# Patient Record
Sex: FEMALE | Race: WHITE | HISPANIC OR LATINO | Employment: OTHER | URBAN - METROPOLITAN AREA
[De-identification: names, ages, dates, MRNs, and addresses within clinical notes are randomized per-mention and may not be internally consistent; named-entity substitution may affect disease eponyms.]

---

## 2017-01-10 ENCOUNTER — HOSPITAL ENCOUNTER (OUTPATIENT)
Dept: RADIOLOGY | Facility: HOSPITAL | Age: 72
Discharge: HOME | End: 2017-01-10
Payer: COMMERCIAL

## 2017-01-10 DIAGNOSIS — Z12.31 ENCOUNTER FOR SCREENING MAMMOGRAM FOR MALIGNANT NEOPLASM OF BREAST: ICD-10-CM

## 2017-01-10 DIAGNOSIS — J32.9 CHRONIC SINUSITIS: ICD-10-CM

## 2017-01-10 PROCEDURE — G0202 SCR MAMMO BI INCL CAD: HCPCS

## 2017-01-11 ENCOUNTER — GENERIC CONVERSION - ENCOUNTER (OUTPATIENT)
Dept: OTHER | Facility: OTHER | Age: 72
End: 2017-01-11

## 2017-05-05 ENCOUNTER — ALLSCRIPTS OFFICE VISIT (OUTPATIENT)
Dept: OTHER | Facility: OTHER | Age: 72
End: 2017-05-05

## 2017-05-05 ENCOUNTER — APPOINTMENT (OUTPATIENT)
Dept: LAB | Facility: CLINIC | Age: 72
End: 2017-05-05
Payer: COMMERCIAL

## 2017-05-05 DIAGNOSIS — M65.311 TRIGGER THUMB OF RIGHT HAND: ICD-10-CM

## 2017-05-05 LAB
EST. AVERAGE GLUCOSE BLD GHB EST-MCNC: 123 MG/DL
HBA1C MFR BLD: 5.9 % (ref 4.2–6.3)

## 2017-05-05 PROCEDURE — 36415 COLL VENOUS BLD VENIPUNCTURE: CPT

## 2017-05-05 PROCEDURE — 83036 HEMOGLOBIN GLYCOSYLATED A1C: CPT

## 2017-06-08 ENCOUNTER — HOSPITAL ENCOUNTER (OUTPATIENT)
Dept: RADIOLOGY | Facility: CLINIC | Age: 72
Discharge: HOME | End: 2017-06-08
Payer: COMMERCIAL

## 2017-06-08 ENCOUNTER — ALLSCRIPTS OFFICE VISIT (OUTPATIENT)
Dept: OTHER | Facility: OTHER | Age: 72
End: 2017-06-08

## 2017-06-08 DIAGNOSIS — M79.643 PAIN OF HAND: ICD-10-CM

## 2017-06-08 PROCEDURE — 73130 X-RAY EXAM OF HAND: CPT

## 2017-11-22 ENCOUNTER — HOSPITAL ENCOUNTER (EMERGENCY)
Facility: HOSPITAL | Age: 72
Discharge: HOME | End: 2017-11-22
Attending: EMERGENCY MEDICINE
Payer: COMMERCIAL

## 2017-11-22 ENCOUNTER — APPOINTMENT (EMERGENCY)
Dept: RADIOLOGY | Facility: HOSPITAL | Age: 72
End: 2017-11-22
Payer: COMMERCIAL

## 2017-11-22 VITALS
RESPIRATION RATE: 16 BRPM | WEIGHT: 119 LBS | OXYGEN SATURATION: 98 % | TEMPERATURE: 97.9 F | SYSTOLIC BLOOD PRESSURE: 171 MMHG | HEART RATE: 95 BPM | DIASTOLIC BLOOD PRESSURE: 107 MMHG

## 2017-11-22 DIAGNOSIS — S66.911A STRAIN OF RIGHT WRIST, INITIAL ENCOUNTER: Primary | ICD-10-CM

## 2017-11-22 PROCEDURE — 73110 X-RAY EXAM OF WRIST: CPT

## 2017-11-22 PROCEDURE — 73130 X-RAY EXAM OF HAND: CPT

## 2017-11-22 PROCEDURE — 99283 EMERGENCY DEPT VISIT LOW MDM: CPT

## 2017-11-22 NOTE — ED PROVIDER NOTES
History  Chief Complaint   Patient presents with    Wrist Injury     pt tripped over curb falling onto right wrist  denies hitting head, no loc, no neck pain, denies thinners  Patient is a 71-year-old female presenting to the ER today with injury to the right wrist and right hand  Patient states that she was going to her mailbox yesterday evening and tripped on her left foot falling forward and catching herself on her right hand and wrist   Denied hitting head or losing consciousness  Patient was able to get herself up  Patient took aspirin for pain  Did not have a ride to the hospital until this morning  On exam patient has pain to palpation over the radial ulnar aspects of the distal right upper extremity  Pain over all MCPs  No obvious deformity however  Assessment plan:  71-year-old female with mechanical fall will check x-ray right wrist and right hand  History provided by:  Patient   used: No        None       Past Medical History:   Diagnosis Date    Asthma     Fibromyalgia     Irritable bowel syndrome        History reviewed  No pertinent surgical history  History reviewed  No pertinent family history  I have reviewed and agree with the history as documented  Social History   Substance Use Topics    Smoking status: Current Every Day Smoker    Smokeless tobacco: Never Used    Alcohol use No        Review of Systems   Constitutional: Negative  Negative for appetite change, chills, diaphoresis, fatigue and fever  HENT: Negative  Eyes: Negative  Respiratory: Negative  Cardiovascular: Negative  Gastrointestinal: Negative for abdominal pain, blood in stool, constipation, diarrhea, nausea and vomiting  Endocrine: Negative  Genitourinary: Negative for decreased urine volume, difficulty urinating, dyspareunia, dysuria, flank pain, frequency, hematuria, pelvic pain, urgency, vaginal bleeding, vaginal discharge and vaginal pain  Musculoskeletal: Positive for arthralgias, joint swelling and myalgias  Skin: Negative  Allergic/Immunologic: Negative  Neurological: Negative  Psychiatric/Behavioral: Negative  All other systems reviewed and are negative  Physical Exam  ED Triage Vitals   Temperature Pulse Respirations Blood Pressure SpO2   11/22/17 0709 11/22/17 0709 11/22/17 0709 11/22/17 0710 11/22/17 0709   97 9 °F (36 6 °C) 95 16 (!) 171/107 98 %      Temp Source Heart Rate Source Patient Position - Orthostatic VS BP Location FiO2 (%)   11/22/17 0709 -- -- -- --   Oral          Pain Score       11/22/17 0706       8           Orthostatic Vital Signs  Vitals:    11/22/17 0709 11/22/17 0710   BP:  (!) 171/107   Pulse: 95        Physical Exam   Constitutional: She is oriented to person, place, and time  She appears well-developed and well-nourished  HENT:   Head: Normocephalic and atraumatic  Right Ear: External ear normal    Left Ear: External ear normal    Nose: Nose normal    Mouth/Throat: Oropharynx is clear and moist    Eyes: Conjunctivae and EOM are normal  Pupils are equal, round, and reactive to light  Neck: Normal range of motion  Neck supple  No JVD present  No tracheal deviation present  No thyromegaly present  Cardiovascular: Normal rate, regular rhythm, normal heart sounds and intact distal pulses  Exam reveals no gallop and no friction rub  No murmur heard  Pulmonary/Chest: Effort normal and breath sounds normal  No stridor  No respiratory distress  She has no wheezes  She has no rales  She exhibits no tenderness  Abdominal: Soft  Bowel sounds are normal  She exhibits no distension and no mass  There is no tenderness  There is no rebound and no guarding  No hernia  Musculoskeletal: Normal range of motion  She exhibits tenderness  She exhibits no edema or deformity  Right wrist: She exhibits tenderness and bony tenderness  She exhibits normal range of motion and no swelling          Right hand: She exhibits tenderness and bony tenderness  She exhibits normal range of motion, normal two-point discrimination, normal capillary refill, no deformity, no laceration and no swelling  Normal sensation noted  Decreased sensation is not present in the ulnar distribution, is not present in the medial distribution and is not present in the radial distribution  Normal strength noted  She exhibits no finger abduction, no thumb/finger opposition and no wrist extension trouble  Lymphadenopathy:     She has no cervical adenopathy  Neurological: She is alert and oriented to person, place, and time  She has normal reflexes  She displays normal reflexes  No cranial nerve deficit  She exhibits normal muscle tone  Coordination normal    Skin: Skin is warm  No rash noted  No erythema  No pallor  Psychiatric: She has a normal mood and affect  Her behavior is normal  Judgment and thought content normal    Nursing note and vitals reviewed        ED Medications  Medications - No data to display    Diagnostic Studies  Results Reviewed     None                 XR hand 3+ views RIGHT   ED Interpretation by Nivia Morse DO (11/22 0759)      XR wrist 3+ views RIGHT    (Results Pending)         Procedures  Procedures      Phone Consults  ED Phone Contact    ED Course  ED Course                                MDM  Number of Diagnoses or Management Options  Strain of right wrist, initial encounter: new and requires workup     Amount and/or Complexity of Data Reviewed  Clinical lab tests: ordered and reviewed  Tests in the radiology section of CPT®: ordered and reviewed  Tests in the medicine section of CPT®: reviewed and ordered  Decide to obtain previous medical records or to obtain history from someone other than the patient: yes  Independent visualization of images, tracings, or specimens: yes    Patient Progress  Patient progress: stable    CritCare Time    Disposition  Final diagnoses:   Strain of right wrist, initial encounter     Time reflects when diagnosis was documented in both MDM as applicable and the Disposition within this note     Time User Action Codes Description Comment    11/22/2017  8:02 AM Aleyda Code Add [E24 321K] Strain of right wrist, initial encounter       ED Disposition     ED Disposition Condition Comment    Discharge  Michel Rivera discharge to home/self care  Condition at discharge: Good        Follow-up Information     Follow up With Specialties Details Why Contact Info    Nabor Washington MD Internal Medicine Schedule an appointment as soon as possible for a visit  [address] [place] [address]  [phone]          Patient's Medications    No medications on file     No discharge procedures on file  ED Provider  Attending physically available and evaluated Michel Rivera I managed the patient along with the ED Attending      Electronically Signed by         Xi Kovacs DO  Resident  11/22/17 6399

## 2017-11-22 NOTE — ED ATTENDING ATTESTATION
Yudy Uriarte MD, saw and evaluated the patient  I have discussed the patient with the resident/non-physician practitioner and agree with the resident's/non-physician practitioner's findings, Plan of Care, and MDM as documented in the resident's/non-physician practitioner's note, except where noted  All available labs and Radiology studies were reviewed  At this point I agree with the current assessment done in the Emergency Department  I have conducted an independent evaluation of this patient a history and physical is as follows:     The patient presents with complaints of hand injury after a fall on an outstretched hands last evening no other injuries just complains of pain over the dorsal aspect of the hand  Mild swelling and tenderness over the dorsal aspect of her 2nd 3rd meta carpal no skin violation normal pulses normal sensation normal motor function wrist is nontender  X-ray negative for fracture    Critical Care Time  CritCare Time

## 2017-11-22 NOTE — DISCHARGE INSTRUCTIONS
Wrist Injury   WHAT YOU NEED TO KNOW:   A wrist injury happens when the tissues of your wrist joint are damaged  Your wrist joint is made up of tendons, ligaments, nerves, and bones  Two common types of injuries that can happen to your wrist are sprains and strains  A sprain can happen when the ligaments are stretched or torn  Ligaments are bands of elastic tissue that connect and hold the bones together  A strain happens when a tendon or muscle is overused, stretched, or torn  Tendons attach your hand and arm muscles to the bones of the wrist    DISCHARGE INSTRUCTIONS:   Medicines:   · NSAIDs:  These medicines decrease swelling, pain, and fever  NSAIDs are available without a doctor's order  Ask which medicine is right for you  Ask how much to take and when to take it  Take as directed  NSAIDs can cause stomach bleeding and kidney problems if not taken correctly  · Pain medicine: You may be given a prescription medicine to decrease pain  Do not wait until the pain is severe before you take this medicine  · Take your medicine as directed  Contact your healthcare provider if you think your medicine is not helping or if you have side effects  Tell him of her if you are allergic to any medicine  Keep a list of the medicines, vitamins, and herbs you take  Include the amounts, and when and why you take them  Bring the list or the pill bottles to follow-up visits  Carry your medicine list with you in case of an emergency  Follow up with your healthcare provider as directed:  Write down your questions so you remember to ask them during your visits  Manage your symptoms:   · Wrist supports:  A cast or splint may be put on your fingers, hand, and wrist to support your wrist and prevent further damage  Wear these as directed  Ask for instructions on how to bathe while you are wearing a splint or case  · Rest:  You may need to rest your wrist for at least 48 hours and avoid activities that cause pain   Ask what activities you should avoid and for how long  · Ice:  Ice helps decrease swelling and pain  Ice may also help prevent tissue damage  Use an ice pack or put crushed ice in a plastic bag  Cover it with a towel and place it on your injured wrist for 15 to 20 minutes every hour as directed  · Compression:  Your healthcare provider may suggest you wrap your wrist with an elastic bandage  This will help decrease swelling, support your wrist, and help it heal  Wear your wrist wrap as directed  Ask for instructions on how to wrap your wrist     · Elevation:  When you sit or lie down, keep your wrist at or above the level of your heart  This may help decrease pain and swelling  Physical therapy:  Your healthcare provider may recommend that you go to physical therapy  A physical therapist shows you how to do exercises that can help to strengthen your wrist and improve its range of movement  These exercises may also help decrease your pain  Prevent another wrist injury:   · Do strengthening exercises: Your healthcare provider or physical therapist may suggest that you do exercises to strengthen your hand and arm muscles  Ask when you may return to your regular physical activities or sports  If you start to exercise too soon it may cause you to injure your wrist again  · Protect your wrists:  Wrist guard splints or protective tape can help to support your wrist during exercise and sports  These devices may also keep your wrist from bending too far back  Ask for more information about the type of wrist support that you should use  Contact your healthcare provider if:   · You have a fever  · The bruising, swelling, or pain in your wrist gets worse  · You have questions or concerns about your condition or care  Return to the emergency department if:   · The skin on or near your wrist or hand feels cold, or it turns blue or white      · The skin on or near your wrist or hand is very tight, raised, and swollen  · You have new trouble moving and using your hands, fingers, or wrist     · Your wrist, hands, or fingers become swollen, red, numb, or they tingle  · Your wrist has any open wounds, including from surgery, that are red, swollen, warm, or have pus coming from them  © 2017 [employer] Information is for End User's use only and may not be sold, redistributed or otherwise used for commercial purposes  All illustrations and images included in CareNotes® are the copyrighted property of [employer]  or Dewayne Melendez  The above information is an  only  It is not intended as medical advice for individual conditions or treatments  Talk to your doctor, nurse or pharmacist before following any medical regimen to see if it is safe and effective for you

## 2017-11-29 ENCOUNTER — GENERIC CONVERSION - ENCOUNTER (OUTPATIENT)
Dept: OTHER | Facility: OTHER | Age: 72
End: 2017-11-29

## 2017-12-11 ENCOUNTER — APPOINTMENT (OUTPATIENT)
Dept: RADIOLOGY | Facility: CLINIC | Age: 72
End: 2017-12-11
Payer: COMMERCIAL

## 2017-12-11 ENCOUNTER — ALLSCRIPTS OFFICE VISIT (OUTPATIENT)
Dept: OTHER | Facility: OTHER | Age: 72
End: 2017-12-11

## 2017-12-11 DIAGNOSIS — S63.641A SPRAIN OF METACARPOPHALANGEAL JOINT OF RIGHT THUMB: ICD-10-CM

## 2017-12-11 DIAGNOSIS — M79.646 PAIN OF FINGER: ICD-10-CM

## 2017-12-11 DIAGNOSIS — Z12.31 ENCOUNTER FOR SCREENING MAMMOGRAM FOR MALIGNANT NEOPLASM OF BREAST: ICD-10-CM

## 2017-12-11 DIAGNOSIS — S63.641D SPRAIN OF METACARPOPHALANGEAL JOINT OF RIGHT THUMB, SUBSEQUENT ENCOUNTER: ICD-10-CM

## 2017-12-11 PROCEDURE — 73140 X-RAY EXAM OF FINGER(S): CPT

## 2017-12-12 NOTE — PROGRESS NOTES
Assessment    1  Rupture of ulnar collateral ligament of right thumb, initial encounter (842 12) (W32 506U)    Plan  Finger pain    · * XR FINGER RIGHT FIFTH DIGIT-PINKIE; Status:Active; Requested for:92Smu2882;   Rupture of ulnar collateral ligament of right thumb, initial encounter    · Follow Up After Tests Complete Evaluation and Treatment  Follow-up  Status: Hold For -Scheduling,Retrospective Authorization  Requested for: 48GKE8408   · Continue with our present treatment plan ; Status:Complete - RetrospectiveAuthorization;   Done: 86ARB5346   · US MSK COMPLETE; Status:Hold For - Scheduling; Requested for:55Fuw7386;   Sprain of right little finger, initial encounter    · Follow-up PRN Evaluation and Treatment  Follow-up  Status: Complete - RetrospectiveAuthorization  Done: 72TLB8014   · Apply an ice pack as needed for pain twice a day for 20 minutes ; Status:Complete -Retrospective Authorization;   Done: 97OGF2394    Discussion/Summary    Assessment: R small finger sprain, possible R thumb UCL tear  up after US is complete  tape R small finger for sprain, ice prn  note was dictated with dictation software  As such, and may contain typographical errors, improperly dictated words, background noise, or other errors  patient is a 71year old female who presents today with R thumb and small finger pain  Pt c/o pain at her best 8/10 and worst 10/10  Pt reports a fall on 11/22/17  pt takes asprin for pain   Pt is waking at night due to the pain  past medical history, surgical history, family history, social history, medications, allergies, and review of systems have been read and reviewed on the chart and have been updated  Review of Systems was recorded in the office today on a separate evaluation sheet and is listed below    of Systems:NegativeNegativeNegativeNegativeNegativeNegativeNegative except as aboveAs aboveNegative except as aboveNegativeNegative    / Psych: Awake and Oriented, No acute distress, age appropriateNormocephalic, atraumatic, mucous membranes moist, neck supple, trachea midline  No rebound or signs of guardingNo discernible arrhythmia, 2+ pulses with good capillary refillNo audible wheezing or audible stridor[The patient is neurovascularly intact in the median, ulnar, and radial nerve distribution  There is normal sensation and good capillary refill within the digits  2+ pulses  ][No lesions, rashes, streaking, purulence, abscesses, lymphangitis] ecchymosis on volar side of R small finger and MCP jt of R thumb negative grind test  UCL radial stress testing showed laxity  Studies: new xrays were ordered today in the office  They were reviewed and discussed with the patient  The xray was normal   attestation:  VITOR Sylvester, ATC, am acting as a scribe while in the presence of the attending physician  Chief Complaint  1  Finger Problem    Active Problems    1  Allergic rhinitis (477 9) (J30 9)   2  Asthma, very poorly controlled (493 90) (J45 909)   3  Breast cyst, right (610 0) (N60 01)   4  Chronic sinusitis (473 9) (J32 9)   5  Classic migraine with aura (346 00) (G43 109)   6  Deficient knowledge of health care maintenance   7  Encounter for screening colonoscopy (V76 51) (Z12 11)   8  Encounter for screening mammogram for high-risk patient (V76 11) (Z12 31)   9  Encounter for screening mammogram for malignant neoplasm of breast (V76 12) (Z12 31)   10  Exacerbation of persistent asthma (493 92) (J45 901)   11  GERD (gastroesophageal reflux disease) (530 81) (K21 9)   12  Hand pain (729 5) (M79 643)   13  Metacarpophalangeal joint pain of right hand (719 44) (M79 641)   14  Mild persistent asthma with acute exacerbation (493 92) (J45 31)   15  Neck muscle strain (847 0) (S16 1XXA)   16  Need for lipid screening (V77 91) (Z13 220)   17  Pain of right thumb (729 5) (M79 644)   18  Renal cyst (753 10) (N28 1)   19  S/P small bowel resection (V45 89) (Z90 49)   20   Screening for cardiovascular condition (V81 2) (Z13 6)   21  Seasonal allergies (477 9) (J30 2)   22  Sinusitis (473 9) (J32 9)   23  Sprain of right wrist, subsequent encounter (V58 89,842 00) (S63 501D)   24  Tobacco use (305 1) (Z72 0)   25  Trigger finger of right thumb (727 03) (M65 311)    Past Medical History   · History of Bronchitis, asthmatic (493 90) (J45 909)   · History of Chronic diarrhea (787 91) (K52 9)   · History of Fibromyalgia (729 1) (M79 7)   · History of Focal nodular hyperplasia of liver (573 8) (K76 89)   · History of diarrhea (V12 79) (E64 883)   · History of herpes zoster (V12 09) (Z86 19)   · History of vertigo (V12 49) (Z87 898)   · History of Irritable bowel syndrome (564 1) (K58 9)   · History of Osteoarthritis (V13 4)   · History of Palpitations Intermittently   · History of URTI (acute upper respiratory infection) (465 9) (J06 9)    Surgical History   · History of Biopsy Breast Open   · History of Small Bowel Resection    Family History   · Family history of Aspiration of vomit   · Family history of Asthma   · Family history of Breast CA    Social History     · Cigarette smoker (305 1) (F17 210)   · Current Smoker (305 1)   · Exercising Regularly   · Lives with adult children   · Marital History -    · Mental Disability: (319)   · Never Drank Alcohol   · Tobacco use (305 1) (Z72 0)   · Unemployed (V62 0)    Current Meds   1  Albuterol Sulfate (2 5 MG/3ML) 0 083% Inhalation Nebulization Solution; USE 1 UNIT DOSE EVERY 4-6 HOURS AS NEEDED FOR WHEEZING ; Therapy: 93GSA8028 to (Evaluate:87Bnx8432)  Requested for: 29EED3932; Last Rx:33Txg5694 Ordered   2  Flovent  MCG/ACT Inhalation Aerosol; INHALE 2 PUFFS TWICE DAILY  RINSE MOUTH AFTER USE; Therapy: 60Ode7397 to (Evaluate:28Jan2018)  Requested for: 80VJL9737; Last Rx:29Nov2017 Ordered   3   Fluticasone Propionate 50 MCG/ACT Nasal Suspension; USE 2 SPRAYS IN EACH NOSTRIL ONCE DAILY  Requested for: 60TJT8227; Last Rx:29Nov2017; Status: 1554 Surgeons  to Pharmacy - Awaiting Verification Ordered   4  Ketorolac Tromethamine 0 4 % Ophthalmic Solution; INSTILL 1 DROP INTO AFFECTED EYE(S) 4 TIMES DAILY; Therapy: 27AVH1837 to (Last LT:78CMW2583)  Requested for: 10PFT4432 Ordered   5  Loratadine 10 MG Oral Tablet; TAKE 1 TABLET DAILY  Requested for: 49QWO4335; Last Rx:40Aoe8710 Ordered   6  Metoclopramide HCl - 5 MG Oral Tablet; TAKE 0 5 TABLET Every 6 hours PRN; Therapy: 44EQR4859 to (Leo Schaeffer)  Requested for: 95LIZ7480; Last VV:49WRI3701 Ordered   7  Nebulizer Device; USE AS DIRECTED; Therapy: 11TTL5835 to (Last Rx:06Mar2015)  Requested for: 16GFS6285 Ordered   8  Omeprazole 20 MG Oral Capsule Delayed Release; TAKE 1 CAPSULE DAILY; Therapy: 28Nft1827 to (Evaluate:28Mct0673)  Requested for: 15PYO1577; Last SQ:12IVL7251 Ordered   9  Ventolin  (90 Base) MCG/ACT Inhalation Aerosol Solution; inhale 1-2 puffs every 4-6 hours only for wheezing and severe shortness of breath not for daily use; Therapy: 73Zie9887 to (Faisal Mora)  Requested for: 48FGU5973; Last Rx:29Nov2017 Ordered   10  ZyrTEC Allergy 10 MG Oral Capsule; take 1 capsule daily; Therapy: 34YTU5978 to (Leo Schaeffer)  Requested for: 63JFM0191; Last  FJ:77KHO6987 Ordered    Allergies  1  Codeine Derivatives   2  Morphine Derivatives   3  Motrin TABS   4  Tramadol  5  No Known Food Allergies   6  Pollen   7   Ragweed    Vitals  Signs   Heart Rate: 89  Systolic: 371  Diastolic: 458  Height: 5 ft 3 in  Weight: 131 lb 4 00 oz  BMI Calculated: 23 25  BSA Calculated: 1 62    Future Appointments    Date/Time Provider Specialty Site   12/21/2017 08:35 AM Huan Jordan DO Internal Medicine [facility] [address] [place] [address] PCP       Signatures   Electronically signed by : Keli Mercado DO; Dec 11 2017  3:31PM EST                       (Author)

## 2017-12-13 ENCOUNTER — HOSPITAL ENCOUNTER (OUTPATIENT)
Dept: RADIOLOGY | Facility: HOSPITAL | Age: 72
Discharge: HOME | End: 2017-12-13
Attending: ORTHOPAEDIC SURGERY | Admitting: RADIOLOGY
Payer: COMMERCIAL

## 2017-12-13 DIAGNOSIS — S63.641A SPRAIN OF METACARPOPHALANGEAL JOINT OF RIGHT THUMB: ICD-10-CM

## 2017-12-13 PROCEDURE — 76882 US LMTD JT/FCL EVL NVASC XTR: CPT

## 2017-12-15 ENCOUNTER — GENERIC CONVERSION - ENCOUNTER (OUTPATIENT)
Dept: OTHER | Facility: OTHER | Age: 72
End: 2017-12-15

## 2017-12-21 ENCOUNTER — ALLSCRIPTS OFFICE VISIT (OUTPATIENT)
Dept: OTHER | Facility: OTHER | Age: 72
End: 2017-12-21

## 2017-12-22 NOTE — PROGRESS NOTES
Assessment   1  Asthma, very poorly controlled (493 90) (J45 909)   2  Chronic sinusitis (473 9) (J32 9)   3  Tobacco use (305 1) (Z72 0)   4  Need for prophylactic vaccination and inoculation against influenza (V04 81) (Z23)   5  Visit for screening mammogram (V76 12) (Z12 31)    Plan   Allergic rhinitis    · Fluticasone Propionate 50 MCG/ACT Nasal Suspension (Flonase)   · Loratadine 10 MG Oral Tablet   · [facility]S Nasal Mist 0 9 % Nasal Aerosol Solution; USE AS DIRECTED ON PACKAGE   · ZyrTEC Allergy 10 MG Oral Capsule; take 1 capsule daily  Asthma, very poorly controlled    · Advair Diskus 250-50 MCG/DOSE Inhalation Aerosol Powder Breath Activated; INHALE 1 PUFF    TWICE DAILY  RINSE MOUTH AFTER USE  Encounter for screening mammogram for high-risk patient, Rupture of ulnar collateral ligament of    right thumb, subsequent encounter    · * MAMMO SCREENING BILATERAL W CAD; Status:Hold For - Scheduling; Requested for:66Sto4533;      Exacerbation of persistent asthma    · Albuterol Sulfate (2 5 MG/3ML) 0 083% Inhalation Nebulization Solution; USE 1 UNIT DOSE    EVERY 4-6 HOURS AS NEEDED FOR WHEEZING   Health Maintenance    · Ventolin  (90 Base) MCG/ACT Inhalation Aerosol Solution; inhale 1-2 puffs every    4-6 hours only for wheezing and severe shortness of breath not for daily use  PMH: Bronchitis, asthmatic    · Flovent  MCG/ACT Inhalation Aerosol  Trigger finger of right thumb    · Ketorolac Tromethamine 0 4 % Ophthalmic Solution    Discussion/Summary   Discussion Summary:    Discussed with patient the need for smoking cessation  Explained to patient and given her poorly-controlled asthma that smoking cessation is likely going to be the best remedy for this  Patient remains pre contemplative about this action at this time  Patient is resistant to stopping smoking as she states that she uses smoking as a relief mechanism for her anxiety  with patient the need for influenza vaccine   Patient states that she is allergic to eggs at this time  Patient states that she is resistant to getting the flu vaccine  The the attending spoke with the patient discuss that allergies to eggs is no longer considered an absolute contraindication to flu vaccination at the patient was offered flu vaccination  She remains hesitant and declines vaccine at this time  with patient the need for follow-up care through her orthopedic surgeon for the ruptured ulnar collateral ligament of her thumb on the right  Patient verbalized understanding and states that she has an appointment to follow up with him in February  with patient the need for use of saline nasal spray for chronic sinusitis  Patient states that she wakes up each morning with difficulty breathing through her nose is a chronic condition for her patient states that the fluticasone has not been helpful for her  At this time I explained to the patient that since the fluticasone has not been helpful for that switching to saline more frequently using this may be helpful    with patient the need for follow-up and moderate feet patient had a mammogram approximately 1 year ago in January of 2017  I will give her a script to go for a follow-up mammogram in 1 year as requested by the radiologist    issues with asthma control  At this point it appears to be more seasonal  We will change her medications to advair for the winter and will re-evaluate in the spring  should follow up in 3 months for re-evaluation, particularly for the asthma control  Counseling Documentation With Imm: The patient was counseled regarding diagnostic results,-- instructions for management,-- risk factor reductions,-- impressions,-- risks and benefits of treatment options,-- importance of compliance with treatment  Immunization Counseling The parent/guardian was counseled on the following vaccine components: Influenza vaccine      Medication SE Review and Pt Understands Tx: Possible side effects of new medications were reviewed with the patient/guardian today  The treatment plan was reviewed with the patient/guardian  The patient/guardian understands and agrees with the treatment plan      Chief Complaint   Chief Complaint Free Text Note Form: Asthma, UCL of r thumb rupture, screening mammo, smoking cessation      History of Present Illness   HPI: Pt is 72 yo female with multiple medical conditions here for a f/u of her chronic medical conditions  recently fell and injured her Rt thumb  She has seen orthopaedic surgery and was diagnosis with an UCL of the R thumb  She is wearing a thumb spica splint for this and is to follow up with him again in February  pt states that she is using her nebulizer twice daily  PT states that she has been running out of her inhalers  Pt continues to use her flovent twice daily and her ventolin twice daily  (upon further questioning by the attending, she states that this is only occurs in the winter ) Pt states that she continues to smoke 5-7 cigarettes  Pt states that she needs refills on all of her inhalers and nebulizers  She continues to have subjective wheezing, particularly when her sinuses are congestioned  for mammography  Pt had a mammo done in 1/2017 and needs follow up in one year  states that she cannot receive an influenza vaccine 2/2 egg allergy, this was discussed with her by the attending     smoking cessation, but the patient is pre-contemplative and states that she needs it for my nerves, its like my own personal drug  Hospital Based Practices Required Assessment:      Pain Assessment      the patient states they have pain  The pain is located in the all over body  The patient describes the pain as aching  (on a scale of 0 to 10, the patient rates the pain at 8 )       Prefered Language is  english  Primary Language is  english        Review of Systems   Complete-Female:      Constitutional: chills, but-- no fever,-- not feeling poorly-- and-- not feeling tired  Eyes: purulent discharge from the eyes-- and-- itching of the eyes, but-- no eye pain,-- no eyesight problems-- and-- eyes not red  ENT: no earache,-- no nosebleeds,-- no hearing loss-- and-- no nasal discharge  Cardiovascular: palpitations, but-- the heart rate was not slow,-- no chest pain-- and-- the heart rate was not fast       Respiratory: cough-- and-- wheezing, but-- no shortness of breath-- and-- no shortness of breath during exertion  Gastrointestinal: diarrhea, but-- no abdominal pain,-- no nausea,-- no vomiting-- and-- no constipation  Genitourinary: no dysuria-- and-- no incontinence  Musculoskeletal: arthralgias-- and-- myalgias, but-- no joint swelling-- and-- no joint stiffness  Integumentary: itching, but-- no rashes,-- no skin lesions-- and-- no skin wound  Neurological: headache  ROS Reviewed:    ROS reviewed  Active Problems   1  Allergic rhinitis (477 9) (J30 9)   2  Asthma, very poorly controlled (493 90) (J45 909)   3  Breast cyst, right (610 0) (N60 01)   4  Chronic sinusitis (473 9) (J32 9)   5  Classic migraine with aura (346 00) (G43 109)   6  Deficient knowledge of health care maintenance   7  Encounter for screening colonoscopy (V76 51) (Z12 11)   8  Encounter for screening mammogram for high-risk patient (V76 11) (Z12 31)   9  Encounter for screening mammogram for malignant neoplasm of breast (V76 12) (Z12 31)   10  Exacerbation of persistent asthma (493 92) (J45 901)   11  Finger pain (729 5) (M79 646)   12  GERD (gastroesophageal reflux disease) (530 81) (K21 9)   13  Hand pain (729 5) (M79 643)   14  Metacarpophalangeal joint pain of right hand (719 44) (M79 641)   15  Mild persistent asthma with acute exacerbation (493 92) (J45 31)   16  Neck muscle strain (847 0) (S16 1XXA)   17  Need for lipid screening (V77 91) (Z13 220)   18  Pain of right thumb (729 5) (M79 644)   19  Renal cyst (753 10) (N28 1)   20  Rupture of ulnar collateral ligament of right thumb, initial encounter (842 12) (S63 641A)   21  S/P small bowel resection (V45 89) (Z90 49)   22  Screening for cardiovascular condition (V81 2) (Z13 6)   23  Seasonal allergies (477 9) (J30 2)   24  Sinusitis (473 9) (J32 9)   25  Sprain of right little finger, initial encounter (842 10) (S63 616A)   26  Sprain of right wrist, subsequent encounter (V58 89,842 00) (S63 501D)   27  Tobacco use (305 1) (Z72 0)   28  Trigger finger of right thumb (727 03) (M65 311)    Past Medical History   1  History of Bronchitis, asthmatic (493 90) (J45 909)   2  History of Chronic diarrhea (787 91) (K52 9)   3  History of Fibromyalgia (729 1) (M79 7)   4  History of Focal nodular hyperplasia of liver (573 8) (K76 89)   5  History of diarrhea (V12 79) (Z87 898)   6  History of herpes zoster (V12 09) (Z86 19)   7  History of vertigo (V12 49) (Z87 898)   8  History of Irritable bowel syndrome (564 1) (K58 9)   9  History of Osteoarthritis (V13 4)   10  History of Palpitations Intermittently   11  History of URTI (acute upper respiratory infection) (465 9) (J06 9)  Active Problems And Past Medical History Reviewed: The active problems and past medical history were reviewed and updated today  Surgical History   1  History of Biopsy Breast Open   2  History of Small Bowel Resection  Surgical History Reviewed: The surgical history was reviewed and updated today  Family History   Mother    1  Family history of Aspiration of vomit  Father    2  Family history of Asthma  Sister    3  Family history of Breast CA  Family History    4  Family history of arthritis (V17 7) (Z82 61)   5  Family history of musculoskeletal disease (V17 89) (Z82 69)   6  Family history of osteoporosis (V17 81) (Z82 62)  Family History Reviewed: The family history was reviewed and updated today         Social History    · Cigarette smoker (305 1) (F17 210)   · Current Smoker (305 1)   · Exercising Regularly   · Lives with adult children   · Marital History -    · Mental Disability: [phone] 6780-0655)   · Never Drank Alcohol   · Tobacco use (305 1) (Z72 0)   · Unemployed (V62 0)  Social History Reviewed: The social history was reviewed and updated today  The social history was reviewed and is unchanged  Current Meds    1  Albuterol Sulfate (2 5 MG/3ML) 0 083% Inhalation Nebulization Solution; USE 1 UNIT DOSE EVERY     4-6 HOURS AS NEEDED FOR WHEEZING ; Therapy: 81UBP3654 to (Evaluate:04Sep2015)  Requested for: 08QWP3634; Last Rx:10Tvf7729     Ordered   2  Flovent  MCG/ACT Inhalation Aerosol; INHALE 2 PUFFS TWICE DAILY  RINSE MOUTH AFTER     USE; Therapy: 44Yeo2032 to (Evaluate:28Jan2018)  Requested for: 17YQH8861; Last Rx:29Nov2017     Ordered   3  Fluticasone Propionate 50 MCG/ACT Nasal Suspension; USE 2 SPRAYS IN EACH NOSTRIL ONCE     DAILY  Requested for: 09SAV1657; Last Rx:29Nov2017; Status: ACTIVE - Transmit to Pharmacy -     Awaiting Verification Ordered   4  Ketorolac Tromethamine 0 4 % Ophthalmic Solution; INSTILL 1 DROP INTO AFFECTED EYE(S) 4     TIMES DAILY; Therapy: 27FST6670 to (Last TZ:57HYK5516)  Requested for: 83KUJ3592 Ordered   5  Loratadine 10 MG Oral Tablet; TAKE 1 TABLET DAILY  Requested for: 07EUG2626; Last     Rx:24Pau0099 Ordered   6  Metoclopramide HCl - 5 MG Oral Tablet; TAKE 0 5 TABLET Every 6 hours PRN; Therapy: 11IXR6824 to ([facility])  Requested for: 59ZMV7724; Last WM:89WDR2546     Ordered   7  Nebulizer Device; USE AS DIRECTED; Therapy: 17CDZ7193 to (Last Rx:06Mar2015)  Requested for: 87EKM3733 Ordered   8  Omeprazole 20 MG Oral Capsule Delayed Release; TAKE 1 CAPSULE DAILY; Therapy: 44Ycy5419 to (Evaluate:73Wec0516)  Requested for: 99ANH5995; Last HI:00LYZ9853     Ordered   9   Ventolin  (90 Base) MCG/ACT Inhalation Aerosol Solution; inhale 1-2 puffs every 4-6 hours     only for wheezing and severe shortness of breath not for daily use;     Therapy: 82Cek0869 to ([facility])  Requested for: 75UWC3268; Last Rx:29Nov2017     Ordered   10  ZyrTEC Allergy 10 MG Oral Capsule; take 1 capsule daily; Therapy: 90FRQ0161 to (Della Shaw)  Requested for: 51XPI6416; Last CS:67DLJ4439      Ordered    Allergies   1  Codeine Derivatives   2  Morphine Derivatives   3  Motrin TABS   4  Tramadol  5  No Known Food Allergies   6  Pollen   7  Ragweed    Vitals   Vital Signs    Recorded: 21Dec2017 08:29AM   Temperature 98 2 F   Heart Rate 80   Systolic 865   Diastolic 90   Height 5 ft 3 in   Weight 128 lb 11 97 oz   BMI Calculated 22 81   BSA Calculated 1 6     Physical Exam        Constitutional Elderly female, in no acute distress, resting comfortably in the chair  Eyes      Conjunctiva and lids: No swelling, erythema or discharge  Pupils and irises: Equal, round and reactive to light  Ears, Nose, Mouth, and Throat      External inspection of ears and nose: Normal        Nasal mucosa, septum, and turbinates: Normal without edema or erythema  Oropharynx: Normal with no erythema, edema, exudate or lesions  -- Moist mucous membranes come no exudates or post nasal drainage my exam       Pulmonary      Respiratory effort: No increased work of breathing or signs of respiratory distress  -- No accessory muscle recruitment, no costal retraction, no tugging  -- Diffusely diminished lung sounds, no signifiicant wheezing on exam, bilaterally anterior posterior  Cardiovascular      Auscultation of heart: Normal rate and rhythm, normal S1 and S2, without murmurs  Examination of extremities for edema and/or varicosities: Normal        Abdomen      Abdomen: Non-tender, no masses  -- No tenderness to palpation in all 4 quadrants, normal bowel sounds in all 4 quadrants  Musculoskeletal      Digits and nails: Normal without clubbing or cyanosis         Inspection/palpation of joints, bones, and muscles: Normal        Skin Skin and subcutaneous tissue: Normal without rashes or lesions  Attending Note   Attending Note: Attending Note: I interviewed and examined the patient,-- I discussed the case with the Resident and reviewed the Resident's note-- and-- I supervised the Resident  Level of Participation: I was present in clinic and examined the patient  Patient's History: Patient has chronic nasal allergic symptoms despite using Flovent  She reports she saw an ENT specialist two years ago and no other treatment options were available  Key Parts of the Exam: Heart reg Lungs: clear  Diagnosis and Plan: Agree with A&P  In addition, would recommend POC spirometry when she returns in the spring  The patient is also willing to have a Medicare annual visit  Information on advanced directives given  Future Appointments      Date/Time Provider Specialty Site   02/21/2018 08:00 AM TRACI Oreilly   Orthopedic [address] [place] [address]     Signatures    Electronically signed by : Ghulam Arciniega DO; Dec 21 2017  9:35AM EST                       (Author)     Electronically signed by : Ghulam Arciniega DO; Dec 21 2017  9:54AM EST                       (Author)     Electronically signed by : TRACI العراقي ; Dec 21 2017 10:58AM EST                       (Co-participant)

## 2018-01-13 VITALS
DIASTOLIC BLOOD PRESSURE: 106 MMHG | WEIGHT: 126 LBS | HEIGHT: 63 IN | HEART RATE: 96 BPM | SYSTOLIC BLOOD PRESSURE: 156 MMHG | BODY MASS INDEX: 22.32 KG/M2

## 2018-01-15 VITALS
HEIGHT: 63 IN | SYSTOLIC BLOOD PRESSURE: 170 MMHG | HEART RATE: 88 BPM | BODY MASS INDEX: 23.44 KG/M2 | WEIGHT: 132.28 LBS | DIASTOLIC BLOOD PRESSURE: 100 MMHG | TEMPERATURE: 98.1 F

## 2018-01-18 NOTE — RESULT NOTES
Message   Please call patient and let her know that the report of her mammogram was negative and that she will need her regular screening mammogram in 1 year  Verified Results  * MAMMO SCREENING BILATERAL W CAD 17TXG3744 08:49AM Anay Morley Order Number: [id number]    - Patient Instructions: To schedule this appointment, please contact Central Scheduling at [phone]  Do not wear any perfume, powder, lotion or deodorant on breast or underarm area  Please bring your doctors order, referral (if needed) and insurance information with you on the day of the test  Failure to bring this information may result in this test being rescheduled  Arrive 15 minutes prior to your appointment time to register  On the day of your test, please bring any prior mammogram or breast studies with you that were not performed at a [facility]  Failure to bring prior exams may result in your test needing to be rescheduled  Test Name Result Flag Reference   MAMMO SCREENING BILATERAL W CAD (Report)     Patient History:   Patient is postmenopausal    Family history of breast cancer in 2 sisters at age 48 or over  Excisional biopsy of the right breast    Patient is an every day smoker  Patient's BMI is 24 2  Reason for exam: screening (asymptomatic)  Mammo Screening Bilateral W CAD: January 10, 2017 - Check In #:    [de-identified]   Bilateral CC and MLO view(s) were taken  Technologist: RT Marcia(YURIDIA)(M)   Prior study comparison: August 11, 2015, bilateral digital    screening mammogram performed at [address] [place] [address]     May 6, 2009, bilateral digital screening mammogram performed at    [address] [place] [address]  December 19, 2003, bilateral    screening mammogram performed at [address] [place] [address]      There are scattered fibroglandular densities  The parenchymal pattern appears stable  No dominant soft tissue    mass or suspicious calcifications are noted   Stable nodular    densities are seen in both breasts  The skin and nipple contours    are within normal limits  No mammographic evidence of malignancy  No    significant changes when compared with prior studies  ASSESSMENT: BiRad:2 - Benign     Recommendation:   Routine screening mammogram in 1 year  A reminder letter will be   scheduled  Analyzed by CAD     8-10% of cancers will be missed on mammography  Management of a    palpable abnormality must be based on clinical grounds  Patients   will be notified of their results via letter from our facility  Accredited by [employer] of Radiology and FDA       Transcription Location: [facility] 98: [id number]     Risk Value(s):   Tyrer-Cuzick 10 Year: 7 798%, Tyrer-Cuzick Lifetime: 12 208%,    Myriad Table: 1 5%, OMAR 5 Year: 9 6%, NCI Lifetime: 25 5%   Signed by:   Cliff Macdonald MD   1/10/17

## 2018-01-22 VITALS
BODY MASS INDEX: 23.25 KG/M2 | DIASTOLIC BLOOD PRESSURE: 100 MMHG | SYSTOLIC BLOOD PRESSURE: 158 MMHG | WEIGHT: 131.25 LBS | HEART RATE: 89 BPM | HEIGHT: 63 IN

## 2018-01-22 VITALS
SYSTOLIC BLOOD PRESSURE: 158 MMHG | WEIGHT: 129.19 LBS | HEART RATE: 70 BPM | HEIGHT: 63 IN | TEMPERATURE: 98.3 F | BODY MASS INDEX: 22.89 KG/M2 | DIASTOLIC BLOOD PRESSURE: 90 MMHG

## 2018-01-23 VITALS
DIASTOLIC BLOOD PRESSURE: 90 MMHG | SYSTOLIC BLOOD PRESSURE: 148 MMHG | HEART RATE: 80 BPM | WEIGHT: 128.75 LBS | TEMPERATURE: 98.2 F | BODY MASS INDEX: 22.81 KG/M2 | HEIGHT: 63 IN

## 2018-01-23 NOTE — CONSULTS
Chief Complaint    1  Finger Problem    Active Problems    1  Allergic rhinitis (477 9) (J30 9)   2  Asthma, very poorly controlled (493 90) (J45 909)   3  Breast cyst, right (610 0) (N60 01)   4  Chronic sinusitis (473 9) (J32 9)   5  Classic migraine with aura (346 00) (G43 109)   6  Deficient knowledge of health care maintenance   7  Encounter for screening colonoscopy (V76 51) (Z12 11)   8  Encounter for screening mammogram for high-risk patient (V76 11) (Z12 31)   9  Encounter for screening mammogram for malignant neoplasm of breast (V76 12)   (Z12 31)   10  Exacerbation of persistent asthma (493 92) (J45 901)   11  GERD (gastroesophageal reflux disease) (530 81) (K21 9)   12  Hand pain (729 5) (M79 643)   13  Metacarpophalangeal joint pain of right hand (719 44) (M79 641)   14  Mild persistent asthma with acute exacerbation (493 92) (J45 31)   15  Neck muscle strain (847 0) (S16 1XXA)   16  Need for lipid screening (V77 91) (Z13 220)   17  Pain of right thumb (729 5) (M79 644)   18  Renal cyst (753 10) (N28 1)   19  S/P small bowel resection (V45 89) (Z90 49)   20  Screening for cardiovascular condition (V81 2) (Z13 6)   21  Seasonal allergies (477 9) (J30 2)   22  Sinusitis (473 9) (J32 9)   23  Sprain of right wrist, subsequent encounter (V58 89,842 00) (S63 501D)   24  Tobacco use (305 1) (Z72 0)   25   Trigger finger of right thumb (727 03) (M65 311)    Past Medical History    · History of Bronchitis, asthmatic (493 90) (J45 909)   · History of Chronic diarrhea (787 91) (K52 9)   · History of Fibromyalgia (729 1) (M79 7)   · History of Focal nodular hyperplasia of liver (573 8) (K76 89)   · History of diarrhea (V12 79) (P80 838)   · History of herpes zoster (V12 09) (Z86 19)   · History of vertigo (V12 49) (L10 867)   · History of Irritable bowel syndrome (564 1) (K58 9)   · History of Osteoarthritis (V13 4)   · History of Palpitations Intermittently   · History of URTI (acute upper respiratory infection) (465 9) (J06 9)    Surgical History    · History of Biopsy Breast Open   · History of Small Bowel Resection    Family History    · Family history of Aspiration of vomit    · Family history of Asthma    · Family history of Breast CA    Social History    · Cigarette smoker (305 1) (F17 210)   · Current Smoker (305 1)   · Exercising Regularly   · Lives with adult children   · Marital History -    · Mental Disability: (319)   · Never Drank Alcohol   · Tobacco use (305 1) (Z72 0)   · Unemployed (V62 0)    Current Meds   1  Albuterol Sulfate (2 5 MG/3ML) 0 083% Inhalation Nebulization Solution; USE 1 UNIT   DOSE EVERY 4-6 HOURS AS NEEDED FOR WHEEZING ; Therapy: 32NML0267 to (Evaluate:04Sep2015)  Requested for: 39XOL7741; Last   Rx:51Ooj7366 Ordered   2  Flovent  MCG/ACT Inhalation Aerosol; INHALE 2 PUFFS TWICE DAILY  RINSE   MOUTH AFTER USE; Therapy: 09Dui5164 to (Evaluate:28Jan2018)  Requested for: 43KYQ3852; Last   Rx:29Nov2017 Ordered   3  Fluticasone Propionate 50 MCG/ACT Nasal Suspension; USE 2 SPRAYS IN EACH   NOSTRIL ONCE DAILY  Requested for: 21UXP8270; Last Rx:29Nov2017; Status: ACTIVE   - Transmit to Pharmacy - Awaiting Verification Ordered   4  Ketorolac Tromethamine 0 4 % Ophthalmic Solution; INSTILL 1 DROP INTO AFFECTED   EYE(S) 4 TIMES DAILY; Therapy: 60XEU9632 to (Last KJ:09VGP5673)  Requested for: 78CRH0076 Ordered   5  Loratadine 10 MG Oral Tablet; TAKE 1 TABLET DAILY  Requested for: 77OBD6531; Last   Rx:19Aua3118 Ordered   6  Metoclopramide HCl - 5 MG Oral Tablet; TAKE 0 5 TABLET Every 6 hours PRN; Therapy: 11TSF3516 to (Katarzyna Gamez)  Requested for: 11MXH0651; Last   XD:98OOW4889 Ordered   7  Nebulizer Device; USE AS DIRECTED; Therapy: 04BXH1550 to (Last Rx:06Mar2015)  Requested for: 82PLF1378 Ordered   8  Omeprazole 20 MG Oral Capsule Delayed Release; TAKE 1 CAPSULE DAILY;    Therapy: 58Axd2120 to (Evaluate:02Sep2017)  Requested for: 77NXE5800; Last   UN:02AVV3013 Ordered 9  Ventolin  (90 Base) MCG/ACT Inhalation Aerosol Solution; inhale 1-2 puffs every   4-6 hours only for wheezing and severe shortness of breath not for daily use; Therapy: 20Ius9643 to (Roberto Carlos Travis)  Requested for: 64EGP6431; Last   Rx:29Nov2017 Ordered   10  ZyrTEC Allergy 10 MG Oral Capsule; take 1 capsule daily; Therapy: 48SDH1780 to (Lyudmila Jones)  Requested for: 53PCZ4671; Last    CN:09DGA9985 Ordered    Allergies    1  Codeine Derivatives   2  Morphine Derivatives   3  Motrin TABS   4  Tramadol    5  No Known Food Allergies   6  Pollen   7  Ragweed    Vitals  Signs    Heart Rate: 49DGQUHTFQ: 490WEMXVXKHB: 472IFEVPV: 5 ft 3 inWeight: 131 lb 4 00 ozBMI Calculated: 23 25BSA Calculated: 1 62    Assessment    1  Rupture of ulnar collateral ligament of right thumb, initial encounter (842 12) (U37 184O)    Plan     Finger pain    · * XR FINGER RIGHT FIFTH DIGIT-PINKIE; Status:Active; Requested for:36Xhw0631;      Rupture of ulnar collateral ligament of right thumb, initial encounter    · Follow Up After Tests Complete Evaluation and Treatment  Follow-up  Status: Hold For -  Scheduling,Retrospective Authorization  Requested for: 58QBD4428   · Continue with our present treatment plan ; Status:Complete - Retrospective  Authorization;   Done: 26LSN1826   · US MSK COMPLETE; Status:Hold For - Scheduling; Requested for:39Bvv5470;      Sprain of right little finger, initial encounter    · Follow-up PRN Evaluation and Treatment  Follow-up  Status: Complete - Retrospective  Authorization  Done: 48JAS6347   · Apply an ice pack as needed for pain twice a day for 20 minutes ; Status:Complete -  Retrospective Authorization;   Done: 50PJS3987    Discussion/Summary    Assessment: R small finger sprain, possible R thumb UCL tear    Plan:   follow up after US is complete  buddy tape R small finger for sprain, ice prn    This note was dictated with dictation software   As such, and may contain typographical errors, improperly dictated words, background noise, or other errors  HPI:  The patient is a 71year old female who presents today with R thumb and small finger pain  Pt c/o pain at her best 8/10 and worst 10/10  Pt reports a fall on 11/22/17  pt takes asprin for pain   Pt is waking at night due to the pain  The past medical history, surgical history, family history, social history, medications, allergies, and review of systems have been read and reviewed on the chart and have been updated  Review of Systems was recorded in the office today on a separate evaluation sheet and is listed below  Review of Systems:  Constitutional: Negative  HEENT: Negative  Cardiovascular: Negative  Pulmonary: Negative  : Negative  GI: Negative  Skin: Negative except as above  Musculoskeletal: As above  Neurologic: Negative except as above  Endocrine: Negative  Psychiatric: Negative    Examination:    General / Psych: Awake and Oriented, No acute distress, age appropriate  HEENT: Normocephalic, atraumatic, mucous membranes moist, neck supple, trachea midline  Abdomen: No rebound or signs of guarding  Cardio: No discernible arrhythmia, 2+ pulses with good capillary refill  Pulmonary: No audible wheezing or audible stridor  Neurologic: [The patient is neurovascularly intact in the median, ulnar, and radial nerve distribution  There is normal sensation and good capillary refill within the digits  2+ pulses ]  Skin: [No lesions, rashes, streaking, purulence, abscesses, lymphangitis] ecchymosis on volar side of R small finger and MCP jt of R thumb  Musculoskeletal: negative grind test  UCL radial stress testing showed laxity  Diagnostic Studies: new xrays were ordered today in the office  They were reviewed and discussed with the patient  The xray was normal      Scribe attestation:    Sav MULLINS, VITOR, ATC, am acting as a scribe while in the presence of the attending physician           Signatures   Electronically signed by : Puja Apodaca DO; Dec 11 2017  3:31PM EST                       (Author)

## 2018-01-23 NOTE — CONSULTS
Chief Complaint    1  Finger Problem    Active Problems    1  Allergic rhinitis (477 9) (J30 9)   2  Asthma, very poorly controlled (493 90) (J45 909)   3  Breast cyst, right (610 0) (N60 01)   4  Chronic sinusitis (473 9) (J32 9)   5  Classic migraine with aura (346 00) (G43 109)   6  Deficient knowledge of health care maintenance   7  Encounter for screening colonoscopy (V76 51) (Z12 11)   8  Encounter for screening mammogram for high-risk patient (V76 11) (Z12 31)   9  Encounter for screening mammogram for malignant neoplasm of breast (V76 12)   (Z12 31)   10  Exacerbation of persistent asthma (493 92) (J45 901)   11  GERD (gastroesophageal reflux disease) (530 81) (K21 9)   12  Hand pain (729 5) (M79 643)   13  Metacarpophalangeal joint pain of right hand (719 44) (M79 641)   14  Mild persistent asthma with acute exacerbation (493 92) (J45 31)   15  Neck muscle strain (847 0) (S16 1XXA)   16  Need for lipid screening (V77 91) (Z13 220)   17  Pain of right thumb (729 5) (M79 644)   18  Renal cyst (753 10) (N28 1)   19  S/P small bowel resection (V45 89) (Z90 49)   20  Screening for cardiovascular condition (V81 2) (Z13 6)   21  Seasonal allergies (477 9) (J30 2)   22  Sinusitis (473 9) (J32 9)   23  Sprain of right wrist, subsequent encounter (V58 89,842 00) (S63 501D)   24  Tobacco use (305 1) (Z72 0)   25  Trigger finger of right thumb (727 03) (M65 311)    Past Medical History    1  History of Bronchitis, asthmatic (493 90) (J45 909)   2  History of Chronic diarrhea (787 91) (K52 9)   3  History of Fibromyalgia (729 1) (M79 7)   4  History of Focal nodular hyperplasia of liver (573 8) (K76 89)   5  History of diarrhea (V12 79) (Z87 898)   6  History of herpes zoster (V12 09) (Z86 19)   7  History of vertigo (V12 49) (Z87 898)   8  History of Irritable bowel syndrome (564 1) (K58 9)   9  History of Osteoarthritis (V13 4)   10  History of Palpitations Intermittently   11   History of URTI (acute upper respiratory infection) (465 9) (J06 9)    Surgical History    1  History of Biopsy Breast Open   2  History of Small Bowel Resection    Family History    1  Family history of Aspiration of vomit    2  Family history of Asthma    3  Family history of Breast CA    Social History    · Cigarette smoker (305 1) (F17 210)   · Current Smoker (305 1)   · Exercising Regularly   · Lives with adult children   · Marital History -    · Mental Disability: ((20) 1896-7386)   · Never Drank Alcohol   · Tobacco use (305 1) (Z72 0)   · Unemployed (V62 0)    Current Meds   1  Albuterol Sulfate (2 5 MG/3ML) 0 083% Inhalation Nebulization Solution; USE 1 UNIT   DOSE EVERY 4-6 HOURS AS NEEDED FOR WHEEZING ; Therapy: 84LXC3420 to (Evaluate:92Xcv7616)  Requested for: 68JXF9718; Last   Rx:96Sqo8528 Ordered   2  Flovent  MCG/ACT Inhalation Aerosol; INHALE 2 PUFFS TWICE DAILY  RINSE   MOUTH AFTER USE; Therapy: 17Juo6538 to (Evaluate:28Jan2018)  Requested for: 10CUH3476; Last   Rx:29Nov2017 Ordered   3  Fluticasone Propionate 50 MCG/ACT Nasal Suspension; USE 2 SPRAYS IN EACH   NOSTRIL ONCE DAILY  Requested for: 19XSX0104; Last Rx:29Nov2017; Status: ACTIVE   - Transmit to Pharmacy - Awaiting Verification Ordered   4  Ketorolac Tromethamine 0 4 % Ophthalmic Solution; INSTILL 1 DROP INTO AFFECTED   EYE(S) 4 TIMES DAILY; Therapy: 41GMC2780 to (Last VN:41VIK9204)  Requested for: 92EBS7492 Ordered   5  Loratadine 10 MG Oral Tablet; TAKE 1 TABLET DAILY  Requested for: 92XRF7464; Last   Rx:65Tlj2182 Ordered   6  Metoclopramide HCl - 5 MG Oral Tablet; TAKE 0 5 TABLET Every 6 hours PRN; Therapy: 76UAS4985 to (Francesca Blancas)  Requested for: 49IQB0733; Last   FV:93HHZ4454 Ordered   7  Nebulizer Device; USE AS DIRECTED; Therapy: 30QFT2645 to (Last Rx:06Mar2015)  Requested for: 88ZEW2178 Ordered   8  Omeprazole 20 MG Oral Capsule Delayed Release; TAKE 1 CAPSULE DAILY;    Therapy: 34SBU3833 to (Evaluate:19Kgn3824)  Requested for: 08XIJ7360; Last   EV:39BKL9140 Ordered   9  Ventolin  (90 Base) MCG/ACT Inhalation Aerosol Solution; inhale 1-2 puffs every   4-6 hours only for wheezing and severe shortness of breath not for daily use; Therapy: 81Ldc7612 to (Mohit Mercado)  Requested for: 74AOW3949; Last   Rx:49Lge5106 Ordered   10  ZyrTEC Allergy 10 MG Oral Capsule; take 1 capsule daily; Therapy: 21QCT7720 to (Elvin Narayanan)  Requested for: 31WGZ8311; Last    VM:67EMD7944 Ordered    Allergies    1  Codeine Derivatives   2  Morphine Derivatives   3  Motrin TABS   4  Tramadol    5  No Known Food Allergies   6  Pollen   7  Ragweed    Vitals  Signs   Recorded: 11Dec2017 03:03PM   Heart Rate: 89  Systolic: 881  Diastolic: 815  Height: 5 ft 3 in  Weight: 131 lb 4 00 oz  BMI Calculated: 23 25  BSA Calculated: 1 62    Assessment    1  Rupture of ulnar collateral ligament of right thumb, initial encounter (842 12) (R28 714V)    Plan  Finger pain    1  * XR FINGER RIGHT FIFTH DIGIT-PINKIE; Status:Active; Requested for:13Qgp8495;   Rupture of ulnar collateral ligament of right thumb, initial encounter    2  Follow Up After Tests Complete Evaluation and Treatment  Follow-up  Status: Hold For -   Scheduling,Retrospective Authorization  Requested for: 02BSP2351   3  Continue with our present treatment plan ; Status:Complete - Retrospective   Authorization;   Done: 57KOT6106   4  US MSK COMPLETE; Status:Hold For - Scheduling; Requested for:56Ucf2464;   Sprain of right little finger, initial encounter    5  Follow-up PRN Evaluation and Treatment  Follow-up  Status: Complete - Retrospective   Authorization  Done: 83ZPY2859   6  Apply an ice pack as needed for pain twice a day for 20 minutes ; Status:Complete -   Retrospective Authorization;   Done: 27NWF6870    Discussion/Summary  Discussion Summary:   Assessment: R small finger sprain, possible R thumb UCL tear    Plan:   follow up after US is complete     buddy tape R small finger for sprain, ice prn    This note was dictated with dictation software  As such, and may contain typographical errors, improperly dictated words, background noise, or other errors  HPI:  The patient is a 71year old female who presents today with R thumb and small finger pain  Pt c/o pain at her best 8/10 and worst 10/10  Pt reports a fall on 11/22/17  pt takes asprin for pain   Pt is waking at night due to the pain  The past medical history, surgical history, family history, social history, medications, allergies, and review of systems have been read and reviewed on the chart and have been updated  Review of Systems was recorded in the office today on a separate evaluation sheet and is listed below  Review of Systems:  Constitutional: Negative  HEENT: Negative  Cardiovascular: Negative  Pulmonary: Negative  : Negative  GI: Negative  Skin: Negative except as above  Musculoskeletal: As above  Neurologic: Negative except as above  Endocrine: Negative  Psychiatric: Negative    Examination:    General / Psych: Awake and Oriented, No acute distress, age appropriate  HEENT: Normocephalic, atraumatic, mucous membranes moist, neck supple, trachea midline  Abdomen: No rebound or signs of guarding  Cardio: No discernible arrhythmia, 2+ pulses with good capillary refill  Pulmonary: No audible wheezing or audible stridor  Neurologic: [The patient is neurovascularly intact in the median, ulnar, and radial nerve distribution  There is normal sensation and good capillary refill within the digits  2+ pulses ]  Skin: [No lesions, rashes, streaking, purulence, abscesses, lymphangitis] ecchymosis on volar side of R small finger and MCP jt of R thumb  Musculoskeletal: negative grind test  UCL radial stress testing showed laxity  Diagnostic Studies: new xrays were ordered today in the office  They were reviewed and discussed with the patient   The xray was normal      Scribe attestation:    ILuiz, LAT, ATC, am acting as a scribe while in the presence of the attending physician           Future Appointments    Signatures   Electronically signed by : Sebas Montesinos DO; Dec 11 2017  3:31PM EST                       (Author)

## 2018-01-24 VITALS
HEIGHT: 63 IN | SYSTOLIC BLOOD PRESSURE: 136 MMHG | WEIGHT: 131 LBS | HEART RATE: 97 BPM | DIASTOLIC BLOOD PRESSURE: 86 MMHG | BODY MASS INDEX: 23.21 KG/M2

## 2018-02-20 RX ORDER — FLUTICASONE PROPIONATE 110 UG/1
2 AEROSOL, METERED RESPIRATORY (INHALATION) 2 TIMES DAILY
COMMUNITY
Start: 2013-12-12 | End: 2018-10-05 | Stop reason: ALTCHOICE

## 2018-02-20 RX ORDER — LORATADINE 10 MG/1
1 TABLET ORAL DAILY
COMMUNITY
End: 2018-03-27 | Stop reason: SDUPTHER

## 2018-02-20 RX ORDER — METOCLOPRAMIDE 5 MG/1
TABLET ORAL EVERY 6 HOURS
COMMUNITY
Start: 2015-03-06 | End: 2018-03-27 | Stop reason: SDUPTHER

## 2018-02-20 RX ORDER — OMEPRAZOLE 20 MG/1
1 CAPSULE, DELAYED RELEASE ORAL DAILY
Status: ON HOLD | COMMUNITY
Start: 2015-04-27 | End: 2018-05-15 | Stop reason: ALTCHOICE

## 2018-02-20 RX ORDER — SOFT LENS DISINFECTANT
SOLUTION, NON-ORAL MISCELLANEOUS
COMMUNITY
Start: 2015-03-06 | End: 2018-06-28 | Stop reason: ALTCHOICE

## 2018-02-20 RX ORDER — KETOROLAC TROMETHAMINE 4 MG/ML
1 SOLUTION/ DROPS OPHTHALMIC 4 TIMES DAILY
Status: ON HOLD | COMMUNITY
Start: 2017-05-05 | End: 2018-05-15 | Stop reason: ALTCHOICE

## 2018-02-20 RX ORDER — ALBUTEROL SULFATE 2.5 MG/3ML
1 SOLUTION RESPIRATORY (INHALATION)
COMMUNITY
Start: 2015-03-06 | End: 2018-05-16 | Stop reason: SDUPTHER

## 2018-02-20 RX ORDER — FLUTICASONE PROPIONATE 50 MCG
SPRAY, SUSPENSION (ML) NASAL
Refills: 0 | COMMUNITY
Start: 2017-12-26 | End: 2018-02-21 | Stop reason: SDUPTHER

## 2018-02-21 ENCOUNTER — OFFICE VISIT (OUTPATIENT)
Dept: OBGYN CLINIC | Facility: HOSPITAL | Age: 73
End: 2018-02-21
Payer: COMMERCIAL

## 2018-02-21 VITALS
HEART RATE: 78 BPM | WEIGHT: 132.4 LBS | DIASTOLIC BLOOD PRESSURE: 101 MMHG | BODY MASS INDEX: 23.46 KG/M2 | SYSTOLIC BLOOD PRESSURE: 188 MMHG | HEIGHT: 63 IN

## 2018-02-21 DIAGNOSIS — M18.11 PRIMARY OSTEOARTHRITIS OF FIRST CARPOMETACARPAL JOINT OF RIGHT HAND: ICD-10-CM

## 2018-02-21 DIAGNOSIS — S63.642A RUPTURE OF ULNAR COLLATERAL LIGAMENT OF LEFT THUMB, INITIAL ENCOUNTER: Primary | ICD-10-CM

## 2018-02-21 DIAGNOSIS — M65.351 TRIGGER LITTLE FINGER OF RIGHT HAND: ICD-10-CM

## 2018-02-21 PROCEDURE — 99214 OFFICE O/P EST MOD 30 MIN: CPT | Performed by: ORTHOPAEDIC SURGERY

## 2018-02-21 RX ORDER — METHYLPREDNISOLONE 4 MG/1
TABLET ORAL
Qty: 1 EACH | Refills: 0 | Status: ON HOLD | OUTPATIENT
Start: 2018-02-21 | End: 2018-05-15 | Stop reason: ALTCHOICE

## 2018-02-21 NOTE — PROGRESS NOTES
ASSESSMENT/PLAN:    Diagnoses and all orders for this visit:    Rupture of ulnar collateral ligament of left thumb, initial encounter    Primary osteoarthritis of first carpometacarpal joint of right hand  -     Thumb Cude comf/Cool  -     Methylprednisolone 4 MG TBPK; Use as directed on package    Other orders  -     fluticasone-salmeterol (ADVAIR DISKUS) 250-50 mcg/dose inhaler; Inhale 1 puff 2 (two) times a day  -     VENTOLIN  (90 Base) MCG/ACT inhaler; inhale 1 to 2 puffs by mouth every 4 to 6 hours ONLY FOR WHEEZING     (REFER TO PRESCRIPTION NOTES)  -     albuterol (2 5 mg/3 mL) 0 083 % nebulizer solution; Inhale 1 each  -     Saline 0 9 % AERS; into each nostril  -     fluticasone (FLOVENT HFA) 110 MCG/ACT inhaler; Inhale 2 puffs 2 (two) times a day  -     Discontinue: fluticasone (FLONASE) 50 mcg/act nasal spray;   -     ketorolac (ACULAR) 0 4 % SOLN; Apply 1 drop to eye 4 (four) times a day  -     loratadine (CLARITIN) 10 mg tablet; Take 1 tablet by mouth daily  -     metoclopramide (REGLAN) 5 mg tablet; Take by mouth every 6 (six) hours  -     Respiratory Therapy Supplies (NEBULIZER) FILIBERTO; by Does not apply route  -     omeprazole (PriLOSEC) 20 mg delayed release capsule; Take 1 capsule by mouth daily  -     Cetirizine HCl (ZYRTEC ALLERGY) 10 MG CAPS; Take 1 capsule by mouth daily        Assessment:   Thumb UCL Tear  Left  Thumb CMC jt arthritis Left  Trigger finger Left small finger    Plan:   Resume activities as tolerated and bracing    Follow Up:  4  week(s)    To Do Next Visit:       General Discussions:  CMC Arthritis: The anatomy and physiology of carpometacarpal joint arthritis was discussed with the patient today in the office  Deterioration of the articular cartilage eventually leads to hypermobility at the thumb [facility] joint, resulting in joint subluxation, osteophyte formation, cystic changes within the trapezium and base of the first metacarpal, as well as subchondral sclerosis  Eventually, pain, limited mobility, and compensatory hyperextension at the metacarpophalangeal joint may develop  While normal activity and usage of the thumb joint may provide a painful experience to the patient, this typically does not result in damage to the thumb or hand  Treatment options include resting thumb spica splints to decreased joint edema, pain, and inflammation  Therapy exercises to strengthen the thenar musculature may relieve pain, but do not alter the overall continued development of osteoarthritis  Oral medications, topical medications, corticosteroid injections may decrease pain and increase overall function  Eventually, approximately 5% of patients may require surgical intervention  Trigger FInger: The anatomy and physiology of trigger finger was discussed with the patient today in the office  Edema and increased contact pressure within the flexor tendons at the A1 pulley can cause pain, crepitation, and limitation of function  Treatment options include resting MP blocking splints to decrease edema, oral anti-inflammatory medications, home or formal therapy exercises, up to 2 steroid injections within the tendon sheath, or surgical release  While majority of patients do respond to conservative treatment, up to 20% may require surgical release  Operative Discussions:         _____________________________________________________  CHIEF COMPLAINT:  Chief Complaint   Patient presents with    Right Thumb - Pain         SUBJECTIVE:  Lyndsay Anne is a 71y o  year old female who presents with Pain  Moderate  Intermittant  Sharp and Aching and shocking sensation to the left thumb  This started  3 month(s) ago as Due to a personal injury  Pt states that the pain is waking her up at night    Radiation: Yes to the  hand  Previous Treatments: bracing without relief  Associated symptoms: pain and numbness in R small finger that sends a sharp shooting pain down into her hand    PAST MEDICAL HISTORY:  Past Medical History:   Diagnosis Date    Asthma     Fibromyalgia     Irritable bowel syndrome        PAST SURGICAL HISTORY:  No past surgical history on file  FAMILY HISTORY:  No family history on file  SOCIAL HISTORY:  Social History   Substance Use Topics    Smoking status: Current Every Day Smoker    Smokeless tobacco: Never Used    Alcohol use No       MEDICATIONS:    Current Outpatient Prescriptions:     albuterol (2 5 mg/3 mL) 0 083 % nebulizer solution, Inhale 1 each, Disp: , Rfl:     Cetirizine HCl (ZYRTEC ALLERGY) 10 MG CAPS, Take 1 capsule by mouth daily, Disp: , Rfl:     fluticasone (FLOVENT HFA) 110 MCG/ACT inhaler, Inhale 2 puffs 2 (two) times a day, Disp: , Rfl:     fluticasone-salmeterol (ADVAIR DISKUS) 250-50 mcg/dose inhaler, Inhale 1 puff 2 (two) times a day, Disp: , Rfl:     ketorolac (ACULAR) 0 4 % SOLN, Apply 1 drop to eye 4 (four) times a day, Disp: , Rfl:     metoclopramide (REGLAN) 5 mg tablet, Take by mouth every 6 (six) hours, Disp: , Rfl:     omeprazole (PriLOSEC) 20 mg delayed release capsule, Take 1 capsule by mouth daily, Disp: , Rfl:     Respiratory Therapy Supplies (NEBULIZER) FILIBERTO, by Does not apply route, Disp: , Rfl:     Saline 0 9 % AERS, into each nostril, Disp: , Rfl:     fluticasone (FLONASE) 50 mcg/act nasal spray, , Disp: , Rfl: 0    loratadine (CLARITIN) 10 mg tablet, Take 1 tablet by mouth daily, Disp: , Rfl:     VENTOLIN  (90 Base) MCG/ACT inhaler, inhale 1 to 2 puffs by mouth every 4 to 6 hours ONLY FOR WHEEZING     (REFER TO PRESCRIPTION NOTES)  , Disp: , Rfl: 0    ALLERGIES:  Allergies   Allergen Reactions    Ambrosia Artemisiifolia (Ragweed) Skin Test     Codeine     Epinephrine      Pt reports "it makes my heart race, they told me I cant take it "    Ibuprofen     Morphine     Pollen Extract     Tramadol        REVIEW OF SYSTEMS:  A comprehensive review of systems was negative except for: Constitutional: positive for fevers  Musculoskeletal: positive for joint pain, joint swelling, muscle pain    LABS:  HgA1c:   Lab Results   Component Value Date    HGBA1C 5 9 05/05/2017     BMP:   Lab Results   Component Value Date    GLUCOSE 92 05/19/2015    CALCIUM 9 3 05/19/2015     05/19/2015    K 4 1 05/19/2015    CO2 28 05/19/2015     05/19/2015    BUN 12 05/19/2015    CREATININE 0 80 05/19/2015         _____________________________________________________  PHYSICAL EXAMINATION:  General: well developed and well nourished, alert, oriented times 3 and appears comfortable  Psychiatric: Normal  HEENT: Trachea Midline, No torticollis  Cardiovascular: No discernable arrhythmia  Pulmonary: No wheezing or stridor  Skin: No Erythema, No Lacerations, No Fluctuation, No Ulcerations, nodule of A1 pulley of the small finger  Nodules at interphalange joint with crepitation to left thumb  Neurovascular: Sensation Intact to the Median, Ulnar, Radial Nerve, Motor Intact to the Median, Ulnar, Radial Nerve and Pulses Intact    MUSCULOSKELETAL EXAMINATION:  RIGHT SIDE:  Finger:  lacking 10 degrees extension at small finger  lateral band subluxation of small finger pip jt as well as a nodule at the A1 pulley of the small finger  instability of ucl of thumb gaps to about 45 degrees  Nodules at interphalange joint with crepetion with shoulder sign to [facility] jt of thumb     _____________________________________________________  STUDIES REVIEWED:  I have personally reviewed pertinent films in PACS and my interpretation is a complete tear of the right thumb MCP jt UCL from its metacarpal head side attachment        PROCEDURES PERFORMED:  Procedures  No Procedures performed today

## 2018-02-23 ENCOUNTER — HOSPITAL ENCOUNTER (EMERGENCY)
Facility: HOSPITAL | Age: 73
Discharge: HOME | End: 2018-02-23
Attending: EMERGENCY MEDICINE
Payer: COMMERCIAL

## 2018-02-23 VITALS
OXYGEN SATURATION: 97 % | TEMPERATURE: 98.8 F | BODY MASS INDEX: 23.03 KG/M2 | WEIGHT: 130 LBS | DIASTOLIC BLOOD PRESSURE: 107 MMHG | RESPIRATION RATE: 19 BRPM | HEART RATE: 104 BPM | SYSTOLIC BLOOD PRESSURE: 193 MMHG

## 2018-02-23 DIAGNOSIS — J01.90 ACUTE SINUSITIS: Primary | ICD-10-CM

## 2018-02-23 PROCEDURE — 99283 EMERGENCY DEPT VISIT LOW MDM: CPT

## 2018-02-23 RX ORDER — ACETAMINOPHEN 325 MG/1
650 TABLET ORAL ONCE
Status: DISCONTINUED | OUTPATIENT
Start: 2018-02-23 | End: 2018-02-23 | Stop reason: HOSPADM

## 2018-02-23 RX ORDER — LORATADINE 10 MG/1
10 TABLET ORAL ONCE
Status: COMPLETED | OUTPATIENT
Start: 2018-02-23 | End: 2018-02-23

## 2018-02-23 RX ORDER — AMOXICILLIN AND CLAVULANATE POTASSIUM 875; 125 MG/1; MG/1
1 TABLET, FILM COATED ORAL ONCE
Status: COMPLETED | OUTPATIENT
Start: 2018-02-23 | End: 2018-02-23

## 2018-02-23 RX ORDER — LORATADINE 10 MG/1
10 TABLET ORAL DAILY
Qty: 20 TABLET | Refills: 0 | Status: SHIPPED | OUTPATIENT
Start: 2018-02-23 | End: 2018-03-27 | Stop reason: SDUPTHER

## 2018-02-23 RX ORDER — ONDANSETRON 4 MG/1
4 TABLET, ORALLY DISINTEGRATING ORAL ONCE
Status: COMPLETED | OUTPATIENT
Start: 2018-02-23 | End: 2018-02-23

## 2018-02-23 RX ORDER — AMOXICILLIN AND CLAVULANATE POTASSIUM 875; 125 MG/1; MG/1
1 TABLET, FILM COATED ORAL EVERY 12 HOURS SCHEDULED
Qty: 14 TABLET | Refills: 0 | Status: SHIPPED | OUTPATIENT
Start: 2018-02-23 | End: 2018-02-23

## 2018-02-23 RX ORDER — AMOXICILLIN AND CLAVULANATE POTASSIUM 875; 125 MG/1; MG/1
1 TABLET, FILM COATED ORAL EVERY 12 HOURS SCHEDULED
Qty: 20 TABLET | Refills: 0 | Status: SHIPPED | OUTPATIENT
Start: 2018-02-23 | End: 2018-03-05

## 2018-02-23 RX ORDER — ONDANSETRON 4 MG/1
4 TABLET, ORALLY DISINTEGRATING ORAL EVERY 8 HOURS PRN
Qty: 12 TABLET | Refills: 0 | Status: SHIPPED | OUTPATIENT
Start: 2018-02-23 | End: 2018-05-16 | Stop reason: SDUPTHER

## 2018-02-23 RX ADMIN — ONDANSETRON 4 MG: 4 TABLET, ORALLY DISINTEGRATING ORAL at 04:23

## 2018-02-23 RX ADMIN — AMOXICILLIN AND CLAVULANATE POTASSIUM 1 TABLET: 875; 125 TABLET, FILM COATED ORAL at 04:40

## 2018-02-23 RX ADMIN — LORATADINE 10 MG: 10 TABLET ORAL at 04:39

## 2018-02-23 NOTE — ED NOTES
Pt  Vomiting at this time, and reports "her headache is worse "  Dr Shaquille Mondragon made aware        Angel Das, RN  02/23/18 0114

## 2018-02-23 NOTE — ED NOTES
Pt  Took home dose of Excedrin at this time with permission from Dr Sherly Alfaro, RN  02/23/18 [id number]

## 2018-02-23 NOTE — ED PROVIDER NOTES
History  Chief Complaint   Patient presents with    Facial Pain     pt states her sinuses have been "killing her"  pt states that she has had sinus pressure since yesterday and today vomited mucus  Pt is eating and drinking     HPI  71-year-old female presents to the emergency department for evaluation of facial pain  Patient states that she has had URI symptoms on and off for some time and that yesterday she began having severe facial pain with sinus pressure  Patient recalls having had similar symptoms in the past due to sinus infections and states that she has similar symptoms a month every time she has upper respiratory tract infections  She has been taking Flonase without relief of symptoms  On review of systems, patient complains of associated lightheadedness, and cough, which leads her to feel nausea  She denies any fevers, chills, chest pain, abdominal pain, change in urinary/bowel function, or complaints other than stated above  Prior to Admission Medications   Prescriptions Last Dose Informant Patient Reported? Taking? Cetirizine HCl (ZYRTEC ALLERGY) 10 MG CAPS  Self Yes Yes   Sig: Take 1 capsule by mouth daily   Methylprednisolone 4 MG TBPK   No Yes   Sig: Use as directed on package   Respiratory Therapy Supplies (NEBULIZER) FILIBERTO  Self Yes Yes   Sig: by Does not apply route   Saline 0 9 % AERS  Self Yes Yes   Sig: into each nostril   VENTOLIN  (90 Base) MCG/ACT inhaler  Self Yes Yes   Sig: inhale 1 to 2 puffs by mouth every 4 to 6 hours ONLY FOR WHEEZING     (REFER TO PRESCRIPTION NOTES)     albuterol (2 5 mg/3 mL) 0 083 % nebulizer solution  Self Yes Yes   Sig: Inhale 1 each   fluticasone (FLOVENT HFA) 110 MCG/ACT inhaler  Self Yes Yes   Sig: Inhale 2 puffs 2 (two) times a day   fluticasone-salmeterol (ADVAIR DISKUS) 250-50 mcg/dose inhaler  Self Yes Yes   Sig: Inhale 1 puff 2 (two) times a day   ketorolac (ACULAR) 0 4 % SOLN  Self Yes Yes   Sig: Apply 1 drop to eye 4 (four) times a day loratadine (CLARITIN) 10 mg tablet  Self Yes Yes   Sig: Take 1 tablet by mouth daily   metoclopramide (REGLAN) 5 mg tablet  Self Yes Yes   Sig: Take by mouth every 6 (six) hours   omeprazole (PriLOSEC) 20 mg delayed release capsule  Self Yes Yes   Sig: Take 1 capsule by mouth daily      Facility-Administered Medications: None       Past Medical History:   Diagnosis Date    Asthma     Fibromyalgia     Irritable bowel syndrome        History reviewed  No pertinent surgical history  Family History   Problem Relation Age of Onset    Heart attack Mother     Asthma Father      I have reviewed and agree with the history as documented  Social History   Substance Use Topics    Smoking status: Current Every Day Smoker    Smokeless tobacco: Never Used    Alcohol use No        Review of Systems   Constitutional: Negative for chills, fatigue and fever  HENT: Positive for rhinorrhea, sinus pain and sinus pressure  Negative for trouble swallowing  Eyes: Negative for visual disturbance  Respiratory: Positive for cough  Negative for shortness of breath  Cardiovascular: Negative for chest pain  Gastrointestinal: Negative for abdominal pain, nausea and vomiting  Genitourinary: Negative for dysuria and hematuria  Musculoskeletal: Negative for back pain  Skin: Negative for rash  Allergic/Immunologic: Negative for immunocompromised state  Neurological: Positive for light-headedness  Negative for headaches  Hematological: Does not bruise/bleed easily  Psychiatric/Behavioral: The patient is not nervous/anxious  All other systems reviewed and are negative        Physical Exam  ED Triage Vitals [02/23/18 0142]   Temperature Pulse Respirations Blood Pressure SpO2   98 8 °F (37 1 °C) (!) 112 20 (!) 204/122 96 %      Temp Source Heart Rate Source Patient Position - Orthostatic VS BP Location FiO2 (%)   Oral Monitor Sitting Left arm --      Pain Score       8           Orthostatic Vital Signs  Vitals: 02/23/18 0142 02/23/18 0441   BP: (!) 204/122 (!) 193/107   Pulse: (!) 112 104   Patient Position - Orthostatic VS: Sitting Lying       Physical Exam   Constitutional: She is oriented to person, place, and time  She appears well-nourished  No distress  HENT:   Head: Normocephalic and atraumatic  Eyes: EOM are normal    Neck: Normal range of motion  Neck supple  Cardiovascular: Normal rate and regular rhythm  Pulmonary/Chest: Effort normal and breath sounds normal  No respiratory distress  Abdominal: Soft  She exhibits no distension  There is no tenderness  Musculoskeletal: Normal range of motion  Neurological: She is alert and oriented to person, place, and time  Skin: Skin is warm and dry  She is not diaphoretic  Psychiatric: She has a normal mood and affect  Her behavior is normal    Nursing note and vitals reviewed  ED Medications  Medications   loratadine (CLARITIN) tablet 10 mg (10 mg Oral Given 2/23/18 0439)   amoxicillin-clavulanate (AUGMENTIN) 875-125 mg per tablet 1 tablet (1 tablet Oral Given 2/23/18 8490)   ondansetron (ZOFRAN-ODT) dispersible tablet 4 mg (4 mg Oral Given 2/23/18 0423)       Diagnostic Studies  Results Reviewed     None                 No orders to display         Procedures  Procedures      Phone Consults  ED Phone Contact    ED Course  ED Course                                MDM  Number of Diagnoses or Management Options  Acute sinusitis:   Diagnosis management comments: 80-year-old female with uri sx, sinus pain  Will treat sx for sinusitis and discharge home with outpatient follow up       CritCare Time    Disposition  Final diagnoses:   Acute sinusitis     Time reflects when diagnosis was documented in both MDM as applicable and the Disposition within this note     Time User Action Codes Description Comment    2/23/2018  4:40 AM Kellen Lemus Add [J01 90] Acute sinusitis       ED Disposition     ED Disposition Condition Comment    Discharge  [facility] [address] discharge to home/self care  Condition at discharge: Good        Follow-up Information     Follow up With Specialties Details Why [address], DO Internal Medicine  As needed, If symptoms worsen [facility]  [phone]          Discharge Medication List as of 2/23/2018  4:42 AM      START taking these medications    Details   amoxicillin-clavulanate (AUGMENTIN) 875-125 mg per tablet Take 1 tablet by mouth every 12 (twelve) hours for 10 days, Starting Fri 2/23/2018, Until Mon 3/5/2018, Print      !! loratadine (CLARITIN) 10 mg tablet Take 1 tablet (10 mg total) by mouth daily, Starting Fri 2/23/2018, Print       !! - Potential duplicate medications found  Please discuss with provider        CONTINUE these medications which have NOT CHANGED    Details   albuterol (2 5 mg/3 mL) 0 083 % nebulizer solution Inhale 1 each, Starting Fri 3/6/2015, Historical Med      Cetirizine HCl (ZYRTEC ALLERGY) 10 MG CAPS Take 1 capsule by mouth daily, Starting Fri 5/5/2017, Historical Med      fluticasone (FLOVENT HFA) 110 MCG/ACT inhaler Inhale 2 puffs 2 (two) times a day, Starting Th 12/12/2013, Historical Med      fluticasone-salmeterol (ADVAIR DISKUS) 250-50 mcg/dose inhaler Inhale 1 puff 2 (two) times a day, Starting Thu 12/21/2017, Historical Med      ketorolac (ACULAR) 0 4 % SOLN Apply 1 drop to eye 4 (four) times a day, Starting Fri 5/5/2017, Historical Med      !! loratadine (CLARITIN) 10 mg tablet Take 1 tablet by mouth daily, Historical Med      Methylprednisolone 4 MG TBPK Use as directed on package, Normal      metoclopramide (REGLAN) 5 mg tablet Take by mouth every 6 (six) hours, Starting Fri 3/6/2015, Historical Med      omeprazole (PriLOSEC) 20 mg delayed release capsule Take 1 capsule by mouth daily, Starting Mon 4/27/2015, Historical Med      Respiratory Therapy Supplies (NEBULIZER) FILIBERTO by Does not apply route, Starting Fri 3/6/2015, Historical Med Saline 0 9 % AERS into each nostril, Starting Thu 12/21/2017, Historical Med      VENTOLIN  (90 Base) MCG/ACT inhaler inhale 1 to 2 puffs by mouth every 4 to 6 hours ONLY FOR WHEEZING     (REFER TO PRESCRIPTION NOTES)  , Historical Med       !! - Potential duplicate medications found  Please discuss with provider  No discharge procedures on file  ED Provider  Attending physically available and evaluated Niels Ya I managed the patient along with the ED Attending      Electronically Signed by         Radha Richmond MD  02/25/18 7501

## 2018-02-23 NOTE — ED NOTES
Pt  Discharged from department by Dr Cristiane Greene  RN not present during discharge        Robert Alcala, EUN  02/23/18 8632

## 2018-02-23 NOTE — ED NOTES
Pt states that she was vomiting mucus but now just has dry heaves     Kena Welsh, EUN  02/23/18 6215

## 2018-02-23 NOTE — ED ATTENDING ATTESTATION
I, [address], DO, saw and evaluated the patient  I have discussed the patient with the resident/non-physician practitioner and agree with the resident's/non-physician practitioner's findings, Plan of Care, and MDM as documented in the resident's/non-physician practitioner's note, except where noted  All available labs and Radiology studies were reviewed  At this point I agree with the current assessment done in the Emergency Department  I have conducted an independent evaluation of this patient a history and physical is as follows:    56-year-old female presents for evaluation of facial pain  Patient states she has had URI symptoms on off and yesterday began having severe facial pain sinus pressure  Patient has had similar symptoms in the past with sinus infections  No chest pain or shortness of breath  On exam-no acute distress, no respiratory distress, tenderness to percussion in the maxillary sinus area bilaterally    Plan-, continue Flonase, start antihistamine like Claritin, antibiotic    Critical Care Time  CritCare Time    Procedures

## 2018-02-23 NOTE — DISCHARGE INSTRUCTIONS
Rhinosinusitis   WHAT YOU NEED TO KNOW:   Rhinosinusitis (RS) is inflammation of your nose and sinuses  It commonly begins as a virus, often as a common cold  Viruses usually last 7 to 10 days and do not need treatment  When the virus does not get better on its own, you may have bacterial RS  This means that bacteria have begun to grow inside your sinuses  Acute RS lasts less than 4 weeks  Chronic RS lasts 12 weeks or more  Recurrent RS is when you have 4 or more episodes of RS in one year  DISCHARGE INSTRUCTIONS:   Return to the emergency department if:   · Your eye and eyelid are red, swollen, and painful  · You cannot open your eye  · You have double vision or you cannot see  · Your eyeball bulges out or you cannot move your eye  · You are more sleepy than normal or you notice changes in your ability to think, move, or talk  · You have a stiff neck, a fever, or a bad headache  · You have swelling of your forehead or scalp  Contact your healthcare provider if:   · Your symptoms are worse or do not improve after 3 to 5 days of treatment  · You have questions or concerns about your condition or care  Medicines: You may need any of the following:  · Acetaminophen  decreases pain and fever  It is available without a doctor's order  Ask how much to take and how often to take it  Follow directions  Acetaminophen can cause liver damage if not taken correctly  · NSAIDs , such as ibuprofen, help decrease swelling, pain, and fever  This medicine is available with or without a doctor's order  NSAIDs can cause stomach bleeding or kidney problems in certain people  If you take blood thinner medicine, always ask your healthcare provider if NSAIDs are safe for you  Always read the medicine label and follow directions  · Nasal steroid sprays  decrease inflammation in your nose and sinuses  · Decongestants  reduce swelling and drain mucus in the nose and sinuses   They may help you breathe easier  · Antihistamines  dry mucus in the nose and relieve sneezing  · Antibiotics  treat a bacterial infection and may be needed if your symptoms do not improve or they get worse  · Take your medicine as directed  Contact your healthcare provider if you think your medicine is not helping or if you have side effects  Tell him or her if you are allergic to any medicine  Keep a list of the medicines, vitamins, and herbs you take  Include the amounts, and when and why you take them  Bring the list or the pill bottles to follow-up visits  Carry your medicine list with you in case of an emergency  Self-care:   · Rinse your sinuses  Use a sinus rinse device to rinse your nasal passages with a saline (salt water) solution  This will help thin the mucus in your nose and rinse away pollen and dirt  It will also help reduce swelling so you can breathe normally  Ask your healthcare provider how often to do this  · Breathe in steam   Heat a bowl of water until you see steam  Lean over the bowl and make a tent over your head with a large towel  Breathe deeply for about 20 minutes  Be careful not to get too close to the steam or burn yourself  Do this 3 times a day  You can also breathe deeply when you take a hot shower  · Sleep with your head elevated  Place an extra pillow under your head before you go to sleep to help your sinuses drain  · Drink liquids as directed  Ask your healthcare provider how much liquid to drink each day and which liquids are best for you  Liquids will thin the mucus in your nose and help it drain  Avoid drinks that contain alcohol or caffeine  · Do not smoke, and avoid secondhand smoke  Nicotine and other chemicals in cigarettes and cigars can make your symptoms worse  Ask your healthcare provider for information if you currently smoke and need help to quit  E-cigarettes or smokeless tobacco still contain nicotine   Talk to your healthcare provider before you use these products  Follow up with your healthcare provider as directed: Follow up if your symptoms are worse or not better after 3 to 5 days of treatment  Write down your questions so you remember to ask them during your visits  © 2017 [employer] Information is for End User's use only and may not be sold, redistributed or otherwise used for commercial purposes  All illustrations and images included in CareNotes® are the copyrighted property of A D A M [employer] Inc  or Dewayne Melendez  The above information is an  only  It is not intended as medical advice for individual conditions or treatments  Talk to your doctor, nurse or pharmacist before following any medical regimen to see if it is safe and effective for you

## 2018-03-27 ENCOUNTER — OFFICE VISIT (OUTPATIENT)
Dept: INTERNAL MEDICINE CLINIC | Facility: CLINIC | Age: 73
End: 2018-03-27
Payer: COMMERCIAL

## 2018-03-27 VITALS
HEIGHT: 63 IN | WEIGHT: 129.19 LBS | DIASTOLIC BLOOD PRESSURE: 90 MMHG | HEART RATE: 80 BPM | SYSTOLIC BLOOD PRESSURE: 150 MMHG | TEMPERATURE: 98.2 F | BODY MASS INDEX: 22.89 KG/M2

## 2018-03-27 DIAGNOSIS — J01.01 ACUTE RECURRENT MAXILLARY SINUSITIS: ICD-10-CM

## 2018-03-27 PROCEDURE — 99213 OFFICE O/P EST LOW 20 MIN: CPT | Performed by: INTERNAL MEDICINE

## 2018-03-27 RX ORDER — LORATADINE 10 MG/1
10 TABLET ORAL DAILY
Qty: 20 TABLET | Refills: 0 | Status: ON HOLD | OUTPATIENT
Start: 2018-03-27 | End: 2018-05-15 | Stop reason: ALTCHOICE

## 2018-03-27 RX ORDER — METOCLOPRAMIDE 5 MG/1
5 TABLET ORAL EVERY 6 HOURS
Qty: 10 TABLET | Refills: 3 | Status: SHIPPED | OUTPATIENT
Start: 2018-03-27 | End: 2018-03-30

## 2018-03-27 RX ORDER — LORATADINE 10 MG/1
10 TABLET ORAL DAILY
Qty: 90 TABLET | Refills: 3 | Status: ON HOLD | OUTPATIENT
Start: 2018-03-27 | End: 2018-05-15 | Stop reason: ALTCHOICE

## 2018-03-27 NOTE — PROGRESS NOTES
CLINIC VISIT NOTE    ASSESSMENT/PLAN:  1) chronic sinusitis, with acute flair   Pt has had symptoms off and on for years   Has been evaluated by ENT and was deemed to high of a surgical risk 2/2 adverse reaction to anesthesia in the past   Pt states most recent symptoms began to flair one month ago   Developed nausea and vomiting   Presented to ED for evaluation on 2/23    Severe facial pain, drainage with no relief from flonase   Started on augmentin with no relief   Will add saline flushes and recommend kandy pot washes   Recommend conservative treatment and discussed with patient the indications to return to center with fever/chills    2) nausea and vomiting, likely 2/2 to #2  Pt is on long term Reglan for nausea suppression, will continue this   Recommended hydration    Health Maintenance:  defered    Schedule a follow-up appointment as needed for symptoms otherwise in 6 months for follow up preventive care  CHIEF COMPLAINT: Sinus congestion    HISTORY OF PRESENT ILLNESS:    Pt is a 72 yo female with pmhx of chronic sinus congestion  Pt presented to the emergency department 1 month ago for evaluation of an acute flair of sinus congestion  She was prescribed Augmentin at that time and had minimal improvement of symptoms  She has been evaluated by ent and has been determined not to be an operative candidate 2/2 adverse reaction to anesthesia  Her symptoms today are principally facial congestion and tenderness over the maxillary sinuses  Review of Systems   Constitutional: Negative for activity change, appetite change, chills, diaphoresis, fatigue and fever  HENT: Positive for congestion  Negative for dental problem  Respiratory: Negative for cough, chest tightness, shortness of breath, wheezing and stridor  Cardiovascular: Negative for chest pain, palpitations and leg swelling  Musculoskeletal: Positive for arthralgias and back pain  Negative for gait problem, joint swelling and myalgias  Neurological: Positive for headaches  Negative for dizziness, syncope, facial asymmetry, weakness, light-headedness and numbness  Hematological: Negative for adenopathy  Does not bruise/bleed easily  OBJECTIVE:  Vitals:    03/27/18 0856   BP: 150/90   BP Location: Left arm   Patient Position: Sitting   Cuff Size: Standard   Pulse: 80   Temp: 98 2 °F (36 8 °C)   TempSrc: Oral   Weight: 58 6 kg (129 lb 3 oz)   Height: 5' 3" (1 6 m)     Physical Exam   Constitutional: She is oriented to person, place, and time  She appears well-developed and well-nourished  No distress  Elderly female resting comfortably in the chair, grossly poor dentician   HENT:   Head: Normocephalic and atraumatic  Mouth/Throat: Oropharynx is clear and moist  No oropharyngeal exudate  Mild injection of nasal carries, no gross drainage  Mild erythema in external auditory canal  Tenderness to palpation across maxilliary sinuses bilaterally   Eyes: Conjunctivae and EOM are normal  Pupils are equal, round, and reactive to light  No scleral icterus  Neck: Normal range of motion  Neck supple  No JVD present  Cardiovascular: Normal rate, regular rhythm and normal heart sounds  Exam reveals no gallop and no friction rub  No murmur heard  Pulmonary/Chest: Effort normal and breath sounds normal  No respiratory distress  She has no wheezes  She has no rales  Abdominal: Soft  Bowel sounds are normal  She exhibits no distension and no mass  There is no tenderness  There is no rebound and no guarding  Musculoskeletal: She exhibits no edema or deformity  Lymphadenopathy:     She has no cervical adenopathy  Neurological: She is alert and oriented to person, place, and time  No cranial nerve deficit  She exhibits normal muscle tone  Coordination normal    Skin: Skin is warm and dry  No rash noted  She is not diaphoretic  No erythema  No pallor  Nursing note and vitals reviewed          Current Outpatient Prescriptions:    albuterol (2 5 mg/3 mL) 0 083 % nebulizer solution, Inhale 1 each, Disp: , Rfl:     Cetirizine HCl (ZYRTEC ALLERGY) 10 MG CAPS, Take 1 capsule by mouth daily, Disp: , Rfl:     fluticasone (FLONASE) 50 mcg/act nasal spray, , Disp: , Rfl: 0    fluticasone (FLOVENT HFA) 110 MCG/ACT inhaler, Inhale 2 puffs 2 (two) times a day, Disp: , Rfl:     fluticasone-salmeterol (ADVAIR DISKUS) 250-50 mcg/dose inhaler, Inhale 1 puff 2 (two) times a day, Disp: , Rfl:     omeprazole (PriLOSEC) 20 mg delayed release capsule, Take 1 capsule by mouth daily, Disp: , Rfl:     Respiratory Therapy Supplies (NEBULIZER) FILIBERTO, by Does not apply route, Disp: , Rfl:     Saline 0 9 % AERS, into each nostril, Disp: , Rfl:     VENTOLIN  (90 Base) MCG/ACT inhaler, inhale 1 to 2 puffs by mouth every 4 to 6 hours ONLY FOR WHEEZING     (REFER TO PRESCRIPTION NOTES)  , Disp: , Rfl: 0    ketorolac (ACULAR) 0 4 % SOLN, Apply 1 drop to eye 4 (four) times a day, Disp: , Rfl:     loratadine (CLARITIN) 10 mg tablet, Take 1 tablet by mouth daily, Disp: , Rfl:     loratadine (CLARITIN) 10 mg tablet, Take 1 tablet (10 mg total) by mouth daily, Disp: 20 tablet, Rfl: 0    Methylprednisolone 4 MG TBPK, Use as directed on package, Disp: 1 each, Rfl: 0    metoclopramide (REGLAN) 5 mg tablet, Take by mouth every 6 (six) hours, Disp: , Rfl:     ondansetron (ZOFRAN-ODT) 4 mg disintegrating tablet, Take 1 tablet (4 mg total) by mouth every 8 (eight) hours as needed for nausea or vomiting for up to 3 days, Disp: 12 tablet, Rfl: 0    Past Medical History:   Diagnosis Date    Asthma     Asthmatic bronchitis     Last Assessed: 10/7/2014     Chronic diarrhea     Last Assessed: 8/20/2015     Fibromyalgia     Focal nodular hyperplasia of liver     Last Assessed: 6/11/2015    Herpes zoster     Last Assessed: 3/18/2016    IBS (irritable bowel syndrome)     Intermittent palpitations     Irritable bowel syndrome     Osteoarthritis     Vertigo      Past Surgical History:   Procedure Laterality Date    BREAST BIOPSY      SMALL INTESTINE SURGERY       Social History     Social History    Marital status:      Spouse name: N/A    Number of children: N/A    Years of education: N/A     Occupational History    Unemployed       Social History Main Topics    Smoking status: Current Every Day Smoker     Types: Cigarettes    Smokeless tobacco: Never Used      Comment: Tobacco Use     Alcohol use No    Drug use: No    Sexual activity: No     Other Topics Concern    Not on file     Social History Narrative    Mental Disability     Exercising Regularly     Lives with adult children      Family History   Problem Relation Age of Onset    Heart attack Mother     Other Mother      Aspiration of Vomit     Asthma Father     Breast cancer Sister     Arthritis Family     Other Family      Musculoskeletal Disease     Osteoporosis Family        ==  Anthony Menjivar 15 Internal Medicine PGY-1    [address] [facility] , [address] [address] [facility] [address] [facility]  Office: [phone]  Fax: [phone]

## 2018-04-04 ENCOUNTER — OFFICE VISIT (OUTPATIENT)
Dept: OBGYN CLINIC | Facility: HOSPITAL | Age: 73
End: 2018-04-04
Payer: COMMERCIAL

## 2018-04-04 VITALS
HEART RATE: 93 BPM | SYSTOLIC BLOOD PRESSURE: 144 MMHG | HEIGHT: 63 IN | BODY MASS INDEX: 23.5 KG/M2 | DIASTOLIC BLOOD PRESSURE: 97 MMHG | WEIGHT: 132.6 LBS

## 2018-04-04 DIAGNOSIS — M18.11 PRIMARY OSTEOARTHRITIS OF FIRST CARPOMETACARPAL JOINT OF RIGHT HAND: ICD-10-CM

## 2018-04-04 DIAGNOSIS — S63.642D RUPTURE OF ULNAR COLLATERAL LIGAMENT OF LEFT THUMB, SUBSEQUENT ENCOUNTER: Primary | ICD-10-CM

## 2018-04-04 PROCEDURE — 99214 OFFICE O/P EST MOD 30 MIN: CPT | Performed by: ORTHOPAEDIC SURGERY

## 2018-04-04 NOTE — H&P
ASSESSMENT/PLAN:    Diagnoses and all orders for this visit:    Rupture of ulnar collateral ligament of left thumb, subsequent encounter    Primary osteoarthritis of first carpometacarpal joint of right hand     Right hand small finger trigger finger    Assessment:   Symptomatic right small trigger, right ulnar collateral ligament tear of the thumb metacarpophalangeal joint, and right thumb CMC joint arthritis    Plan:   Patient today for surgical intervention for ulnar collateral ligament reconstruction of the right thumb metacarpophalangeal joint as well as trigger finger release of the right small finger  We will observe the thumb CMC arthritis and address this in the future if required  She is aware that her thumb arthritis pain will not resolve after surgical intervention to fix the ligament problems  Expected recovery time approximately 3 months for the ulnar collateral ligament injury  She may continue her Comfort Cool brace for her thumb  Follow Up: After Surgery    To Do Next Visit:    and Sutures out    General Discussions:          Operative Discussions:     Standard Consent: The risks and benefits of the procedure were explained to the patient, which include, but are not limited to: Bleeding, infection, recurrence, pain, scar, damage to tendons, damage to nerves, and damage to blood vessels, failure to give desired results and complications related to anesthesia  These risks, along with alternative conservative treatment options, and postoperative protocols were voiced back and understood by the patient  All questions were answered to the patient's satisfaction  The patient agrees to comply with a standard postoperative protocol, and is willing to proceed  Education was provided via written and auditory forms  There were no barriers to learning  Written handouts regarding wound care, incision and scar care, and general preoperative information was provided to the patient    Prior to surgery, the patient may be requested to stop all anti-inflammatory medications  Prophylactic aspirin, Plavix, and Coumadin may be allowed to be continued  Medications including vitamin E , ginkgo, and fish oil are requested to be stopped approximately one week prior to surgery  Hypertensive medications and beta blockers, if taken, should be continued  _____________________________________________________  CHIEF COMPLAINT:  Chief Complaint   Patient presents with    Left Thumb - Follow-up         SUBJECTIVE:  Brooks Cheney is a 71y o  year old female who presents for follow up regarding her right hand  Medrol Dosepak did not provide stability or pain relief  She is utilizing a Comfort Cool brace which is helping minimally  Today, she complains of mild-to-moderate pain over the thumb CMC joint, she complains of pain over the flexor tendon the right small finger as well as instability and pain to the metacarpophalangeal joint of the right thumb  No history of numbness, tingling, fevers, or chills      PAST MEDICAL HISTORY:  Past Medical History:   Diagnosis Date    Asthma     Asthmatic bronchitis     Last Assessed: 10/7/2014     Chronic diarrhea     Last Assessed: 8/20/2015     Fibromyalgia     Focal nodular hyperplasia of liver     Last Assessed: 6/11/2015    Herpes zoster     Last Assessed: 3/18/2016    IBS (irritable bowel syndrome)     Intermittent palpitations     Irritable bowel syndrome     Osteoarthritis     Vertigo        PAST SURGICAL HISTORY:  Past Surgical History:   Procedure Laterality Date    BREAST BIOPSY      SMALL INTESTINE SURGERY         FAMILY HISTORY:  Family History   Problem Relation Age of Onset    Heart attack Mother     Other Mother      Aspiration of Vomit     Asthma Father     Breast cancer Sister     Arthritis Family     Other Family      Musculoskeletal Disease     Osteoporosis Family        SOCIAL HISTORY:  Social History   Substance Use Topics    Smoking status: Current Every Day Smoker     Types: Cigarettes    Smokeless tobacco: Never Used      Comment: Tobacco Use     Alcohol use No       MEDICATIONS:    Current Outpatient Prescriptions:     albuterol (2 5 mg/3 mL) 0 083 % nebulizer solution, Inhale 1 each, Disp: , Rfl:     Cetirizine HCl (ZYRTEC ALLERGY) 10 MG CAPS, Take 1 capsule by mouth daily, Disp: , Rfl:     fluticasone (FLONASE) 50 mcg/act nasal spray, , Disp: , Rfl: 0    fluticasone (FLOVENT HFA) 110 MCG/ACT inhaler, Inhale 2 puffs 2 (two) times a day, Disp: , Rfl:     fluticasone-salmeterol (ADVAIR DISKUS) 250-50 mcg/dose inhaler, Inhale 1 puff 2 (two) times a day, Disp: , Rfl:     ketorolac (ACULAR) 0 4 % SOLN, Apply 1 drop to eye 4 (four) times a day, Disp: , Rfl:     loratadine (CLARITIN) 10 mg tablet, Take 1 tablet (10 mg total) by mouth daily, Disp: 20 tablet, Rfl: 0    loratadine (CLARITIN) 10 mg tablet, Take 1 tablet (10 mg total) by mouth daily, Disp: 90 tablet, Rfl: 3    Methylprednisolone 4 MG TBPK, Use as directed on package, Disp: 1 each, Rfl: 0    omeprazole (PriLOSEC) 20 mg delayed release capsule, Take 1 capsule by mouth daily, Disp: , Rfl:     Respiratory Therapy Supplies (NEBULIZER) FILIBERTO, by Does not apply route, Disp: , Rfl:     Saline 0 9 % AERS, into each nostril, Disp: , Rfl:     VENTOLIN  (90 Base) MCG/ACT inhaler, inhale 1 to 2 puffs by mouth every 4 to 6 hours ONLY FOR WHEEZING     (REFER TO PRESCRIPTION NOTES)  , Disp: , Rfl: 0    ondansetron (ZOFRAN-ODT) 4 mg disintegrating tablet, Take 1 tablet (4 mg total) by mouth every 8 (eight) hours as needed for nausea or vomiting for up to 3 days, Disp: 12 tablet, Rfl: 0    ALLERGIES:  Allergies   Allergen Reactions    Ambrosia Artemisiifolia (Ragweed) Skin Test     Codeine     Epinephrine      Pt reports "it makes my heart race, they told me I cant take it "    Ibuprofen     Morphine     Other     Pollen Extract     Tramadol        REVIEW OF SYSTEMS:  Pertinent items are noted in HPI  A comprehensive review of systems was negative  LABS:  HgA1c:   Lab Results   Component Value Date    HGBA1C 5 9 05/05/2017     BMP:   Lab Results   Component Value Date    GLUCOSE 92 05/19/2015    CALCIUM 9 3 05/19/2015     05/19/2015    K 4 1 05/19/2015    CO2 28 05/19/2015     05/19/2015    BUN 12 05/19/2015    CREATININE 0 80 05/19/2015           _____________________________________________________  PHYSICAL EXAMINATION:  General: well developed and well nourished, alert, oriented times 3 and appears comfortable  Psychiatric: Normal  HEENT: Trachea Midline, No torticollis  Cardiovascular: No discernable arrhythmia  Pulmonary: No wheezing or stridor  Skin: No masses, erthema, lacerations, fluctation, ulcerations  Neurovascular: Sensation Intact to the Median, Ulnar, Radial Nerve, Motor Intact to the Median, Ulnar, Radial Nerve and Pulses Intact    MUSCULOSKELETAL EXAMINATION:  Right hand today has tenderness to palpation right thumb carpometacarpal joint without if shoulder sign, crepitation, and grind test   Patient has significant laxity ulnar collateral ligament of the right thumb metacarpophalangeal joint both in neutral and 30° flexion compared to the contralateral side indicative of a tear  Patient with no laxity index finger radial collateral ligament metacarpophalangeal joint but significant tenderness to the adductor muscle at this point  Patient has crepitation with a nodule over the small finger of the right hand with no active triggering but pain with motion      _____________________________________________________  STUDIES REVIEWED:  No Studies to review      PROCEDURES PERFORMED:  Procedures  No Procedures performed today

## 2018-04-04 NOTE — PROGRESS NOTES
ASSESSMENT/PLAN:    Diagnoses and all orders for this visit:    Rupture of ulnar collateral ligament of left thumb, subsequent encounter    Primary osteoarthritis of first carpometacarpal joint of right hand     Right hand small finger trigger finger    Assessment:   Symptomatic right small trigger, right ulnar collateral ligament tear of the thumb metacarpophalangeal joint, and right thumb CMC joint arthritis    Plan:   Patient today for surgical intervention for ulnar collateral ligament reconstruction of the right thumb metacarpophalangeal joint as well as trigger finger release of the right small finger  We will observe the thumb CMC arthritis and address this in the future if required  She is aware that her thumb arthritis pain will not resolve after surgical intervention to fix the ligament problems  Expected recovery time approximately 3 months for the ulnar collateral ligament injury  She may continue her Comfort Cool brace for her thumb  Follow Up: After Surgery    To Do Next Visit:    and Sutures out    General Discussions:          Operative Discussions:     Standard Consent: The risks and benefits of the procedure were explained to the patient, which include, but are not limited to: Bleeding, infection, recurrence, pain, scar, damage to tendons, damage to nerves, and damage to blood vessels, failure to give desired results and complications related to anesthesia  These risks, along with alternative conservative treatment options, and postoperative protocols were voiced back and understood by the patient  All questions were answered to the patient's satisfaction  The patient agrees to comply with a standard postoperative protocol, and is willing to proceed  Education was provided via written and auditory forms  There were no barriers to learning  Written handouts regarding wound care, incision and scar care, and general preoperative information was provided to the patient    Prior to surgery, the patient may be requested to stop all anti-inflammatory medications  Prophylactic aspirin, Plavix, and Coumadin may be allowed to be continued  Medications including vitamin E , ginkgo, and fish oil are requested to be stopped approximately one week prior to surgery  Hypertensive medications and beta blockers, if taken, should be continued  _____________________________________________________  CHIEF COMPLAINT:  Chief Complaint   Patient presents with    Left Thumb - Follow-up         SUBJECTIVE:  Brigette Saez is a 71y o  year old female who presents for follow up regarding her right hand  Medrol Dosepak did not provide stability or pain relief  She is utilizing a Comfort Cool brace which is helping minimally  Today, she complains of mild-to-moderate pain over the thumb CMC joint, she complains of pain over the flexor tendon the right small finger as well as instability and pain to the metacarpophalangeal joint of the right thumb  No history of numbness, tingling, fevers, or chills      PAST MEDICAL HISTORY:  Past Medical History:   Diagnosis Date    Asthma     Asthmatic bronchitis     Last Assessed: 10/7/2014     Chronic diarrhea     Last Assessed: 8/20/2015     Fibromyalgia     Focal nodular hyperplasia of liver     Last Assessed: 6/11/2015    Herpes zoster     Last Assessed: 3/18/2016    IBS (irritable bowel syndrome)     Intermittent palpitations     Irritable bowel syndrome     Osteoarthritis     Vertigo        PAST SURGICAL HISTORY:  Past Surgical History:   Procedure Laterality Date    BREAST BIOPSY      SMALL INTESTINE SURGERY         FAMILY HISTORY:  Family History   Problem Relation Age of Onset    Heart attack Mother     Other Mother      Aspiration of Vomit     Asthma Father     Breast cancer Sister     Arthritis Family     Other Family      Musculoskeletal Disease     Osteoporosis Family        SOCIAL HISTORY:  Social History   Substance Use Topics    Smoking status: Current Every Day Smoker     Types: Cigarettes    Smokeless tobacco: Never Used      Comment: Tobacco Use     Alcohol use No       MEDICATIONS:    Current Outpatient Prescriptions:     albuterol (2 5 mg/3 mL) 0 083 % nebulizer solution, Inhale 1 each, Disp: , Rfl:     Cetirizine HCl (ZYRTEC ALLERGY) 10 MG CAPS, Take 1 capsule by mouth daily, Disp: , Rfl:     fluticasone (FLONASE) 50 mcg/act nasal spray, , Disp: , Rfl: 0    fluticasone (FLOVENT HFA) 110 MCG/ACT inhaler, Inhale 2 puffs 2 (two) times a day, Disp: , Rfl:     fluticasone-salmeterol (ADVAIR DISKUS) 250-50 mcg/dose inhaler, Inhale 1 puff 2 (two) times a day, Disp: , Rfl:     ketorolac (ACULAR) 0 4 % SOLN, Apply 1 drop to eye 4 (four) times a day, Disp: , Rfl:     loratadine (CLARITIN) 10 mg tablet, Take 1 tablet (10 mg total) by mouth daily, Disp: 20 tablet, Rfl: 0    loratadine (CLARITIN) 10 mg tablet, Take 1 tablet (10 mg total) by mouth daily, Disp: 90 tablet, Rfl: 3    Methylprednisolone 4 MG TBPK, Use as directed on package, Disp: 1 each, Rfl: 0    omeprazole (PriLOSEC) 20 mg delayed release capsule, Take 1 capsule by mouth daily, Disp: , Rfl:     Respiratory Therapy Supplies (NEBULIZER) FILIBERTO, by Does not apply route, Disp: , Rfl:     Saline 0 9 % AERS, into each nostril, Disp: , Rfl:     VENTOLIN  (90 Base) MCG/ACT inhaler, inhale 1 to 2 puffs by mouth every 4 to 6 hours ONLY FOR WHEEZING     (REFER TO PRESCRIPTION NOTES)  , Disp: , Rfl: 0    ondansetron (ZOFRAN-ODT) 4 mg disintegrating tablet, Take 1 tablet (4 mg total) by mouth every 8 (eight) hours as needed for nausea or vomiting for up to 3 days, Disp: 12 tablet, Rfl: 0    ALLERGIES:  Allergies   Allergen Reactions    Ambrosia Artemisiifolia (Ragweed) Skin Test     Codeine     Epinephrine      Pt reports "it makes my heart race, they told me I cant take it "    Ibuprofen     Morphine     Other     Pollen Extract     Tramadol        REVIEW OF SYSTEMS:  Pertinent items are noted in HPI  A comprehensive review of systems was negative  LABS:  HgA1c:   Lab Results   Component Value Date    HGBA1C 5 9 05/05/2017     BMP:   Lab Results   Component Value Date    GLUCOSE 92 05/19/2015    CALCIUM 9 3 05/19/2015     05/19/2015    K 4 1 05/19/2015    CO2 28 05/19/2015     05/19/2015    BUN 12 05/19/2015    CREATININE 0 80 05/19/2015           _____________________________________________________  PHYSICAL EXAMINATION:  General: well developed and well nourished, alert, oriented times 3 and appears comfortable  Psychiatric: Normal  HEENT: Trachea Midline, No torticollis  Cardiovascular: No discernable arrhythmia  Pulmonary: No wheezing or stridor  Skin: No masses, erthema, lacerations, fluctation, ulcerations  Neurovascular: Sensation Intact to the Median, Ulnar, Radial Nerve, Motor Intact to the Median, Ulnar, Radial Nerve and Pulses Intact    MUSCULOSKELETAL EXAMINATION:  Right hand today has tenderness to palpation right thumb carpometacarpal joint without if shoulder sign, crepitation, and grind test   Patient has significant laxity ulnar collateral ligament of the right thumb metacarpophalangeal joint both in neutral and 30° flexion compared to the contralateral side indicative of a tear  Patient with no laxity index finger radial collateral ligament metacarpophalangeal joint but significant tenderness to the adductor muscle at this point  Patient has crepitation with a nodule over the small finger of the right hand with no active triggering but pain with motion      _____________________________________________________  STUDIES REVIEWED:  No Studies to review      PROCEDURES PERFORMED:  Procedures  No Procedures performed today

## 2018-04-16 ENCOUNTER — OFFICE VISIT (OUTPATIENT)
Dept: INTERNAL MEDICINE CLINIC | Facility: CLINIC | Age: 73
End: 2018-04-16

## 2018-04-16 VITALS
WEIGHT: 129.63 LBS | BODY MASS INDEX: 22.97 KG/M2 | SYSTOLIC BLOOD PRESSURE: 142 MMHG | HEART RATE: 108 BPM | HEIGHT: 63 IN | DIASTOLIC BLOOD PRESSURE: 102 MMHG | TEMPERATURE: 98.4 F

## 2018-04-16 DIAGNOSIS — Z01.818 PRE-OPERATIVE EXAMINATION: ICD-10-CM

## 2018-04-16 DIAGNOSIS — M18.11 PRIMARY OSTEOARTHRITIS OF FIRST CARPOMETACARPAL JOINT OF RIGHT HAND: Primary | ICD-10-CM

## 2018-04-16 PROCEDURE — 99213 OFFICE O/P EST LOW 20 MIN: CPT | Performed by: INTERNAL MEDICINE

## 2018-04-16 NOTE — PATIENT INSTRUCTIONS

## 2018-04-16 NOTE — PROGRESS NOTES
ASSESSMENT/PLAN:  Diagnoses and all orders for this visit:    Pre-operative examination for Primary osteoarthritis of first carpometacarpal joint of right hand: Patient is able to complete >4 METs of activity without difficulty  Her risk for operative cardiovascular event is 0 4%  In addition, although patient has diagnosis of asthma her disease is well controlled, although patient continues to smoke  Overall, she is low risk for this low risk procedure, and may proceed to surgery without further testing  She was advised to cease smoking prior to and after surgery to aid with recovery  She should take all inhalers as directed prior to surgery  These were discussed with patient and she is agreeable  Nicotine dependence:  I discussed ceasing smoking prior to the operation to assist with recovery, however patient remains pre-contemplative  CHIEF COMPLAINT: Pre-operative evaluation    HISTORY OF PRESENT ILLNESS:  Colin Mckeon is a 71 y o  who is in clinic today for pre-operative evaluation prior to RIGHT trigger finger surgery tentatively 5/15/2018  She reports she is able to walk >2 flights of stairs without feeling short of breath and is able to complete chores around her home without shortness of breath  She has a history of asthma for which she uses her albuterol nebulizers less than monthly for shortness of breath, with patient reporting no use over the past 4 weeks  Denies history of MI, stroke, DM2  Review of Systems   Constitutional: Negative for chills and fever  HENT: Positive for congestion and rhinorrhea  Negative for sinus pain  Respiratory: Negative for cough, shortness of breath and wheezing  Cardiovascular: Negative for chest pain, palpitations and leg swelling  Gastrointestinal: Negative for abdominal pain, nausea and vomiting  Endocrine: Negative for polydipsia and polyuria  Musculoskeletal: Negative for arthralgias and back pain     Skin: Negative for color change and pallor  Neurological: Negative for dizziness, weakness and headaches  Hematological: Negative for adenopathy  Does not bruise/bleed easily  OBJECTIVE:  Vitals:    04/16/18 0927   BP: (!) 142/102   Pulse: (!) 108   Temp: 98 4 °F (36 9 °C)   Weight: 58 8 kg (129 lb 10 1 oz)   Height: 5' 3" (1 6 m)     Physical Exam   Constitutional: She is oriented to person, place, and time  She appears well-developed and well-nourished  No distress  HENT:   Head: Normocephalic and atraumatic  Mouth/Throat: Oropharynx is clear and moist  No oropharyngeal exudate  Eyes: Right eye exhibits no discharge  Left eye exhibits no discharge  No scleral icterus  Neck: Normal range of motion  Neck supple  Cardiovascular: Normal rate, regular rhythm, normal heart sounds and intact distal pulses  Exam reveals no gallop and no friction rub  No murmur heard  Pulmonary/Chest: Effort normal and breath sounds normal  No respiratory distress  She has no wheezes  She has no rales  Abdominal: Soft  Bowel sounds are normal  There is no tenderness  Musculoskeletal: Normal range of motion  She exhibits no edema or tenderness  Lymphadenopathy:     She has no cervical adenopathy  Neurological: She is alert and oriented to person, place, and time  No cranial nerve deficit  Skin: Skin is warm and dry  No rash noted  She is not diaphoretic  No erythema  Psychiatric: She has a normal mood and affect  Her behavior is normal    Vitals reviewed          Current Outpatient Prescriptions:     albuterol (2 5 mg/3 mL) 0 083 % nebulizer solution, Inhale 1 each, Disp: , Rfl:     fluticasone (FLONASE) 50 mcg/act nasal spray, , Disp: , Rfl: 0    fluticasone (FLOVENT HFA) 110 MCG/ACT inhaler, Inhale 2 puffs 2 (two) times a day, Disp: , Rfl:     fluticasone-salmeterol (ADVAIR DISKUS) 250-50 mcg/dose inhaler, Inhale 1 puff 2 (two) times a day, Disp: , Rfl:     loratadine (CLARITIN) 10 mg tablet, Take 1 tablet (10 mg total) by mouth daily, Disp: 20 tablet, Rfl: 0    omeprazole (PriLOSEC) 20 mg delayed release capsule, Take 1 capsule by mouth daily, Disp: , Rfl:     Respiratory Therapy Supplies (NEBULIZER) FILIBERTO, by Does not apply route, Disp: , Rfl:     VENTOLIN  (90 Base) MCG/ACT inhaler, inhale 1 to 2 puffs by mouth every 4 to 6 hours ONLY FOR WHEEZING     (REFER TO PRESCRIPTION NOTES)  , Disp: , Rfl: 0    Cetirizine HCl (ZYRTEC ALLERGY) 10 MG CAPS, Take 1 capsule by mouth daily, Disp: , Rfl:     ketorolac (ACULAR) 0 4 % SOLN, Apply 1 drop to eye 4 (four) times a day, Disp: , Rfl:     loratadine (CLARITIN) 10 mg tablet, Take 1 tablet (10 mg total) by mouth daily, Disp: 90 tablet, Rfl: 3    Methylprednisolone 4 MG TBPK, Use as directed on package, Disp: 1 each, Rfl: 0    ondansetron (ZOFRAN-ODT) 4 mg disintegrating tablet, Take 1 tablet (4 mg total) by mouth every 8 (eight) hours as needed for nausea or vomiting for up to 3 days, Disp: 12 tablet, Rfl: 0    Saline 0 9 % AERS, into each nostril, Disp: , Rfl:     Past Medical History:   Diagnosis Date    Asthma     Asthmatic bronchitis     Last Assessed: 10/7/2014     Chronic diarrhea     Last Assessed: 8/20/2015     Fibromyalgia     Focal nodular hyperplasia of liver     Last Assessed: 6/11/2015    Herpes zoster     Last Assessed: 3/18/2016    IBS (irritable bowel syndrome)     Intermittent palpitations     Irritable bowel syndrome     Osteoarthritis     Vertigo      Past Surgical History:   Procedure Laterality Date    BREAST BIOPSY      SMALL INTESTINE SURGERY       Social History     Social History    Marital status:       Spouse name: N/A    Number of children: N/A    Years of education: N/A     Occupational History    Unemployed       Social History Main Topics    Smoking status: Current Every Day Smoker     Types: Cigarettes    Smokeless tobacco: Never Used      Comment: Tobacco Use     Alcohol use No    Drug use: No    Sexual activity: No Other Topics Concern    Not on file     Social History Narrative    Mental Disability     Exercising Regularly     Lives with adult children      Family History   Problem Relation Age of Onset    Heart attack Mother     Other Mother      Aspiration of Vomit     Asthma Father     Breast cancer Sister     Arthritis Family     Other Family      Musculoskeletal Disease     Osteoporosis Family        ==  DO Mercedes Kasper 74 Internal Medicine PGY-3    [facility]  [address]   [facility] , [address] [address] [facility] [address] [facility]  Office: [phone]  Fax: [phone]

## 2018-05-11 ENCOUNTER — OFFICE VISIT (OUTPATIENT)
Dept: LAB | Facility: HOSPITAL | Age: 73
End: 2018-05-11
Attending: ORTHOPAEDIC SURGERY
Payer: COMMERCIAL

## 2018-05-11 DIAGNOSIS — M18.11 PRIMARY OSTEOARTHRITIS OF FIRST CARPOMETACARPAL JOINT OF RIGHT HAND: ICD-10-CM

## 2018-05-11 LAB
ALBUMIN SERPL BCP-MCNC: 3 G/DL (ref 3.5–5)
ALP SERPL-CCNC: 80 U/L (ref 46–116)
ALT SERPL W P-5'-P-CCNC: 20 U/L (ref 12–78)
ANION GAP SERPL CALCULATED.3IONS-SCNC: 8 MMOL/L (ref 4–13)
AST SERPL W P-5'-P-CCNC: 16 U/L (ref 5–45)
ATRIAL RATE: 87 BPM
BASOPHILS # BLD AUTO: 0.06 THOUSANDS/ΜL (ref 0–0.1)
BASOPHILS NFR BLD AUTO: 1 % (ref 0–1)
BILIRUB SERPL-MCNC: 0.2 MG/DL (ref 0.2–1)
BUN SERPL-MCNC: 13 MG/DL (ref 5–25)
CALCIUM SERPL-MCNC: 8.8 MG/DL (ref 8.3–10.1)
CHLORIDE SERPL-SCNC: 111 MMOL/L (ref 100–108)
CO2 SERPL-SCNC: 25 MMOL/L (ref 21–32)
CREAT SERPL-MCNC: 0.95 MG/DL (ref 0.6–1.3)
EOSINOPHIL # BLD AUTO: 0.49 THOUSAND/ΜL (ref 0–0.61)
EOSINOPHIL NFR BLD AUTO: 5 % (ref 0–6)
ERYTHROCYTE [DISTWIDTH] IN BLOOD BY AUTOMATED COUNT: 15.4 % (ref 11.6–15.1)
GFR SERPL CREATININE-BSD FRML MDRD: 60 ML/MIN/1.73SQ M
GLUCOSE SERPL-MCNC: 95 MG/DL (ref 65–140)
HCT VFR BLD AUTO: 42.4 % (ref 34.8–46.1)
HGB BLD-MCNC: 13.3 G/DL (ref 11.5–15.4)
LYMPHOCYTES # BLD AUTO: 3 THOUSANDS/ΜL (ref 0.6–4.47)
LYMPHOCYTES NFR BLD AUTO: 30 % (ref 14–44)
MCH RBC QN AUTO: 29.2 PG (ref 26.8–34.3)
MCHC RBC AUTO-ENTMCNC: 31.4 G/DL (ref 31.4–37.4)
MCV RBC AUTO: 93 FL (ref 82–98)
MONOCYTES # BLD AUTO: 0.56 THOUSAND/ΜL (ref 0.17–1.22)
MONOCYTES NFR BLD AUTO: 6 % (ref 4–12)
NEUTROPHILS # BLD AUTO: 5.9 THOUSANDS/ΜL (ref 1.85–7.62)
NEUTS SEG NFR BLD AUTO: 58 % (ref 43–75)
NRBC BLD AUTO-RTO: 0 /100 WBCS
P AXIS: 62 DEGREES
PLATELET # BLD AUTO: 280 THOUSANDS/UL (ref 149–390)
PMV BLD AUTO: 11.9 FL (ref 8.9–12.7)
POTASSIUM SERPL-SCNC: 4.1 MMOL/L (ref 3.5–5.3)
PR INTERVAL: 128 MS
PROT SERPL-MCNC: 6.7 G/DL (ref 6.4–8.2)
QRS AXIS: -3 DEGREES
QRSD INTERVAL: 82 MS
QT INTERVAL: 366 MS
QTC INTERVAL: 440 MS
RBC # BLD AUTO: 4.55 MILLION/UL (ref 3.81–5.12)
SODIUM SERPL-SCNC: 144 MMOL/L (ref 136–145)
T WAVE AXIS: 48 DEGREES
VENTRICULAR RATE: 87 BPM
WBC # BLD AUTO: 10.03 THOUSAND/UL (ref 4.31–10.16)

## 2018-05-11 PROCEDURE — 85025 COMPLETE CBC W/AUTO DIFF WBC: CPT

## 2018-05-11 PROCEDURE — 93010 ELECTROCARDIOGRAM REPORT: CPT | Performed by: INTERNAL MEDICINE

## 2018-05-11 PROCEDURE — 93005 ELECTROCARDIOGRAM TRACING: CPT

## 2018-05-11 PROCEDURE — 80053 COMPREHEN METABOLIC PANEL: CPT

## 2018-05-11 PROCEDURE — 36415 COLL VENOUS BLD VENIPUNCTURE: CPT

## 2018-05-15 ENCOUNTER — HOSPITAL ENCOUNTER (OUTPATIENT)
Facility: HOSPITAL | Age: 73
Setting detail: OUTPATIENT SURGERY
Discharge: HOME | End: 2018-05-15
Attending: ORTHOPAEDIC SURGERY | Admitting: ORTHOPAEDIC SURGERY
Payer: COMMERCIAL

## 2018-05-15 VITALS
RESPIRATION RATE: 18 BRPM | HEIGHT: 63 IN | BODY MASS INDEX: 22.86 KG/M2 | SYSTOLIC BLOOD PRESSURE: 173 MMHG | DIASTOLIC BLOOD PRESSURE: 98 MMHG | WEIGHT: 129 LBS | HEART RATE: 89 BPM | TEMPERATURE: 98 F | OXYGEN SATURATION: 98 %

## 2018-05-15 RX ORDER — ASPIRIN 81 MG/1
81 TABLET ORAL DAILY
COMMUNITY
End: 2020-01-23

## 2018-05-16 ENCOUNTER — OFFICE VISIT (OUTPATIENT)
Dept: INTERNAL MEDICINE CLINIC | Facility: CLINIC | Age: 73
End: 2018-05-16
Payer: COMMERCIAL

## 2018-05-16 VITALS
BODY MASS INDEX: 23.12 KG/M2 | SYSTOLIC BLOOD PRESSURE: 174 MMHG | DIASTOLIC BLOOD PRESSURE: 100 MMHG | HEART RATE: 92 BPM | WEIGHT: 130.51 LBS | TEMPERATURE: 98.2 F

## 2018-05-16 DIAGNOSIS — R11.0 NAUSEA: Chronic | ICD-10-CM

## 2018-05-16 DIAGNOSIS — F17.210 CIGARETTE NICOTINE DEPENDENCE WITHOUT COMPLICATION: ICD-10-CM

## 2018-05-16 DIAGNOSIS — M18.11 PRIMARY OSTEOARTHRITIS OF FIRST CARPOMETACARPAL JOINT OF RIGHT HAND: ICD-10-CM

## 2018-05-16 DIAGNOSIS — S63.642D RUPTURE OF ULNAR COLLATERAL LIGAMENT OF LEFT THUMB, SUBSEQUENT ENCOUNTER: ICD-10-CM

## 2018-05-16 DIAGNOSIS — J45.20 MILD INTERMITTENT ASTHMA, UNSPECIFIED WHETHER COMPLICATED: Primary | ICD-10-CM

## 2018-05-16 DIAGNOSIS — J30.2 SEASONAL ALLERGIC RHINITIS, UNSPECIFIED TRIGGER: ICD-10-CM

## 2018-05-16 DIAGNOSIS — Z12.31 SCREENING MAMMOGRAM, ENCOUNTER FOR: ICD-10-CM

## 2018-05-16 DIAGNOSIS — J32.8 OTHER CHRONIC SINUSITIS: Chronic | ICD-10-CM

## 2018-05-16 PROCEDURE — 99213 OFFICE O/P EST LOW 20 MIN: CPT | Performed by: INTERNAL MEDICINE

## 2018-05-16 RX ORDER — ALBUTEROL SULFATE 2.5 MG/3ML
2.5 SOLUTION RESPIRATORY (INHALATION) EVERY 4 HOURS PRN
Qty: 25 VIAL | Refills: 3 | Status: SHIPPED | OUTPATIENT
Start: 2018-05-16 | End: 2018-10-05 | Stop reason: SDUPTHER

## 2018-05-16 RX ORDER — MONTELUKAST SODIUM 10 MG/1
10 TABLET ORAL
Qty: 30 TABLET | Refills: 3 | Status: SHIPPED | OUTPATIENT
Start: 2018-05-16 | End: 2020-01-23

## 2018-05-16 RX ORDER — ONDANSETRON 4 MG/1
4 TABLET, ORALLY DISINTEGRATING ORAL EVERY 8 HOURS PRN
Qty: 15 TABLET | Refills: 0 | Status: SHIPPED | OUTPATIENT
Start: 2018-05-16 | End: 2019-01-17 | Stop reason: SDUPTHER

## 2018-05-16 RX ORDER — ALBUTEROL SULFATE 2.5 MG/3ML
2.5 SOLUTION RESPIRATORY (INHALATION) EVERY 4 HOURS PRN
Qty: 3 ML | Refills: 3 | Status: SHIPPED | OUTPATIENT
Start: 2018-05-16 | End: 2018-05-16 | Stop reason: SDUPTHER

## 2018-05-16 RX ORDER — NICOTINE 21 MG/24HR
1 PATCH, TRANSDERMAL 24 HOURS TRANSDERMAL EVERY 24 HOURS
Qty: 28 PATCH | Refills: 3 | Status: SHIPPED | OUTPATIENT
Start: 2018-05-16 | End: 2020-01-23

## 2018-05-16 NOTE — PROGRESS NOTES
CLINIC VISIT NOTE  Ike Blanca 68 y o  female   MRN: [id number]    ASSESSMENT/PLAN:  Diagnoses and all orders for this visit:    1  Mild intermittent asthma, unspecified whether complicated  · albuterol (2 5 mg/3 mL) 0 083 % nebulizer solution; Take 3 mL (2 5 mg total) by nebulization every 4 (four) hours as needed for wheezing or shortness of breath  · No acute exacerbation, symptoms are well controlled  · Patient reports that she uses her inhalers and nebulizers only as needed and does not use them every week    2  Other chronic sinusitis  · Advised patient to continue taking her medications as instructed  · Will add Singulair 10 mg daily  · Advised patient to continue taking Allegra for the next 2 weeks, and if there is no relief of symptoms, to try over-the-counter Zyrtec  · Follow-up in 1 month for re-evaluation    3  Primary osteoarthritis of first carpometacarpal joint of right hand and rupture of ulnar collateral ligament of left thumb, subsequent encounter  · Advised patient to reschedule her surgery  · Follow-up with Orthopedic surgery    4  Screening mammogram, encounter for  · Will will provide order slip for screening mammogram  · Last mammogram in January 2017 was BI-[facility]ADS 2    5  Cigarette nicotine dependence without complication  · Patient is willing to attempt smoking cessation  · Will provide orders for nicotine patch  · Also advised patient that smoking cessation will help with her sinusitis and allergic symptoms    6  Nausea  · Will reorder Zofran as needed    7   Seasonal allergic rhinitis, unspecified trigger  · Advise patient to continue Allegra for 2 weeks and if there is no change in symptoms, to switch to over-the-counter Zyrtec      Health Maintenance:  Due for mammogram-Will provide order slip  Patient states that she has had a colonoscopy within the last 5 years although no records are available-patient states that she will contact the doctor who performed colonoscopy to have records faxed over    Schedule a follow-up appointment in 1 month for follow-up of chronic complaints possible GI referral for evaluation chronic nausea  Chief Complaint:  Nausea, sinus congestion, cough  History of Present Illness:  67-year-old female presents to office for complaints of nausea, sinus congestion, mucus production, and cough  Patient was unable to have her orthopedic surgery performed yesterday due to worsening complaints as noted above  Patient presents to office today to address these issues  It is feeling okay otherwise offers no other acute complaints apart from some itchiness in her back which she was concern for possible shingles  Patient was scheduled to have surgery of ruptured ulnar collateral ligament of the left thumb as well as surgery for primary osteoarthritis of the 1st carpometacarpal joint of the right hand with Orthopedic surgery yesterday  However, due to symptoms of nausea, sinus congestion, and cough with mucus production, patient refused surgery  Patient has suffered from chronic sinusitis has had acute flares in the past   Her current presentation is same  She is currently on multiple medications including Allegra, Flonase  She also uses a Neti pot at home  After switching to Allegra, she has had some relief of her symptoms  Review of Systems   HENT: Positive for congestion, postnasal drip and rhinorrhea  Respiratory: Positive for cough  Gastrointestinal: Positive for nausea  Musculoskeletal: Positive for arthralgias  All other systems reviewed and are negative  Objective:  Vitals:    05/16/18 1402   BP: (!) 174/100   Pulse: 92   Temp: 98 2 °F (36 8 °C)   Weight: 59 2 kg (130 lb 8 2 oz)     Physical Exam   Constitutional: She is oriented to person, place, and time  She appears well-developed  No distress  HENT:   Head: Normocephalic and atraumatic  Nose: Nose normal    Mouth/Throat: No oropharyngeal exudate     Mildly edematous turbinates bilaterally, positive cerumen, TMs intact with no edema or fluid noted   Eyes: Conjunctivae and EOM are normal  No scleral icterus  Neck: Neck supple  No JVD present  No tracheal deviation present  Cardiovascular: Normal rate, regular rhythm, normal heart sounds and intact distal pulses  Pulmonary/Chest: Effort normal and breath sounds normal  No stridor  No respiratory distress  She has no wheezes  She has no rales  She exhibits no tenderness  Abdominal: Soft  Bowel sounds are normal  She exhibits no distension  There is no tenderness  There is no rebound and no guarding  Musculoskeletal: She exhibits no edema  Distal right upper extremity in Comfort Cool brace   Neurological: She is alert and oriented to person, place, and time  No cranial nerve deficit  Skin: Skin is warm and dry  She is not diaphoretic  No erythema  No pallor  No shingles rash noted in area of complaint of itchiness   Psychiatric: She has a normal mood and affect  Her behavior is normal  Judgment and thought content normal    Nursing note and vitals reviewed          Current Outpatient Prescriptions:     albuterol (2 5 mg/3 mL) 0 083 % nebulizer solution, Take 3 mL (2 5 mg total) by nebulization every 4 (four) hours as needed for wheezing or shortness of breath, Disp: 3 mL, Rfl: 3    aspirin (ECOTRIN LOW STRENGTH) 81 mg EC tablet, Take 81 mg by mouth daily, Disp: , Rfl:     fexofenadine (ALLEGRA) 30 MG tablet, Take 30 mg by mouth 2 (two) times a day, Disp: , Rfl:     fluticasone (FLONASE) 50 mcg/act nasal spray, , Disp: , Rfl: 0    fluticasone (FLOVENT HFA) 110 MCG/ACT inhaler, Inhale 2 puffs 2 (two) times a day, Disp: , Rfl:     fluticasone-salmeterol (ADVAIR DISKUS) 250-50 mcg/dose inhaler, Inhale 1 puff 2 (two) times a day, Disp: , Rfl:     Respiratory Therapy Supplies (NEBULIZER) FILIBERTO, by Does not apply route, Disp: , Rfl:     Saline 0 9 % AERS, into each nostril, Disp: , Rfl:     VENTOLIN  (90 Base) MCG/ACT inhaler, inhale 1 to 2 puffs by mouth every 4 to 6 hours ONLY FOR WHEEZING     (REFER TO PRESCRIPTION NOTES)  , Disp: , Rfl: 0    ondansetron (ZOFRAN-ODT) 4 mg disintegrating tablet, Take 1 tablet (4 mg total) by mouth every 8 (eight) hours as needed for nausea or vomiting for up to 3 days, Disp: 12 tablet, Rfl: 0    Past Medical History:   Diagnosis Date    Asthma     Asthmatic bronchitis     Last Assessed: 10/7/2014     Chronic diarrhea     Last Assessed: 8/20/2015     Fibromyalgia     Focal nodular hyperplasia of liver     Last Assessed: 6/11/2015    Herpes zoster     Last Assessed: 3/18/2016    IBS (irritable bowel syndrome)     Intermittent palpitations     Irritable bowel syndrome     Osteoarthritis     Vertigo      Past Surgical History:   Procedure Laterality Date    BREAST BIOPSY      SMALL INTESTINE SURGERY       Social History     Social History    Marital status:      Spouse name: N/A    Number of children: N/A    Years of education: N/A     Occupational History    Unemployed       Social History Main Topics    Smoking status: Current Every Day Smoker     Types: Cigarettes    Smokeless tobacco: Never Used      Comment: Tobacco Use     Alcohol use No    Drug use: No    Sexual activity: No     Other Topics Concern    Not on file     Social History Narrative    Mental Disability     Exercising Regularly     Lives with adult children      Family History   Problem Relation Age of Onset    Heart attack Mother     Other Mother      Aspiration of Vomit     Asthma Father     Breast cancer Sister     Arthritis Family     Other Family      Musculoskeletal Disease     Osteoporosis Family        ==  DO Mercedes Johnson 73 Internal Medicine PGY-1    [address] , [address] [address] [facility] [address] [facility]  Office: [phone]  Fax: [phone]

## 2018-05-16 NOTE — PATIENT INSTRUCTIONS
Sinusitis   AMBULATORY CARE:   Sinusitis  is inflammation or infection of your sinuses  It is most often caused by a virus  Acute sinusitis may last up to 12 weeks  Chronic sinusitis lasts longer than 12 weeks  Recurrent sinusitis means you have 4 or more times in 1 year  Common symptoms include the following:   · Fever    · Pain, pressure, redness, or swelling around the forehead, cheeks, or eyes    · Thick yellow or green discharge from your nose    · Tenderness when you touch your face over your sinuses    · Dry cough that happens mostly at night or when you lie down    · Headache and face pain that is worse when you lean forward    · Tooth pain, or pain when you chew  Seek care immediately if:   · Your eye and eyelid are red, swollen, and painful  · You cannot open your eye  · You have vision changes, such as double vision  · Your eyeball bulges out or you cannot move your eye  · You are more sleepy than normal, or you notice changes in your ability to think, move, or talk  · You have a stiff neck, a fever, or a bad headache  · You have swelling of your forehead or scalp  Contact your healthcare provider if:   · Your symptoms do not improve after 3 days  · Your symptoms do not go away after 10 days  · You have nausea and are vomiting  · Your nose is bleeding  · You have questions or concerns about your condition or care  Treatment for sinusitis:  Your symptoms may go away on their own  Your healthcare provider may recommend watchful waiting for up to 10 days before starting antibiotics  You may  need any of the following:  · Acetaminophen  decreases pain and fever  It is available without a doctor's order  Ask how much to take and how often to take it  Follow directions  Read the labels of all other medicines you are using to see if they also contain acetaminophen, or ask your doctor or pharmacist  Acetaminophen can cause liver damage if not taken correctly   Do not use more than 4 grams (4,000 milligrams) total of acetaminophen in one day  · NSAIDs , such as ibuprofen, help decrease swelling, pain, and fever  This medicine is available with or without a doctor's order  NSAIDs can cause stomach bleeding or kidney problems in certain people  If you take blood thinner medicine, always ask your healthcare provider if NSAIDs are safe for you  Always read the medicine label and follow directions  · Nasal steroid sprays  may help decrease inflammation in your nose and sinuses  · Decongestants  help reduce swelling and drain mucus in the nose and sinuses  They may help you breathe easier  · Antihistamines  help dry mucus in the nose and relieve sneezing  · Antibiotics  help treat or prevent a bacterial infection  · Take your medicine as directed  Contact your healthcare provider if you think your medicine is not helping or if you have side effects  Tell him or her if you are allergic to any medicine  Keep a list of the medicines, vitamins, and herbs you take  Include the amounts, and when and why you take them  Bring the list or the pill bottles to follow-up visits  Carry your medicine list with you in case of an emergency  Self-care:   · Rinse your sinuses  Use a sinus rinse device to rinse your nasal passages with a saline (salt water) solution or distilled water  Do not use tap water  This will help thin the mucus in your nose and rinse away pollen and dirt  It will also help reduce swelling so you can breathe normally  Ask your healthcare provider how often to do this  · Breathe in steam   Heat a bowl of water until you see steam  Lean over the bowl and make a tent over your head with a large towel  Breathe deeply for about 20 minutes  Be careful not to get too close to the steam or burn yourself  Do this 3 times a day  You can also breathe deeply when you take a hot shower  · Sleep with your head elevated    Place an extra pillow under your head before you go to sleep to help your sinuses drain  · Drink liquids as directed  Ask your healthcare provider how much liquid to drink each day and which liquids are best for you  Liquids will thin the mucus in your nose and help it drain  Avoid drinks that contain alcohol or caffeine  · Do not smoke, and avoid secondhand smoke  Nicotine and other chemicals in cigarettes and cigars can make your symptoms worse  Ask your healthcare provider for information if you currently smoke and need help to quit  E-cigarettes or smokeless tobacco still contain nicotine  Talk to your healthcare provider before you use these products  Prevent the spread of germs that cause sinusitis:  Wash your hands often with soap and water  Wash your hands after you use the bathroom, change a child's diaper, or sneeze  Wash your hands before you prepare or eat food  Follow up with your healthcare provider as directed: You may be referred to an ear, nose, and throat specialist  Write down your questions so you remember to ask them during your visits  © 2017 [facility] [facility] Information is for End User's use only and may not be sold, redistributed or otherwise used for commercial purposes  All illustrations and images included in CareNotes® are the copyrighted property of [employer]  or Dewayne Melendez  The above information is an  only  It is not intended as medical advice for individual conditions or treatments  Talk to your doctor, nurse or pharmacist before following any medical regimen to see if it is safe and effective for you

## 2018-05-23 ENCOUNTER — OFFICE VISIT (OUTPATIENT)
Dept: OBGYN CLINIC | Facility: HOSPITAL | Age: 73
End: 2018-05-23
Payer: COMMERCIAL

## 2018-05-23 VITALS
HEIGHT: 63 IN | HEART RATE: 94 BPM | WEIGHT: 131.6 LBS | SYSTOLIC BLOOD PRESSURE: 196 MMHG | BODY MASS INDEX: 23.32 KG/M2 | DIASTOLIC BLOOD PRESSURE: 128 MMHG

## 2018-05-23 DIAGNOSIS — S63.642D RUPTURE OF ULNAR COLLATERAL LIGAMENT OF LEFT THUMB, SUBSEQUENT ENCOUNTER: Primary | ICD-10-CM

## 2018-05-23 DIAGNOSIS — M65.321 TRIGGER FINGER, RIGHT INDEX FINGER: ICD-10-CM

## 2018-05-23 DIAGNOSIS — M18.11 PRIMARY OSTEOARTHRITIS OF FIRST CARPOMETACARPAL JOINT OF RIGHT HAND: ICD-10-CM

## 2018-05-23 DIAGNOSIS — M65.351 TRIGGER LITTLE FINGER OF RIGHT HAND: ICD-10-CM

## 2018-05-23 DIAGNOSIS — M65.321 TRIGGER INDEX FINGER OF RIGHT HAND: ICD-10-CM

## 2018-05-23 PROCEDURE — 99214 OFFICE O/P EST MOD 30 MIN: CPT | Performed by: ORTHOPAEDIC SURGERY

## 2018-05-23 NOTE — PROGRESS NOTES
ASSESSMENT/PLAN:    Diagnoses and all orders for this visit:    Rupture of ulnar collateral ligament of left thumb, subsequent encounter  -     Case request operating room: RECONSTRUCTION UCL THUMB, [address]; Standing  -     Case request operating room: REC[facility] THUMB, [address]    Primary osteoarthritis of first carpometacarpal joint of right hand    Trigger little finger of right hand  -     Case request operating room: REC[facility]STRUCTION [facility] THUMB, [address]; Standing  -     Case request operating room: RECONSTRUCTION UCL THUMB, RELEASE TRIGGER FINGER INDEX AND SMALL    Trigger finger, right index finger  -     Case request operating room: REC[facility]STRUCTION [facility] THUMB, [address]; Standing  -     Case request operating room: RECONSTRUCTION UCL THUMB, RELEASE TRIGGER FINGER INDEX AND SMALL    Trigger index finger of right hand    Other orders  -     Diet NPO; Sips with meds; Standing  -     Height and weight upon arrival; Standing  -     Void on call to OR; Standing  -     Insert peripheral IV; Standing  -     ceFAZolin (ANCEF) 2 G in sodium chloride 0 9% 50 ml IVPB (CMPD ORDER); Infuse 50 mL (2,000 mg total) into a venous catheter once         Assessment:   Symptomatic right small and index trigger, 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, and right thumb CMC joint arthritis    Plan:   Patient today for surgical intervention for ulnar collateral ligament reconstruction of the right thumb metacarpophalangeal joint as well as trigger finger release of the right small finger  We will observe the thumb CMC arthritis and address this in the future if required  She is aware that her thumb arthritis pain will not resolve after surgical intervention to fix the ligament problems    Expected recovery time approximately 3 months for the ulnar collateral ligament injury  She may continue her Comfort Cool brace for her thumb  Follow Up: After Surgery    To Do Next Visit:  Sutures out    General Discussions:  [facility] Arthritis: The anatomy and physiology of carpometacarpal joint arthritis was discussed with the patient today in the office  Deterioration of the articular cartilage eventually leads to hypermobility at the thumb [facility] joint, resulting in joint subluxation, osteophyte formation, cystic changes within the trapezium and base of the first metacarpal, as well as subchondral sclerosis  Eventually, pain, limited mobility, and compensatory hyperextension at the metacarpophalangeal joint may develop  While normal activity and usage of the thumb joint may provide a painful experience to the patient, this typically does not result in damage to the thumb or hand  Treatment options include resting thumb spica splints to decreased joint edema, pain, and inflammation  Therapy exercises to strengthen the thenar musculature may relieve pain, but do not alter the overall continued development of osteoarthritis  Oral medications, topical medications, corticosteroid injections may decrease pain and increase overall function  Eventually, approximately 5% of patients may require surgical intervention  Operative Discussions:  Trigger Finger Release: The anatomy and physiology of trigger finger was discussed with the patient today in the office  Edema and increased contact pressure within the flexor tendons at the A1 pulley can cause pain, crepitation, and limitation of function  Treatment options include resting MP blocking splints to decrease edema, oral anti-inflammatory medications, home or formal therapy exercises, up to 2 steroid injections or surgical release  While majority of patients do respond to conservative treatment, up to 20% may require surgical release  The patient has elected release of the trigger finger    The patient has elected to undergo a release of the A1 pulley (trigger finger)  A small incision will be made over the palmar aspect of the hand, the tendon sheath holding the flexor tendons will be released  In the postoperative period, light activities are allowed immediately, driving is allowed when narcotic medication has stopped, and the incision may get wet after 2 days  Heavy activities (lifting more than approximately 10 pounds) will be allowed after the follow up appointment in 1-2 weeks  While the pain and discomfort within the wrist typically improves rapidly, some residual discomfort may be present for up to 6 weeks  The nodule that is typically palpable in the palmar aspect of the hand will not be removed, as this would necessitate removal of a portion of the flexor tendon, however the catching, clicking, and locking should resolve  Approximate success rate is 98%  The risks and benefits of the procedure were explained to the patient, which include, but are not limited to: Bleeding, infection, recurrence, pain, scar, damage to tendons, damage to nerves, and damage to blood vessels, need for future surgery and complications related to anesthesia  If bony work is done, risks also include malunion and nonunion  These risks, along with alternative conservative treatment options, and postoperative protocols were voiced back and understood by the patient  All questions were answered to the patient's satisfaction  The patient agrees to comply with a standard postoperative protocol, and is willing to proceed  Education was provided via written and auditory forms  There were no barriers to learning  Written handouts regarding wound care, incision and scar care, and general preoperative information, as well as risks and benefits were provided to the patient    Standard Consent: The risks and benefits of the procedure were explained to the patient, which include, but are not limited to: Bleeding, infection, recurrence, pain, scar, damage to tendons, damage to nerves, and damage to blood vessels, failure to give desired results and complications related to anesthesia  These risks, along with alternative conservative treatment options, and postoperative protocols were voiced back and understood by the patient  All questions were answered to the patient's satisfaction  The patient agrees to comply with a standard postoperative protocol, and is willing to proceed  Education was provided via written and auditory forms  There were no barriers to learning  Written handouts regarding wound care, incision and scar care, and general preoperative information was provided to the patient  Prior to surgery, the patient may be requested to stop all anti-inflammatory medications  Prophylactic aspirin, Plavix, and Coumadin may be allowed to be continued  Medications including vitamin E , ginkgo, and fish oil are requested to be stopped approximately one week prior to surgery  Hypertensive medications and beta blockers, if taken, should be continued  _____________________________________________________  CHIEF COMPLAINT:  Chief Complaint   Patient presents with    Right Thumb - Follow-up         SUBJECTIVE:  Kayla Bain is a 68y o  year old female who presents for follow up regarding Trigger Finger  right  index finger, small finger and Thumb UCL Tear  right  Since last visit, Kayla Bain has tried NSAIDs, Tylenol, activity modification and bracing without relief  Today there is Pain  Moderate  Constant  Sharp and Aching to the right thumb  Pt is having trouble sleeping at night due to her pain  Radiation: Yes to the  wrist and hand  Associated symptoms: Catching and Locking of R index and small finger  Pt states that these are the most painful in the morning and are swollen upon waking up  Pt states that they click and lock on her very frequently       PAST MEDICAL HISTORY:  Past Medical History:   Diagnosis Date    Asthma     Asthmatic bronchitis     Last Assessed: 10/7/2014     Chronic diarrhea     Last Assessed: 8/20/2015     Fibromyalgia     Focal nodular hyperplasia of liver     Last Assessed: 6/11/2015    Herpes zoster     Last Assessed: 3/18/2016    IBS (irritable bowel syndrome)     Intermittent palpitations     Irritable bowel syndrome     Osteoarthritis     Vertigo        PAST SURGICAL HISTORY:  Past Surgical History:   Procedure Laterality Date    BREAST BIOPSY      SMALL INTESTINE SURGERY         FAMILY HISTORY:  Family History   Problem Relation Age of Onset    Heart attack Mother     Other Mother      Aspiration of Vomit     Asthma Father     Breast cancer Sister     Arthritis Family     Other Family      Musculoskeletal Disease     Osteoporosis Family        SOCIAL HISTORY:  Social History   Substance Use Topics    Smoking status: Current Every Day Smoker     Types: Cigarettes    Smokeless tobacco: Never Used      Comment: Tobacco Use     Alcohol use No       MEDICATIONS:    Current Outpatient Prescriptions:     albuterol (2 5 mg/3 mL) 0 083 % nebulizer solution, Take 3 mL (2 5 mg total) by nebulization every 4 (four) hours as needed for wheezing or shortness of breath, Disp: 25 vial, Rfl: 3    aspirin (ECOTRIN LOW STRENGTH) 81 mg EC tablet, Take 81 mg by mouth daily, Disp: , Rfl:     fexofenadine (ALLEGRA) 30 MG tablet, Take 30 mg by mouth 2 (two) times a day, Disp: , Rfl:     fluticasone (FLONASE) 50 mcg/act nasal spray, , Disp: , Rfl: 0    fluticasone (FLOVENT HFA) 110 MCG/ACT inhaler, Inhale 2 puffs 2 (two) times a day, Disp: , Rfl:     fluticasone-salmeterol (ADVAIR DISKUS) 250-50 mcg/dose inhaler, Inhale 1 puff 2 (two) times a day, Disp: , Rfl:     montelukast (SINGULAIR) 10 mg tablet, Take 1 tablet (10 mg total) by mouth daily at bedtime, Disp: 30 tablet, Rfl: 3    nicotine (NICODERM CQ) 14 mg/24hr TD 24 hr patch, Place 1 patch on the skin every 24 hours, Disp: 28 patch, Rfl: 3    Respiratory Therapy Supplies (NEBULIZER) FILIBERTO, by Does not apply route, Disp: , Rfl:     Saline 0 9 % AERS, into each nostril, Disp: , Rfl:     VENTOLIN  (90 Base) MCG/ACT inhaler, inhale 1 to 2 puffs by mouth every 4 to 6 hours ONLY FOR WHEEZING     (REFER TO PRESCRIPTION NOTES)  , Disp: , Rfl: 0    ondansetron (ZOFRAN-ODT) 4 mg disintegrating tablet, Take 1 tablet (4 mg total) by mouth every 8 (eight) hours as needed for nausea or vomiting for up to 5 days, Disp: 15 tablet, Rfl: 0    ALLERGIES:  Allergies   Allergen Reactions    Ambrosia Artemisiifolia (Ragweed) Skin Test     Codeine     Epinephrine      Pt reports "it makes my heart race, they told me I cant take it "    Ibuprofen     Morphine     Other     Pollen Extract     Tramadol        REVIEW OF SYSTEMS:  Pertinent items are noted in HPI      LABS:  HgA1c:   Lab Results   Component Value Date    HGBA1C 5 9 05/05/2017     BMP:   Lab Results   Component Value Date    GLUCOSE 95 05/11/2018    CALCIUM 8 8 05/11/2018     05/11/2018    K 4 1 05/11/2018    CO2 25 05/11/2018     (H) 05/11/2018    BUN 13 05/11/2018    CREATININE 0 95 05/11/2018           _____________________________________________________  PHYSICAL EXAMINATION:  General: well developed and well nourished, alert, oriented times 3 and appears comfortable  Psychiatric: Normal  HEENT: Trachea Midline, No torticollis  Cardiovascular: No discernable arrhythmia  Pulmonary: No wheezing or stridor  Skin: No masses, erthema, lacerations, fluctation, ulcerations  Neurovascular: Sensation Intact to the Median, Ulnar, Radial Nerve, Motor Intact to the Median, Ulnar, Radial Nerve and Pulses Intact    MUSCULOSKELETAL EXAMINATION:  RIGHT SIDE:  Right hand today has tenderness to palpation right thumb carpometacarpal joint without if shoulder sign, crepitation, and grind test   Patient has significant laxity ulnar collateral ligament of the right thumb metacarpophalangeal joint both in neutral and 30° flexion to about 60 degrees when compared to the contralateral side indicative of a tear  Patient with no laxity index finger radial collateral ligament metacarpophalangeal joint but significant tenderness to the adductor muscle at this point  Patient has crepitation with a nodule over the small and index finger of the right hand with no active triggering but pain with motion      _____________________________________________________  STUDIES REVIEWED:  No Studies to review      PROCEDURES PERFORMED:  Procedures  No Procedures performed today   Scribe Attestation    I,:   Merna Vargas am acting as a scribe while in the presence of the attending physician :        I,:   Diaz Ashton MD personally performed the services described in this documentation    as scribed in my presence :

## 2018-05-23 NOTE — H&P
ASSESSMENT/PLAN:    Diagnoses and all orders for this visit:    Rupture of ulnar collateral ligament of left thumb, subsequent encounter  -     Case request operating room: RECONSTRUCTION UCL THUMB, [address]; Standing  -     Case request operating room: RECONSTRUCTION UCL THUMB, [address]    Primary osteoarthritis of first carpometacarpal joint of right hand    Trigger little finger of right hand  -     Case request operating room: RECONSTRUCTION UCL THUMB, [address]; Standing  -     Case request operating room: RECONSTRUCTION UCL THUMB, RELEASE TRIGGER FINGER INDEX AND SMALL    Trigger finger, right index finger  -     Case request operating room: RECONSTRUCTION UCL THUMB, [address]; Standing  -     Case request operating room: RECONSTRUCTION UCL THUMB, RELEASE TRIGGER FINGER INDEX AND SMALL    Trigger index finger of right hand    Other orders  -     Diet NPO; Sips with meds; Standing  -     Height and weight upon arrival; Standing  -     Void on call to OR; Standing  -     Insert peripheral IV; Standing  -     ceFAZolin (ANCEF) 2 G in sodium chloride 0 9% 50 ml IVPB (CMPD ORDER); Infuse 50 mL (2,000 mg total) into a venous catheter once         Assessment:   Symptomatic right small and index trigger, 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, and right thumb CMC joint arthritis    Plan:   Patient today for surgical intervention for ulnar collateral ligament reconstruction of the right thumb metacarpophalangeal joint as well as trigger finger release of the right small finger  We will observe the thumb CMC arthritis and address this in the future if required  She is aware that her thumb arthritis pain will not resolve after surgical intervention to fix the ligament problems    Expected recovery time approximately 3 months for the ulnar collateral ligament injury  She may continue her Comfort Cool brace for her thumb  Follow Up: After Surgery    To Do Next Visit:  Sutures out    General Discussions:  [facility] Arthritis: The anatomy and physiology of carpometacarpal joint arthritis was discussed with the patient today in the office  Deterioration of the articular cartilage eventually leads to hypermobility at the thumb [facility] joint, resulting in joint subluxation, osteophyte formation, cystic changes within the trapezium and base of the first metacarpal, as well as subchondral sclerosis  Eventually, pain, limited mobility, and compensatory hyperextension at the metacarpophalangeal joint may develop  While normal activity and usage of the thumb joint may provide a painful experience to the patient, this typically does not result in damage to the thumb or hand  Treatment options include resting thumb spica splints to decreased joint edema, pain, and inflammation  Therapy exercises to strengthen the thenar musculature may relieve pain, but do not alter the overall continued development of osteoarthritis  Oral medications, topical medications, corticosteroid injections may decrease pain and increase overall function  Eventually, approximately 5% of patients may require surgical intervention  Operative Discussions:  Trigger Finger Release: The anatomy and physiology of trigger finger was discussed with the patient today in the office  Edema and increased contact pressure within the flexor tendons at the A1 pulley can cause pain, crepitation, and limitation of function  Treatment options include resting MP blocking splints to decrease edema, oral anti-inflammatory medications, home or formal therapy exercises, up to 2 steroid injections or surgical release  While majority of patients do respond to conservative treatment, up to 20% may require surgical release  The patient has elected release of the trigger finger    The patient has elected to undergo a release of the A1 pulley (trigger finger)  A small incision will be made over the palmar aspect of the hand, the tendon sheath holding the flexor tendons will be released  In the postoperative period, light activities are allowed immediately, driving is allowed when narcotic medication has stopped, and the incision may get wet after 2 days  Heavy activities (lifting more than approximately 10 pounds) will be allowed after the follow up appointment in 1-2 weeks  While the pain and discomfort within the wrist typically improves rapidly, some residual discomfort may be present for up to 6 weeks  The nodule that is typically palpable in the palmar aspect of the hand will not be removed, as this would necessitate removal of a portion of the flexor tendon, however the catching, clicking, and locking should resolve  Approximate success rate is 98%  The risks and benefits of the procedure were explained to the patient, which include, but are not limited to: Bleeding, infection, recurrence, pain, scar, damage to tendons, damage to nerves, and damage to blood vessels, need for future surgery and complications related to anesthesia  If bony work is done, risks also include malunion and nonunion  These risks, along with alternative conservative treatment options, and postoperative protocols were voiced back and understood by the patient  All questions were answered to the patient's satisfaction  The patient agrees to comply with a standard postoperative protocol, and is willing to proceed  Education was provided via written and auditory forms  There were no barriers to learning  Written handouts regarding wound care, incision and scar care, and general preoperative information, as well as risks and benefits were provided to the patient    Standard Consent: The risks and benefits of the procedure were explained to the patient, which include, but are not limited to: Bleeding, infection, recurrence, pain, scar, damage to tendons, damage to nerves, and damage to blood vessels, failure to give desired results and complications related to anesthesia  These risks, along with alternative conservative treatment options, and postoperative protocols were voiced back and understood by the patient  All questions were answered to the patient's satisfaction  The patient agrees to comply with a standard postoperative protocol, and is willing to proceed  Education was provided via written and auditory forms  There were no barriers to learning  Written handouts regarding wound care, incision and scar care, and general preoperative information was provided to the patient  Prior to surgery, the patient may be requested to stop all anti-inflammatory medications  Prophylactic aspirin, Plavix, and Coumadin may be allowed to be continued  Medications including vitamin E , ginkgo, and fish oil are requested to be stopped approximately one week prior to surgery  Hypertensive medications and beta blockers, if taken, should be continued  _____________________________________________________  CHIEF COMPLAINT:  Chief Complaint   Patient presents with    Right Thumb - Follow-up         SUBJECTIVE:  Francisco Campuzano is a 68y o  year old female who presents for follow up regarding Trigger Finger  right  index finger, small finger and Thumb UCL Tear  right  Since last visit, Francisco Campuzano has tried NSAIDs, Tylenol, activity modification and bracing without relief  Today there is Pain  Moderate  Constant  Sharp and Aching to the right thumb  Pt is having trouble sleeping at night due to her pain  Radiation: Yes to the  wrist and hand  Associated symptoms: Catching and Locking of R index and small finger  Pt states that these are the most painful in the morning and are swollen upon waking up  Pt states that they click and lock on her very frequently       PAST MEDICAL HISTORY:  Past Medical History:   Diagnosis Date    Asthma     Asthmatic bronchitis     Last Assessed: 10/7/2014     Chronic diarrhea     Last Assessed: 8/20/2015     Fibromyalgia     Focal nodular hyperplasia of liver     Last Assessed: 6/11/2015    Herpes zoster     Last Assessed: 3/18/2016    IBS (irritable bowel syndrome)     Intermittent palpitations     Irritable bowel syndrome     Osteoarthritis     Vertigo        PAST SURGICAL HISTORY:  Past Surgical History:   Procedure Laterality Date    BREAST BIOPSY      SMALL INTESTINE SURGERY         FAMILY HISTORY:  Family History   Problem Relation Age of Onset    Heart attack Mother     Other Mother      Aspiration of Vomit     Asthma Father     Breast cancer Sister     Arthritis Family     Other Family      Musculoskeletal Disease     Osteoporosis Family        SOCIAL HISTORY:  Social History   Substance Use Topics    Smoking status: Current Every Day Smoker     Types: Cigarettes    Smokeless tobacco: Never Used      Comment: Tobacco Use     Alcohol use No       MEDICATIONS:    Current Outpatient Prescriptions:     albuterol (2 5 mg/3 mL) 0 083 % nebulizer solution, Take 3 mL (2 5 mg total) by nebulization every 4 (four) hours as needed for wheezing or shortness of breath, Disp: 25 vial, Rfl: 3    aspirin (ECOTRIN LOW STRENGTH) 81 mg EC tablet, Take 81 mg by mouth daily, Disp: , Rfl:     fexofenadine (ALLEGRA) 30 MG tablet, Take 30 mg by mouth 2 (two) times a day, Disp: , Rfl:     fluticasone (FLONASE) 50 mcg/act nasal spray, , Disp: , Rfl: 0    fluticasone (FLOVENT HFA) 110 MCG/ACT inhaler, Inhale 2 puffs 2 (two) times a day, Disp: , Rfl:     fluticasone-salmeterol (ADVAIR DISKUS) 250-50 mcg/dose inhaler, Inhale 1 puff 2 (two) times a day, Disp: , Rfl:     montelukast (SINGULAIR) 10 mg tablet, Take 1 tablet (10 mg total) by mouth daily at bedtime, Disp: 30 tablet, Rfl: 3    nicotine (NICODERM CQ) 14 mg/24hr TD 24 hr patch, Place 1 patch on the skin every 24 hours, Disp: 28 patch, Rfl: 3    Respiratory Therapy Supplies (NEBULIZER) FILIBERTO, by Does not apply route, Disp: , Rfl:     Saline 0 9 % AERS, into each nostril, Disp: , Rfl:     VENTOLIN  (90 Base) MCG/ACT inhaler, inhale 1 to 2 puffs by mouth every 4 to 6 hours ONLY FOR WHEEZING     (REFER TO PRESCRIPTION NOTES)  , Disp: , Rfl: 0    ondansetron (ZOFRAN-ODT) 4 mg disintegrating tablet, Take 1 tablet (4 mg total) by mouth every 8 (eight) hours as needed for nausea or vomiting for up to 5 days, Disp: 15 tablet, Rfl: 0    ALLERGIES:  Allergies   Allergen Reactions    Ambrosia Artemisiifolia (Ragweed) Skin Test     Codeine     Epinephrine      Pt reports "it makes my heart race, they told me I cant take it "    Ibuprofen     Morphine     Other     Pollen Extract     Tramadol        REVIEW OF SYSTEMS:  Pertinent items are noted in HPI      LABS:  HgA1c:   Lab Results   Component Value Date    HGBA1C 5 9 05/05/2017     BMP:   Lab Results   Component Value Date    GLUCOSE 95 05/11/2018    CALCIUM 8 8 05/11/2018     05/11/2018    K 4 1 05/11/2018    CO2 25 05/11/2018     (H) 05/11/2018    BUN 13 05/11/2018    CREATININE 0 95 05/11/2018           _____________________________________________________  PHYSICAL EXAMINATION:  General: well developed and well nourished, alert, oriented times 3 and appears comfortable  Psychiatric: Normal  HEENT: Trachea Midline, No torticollis  Cardiovascular: No discernable arrhythmia  Pulmonary: No wheezing or stridor  Skin: No masses, erthema, lacerations, fluctation, ulcerations  Neurovascular: Sensation Intact to the Median, Ulnar, Radial Nerve, Motor Intact to the Median, Ulnar, Radial Nerve and Pulses Intact    MUSCULOSKELETAL EXAMINATION:  RIGHT SIDE:  Right hand today has tenderness to palpation right thumb carpometacarpal joint without if shoulder sign, crepitation, and grind test   Patient has significant laxity ulnar collateral ligament of the right thumb metacarpophalangeal joint both in neutral and 30° flexion to about 60 degrees when compared to the contralateral side indicative of a tear  Patient with no laxity index finger radial collateral ligament metacarpophalangeal joint but significant tenderness to the adductor muscle at this point  Patient has crepitation with a nodule over the small and index finger of the right hand with no active triggering but pain with motion      _____________________________________________________  STUDIES REVIEWED:  No Studies to review      PROCEDURES PERFORMED:  Procedures  No Procedures performed today   Scribe Attestation    I,:   Becky Batista am acting as a scribe while in the presence of the attending physician :        I,:   Sterling Burns MD personally performed the services described in this documentation    as scribed in my presence :

## 2018-05-28 DIAGNOSIS — J30.1 SEASONAL ALLERGIC RHINITIS DUE TO POLLEN: Primary | ICD-10-CM

## 2018-05-29 RX ORDER — FLUTICASONE PROPIONATE 50 MCG
SPRAY, SUSPENSION (ML) NASAL
Qty: 16 G | Refills: 5 | Status: SHIPPED | OUTPATIENT
Start: 2018-05-29 | End: 2018-07-09 | Stop reason: SDUPTHER

## 2018-06-08 ENCOUNTER — APPOINTMENT (OUTPATIENT)
Dept: LAB | Facility: CLINIC | Age: 73
End: 2018-06-08
Payer: COMMERCIAL

## 2018-06-08 ENCOUNTER — OFFICE VISIT (OUTPATIENT)
Dept: INTERNAL MEDICINE CLINIC | Facility: CLINIC | Age: 73
End: 2018-06-08
Payer: COMMERCIAL

## 2018-06-08 VITALS
BODY MASS INDEX: 22.89 KG/M2 | WEIGHT: 129.19 LBS | SYSTOLIC BLOOD PRESSURE: 180 MMHG | DIASTOLIC BLOOD PRESSURE: 104 MMHG | TEMPERATURE: 98.3 F | HEART RATE: 84 BPM | HEIGHT: 63 IN

## 2018-06-08 DIAGNOSIS — I10 ESSENTIAL HYPERTENSION: ICD-10-CM

## 2018-06-08 DIAGNOSIS — Z23 NEED FOR TDAP VACCINATION: Primary | ICD-10-CM

## 2018-06-08 DIAGNOSIS — F41.9 ANXIETY: ICD-10-CM

## 2018-06-08 PROBLEM — F41.0 PANIC ATTACK: Status: ACTIVE | Noted: 2018-06-08

## 2018-06-08 LAB
ANION GAP SERPL CALCULATED.3IONS-SCNC: 7 MMOL/L (ref 4–13)
BUN SERPL-MCNC: 14 MG/DL (ref 5–25)
CALCIUM SERPL-MCNC: 9.3 MG/DL (ref 8.3–10.1)
CHLORIDE SERPL-SCNC: 110 MMOL/L (ref 100–108)
CO2 SERPL-SCNC: 25 MMOL/L (ref 21–32)
CREAT SERPL-MCNC: 1 MG/DL (ref 0.6–1.3)
GFR SERPL CREATININE-BSD FRML MDRD: 56 ML/MIN/1.73SQ M
GLUCOSE P FAST SERPL-MCNC: 85 MG/DL (ref 65–99)
POTASSIUM SERPL-SCNC: 4 MMOL/L (ref 3.5–5.3)
SODIUM SERPL-SCNC: 142 MMOL/L (ref 136–145)

## 2018-06-08 PROCEDURE — 3725F SCREEN DEPRESSION PERFORMED: CPT | Performed by: INTERNAL MEDICINE

## 2018-06-08 PROCEDURE — 36415 COLL VENOUS BLD VENIPUNCTURE: CPT

## 2018-06-08 PROCEDURE — 80048 BASIC METABOLIC PNL TOTAL CA: CPT

## 2018-06-08 PROCEDURE — 90715 TDAP VACCINE 7 YRS/> IM: CPT | Performed by: INTERNAL MEDICINE

## 2018-06-08 PROCEDURE — 90471 IMMUNIZATION ADMIN: CPT | Performed by: INTERNAL MEDICINE

## 2018-06-08 PROCEDURE — 99213 OFFICE O/P EST LOW 20 MIN: CPT | Performed by: INTERNAL MEDICINE

## 2018-06-08 RX ORDER — HYDROCHLOROTHIAZIDE 12.5 MG/1
12.5 TABLET ORAL DAILY
Qty: 30 TABLET | Refills: 0 | Status: SHIPPED | OUTPATIENT
Start: 2018-06-08 | End: 2018-07-09 | Stop reason: SDUPTHER

## 2018-06-08 RX ORDER — ADHESIVE BANDAGE 3/4"
BANDAGE TOPICAL 2 TIMES DAILY
Qty: 1 EACH | Refills: 0 | Status: SHIPPED | OUTPATIENT
Start: 2018-06-08 | End: 2018-07-09 | Stop reason: SDUPTHER

## 2018-06-08 RX ORDER — HYDROXYZINE HYDROCHLORIDE 10 MG/1
10 TABLET, FILM COATED ORAL
Qty: 5 TABLET | Refills: 0 | Status: ON HOLD | OUTPATIENT
Start: 2018-06-08 | End: 2018-07-03

## 2018-06-08 NOTE — ASSESSMENT & PLAN NOTE
Assessment/Plan  panic attacks  Possible organic contributing causes are: coming up surgery on July 3/2018  Hydroxyzine 10 mg to be taken the night before surgery

## 2018-06-08 NOTE — ASSESSMENT & PLAN NOTE
persistently elevated BP    Plan:   Will start her on HCTZ 12 5 mg daily   Have BMP in one week  f/u on BP in one week  BP cuff Rx provided

## 2018-06-08 NOTE — PROGRESS NOTES
INTERNAL MEDICINE FOLLOW-UP OFFICE VISIT  [facility]  [address] [facility], [address]    NAME: Sander Nino  AGE: 68 y o  SEX: female    DATE OF ENCOUNTER: 6/8/2018    Assessment and Plan     Panic attack  Assessment/Plan  panic attacks  Possible organic contributing causes are: coming up surgery on July 3/2018  Hydroxyzine 10 mg to be taken the night before surgery  Essential hypertension  persistently elevated BP    Plan: Will start her on HCTZ 12 5 mg daily   Have BMP in one week  f/u on BP in one week  BP cuff Rx provided      Orders Placed This Encounter   Procedures    TDAP VACCINE GREATER THAN OR EQUAL TO 6YO IM    Basic metabolic panel           Chief Complaint     Chief Complaint   Patient presents with    Follow-up       History of Present Illness     Subjective  Sander Nino is a 68 y o  female who presents for evaluation of panic attack  She has the following anxiety symptoms: insomnia, palpitations and panic attacks  Symptom are exacerbated because she is schedule for orthopedic right hand surgery    She denies current suicidal and homicidal ideation  Family history significant for depression  Risk factors: none  Previous treatment includes life style modification  The following portions of the patient's history were reviewed and updated as appropriate: allergies, current medications, past family history, past medical history, past social history, past surgical history and problem list     Review of Systems     Review of Systems   Constitutional: Negative for activity change, appetite change, chills, diaphoresis, fever and unexpected weight change  HENT: Negative for congestion, sinus pressure, trouble swallowing and voice change  Eyes: Negative for discharge, itching and visual disturbance  Respiratory: Negative for apnea, choking, chest tightness, shortness of breath and wheezing      Cardiovascular: Negative for chest pain, palpitations and leg swelling  Gastrointestinal: Negative for abdominal distention, abdominal pain, diarrhea, nausea and vomiting  Endocrine: Negative for cold intolerance, heat intolerance and polyuria  Genitourinary: Negative for difficulty urinating and dysuria  Musculoskeletal: Negative for back pain  Skin: Negative for color change and rash  Allergic/Immunologic: Negative for environmental allergies and food allergies  Neurological: Negative for tremors, seizures, weakness, light-headedness, numbness and headaches  Psychiatric/Behavioral: Negative for agitation, behavioral problems, confusion, hallucinations and suicidal ideas  The patient is not nervous/anxious  Active Problem List     Patient Active Problem List   Diagnosis    Rupture of ulnar collateral ligament of left thumb    Primary osteoarthritis of first carpometacarpal joint of right hand    Pre-operative examination    Mild intermittent asthma    Other chronic sinusitis    Screening mammogram, encounter for    Cigarette nicotine dependence without complication    Nausea    Seasonal allergies    Trigger little finger of right hand    Trigger finger, right index finger    Panic attack    Essential hypertension       Objective     BP (!) 180/104 (BP Location: Right arm, Patient Position: Sitting, Cuff Size: Standard)   Pulse 84   Temp 98 3 °F (36 8 °C) (Oral)   Ht 5' 3" (1 6 m)   Wt 58 6 kg (129 lb 3 oz)   BMI 22 88 kg/m²     Physical Exam   Constitutional: She is oriented to person, place, and time  She appears well-developed and well-nourished  No distress  HENT:   Head: Normocephalic and atraumatic  Mouth/Throat: Oropharynx is clear and moist  No oropharyngeal exudate  Eyes: Conjunctivae and EOM are normal  Pupils are equal, round, and reactive to light  Right eye exhibits no discharge  Left eye exhibits no discharge  No scleral icterus  Neck: Normal range of motion  Neck supple  No JVD present  No tracheal deviation present  No thyromegaly present  Cardiovascular: Normal rate, regular rhythm, normal heart sounds and intact distal pulses  Exam reveals no gallop and no friction rub  No murmur heard  Pulmonary/Chest: Effort normal and breath sounds normal  No stridor  No respiratory distress  She has no wheezes  She has no rales  She exhibits no tenderness  Abdominal: Soft  Bowel sounds are normal  She exhibits no distension  There is no tenderness  There is no rebound and no guarding  Musculoskeletal: Normal range of motion  She exhibits no edema or tenderness  Neurological: She is alert and oriented to person, place, and time  She has normal reflexes  She displays normal reflexes  No cranial nerve deficit  She exhibits normal muscle tone  Coordination normal    Skin: Skin is warm and dry  No rash noted  She is not diaphoretic  No erythema  No pallor  Psychiatric: She has a normal mood and affect  Her behavior is normal  Thought content normal    Nursing note and vitals reviewed          Current Medications     Current Outpatient Prescriptions:     aspirin (ECOTRIN LOW STRENGTH) 81 mg EC tablet, Take 81 mg by mouth daily, Disp: , Rfl:     fexofenadine (ALLEGRA) 30 MG tablet, Take 30 mg by mouth 2 (two) times a day, Disp: , Rfl:     fluticasone (FLONASE) 50 mcg/act nasal spray, instill 2 sprays into each nostril once daily, Disp: 16 g, Rfl: 5    fluticasone (FLOVENT HFA) 110 MCG/ACT inhaler, Inhale 2 puffs 2 (two) times a day, Disp: , Rfl:     fluticasone-salmeterol (ADVAIR DISKUS) 250-50 mcg/dose inhaler, Inhale 1 puff 2 (two) times a day, Disp: , Rfl:     montelukast (SINGULAIR) 10 mg tablet, Take 1 tablet (10 mg total) by mouth daily at bedtime, Disp: 30 tablet, Rfl: 3    nicotine (NICODERM CQ) 14 mg/24hr TD 24 hr patch, Place 1 patch on the skin every 24 hours, Disp: 28 patch, Rfl: 3    Respiratory Therapy Supplies (NEBULIZER) FILIBERTO, by Does not apply route, Disp: , Rfl:     Saline 0 9 % AERS, into each nostril, Disp: , Rfl:     VENTOLIN  (90 Base) MCG/ACT inhaler, inhale 1 to 2 puffs by mouth every 4 to 6 hours ONLY FOR WHEEZING     (REFER TO PRESCRIPTION NOTES)  , Disp: , Rfl: 0    albuterol (2 5 mg/3 mL) 0 083 % nebulizer solution, Take 3 mL (2 5 mg total) by nebulization every 4 (four) hours as needed for wheezing or shortness of breath, Disp: 25 vial, Rfl: 3    Blood Pressure Monitoring (BLOOD PRESSURE CUFF) MISC, by Does not apply route 2 (two) times a day, Disp: 1 each, Rfl: 0    hydrochlorothiazide (HYDRODIURIL) 12 5 mg tablet, Take 1 tablet (12 5 mg total) by mouth daily, Disp: 30 tablet, Rfl: 0    hydrOXYzine HCL (ATARAX) 10 mg tablet, Take 1 tablet (10 mg total) by mouth daily at bedtime for 5 days, Disp: 5 tablet, Rfl: 0    ondansetron (ZOFRAN-ODT) 4 mg disintegrating tablet, Take 1 tablet (4 mg total) by mouth every 8 (eight) hours as needed for nausea or vomiting for up to 5 days, Disp: 15 tablet, Rfl: 0    Health Maintenance     Health Maintenance   Topic Date Due    Hepatitis C Screening  1945    Depression Screening PHQ-9  1945    CRC Screening: Colonoscopy  1945    DTaP,Tdap,and Td Vaccines (1 - Tdap) 05/05/1967    Fall Risk  05/05/2011    Urinary Incontinence Screening  05/05/2011    GLAUCOMA SCREENING 67+ YR  05/05/2013    INFLUENZA VACCINE  09/01/2018    MAMMOGRAM  01/10/2019    PNEUMOCOCCAL POLYSACCHARIDE VACCINE AGE 65 AND OVER  Completed     Immunization History   Administered Date(s) Administered    Pneumococcal Polysaccharide PPV23 01/01/2003, 10/01/2008    Tdap 06/08/2018       Miriam Watts DO  6/8/2018 9:38 AM

## 2018-06-28 ENCOUNTER — ANESTHESIA EVENT (OUTPATIENT)
Dept: PERIOP | Facility: HOSPITAL | Age: 73
End: 2018-06-28

## 2018-06-28 NOTE — ANESTHESIA PREPROCEDURE EVALUATION
Review of Systems/Medical History  Patient summary reviewed  Chart reviewed  No history of anesthetic complications     Cardiovascular  Hypertension ,   Comment: Hx irregular heartbeat  EKG- 5/2018--SR 87, Old septal MI, T wave inversion laterally (new),  Pulmonary  Smoker (1 pk every 3 days) cigarette smoker  , Asthma (Mild intermittent) , well controlled/ stable Asthma type of rescue: PRN nebulizer,   Comment: Seasonal allergies     GI/Hepatic    GERD well controlled, Liver disease (Focal nodular hyperplasia) ,   Comment: IBS     Negative  ROS        Endo/Other  Negative endo/other ROS      GYN  Negative gynecology ROS          Hematology  Negative hematology ROS      Musculoskeletal  Osteoarthritis,        Neurology    Headaches (Migraines), Fibromyalgia  Comment: Hx vertigo  Fibromyalgia Psychology   Anxiety,     Comment: Panic attacks         Physical Exam    Airway    Mallampati score: II  TM Distance: >3 FB       Dental   upper dentures and lower dentures,     Cardiovascular  Rhythm: regular,     Pulmonary  Breath sounds clear to auscultation,     Other Findings        Anesthesia Plan  ASA Score- 3     Anesthesia Type- general with ASA Monitors  Additional Monitors:   Airway Plan: LMA  Comment: Pt had a GI EGD procedure done 8-9 years ago, not done with anesthesia, with concerns about "lung scarring" after procedure (spoke with daughter over the phone)  Pt aware she may receive regional nn block if [facility] requests it, with LMA vs  ETT    7/3/2018--New T wave inversions laterally in EKG 5/11/2018 have not been followed up on  Needs f/u before proceeding with GA  TRACI Brantley MD        Plan Factors-    Induction- intravenous  Postoperative Plan- Plan for postoperative opioid use  Informed Consent- Anesthetic plan and risks discussed with patient  I personally reviewed this patient with the CRNA  Discussed and agreed on the Anesthesia Plan with the CRNA  Margarita Avendaño

## 2018-07-03 ENCOUNTER — ANESTHESIA (OUTPATIENT)
Dept: PERIOP | Facility: HOSPITAL | Age: 73
End: 2018-07-03

## 2018-07-08 PROBLEM — R94.31 ABNORMAL EKG: Status: ACTIVE | Noted: 2018-07-08

## 2018-07-08 PROBLEM — M65.311 TRIGGER FINGER OF RIGHT THUMB: Status: ACTIVE | Noted: 2017-05-05

## 2018-07-09 ENCOUNTER — OFFICE VISIT (OUTPATIENT)
Dept: INTERNAL MEDICINE CLINIC | Facility: CLINIC | Age: 73
End: 2018-07-09
Payer: COMMERCIAL

## 2018-07-09 VITALS
WEIGHT: 131.84 LBS | HEIGHT: 63 IN | TEMPERATURE: 98.5 F | DIASTOLIC BLOOD PRESSURE: 110 MMHG | BODY MASS INDEX: 23.36 KG/M2 | HEART RATE: 88 BPM | SYSTOLIC BLOOD PRESSURE: 160 MMHG

## 2018-07-09 DIAGNOSIS — R94.31 ABNORMAL EKG: Primary | ICD-10-CM

## 2018-07-09 DIAGNOSIS — J30.2 SEASONAL ALLERGIC RHINITIS, UNSPECIFIED TRIGGER: ICD-10-CM

## 2018-07-09 DIAGNOSIS — I10 ESSENTIAL HYPERTENSION: ICD-10-CM

## 2018-07-09 PROBLEM — R73.03 PREDIABETES: Status: ACTIVE | Noted: 2018-07-09

## 2018-07-09 PROBLEM — R11.0 NAUSEA: Chronic | Status: RESOLVED | Noted: 2018-05-16 | Resolved: 2018-07-09

## 2018-07-09 PROCEDURE — 99213 OFFICE O/P EST LOW 20 MIN: CPT | Performed by: INTERNAL MEDICINE

## 2018-07-09 RX ORDER — HYDROCHLOROTHIAZIDE 25 MG/1
25 TABLET ORAL DAILY
Qty: 30 TABLET | Refills: 0 | Status: SHIPPED | OUTPATIENT
Start: 2018-07-09 | End: 2018-07-23 | Stop reason: SDUPTHER

## 2018-07-09 RX ORDER — FLUTICASONE PROPIONATE 50 MCG
2 SPRAY, SUSPENSION (ML) NASAL DAILY
Qty: 16 G | Refills: 4 | Status: SHIPPED | OUTPATIENT
Start: 2018-07-09 | End: 2019-01-17 | Stop reason: SDUPTHER

## 2018-07-09 RX ORDER — ADHESIVE BANDAGE 3/4"
BANDAGE TOPICAL 2 TIMES DAILY
Qty: 1 EACH | Refills: 0 | Status: SHIPPED | OUTPATIENT
Start: 2018-07-09 | End: 2020-06-03 | Stop reason: SDUPTHER

## 2018-07-09 NOTE — PROGRESS NOTES
Assessment/Plan:     Diagnoses and all orders for this visit:    Abnormal EKG  ·   EKG showed T-wave inversions in V4 and V5, possible atrial enlargement  · Pt has good constitutional status and RCRI of 0, thus 0 4% of cardiac event during surgery  · Do not believe EKG alone should delay surgery, however, given recent changes and uncontrolled BP, will order echo  · Would like to obtain better BP control prior to proceeding with surgery  · Follow up in 2 weeks  Seasonal allergies  · Renewed flonase    HTN  · Recommend checking BP at home with cuff  · Increase HCT to 25 mg daily  · Recommend follow up in 2 weeks for BP check - obtain echo at earliest convenience  Subjective:      Patient ID: Alisia Villa is a 68 y o  female  Pt denies history of heart attack and stroke, but does report history of "irregular heartbeat" but doesn't know what kind  She said a couple years ago, wore a heart monitor for a week where they caught an abnormal heart rhythm, but doesn't know what kind  No known hx of a-fib or vtach  Currently smokes 3-4 cigs a day  She can walk up two flights of steps and two city blocks without stopping  Denies chest pain, palpitations, shortness of breath, abd pain, edema  She does however get anxious a lot and does get nervous in the doctor's office  She has been very anxious since hearing the news of her abnormal EKG results and is upset that someone did not tell her about them beforehand  Pt states that she only takes HCTZ for BP, however has missed doses in the past and she only started taking it last Friday as she was out for a while  She did not take her BP medication today  The following portions of the patient's history were reviewed and updated as appropriate: allergies, current medications, past family history, past medical history, past social history, past surgical history and problem list     Review of Systems   Constitutional: Negative for chills and fever  HENT: Positive for congestion  Eyes: Positive for itching  Respiratory: Negative for cough, shortness of breath and wheezing  Cardiovascular: Negative for chest pain, palpitations and leg swelling  Objective:    BP (!) 160/110 (BP Location: Left arm, Patient Position: Sitting, Cuff Size: Adult)   Pulse 88   Temp 98 5 °F (36 9 °C) (Oral)   Ht 5' 2 5" (1 588 m)   Wt 59 8 kg (131 lb 13 4 oz)   BMI 23 73 kg/m²      Physical Exam   Constitutional: She is oriented to person, place, and time  She appears well-developed and well-nourished  No distress  Repeat blood pressure:  /110  /110   HENT:   Head: Normocephalic and atraumatic  Cardiovascular: Normal rate, regular rhythm, normal heart sounds and intact distal pulses  No murmur heard  Pulses:       Radial pulses are 2+ on the right side, and 2+ on the left side  Dorsalis pedis pulses are 1+ on the right side, and 1+ on the left side  Pulmonary/Chest: Effort normal and breath sounds normal  No respiratory distress  She has no wheezes  Abdominal: Soft  There is no tenderness  Musculoskeletal: She exhibits no edema  Neurological: She is alert and oriented to person, place, and time  Skin: Skin is warm and dry  She is not diaphoretic  Psychiatric: Her speech is normal  Her mood appears anxious  Vitals reviewed

## 2018-07-09 NOTE — PATIENT INSTRUCTIONS
· You are on a medication for blood pressure called HYDROCHLOROTHIAZIDE  This medication works of by increasing the amount of water and electrolytes your kidneys excrete  We will increase your dose of hydrochlorothiazide to 25 mg daily      · Check your blood pressure at home 2 times a day and writing down results  · Follow up in our clinic 2 weeks for BP check

## 2018-07-12 ENCOUNTER — TELEPHONE (OUTPATIENT)
Dept: INTERNAL MEDICINE CLINIC | Facility: CLINIC | Age: 73
End: 2018-07-12

## 2018-07-12 DIAGNOSIS — R94.31 ABNORMAL EKG: Primary | ICD-10-CM

## 2018-07-12 DIAGNOSIS — Z01.818 PRE-OPERATIVE EXAMINATION: ICD-10-CM

## 2018-07-12 NOTE — TELEPHONE ENCOUNTER
Please call patient and notify her that anesthesia would like her to be evaluated by cardiology prior to her operation  I spoke with the anesthesiologist, and despite our assessment that she should be okay for the operation, anesthesia was still requesting a note from cardiology stating she was okay to undergo surgery  I have placed a cardiology referral and she can follow up with them at her convenience  Please let me know if any other questions  Thank you

## 2018-07-13 ENCOUNTER — TELEPHONE (OUTPATIENT)
Dept: INTERNAL MEDICINE CLINIC | Facility: CLINIC | Age: 73
End: 2018-07-13

## 2018-07-13 ENCOUNTER — OFFICE VISIT (OUTPATIENT)
Dept: OBGYN CLINIC | Facility: HOSPITAL | Age: 73
End: 2018-07-13
Payer: COMMERCIAL

## 2018-07-13 VITALS
BODY MASS INDEX: 23.64 KG/M2 | HEART RATE: 83 BPM | HEIGHT: 63 IN | DIASTOLIC BLOOD PRESSURE: 113 MMHG | SYSTOLIC BLOOD PRESSURE: 194 MMHG | WEIGHT: 133.4 LBS

## 2018-07-13 DIAGNOSIS — S63.642D RUPTURE OF ULNAR COLLATERAL LIGAMENT OF LEFT THUMB, SUBSEQUENT ENCOUNTER: Primary | ICD-10-CM

## 2018-07-13 PROCEDURE — 99213 OFFICE O/P EST LOW 20 MIN: CPT | Performed by: ORTHOPAEDIC SURGERY

## 2018-07-13 NOTE — PROGRESS NOTES
ASSESSMENT/PLAN:     assessment:  78-year-old female with right UCL injury to her thumb, right index trigger finger, right small finger trigger finger     plan:  -   Blood pressure control with primary care doctor  - follow-up p r n  To reschedule surgical intervention for the above-mentioned pathologies    _____________________________________________________  CHIEF COMPLAINT:  Chief Complaint   Patient presents with    Left Thumb - Follow-up         SUBJECTIVE:  Abdirashid Ashley is a 68y o  year old female who presents  With known  Right thumb  UCL injury and right index and small finger trigger fingers  Her surgery to address these problems recently canceled due to EKG changes  She also has found that she has high blood pressure since she went to her primary care physician after the surgery cancellation    She plans to do serial visits to her primary care physician to get her blood pressure under control    PAST MEDICAL HISTORY:  Past Medical History:   Diagnosis Date    Asthma     Asthmatic bronchitis     Last Assessed: 10/7/2014     Chronic diarrhea     Last Assessed: 8/20/2015     Fibromyalgia     Focal nodular hyperplasia of liver     Last Assessed: 6/11/2015    Herpes zoster     Last Assessed: 3/18/2016    IBS (irritable bowel syndrome)     Intermittent palpitations     Irritable bowel syndrome     Osteoarthritis     Vertigo        PAST SURGICAL HISTORY:  Past Surgical History:   Procedure Laterality Date    BREAST BIOPSY      SMALL INTESTINE SURGERY         FAMILY HISTORY:  Family History   Problem Relation Age of Onset    Heart attack Mother     Other Mother         Aspiration of Vomit     Asthma Father     Breast cancer Sister     Arthritis Family     Other Family         Musculoskeletal Disease     Osteoporosis Family        SOCIAL HISTORY:  Social History   Substance Use Topics    Smoking status: Current Every Day Smoker     Types: Cigarettes    Smokeless tobacco: Never Used Comment: Tobacco Use down to 3 cigs daily    Alcohol use No       MEDICATIONS:    Current Outpatient Prescriptions:     albuterol (2 5 mg/3 mL) 0 083 % nebulizer solution, Take 3 mL (2 5 mg total) by nebulization every 4 (four) hours as needed for wheezing or shortness of breath, Disp: 25 vial, Rfl: 3    aspirin (ECOTRIN LOW STRENGTH) 81 mg EC tablet, Take 81 mg by mouth daily, Disp: , Rfl:     Blood Pressure Monitoring (BLOOD PRESSURE CUFF) MISC, by Does not apply route 2 (two) times a day, Disp: 1 each, Rfl: 0    fexofenadine (ALLEGRA) 30 MG tablet, Take 180 mg by mouth daily  , Disp: , Rfl:     fluticasone (FLONASE) 50 mcg/act nasal spray, 2 sprays into each nostril daily, Disp: 16 g, Rfl: 4    fluticasone (FLOVENT HFA) 110 MCG/ACT inhaler, Inhale 2 puffs 2 (two) times a day, Disp: , Rfl:     fluticasone-salmeterol (ADVAIR DISKUS) 250-50 mcg/dose inhaler, Inhale 1 puff 2 (two) times a day as needed  , Disp: , Rfl:     hydrochlorothiazide (HYDRODIURIL) 25 mg tablet, Take 1 tablet (25 mg total) by mouth daily, Disp: 30 tablet, Rfl: 0    montelukast (SINGULAIR) 10 mg tablet, Take 1 tablet (10 mg total) by mouth daily at bedtime, Disp: 30 tablet, Rfl: 3    nicotine (NICODERM CQ) 14 mg/24hr TD 24 hr patch, Place 1 patch on the skin every 24 hours, Disp: 28 patch, Rfl: 3    VENTOLIN  (90 Base) MCG/ACT inhaler, inhale 1 to 2 puffs by mouth every 4 to 6 hours ONLY FOR WHEEZING     (REFER TO PRESCRIPTION NOTES)  , Disp: , Rfl: 0    ondansetron (ZOFRAN-ODT) 4 mg disintegrating tablet, Take 1 tablet (4 mg total) by mouth every 8 (eight) hours as needed for nausea or vomiting for up to 5 days, Disp: 15 tablet, Rfl: 0    ALLERGIES:  Allergies   Allergen Reactions    Ambrosia Artemisiifolia (Ragweed) Skin Test Facial Swelling    Codeine Other (See Comments)     Heart races    Epinephrine      Pt reports "it makes my heart race, they told me I cant take it "    Ibuprofen Other (See Comments)     Heart racing    Morphine Other (See Comments)     Heart racing    Pollen Extract Sneezing    Tramadol Other (See Comments)     Heart racing       REVIEW OF SYSTEMS:  Pertinent items are noted in HPI  A comprehensive review of systems was negative  LABS:  HgA1c:   Lab Results   Component Value Date    HGBA1C 5 9 05/05/2017     BMP:   Lab Results   Component Value Date    GLUCOSE 95 05/11/2018    CALCIUM 9 3 06/08/2018     06/08/2018    K 4 0 06/08/2018    CO2 25 06/08/2018     (H) 06/08/2018    BUN 14 06/08/2018    CREATININE 1 00 06/08/2018       _____________________________________________________  PHYSICAL EXAMINATION:  General: well developed and well nourished, alert, oriented times 3 and appears comfortable  Psychiatric: Normal  HEENT: Trachea Midline, No torticollis  Cardiovascular: No discernable arrhythmia  Pulmonary: No wheezing or stridor  Skin: No masses, erthema, lacerations, fluctation, ulcerations  Neurovascular: Sensation Intact to the Median, Ulnar, Radial Nerve, Motor Intact to the Median, Ulnar, Radial Nerve and Pulses Intact    MUSCULOSKELETAL EXAMINATION:   right thumb:   Stress of ulnar collateral ligament of the thumb gaps at 0 and 30  Right index finger pain over volar metacarpal region and palpable nodule  Right small finger:  Pain over volar metacarpal region and palpable nodule    _____________________________________________________  STUDIES REVIEWED:  No Studies to review      PROCEDURES PERFORMED:  Procedures  No Procedures performed today     I interviewed, took the history and examined the patient  I discuss the case with the resident and reviewed the resident's note  I supervised the resident and I agree with the resident management plan as it was presented to me  I was present in the clinic and examined the patient

## 2018-07-13 NOTE — TELEPHONE ENCOUNTER
Please disregard last message regarding patient's preop  I spoke more with anesthesia, and they said that as long as there's a note from us stating she's okay to have surgery done, they are okay with it  So she does not necessarily need a cardiology evaluation  However, we are still trying to get her BP under better control, so she should keep her follow-up with us prior to getting surgery    However, it looks like the anesthesiologist is no longer requiring her to see a cardiologist

## 2018-07-21 NOTE — PROGRESS NOTES
INTERNAL MEDICINE FOLLOW-UP OFFICE VISIT  [facility]  [address] [place] [address]    NAME: Heather Maldonado  AGE: 68 y o  SEX: female  DATE OF ENCOUNTER: 7/21/2018    Assessment and Plan     1  Abnormal EKG  [facility]CO pending   Will follow up results   Patient has follow up with cardiology in august 2  Essential hypertension  Home blood pressure ranges-was utnable to take blood pressure at home as her insurance does not cover   In office blood pressure repeat measurement-106/82   Patient to obtain echo at earliest convenience for abnormal EKG findings  - hydrochlorothiazide (HYDRODIURIL) 25 mg tablet; Take 1 tablet (25 mg total) by mouth daily  Dispense: 90 tablet; Refill: 3    3  Palpitations  Patient is to monitor symptom frequency and duration at home  At next visit, can discuss holter/event recorder  Appreciate Cardiology recommendations      No orders of the defined types were placed in this encounter       - Counseling Documentation: patient was counseled regarding: diagnostic results, instructions for management and risk factor reductions    Chief Complaint     Chief Complaint   Patient presents with    Follow-up     blood pressure check       History of Present Illness     Patient is a 26-year-old female with history of asthma, hypertension, seasonal allergies, current tobacco use who presents as 2 week follow-up for blood pressure check  Blood pressure elevated at last visit  Home hydrochlorothiazide increased from 12 5 mg 25 mg  Patient instructed to take home blood pressure measurements  For abnormal EKG findings, she has echo ordered and scheduled  Patient notes history of palpitations in the past   She reports that these occurred for whole life especially around her pregnancy  She had echo in the past as well as Holter monitor which afforded no diagnosis                 The following portions of the patient's history were reviewed and updated as appropriate: allergies, current medications, past family history, past medical history, past social history, past surgical history and problem list     Review of Systems     Review of Systems   Constitutional: Negative for chills and fever  HENT: Positive for congestion and sinus pressure  Negative for sore throat and trouble swallowing  Eyes: Negative for photophobia and visual disturbance  Respiratory: Negative for shortness of breath and wheezing  Cardiovascular: Positive for palpitations  Negative for chest pain  Gastrointestinal: Negative for abdominal distention and abdominal pain  Musculoskeletal: Negative for arthralgias and back pain  Neurological: Negative for dizziness, light-headedness and headaches  Active Problem List     Patient Active Problem List   Diagnosis    Rupture of ulnar collateral ligament of left thumb    Primary osteoarthritis of first carpometacarpal joint of right hand    Pre-operative examination    Mild intermittent asthma    Other chronic sinusitis    Screening mammogram, encounter for    Cigarette nicotine dependence without complication    Seasonal allergies    Trigger finger, right index finger    Panic attack    Essential hypertension    Asthma, very poorly controlled    Breast cyst, right    Classic migraine with aura    Deficient knowledge    GERD (gastroesophageal reflux disease)    Renal cyst    Trigger finger of right thumb    Abnormal EKG    Prediabetes       Objective     There were no vitals taken for this visit  Physical Exam   Constitutional: She is oriented to person, place, and time  She appears well-developed and well-nourished  No distress  HENT:   Head: Normocephalic and atraumatic  Mouth/Throat: No oropharyngeal exudate  Eyes: Conjunctivae and EOM are normal  Pupils are equal, round, and reactive to light  No scleral icterus  Cardiovascular: Normal rate, regular rhythm and normal heart sounds      No murmur heard   Pulmonary/Chest: Effort normal and breath sounds normal  No respiratory distress  She has no wheezes  She has no rales  Musculoskeletal: She exhibits no edema, tenderness or deformity  Neurological: She is alert and oriented to person, place, and time  Skin: Skin is warm and dry  She is not diaphoretic  Psychiatric: She has a normal mood and affect   Her behavior is normal  Judgment and thought content normal        Pertinent Laboratory/Diagnostic Studies:  Labs reviewed     Current Medications     Current Outpatient Prescriptions:     albuterol (2 5 mg/3 mL) 0 083 % nebulizer solution, Take 3 mL (2 5 mg total) by nebulization every 4 (four) hours as needed for wheezing or shortness of breath, Disp: 25 vial, Rfl: 3    aspirin (ECOTRIN LOW STRENGTH) 81 mg EC tablet, Take 81 mg by mouth daily, Disp: , Rfl:     Blood Pressure Monitoring (BLOOD PRESSURE CUFF) MISC, by Does not apply route 2 (two) times a day, Disp: 1 each, Rfl: 0    fexofenadine (ALLEGRA) 30 MG tablet, Take 180 mg by mouth daily  , Disp: , Rfl:     fluticasone (FLONASE) 50 mcg/act nasal spray, 2 sprays into each nostril daily, Disp: 16 g, Rfl: 4    fluticasone (FLOVENT HFA) 110 MCG/ACT inhaler, Inhale 2 puffs 2 (two) times a day, Disp: , Rfl:     fluticasone-salmeterol (ADVAIR DISKUS) 250-50 mcg/dose inhaler, Inhale 1 puff 2 (two) times a day as needed  , Disp: , Rfl:     hydrochlorothiazide (HYDRODIURIL) 25 mg tablet, Take 1 tablet (25 mg total) by mouth daily, Disp: 30 tablet, Rfl: 0    montelukast (SINGULAIR) 10 mg tablet, Take 1 tablet (10 mg total) by mouth daily at bedtime, Disp: 30 tablet, Rfl: 3    nicotine (NICODERM CQ) 14 mg/24hr TD 24 hr patch, Place 1 patch on the skin every 24 hours, Disp: 28 patch, Rfl: 3    ondansetron (ZOFRAN-ODT) 4 mg disintegrating tablet, Take 1 tablet (4 mg total) by mouth every 8 (eight) hours as needed for nausea or vomiting for up to 5 days, Disp: 15 tablet, Rfl: 0    VENTOLIN  (90 Base) MCG/ACT inhaler, inhale 1 to 2 puffs by mouth every 4 to 6 hours ONLY FOR WHEEZING     (REFER TO PRESCRIPTION NOTES)  , Disp: , Rfl: 0    Health Maintenance     Health Maintenance   Topic Date Due    Hepatitis C Screening  1945    CRC Screening: Colonoscopy  1945    Fall Risk  05/05/2011    Urinary Incontinence Screening  05/05/2011    GLAUCOMA SCREENING 65 + YR  05/05/2013    INFLUENZA VACCINE  09/01/2018    MAMMOGRAM  01/10/2019    Depression Screening PHQ-9  06/08/2019    DTaP,Tdap,and Td Vaccines (2 - Td) 06/08/2028    PNEUMOCOCCAL POLYSACCHARIDE VACCINE AGE 72 AND OVER  Completed     Immunization History   Administered Date(s) Administered    Pneumococcal Polysaccharide PPV23 01/01/2003, 10/01/2008    Tdap 06/08/2018       Shun Arthur  Internal Medicine PGY-2    [address] [facility], [place] , [address] [address] [facility] [address] [facility]  Office: [phone]  Fax: [phone]

## 2018-07-23 ENCOUNTER — OFFICE VISIT (OUTPATIENT)
Dept: INTERNAL MEDICINE CLINIC | Facility: CLINIC | Age: 73
End: 2018-07-23
Payer: COMMERCIAL

## 2018-07-23 VITALS
BODY MASS INDEX: 23.67 KG/M2 | TEMPERATURE: 98.1 F | WEIGHT: 133.6 LBS | HEIGHT: 63 IN | SYSTOLIC BLOOD PRESSURE: 138 MMHG | DIASTOLIC BLOOD PRESSURE: 88 MMHG | HEART RATE: 88 BPM

## 2018-07-23 DIAGNOSIS — R94.31 ABNORMAL EKG: Primary | ICD-10-CM

## 2018-07-23 DIAGNOSIS — I10 ESSENTIAL HYPERTENSION: ICD-10-CM

## 2018-07-23 DIAGNOSIS — R00.2 PALPITATIONS: ICD-10-CM

## 2018-07-23 PROCEDURE — 99213 OFFICE O/P EST LOW 20 MIN: CPT | Performed by: INTERNAL MEDICINE

## 2018-07-23 RX ORDER — HYDROCHLOROTHIAZIDE 25 MG/1
25 TABLET ORAL DAILY
Qty: 90 TABLET | Refills: 3 | Status: SHIPPED | OUTPATIENT
Start: 2018-07-23 | End: 2018-08-31 | Stop reason: SDUPTHER

## 2018-08-15 ENCOUNTER — HOSPITAL ENCOUNTER (OUTPATIENT)
Dept: NON INVASIVE DIAGNOSTICS | Facility: HOSPITAL | Age: 73
Discharge: HOME | End: 2018-08-15
Payer: COMMERCIAL

## 2018-08-15 DIAGNOSIS — I10 ESSENTIAL HYPERTENSION: ICD-10-CM

## 2018-08-15 DIAGNOSIS — R94.31 ABNORMAL EKG: ICD-10-CM

## 2018-08-15 PROCEDURE — 93306 TTE W/DOPPLER COMPLETE: CPT

## 2018-08-15 PROCEDURE — 93306 TTE W/DOPPLER COMPLETE: CPT | Performed by: INTERNAL MEDICINE

## 2018-08-26 NOTE — PROGRESS NOTES
INTERNAL MEDICINE FOLLOW-UP OFFICE VISIT  [facility]  [address] [facility]    NAME: Johanna Dakin  AGE: 68 y o  SEX: female    DATE OF ENCOUNTER: 8/27/2018    Assessment and Plan     1  Abnormal EKG  Lateral T-wave inversions, possible left atrial enlargement   Workup for palpitations as below  Echocardiogram reviewed  Patient to follow up with Cardiology    2  Intermittent palpitations  Echo read reviewed with patient  Likely benefit from event recorder  Appreciate Cardiology recommendations patient has visit 8/31/2018    3  Chronic left hip pain  Concerning for osteoarthritis, sacral iliac joint pain, sciatica  Provide Tylenol, x-ray hips, sacroiliac joints  PT eval and treat-patient has worked in the pool in the past with good results  - acetaminophen (TYLENOL) 500 mg tablet; Take 1 tablet (500 mg total) by mouth every 6 (six) hours as needed for mild pain  Dispense: 60 tablet; Refill: 0  - XR hips bilateral 2 vw w pelvis if performed; Future  - Ambulatory referral to Physical Therapy; Future  - XR sacroiliac joints 3+ views; Future    Orders Placed This Encounter   Procedures    XR hips bilateral 2 vw w pelvis if performed    XR sacroiliac joints 3+ views    Ambulatory referral to Physical Therapy       - Counseling Documentation: patient was counseled regarding: diagnostic results, risk factor reductions, impressions and importance of compliance with treatment    Chief Complaint     Chief Complaint   Patient presents with    Follow-up       History of Present Illness     Patient presents today for follow-up of echo results  Patient originally evaluated for preoperative clearance  Found to have abnormal EKG with T-wave inversions in V4 and V5 with possible atrial enlargement  Patient notes history of "irregular heartbeat" where she wore a monitor years ago that caught an abnormal heart rhythm    The no documented history of atrial fibrillation or ventricular tachycardia  Patient seen on last visit and echo ordered  Echo report consistent with EF 65%, no RWMA, wall thickness mildly increased, mild concentric hypertrophy, grade 1 diastolic dysfunction    Under review of patient's "irregular heartbeat"  Patient notes has been occurring her whole life since childhood  Occurs every 2-3 days  Can last from 1 hr to the entire day and resolved spontaneously  Patient has sensation of tachycardia, palpitations  These can occur at rest or with exercise  Not related to stress  No associated chest pain, dizziness, lightheadedness  Patient notes that she wore heart monitor in the past for 1 week without evidence of irregular heartbeat  Patient has follow-up with Cardiology 8/31/2018  Discuss holter monitor  Hip Pain    Incident onset: In november, mechanical fall  The injury mechanism was a fall  The pain is present in the left hip (Radiates down to left calf)  The quality of the pain is described as aching and shooting  The pain is at a severity of 8/10  The pain is moderate  The pain has been intermittent since onset  The symptoms are aggravated by weight bearing  She has tried nothing for the symptoms  The following portions of the patient's history were reviewed and updated as appropriate: allergies, current medications, past family history, past medical history, past social history, past surgical history and problem list     Review of Systems     Review of Systems   Constitutional: Negative for chills, fever and unexpected weight change  HENT: Positive for congestion and rhinorrhea  Negative for sore throat  Eyes: Negative for photophobia and visual disturbance  Respiratory: Negative for shortness of breath and wheezing  Cardiovascular: Negative for chest pain, palpitations and leg swelling  Gastrointestinal: Negative for abdominal distention and abdominal pain  Genitourinary: Negative for difficulty urinating and dysuria  Musculoskeletal: Positive for arthralgias, joint swelling and myalgias  Skin: Negative for rash and wound  Neurological: Negative for dizziness and headaches  Active Problem List     Patient Active Problem List   Diagnosis    Rupture of ulnar collateral ligament of left thumb    Primary osteoarthritis of first carpometacarpal joint of right hand    Pre-operative examination    Mild intermittent asthma    Other chronic sinusitis    Screening mammogram, encounter for    Cigarette nicotine dependence without complication    Seasonal allergies    Trigger finger, right index finger    Panic attack    Essential hypertension    Asthma, very poorly controlled    Breast cyst, right    Classic migraine with aura    Deficient knowledge    GERD (gastroesophageal reflux disease)    Renal cyst    Trigger finger of right thumb    Abnormal EKG    Prediabetes    Intermittent palpitations    Chronic left hip pain       Objective     /70   Pulse 88   Temp 97 9 °F (36 6 °C)   Ht 5' 3" (1 6 m)   Wt 60 4 kg (133 lb 2 5 oz)   BMI 23 59 kg/m²     Physical Exam   Constitutional: She appears well-developed and well-nourished  No distress  HENT:   Head: Normocephalic and atraumatic  Right Ear: External ear normal    Left Ear: External ear normal    Mouth/Throat: Oropharynx is clear and moist  No oropharyngeal exudate  Eyes: Conjunctivae and EOM are normal  Pupils are equal, round, and reactive to light  No scleral icterus  Neck: Normal range of motion  Neck supple  Cardiovascular: Normal rate, regular rhythm and normal heart sounds  No murmur heard  Pulmonary/Chest: Effort normal and breath sounds normal  No respiratory distress  She has no wheezes  She has no rales  Abdominal: Soft  She exhibits no distension  There is no tenderness  There is no rebound  Musculoskeletal: She exhibits tenderness (Left hip and left sacroiliac joint)  She exhibits no edema or deformity  Lymphadenopathy:     She has no cervical adenopathy  Skin: Skin is warm and dry  She is not diaphoretic  Psychiatric: She has a normal mood and affect  Her behavior is normal  Judgment and thought content normal        Pertinent Laboratory/Diagnostic Studies:  No results found      Images and diagnostics reviewed     Current Medications     Current Outpatient Prescriptions:     albuterol (2 5 mg/3 mL) 0 083 % nebulizer solution, Take 3 mL (2 5 mg total) by nebulization every 4 (four) hours as needed for wheezing or shortness of breath, Disp: 25 vial, Rfl: 3    aspirin (ECOTRIN LOW STRENGTH) 81 mg EC tablet, Take 81 mg by mouth daily, Disp: , Rfl:     Blood Pressure Monitoring (BLOOD PRESSURE CUFF) MISC, by Does not apply route 2 (two) times a day, Disp: 1 each, Rfl: 0    fexofenadine (ALLEGRA) 30 MG tablet, Take 180 mg by mouth daily  , Disp: , Rfl:     fluticasone (FLONASE) 50 mcg/act nasal spray, 2 sprays into each nostril daily, Disp: 16 g, Rfl: 4    hydrochlorothiazide (HYDRODIURIL) 25 mg tablet, Take 1 tablet (25 mg total) by mouth daily, Disp: 90 tablet, Rfl: 3    acetaminophen (TYLENOL) 500 mg tablet, Take 1 tablet (500 mg total) by mouth every 6 (six) hours as needed for mild pain, Disp: 60 tablet, Rfl: 0    fluticasone (FLOVENT HFA) 110 MCG/ACT inhaler, Inhale 2 puffs 2 (two) times a day, Disp: , Rfl:     fluticasone-salmeterol (ADVAIR DISKUS) 250-50 mcg/dose inhaler, Inhale 1 puff 2 (two) times a day as needed  , Disp: , Rfl:     montelukast (SINGULAIR) 10 mg tablet, Take 1 tablet (10 mg total) by mouth daily at bedtime, Disp: 30 tablet, Rfl: 3    nicotine (NICODERM CQ) 14 mg/24hr TD 24 hr patch, Place 1 patch on the skin every 24 hours, Disp: 28 patch, Rfl: 3    ondansetron (ZOFRAN-ODT) 4 mg disintegrating tablet, Take 1 tablet (4 mg total) by mouth every 8 (eight) hours as needed for nausea or vomiting for up to 5 days, Disp: 15 tablet, Rfl: 0    VENTOLIN  (90 Base) MCG/ACT inhaler, inhale 1 to 2 puffs by mouth every 4 to 6 hours ONLY FOR WHEEZING     (REFER TO PRESCRIPTION NOTES)  , Disp: , Rfl: 0    Health Maintenance     Health Maintenance   Topic Date Due    CRC Screening: Colonoscopy  1945    Fall Risk  05/05/2011    Urinary Incontinence Screening  05/05/2011    Pneumococcal PPSV23/PCV13 65+ Years / High and Highest Risk (1 of 2 - PCV13) 05/05/2011    INFLUENZA VACCINE  09/01/2018    MAMMOGRAM  01/10/2019    Depression Screening PHQ-9  06/08/2019    DTaP,Tdap,and Td Vaccines (2 - Td) 06/08/2028     Immunization History   Administered Date(s) Administered    Pneumococcal Polysaccharide PPV23 01/01/2003, 10/01/2008    Tdap 06/08/2018       Nasir Gutierrez  Internal Medicine PGY-2  [address] [facility] , [address] [address] [facility] [address] [facility]  Office: [phone]  Fax: [phone]

## 2018-08-27 ENCOUNTER — TELEPHONE (OUTPATIENT)
Dept: INTERNAL MEDICINE CLINIC | Facility: CLINIC | Age: 73
End: 2018-08-27

## 2018-08-27 ENCOUNTER — OFFICE VISIT (OUTPATIENT)
Dept: INTERNAL MEDICINE CLINIC | Facility: CLINIC | Age: 73
End: 2018-08-27
Payer: COMMERCIAL

## 2018-08-27 VITALS
TEMPERATURE: 97.9 F | HEIGHT: 63 IN | BODY MASS INDEX: 23.59 KG/M2 | SYSTOLIC BLOOD PRESSURE: 104 MMHG | WEIGHT: 133.16 LBS | DIASTOLIC BLOOD PRESSURE: 70 MMHG | HEART RATE: 88 BPM

## 2018-08-27 DIAGNOSIS — R94.31 ABNORMAL EKG: Primary | ICD-10-CM

## 2018-08-27 DIAGNOSIS — M25.552 CHRONIC LEFT HIP PAIN: ICD-10-CM

## 2018-08-27 DIAGNOSIS — R00.2 INTERMITTENT PALPITATIONS: ICD-10-CM

## 2018-08-27 DIAGNOSIS — G89.29 CHRONIC LEFT HIP PAIN: ICD-10-CM

## 2018-08-27 PROCEDURE — 99214 OFFICE O/P EST MOD 30 MIN: CPT | Performed by: INTERNAL MEDICINE

## 2018-08-27 RX ORDER — ACETAMINOPHEN 500 MG
500 TABLET ORAL EVERY 6 HOURS PRN
Qty: 60 TABLET | Refills: 0 | Status: SHIPPED | OUTPATIENT
Start: 2018-08-27

## 2018-08-27 NOTE — PATIENT INSTRUCTIONS
Hip Pain   WHAT YOU NEED TO KNOW:   What causes hip pain? Hip pain can be caused by a number of conditions, such as bursitis, arthritis, or muscle or tendon strain  You may have swelling in the fluid-filled sacs that protect your muscles and tendons  Hip pain can also be caused by a lower back problem  Hip pain may be caused by trauma, playing sports, or running  Your pain may start in your hip and go to your thigh, buttock, or groin  How is hip pain treated? You may need x-rays to make sure there are no broken bones  You may be given NSAIDs to help decrease pain and swelling  How can I manage hip pain? · Rest  your injured hip so that it can heal  You may need to avoid putting any weight on your hip for at least 48 hours  Return to normal activities as directed  · Ice  the injury for 20 minutes every 4 hours, or as directed  Use an ice pack, or put crushed ice in a plastic bag  Cover it with a towel to protect your skin  Ice helps prevent tissue damage and decreases swelling and pain  · Elevate  your injured hip above the level of your heart as often as you can  This will help decrease swelling and pain  If possible, prop your hip and leg on pillows or blankets to keep the area elevated comfortably  · Maintain a healthy weight  Extra body weight can cause pressure and pain in your hip, knee, and ankle joints  Ask your healthcare provider how much you should weigh  Ask him to help you create a weight loss plan if you are overweight  · Use assistive devices as directed  You may need to use a cane or crutches  Assistive devices help decrease pain and pressure on your hip when you walk  Ask your healthcare provider for more information about assistive devices and how to use them correctly  When should I seek immediate care? · Your pain gets worse  · You have numbness in your leg or toes  · You cannot put any weight on or move your hip  When should I contact my healthcare provider?    · You have a fever  · Your pain does not decrease, even after treatment  · You have questions or concerns about your condition or care  CARE AGREEMENT:   You have the right to help plan your care  Learn about your health condition and how it may be treated  Discuss treatment options with your caregivers to decide what care you want to receive  You always have the right to refuse treatment  The above information is an  only  It is not intended as medical advice for individual conditions or treatments  Talk to your doctor, nurse or pharmacist before following any medical regimen to see if it is safe and effective for you  © 2017 [address] [facility] Information is for End User's use only and may not be sold, redistributed or otherwise used for commercial purposes  All illustrations and images included in CareNotes® are the copyrighted property of A D A M , Inc  or Dewayne Melendez

## 2018-08-31 ENCOUNTER — OFFICE VISIT (OUTPATIENT)
Dept: MULTI SPECIALTY CLINIC | Facility: CLINIC | Age: 73
End: 2018-08-31
Payer: COMMERCIAL

## 2018-08-31 VITALS
TEMPERATURE: 98.5 F | HEART RATE: 100 BPM | BODY MASS INDEX: 23.98 KG/M2 | WEIGHT: 135.36 LBS | HEIGHT: 63 IN | SYSTOLIC BLOOD PRESSURE: 124 MMHG | DIASTOLIC BLOOD PRESSURE: 86 MMHG

## 2018-08-31 DIAGNOSIS — R94.31 ABNORMAL EKG: ICD-10-CM

## 2018-08-31 DIAGNOSIS — Z01.818 PRE-OPERATIVE EXAMINATION: ICD-10-CM

## 2018-08-31 DIAGNOSIS — I10 ESSENTIAL HYPERTENSION: ICD-10-CM

## 2018-08-31 PROCEDURE — 93000 ELECTROCARDIOGRAM COMPLETE: CPT | Performed by: STUDENT IN AN ORGANIZED HEALTH CARE EDUCATION/TRAINING PROGRAM

## 2018-08-31 PROCEDURE — 99202 OFFICE O/P NEW SF 15 MIN: CPT | Performed by: STUDENT IN AN ORGANIZED HEALTH CARE EDUCATION/TRAINING PROGRAM

## 2018-08-31 RX ORDER — HYDROCHLOROTHIAZIDE 25 MG/1
12.5 TABLET ORAL DAILY
Qty: 90 TABLET | Refills: 0 | Status: SHIPPED | OUTPATIENT
Start: 2018-08-31 | End: 2019-01-17 | Stop reason: DRUGHIGH

## 2018-08-31 NOTE — PROGRESS NOTES
Cardiology Follow Up    Chapis Calderon  1945  [id number]  [facility] [phone]    1  Abnormal EKG  Ambulatory referral to Cardiology    POCT ECG   2  Pre-operative examination  Ambulatory referral to Cardiology    POCT ECG   3  Essential hypertension  hydrochlorothiazide (HYDRODIURIL) 25 mg tablet    metoprolol tartrate (LOPRESSOR) 25 mg tablet    Basic metabolic panel    Magnesium    POCT ECG     Discussion/Summary:    1  EKG done today shows sinus tachycardia  T wave inversions seen in prior EKG are just non specific changes  Patient denies any chest pain, Good functional capacity  Echo showed normal LV systolic function with no significant valvular abnormalities  2  HTN - BP controlled today but patient having symptoms with increased dose of HCTZ  Will decrease HCTZ to 12 5 mg daily and start metoprolol tartrate 25 mg bid  Will f u in 1 week for BP check  3  Palpitations - could be just sinus tachycardia given patients anxiety  If she still continues to have palpitations next week after taking new BP medications and if her BP is controlled, will consider 48 Holter at that time  4  Pre operative cardiac risk assessment - She has no history of CAD or heart failure  Very good functional capacity  Echo showed normal LV function  Will f/u in 1 week as new medications were started in this visit and then give recommendations for surgery  Patient states she had some issues with her lung and anesthesia when she had prior abdominal surgery  Asked her to discuss with her medical team regarding that prior to her surgery for her hand  HPI - 69 y/o F w/ Hx HTN, Tobacco abuse was sent for pre op evaluation for trigger finger  She was found to have abnormal EKG with mild T wave inversions in leads V4, V5  Patient denies any history of CAD or heart failure   Patient was recently started on HCTZ 12 5 mg daily for HTN and was recently increased to 25 mg daily  Patient states since her HCTZ was increased, she has been c/o dizziness and cramps  She has c/o palpitations since several years  She states every 2 days she gets palpitations at rest for 10 - 15 minutes, which resolves on its own  She states she had heart monitors in the past and was told she had "irregular heart beat", but was never given any medications or blood thinners  She states she was never told she had atrial fibrillation  She had a hand injury and trigger finger and needs surgery for it  She denies any chest pain, sob, syncope episodes  She walks daily without any complaints  She can walk more than 2 flights of stairs without any sob  She can walk more than 5 blocks without any complaints  She smokes daily  Denies alcohol or drug abuse  Denies any family history of heart problems      Patient Active Problem List   Diagnosis    Rupture of ulnar collateral ligament of left thumb    Primary osteoarthritis of first carpometacarpal joint of right hand    Pre-operative examination    Mild intermittent asthma    Other chronic sinusitis    Screening mammogram, encounter for    Cigarette nicotine dependence without complication    Seasonal allergies    Trigger finger, right index finger    Panic attack    Essential hypertension    Asthma, very poorly controlled    Breast cyst, right    Classic migraine with aura    Deficient knowledge    GERD (gastroesophageal reflux disease)    Renal cyst    Trigger finger of right thumb    Abnormal EKG    Prediabetes    Intermittent palpitations    Chronic left hip pain     Past Medical History:   Diagnosis Date    Asthma     Asthmatic bronchitis     Last Assessed: 10/7/2014     Chronic diarrhea     Last Assessed: 8/20/2015     Fibromyalgia     Focal nodular hyperplasia of liver     Last Assessed: 6/11/2015    Herpes zoster     Last Assessed: 3/18/2016    IBS (irritable bowel syndrome)     Intermittent palpitations     Irritable bowel syndrome     Osteoarthritis     Vertigo      Social History     Social History    Marital status:       Spouse name: N/A    Number of children: N/A    Years of education: N/A     Occupational History    Unemployed       Social History Main Topics    Smoking status: Current Every Day Smoker     Packs/day: 0 25     Types: Cigarettes    Smokeless tobacco: Never Used      Comment: Tobacco Use down to 3 cigs daily    Alcohol use No    Drug use: No    Sexual activity: No     Other Topics Concern    Not on file     Social History Narrative    Mental Disability     Exercising Regularly     Lives with adult children       Family History   Problem Relation Age of Onset    Heart attack Mother     Other Mother         Aspiration of Vomit     Asthma Father     Breast cancer Sister     Arthritis Family     Other Family         Musculoskeletal Disease     Osteoporosis Family      Past Surgical History:   Procedure Laterality Date    BREAST BIOPSY      SMALL INTESTINE SURGERY         Current Outpatient Prescriptions:     albuterol (2 5 mg/3 mL) 0 083 % nebulizer solution, Take 3 mL (2 5 mg total) by nebulization every 4 (four) hours as needed for wheezing or shortness of breath, Disp: 25 vial, Rfl: 3    aspirin (ECOTRIN LOW STRENGTH) 81 mg EC tablet, Take 81 mg by mouth daily, Disp: , Rfl:     fexofenadine (ALLEGRA) 30 MG tablet, Take 180 mg by mouth daily  , Disp: , Rfl:     fluticasone (FLONASE) 50 mcg/act nasal spray, 2 sprays into each nostril daily, Disp: 16 g, Rfl: 4    fluticasone (FLOVENT HFA) 110 MCG/ACT inhaler, Inhale 2 puffs 2 (two) times a day, Disp: , Rfl:     fluticasone-salmeterol (ADVAIR DISKUS) 250-50 mcg/dose inhaler, Inhale 1 puff 2 (two) times a day as needed  , Disp: , Rfl:     hydrochlorothiazide (HYDRODIURIL) 25 mg tablet, Take 0 5 tablets (12 5 mg total) by mouth daily, Disp: 90 tablet, Rfl: 0    VENTOLIN  (90 Base) MCG/ACT inhaler, inhale 1 to 2 puffs by mouth every 4 to 6 hours ONLY FOR WHEEZING     (REFER TO PRESCRIPTION NOTES)  , Disp: , Rfl: 0    acetaminophen (TYLENOL) 500 mg tablet, Take 1 tablet (500 mg total) by mouth every 6 (six) hours as needed for mild pain (Patient not taking: Reported on 8/31/2018 ), Disp: 60 tablet, Rfl: 0    Blood Pressure Monitoring (BLOOD PRESSURE CUFF) MISC, by Does not apply route 2 (two) times a day (Patient not taking: Reported on 8/31/2018 ), Disp: 1 each, Rfl: 0    metoprolol tartrate (LOPRESSOR) 25 mg tablet, Take 1 tablet (25 mg total) by mouth every 12 (twelve) hours, Disp: 60 tablet, Rfl: 6    montelukast (SINGULAIR) 10 mg tablet, Take 1 tablet (10 mg total) by mouth daily at bedtime (Patient not taking: Reported on 8/31/2018 ), Disp: 30 tablet, Rfl: 3    nicotine (NICODERM CQ) 14 mg/24hr TD 24 hr patch, Place 1 patch on the skin every 24 hours (Patient not taking: Reported on 8/31/2018 ), Disp: 28 patch, Rfl: 3    ondansetron (ZOFRAN-ODT) 4 mg disintegrating tablet, Take 1 tablet (4 mg total) by mouth every 8 (eight) hours as needed for nausea or vomiting for up to 5 days, Disp: 15 tablet, Rfl: 0  Allergies   Allergen Reactions    Ambrosia Artemisiifolia (Ragweed) Skin Test Facial Swelling    Codeine Other (See Comments)     Heart races    Epinephrine      Pt reports "it makes my heart race, they told me I cant take it "    Ibuprofen Other (See Comments)     Heart racing    Morphine Other (See Comments)     Heart racing    Pollen Extract Sneezing    Tramadol Other (See Comments)     Heart racing       Imaging: No results found  Review of Systems:  Review of Systems   Constitutional: Negative for fatigue  Respiratory: Negative for chest tightness and shortness of breath  Cardiovascular: Positive for palpitations  Negative for chest pain  Neurological: Positive for dizziness  Negative for syncope  Physical Exam:  Physical Exam   Constitutional: She is oriented to person, place, and time   She appears well-developed  No distress  HENT:   Head: Normocephalic and atraumatic  Eyes: Pupils are equal, round, and reactive to light  Neck: No JVD present  No thyromegaly present  Cardiovascular: Regular rhythm  Tachycardia present  No murmur heard  Pulmonary/Chest: She has no wheezes  She has no rales  Abdominal: Soft  There is no tenderness  Musculoskeletal: She exhibits no edema or tenderness  Neurological: She is alert and oriented to person, place, and time  Skin: She is not diaphoretic         /86 (BP Location: Right arm, Patient Position: Sitting, Cuff Size: Adult)   Pulse 100   Temp 98 5 °F (36 9 °C) (Oral)   Ht 5' 2 5" (1 588 m)   Wt 61 4 kg (135 lb 5 8 oz)   BMI 24 36 kg/m²

## 2018-09-14 NOTE — PROGRESS NOTES
LEFT REMINDER FOR PATIENT TO COMPLETE TESTS THAT WERE ORDERED BY PROVIDER:    XR HIPS BILATERAL 2 VW W PELVIS   XR SACROILIAC JOINTS 3+ VIEWS

## 2018-09-19 ENCOUNTER — OFFICE VISIT (OUTPATIENT)
Dept: OBGYN CLINIC | Facility: HOSPITAL | Age: 73
End: 2018-09-19
Payer: COMMERCIAL

## 2018-09-19 ENCOUNTER — OFFICE VISIT (OUTPATIENT)
Dept: OCCUPATIONAL THERAPY | Facility: HOSPITAL | Age: 73
End: 2018-09-19
Payer: COMMERCIAL

## 2018-09-19 VITALS
HEART RATE: 80 BPM | HEIGHT: 63 IN | DIASTOLIC BLOOD PRESSURE: 88 MMHG | WEIGHT: 134 LBS | SYSTOLIC BLOOD PRESSURE: 143 MMHG | BODY MASS INDEX: 23.74 KG/M2

## 2018-09-19 DIAGNOSIS — M65.321 TRIGGER FINGER, RIGHT INDEX FINGER: ICD-10-CM

## 2018-09-19 DIAGNOSIS — M65.351 TRIGGER FINGER, RIGHT LITTLE FINGER: ICD-10-CM

## 2018-09-19 DIAGNOSIS — S63.641D RUPTURE OF ULNAR COLLATERAL LIGAMENT OF RIGHT THUMB, SUBSEQUENT ENCOUNTER: Primary | ICD-10-CM

## 2018-09-19 DIAGNOSIS — R52 BODY ACHES: ICD-10-CM

## 2018-09-19 DIAGNOSIS — S63.641A RUPTURE OF ULNAR COLLATERAL LIGAMENT OF RIGHT THUMB, INITIAL ENCOUNTER: Primary | ICD-10-CM

## 2018-09-19 PROBLEM — M65.839 INTERSECTION SYNDROME: Status: ACTIVE | Noted: 2018-09-19

## 2018-09-19 PROCEDURE — G8991 OTHER PT/OT GOAL STATUS: HCPCS | Performed by: OCCUPATIONAL THERAPIST

## 2018-09-19 PROCEDURE — G8990 OTHER PT/OT CURRENT STATUS: HCPCS | Performed by: OCCUPATIONAL THERAPIST

## 2018-09-19 PROCEDURE — G8992 OTHER PT/OT  D/C STATUS: HCPCS | Performed by: OCCUPATIONAL THERAPIST

## 2018-09-19 PROCEDURE — L3913 HFO W/O JOINTS CF: HCPCS | Performed by: OCCUPATIONAL THERAPIST

## 2018-09-19 PROCEDURE — 99213 OFFICE O/P EST LOW 20 MIN: CPT | Performed by: ORTHOPAEDIC SURGERY

## 2018-09-19 NOTE — PROGRESS NOTES
ASSESSMENT/PLAN:    Assessment:   Right thumb UCL rupture  Right index and small trigger fingers  Right EPL subluxation   Left intersection syndrome    Plan:   Reiterated to the patient that we do need clearance from her cardiologist to proceed with surgery and she should make sure to follow up with them to get this  In the meantime, she would like to just try a new splint (skier's) and see if this helps  HEP for intersection syndrome  As for her left intersection syndrome, treatment options of steroid injections, OTC medications/creams, massage, heat/ice discussed - pt would like to proceed with just home treatment options, declines injection today    Follow Up:  6  week(s)  _____________________________________________________  CHIEF COMPLAINT:  Chief Complaint   Patient presents with    Right Thumb - Pain, Numbness         SUBJECTIVE:  Dariela Duron is a 68y o  year old female who presents for follow up regarding right thumb UCL tear  Has been wearing her brace but states she occasionally has to take this off 2/2 sensation of numbness  She still has great difficulty using this hand  Also still complaints of right index and small finger pain, but no locking  We are still waiting official clearance for surgery (appears patient did not follow up as instructed)  In addition, now with new left forearm pain, dorsal  No injury  Also, has started to notice something "moving" in her right thumb      PAST MEDICAL HISTORY:  Past Medical History:   Diagnosis Date    Asthma     Asthmatic bronchitis     Last Assessed: 10/7/2014     Chronic diarrhea     Last Assessed: 8/20/2015     Fibromyalgia     Focal nodular hyperplasia of liver     Last Assessed: 6/11/2015    Herpes zoster     Last Assessed: 3/18/2016    IBS (irritable bowel syndrome)     Intermittent palpitations     Irritable bowel syndrome     Osteoarthritis     Vertigo        PAST SURGICAL HISTORY:  Past Surgical History:   Procedure Laterality Date    BREAST BIOPSY      SMALL INTESTINE SURGERY         FAMILY HISTORY:  Family History   Problem Relation Age of Onset    Heart attack Mother     Other Mother         Aspiration of Vomit     Asthma Father     Breast cancer Sister     Arthritis Family     Other Family         Musculoskeletal Disease     Osteoporosis Family        SOCIAL HISTORY:  Social History   Substance Use Topics    Smoking status: Current Every Day Smoker     Packs/day: 0 25     Types: Cigarettes    Smokeless tobacco: Never Used      Comment: Tobacco Use down to 3 cigs daily    Alcohol use No       MEDICATIONS:    Current Outpatient Prescriptions:     albuterol (2 5 mg/3 mL) 0 083 % nebulizer solution, Take 3 mL (2 5 mg total) by nebulization every 4 (four) hours as needed for wheezing or shortness of breath, Disp: 25 vial, Rfl: 3    aspirin (ECOTRIN LOW STRENGTH) 81 mg EC tablet, Take 81 mg by mouth daily, Disp: , Rfl:     Blood Pressure Monitoring (BLOOD PRESSURE CUFF) MISC, by Does not apply route 2 (two) times a day, Disp: 1 each, Rfl: 0    fexofenadine (ALLEGRA) 30 MG tablet, Take 180 mg by mouth daily  , Disp: , Rfl:     fluticasone (FLONASE) 50 mcg/act nasal spray, 2 sprays into each nostril daily, Disp: 16 g, Rfl: 4    fluticasone (FLOVENT HFA) 110 MCG/ACT inhaler, Inhale 2 puffs 2 (two) times a day, Disp: , Rfl:     fluticasone-salmeterol (ADVAIR DISKUS) 250-50 mcg/dose inhaler, Inhale 1 puff 2 (two) times a day as needed  , Disp: , Rfl:     hydrochlorothiazide (HYDRODIURIL) 25 mg tablet, Take 0 5 tablets (12 5 mg total) by mouth daily, Disp: 90 tablet, Rfl: 0    montelukast (SINGULAIR) 10 mg tablet, Take 1 tablet (10 mg total) by mouth daily at bedtime, Disp: 30 tablet, Rfl: 3    VENTOLIN  (90 Base) MCG/ACT inhaler, inhale 1 to 2 puffs by mouth every 4 to 6 hours ONLY FOR WHEEZING     (REFER TO PRESCRIPTION NOTES)  , Disp: , Rfl: 0    acetaminophen (TYLENOL) 500 mg tablet, Take 1 tablet (500 mg total) by mouth every 6 (six) hours as needed for mild pain (Patient not taking: Reported on 9/19/2018 ), Disp: 60 tablet, Rfl: 0    metoprolol tartrate (LOPRESSOR) 25 mg tablet, Take 1 tablet (25 mg total) by mouth every 12 (twelve) hours (Patient not taking: Reported on 9/19/2018 ), Disp: 60 tablet, Rfl: 6    nicotine (NICODERM CQ) 14 mg/24hr TD 24 hr patch, Place 1 patch on the skin every 24 hours (Patient not taking: Reported on 8/31/2018 ), Disp: 28 patch, Rfl: 3    ondansetron (ZOFRAN-ODT) 4 mg disintegrating tablet, Take 1 tablet (4 mg total) by mouth every 8 (eight) hours as needed for nausea or vomiting for up to 5 days, Disp: 15 tablet, Rfl: 0    ALLERGIES:  Allergies   Allergen Reactions    Ambrosia Artemisiifolia (Ragweed) Skin Test Facial Swelling    Codeine Other (See Comments)     Heart races    Epinephrine      Pt reports "it makes my heart race, they told me I cant take it "    Ibuprofen Other (See Comments)     Heart racing    Morphine Other (See Comments)     Heart racing    Pollen Extract Sneezing    Tramadol Other (See Comments)     Heart racing       REVIEW OF SYSTEMS:  Pertinent items are noted in HPI  A comprehensive review of systems was negative      LABS:  HgA1c:   Lab Results   Component Value Date    HGBA1C 5 9 05/05/2017     BMP:   Lab Results   Component Value Date    GLUCOSE 92 05/19/2015    CALCIUM 9 3 06/08/2018     06/08/2018    K 4 0 06/08/2018    CO2 25 06/08/2018     (H) 06/08/2018    BUN 14 06/08/2018    CREATININE 1 00 06/08/2018           _____________________________________________________  PHYSICAL EXAMINATION:  General: well developed and well nourished, alert, oriented times 3 and appears comfortable  Psychiatric: Normal  HEENT: Trachea Midline, No torticollis  Cardiovascular: No discernable arrhythmia  Pulmonary: No wheezing or stridor  Skin: No masses, erthema, lacerations, fluctation, ulcerations  Neurovascular: Sensation Intact to the Median, Ulnar, Radial Nerve, Motor Intact to the Median, Ulnar, Radial Nerve and Pulses Intact    MUSCULOSKELETAL EXAMINATION:  LEFT SIDE:  Patient with ttp c/w intersection syndrome  Mild edema  This is not worsened with active thumb extension or finger extension  RIGHT SIDE:  Patient with laxity of the thumb UCL  She has ttp along this area  She is also noted to have a subluxing EPL  In addition, patient with ttp along A1 pulleys along the index and small fingers   No clicking     _____________________________________________________  STUDIES REVIEWED:  No Studies to review      PROCEDURES PERFORMED:  Procedures  No Procedures performed today   Scribe Attestation    I,:   Ly Dee PA-C am acting as a scribe while in the presence of the attending physician :        I,:   Erik Lindsey MD personally performed the services described in this documentation    as scribed in my presence :

## 2018-09-19 NOTE — PROGRESS NOTES
Splint     Diagnosis:   1  Rupture of ulnar collateral ligament of right thumb, subsequent encounter       Indication: Joint Protection    Location: Right  thumb  Supplies: Custom Fit Orthotic  Splint type: Hand-Based SPICA  Wearing Schedule: Night only and Day Time as Tolerated  Describe Position: Thumb 30 degrees palmar/radial abduction    Precautions: Joint conservative protection    Patient or Caregiver expresses understanding of wearing Schedule and Precautions? Yes  Patient or Caregiver able to don/doff orthotic independently? Yes    Written orders provided to patient?  No  Orders Obtained: Verbal  Orders Obtained from: Stefania Jara    Return for evaluation and treatment No

## 2018-10-05 ENCOUNTER — OFFICE VISIT (OUTPATIENT)
Dept: INTERNAL MEDICINE CLINIC | Facility: CLINIC | Age: 73
End: 2018-10-05
Payer: COMMERCIAL

## 2018-10-05 VITALS
HEIGHT: 63 IN | HEART RATE: 80 BPM | BODY MASS INDEX: 23.91 KG/M2 | DIASTOLIC BLOOD PRESSURE: 87 MMHG | WEIGHT: 134.92 LBS | SYSTOLIC BLOOD PRESSURE: 141 MMHG | TEMPERATURE: 98.2 F

## 2018-10-05 DIAGNOSIS — J06.9 VIRAL UPPER RESPIRATORY TRACT INFECTION: Primary | ICD-10-CM

## 2018-10-05 DIAGNOSIS — J45.20 MILD INTERMITTENT ASTHMA, UNSPECIFIED WHETHER COMPLICATED: ICD-10-CM

## 2018-10-05 DIAGNOSIS — J45.21 MILD INTERMITTENT ASTHMA WITH ACUTE EXACERBATION: Chronic | ICD-10-CM

## 2018-10-05 PROCEDURE — 4040F PNEUMOC VAC/ADMIN/RCVD: CPT | Performed by: PHYSICIAN ASSISTANT

## 2018-10-05 PROCEDURE — 99213 OFFICE O/P EST LOW 20 MIN: CPT | Performed by: PHYSICIAN ASSISTANT

## 2018-10-05 PROCEDURE — 1160F RVW MEDS BY RX/DR IN RCRD: CPT | Performed by: PHYSICIAN ASSISTANT

## 2018-10-05 RX ORDER — ALBUTEROL SULFATE 2.5 MG/3ML
2.5 SOLUTION RESPIRATORY (INHALATION) EVERY 4 HOURS PRN
Qty: 75 VIAL | Refills: 0 | Status: SHIPPED | OUTPATIENT
Start: 2018-10-05 | End: 2019-01-17 | Stop reason: SDUPTHER

## 2018-10-05 NOTE — PATIENT INSTRUCTIONS
Continue inhaler and the nebulizer if not helping  Cold med was sent to your pharmacy    We advised to that you can continue to use your Flonase and may use the saline nasal spray during the day however wait at least 1 hour after using the Flonase before using the saline spray  Please continue to get plenty of rest and clear fluids  As we discussed if your symptoms get any worse or you are having difficulty breathing then please call here or go to the emergency room

## 2018-10-05 NOTE — PROGRESS NOTES
Assessment/Plan:    No problem-specific Assessment & Plan notes found for this encounter  Diagnoses and all orders for this visit:    Viral upper respiratory tract infection    Mild intermittent asthma with acute exacerbation  -     Dextromethorphan-Guaifenesin (CORICIDIN HBP CONGESTION/COUGH)  MG CAPS; Take 1 capsule by mouth 3 (three) times a day for 5 days    Mild intermittent asthma, unspecified whether complicated  -     albuterol (2 5 mg/3 mL) 0 083 % nebulizer solution; Take 1 vial (2 5 mg total) by nebulization every 4 (four) hours as needed for wheezing or shortness of breath          Subjective:      Patient ID: Jannie Elise is a 68 y o  female  Pt here with worsening asthma symptoms  States past 2 weeks with congestion and then went to her chest 2 days ago    Patient reports that she called for a same-day sick visit when she felt her chest was titer and heard wheezing last night  Patient confirms that she is using her nebulizer  Patient is using it 3-4 times daily  Patient also has a Ventolin inhaler and is using that on occasion  Patient does not have any daily inhalers as normally she does not have daily symptoms of her asthma  Patient feels that her worst symptom is the nasal congestion and postnasal drip  Patient purchased an over-the-counter saline nasal spray but did did not start using it as wanted to know how to use it correctly 1st     Taking the 1/2 HCTZ as per cardiologist  Juan Cordero taking the metoprolol        The following portions of the patient's history were reviewed and updated as appropriate: allergies, current medications, past family history, past medical history, past social history, past surgical history and problem list     Review of Systems   Constitutional: Negative for chills and fever  HENT: Positive for congestion and postnasal drip  Negative for facial swelling, sore throat and trouble swallowing  Eyes: Negative for itching     Respiratory: Positive for cough, shortness of breath and wheezing  Negative for chest tightness  Cardiovascular: Negative for chest pain and palpitations  Gastrointestinal: Negative for constipation, diarrhea and nausea  Genitourinary: Negative for dysuria and urgency  Musculoskeletal: Negative  Skin: Negative for rash  Neurological: Positive for light-headedness  Negative for dizziness and syncope  Objective:      /87   Pulse 80   Temp 98 2 °F (36 8 °C) (Oral)   Ht 5' 2 5" (1 588 m)   Wt 61 2 kg (134 lb 14 7 oz)   BMI 24 28 kg/m²          Physical Exam   Constitutional: She is oriented to person, place, and time  She appears well-developed and well-nourished  She does not have a sickly appearance  She does not appear ill  No distress  HENT:   Bilateral nares positive for some swelling  Clear rhinorrhea  Moderate postnasal drip  Eyes: Conjunctivae are normal  Right eye exhibits no discharge  Left eye exhibits no discharge  Neck: Neck supple  Cardiovascular: Normal rate, regular rhythm and normal heart sounds  Pulmonary/Chest: Breath sounds normal  She has no wheezes  She has no rhonchi  She has no rales  Throughout the visit patient is speaking in complete full sentences  She is not short of breath  No audible wheezing on auscultation  Abdominal: There is no tenderness  Lymphadenopathy:     She has no cervical adenopathy  Neurological: She is alert and oriented to person, place, and time  Skin: No rash noted  Psychiatric: She has a normal mood and affect   Her behavior is normal

## 2018-10-25 RX ORDER — ONDANSETRON 4 MG/1
TABLET, ORALLY DISINTEGRATING ORAL AS NEEDED
Refills: 0 | COMMUNITY
Start: 2018-08-31 | End: 2020-02-11 | Stop reason: SDUPTHER

## 2019-01-11 ENCOUNTER — OFFICE VISIT (OUTPATIENT)
Dept: OBGYN CLINIC | Facility: HOSPITAL | Age: 74
End: 2019-01-11
Payer: COMMERCIAL

## 2019-01-11 VITALS
DIASTOLIC BLOOD PRESSURE: 80 MMHG | WEIGHT: 136 LBS | SYSTOLIC BLOOD PRESSURE: 128 MMHG | HEIGHT: 63 IN | HEART RATE: 84 BPM | BODY MASS INDEX: 24.1 KG/M2

## 2019-01-11 DIAGNOSIS — S63.641A RUPTURE OF ULNAR COLLATERAL LIGAMENT OF RIGHT THUMB, INITIAL ENCOUNTER: Primary | ICD-10-CM

## 2019-01-11 DIAGNOSIS — M65.321 TRIGGER FINGER, RIGHT INDEX FINGER: ICD-10-CM

## 2019-01-11 DIAGNOSIS — M65.351 TRIGGER FINGER, RIGHT LITTLE FINGER: ICD-10-CM

## 2019-01-11 PROCEDURE — 99213 OFFICE O/P EST LOW 20 MIN: CPT | Performed by: ORTHOPAEDIC SURGERY

## 2019-01-11 NOTE — PROGRESS NOTES
ASSESSMENT/PLAN:    Assessment:   Right thumb UCL rupture with increased sensitivity to SSBRN  Right index and small trigger fingers  Right EPL subluxation   Left intersection syndrome    Plan: We will switch the patient from her hard thumb spica splint made by OT to a comfort cool brace today  We will follow up with the patient in another 4 weeks to evaluate for possible surgical intervention of her trigger fingers and thumb ucl  She is going to finish up getting all of her pre op clearance in the next 4 weeks  Follow Up:  4  week(s)    To Do Next Visit:       General Discussions:     Trigger FInger: The anatomy and physiology of trigger finger was discussed with the patient today in the office  Edema and increased contact pressure within the flexor tendons at the A1 pulley can cause pain, crepitation, and limitation of function  Treatment options include resting MP blocking splints to decrease edema, oral anti-inflammatory medications, home or formal therapy exercises, up to 2 steroid injections within the tendon sheath, or surgical release  While majority of patients do respond to conservative treatment, up to 20% may require surgical release  Operative Discussions:         _____________________________________________________  CHIEF COMPLAINT:  Chief Complaint   Patient presents with    Right Thumb - Follow-up         SUBJECTIVE:  Sammi Garcia is a 68y o  year old female who presents for follow up regarding Right thumb UCL rupture, Right index and small trigger fingers, Right EPL subluxation and Left intersection syndrome   Since last visit, Sammi Garcia has tried thumb spica brace without relief  Today there is Pain  Severe  Constant  Aching to the right index finger, small finger and thumb  She states that she did follow up with cardiology and has another appointment due to her asthma on 1/17/19 to obtain clearance     Radiation: None  Associated symptoms: No Complaints    PAST MEDICAL HISTORY:  Past Medical History:   Diagnosis Date    Asthma     Asthmatic bronchitis     Last Assessed: 10/7/2014     Chronic diarrhea     Last Assessed: 8/20/2015     Fibromyalgia     Focal nodular hyperplasia of liver     Last Assessed: 6/11/2015    Herpes zoster     Last Assessed: 3/18/2016    IBS (irritable bowel syndrome)     Intermittent palpitations     Irritable bowel syndrome     Osteoarthritis     Vertigo        PAST SURGICAL HISTORY:  Past Surgical History:   Procedure Laterality Date    BREAST BIOPSY      SMALL INTESTINE SURGERY         FAMILY HISTORY:  Family History   Problem Relation Age of Onset    Heart attack Mother     Other Mother         Aspiration of Vomit     Asthma Father     Breast cancer Sister     Arthritis Family     Other Family         Musculoskeletal Disease     Osteoporosis Family        SOCIAL HISTORY:  Social History   Substance Use Topics    Smoking status: Current Every Day Smoker     Packs/day: 0 25     Types: Cigarettes    Smokeless tobacco: Never Used      Comment: Tobacco Use down to 3 cigs daily    Alcohol use No       MEDICATIONS:    Current Outpatient Prescriptions:     acetaminophen (TYLENOL) 500 mg tablet, Take 1 tablet (500 mg total) by mouth every 6 (six) hours as needed for mild pain, Disp: 60 tablet, Rfl: 0    albuterol (2 5 mg/3 mL) 0 083 % nebulizer solution, Take 1 vial (2 5 mg total) by nebulization every 4 (four) hours as needed for wheezing or shortness of breath, Disp: 75 vial, Rfl: 0    aspirin (ECOTRIN LOW STRENGTH) 81 mg EC tablet, Take 81 mg by mouth daily, Disp: , Rfl:     Blood Pressure Monitoring (BLOOD PRESSURE CUFF) MISC, by Does not apply route 2 (two) times a day, Disp: 1 each, Rfl: 0    fexofenadine (ALLEGRA) 30 MG tablet, Take 180 mg by mouth daily  , Disp: , Rfl:     fluticasone (FLONASE) 50 mcg/act nasal spray, 2 sprays into each nostril daily, Disp: 16 g, Rfl: 4    hydrochlorothiazide (HYDRODIURIL) 25 mg tablet, Take 0 5 tablets (12 5 mg total) by mouth daily, Disp: 90 tablet, Rfl: 0    metoprolol tartrate (LOPRESSOR) 25 mg tablet, Take 1 tablet (25 mg total) by mouth every 12 (twelve) hours, Disp: 60 tablet, Rfl: 6    montelukast (SINGULAIR) 10 mg tablet, Take 1 tablet (10 mg total) by mouth daily at bedtime, Disp: 30 tablet, Rfl: 3    nicotine (NICODERM CQ) 14 mg/24hr TD 24 hr patch, Place 1 patch on the skin every 24 hours, Disp: 28 patch, Rfl: 3    ondansetron (ZOFRAN-ODT) 4 mg disintegrating tablet, dissolve 1 tablet ON TONGUE every 8 hours if needed for nausea and vomiting FOR UP TO 5 DAYS, Disp: , Rfl: 0    VENTOLIN  (90 Base) MCG/ACT inhaler, inhale 1 to 2 puffs by mouth every 4 to 6 hours ONLY FOR WHEEZING     (REFER TO PRESCRIPTION NOTES)  , Disp: , Rfl: 0    ondansetron (ZOFRAN-ODT) 4 mg disintegrating tablet, Take 1 tablet (4 mg total) by mouth every 8 (eight) hours as needed for nausea or vomiting for up to 5 days, Disp: 15 tablet, Rfl: 0    ALLERGIES:  Allergies   Allergen Reactions    Ambrosia Artemisiifolia (Ragweed) Skin Test Facial Swelling    Codeine Other (See Comments)     Heart races    Epinephrine      Pt reports "it makes my heart race, they told me I cant take it "    Ibuprofen Other (See Comments)     Heart racing    Morphine Other (See Comments)     Heart racing    Pollen Extract Sneezing    Tramadol Other (See Comments)     Heart racing       REVIEW OF SYSTEMS:  Pertinent items are noted in HPI      LABS:  HgA1c:   Lab Results   Component Value Date    HGBA1C 5 9 05/05/2017     BMP:   Lab Results   Component Value Date    GLUCOSE 92 05/19/2015    CALCIUM 9 3 06/08/2018     05/19/2015    K 4 0 06/08/2018    CO2 25 06/08/2018     (H) 06/08/2018    BUN 14 06/08/2018    CREATININE 1 00 06/08/2018           _____________________________________________________  PHYSICAL EXAMINATION:  General: well developed and well nourished, alert, oriented times 3 and appears comfortable  Psychiatric: Normal  HEENT: Trachea Midline, No torticollis  Cardiovascular: No discernable arrhythmia  Pulmonary: No wheezing or stridor  Skin: No Erythema, No Lacerations, No Fluctuation, No Ulcerations  Neurovascular: Sensation Intact to the Median, Ulnar, Radial Nerve, Motor Intact to the Median, Ulnar, Radial Nerve and Pulses Intact    MUSCULOSKELETAL EXAMINATION:  RIGHT SIDE:  Finger:  Nodules  index finger, small finger, with crepitation, tenderness to A1 pulleys of these two fingers   SSBRN tinels reproduces pain to index finger    _____________________________________________________  STUDIES REVIEWED:  No Studies to review      PROCEDURES PERFORMED:  Procedures  No Procedures performed today   Scribe Attestation    I,:   Adalberto Kelley am acting as a scribe while in the presence of the attending physician :        I,:   Tien Ruth MD personally performed the services described in this documentation    as scribed in my presence :

## 2019-01-15 ENCOUNTER — TELEPHONE (OUTPATIENT)
Dept: OBGYN CLINIC | Facility: HOSPITAL | Age: 74
End: 2019-01-15

## 2019-01-17 ENCOUNTER — OFFICE VISIT (OUTPATIENT)
Dept: INTERNAL MEDICINE CLINIC | Facility: CLINIC | Age: 74
End: 2019-01-17

## 2019-01-17 ENCOUNTER — APPOINTMENT (OUTPATIENT)
Dept: LAB | Facility: CLINIC | Age: 74
End: 2019-01-17
Payer: COMMERCIAL

## 2019-01-17 VITALS
TEMPERATURE: 97.7 F | DIASTOLIC BLOOD PRESSURE: 90 MMHG | SYSTOLIC BLOOD PRESSURE: 134 MMHG | WEIGHT: 137.13 LBS | HEART RATE: 68 BPM | HEIGHT: 63 IN | BODY MASS INDEX: 24.3 KG/M2

## 2019-01-17 DIAGNOSIS — J30.2 SEASONAL ALLERGIC RHINITIS, UNSPECIFIED TRIGGER: ICD-10-CM

## 2019-01-17 DIAGNOSIS — S63.641D RUPTURE OF ULNAR COLLATERAL LIGAMENT OF RIGHT THUMB, SUBSEQUENT ENCOUNTER: ICD-10-CM

## 2019-01-17 DIAGNOSIS — R42 DIZZINESS: ICD-10-CM

## 2019-01-17 DIAGNOSIS — R00.2 INTERMITTENT PALPITATIONS: ICD-10-CM

## 2019-01-17 DIAGNOSIS — J45.20 MILD INTERMITTENT ASTHMA, UNSPECIFIED WHETHER COMPLICATED: ICD-10-CM

## 2019-01-17 DIAGNOSIS — R52 BODY ACHES: ICD-10-CM

## 2019-01-17 DIAGNOSIS — I10 ESSENTIAL HYPERTENSION: ICD-10-CM

## 2019-01-17 DIAGNOSIS — Z01.818 PRE-OPERATIVE EXAMINATION: Primary | ICD-10-CM

## 2019-01-17 DIAGNOSIS — R11.0 NAUSEA: Chronic | ICD-10-CM

## 2019-01-17 LAB
ANION GAP SERPL CALCULATED.3IONS-SCNC: 7 MMOL/L (ref 4–13)
BUN SERPL-MCNC: 20 MG/DL (ref 5–25)
CALCIUM SERPL-MCNC: 8.9 MG/DL (ref 8.3–10.1)
CHLORIDE SERPL-SCNC: 108 MMOL/L (ref 100–108)
CO2 SERPL-SCNC: 23 MMOL/L (ref 21–32)
CREAT SERPL-MCNC: 1.24 MG/DL (ref 0.6–1.3)
CRP SERPL QL: 4.8 MG/L
ERYTHROCYTE [SEDIMENTATION RATE] IN BLOOD: 26 MM/HOUR (ref 0–20)
EST. AVERAGE GLUCOSE BLD GHB EST-MCNC: 137 MG/DL
GFR SERPL CREATININE-BSD FRML MDRD: 43 ML/MIN/1.73SQ M
GLUCOSE P FAST SERPL-MCNC: 100 MG/DL (ref 65–99)
HBA1C MFR BLD: 6.4 % (ref 4.2–6.3)
MAGNESIUM SERPL-MCNC: 2.5 MG/DL (ref 1.6–2.6)
POTASSIUM SERPL-SCNC: 4.3 MMOL/L (ref 3.5–5.3)
SODIUM SERPL-SCNC: 138 MMOL/L (ref 136–145)
T4 SERPL-MCNC: 8.1 UG/DL (ref 4.7–13.3)
TSH SERPL DL<=0.05 MIU/L-ACNC: 1.51 UIU/ML (ref 0.36–3.74)

## 2019-01-17 PROCEDURE — 83735 ASSAY OF MAGNESIUM: CPT

## 2019-01-17 PROCEDURE — 86431 RHEUMATOID FACTOR QUANT: CPT

## 2019-01-17 PROCEDURE — 99214 OFFICE O/P EST MOD 30 MIN: CPT | Performed by: INTERNAL MEDICINE

## 2019-01-17 PROCEDURE — 86235 NUCLEAR ANTIGEN ANTIBODY: CPT

## 2019-01-17 PROCEDURE — 36415 COLL VENOUS BLD VENIPUNCTURE: CPT

## 2019-01-17 PROCEDURE — 86038 ANTINUCLEAR ANTIBODIES: CPT

## 2019-01-17 PROCEDURE — 84436 ASSAY OF TOTAL THYROXINE: CPT

## 2019-01-17 PROCEDURE — 86140 C-REACTIVE PROTEIN: CPT

## 2019-01-17 PROCEDURE — 86200 CCP ANTIBODY: CPT

## 2019-01-17 PROCEDURE — 85652 RBC SED RATE AUTOMATED: CPT

## 2019-01-17 PROCEDURE — 84443 ASSAY THYROID STIM HORMONE: CPT

## 2019-01-17 PROCEDURE — 83036 HEMOGLOBIN GLYCOSYLATED A1C: CPT

## 2019-01-17 PROCEDURE — 81374 HLA I TYPING 1 ANTIGEN LR: CPT

## 2019-01-17 PROCEDURE — 86430 RHEUMATOID FACTOR TEST QUAL: CPT

## 2019-01-17 PROCEDURE — 80048 BASIC METABOLIC PNL TOTAL CA: CPT

## 2019-01-17 PROCEDURE — 86617 LYME DISEASE ANTIBODY: CPT

## 2019-01-17 RX ORDER — FLUTICASONE PROPIONATE 50 MCG
2 SPRAY, SUSPENSION (ML) NASAL DAILY
Qty: 16 G | Refills: 4 | Status: SHIPPED | OUTPATIENT
Start: 2019-01-17 | End: 2019-07-27 | Stop reason: SDUPTHER

## 2019-01-17 RX ORDER — FLUTICASONE PROPIONATE 44 UG/1
2 AEROSOL, METERED RESPIRATORY (INHALATION) 2 TIMES DAILY
Qty: 1 INHALER | Refills: 1 | Status: SHIPPED | OUTPATIENT
Start: 2019-01-17 | End: 2020-01-23

## 2019-01-17 RX ORDER — ONDANSETRON 4 MG/1
4 TABLET, ORALLY DISINTEGRATING ORAL EVERY 8 HOURS PRN
Qty: 15 TABLET | Refills: 0 | Status: SHIPPED | OUTPATIENT
Start: 2019-01-17 | End: 2019-01-22 | Stop reason: ALTCHOICE

## 2019-01-17 RX ORDER — ALBUTEROL SULFATE 2.5 MG/3ML
2.5 SOLUTION RESPIRATORY (INHALATION) EVERY 4 HOURS PRN
Qty: 75 VIAL | Refills: 0 | Status: SHIPPED | OUTPATIENT
Start: 2019-01-17 | End: 2020-01-23 | Stop reason: SDUPTHER

## 2019-01-17 RX ORDER — HYDROCHLOROTHIAZIDE 25 MG/1
12.5 TABLET ORAL DAILY
Qty: 90 TABLET | Refills: 0 | Status: CANCELLED | OUTPATIENT
Start: 2019-01-17

## 2019-01-17 RX ORDER — BISOPROLOL FUMARATE AND HYDROCHLOROTHIAZIDE 2.5; 6.25 MG/1; MG/1
1 TABLET ORAL DAILY
Qty: 30 TABLET | Refills: 5 | Status: SHIPPED | OUTPATIENT
Start: 2019-01-17 | End: 2020-01-23

## 2019-01-17 NOTE — PROGRESS NOTES
INTERNAL MEDICINE FOLLOW-UP OFFICE VISIT  [facility]  [address] [place] [address]    NAME: Leola Dance  AGE: 68 y o  SEX: female    DATE OF ENCOUNTER: 1/17/2019    Assessment and Plan     1  Mild intermittent asthma, unspecified whether complicated  Start inhaled   Uncontrolled with increased use of albuterol as well as exacerbation within last year  Will add Flovent  Encourage patient to use singulair  Smoking cessation discussed   - albuterol (2 5 mg/3 mL) 0 083 % nebulizer solution; Take 1 vial (2 5 mg total) by nebulization every 4 (four) hours as needed for wheezing or shortness of breath  Dispense: 75 vial; Refill: 0    2  Seasonal allergic rhinitis, unspecified trigger  - fluticasone (FLONASE) 50 mcg/act nasal spray; 2 sprays into each nostril daily  Dispense: 16 g; Refill: 4    3  Essential hypertension  DC hydrochlorothiazide  Start bisoprolol 6 25 mg/hydrochlorothiazide 12 5 mg  Continue monitor blood pressure at home  Patient return for blood pressure check follow-up of symptoms    4  Nausea  - ondansetron (ZOFRAN-ODT) 4 mg disintegrating tablet; Take 1 tablet (4 mg total) by mouth every 8 (eight) hours as needed for nausea or vomiting for up to 5 days  Dispense: 15 tablet; Refill: 0    5  Pre-operative examination  No history of CAD, CHF, not on insulin therapy  Good functional capacity with greater than 4 Mets  Does have history of asthma  with 1 exacerbation last year  STOP BANG negative   Plan:  -will check chest x-ray    -will start Flovent for improved control of asthma  -Patient's cardiac status and pulmonary status should not preclude her from surgery  Patient should return to Cardiology for further follow-up of ongoing issues  6  Intermittent palpitations  Patient reports same symptoms and previous visit  Patient is self discontinue metoprolol due to dizziness  Will start bisoprolol/hydrochlorothiazide    7   Rupture of ulnar collateral ligament of right thumb, subsequent encounter  Along with orthopedist  Preoperative clearance as above    8  Dizziness   Orthostatics negative in office  Possibly secondary to new onset diabetes as patient is complaining of polydipsia and polyuria  Will check hemoglobin A1c      No orders of the defined types were placed in this encounter       - Counseling Documentation: patient was counseled regarding: diagnostic results, instructions for management, risks and benefits of treatment options and importance of compliance with treatment    Chief Complaint     Chief Complaint   Patient presents with    Follow-up     needs medications refilled; cardiologist said she needs bloodwork to determine if patient is losing too much fluid causing cramping and dizziness       History of Present Illness     Patient is a 64-year-old female with history of asthma, hypertension, seasonal allergies, palpitations, current tobacco use who presents For follow up  Patient recently followed with cardiologist in August for evaluation of palpitations  Was started on metoprolol at that time  Patient was unable to tolerate medications he started getting dizzy and stopped on her own  They also decreased hydrochlorothiazide from 25 mg to 12 5 mg daily  But due to patient not being able to cut medication she is continue to 25 mg dose  She is currently complaining of dizziness that has been occurring over the last few months  She reports that 2-3 times per week she feels lightheadedness and vision gets hazy  These can last for up to 10-15 minutes  During these times if she tries to walk she feels off balance  She denies any palpitations during these events  They resolved spontaneously  She also complains of increased thirst with polydipsia  All the symptoms seem to occur at the same time  Additionally presents for preoperative clearance for rupture of ligament in hand  Orthopedist is planning repair    Patient has good functional status can walk up 2 flights of stairs and walk multiple blocks without symptoms of chest pain or shortness of breath  Patient had echocardiogram in August 2018 which showed EF 65%, no regional wall motion abnormalities, grade 1 diastolic dysfunction  Last EKG with nonspecific T-wave changes    Health maintenance   Needs flu, pcv 13, mammogram, tdap          The following portions of the patient's history were reviewed and updated as appropriate: allergies, current medications, past family history, past medical history, past social history, past surgical history and problem list     Review of Systems     Review of Systems   Constitutional: Negative for chills and fever  HENT: Negative for sore throat and trouble swallowing  Eyes: Negative for photophobia and visual disturbance  Respiratory: Positive for wheezing  Negative for shortness of breath  Cardiovascular: Positive for palpitations  Negative for chest pain  Gastrointestinal: Negative for constipation, diarrhea, nausea and vomiting  Endocrine: Positive for polydipsia and polyuria  Genitourinary: Negative for difficulty urinating and dysuria  Musculoskeletal: Positive for arthralgias and myalgias  Skin: Negative for rash and wound  Neurological: Positive for dizziness and light-headedness  Negative for headaches         Active Problem List     Patient Active Problem List   Diagnosis    Rupture of ulnar collateral ligament of right thumb    Primary osteoarthritis of first carpometacarpal joint of right hand    Pre-operative examination    Mild intermittent asthma    Other chronic sinusitis    Screening mammogram, encounter for    Cigarette nicotine dependence without complication    Seasonal allergies    Trigger finger, right index finger    Panic attack    Essential hypertension    Asthma, very poorly controlled    Breast cyst, right    Classic migraine with aura    Deficient knowledge    GERD (gastroesophageal reflux disease)    Renal cyst    Trigger finger of right thumb    Abnormal EKG    Prediabetes    Intermittent palpitations    Chronic left hip pain    Trigger finger, right little finger    Intersection syndrome    Viral upper respiratory tract infection    Dizziness       Objective     /90   Pulse 68   Temp 97 7 °F (36 5 °C)   Ht 5' 2 5" (1 588 m)   Wt 62 2 kg (137 lb 2 oz)   BMI 24 68 kg/m²     Physical Exam   Constitutional: She is oriented to person, place, and time  She appears well-developed and well-nourished  No distress  HENT:   Head: Normocephalic and atraumatic  Mouth/Throat: No oropharyngeal exudate  Dry mucous membranes    Eyes: Pupils are equal, round, and reactive to light  Conjunctivae and EOM are normal  No scleral icterus  Neck: Normal range of motion  Neck supple  Cardiovascular: Normal rate, regular rhythm and normal heart sounds  No murmur heard  Pulmonary/Chest: Breath sounds normal  No respiratory distress  She has no wheezes  She has no rales  She exhibits no tenderness  Abdominal: Soft  Bowel sounds are normal  She exhibits no distension  There is no tenderness  There is no rebound and no guarding  Musculoskeletal: She exhibits no edema, tenderness or deformity  Lymphadenopathy:     She has no cervical adenopathy  Neurological: She is alert and oriented to person, place, and time  Skin: She is not diaphoretic  Psychiatric: She has a normal mood and affect  Her behavior is normal  Judgment and thought content normal        Pertinent Laboratory/Diagnostic Studies:  No results found      Images and diagnostics reviewed     Current Medications     Current Outpatient Prescriptions:     albuterol (2 5 mg/3 mL) 0 083 % nebulizer solution, Take 1 vial (2 5 mg total) by nebulization every 4 (four) hours as needed for wheezing or shortness of breath, Disp: 75 vial, Rfl: 0    aspirin (ECOTRIN LOW STRENGTH) 81 mg EC tablet, Take 81 mg by mouth daily, Disp: , Rfl:     fexofenadine (ALLEGRA) 30 MG tablet, Take 180 mg by mouth daily  , Disp: , Rfl:     fluticasone (FLONASE) 50 mcg/act nasal spray, 2 sprays into each nostril daily, Disp: 16 g, Rfl: 4    ondansetron (ZOFRAN-ODT) 4 mg disintegrating tablet, dissolve 1 tablet ON TONGUE every 8 hours if needed for nausea and vomiting FOR UP TO 5 DAYS, Disp: , Rfl: 0    VENTOLIN  (90 Base) MCG/ACT inhaler, inhale 1 to 2 puffs by mouth every 4 to 6 hours ONLY FOR WHEEZING     (REFER TO PRESCRIPTION NOTES)  , Disp: , Rfl: 0    acetaminophen (TYLENOL) 500 mg tablet, Take 1 tablet (500 mg total) by mouth every 6 (six) hours as needed for mild pain (Patient not taking: Reported on 1/17/2019 ), Disp: 60 tablet, Rfl: 0    Blood Pressure Monitoring (BLOOD PRESSURE CUFF) MISC, by Does not apply route 2 (two) times a day (Patient not taking: Reported on 1/17/2019 ), Disp: 1 each, Rfl: 0    montelukast (SINGULAIR) 10 mg tablet, Take 1 tablet (10 mg total) by mouth daily at bedtime (Patient not taking: Reported on 1/17/2019 ), Disp: 30 tablet, Rfl: 3    nicotine (NICODERM CQ) 14 mg/24hr TD 24 hr patch, Place 1 patch on the skin every 24 hours (Patient not taking: Reported on 1/17/2019 ), Disp: 28 patch, Rfl: 3    ondansetron (ZOFRAN-ODT) 4 mg disintegrating tablet, Take 1 tablet (4 mg total) by mouth every 8 (eight) hours as needed for nausea or vomiting for up to 5 days, Disp: 15 tablet, Rfl: 0    Health Maintenance     Health Maintenance   Topic Date Due    Hepatitis C Screening  1945    Medicare Annual Wellness Visit (AWV)  1945    Fall Risk  05/05/2011    Urinary Incontinence Screening  05/05/2011    Pneumococcal PPSV23/PCV13 65+ Years / High and Highest Risk (1 of 2 - PCV13) 05/05/2011    CRC Screening: Colonoscopy  03/25/2015    INFLUENZA VACCINE  07/01/2018    MAMMOGRAM  01/10/2019    Depression Screening PHQ  06/08/2019    DTaP,Tdap,and Td Vaccines (2 - Td) 06/08/2028     Immunization History Administered Date(s) Administered    Pneumococcal Polysaccharide PPV23 01/01/2003, 10/01/2008    Tdap 06/08/2018       Scarlet Belem  Internal Medicine PGY-2  [address] [facility] , [address] [address] [place] [address], [facility]  Office: [phone]  Fax: [phone]

## 2019-01-18 DIAGNOSIS — R76.8 RHEUMATOID FACTOR POSITIVE: Primary | ICD-10-CM

## 2019-01-18 LAB
B BURGDOR IGG PATRN SER IB-IMP: NEGATIVE
B BURGDOR IGM PATRN SER IB-IMP: NEGATIVE
B BURGDOR18KD IGG SER QL IB: ABNORMAL
B BURGDOR23KD IGG SER QL IB: PRESENT
B BURGDOR23KD IGM SER QL IB: ABNORMAL
B BURGDOR28KD IGG SER QL IB: ABNORMAL
B BURGDOR30KD IGG SER QL IB: ABNORMAL
B BURGDOR39KD IGG SER QL IB: PRESENT
B BURGDOR39KD IGM SER QL IB: ABNORMAL
B BURGDOR41KD IGG SER QL IB: ABNORMAL
B BURGDOR41KD IGM SER QL IB: ABNORMAL
B BURGDOR45KD IGG SER QL IB: ABNORMAL
B BURGDOR58KD IGG SER QL IB: ABNORMAL
B BURGDOR66KD IGG SER QL IB: ABNORMAL
B BURGDOR93KD IGG SER QL IB: PRESENT
CRYOGLOB RF SER-ACNC: ABNORMAL [IU]/ML
ENA SCL70 AB SER-ACNC: <0.2 AI (ref 0–0.9)
RHEUMATOID FACT SER QL LA: POSITIVE
RYE IGE QN: NEGATIVE

## 2019-01-19 LAB — CCP IGA+IGG SERPL IA-ACNC: >250 UNITS (ref 0–19)

## 2019-01-22 LAB — HLA-B27 QL NAA+PROBE: NEGATIVE

## 2019-02-08 ENCOUNTER — OFFICE VISIT (OUTPATIENT)
Dept: OBGYN CLINIC | Facility: HOSPITAL | Age: 74
End: 2019-02-08
Payer: COMMERCIAL

## 2019-02-08 ENCOUNTER — OFFICE VISIT (OUTPATIENT)
Dept: OCCUPATIONAL THERAPY | Facility: HOSPITAL | Age: 74
End: 2019-02-08
Attending: ORTHOPAEDIC SURGERY
Payer: COMMERCIAL

## 2019-02-08 VITALS
BODY MASS INDEX: 25.21 KG/M2 | HEART RATE: 87 BPM | HEIGHT: 62 IN | SYSTOLIC BLOOD PRESSURE: 158 MMHG | DIASTOLIC BLOOD PRESSURE: 99 MMHG | WEIGHT: 137 LBS

## 2019-02-08 DIAGNOSIS — M65.321 TRIGGER FINGER, RIGHT INDEX FINGER: ICD-10-CM

## 2019-02-08 DIAGNOSIS — M65.331 TRIGGER FINGER, RIGHT MIDDLE FINGER: ICD-10-CM

## 2019-02-08 DIAGNOSIS — M65.351 TRIGGER FINGER, RIGHT LITTLE FINGER: ICD-10-CM

## 2019-02-08 DIAGNOSIS — M65.321 TRIGGER FINGER, RIGHT INDEX FINGER: Primary | ICD-10-CM

## 2019-02-08 DIAGNOSIS — S63.641D RUPTURE OF ULNAR COLLATERAL LIGAMENT OF RIGHT THUMB, SUBSEQUENT ENCOUNTER: ICD-10-CM

## 2019-02-08 PROCEDURE — L3933 FO W/O JOINTS CF: HCPCS | Performed by: OCCUPATIONAL THERAPIST

## 2019-02-08 PROCEDURE — 99214 OFFICE O/P EST MOD 30 MIN: CPT | Performed by: ORTHOPAEDIC SURGERY

## 2019-02-08 NOTE — PROGRESS NOTES
ASSESSMENT/PLAN:    Assessment:   Right thumb UCL rupture  Right index, long and small trigger fingers      Plan:   Patient will see hand therapy today for trigger finger splints  Comfort cool thumb spica brace      Follow Up:  patient's will call once her other medial issue are taken care and she wants to proceed with surgery    To Do Next Visit:   recheck    General Discussions:     Trigger FInger: The anatomy and physiology of trigger finger was discussed with the patient today in the office  Edema and increased contact pressure within the flexor tendons at the A1 pulley can cause pain, crepitation, and limitation of function  Treatment options include resting MP blocking splints to decrease edema, oral anti-inflammatory medications, home or formal therapy exercises, up to 2 steroid injections within the tendon sheath, or surgical release  While majority of patients do respond to conservative treatment, up to 20% may require surgical release        _____________________________________________________  CHIEF COMPLAINT:  Chief Complaint   Patient presents with    Right Hand - Follow-up         SUBJECTIVE:  Giovani Sandoval is a 68y o  year old female who presents for follow up regarding right index, long, and small trigger fingers and right thumb UCL rupture  Patient was previously schedule for surgery however was cancelled due to other medial issues  Since last visit, Giovani Sandoval has tried comfort cool bracing with only partial relief  Today there is Pain  Moderate  Intermittant  Sharp, Catching and Locking to the right index finger, long finger and small finger          PAST MEDICAL HISTORY:  Past Medical History:   Diagnosis Date    Asthma     Asthmatic bronchitis     Last Assessed: 10/7/2014     Chronic diarrhea     Last Assessed: 8/20/2015     Fibromyalgia     Focal nodular hyperplasia of liver     Last Assessed: 6/11/2015    Herpes zoster     Last Assessed: 3/18/2016    IBS (irritable bowel syndrome)     Intermittent palpitations     Irritable bowel syndrome     Osteoarthritis     Vertigo        PAST SURGICAL HISTORY:  Past Surgical History:   Procedure Laterality Date    BREAST BIOPSY      SMALL INTESTINE SURGERY         FAMILY HISTORY:  Family History   Problem Relation Age of Onset    Heart attack Mother     Other Mother         Aspiration of Vomit     Asthma Father     Breast cancer Sister     Arthritis Family     Other Family         Musculoskeletal Disease     Osteoporosis Family        SOCIAL HISTORY:  Social History   Substance Use Topics    Smoking status: Current Every Day Smoker     Packs/day: 0 25     Types: Cigarettes    Smokeless tobacco: Never Used      Comment: Tobacco Use down to 3 cigs daily    Alcohol use No       MEDICATIONS:    Current Outpatient Prescriptions:     acetaminophen (TYLENOL) 500 mg tablet, Take 1 tablet (500 mg total) by mouth every 6 (six) hours as needed for mild pain, Disp: 60 tablet, Rfl: 0    albuterol (2 5 mg/3 mL) 0 083 % nebulizer solution, Take 1 vial (2 5 mg total) by nebulization every 4 (four) hours as needed for wheezing or shortness of breath, Disp: 75 vial, Rfl: 0    aspirin (ECOTRIN LOW STRENGTH) 81 mg EC tablet, Take 81 mg by mouth daily, Disp: , Rfl:     bisoprolol-hydrochlorothiazide (ZIAC) 2 5-6 25 MG per tablet, Take 1 tablet by mouth daily, Disp: 30 tablet, Rfl: 5    Blood Pressure Monitoring (BLOOD PRESSURE CUFF) MISC, by Does not apply route 2 (two) times a day, Disp: 1 each, Rfl: 0    fexofenadine (ALLEGRA) 30 MG tablet, Take 180 mg by mouth daily  , Disp: , Rfl:     fluticasone (FLONASE) 50 mcg/act nasal spray, 2 sprays into each nostril daily, Disp: 16 g, Rfl: 4    fluticasone (FLOVENT HFA) 44 mcg/act inhaler, Inhale 2 puffs 2 (two) times a day Rinse mouth after use , Disp: 1 Inhaler, Rfl: 1    montelukast (SINGULAIR) 10 mg tablet, Take 1 tablet (10 mg total) by mouth daily at bedtime, Disp: 30 tablet, Rfl: 3   nicotine (NICODERM CQ) 14 mg/24hr TD 24 hr patch, Place 1 patch on the skin every 24 hours, Disp: 28 patch, Rfl: 3    ondansetron (ZOFRAN-ODT) 4 mg disintegrating tablet, dissolve 1 tablet ON TONGUE every 8 hours if needed for nausea and vomiting FOR UP TO 5 DAYS, Disp: , Rfl: 0    VENTOLIN  (90 Base) MCG/ACT inhaler, inhale 1 to 2 puffs by mouth every 4 to 6 hours ONLY FOR WHEEZING     (REFER TO PRESCRIPTION NOTES)  , Disp: , Rfl: 0    ondansetron (ZOFRAN-ODT) 4 mg disintegrating tablet, Take 1 tablet (4 mg total) by mouth every 8 (eight) hours as needed for nausea or vomiting for up to 5 days, Disp: 15 tablet, Rfl: 0    ALLERGIES:  Allergies   Allergen Reactions    Ambrosia Artemisiifolia (Ragweed) Skin Test Facial Swelling    Codeine Other (See Comments)     Heart races    Epinephrine      Pt reports "it makes my heart race, they told me I cant take it "    Ibuprofen Other (See Comments)     Heart racing    Morphine Other (See Comments)     Heart racing    Pollen Extract Sneezing    Tramadol Other (See Comments)     Heart racing       REVIEW OF SYSTEMS:  Pertinent items are noted in HPI  A comprehensive review of systems was negative      LABS:  HgA1c:   Lab Results   Component Value Date    HGBA1C 6 4 (H) 01/17/2019     BMP:   Lab Results   Component Value Date    GLUCOSE 92 05/19/2015    CALCIUM 8 9 01/17/2019     05/19/2015    K 4 3 01/17/2019    CO2 23 01/17/2019     01/17/2019    BUN 20 01/17/2019    CREATININE 1 24 01/17/2019           _____________________________________________________  PHYSICAL EXAMINATION:  General: well developed and well nourished, alert, oriented times 3 and appears comfortable  Psychiatric: Normal  HEENT: Trachea Midline, No torticollis  Cardiovascular: No discernable arrhythmia  Pulmonary: No wheezing or stridor  Skin: No masses, erthema, lacerations, fluctation, ulcerations  Neurovascular: Sensation Intact to the Median, Ulnar, Radial Nerve, Motor Intact to the Median, Ulnar, Radial Nerve and Pulses Intact    MUSCULOSKELETAL EXAMINATION:  RIGHT SIDE:  Finger:  Nodules  index finger, long finger, small finger, with crepitation, tenderness to A1 pulleys of these three fingers  UCL gapping with 70 degrees with stress testing, positive shoulder sign, palpable nodule PIP of the index finger       _____________________________________________________  STUDIES REVIEWED:  No Studies to review      PROCEDURES PERFORMED:  Procedures  No Procedures performed today       Scribe Attestation    I,:   Allen Baron am acting as a scribe while in the presence of the attending physician :        I,:   Jessy Rosenthal MD personally performed the services described in this documentation    as scribed in my presence :          Hattie Chicas,:   Allen Baron am acting as a scribe while in the presence of the attending physician :        I,:   Jessy Rosenthal MD personally performed the services described in this documentation    as scribed in my presence :

## 2019-02-08 NOTE — PROGRESS NOTES
Splint     Diagnosis:   1  Trigger finger, right index finger  Ambulatory referral to PT/OT hand therapy   2  Trigger finger, right little finger  Ambulatory referral to PT/OT hand therapy   3  Trigger finger, right middle finger  Ambulatory referral to PT/OT hand therapy     Indication: Motion Blocking    Location: Right  index finger, long finger, ring finger and small finger  Supplies: Custom Fit Orthotic and Skin coverage   Splint type: MCP ext  Wearing Schedule: Night only  Describe Position: ext    Precautions: Universal (skin contact/breakdown)    Patient or Caregiver expresses understanding of wearing Schedule and Precautions? Yes  Patient or Caregiver able to don/doff orthotic independently? Yes    Written orders provided to patient?  Yes  Orders Obtained: Written  Orders Obtained from: Dr Clemencia Plaza      Return for evaluation and treatment No

## 2019-03-25 ENCOUNTER — TELEPHONE (OUTPATIENT)
Dept: INTERNAL MEDICINE CLINIC | Facility: CLINIC | Age: 74
End: 2019-03-25

## 2019-03-25 NOTE — TELEPHONE ENCOUNTER
NO SHOW #[id number] Patient presents with:  Weakness: APN assessment; pt with 10 lb weight loss since discharge from hospital 1/16/18    Pt and spouse concerned with continuing weight loss and inability to eat/drink since returning home from hospital. Pt denies N/V/D/C, fever

## 2019-07-10 ENCOUNTER — TELEPHONE (OUTPATIENT)
Dept: INTERNAL MEDICINE CLINIC | Facility: CLINIC | Age: 74
End: 2019-07-10

## 2019-07-10 NOTE — TELEPHONE ENCOUNTER
----- Message from Devin Duran sent at 7/8/2019 10:42 AM EDT -----  Regarding: 3 no shows in a row   3/25/19 - no show   6/3/19-no show  7/8/19-no show

## 2019-07-10 NOTE — TELEPHONE ENCOUNTER
I left this patient a voice mail message on both phone numbers to call me back to review past 3 missed appointments and to identify any barriers on why she missed her appointments and to see how we can assist      If there are no barriers mentioned then I will forward this patients information to Dr Vickie Clayton and Brian Guardado to review and to determine dismissal

## 2019-07-26 NOTE — TELEPHONE ENCOUNTER
I have called this patient multiple times and left voicemail messages on both of the phone numbers that are listed  I have not received a call back from her  I was waiting to see if she would call us back before sending you this message  Patient has 3 consecutive no show appointments

## 2019-07-27 DIAGNOSIS — J30.2 SEASONAL ALLERGIC RHINITIS, UNSPECIFIED TRIGGER: ICD-10-CM

## 2019-07-28 RX ORDER — FLUTICASONE PROPIONATE 50 MCG
SPRAY, SUSPENSION (ML) NASAL
Qty: 16 G | Refills: 4 | Status: SHIPPED | OUTPATIENT
Start: 2019-07-28 | End: 2020-01-23

## 2019-07-29 NOTE — TELEPHONE ENCOUNTER
Please send her a dismissal letter  It is fine to refill medications for the next 30 days, so that she has time to schedule care elsewhere

## 2020-01-20 ENCOUNTER — APPOINTMENT (EMERGENCY)
Dept: RADIOLOGY | Facility: HOSPITAL | Age: 75
End: 2020-01-20
Payer: COMMERCIAL

## 2020-01-20 ENCOUNTER — HOSPITAL ENCOUNTER (EMERGENCY)
Facility: HOSPITAL | Age: 75
Discharge: HOME | End: 2020-01-20
Attending: EMERGENCY MEDICINE | Admitting: EMERGENCY MEDICINE
Payer: COMMERCIAL

## 2020-01-20 VITALS
RESPIRATION RATE: 20 BRPM | WEIGHT: 137.57 LBS | HEART RATE: 96 BPM | SYSTOLIC BLOOD PRESSURE: 183 MMHG | BODY MASS INDEX: 25.16 KG/M2 | OXYGEN SATURATION: 99 % | DIASTOLIC BLOOD PRESSURE: 95 MMHG | TEMPERATURE: 98 F

## 2020-01-20 DIAGNOSIS — M51.26 HERNIATION OF LEFT SIDE OF L4-L5 INTERVERTEBRAL DISC: ICD-10-CM

## 2020-01-20 DIAGNOSIS — M79.605 ACUTE PAIN OF LEFT LOWER EXTREMITY: Primary | ICD-10-CM

## 2020-01-20 DIAGNOSIS — M70.60 TROCHANTERIC BURSITIS: ICD-10-CM

## 2020-01-20 DIAGNOSIS — M54.50 LOWER BACK PAIN: ICD-10-CM

## 2020-01-20 LAB
ANION GAP SERPL CALCULATED.3IONS-SCNC: 7 MMOL/L (ref 4–13)
BASOPHILS # BLD AUTO: 0.07 THOUSANDS/ΜL (ref 0–0.1)
BASOPHILS NFR BLD AUTO: 1 % (ref 0–1)
BUN SERPL-MCNC: 14 MG/DL (ref 5–25)
CALCIUM SERPL-MCNC: 8.9 MG/DL (ref 8.3–10.1)
CHLORIDE SERPL-SCNC: 113 MMOL/L (ref 100–108)
CO2 SERPL-SCNC: 24 MMOL/L (ref 21–32)
CREAT SERPL-MCNC: 1.04 MG/DL (ref 0.6–1.3)
EOSINOPHIL # BLD AUTO: 0.23 THOUSAND/ΜL (ref 0–0.61)
EOSINOPHIL NFR BLD AUTO: 2 % (ref 0–6)
ERYTHROCYTE [DISTWIDTH] IN BLOOD BY AUTOMATED COUNT: 14.5 % (ref 11.6–15.1)
GFR SERPL CREATININE-BSD FRML MDRD: 53 ML/MIN/1.73SQ M
GLUCOSE SERPL-MCNC: 92 MG/DL (ref 65–140)
HCT VFR BLD AUTO: 42.4 % (ref 34.8–46.1)
HGB BLD-MCNC: 13.4 G/DL (ref 11.5–15.4)
IMM GRANULOCYTES # BLD AUTO: 0.03 THOUSAND/UL (ref 0–0.2)
IMM GRANULOCYTES NFR BLD AUTO: 0 % (ref 0–2)
LYMPHOCYTES # BLD AUTO: 2.01 THOUSANDS/ΜL (ref 0.6–4.47)
LYMPHOCYTES NFR BLD AUTO: 18 % (ref 14–44)
MCH RBC QN AUTO: 29.2 PG (ref 26.8–34.3)
MCHC RBC AUTO-ENTMCNC: 31.6 G/DL (ref 31.4–37.4)
MCV RBC AUTO: 92 FL (ref 82–98)
MONOCYTES # BLD AUTO: 0.59 THOUSAND/ΜL (ref 0.17–1.22)
MONOCYTES NFR BLD AUTO: 5 % (ref 4–12)
NEUTROPHILS # BLD AUTO: 8.44 THOUSANDS/ΜL (ref 1.85–7.62)
NEUTS SEG NFR BLD AUTO: 74 % (ref 43–75)
NRBC BLD AUTO-RTO: 0 /100 WBCS
PLATELET # BLD AUTO: 252 THOUSANDS/UL (ref 149–390)
PMV BLD AUTO: 11.6 FL (ref 8.9–12.7)
POTASSIUM SERPL-SCNC: 4.4 MMOL/L (ref 3.5–5.3)
RBC # BLD AUTO: 4.59 MILLION/UL (ref 3.81–5.12)
SODIUM SERPL-SCNC: 144 MMOL/L (ref 136–145)
WBC # BLD AUTO: 11.37 THOUSAND/UL (ref 4.31–10.16)

## 2020-01-20 PROCEDURE — 36415 COLL VENOUS BLD VENIPUNCTURE: CPT | Performed by: EMERGENCY MEDICINE

## 2020-01-20 PROCEDURE — 80048 BASIC METABOLIC PNL TOTAL CA: CPT | Performed by: EMERGENCY MEDICINE

## 2020-01-20 PROCEDURE — 99284 EMERGENCY DEPT VISIT MOD MDM: CPT

## 2020-01-20 PROCEDURE — 85025 COMPLETE CBC W/AUTO DIFF WBC: CPT | Performed by: EMERGENCY MEDICINE

## 2020-01-20 PROCEDURE — 72131 CT LUMBAR SPINE W/O DYE: CPT

## 2020-01-20 PROCEDURE — 99284 EMERGENCY DEPT VISIT MOD MDM: CPT | Performed by: EMERGENCY MEDICINE

## 2020-01-20 PROCEDURE — 96374 THER/PROPH/DIAG INJ IV PUSH: CPT

## 2020-01-20 PROCEDURE — 73502 X-RAY EXAM HIP UNI 2-3 VIEWS: CPT

## 2020-01-20 RX ORDER — TRIAMCINOLONE ACETONIDE 40 MG/ML
40 INJECTION, SUSPENSION INTRA-ARTICULAR; INTRAMUSCULAR ONCE
Status: COMPLETED | OUTPATIENT
Start: 2020-01-20 | End: 2020-01-20

## 2020-01-20 RX ORDER — ACETAMINOPHEN 325 MG/1
975 TABLET ORAL ONCE
Status: COMPLETED | OUTPATIENT
Start: 2020-01-20 | End: 2020-01-20

## 2020-01-20 RX ORDER — BUPIVACAINE HYDROCHLORIDE 5 MG/ML
5 INJECTION, SOLUTION EPIDURAL; INTRACAUDAL ONCE
Status: COMPLETED | OUTPATIENT
Start: 2020-01-20 | End: 2020-01-20

## 2020-01-20 RX ADMIN — TRIAMCINOLONE ACETONIDE 40 MG: 40 INJECTION, SUSPENSION INTRA-ARTICULAR; INTRAMUSCULAR at 12:01

## 2020-01-20 RX ADMIN — ACETAMINOPHEN 975 MG: 325 TABLET ORAL at 11:57

## 2020-01-20 RX ADMIN — BUPIVACAINE HYDROCHLORIDE 5 ML: 5 INJECTION, SOLUTION EPIDURAL; INTRACAUDAL at 13:30

## 2020-01-20 RX ADMIN — TRIAMCINOLONE ACETONIDE 40 MG: 40 INJECTION, SUSPENSION INTRA-ARTICULAR; INTRAMUSCULAR at 13:30

## 2020-01-20 NOTE — ED ATTENDING ATTESTATION
1/20/2020  IDago MD, saw and evaluated the patient  I have discussed the patient with the resident/non-physician practitioner and agree with the resident's/non-physician practitioner's findings, Plan of Care, and MDM as documented in the resident's/non-physician practitioner's note, except where noted  All available labs and Radiology studies were reviewed  I was present for key portions of any procedure(s) performed by the resident/non-physician practitioner and I was immediately available to provide assistance  At this point I agree with the current assessment done in the Emergency Department  I have conducted an independent evaluation of this patient a history and physical is as follows:   The patient presents for evaluation of atraumatic left lateral hip pain the pain is worse if she touches the area she is able to walk she is able to range her hip without difficulty she has had no fever or chills  No radicular symptoms and no back pain and no abdominal pain  Exam well-appearing no acute distress abdomen soft nontender no masses noted no bruits noted pulses are normal back is nontender  Tenderness over the lateral aspect of her left hip there is no redness she has good range of motion of her hip joint  From a clinical standpoint this appears to be trochanteric bursitis  Will do x-ray will offer local bupivacaine/triamcinolone local injection  ED Course         Critical Care Time  Procedures

## 2020-01-20 NOTE — ED PROVIDER NOTES
ASSESSMENT AND PLAN    Yassine Nix is a 69 y o  female with a history of fibromyalgia who presents for evaluation of left hip pain as noted below  On arrival, the patient is hemodynamically stable and well-appearing without acute distress, with a nontoxic appearance  On exam, the patient has tenderness to palpation of the left paraspinal muscles, as well as the SI joint  She also has point tenderness over the greater trochanter on the left side, and extending down the ITB band on the left  She has no knee tenderness  She has no step-off, deformity, skin change, or signs of infection   The physical exam is otherwise unremarkable   -Labwork largely unremarkable  -Tylenol for pain  -steroid injection performed overlying the left greater trochanter for possible trochanteric bursitis  -plain films of the left hip unremarkable  -noncontrast CT scan of the L-spine significant for multiple level degenerative disc disease, as well as significant disc retropulsion at L4-L5, with compression of the L4-L5 spinal nerve  There is also critical lumbar spinal stenosis  -unclear etiology of the patient's symptoms  While she does have exam consistent with trochanteric bursitis, she also has symptomatology concerning for neuropathic pain due to a spinal nerve impingement  On re-evaluation, her pain has slightly improved  She is able to ambulate, though with a limp  I offered the patient admission given her difficulty walking, as well as her critical stenosis, however the patient declined, stating she would "rather have an appointment   -will discharge home  Strict return precautions provided  Provided with outpatient follow up with Neurosurgery for further evaluation  History  Chief Complaint   Patient presents with    Leg Pain     L leg pain since Friday  Denies jean-pierre HIDALGO this is a 18-year-old female who presents for evaluation of left leg pain  The patient states her symptoms started on Friday    She states the pain starts at the hip, is quite sharp, and worse with any movement  She states that it goes down her left knee  She states when she has flares of pain, is very sharp, it causes her significant came pain, making it difficult for her to walk  She denies any trauma  She denies any heavy lifting, but states that she walks a lot  She denies any back pain  She denies any difficulty urinating, urinary retention, incontinence of urine or stool  She denies any fevers, chills, chest pain, shortness of breath, nausea, vomiting, diarrhea, abdominal pain  She has no recent travel, surgeries, hospitalizations, or sick contacts  She has no history of IV drug use  She has no recent weight gain or weight loss  She has no recent use of a long-term steroids  The patient states that she has tried aspirin for pain, with her most recent dose being earlier today, however has not tried any other pain medication    Prior to Admission Medications   Prescriptions Last Dose Informant Patient Reported? Taking? Blood Pressure Monitoring (BLOOD PRESSURE CUFF) MISC  Self No No   Sig: by Does not apply route 2 (two) times a day   VENTOLIN  (90 Base) MCG/ACT inhaler  Self Yes No   Sig: inhale 1 to 2 puffs by mouth every 4 to 6 hours ONLY FOR WHEEZING     (REFER TO PRESCRIPTION NOTES)     acetaminophen (TYLENOL) 500 mg tablet  Self No No   Sig: Take 1 tablet (500 mg total) by mouth every 6 (six) hours as needed for mild pain   albuterol (2 5 mg/3 mL) 0 083 % nebulizer solution  Self No No   Sig: Take 1 vial (2 5 mg total) by nebulization every 4 (four) hours as needed for wheezing or shortness of breath   aspirin (ECOTRIN LOW STRENGTH) 81 mg EC tablet  Self Yes No   Sig: Take 81 mg by mouth daily   bisoprolol-hydrochlorothiazide (ZIAC) 2 5-6 25 MG per tablet  Self No No   Sig: Take 1 tablet by mouth daily   fexofenadine (ALLEGRA) 30 MG tablet  Self Yes No   Sig: Take 180 mg by mouth daily     fluticasone (FLONASE) 50 mcg/act nasal spray   No No   Sig: instill 2 sprays into each nostril once daily   fluticasone (FLOVENT HFA) 44 mcg/act inhaler  Self No No   Sig: Inhale 2 puffs 2 (two) times a day Rinse mouth after use    montelukast (SINGULAIR) 10 mg tablet  Self No No   Sig: Take 1 tablet (10 mg total) by mouth daily at bedtime   nicotine (NICODERM CQ) 14 mg/24hr TD 24 hr patch  Self No No   Sig: Place 1 patch on the skin every 24 hours   ondansetron (ZOFRAN-ODT) 4 mg disintegrating tablet  Self Yes No   Sig: dissolve 1 tablet ON TONGUE every 8 hours if needed for nausea and vomiting FOR UP TO 5 DAYS   ondansetron (ZOFRAN-ODT) 4 mg disintegrating tablet   No No   Sig: Take 1 tablet (4 mg total) by mouth every 8 (eight) hours as needed for nausea or vomiting for up to 5 days      Facility-Administered Medications: None       Past Medical History:   Diagnosis Date    Asthma     Asthmatic bronchitis     Last Assessed: 10/7/2014     Chronic diarrhea     Last Assessed: 8/20/2015     Fibromyalgia     Focal nodular hyperplasia of liver     Last Assessed: 6/11/2015    Herpes zoster     Last Assessed: 3/18/2016    IBS (irritable bowel syndrome)     Intermittent palpitations     Irritable bowel syndrome     Osteoarthritis     Vertigo        Past Surgical History:   Procedure Laterality Date    BREAST BIOPSY      SMALL INTESTINE SURGERY         Family History   Problem Relation Age of Onset    Heart attack Mother     Other Mother         Aspiration of Vomit     Asthma Father     Breast cancer Sister     Arthritis Family     Other Family         Musculoskeletal Disease     Osteoporosis Family      I have reviewed and agree with the history as documented  Social History     Tobacco Use    Smoking status: Current Every Day Smoker     Packs/day: 0 25     Types: Cigarettes    Smokeless tobacco: Never Used    Tobacco comment: Tobacco Use down to 3 cigs daily   Substance Use Topics    Alcohol use: No    Drug use:  No Review of Systems   Constitutional: Negative for chills and fever  HENT: Negative for congestion and rhinorrhea  Eyes: Negative for photophobia and visual disturbance  Respiratory: Negative for cough and shortness of breath  Cardiovascular: Negative for chest pain and palpitations  Gastrointestinal: Negative for abdominal pain, diarrhea, nausea and vomiting  Genitourinary: Negative for dysuria and frequency  Musculoskeletal: Negative for neck pain and neck stiffness  Skin: Negative for pallor and rash  Neurological: Negative for light-headedness and headaches  All other systems reviewed and are negative  Physical Exam  ED Triage Vitals [01/20/20 1054]   Temperature Pulse Respirations Blood Pressure SpO2   98 °F (36 7 °C) 96 20 (!) 183/95 99 %      Temp Source Heart Rate Source Patient Position - Orthostatic VS BP Location FiO2 (%)   Oral Monitor Lying Left arm --      Pain Score       Worst Possible Pain             Orthostatic Vital Signs  Vitals:    01/20/20 1054   BP: (!) 183/95   Pulse: 96   Patient Position - Orthostatic VS: Lying       Physical Exam   Constitutional: She is oriented to person, place, and time  Awake alert, well-appearing, no acute distress  Nontoxic in appearance  Mental status appropriate  HENT:   Head: Normocephalic and atraumatic  Mouth/Throat: Oropharynx is clear and moist  No oropharyngeal exudate  Eyes: Pupils are equal, round, and reactive to light  EOM are normal  Right eye exhibits no discharge  Left eye exhibits no discharge  No scleral icterus  Neck: Normal range of motion  Neck supple  No JVD present  No tracheal deviation present  Cardiovascular: Normal rate, regular rhythm and normal heart sounds  No murmur heard  Pulmonary/Chest: Effort normal  No stridor  No respiratory distress  She has no wheezes  She has no rales  Abdominal: Soft  She exhibits no distension and no mass  There is no tenderness     Musculoskeletal:   There is test palpation over the left SI joint  There is tenderness over the left greater trochanter  Pain is reproducible with flexion at the hip on the left side  Straight leg raise is negative  There are no overlying skin changes, warmth, crepitus, or deformity   Neurological: She is alert and oriented to person, place, and time  No cranial nerve deficit  She exhibits normal muscle tone  Skin: Skin is warm and dry  No rash noted  No pallor         ED Medications  Medications   acetaminophen (TYLENOL) tablet 975 mg (975 mg Oral Given 1/20/20 1157)   bupivacaine (PF) (MARCAINE) 0 5 % injection 5 mL (5 mL Injection Given by Other 1/20/20 1330)   triamcinolone acetonide (KENALOG-40) 40 mg/mL injection 40 mg (40 mg Intra-articular Given 1/20/20 1201)   triamcinolone acetonide (KENALOG-40) 40 mg/mL injection 40 mg (40 mg Intra-articular Given by Other 1/20/20 1330)       Diagnostic Studies  Results Reviewed     Procedure Component Value Units Date/Time    Basic metabolic panel [[id number]]  (Abnormal) Collected:  01/20/20 1200    Lab Status:  Final result Specimen:  Blood from Arm, Left Updated:  01/20/20 1225     Sodium 144 mmol/L      Potassium 4 4 mmol/L      Chloride 113 mmol/L      CO2 24 mmol/L      ANION GAP 7 mmol/L      BUN 14 mg/dL      Creatinine 1 04 mg/dL      Glucose 92 mg/dL      Calcium 8 9 mg/dL      eGFR 53 ml/min/1 73sq m     Narrative:       [place] guidelines for Chronic Kidney Disease (CKD):     Stage 1 with normal or high GFR (GFR > 90 mL/min/1 73 square meters)    Stage 2 Mild CKD (GFR = 60-89 mL/min/1 73 square meters)    Stage 3A Moderate CKD (GFR = 45-59 mL/min/1 73 square meters)    Stage 3B Moderate CKD (GFR = 30-44 mL/min/1 73 square meters)    Stage 4 Severe CKD (GFR = 15-29 mL/min/1 73 square meters)    Stage 5 End Stage CKD (GFR <15 mL/min/1 73 square meters)  Note: GFR calculation is accurate only with a steady state creatinine    CBC and differential [[id number]]  (Abnormal) Collected:  01/20/20 1200    Lab Status:  Final result Specimen:  Blood from Arm, Left Updated:  01/20/20 1209     WBC 11 37 Thousand/uL      RBC 4 59 Million/uL      Hemoglobin 13 4 g/dL      Hematocrit 42 4 %      MCV 92 fL      MCH 29 2 pg      MCHC 31 6 g/dL      RDW 14 5 %      MPV 11 6 fL      Platelets 243 Thousands/uL      nRBC 0 /100 WBCs      Neutrophils Relative 74 %      Immat GRANS % 0 %      Lymphocytes Relative 18 %      Monocytes Relative 5 %      Eosinophils Relative 2 %      Basophils Relative 1 %      Neutrophils Absolute 8 44 Thousands/µL      Immature Grans Absolute 0 03 Thousand/uL      Lymphocytes Absolute 2 01 Thousands/µL      Monocytes Absolute 0 59 Thousand/µL      Eosinophils Absolute 0 23 Thousand/µL      Basophils Absolute 0 07 Thousands/µL                  CT lumbar spine without contrast   Final Result by Liliana Jason MD (01/20 1036)      Severe central canal narrowing at L4-5 level with associated facet joint disease, ligamentous hypertrophy  There is a broad-based left paracentral, central, right paracentral and right foraminal disc herniation  There is noted right and moderate to    severe left foraminal narrowing and correlate with left L4 and L5 radiculopathy      No acute compression collapse vertebra      On the previous study of February 2010 patient had a cystic mass in the left adnexa, this is not imaged on the current study  Correlate clinically for need of further evaluation with ultrasound, evaluate whether that mass has been removed or resolved   The study was marked in EPIC for immediate notification  Workstation performed: [id number]         XR hip/pelv 2-3 vws left if performed   Final Result by Amelia Burns MD (01/20 6498)      No acute osseous abnormality              Workstation performed: [id number]               Procedures  Procedures      ED Course                               MDM      Disposition  Final diagnoses:   Acute pain of left lower extremity   Herniation of left side of L4-L5 intervertebral disc   Trochanteric bursitis   Lower back pain     Time reflects when diagnosis was documented in both MDM as applicable and the Disposition within this note     Time User Action Codes Description Comment    1/20/2020  3:58 PM [place] Add [M79 605] Acute pain of left lower extremity     1/20/2020  3:58 PM [place] Add [M51 26] Herniation of left side of L4-L5 intervertebral disc     1/20/2020  3:59 PM [place] Add [M70 60] Trochanteric bursitis     1/20/2020  4:00 PM [place] Add [M54 5] Lower back pain       ED Disposition     ED Disposition Condition Date/Time Comment    Discharge Stable Mon Jan 20, 2020  3:58 PM Magui Selby discharge to home/self care              Follow-up Information     Follow up With Specialties Details Why Contact Info Additional [address] [facility]   [address] [address]  [address] [place] [address], [address] [place] [address], [facility], [place], [place], [address]   [facility]       Rowan Armas MD Neurosurgery Call  To schedule an appointment [place] [address]  [phone]             Discharge Medication List as of 1/20/2020  4:02 PM      CONTINUE these medications which have NOT CHANGED    Details   acetaminophen (TYLENOL) 500 mg tablet Take 1 tablet (500 mg total) by mouth every 6 (six) hours as needed for mild pain, Starting Mon 8/27/2018, Normal      albuterol (2 5 mg/3 mL) 0 083 % nebulizer solution Take 1 vial (2 5 mg total) by nebulization every 4 (four) hours as needed for wheezing or shortness of breath, Starting Thu 1/17/2019, Normal      aspirin (ECOTRIN LOW STRENGTH) 81 mg EC tablet Take 81 mg by mouth daily, Historical Med      bisoprolol-hydrochlorothiazide (ZIAC) 2 5-6 25 MG per tablet Take 1 tablet by mouth daily, Starting Thu 1/17/2019, Normal      Blood Pressure Monitoring (BLOOD PRESSURE CUFF) MISC by Does not apply route 2 (two) times a day, Starting Mon 7/9/2018, Print      fexofenadine (ALLEGRA) 30 MG tablet Take 180 mg by mouth daily  , Historical Med      fluticasone (FLONASE) 50 mcg/act nasal spray instill 2 sprays into each nostril once daily, Normal      fluticasone (FLOVENT HFA) 44 mcg/act inhaler Inhale 2 puffs 2 (two) times a day Rinse mouth after use , Starting Thu 1/17/2019, Normal      montelukast (SINGULAIR) 10 mg tablet Take 1 tablet (10 mg total) by mouth daily at bedtime, Starting Wed 5/16/2018, Normal      nicotine (NICODERM CQ) 14 mg/24hr TD 24 hr patch Place 1 patch on the skin every 24 hours, Starting Wed 5/16/2018, Normal      ondansetron (ZOFRAN-ODT) 4 mg disintegrating tablet dissolve 1 tablet ON TONGUE every 8 hours if needed for nausea and vomiting FOR UP TO 5 DAYS, Historical Med      VENTOLIN  (90 Base) MCG/ACT inhaler inhale 1 to 2 puffs by mouth every 4 to 6 hours ONLY FOR WHEEZING     (REFER TO PRESCRIPTION NOTES)  , Historical Med           No discharge procedures on file  ED Provider  Attending physically available and evaluated Rishabh Elena  HARPREET managed the patient along with the ED Attending      Electronically Signed by         Kike Bennett MD  01/20/20 7395

## 2020-01-21 ENCOUNTER — TRANSCRIBE ORDERS (OUTPATIENT)
Dept: NEUROSURGERY | Facility: CLINIC | Age: 75
End: 2020-01-21

## 2020-01-21 DIAGNOSIS — M54.50 LOWER BACK PAIN: Primary | ICD-10-CM

## 2020-01-23 ENCOUNTER — OFFICE VISIT (OUTPATIENT)
Dept: FAMILY MEDICINE CLINIC | Facility: CLINIC | Age: 75
End: 2020-01-23

## 2020-01-23 VITALS
WEIGHT: 132.8 LBS | BODY MASS INDEX: 22.67 KG/M2 | TEMPERATURE: 97.5 F | SYSTOLIC BLOOD PRESSURE: 160 MMHG | HEART RATE: 80 BPM | RESPIRATION RATE: 20 BRPM | HEIGHT: 64 IN | DIASTOLIC BLOOD PRESSURE: 110 MMHG

## 2020-01-23 DIAGNOSIS — I10 ESSENTIAL HYPERTENSION: ICD-10-CM

## 2020-01-23 DIAGNOSIS — J45.20 MILD INTERMITTENT ASTHMA, UNSPECIFIED WHETHER COMPLICATED: ICD-10-CM

## 2020-01-23 DIAGNOSIS — R73.03 PREDIABETES: ICD-10-CM

## 2020-01-23 DIAGNOSIS — M05.80 POLYARTHRITIS WITH POSITIVE RHEUMATOID FACTOR (HCC): ICD-10-CM

## 2020-01-23 DIAGNOSIS — Z12.39 SCREENING FOR BREAST CANCER: ICD-10-CM

## 2020-01-23 DIAGNOSIS — Z00.00 MEDICARE ANNUAL WELLNESS VISIT, INITIAL: Primary | ICD-10-CM

## 2020-01-23 DIAGNOSIS — Z12.31 SCREENING MAMMOGRAM, ENCOUNTER FOR: ICD-10-CM

## 2020-01-23 PROBLEM — J06.9 VIRAL UPPER RESPIRATORY TRACT INFECTION: Status: RESOLVED | Noted: 2018-10-05 | Resolved: 2020-01-23

## 2020-01-23 PROBLEM — Z01.818 PRE-OPERATIVE EXAMINATION: Status: RESOLVED | Noted: 2018-04-16 | Resolved: 2020-01-23

## 2020-01-23 PROBLEM — R42 DIZZINESS: Status: RESOLVED | Noted: 2019-01-17 | Resolved: 2020-01-23

## 2020-01-23 PROCEDURE — 3008F BODY MASS INDEX DOCD: CPT | Performed by: FAMILY MEDICINE

## 2020-01-23 PROCEDURE — 1160F RVW MEDS BY RX/DR IN RCRD: CPT | Performed by: FAMILY MEDICINE

## 2020-01-23 PROCEDURE — 1101F PT FALLS ASSESS-DOCD LE1/YR: CPT | Performed by: FAMILY MEDICINE

## 2020-01-23 PROCEDURE — 3725F SCREEN DEPRESSION PERFORMED: CPT | Performed by: FAMILY MEDICINE

## 2020-01-23 PROCEDURE — 3288F FALL RISK ASSESSMENT DOCD: CPT | Performed by: FAMILY MEDICINE

## 2020-01-23 PROCEDURE — 1170F FXNL STATUS ASSESSED: CPT | Performed by: FAMILY MEDICINE

## 2020-01-23 PROCEDURE — G0439 PPPS, SUBSEQ VISIT: HCPCS | Performed by: FAMILY MEDICINE

## 2020-01-23 PROCEDURE — 1125F AMNT PAIN NOTED PAIN PRSNT: CPT | Performed by: FAMILY MEDICINE

## 2020-01-23 RX ORDER — OXYMETAZOLINE HYDROCHLORIDE 0.05 G/100ML
2 SPRAY NASAL 2 TIMES DAILY
COMMUNITY
End: 2020-03-03 | Stop reason: ALTCHOICE

## 2020-01-23 RX ORDER — BISOPROLOL FUMARATE AND HYDROCHLOROTHIAZIDE 2.5; 6.25 MG/1; MG/1
1 TABLET ORAL DAILY
Qty: 30 TABLET | Refills: 5 | Status: SHIPPED | OUTPATIENT
Start: 2020-01-23 | End: 2020-01-23

## 2020-01-23 RX ORDER — ALBUTEROL SULFATE 2.5 MG/3ML
2.5 SOLUTION RESPIRATORY (INHALATION) EVERY 4 HOURS PRN
Qty: 75 VIAL | Refills: 0 | Status: SHIPPED | OUTPATIENT
Start: 2020-01-23 | End: 2021-08-25 | Stop reason: SDUPTHER

## 2020-01-23 RX ORDER — LISINOPRIL AND HYDROCHLOROTHIAZIDE 12.5; 1 MG/1; MG/1
1 TABLET ORAL DAILY
Qty: 30 TABLET | Refills: 5 | Status: SHIPPED | OUTPATIENT
Start: 2020-01-23 | End: 2020-03-03

## 2020-01-23 NOTE — PATIENT INSTRUCTIONS
Medicare Preventive Visit Patient Instructions  Thank you for completing your Welcome to Medicare Visit or Medicare Annual Wellness Visit today  Your next wellness visit will be due in one year (1/23/2021)  The screening/preventive services that you may require over the next 5-10 years are detailed below  Some tests may not apply to you based off risk factors and/or age  Screening tests ordered at today's visit but not completed yet may show as past due  Also, please note that scanned in results may not display below  Preventive Screenings:  Service Recommendations Previous Testing/Comments   Colorectal Cancer Screening  * Colonoscopy    * Fecal Occult Blood Test (FOBT)/Fecal Immunochemical Test (FIT)  * Fecal DNA/Cologuard Test  * Flexible Sigmoidoscopy Age: 54-65 years old   Colonoscopy: every 10 years (may be performed more frequently if at higher risk)  OR  FOBT/FIT: every 1 year  OR  Cologuard: every 3 years  OR  Sigmoidoscopy: every 5 years  Screening may be recommended earlier than age 48 if at higher risk for colorectal cancer  Also, an individualized decision between you and your healthcare provider will decide whether screening between the ages of 74-80 would be appropriate  Colonoscopy: 03/25/2005  FOBT/FIT: Not on file  Cologuard: Not on file  Sigmoidoscopy: Not on file         Breast Cancer Screening Age: 36 years old  Frequency: every 1-2 years  Not required if history of left and right mastectomy Mammogram: 01/10/2017       Cervical Cancer Screening Between the ages of 21-29, pap smear recommended once every 3 years  Between the ages of 33-67, can perform pap smear with HPV co-testing every 5 years     Recommendations may differ for women with a history of total hysterectomy, cervical cancer, or abnormal pap smears in past  Pap Smear: Not on file       Hepatitis C Screening Once for adults born between [facility]  More frequently in patients at high risk for Hepatitis C Hep C Antibody: Not on file       Diabetes Screening 1-2 times per year if you're at risk for diabetes or have pre-diabetes Fasting glucose: 100 mg/dL   A1C: 6 4 %       Cholesterol Screening Once every 5 years if you don't have a lipid disorder  May order more often based on risk factors  Lipid panel: 12/13/2016         Other Preventive Screenings Covered by Medicare:  1  Abdominal Aortic Aneurysm (AAA) Screening: covered once if your at risk  You're considered to be at risk if you have a family history of AAA  2  Lung Cancer Screening: covers low dose CT scan once per year if you meet all of the following conditions: (1) Age 50-69; (2) No signs or symptoms of lung cancer; (3) Current smoker or have quit smoking within the last 15 years; (4) You have a tobacco smoking history of at least 30 pack years (packs per day multiplied by number of years you smoked); (5) You get a written order from a healthcare provider  3  Glaucoma Screening: covered annually if you're considered high risk: (1) You have diabetes OR (2) Family history of glaucoma OR (3)  aged 48 and older OR (3)  American aged 72 and older  3  Osteoporosis Screening: covered every 2 years if you meet one of the following conditions: (1) You're estrogen deficient and at risk for osteoporosis based off medical history and other findings; (2) Have a vertebral abnormality; (3) On glucocorticoid therapy for more than 3 months; (4) Have primary hyperparathyroidism; (5) On osteoporosis medications and need to assess response to drug therapy  · Last bone density test (DXA Scan): Not on file  5  HIV Screening: covered annually if you're between the age of 12-76  Also covered annually if you are younger than 13 and older than 72 with risk factors for HIV infection  For pregnant patients, it is covered up to 3 times per pregnancy      Immunizations:  Immunization Recommendations   Influenza Vaccine Annual influenza vaccination during flu season is recommended for all persons aged >= 6 months who do not have contraindications   Pneumococcal Vaccine (Prevnar and Pneumovax)  * Prevnar = PCV13  * Pneumovax = PPSV23   Adults 25-60 years old: 1-3 doses may be recommended based on certain risk factors  Adults 72 years old: Prevnar (PCV13) vaccine recommended followed by Pneumovax (PPSV23) vaccine  If already received PPSV23 since turning 65, then PCV13 recommended at least one year after PPSV23 dose  Hepatitis B Vaccine 3 dose series if at intermediate or high risk (ex: diabetes, end stage renal disease, liver disease)   Tetanus (Td) Vaccine - COST NOT COVERED BY MEDICARE PART B Following completion of primary series, a booster dose should be given every 10 years to maintain immunity against tetanus  Td may also be given as tetanus wound prophylaxis  Tdap Vaccine - COST NOT COVERED BY MEDICARE PART B Recommended at least once for all adults  For pregnant patients, recommended with each pregnancy  Shingles Vaccine (Shingrix) - COST NOT COVERED BY MEDICARE PART B  2 shot series recommended in those aged 48 and above     Health Maintenance Due:      Topic Date Due    Hepatitis C Screening  1945    CRC Screening: Colonoscopy  03/25/2015    MAMMOGRAM  01/10/2019     Immunizations Due:      Topic Date Due    Pneumococcal Vaccine: 65+ Years (1 of 2 - PCV13) 05/05/2011     Advance Directives   What are advance directives? Advance directives are legal documents that state your wishes and plans for medical care  These plans are made ahead of time in case you lose your ability to make decisions for yourself  Advance directives can apply to any medical decision, such as the treatments you want, and if you want to donate organs  What are the types of advance directives? There are many types of advance directives, and each state has rules about how to use them  You may choose a combination of any of the following:  · Living will:   This is a written record of the treatment you want  You can also choose which treatments you do not want, which to limit, and which to stop at a certain time  This includes surgery, medicine, IV fluid, and tube feedings  · Durable power of  for healthcare [facility]): This is a written record that states who you want to make healthcare choices for you when you are unable to make them for yourself  This person, called a proxy, is usually a family member or a friend  You may choose more than 1 proxy  · Do not resuscitate (DNR) order:  A DNR order is used in case your heart stops beating or you stop breathing  It is a request not to have certain forms of treatment, such as CPR  A DNR order may be included in other types of advance directives  · Medical directive: This covers the care that you want if you are in a coma, near death, or unable to make decisions for yourself  You can list the treatments you want for each condition  Treatment may include pain medicine, surgery, blood transfusions, dialysis, IV or tube feedings, and a ventilator (breathing machine)  · Values history: This document has questions about your views, beliefs, and how you feel and think about life  This information can help others choose the care that you would choose  Why are advance directives important? An advance directive helps you control your care  Although spoken wishes may be used, it is better to have your wishes written down  Spoken wishes can be misunderstood, or not followed  Treatments may be given even if you do not want them  An advance directive may make it easier for your family to make difficult choices about your care  Cigarette Smoking and Your Health   Risks to your health if you smoke:  Nicotine and other chemicals found in tobacco damage every cell in your body  Even if you are a light smoker, you have an increased risk for cancer, heart disease, and lung disease   If you are pregnant or have diabetes, smoking increases your risk for complications  Benefits to your health if you stop smoking:   · You decrease respiratory symptoms such as coughing, wheezing, and shortness of breath  · You reduce your risk for cancers of the lung, mouth, throat, kidney, bladder, pancreas, stomach, and cervix  If you already have cancer, you increase the benefits of chemotherapy  You also reduce your risk for cancer returning or a second cancer from developing  · You reduce your risk for heart disease, blood clots, heart attack, and stroke  · You reduce your risk for lung infections, and diseases such as pneumonia, asthma, chronic bronchitis, and emphysema  · Your circulation improves  More oxygen can be delivered to your body  If you have diabetes, you lower your risk for complications, such as kidney, artery, and eye diseases  You also lower your risk for nerve damage  Nerve damage can lead to amputations, poor vision, and blindness  · You improve your body's ability to heal and to fight infections  For more information and support to stop smoking:   · [place]  gov  Phone: 5- 228 - 044-7586  Web Address: www [employer]  [address] [place], [place] [address]amanda 2018 Information is for End User's use only and may not be sold, redistributed or otherwise used for commercial purposes   All illustrations and images included in CareNotes® are the copyrighted property of [employer]  or [address] [place] [address] [employer]

## 2020-01-23 NOTE — PROGRESS NOTES
Assessment/Plan:    Prediabetes  Will check HGBA1C    Mild intermittent asthma  Stable  Pt using albuterol inhaler few times/month  Refill albuterol neb solution and inhaler    Essential hypertension  Add lisinopril 10mg to hctz 12 5mg po qd  Advised to check blood pressure regularly record readings and bring readings to next visit  Discussed following a low-sodium diet    Medicare annual wellness visit, initial  Order given for mammogram  [facility] check lipid panel  HGBA1c  Send for CT lung screening  Decline flu, shingles, and pneumovax vaccines at this time  Declined referral to colonoscopy at this time    Polyarthritis with positive rheumatoid factor ([place] [id number] )  H/o + RF has never been seen by rheumatology  Refer to rheumatology      Diagnoses and all orders for this visit:    Medicare annual wellness visit, initial  -     Mammo screening bilateral w cad; Future  -     Lipid panel; Future    Essential hypertension  -     Lipid panel; Future  -     Discontinue: bisoprolol-hydrochlorothiazide (ZIAC) 2 5-6 25 MG per tablet; Take 1 tablet by mouth daily  -     lisinopril-hydrochlorothiazide (PRINZIDE,ZESTORETIC) 10-12 5 MG per tablet; Take 1 tablet by mouth daily    Mild intermittent asthma, unspecified whether complicated  -     albuterol (2 5 mg/3 mL) 0 083 % nebulizer solution; Take 1 vial (2 5 mg total) by nebulization every 4 (four) hours as needed for wheezing or shortness of breath  -     VENTOLIN  (90 Base) MCG/ACT inhaler; Inhale 2 puffs every 6 (six) hours as needed for wheezing  -     CT lung screening program; Future    Screening for breast cancer  -     Mammo screening bilateral w cad; Future    Prediabetes  -     Hemoglobin A1C; Future    Polyarthritis with positive rheumatoid factor ([place] [id number] )  -     Ambulatory referral to Rheumatology;  Future    Screening mammogram, encounter for    Other orders  -     oxymetazoline (AFRIN) 0 05 % nasal spray; 2 sprays by Each Nare route 2 (two) times a day        Subjective:      Patient ID: Cristi Bonilla is a 69 y o  female  HPI  New pt here to today for Medicare annual wellness visit and HTN  She has a medical h/o hypertension, seasonal allergies, asthma, migraines, GERD, fibromyalgia, IBS, osteoarthritis, chronic diarrhea, and history of panic attacks  She was seen at [facility] ED on 1/20/20 for Left leg pain x 3 days  Ct scan L-spine revealed   Severe central canal narrowing at L4-5 level with associated facet joint disease, ligamentous hypertrophy  There is a broad-based left paracentral, central, right paracentral and right foraminal disc herniation  There is noted right and moderate to   severe left foraminal narrowing and correlate with left L4 and L5 radiculopathy  No acute compression collapse vertebra    She was given a steroid injection while in the ED then discharged home on Tylenol and referred home with strict return precautions  Has appt scheduled to see Neurosurgery on 1/27/2020  She continue with pain to lower back radiating to left leg  She reports no change since ED discharge  Denies any fevers, chills, incontinence of b/b, or saddle paresthesia  Pt taking HCTZ 25 mg 1/2 tab daily  Advised to take 1/2 tab d/t decrease K+  Does not check her blood pressure  Denies any dizziness, headaches, or visual changes  Started smoking as a teenager 1PPD  Has been smoking >30 years  The following portions of the patient's history were reviewed and updated as appropriate: allergies, current medications, past family history, past medical history, past social history, past surgical history and problem list     Review of Systems   Constitutional: Negative for chills, fatigue and fever  Respiratory: Negative for cough, chest tightness, shortness of breath and wheezing  Cardiovascular: Negative for chest pain and palpitations  Gastrointestinal: Negative for abdominal pain, constipation, nausea and vomiting     Genitourinary: Negative for difficulty urinating  Musculoskeletal: Positive for arthralgias (chronic) and back pain  Negative for myalgias, neck pain and neck stiffness  Skin: Negative for rash and wound  Neurological: Positive for numbness  Negative for dizziness, weakness, light-headedness and headaches  Psychiatric/Behavioral: Negative for agitation and behavioral problems  The patient is not nervous/anxious  Objective:      BP (!) 160/110 (BP Location: Left arm, Patient Position: Sitting, Cuff Size: Standard)   Pulse 80   Temp 97 5 °F (36 4 °C) (Tympanic)   Resp 20   Ht 5' 3 7" (1 618 m)   Wt 60 2 kg (132 lb 12 8 oz)   BMI 23 01 kg/m²          Physical Exam   Constitutional: She is oriented to person, place, and time  She appears well-developed and well-nourished  No distress  HENT:   Head: Normocephalic and atraumatic  Neck: Normal range of motion  Neck supple  No tracheal deviation present  Cardiovascular: Normal rate, regular rhythm and normal heart sounds  No murmur heard  Pulmonary/Chest: Effort normal and breath sounds normal  No respiratory distress  She has no wheezes  Musculoskeletal: She exhibits no edema or deformity  Neurological: She is alert and oriented to person, place, and time  Gait abnormal    Limping d/t leg/back pain   Psychiatric: She has a normal mood and affect  Her behavior is normal  Thought content normal         Assessment and Plan:     Problem List Items Addressed This Visit        Respiratory    Mild intermittent asthma (Chronic)     Stable   Pt using albuterol inhaler few times/month  Refill albuterol neb solution and inhaler         Relevant Medications    albuterol (2 5 mg/3 mL) 0 083 % nebulizer solution    VENTOLIN  (90 Base) MCG/ACT inhaler    Other Relevant Orders    CT lung screening program       Cardiovascular and Mediastinum    Essential hypertension     Add lisinopril 10mg to hctz 12 5mg po qd  Advised to check blood pressure regularly record readings and bring readings to next visit  Discussed following a low-sodium diet         Relevant Medications    lisinopril-hydrochlorothiazide (PRINZIDE,ZESTORETIC) 10-12 5 MG per tablet    Other Relevant Orders    Lipid panel       Musculoskeletal and Integument    Polyarthritis with positive rheumatoid factor (HCC)     H/o + RF has never been seen by rheumatology  Refer to rheumatology         Relevant Orders    Ambulatory referral to Rheumatology       Other    Screening mammogram, encounter for    Prediabetes     Will check HGBA1C         Relevant Orders    Hemoglobin A1C    Medicare annual wellness visit, initial - Primary     Order given for mammogram  [facility] check lipid panel  HGBA1c  Send for CT lung screening  Decline flu, shingles, and pneumovax vaccines at this time  Declined referral to colonoscopy at this time         Relevant Orders    Mammo screening bilateral w cad    Lipid panel      Other Visit Diagnoses     Screening for breast cancer        Relevant Orders    Mammo screening bilateral w cad           Preventive health issues were discussed with patient, and age appropriate screening tests were ordered as noted in patient's After Visit Summary  Personalized health advice and appropriate referrals for health education or preventive services given if needed, as noted in patient's After Visit Summary       History of Present Illness:     Patient presents for Medicare Annual Wellness visit    Patient Care Team:  Rigoberto Keita PA-C as PCP - General (Family Medicine)  Herrera Andre MD     Problem List:     Patient Active Problem List   Diagnosis    Rupture of ulnar collateral ligament of right thumb    Primary osteoarthritis of first carpometacarpal joint of right hand    Mild intermittent asthma    Other chronic sinusitis    Screening mammogram, encounter for    Cigarette nicotine dependence without complication    Seasonal allergies    Trigger finger, right index finger    Panic attack    Essential hypertension    Breast cyst, right    Classic migraine with aura    Deficient knowledge    GERD (gastroesophageal reflux disease)    Renal cyst    Trigger finger of right thumb    Abnormal EKG    Prediabetes    Intermittent palpitations    Chronic left hip pain    Trigger finger, right little finger    Intersection syndrome    Polyarthritis with positive rheumatoid factor ([address] )    Medicare annual wellness visit, initial      Past Medical and Surgical History:     Past Medical History:   Diagnosis Date    Asthma     Asthmatic bronchitis     Last Assessed: 10/7/2014     Chronic diarrhea     Last Assessed: 8/20/2015     Fibromyalgia     Focal nodular hyperplasia of liver     Last Assessed: 6/11/2015    Herpes zoster     Last Assessed: 3/18/2016    Hypertension     IBS (irritable bowel syndrome)     Intermittent palpitations     Irritable bowel syndrome     Osteoarthritis     Vertigo      Past Surgical History:   Procedure Laterality Date    BREAST BIOPSY      SMALL INTESTINE SURGERY        Family History:     Family History   Problem Relation Age of Onset    Heart attack Mother     Other Mother         Aspiration of Vomit     Asthma Father     Breast cancer Sister     Arthritis Family     Other Family         Musculoskeletal Disease     Osteoporosis Family       Social History:     Social History     Socioeconomic History    Marital status:      Spouse name: None    Number of children: None    Years of education: None    Highest education level: None   Occupational History    Occupation: Unemployed    Social Needs    Financial resource strain: None    Food insecurity:     Worry: None     Inability: None    Transportation needs:     Medical: None     Non-medical: None   Tobacco Use    Smoking status: Current Every Day Smoker     Packs/day: 0 50     Types: Cigarettes    Smokeless tobacco: Never Used   Substance and Sexual Activity    Alcohol use: No    Drug use:  No  Sexual activity: Not Currently   Lifestyle    Physical activity:     Days per week: None     Minutes per session: None    Stress: None   Relationships    Social connections:     Talks on phone: None     Gets together: None     Attends [employer] service: None     Active member of club or organization: None     Attends meetings of clubs or organizations: None     Relationship status: None    Intimate partner violence:     Fear of current or ex partner: None     Emotionally abused: None     Physically abused: None     Forced sexual activity: None   Other Topics Concern    None   Social History Narrative    Mental Disability     Exercising Regularly     Lives with adult children        Medications and Allergies:     Current Outpatient Medications   Medication Sig Dispense Refill    acetaminophen (TYLENOL) 500 mg tablet Take 1 tablet (500 mg total) by mouth every 6 (six) hours as needed for mild pain 60 tablet 0    albuterol (2 5 mg/3 mL) 0 083 % nebulizer solution Take 1 vial (2 5 mg total) by nebulization every 4 (four) hours as needed for wheezing or shortness of breath 75 vial 0    Blood Pressure Monitoring (BLOOD PRESSURE CUFF) MISC by Does not apply route 2 (two) times a day 1 each 0    ondansetron (ZOFRAN-ODT) 4 mg disintegrating tablet dissolve 1 tablet ON TONGUE every 8 hours if needed for nausea and vomiting FOR UP TO 5 DAYS  0    oxymetazoline (AFRIN) 0 05 % nasal spray 2 sprays by Each Nare route 2 (two) times a day      VENTOLIN  (90 Base) MCG/ACT inhaler Inhale 2 puffs every 6 (six) hours as needed for wheezing 1 Inhaler 3    lisinopril-hydrochlorothiazide (PRINZIDE,ZESTORETIC) 10-12 5 MG per tablet Take 1 tablet by mouth daily 30 tablet 5     No current facility-administered medications for this visit        Allergies   Allergen Reactions    Ambrosia Artemisiifolia (Ragweed) Skin Test Facial Swelling    Codeine Other (See Comments)     Heart races    Epinephrine      Pt reports "it makes my heart race, they told me I cant take it "    Ibuprofen Other (See Comments)     Heart racing    Morphine Other (See Comments)     Heart racing    Pollen Extract Sneezing    Tramadol Other (See Comments)     Heart racing      Immunizations:     Immunization History   Administered Date(s) Administered    Pneumococcal Polysaccharide PPV23 01/01/2003, 10/01/2008    Tdap 06/08/2018      Health Maintenance:         Topic Date Due    MAMMOGRAM  01/10/2019    CRC Screening: Colonoscopy  02/23/2020 (Originally 3/25/2015)    Hepatitis C Screening  01/23/2021 (Originally 1945)         Topic Date Due    Pneumococcal Vaccine: 65+ Years (1 of 2 - PCV13) 05/05/2011      Medicare Health Risk Assessment:     BP (!) 160/110 (BP Location: Left arm, Patient Position: Sitting, Cuff Size: Standard)   Pulse 80   Temp 97 5 °F (36 4 °C) (Tympanic)   Resp 20   Ht 5' 3 7" (1 618 m)   Wt 60 2 kg (132 lb 12 8 oz)   BMI 23 01 kg/m²      Joseph Hooper is here for her Initial Wellness visit  Health Risk Assessment:   Patient rates overall health as fair  Patient feels that their physical health rating is much worse  Eyesight was rated as same  Hearing was rated as same  Patient feels that their emotional and mental health rating is same  Pain experienced in the last 7 days has been a lot  Patient's pain rating has been 10/10  Patient states that she has experienced no weight loss or gain in last 6 months  Depression Screening:   PHQ-2 Score: 0      Fall Risk Screening: In the past year, patient has experienced: no history of falling in past year      Urinary Incontinence Screening:   Patient has not leaked urine accidently in the last six months  Home Safety:  Patient has trouble with stairs inside or outside of their home  Patient has working smoke alarms and has working carbon monoxide detector  Home safety hazards include: none  Nutrition:   Current diet is Other (please comment)  Had small bowel surgery  Has to limit several foods    Medications:   Patient is currently taking over-the-counter supplements  OTC medications include: see medication list  Patient is able to manage medications  ASA   Afrin    Activities of Daily Living (ADLs)/Instrumental Activities of Daily Living (IADLs):   Walk and transfer into and out of bed and chair?: No  Dress and groom yourself?: No    Bathe or shower yourself?: No    Feed yourself? No  Do your laundry/housekeeping?: No  Manage your money, pay your bills and track your expenses?: No  Make your own meals?: No    Do your own shopping?: No    Previous Hospitalizations:   Any hospitalizations or ED visits within the last 12 months?: No      Advance Care Planning:   Living will: Yes    Advanced directive: Yes      Cognitive Screening:   Provider or family/friend/caregiver concerned regarding cognition?: No    PREVENTIVE SCREENINGS      Cardiovascular Screening:    General: Screening Current    Due for: Lipid Panel      Diabetes Screening:     General: Screening Current      Colorectal Cancer Screening:       Due for: Colonoscopy - Low Risk      Breast Cancer Screening:       Due for: Mammogram        Cervical Cancer Screening:    General: Screening Not Indicated      Osteoporosis Screening:      Due for: DXA Axial      Abdominal Aortic Aneurysm (AAA) Screening:        General: Patient Declines      Lung Cancer Screening:       Due for: Low Dose CT (LDCT)      Hepatitis C Screening:    General: Patient Declines    Other Counseling Topics:   Calcium and vitamin D intake and regular weightbearing exercise         Halle Kelly PA-C

## 2020-01-23 NOTE — ASSESSMENT & PLAN NOTE
Order given for mammogram  [facility] check lipid panel   HGBA1c  Send for CT lung screening  Decline flu, shingles, and pneumovax vaccines at this time  Declined referral to colonoscopy at this time

## 2020-01-23 NOTE — ASSESSMENT & PLAN NOTE
Add lisinopril 10mg to hctz 12 5mg po qd  Advised to check blood pressure regularly record readings and bring readings to next visit  Discussed following a low-sodium diet

## 2020-01-27 ENCOUNTER — CONSULT (OUTPATIENT)
Dept: NEUROSURGERY | Facility: CLINIC | Age: 75
End: 2020-01-27
Payer: COMMERCIAL

## 2020-01-27 VITALS
TEMPERATURE: 98.5 F | RESPIRATION RATE: 16 BRPM | HEIGHT: 60 IN | DIASTOLIC BLOOD PRESSURE: 108 MMHG | HEART RATE: 92 BPM | BODY MASS INDEX: 25.29 KG/M2 | SYSTOLIC BLOOD PRESSURE: 160 MMHG | WEIGHT: 128.8 LBS

## 2020-01-27 DIAGNOSIS — M54.50 LOWER BACK PAIN: ICD-10-CM

## 2020-01-27 DIAGNOSIS — M54.42 ACUTE LEFT-SIDED LOW BACK PAIN WITH LEFT-SIDED SCIATICA: Primary | ICD-10-CM

## 2020-01-27 PROCEDURE — 99204 OFFICE O/P NEW MOD 45 MIN: CPT | Performed by: PHYSICIAN ASSISTANT

## 2020-01-27 RX ORDER — GABAPENTIN 100 MG/1
100 CAPSULE ORAL
Qty: 30 CAPSULE | Refills: 0 | Status: SHIPPED | OUTPATIENT
Start: 2020-01-27 | End: 2020-02-24

## 2020-01-27 NOTE — ASSESSMENT & PLAN NOTE
10 day history of LLE radiculopathy in L5/S1 distrubution without preceding injury  · ED visit on 1/20/20 - s/p left hip injection with no relief    Imaging:  · CT lumbar spine w/o, 1/20/20: Severe central canal narrowing at L4-5 level with associated facet joint disease, ligamentous hypertrophy  There is a broad-based left paracentral, central, right paracentral and right foraminal disc herniation    There is noted right and moderate to severe left foraminal narrowing and correlate with left L4 and L5 radiculopathy    Plan:  · MRI lumbar spine to better visualize disc herniation for possible surgical planning  · Lumbar xrays with flexion/extension views to look for spondylolisthesis/instability  · Gabapentin 100mg at bedtime; titrate to three times daily if well tolerated in 1 week  · PT not advised at this time due to severity of pain  · Return after imaging to discuss further treatment options

## 2020-01-27 NOTE — PATIENT INSTRUCTIONS
MRI lumbar spine  Xrays    Gabapentin 100mg take at night before bed  If well tolerated, increase to three times daily in 1 week    Return after MRI

## 2020-01-27 NOTE — PROGRESS NOTES
Assessment/Plan:    Acute left-sided low back pain with left-sided sciatica  10 day history of LLE radiculopathy in L5/S1 distrubution without preceding injury  · ED visit on 1/20/20 - s/p left hip injection with no relief    Imaging:  · CT lumbar spine w/o, 1/20/20: Severe central canal narrowing at L4-5 level with associated facet joint disease, ligamentous hypertrophy  There is a broad-based left paracentral, central, right paracentral and right foraminal disc herniation  There is noted right and moderate to severe left foraminal narrowing and correlate with left L4 and L5 radiculopathy    Plan:  · MRI lumbar spine to better visualize disc herniation for possible surgical planning  · Lumbar xrays with flexion/extension views to look for spondylolisthesis/instability  · Gabapentin 100mg at bedtime; titrate to three times daily if well tolerated in 1 week  · PT not advised at this time due to severity of pain  · Return after imaging to discuss further treatment options       Diagnoses and all orders for this visit:    Acute left-sided low back pain with left-sided sciatica  -     MRI lumbar spine without contrast; Future  -     X-ray lumbar spine flexion and extension only 4+ views; Future  -     gabapentin (NEURONTIN) 100 mg capsule; Take 1 capsule (100 mg total) by mouth daily at bedtime    Lower back pain  -     Ambulatory referral to Neurosurgery  -     MRI lumbar spine without contrast; Future  -     X-ray lumbar spine flexion and extension only 4+ views; Future  -     gabapentin (NEURONTIN) 100 mg capsule; Take 1 capsule (100 mg total) by mouth daily at bedtime          Subjective:      Patient ID: Eunice Drake is a 69 y o  female  This is a 70 yo female with a PMH significant for asthma, fibromyalgia, irregular heat beat, chronic sinusitis, IBS who developed an acute onset of LLE pain on 1/20/20 without a prior injury  She denies back pain   She states the pain shoots from her left hip down her posterior thigh and calf  Her foot is not involved  She rates it at a 8-9/10 and is worse with activity, better with sitting down  She describes it as an electric shock sensation  She is currently taking aspirin for the pain which does not help  She did go to the ED on 1/20/20 and got a left hip injection, which did not help  She states that she does get back pain with certain movements of her leg, prompting the ED physician to work up back pain as well as hip pain  Ct lumbar spine showed severe left NF narrowing at L4-5 and L5-S1 as well as a left paracentral disc herniation  She denies fever, chills, malaise, urinary or bowel incontinence  She denies weakness, numbness, or RLE symptoms  She has tried PT in the past for another condition, but is only allowed to do aqua therapy due to her fibromyalgia  The following portions of the patient's history were reviewed and updated as appropriate: allergies, current medications, past family history, past medical history, past social history, past surgical history and problem list     Review of Systems   Constitutional: Negative  HENT:        Chronic sinus condition    Eyes: Negative  Respiratory: Negative  Cardiovascular: Negative  Gastrointestinal: Positive for constipation  Endocrine: Negative  Genitourinary: Negative  Musculoskeletal: Positive for back pain (Left sided LB radiates down left leg ) and gait problem (Painful )  Skin: Negative  Allergic/Immunologic: Negative  Hematological: Negative  Psychiatric/Behavioral: Negative  All other systems reviewed and are negative        ROS was personally reviewed and changes made as needed     Objective:      BP (!) 160/108 (BP Location: Left arm, Patient Position: Sitting, Cuff Size: Adult)   Pulse 92   Temp 98 5 °F (36 9 °C) (Oral)   Resp 16   Ht 5' 0 37" (1 533 m)   Wt 58 4 kg (128 lb 12 8 oz)   BMI 24 85 kg/m²          Physical Exam   Constitutional: She is oriented to person, place, and time  She appears well-developed and well-nourished  HENT:   Head: Normocephalic and atraumatic  Eyes: Pupils are equal, round, and reactive to light  EOM are normal    Neck: Normal range of motion  Cardiovascular: Normal rate  Pulmonary/Chest: Effort normal  No respiratory distress  Abdominal: Soft  Musculoskeletal: Normal range of motion  Neurological: She is alert and oriented to person, place, and time  She has normal strength  She displays no atrophy and no tremor  No cranial nerve deficit or sensory deficit  She exhibits normal muscle tone  Gait (antalgic) abnormal  Coordination normal  GCS eye subscore is 4  GCS verbal subscore is 5  GCS motor subscore is 6  Reflex Scores:       Tricep reflexes are 2+ on the right side and 2+ on the left side  Bicep reflexes are 2+ on the right side and 2+ on the left side  Brachioradialis reflexes are 2+ on the right side and 2+ on the left side  Patellar reflexes are 2+ on the right side and 2+ on the left side  Achilles reflexes are 2+ on the right side and 2+ on the left side  +TTP left lumbar paraspinal muscles  LLE shooting pain with lumbar twisting, no pain with flexion/extension  5/5 strength BLE  SILT bilaterally  Reflexes 2+ bilaterally   Skin: Skin is warm and dry  Psychiatric: She has a normal mood and affect  Her behavior is normal  Judgment and thought content normal    Nursing note and vitals reviewed  Imaging:  Xr Hip/pelv 2-3 Vws Left If Performed    Result Date: 1/20/2020  Narrative: LEFT HIP INDICATION:   left hip pain  COMPARISON:  9/14/2005 VIEWS:  XR HIP/PELV 2-3 VWS LEFT  W PELVIS IF PERFORMED FINDINGS: There is no acute fracture or dislocation  No significant hip degenerative changes  No lytic or blastic osseous lesions  Soft tissues are unremarkable  The visualized lumbar spine is unremarkable  Impression: No acute osseous abnormality   Workstation performed: [id number]     Ct Lumbar Spine Without Contrast    Result Date: 1/20/2020  Narrative: CT LUMBAR SPINE INDICATION:   left paraspine lumbar pain  COMPARISON: None  TECHNIQUE:  Contiguous axial images through the lumbar spine were obtained  Sagittal and coronal reconstructions were performed  Radiation dose length product (DLP) for this visit:  442 77 mGy-cm   This examination, like all CT scans performed in the [facility], was performed utilizing techniques to minimize radiation dose exposure, including the use of iterative  reconstruction and automated exposure control  IMAGE QUALITY:  Diagnostic  FINDINGS: ALIGNMENT:  There are 5 lumbar type vertebral bodies  Normal alignment of the lumbar spine  No spondylolisthesis or spondylolysis  VERTEBRAL BODIES:  No fracture  No lytic or blastic lesion  DEGENERATIVE CHANGES: Lower Thoracic spine:  Normal lower thoracic disc spaces ] L1-2:  Normal disc height  No herniation  Normal facet joints  No canal or foraminal stenosis  L2-3:  Appears unremarkable L3-4:  Demonstrates mild facet joint disease with mild disc bulge with mild central canal narrowing  There is no significant foraminal narrowing L4-5:  Demonstrates ligamentous hypertrophy, facet joint disease  There is broad-based left paracentral, right paracentral, right foraminal disc herniation  There is effacement of the anterior epidural fat  There is mild anterolisthesis of L4 over 5  There is severe central canal narrowing there is moderate  to severe left and mild right foraminal narrowing L5-S1:  Demonstrates disc bulge  There is facet joint disease and there is mild bilateral foraminal narrowing PARASPINAL SOFT TISSUES:  Right renal cysts are noted Rounded density seen within the pelvis on the right side in image 19 series 2 suggests partly imaged uterus    Impression: Severe central canal narrowing at L4-5 level with associated facet joint disease, ligamentous hypertrophy    There is a broad-based left paracentral, central, right paracentral and right foraminal disc herniation  There is noted right and moderate to severe left foraminal narrowing and correlate with left L4 and L5 radiculopathy No acute compression collapse vertebra On the previous study of February 2010 patient had a cystic mass in the left adnexa, this is not imaged on the current study  Correlate clinically for need of further evaluation with ultrasound, evaluate whether that mass has been removed or resolved The study was marked in EPIC for immediate notification   Workstation performed: [id number]

## 2020-02-03 ENCOUNTER — HOSPITAL ENCOUNTER (OUTPATIENT)
Dept: RADIOLOGY | Facility: HOSPITAL | Age: 75
Discharge: HOME | End: 2020-02-03
Payer: COMMERCIAL

## 2020-02-03 ENCOUNTER — TRANSCRIBE ORDERS (OUTPATIENT)
Dept: RADIOLOGY | Facility: HOSPITAL | Age: 75
End: 2020-02-03

## 2020-02-03 DIAGNOSIS — M54.50 LOWER BACK PAIN: ICD-10-CM

## 2020-02-03 DIAGNOSIS — M54.42 ACUTE LEFT-SIDED LOW BACK PAIN WITH LEFT-SIDED SCIATICA: ICD-10-CM

## 2020-02-03 PROCEDURE — 72148 MRI LUMBAR SPINE W/O DYE: CPT

## 2020-02-03 PROCEDURE — 72120 X-RAY BEND ONLY L-S SPINE: CPT

## 2020-02-07 ENCOUNTER — DOCUMENTATION (OUTPATIENT)
Dept: FAMILY MEDICINE CLINIC | Facility: CLINIC | Age: 75
End: 2020-02-07

## 2020-02-07 NOTE — PROGRESS NOTES
Assessment/Plan:  Tobacco Cessation Counseling: Tobacco cessation counseling and education was provided  The patient is sincerely urged to quit consumption of tobacco  She is not ready to quit tobacco  The numerous health risks of tobacco consumption were discussed  If she decides to quit, there are a number of helpful adjunctive aids, and she can see me to discuss nicotine replacement therapy, chantix, or bupropion anytime in the future  Essential hypertension  Increase lisinopril/hctz to 20/25mg po qd  Continue checking bp daily, bring results to next visit    GERD (gastroesophageal reflux disease)  Start Pepcid 20mg bid  Discussed avoiding foods that may worsen sx such fatty/fried foods, caffeine, tobacco, chocolate, tomato base foods, or spicy foods      Screening for colon cancer  Overdue last colonoscopy 2005  Refer to GI    Seasonal allergies  Refill flonase nasal spray    BMI Counseling: Body mass index is 25 12 kg/m²  The BMI is above normal  Nutrition recommendations include 3-5 servings of fruits/vegetables daily, consuming healthier snacks, moderation in carbohydrate intake and reducing intake of saturated fat and trans fat  Exercise recommendations include exercising 3-5 times per week and strength training exercises  Diagnoses and all orders for this visit:    Essential hypertension    Gastroesophageal reflux disease without esophagitis  -     ondansetron (ZOFRAN-ODT) 4 mg disintegrating tablet; Take 1 tablet (4 mg total) by mouth every 8 (eight) hours as needed for vomiting  -     Ambulatory referral to Gastroenterology; Future  -     famotidine (PEPCID) 20 mg tablet; Take 1 tablet (20 mg total) by mouth 2 (two) times a day    Seasonal allergies  -     fluticasone (FLONASE) 50 mcg/act nasal spray; 2 sprays into each nostril daily    Screening for colon cancer  -     Ambulatory referral to Gastroenterology; Future          Subjective:      Patient ID: Barbara Ashley is a 69 y o  female  HPI     Here to f /u HTN    Checking her bp at home, readings are usually in 150s-160s/90s-100  Is taking lisinopril/HCTZ 10/12 5mg po qd w/o any adverse effects  Has f/u appt scheduled today with neurosurgery to review MRI results  She continues with LBP but reports her pain has improved since last visit  C/o GERD- has "really bad reflux" daily  Doesn't eat much d/t the discomfort  Treated in the past, believes she was prescribed Prilosec but is npt sure  Would like to restart Flonase nasal spray for seasonal allergies  The following portions of the patient's history were reviewed and updated as appropriate: allergies, current medications, past family history, past medical history, past social history, past surgical history and problem list     Review of Systems   Constitutional: Negative for chills, fatigue and fever  Respiratory: Negative for cough, chest tightness, shortness of breath and wheezing  Cardiovascular: Negative for chest pain and palpitations  Gastrointestinal: Negative for abdominal pain, constipation, diarrhea, nausea and vomiting  Genitourinary: Negative for difficulty urinating  Musculoskeletal: Positive for arthralgias and back pain  Negative for myalgias, neck pain and neck stiffness  Skin: Negative for rash and wound  Allergic/Immunologic: Positive for environmental allergies  Neurological: Negative for dizziness, weakness, light-headedness, numbness and headaches  Psychiatric/Behavioral: Negative for agitation and behavioral problems  The patient is not nervous/anxious  Objective:      /100 (BP Location: Left arm, Patient Position: Sitting, Cuff Size: Standard)   Pulse 88   Temp (!) 96 1 °F (35 6 °C) (Tympanic)   Resp 14   Ht 5' 0 37" (1 533 m)   Wt 59 1 kg (130 lb 3 5 oz)   BMI 25 12 kg/m²          Physical Exam   Constitutional: She is oriented to person, place, and time  She appears well-developed and well-nourished   No distress  HENT:   Head: Normocephalic and atraumatic  Neck: Normal range of motion  Cardiovascular: Normal rate, regular rhythm and normal heart sounds  No murmur heard  Pulmonary/Chest: Effort normal and breath sounds normal  No respiratory distress  She has no wheezes  Musculoskeletal: She exhibits no edema or deformity  Neurological: She is alert and oriented to person, place, and time  Psychiatric: She has a normal mood and affect   Her behavior is normal  Thought content normal

## 2020-02-11 ENCOUNTER — OFFICE VISIT (OUTPATIENT)
Dept: NEUROSURGERY | Facility: CLINIC | Age: 75
End: 2020-02-11
Payer: COMMERCIAL

## 2020-02-11 ENCOUNTER — OFFICE VISIT (OUTPATIENT)
Dept: FAMILY MEDICINE CLINIC | Facility: CLINIC | Age: 75
End: 2020-02-11

## 2020-02-11 VITALS
HEART RATE: 88 BPM | TEMPERATURE: 96.1 F | RESPIRATION RATE: 14 BRPM | HEIGHT: 60 IN | WEIGHT: 130.22 LBS | SYSTOLIC BLOOD PRESSURE: 162 MMHG | DIASTOLIC BLOOD PRESSURE: 100 MMHG | BODY MASS INDEX: 25.57 KG/M2

## 2020-02-11 VITALS
HEIGHT: 60 IN | SYSTOLIC BLOOD PRESSURE: 150 MMHG | HEART RATE: 93 BPM | WEIGHT: 130 LBS | TEMPERATURE: 97.2 F | RESPIRATION RATE: 16 BRPM | DIASTOLIC BLOOD PRESSURE: 100 MMHG | BODY MASS INDEX: 25.52 KG/M2

## 2020-02-11 DIAGNOSIS — Z12.11 SCREENING FOR COLON CANCER: ICD-10-CM

## 2020-02-11 DIAGNOSIS — I10 ESSENTIAL HYPERTENSION: Primary | ICD-10-CM

## 2020-02-11 DIAGNOSIS — J30.2 SEASONAL ALLERGIES: ICD-10-CM

## 2020-02-11 DIAGNOSIS — M54.42 ACUTE LEFT-SIDED LOW BACK PAIN WITH LEFT-SIDED SCIATICA: Primary | ICD-10-CM

## 2020-02-11 DIAGNOSIS — K21.9 GASTROESOPHAGEAL REFLUX DISEASE WITHOUT ESOPHAGITIS: ICD-10-CM

## 2020-02-11 PROCEDURE — 3080F DIAST BP >= 90 MM HG: CPT | Performed by: PHYSICIAN ASSISTANT

## 2020-02-11 PROCEDURE — 1170F FXNL STATUS ASSESSED: CPT | Performed by: PHYSICIAN ASSISTANT

## 2020-02-11 PROCEDURE — 4040F PNEUMOC VAC/ADMIN/RCVD: CPT | Performed by: PHYSICIAN ASSISTANT

## 2020-02-11 PROCEDURE — 99213 OFFICE O/P EST LOW 20 MIN: CPT | Performed by: PHYSICIAN ASSISTANT

## 2020-02-11 PROCEDURE — 4004F PT TOBACCO SCREEN RCVD TLK: CPT | Performed by: PHYSICIAN ASSISTANT

## 2020-02-11 PROCEDURE — 1160F RVW MEDS BY RX/DR IN RCRD: CPT | Performed by: PHYSICIAN ASSISTANT

## 2020-02-11 PROCEDURE — 99214 OFFICE O/P EST MOD 30 MIN: CPT | Performed by: PHYSICIAN ASSISTANT

## 2020-02-11 PROCEDURE — 3008F BODY MASS INDEX DOCD: CPT | Performed by: PHYSICIAN ASSISTANT

## 2020-02-11 PROCEDURE — 3077F SYST BP >= 140 MM HG: CPT | Performed by: PHYSICIAN ASSISTANT

## 2020-02-11 RX ORDER — FAMOTIDINE 20 MG/1
20 TABLET, FILM COATED ORAL 2 TIMES DAILY
Qty: 60 TABLET | Refills: 3 | Status: SHIPPED | OUTPATIENT
Start: 2020-02-11 | End: 2020-04-15

## 2020-02-11 RX ORDER — FLUTICASONE PROPIONATE 50 MCG
2 SPRAY, SUSPENSION (ML) NASAL DAILY
Qty: 1 BOTTLE | Refills: 2 | Status: SHIPPED | OUTPATIENT
Start: 2020-02-11 | End: 2020-04-23

## 2020-02-11 RX ORDER — ONDANSETRON 4 MG/1
4 TABLET, ORALLY DISINTEGRATING ORAL EVERY 8 HOURS PRN
Qty: 20 TABLET | Refills: 0 | Status: SHIPPED | OUTPATIENT
Start: 2020-02-11 | End: 2020-06-03 | Stop reason: SDUPTHER

## 2020-02-11 NOTE — ASSESSMENT & PLAN NOTE
Start Pepcid 20mg bid  Discussed avoiding foods that may worsen sx such fatty/fried foods, caffeine, tobacco, chocolate, tomato base foods, or spicy foods

## 2020-02-11 NOTE — PROGRESS NOTES
Neurosurgery Office Note  Nicole Steven 69 y o  female MRN: [id number]      Assessment/Plan     Acute left-sided low back pain with left-sided sciatica  1 month history of LLE radiculopathy in L5/S1 distrubution without preceding injury  · ED visit on 1/20/20 - s/p left hip injection with no relief  · Today states her leg pain is essentially gone using stephy-escobar cream  · Patient denies back pain or RLE symptoms  · She did not increase gabapentin as previously discussed    Imaging:  · MRI lumbar spine w/o, 2/3/20: Disc bulge and superimposed right paracentral disc protrusion at level L5-S1 without significant canal narrowing  There is abutment/possible impingement of right S1 nerve root  Correlate for right S1 radiculopathy  · Xray lumbar spine, 2/3/20: Mild lower lumbar spine degenerative change without evidence of instability on flexion or extension views  No acute findings  Plan:  · As pain is resolved and the patient has no current symptoms, she can follow up in our office on an as needed basis  · We did discuss PT today, but she denied as she is an extremely active person and feels she does PT on a daily basis  Diagnoses and all orders for this visit:    Acute left-sided low back pain with left-sided sciatica            CHIEF COMPLAINT    Chief Complaint   Patient presents with    Follow-up     2 week follow up with imaging       HISTORY    This is a 70 yo female with a PMH significant for asthma, fibromyalgia, irregular heart beat, chronic sinusitis, IBS who we last saw on 1/27/20 for acute low back pain with LLE radiculopathy in an L5-S1 distrubution  At that time we recommended MRI lumbar spine and xrays for possible surgical planning and increasing her gabapentin  Her pain was so severe that PT was not recommended  She is here today to review her images  Her symptoms have resolved over the past 2 weeks with no treatment   She has some bilateral low back "soreness" from activity but no more LLE pain  She has been using stephy-escobar cream daily and feels that this helps  She denies any new or worsening symptoms  She has no RLE complaints  REVIEW OF SYSTEMS    Review of Systems   Constitutional: Negative  HENT: Negative  Eyes: Positive for discharge (gets tears a lot) and visual disturbance (see's lines in her eyes, usually with migrains)  Respiratory: Negative  Cardiovascular: Negative  Gastrointestinal: Negative  Endocrine: Negative  Genitourinary: Negative  Musculoskeletal: Positive for back pain (lower back pain, has a pressure), myalgias, neck pain and neck stiffness (when she sleeping)  Negative for gait problem  Skin: Negative  Allergic/Immunologic: Negative  Neurological: Positive for light-headedness and headaches (get migrains)  Negative for dizziness, tremors, seizures, speech difficulty, weakness and numbness  Hematological: Negative  Psychiatric/Behavioral: Negative        ROS was personally reviewed and changes made as needed     Meds/Allergies     Current Outpatient Medications   Medication Sig Dispense Refill    acetaminophen (TYLENOL) 500 mg tablet Take 1 tablet (500 mg total) by mouth every 6 (six) hours as needed for mild pain 60 tablet 0    albuterol (2 5 mg/3 mL) 0 083 % nebulizer solution Take 1 vial (2 5 mg total) by nebulization every 4 (four) hours as needed for wheezing or shortness of breath 75 vial 0    Blood Pressure Monitoring (BLOOD PRESSURE CUFF) MISC by Does not apply route 2 (two) times a day 1 each 0    famotidine (PEPCID) 20 mg tablet Take 1 tablet (20 mg total) by mouth 2 (two) times a day 60 tablet 3    fluticasone (FLONASE) 50 mcg/act nasal spray 2 sprays into each nostril daily 1 Bottle 2    gabapentin (NEURONTIN) 100 mg capsule Take 1 capsule (100 mg total) by mouth daily at bedtime 30 capsule 0    lisinopril-hydrochlorothiazide (PRINZIDE,ZESTORETIC) 10-12 5 MG per tablet Take 1 tablet by mouth daily (Patient taking differently: Take 2 tablets by mouth daily ) 30 tablet 5    ondansetron (ZOFRAN-ODT) 4 mg disintegrating tablet Take 1 tablet (4 mg total) by mouth every 8 (eight) hours as needed for vomiting 20 tablet 0    oxymetazoline (AFRIN) 0 05 % nasal spray 2 sprays by Each Nare route 2 (two) times a day      VENTOLIN  (90 Base) MCG/ACT inhaler Inhale 2 puffs every 6 (six) hours as needed for wheezing 1 Inhaler 3     No current facility-administered medications for this visit          Allergies   Allergen Reactions    Ambrosia Artemisiifolia (Ragweed) Skin Test Facial Swelling    Codeine Other (See Comments)     Heart races    Epinephrine      Pt reports "it makes my heart race, they told me I cant take it "    Ibuprofen Other (See Comments)     Heart racing    Morphine Other (See Comments)     Heart racing    Pollen Extract Sneezing    Tramadol Other (See Comments)     Heart racing       PAST HISTORY    Past Medical History:   Diagnosis Date    Asthma     Asthmatic bronchitis     Last Assessed: 10/7/2014     Chronic diarrhea     Last Assessed: 8/20/2015     Fibromyalgia     Focal nodular hyperplasia of liver     Last Assessed: 6/11/2015    Herpes zoster     Last Assessed: 3/18/2016    Hypertension     IBS (irritable bowel syndrome)     Intermittent palpitations     Irritable bowel syndrome     Osteoarthritis     Vertigo        Past Surgical History:   Procedure Laterality Date    BREAST BIOPSY      SMALL INTESTINE SURGERY         Social History     Tobacco Use    Smoking status: Current Every Day Smoker     Packs/day: 0 50     Types: Cigarettes    Smokeless tobacco: Never Used   Substance Use Topics    Alcohol use: No    Drug use: No       Family History   Problem Relation Age of Onset    Heart attack Mother     Other Mother         Aspiration of Vomit     Asthma Father     Breast cancer Sister     Arthritis Family     Other Family         Musculoskeletal Disease     Osteoporosis Family Above history personally reviewed  EXAM    Vitals:Blood pressure 150/100, pulse 93, temperature (!) 97 2 °F (36 2 °C), temperature source Tympanic, resp  rate 16, height 5' (1 524 m), weight 59 kg (130 lb)  ,Body mass index is 25 39 kg/m²  Physical Exam   Constitutional: She is oriented to person, place, and time  She appears well-developed and well-nourished  HENT:   Head: Normocephalic and atraumatic  Eyes: Pupils are equal, round, and reactive to light  EOM are normal    Neck: Normal range of motion  Cardiovascular: Normal rate  Pulmonary/Chest: Effort normal  No respiratory distress  Abdominal: Soft  Musculoskeletal: Normal range of motion  Neurological: She is alert and oriented to person, place, and time  She has normal strength  She has a normal Finger-Nose-Finger Test and a normal Tandem Gait Test  Gait normal    Reflex Scores:       Tricep reflexes are 2+ on the right side and 2+ on the left side  Bicep reflexes are 2+ on the right side and 2+ on the left side  Brachioradialis reflexes are 2+ on the right side and 2+ on the left side  Patellar reflexes are 2+ on the right side and 2+ on the left side  Achilles reflexes are 2+ on the right side and 2+ on the left side  Skin: Skin is warm and dry  Psychiatric: She has a normal mood and affect  Her speech is normal and behavior is normal  Judgment and thought content normal    Nursing note and vitals reviewed  Neurologic Exam     Mental Status   Oriented to person, place, and time  Recall at 5 minutes: recalls 3 of 3 objects  Follows 2 step commands  Attention: normal  Concentration: normal    Speech: speech is normal   Level of consciousness: alert  Knowledge: good  Able to perform simple calculations  Able to name object  Able to repeat  Normal comprehension  Cranial Nerves   Cranial nerves II through XII intact       CN III, IV, VI   Pupils are equal, round, and reactive to light   Extraocular motions are normal      Motor Exam   Muscle bulk: normal  Overall muscle tone: normal  Right arm pronator drift: absent  Left arm pronator drift: absent    Strength   Strength 5/5 throughout  Sensory Exam   Light touch normal    Pinprick normal    DST and JPS intact bilaterally  NTTP lumbar spine  No pain with lumbar ROM     Gait, Coordination, and Reflexes     Gait  Gait: normal    Coordination   Finger to nose coordination: normal  Tandem walking coordination: normal    Tremor   Resting tremor: absent    Reflexes   Right brachioradialis: 2+  Left brachioradialis: 2+  Right biceps: 2+  Left biceps: 2+  Right triceps: 2+  Left triceps: 2+  Right patellar: 2+  Left patellar: 2+  Right achilles: 2+  Left achilles: 2+  Right : 2+  Left : 2+  Right Dickerson: absent  Left Dickerson: absent  Right ankle clonus: absent  Left ankle clonus: absent        MEDICAL DECISION MAKING    Imaging Studies:     X-ray Lumbar Spine Flexion And Extension Only 4+ Views    Result Date: 2/8/2020  Narrative: LUMBAR SPINE INDICATION:   M54 5: Low back pain M54 42: Lumbago with sciatica, left side  COMPARISON:  None VIEWS:  XR LUMBAR SP/BENDING ONLY MIN 2-3 V FINDINGS: There are 5 non rib bearing lumbar vertebral bodies  There is no evidence of acute fracture or destructive osseous lesion  Alignment is unremarkable  Mild degenerative change noted lower lumbar spine  No evidence of instability on flexion and extension views  The pedicles appear intact  There are atherosclerotic calcifications  Soft tissues are otherwise unremarkable  Impression: Mild lower lumbar spine degenerative change without evidence of instability on flexion or extension views  No acute findings  Workstation performed: [id number]     Xr Hip/pelv 2-3 Vws Left If Performed    Result Date: 1/20/2020  Narrative: LEFT HIP INDICATION:   left hip pain   COMPARISON:  9/14/2005 VIEWS:  XR HIP/PELV 2-3 VWS LEFT  W PELVIS IF PERFORMED FINDINGS: There is no acute fracture or dislocation  No significant hip degenerative changes  No lytic or blastic osseous lesions  Soft tissues are unremarkable  The visualized lumbar spine is unremarkable  Impression: No acute osseous abnormality  Workstation performed: [id number]     Ct Lumbar Spine Without Contrast    Result Date: 1/20/2020  Narrative: CT LUMBAR SPINE INDICATION:   left paraspine lumbar pain  COMPARISON: None  TECHNIQUE:  Contiguous axial images through the lumbar spine were obtained  Sagittal and coronal reconstructions were performed  Radiation dose length product (DLP) for this visit:  442 77 mGy-cm   This examination, like all CT scans performed in the [facility], was performed utilizing techniques to minimize radiation dose exposure, including the use of iterative  reconstruction and automated exposure control  IMAGE QUALITY:  Diagnostic  FINDINGS: ALIGNMENT:  There are 5 lumbar type vertebral bodies  Normal alignment of the lumbar spine  No spondylolisthesis or spondylolysis  VERTEBRAL BODIES:  No fracture  No lytic or blastic lesion  DEGENERATIVE CHANGES: Lower Thoracic spine:  Normal lower thoracic disc spaces ] L1-2:  Normal disc height  No herniation  Normal facet joints  No canal or foraminal stenosis  L2-3:  Appears unremarkable L3-4:  Demonstrates mild facet joint disease with mild disc bulge with mild central canal narrowing  There is no significant foraminal narrowing L4-5:  Demonstrates ligamentous hypertrophy, facet joint disease  There is broad-based left paracentral, right paracentral, right foraminal disc herniation  There is effacement of the anterior epidural fat  There is mild anterolisthesis of L4 over 5  There is severe central canal narrowing there is moderate  to severe left and mild right foraminal narrowing L5-S1:  Demonstrates disc bulge    There is facet joint disease and there is mild bilateral foraminal narrowing PARASPINAL SOFT TISSUES:  Right renal cysts are noted Rounded density seen within the pelvis on the right side in image 19 series 2 suggests partly imaged uterus    Impression: Severe central canal narrowing at L4-5 level with associated facet joint disease, ligamentous hypertrophy  There is a broad-based left paracentral, central, right paracentral and right foraminal disc herniation  There is noted right and moderate to severe left foraminal narrowing and correlate with left L4 and L5 radiculopathy No acute compression collapse vertebra On the previous study of February 2010 patient had a cystic mass in the left adnexa, this is not imaged on the current study  Correlate clinically for need of further evaluation with ultrasound, evaluate whether that mass has been removed or resolved The study was marked in EPIC for immediate notification  Workstation performed: [id number]     Mri Lumbar Spine Without Contrast    Result Date: 2/5/2020  Narrative: MRI LUMBAR SPINE WITHOUT CONTRAST INDICATION: M54 5: Low back pain M54 42: Lumbago with sciatica, left side  COMPARISON:  Lumbar spine CT 2/3/2020 TECHNIQUE:  Sagittal T1, sagittal T2, sagittal inversion recovery, axial T1 and axial T2, coronal T2   IMAGE QUALITY:  Diagnostic FINDINGS: VERTEBRAL BODIES:  There are 5 lumbar type vertebral bodies  There is preserved normal lumbar lordosis  No significant spondylolisthesis  No abnormal bone marrow signal or suspicious discrete marrow lesion SACRUM:  Normal signal within the sacrum  No evidence of insufficiency or stress fracture  DISTAL CORD AND CONUS:  Normal size and signal within the distal cord and conus  PARASPINAL SOFT TISSUES:  Paraspinal soft tissues are unremarkable  Partially imaged renal cysts LOWER THORACIC DISC SPACES:  Normal disc height and signal   No disc herniation, canal stenosis or foraminal narrowing  LUMBAR DISC SPACES: L1-L2:  No significant disc bulge  Moderate bilateral facet arthropathy    No canal or significant foraminal stenosis L2-L3:  Minimal disc bulge  Moderate bilateral facet arthropathy  No significant canal or foraminal stenosis L3-L4:  Mild disc bulge  There is moderate bilateral facet arthropathy  No significant canal stenosis  Minimal bilateral foraminal narrowing L4-L5:  There is disc bulge and severe bilateral facet arthropathy  There are approximately 5 mm emily-facet joint synovial cysts along the anteromedial aspect of the facet joints bilaterally (series 6 image 19 and series 7 images 10 and 8)  There is bilateral ligamentum flavum thickening  There is severe canal stenosis  Mild bilateral foraminal narrowing  L5-S1: There is disc bulge and superimposed right paracentral disc protrusion and moderate bilateral facet arthropathy  There is abutment/possible impingement of right S1 nerve root (series 6 image 25)  Impression: 1  Severe degenerative canal stenosis at level L4-5  2   Disc bulge and superimposed right paracentral disc protrusion at level L5-S1 without significant canal narrowing  There is abutment/possible impingement of right S1 nerve root  Correlate for right S1 radiculopathy  Workstation performed: [id number]       I have personally reviewed pertinent reports     and I have personally reviewed pertinent films in PACS

## 2020-02-11 NOTE — ASSESSMENT & PLAN NOTE
1 month history of LLE radiculopathy in L5/S1 distrubution without preceding injury  · ED visit on 1/20/20 - s/p left hip injection with no relief  · Today states her leg pain is essentially gone using stephy-escobar cream  · Patient denies back pain or RLE symptoms  · She did not increase gabapentin as previously discussed    Imaging:  · MRI lumbar spine w/o, 2/3/20: Disc bulge and superimposed right paracentral disc protrusion at level L5-S1 without significant canal narrowing  There is abutment/possible impingement of right S1 nerve root  Correlate for right S1 radiculopathy  · Xray lumbar spine, 2/3/20: Mild lower lumbar spine degenerative change without evidence of instability on flexion or extension views  No acute findings  Plan:  · As pain is resolved and the patient has no current symptoms, she can follow up in our office on an as needed basis  · We did discuss PT today, but she denied as she is an extremely active person and feels she does PT on a daily basis

## 2020-02-23 DIAGNOSIS — M54.42 ACUTE LEFT-SIDED LOW BACK PAIN WITH LEFT-SIDED SCIATICA: ICD-10-CM

## 2020-02-23 DIAGNOSIS — M54.50 LOWER BACK PAIN: ICD-10-CM

## 2020-02-24 RX ORDER — GABAPENTIN 100 MG/1
100 CAPSULE ORAL
Qty: 30 CAPSULE | Refills: 0 | Status: SHIPPED | OUTPATIENT
Start: 2020-02-24 | End: 2020-03-30

## 2020-03-02 NOTE — PROGRESS NOTES
Assessment/Plan:  Tobacco Cessation Counseling: Tobacco cessation counseling and education was provided  The patient is sincerely urged to quit consumption of tobacco  She is not ready to quit tobacco  The numerous health risks of tobacco consumption were discussed  If she decides to quit, there are a number of helpful adjunctive aids, and she can see me to discuss nicotine replacement therapy, chantix, or bupropion anytime in the future  Viral syndrome  Encourage to follow a bland diet advance as symptoms improve  May use Zofran prn  Stay hydrated at all times  Tylenol prn   ED precautions given   Discussed smoking cessation      Seasonal allergies  Add Astelin nasal spray prn    GERD (gastroesophageal reflux disease)  Add Prilosec 20mg po qd prn  Continue pepcid 20mg bid  Follow a GERD free diet       Acute left-sided low back pain with left-sided sciatica  Encouraged to reconsider PT  Provided with an order for a walker  Handicap disability placard form completed, copy made and original return back to patient     Essential hypertension  BP today 140/98  Continue lisinopril/HCTZ 20/25mg QD  Encouraged to check bp regularly and record readings  Discussed following a heart healthy diet          Diagnoses and all orders for this visit:    Essential hypertension  -     lisinopril-hydrochlorothiazide (PRINZIDE,ZESTORETIC) 20-25 MG per tablet; Take 1 tablet by mouth daily    Gastroesophageal reflux disease without esophagitis  -     omeprazole (PriLOSEC) 20 mg delayed release capsule; Take 1 capsule (20 mg total) by mouth daily as needed (heartburn)    Seasonal allergies  -     azelastine (ASTELIN) 0 1 % nasal spray; 2 sprays into each nostril 2 (two) times a day Use in each nostril as directed    Acute left-sided low back pain with left-sided sciatica  -     Walker    Chronic left hip pain  -     Walker    Viral syndrome          Subjective:      Patient ID: Jude Salazar is a 69 y o  female      HPI     Here today for f/u HTN    HTN- checking bp at pharmacy weekly, told readings are "a little high"  Can not recall results  Is taking medication as directed    She states, "I'm getting sick"  C/o  nausea, nasal congestion, ears feeling clogging, productive cough and wheezing in the am then becomes dry x 2 days  Continue using Flonase and Claritin daily  Doesn't feel Flonase is helping  Has albuterol inhaler and neb tx, has not had to use as of yet  Denies any fevers, chills, abdominal pain, diarrhea, chest pain, or SOB    GERD- taking Pepcid 20 mg bid with some relief  She reports lately she can feel the acid in her throat  Can't lie flat  Has taken Prilosec in the past with good result  Lower back pain with left side sciatica has good days and bad days  Having trouble walking long distances  Feels unsteady on her feet  PT recommended by Neurosurgery however, patient refused  Requesting walker and handicap placard  The following portions of the patient's history were reviewed and updated as appropriate: allergies, current medications, past family history, past medical history, past social history, past surgical history and problem list     Review of Systems   Constitutional: Negative for chills, fatigue and fever  HENT: Positive for congestion, postnasal drip and sinus pressure  Negative for ear discharge, rhinorrhea, sneezing, sore throat, tinnitus, trouble swallowing and voice change  Respiratory: Positive for cough and wheezing  Negative for apnea, chest tightness and shortness of breath  Cardiovascular: Negative for chest pain, palpitations and leg swelling  Gastrointestinal: Positive for nausea  Negative for abdominal pain, constipation, diarrhea and vomiting  Genitourinary: Negative  Musculoskeletal: Positive for back pain  Neurological: Negative for dizziness, tremors, weakness, light-headedness, numbness and headaches          Tingling left leg   Psychiatric/Behavioral: Negative for behavioral problems, confusion and dysphoric mood  The patient is not nervous/anxious  Objective:      /98 (BP Location: Left arm, Patient Position: Sitting, Cuff Size: Large)   Pulse (!) 108   Temp 97 5 °F (36 4 °C) (Tympanic)   Resp 18   Wt 59 5 kg (131 lb 3 2 oz)   BMI 25 62 kg/m²          Physical Exam   Constitutional: She is oriented to person, place, and time  She appears well-developed and well-nourished  Non-toxic appearance  She does not have a sickly appearance  She does not appear ill  No distress  HENT:   Right Ear: Tympanic membrane, external ear and ear canal normal    Left Ear: Tympanic membrane, external ear and ear canal normal    Nose: No rhinorrhea  Right sinus exhibits maxillary sinus tenderness  Right sinus exhibits no frontal sinus tenderness  Left sinus exhibits maxillary sinus tenderness  Left sinus exhibits no frontal sinus tenderness  Mouth/Throat: Posterior oropharyngeal erythema present  No tonsillar exudate  Bilateral turbinates enlarged    Eyes: Pupils are equal, round, and reactive to light  Neck: Normal range of motion  Neck supple  Cardiovascular: Normal rate, regular rhythm and normal heart sounds  Pulmonary/Chest: Effort normal and breath sounds normal  No respiratory distress  She has no wheezes  Abdominal: Soft  Bowel sounds are normal  There is no tenderness  Lymphadenopathy:     She has no cervical adenopathy  Neurological: She is alert and oriented to person, place, and time  Skin: Skin is warm and dry  Psychiatric: She has a normal mood and affect   Her behavior is normal

## 2020-03-03 ENCOUNTER — TELEPHONE (OUTPATIENT)
Dept: FAMILY MEDICINE CLINIC | Facility: CLINIC | Age: 75
End: 2020-03-03

## 2020-03-03 ENCOUNTER — OFFICE VISIT (OUTPATIENT)
Dept: FAMILY MEDICINE CLINIC | Facility: CLINIC | Age: 75
End: 2020-03-03

## 2020-03-03 VITALS
DIASTOLIC BLOOD PRESSURE: 98 MMHG | WEIGHT: 131.2 LBS | RESPIRATION RATE: 18 BRPM | BODY MASS INDEX: 25.62 KG/M2 | TEMPERATURE: 97.5 F | SYSTOLIC BLOOD PRESSURE: 140 MMHG | HEART RATE: 108 BPM

## 2020-03-03 DIAGNOSIS — K21.9 GASTROESOPHAGEAL REFLUX DISEASE WITHOUT ESOPHAGITIS: ICD-10-CM

## 2020-03-03 DIAGNOSIS — G89.29 CHRONIC LEFT HIP PAIN: ICD-10-CM

## 2020-03-03 DIAGNOSIS — I10 ESSENTIAL HYPERTENSION: Primary | ICD-10-CM

## 2020-03-03 DIAGNOSIS — M25.552 CHRONIC LEFT HIP PAIN: ICD-10-CM

## 2020-03-03 DIAGNOSIS — B34.9 VIRAL SYNDROME: ICD-10-CM

## 2020-03-03 DIAGNOSIS — J30.2 SEASONAL ALLERGIES: ICD-10-CM

## 2020-03-03 DIAGNOSIS — M54.42 ACUTE LEFT-SIDED LOW BACK PAIN WITH LEFT-SIDED SCIATICA: ICD-10-CM

## 2020-03-03 PROCEDURE — 3080F DIAST BP >= 90 MM HG: CPT | Performed by: PHYSICIAN ASSISTANT

## 2020-03-03 PROCEDURE — 1160F RVW MEDS BY RX/DR IN RCRD: CPT | Performed by: PHYSICIAN ASSISTANT

## 2020-03-03 PROCEDURE — 4040F PNEUMOC VAC/ADMIN/RCVD: CPT | Performed by: PHYSICIAN ASSISTANT

## 2020-03-03 PROCEDURE — 3077F SYST BP >= 140 MM HG: CPT | Performed by: PHYSICIAN ASSISTANT

## 2020-03-03 PROCEDURE — 4004F PT TOBACCO SCREEN RCVD TLK: CPT | Performed by: PHYSICIAN ASSISTANT

## 2020-03-03 PROCEDURE — 99214 OFFICE O/P EST MOD 30 MIN: CPT | Performed by: PHYSICIAN ASSISTANT

## 2020-03-03 RX ORDER — LISINOPRIL AND HYDROCHLOROTHIAZIDE 25; 20 MG/1; MG/1
1 TABLET ORAL DAILY
Qty: 30 TABLET | Refills: 5 | Status: SHIPPED | OUTPATIENT
Start: 2020-03-03 | End: 2020-05-04 | Stop reason: SDUPTHER

## 2020-03-03 RX ORDER — AZELASTINE 1 MG/ML
2 SPRAY, METERED NASAL 2 TIMES DAILY
Qty: 1 BOTTLE | Refills: 2 | Status: SHIPPED | OUTPATIENT
Start: 2020-03-03 | End: 2021-05-18 | Stop reason: SDUPTHER

## 2020-03-03 RX ORDER — OMEPRAZOLE 20 MG/1
20 CAPSULE, DELAYED RELEASE ORAL DAILY PRN
Qty: 30 CAPSULE | Refills: 1 | Status: SHIPPED | OUTPATIENT
Start: 2020-03-03 | End: 2020-04-13

## 2020-03-03 NOTE — TELEPHONE ENCOUNTER
Reviewed Jose's office note from 2/11/20  Patient was advised to Take Famotidine 20mg #60 and use Prilosec prn for heart burn

## 2020-03-03 NOTE — TELEPHONE ENCOUNTER
[facility] calling to verify if pt's medication was changed from Famotidine 20mg to the Prilosec 20mg, good call back # for [facility] is [phone]

## 2020-03-03 NOTE — ASSESSMENT & PLAN NOTE
BP today 140/98  Continue lisinopril/HCTZ 20/25mg QD  Encouraged to check bp regularly and record readings  Discussed following a heart healthy diet

## 2020-03-03 NOTE — ASSESSMENT & PLAN NOTE
Encourage to follow a bland diet advance as symptoms improve  May use Zofran prn  Stay hydrated at all times  Tylenol prn   ED precautions given   Discussed smoking cessation

## 2020-03-03 NOTE — PATIENT INSTRUCTIONS
Viral Syndrome   AMBULATORY CARE:   Viral syndrome  is a term used for general symptoms of a viral infection that has no clear cause  Viruses are spread easily from person to person through the air and on shared items  Common symptoms include the following:   · Fever and chills    · A runny or stuffy nose     · Cough, sore throat, or hoarseness     · Headache, or pain and pressure around your eyes     · Muscle aches and joint pain     · Shortness of breath or wheezing     · Abdominal pain, cramps, and diarrhea     · Nausea, vomiting, or loss of appetite  Call 911 for the following:   · You have a seizure  · You cannot be woken  · You have chest pain or trouble breathing  Seek care immediately if:   · You have a stiff neck, a bad headache, and sensitivity to light  · You feel weak, dizzy, or confused  · You stop urinating or urinate a lot less than normal      · You cough up blood or thick, yellow or green, mucus  · You have severe abdominal pain or your abdomen is larger than usual   Contact your healthcare provider if:   · Your symptoms do not get better with treatment, or get worse, after 3 days  · You have a rash or ear pain  · You have burning when you urinate  · You have questions or concerns about your condition or care  Treatment for viral syndrome  may include medicines to manage your symptoms  You may  need any of the following:  · Acetaminophen  decreases pain and fever  It is available without a doctor's order  Ask how much medicine to take and how often to take it  Follow directions  Acetaminophen can cause liver damage if not taken correctly  · NSAIDs , such as ibuprofen, help decrease swelling, pain, and fever  NSAIDs can cause stomach bleeding or kidney problems in certain people  If you take blood thinner medicine, always ask your healthcare provider if NSAIDs are safe for you  Always read the medicine label and follow directions      · Cold medicine  helps decrease swelling, control a cough, and relieve chest or nasal congestion  · Saline nasal spray  helps decrease nasal congestion  · Take your medicine as directed  Contact your healthcare provider if you think your medicine is not helping or if you have side effects  Tell him of her if you are allergic to any medicine  Keep a list of the medicines, vitamins, and herbs you take  Include the amounts, and when and why you take them  Bring the list or the pill bottles to follow-up visits  Carry your medicine list with you in case of an emergency  Manage your symptoms:   · Drink liquids as directed  to prevent dehydration  Ask how much liquid to drink each day and which liquids are best for you  Ask if you should drink an oral rehydration solution (ORS)  An ORS has the right amounts of water, salts, and sugar you need to replace body fluids  This may help prevent dehydration caused by vomiting or diarrhea  Do not drink liquids with caffeine  Drinks with caffeine can make dehydration worse  · Get plenty of rest  to help your body heal  Take naps throughout the day  Ask your healthcare provider when you can return to work and your normal activities  · Use a cool mist humidifier  to help you breathe easier if you have nasal or chest congestion  Ask your healthcare provider how to use a cool mist humidifier  · Eat honey or use cough drops  to help decrease throat discomfort  Ask your healthcare provider how much honey you should eat each day  Cough drops are available without a doctor's order  Follow directions for taking cough drops  · Do not smoke and stay away from others who smoke  Nicotine and other chemicals in cigarettes and cigars can cause lung damage  Smoking can also delay healing  Ask your healthcare provider for information if you currently smoke and need help to quit  E-cigarettes or smokeless tobacco still contain nicotine   Talk to your healthcare provider before you use these products  · Wash your hands frequently  to prevent the spread of germs to others  Use soap and water  Use gel hand  when soap and water are not available  Wash your hands after you use the bathroom, cough, or sneeze  Wash your hands before you prepare or eat food  Follow up with your healthcare provider as directed:  Write down your questions so you remember to ask them during your visits  © 2017 [facility] Information is for End User's use only and may not be sold, redistributed or otherwise used for commercial purposes  All illustrations and images included in CareNotes® are the copyrighted property of [employer]  or Dewayne Melendez  The above information is an  only  It is not intended as medical advice for individual conditions or treatments  Talk to your doctor, nurse or pharmacist before following any medical regimen to see if it is safe and effective for you

## 2020-03-03 NOTE — ASSESSMENT & PLAN NOTE
Encouraged to reconsider PT  Provided with an order for a walker  Handicap disability placard form completed, copy made and original return back to patient

## 2020-03-26 DIAGNOSIS — M54.50 LOWER BACK PAIN: ICD-10-CM

## 2020-03-26 DIAGNOSIS — M54.42 ACUTE LEFT-SIDED LOW BACK PAIN WITH LEFT-SIDED SCIATICA: ICD-10-CM

## 2020-03-30 RX ORDER — GABAPENTIN 100 MG/1
100 CAPSULE ORAL
Qty: 30 CAPSULE | Refills: 0 | Status: SHIPPED | OUTPATIENT
Start: 2020-03-30 | End: 2020-05-01

## 2020-04-08 DIAGNOSIS — K21.9 GASTROESOPHAGEAL REFLUX DISEASE WITHOUT ESOPHAGITIS: ICD-10-CM

## 2020-04-13 RX ORDER — OMEPRAZOLE 20 MG/1
20 CAPSULE, DELAYED RELEASE ORAL DAILY PRN
Qty: 30 CAPSULE | Refills: 1 | Status: SHIPPED | OUTPATIENT
Start: 2020-04-13 | End: 2020-04-15

## 2020-04-14 ENCOUNTER — TELEMEDICINE (OUTPATIENT)
Dept: RHEUMATOLOGY | Facility: CLINIC | Age: 75
End: 2020-04-14
Payer: COMMERCIAL

## 2020-04-14 DIAGNOSIS — M79.7 FIBROMYALGIA: ICD-10-CM

## 2020-04-14 DIAGNOSIS — M51.36 LUMBAR DEGENERATIVE DISC DISEASE: ICD-10-CM

## 2020-04-14 DIAGNOSIS — M05.80 POLYARTHRITIS WITH POSITIVE RHEUMATOID FACTOR (HCC): ICD-10-CM

## 2020-04-14 DIAGNOSIS — M05.79 RHEUMATOID ARTHRITIS INVOLVING MULTIPLE SITES WITH POSITIVE RHEUMATOID FACTOR (HCC): Primary | ICD-10-CM

## 2020-04-14 PROCEDURE — G2012 BRIEF CHECK IN BY MD/QHP: HCPCS | Performed by: INTERNAL MEDICINE

## 2020-04-14 RX ORDER — HYDROXYCHLOROQUINE SULFATE 200 MG/1
200 TABLET, FILM COATED ORAL
Qty: 90 TABLET | Refills: 0 | Status: SHIPPED | OUTPATIENT
Start: 2020-04-14 | End: 2020-07-16

## 2020-04-15 ENCOUNTER — TELEMEDICINE (OUTPATIENT)
Dept: GASTROENTEROLOGY | Facility: CLINIC | Age: 75
End: 2020-04-15
Payer: COMMERCIAL

## 2020-04-15 DIAGNOSIS — K58.0 IRRITABLE BOWEL SYNDROME WITH DIARRHEA: Primary | ICD-10-CM

## 2020-04-15 DIAGNOSIS — K21.9 GASTROESOPHAGEAL REFLUX DISEASE WITHOUT ESOPHAGITIS: ICD-10-CM

## 2020-04-15 DIAGNOSIS — K59.1 FUNCTIONAL DIARRHEA: ICD-10-CM

## 2020-04-15 DIAGNOSIS — Z12.11 SCREENING FOR COLON CANCER: ICD-10-CM

## 2020-04-15 PROCEDURE — 1160F RVW MEDS BY RX/DR IN RCRD: CPT | Performed by: INTERNAL MEDICINE

## 2020-04-15 PROCEDURE — G2012 BRIEF CHECK IN BY MD/QHP: HCPCS | Performed by: INTERNAL MEDICINE

## 2020-04-15 RX ORDER — DICYCLOMINE HYDROCHLORIDE 10 MG/1
10 CAPSULE ORAL
Qty: 30 CAPSULE | Refills: 3 | Status: SHIPPED | OUTPATIENT
Start: 2020-04-15 | End: 2021-05-18

## 2020-04-15 RX ORDER — PANTOPRAZOLE SODIUM 40 MG/1
40 TABLET, DELAYED RELEASE ORAL DAILY
Qty: 30 TABLET | Refills: 3 | Status: SHIPPED | OUTPATIENT
Start: 2020-04-15 | End: 2020-08-27

## 2020-04-16 ENCOUNTER — TELEPHONE (OUTPATIENT)
Dept: GASTROENTEROLOGY | Facility: CLINIC | Age: 75
End: 2020-04-16

## 2020-04-20 ENCOUNTER — TELEPHONE (OUTPATIENT)
Dept: GASTROENTEROLOGY | Facility: CLINIC | Age: 75
End: 2020-04-20

## 2020-04-22 DIAGNOSIS — J30.2 SEASONAL ALLERGIES: ICD-10-CM

## 2020-04-23 RX ORDER — FLUTICASONE PROPIONATE 50 MCG
SPRAY, SUSPENSION (ML) NASAL
Qty: 16 G | Refills: 3 | Status: SHIPPED | OUTPATIENT
Start: 2020-04-23 | End: 2020-05-19

## 2020-05-01 DIAGNOSIS — M54.50 LOWER BACK PAIN: ICD-10-CM

## 2020-05-01 DIAGNOSIS — K21.9 GASTROESOPHAGEAL REFLUX DISEASE WITHOUT ESOPHAGITIS: ICD-10-CM

## 2020-05-01 DIAGNOSIS — M54.42 ACUTE LEFT-SIDED LOW BACK PAIN WITH LEFT-SIDED SCIATICA: ICD-10-CM

## 2020-05-01 RX ORDER — GABAPENTIN 100 MG/1
100 CAPSULE ORAL
Qty: 30 CAPSULE | Refills: 0 | Status: SHIPPED | OUTPATIENT
Start: 2020-05-01 | End: 2020-05-28

## 2020-05-04 DIAGNOSIS — I10 ESSENTIAL HYPERTENSION: ICD-10-CM

## 2020-05-05 RX ORDER — LISINOPRIL AND HYDROCHLOROTHIAZIDE 25; 20 MG/1; MG/1
1 TABLET ORAL DAILY
Qty: 90 TABLET | Refills: 2 | Status: SHIPPED | OUTPATIENT
Start: 2020-05-05 | End: 2020-06-03

## 2020-05-05 RX ORDER — OMEPRAZOLE 20 MG/1
20 CAPSULE, DELAYED RELEASE ORAL DAILY PRN
Qty: 30 CAPSULE | Refills: 1 | OUTPATIENT
Start: 2020-05-05

## 2020-05-17 DIAGNOSIS — J30.2 SEASONAL ALLERGIES: ICD-10-CM

## 2020-05-19 RX ORDER — FLUTICASONE PROPIONATE 50 MCG
SPRAY, SUSPENSION (ML) NASAL
Qty: 16 G | Refills: 3 | Status: SHIPPED | OUTPATIENT
Start: 2020-05-19 | End: 2021-01-26

## 2020-05-28 DIAGNOSIS — M54.50 LOWER BACK PAIN: ICD-10-CM

## 2020-05-28 DIAGNOSIS — M54.42 ACUTE LEFT-SIDED LOW BACK PAIN WITH LEFT-SIDED SCIATICA: ICD-10-CM

## 2020-05-28 RX ORDER — GABAPENTIN 100 MG/1
100 CAPSULE ORAL
Qty: 30 CAPSULE | Refills: 0 | Status: SHIPPED | OUTPATIENT
Start: 2020-05-28 | End: 2020-06-26

## 2020-06-02 ENCOUNTER — TELEPHONE (OUTPATIENT)
Dept: FAMILY MEDICINE CLINIC | Facility: CLINIC | Age: 75
End: 2020-06-02

## 2020-06-03 ENCOUNTER — OFFICE VISIT (OUTPATIENT)
Dept: FAMILY MEDICINE CLINIC | Facility: CLINIC | Age: 75
End: 2020-06-03

## 2020-06-03 VITALS
TEMPERATURE: 97.8 F | SYSTOLIC BLOOD PRESSURE: 154 MMHG | BODY MASS INDEX: 26.39 KG/M2 | WEIGHT: 134.4 LBS | DIASTOLIC BLOOD PRESSURE: 82 MMHG | HEART RATE: 82 BPM | HEIGHT: 60 IN | RESPIRATION RATE: 14 BRPM

## 2020-06-03 DIAGNOSIS — J45.20 MILD INTERMITTENT ASTHMA, UNSPECIFIED WHETHER COMPLICATED: ICD-10-CM

## 2020-06-03 DIAGNOSIS — I10 ESSENTIAL HYPERTENSION: Primary | ICD-10-CM

## 2020-06-03 DIAGNOSIS — F41.9 ANXIETY: ICD-10-CM

## 2020-06-03 DIAGNOSIS — K21.9 GASTROESOPHAGEAL REFLUX DISEASE WITHOUT ESOPHAGITIS: ICD-10-CM

## 2020-06-03 DIAGNOSIS — H10.10 SEASONAL ALLERGIC CONJUNCTIVITIS: ICD-10-CM

## 2020-06-03 PROBLEM — T78.40XA ALLERGIES: Status: ACTIVE | Noted: 2020-06-03

## 2020-06-03 PROCEDURE — 99214 OFFICE O/P EST MOD 30 MIN: CPT | Performed by: PHYSICIAN ASSISTANT

## 2020-06-03 PROCEDURE — 4004F PT TOBACCO SCREEN RCVD TLK: CPT | Performed by: PHYSICIAN ASSISTANT

## 2020-06-03 PROCEDURE — 3077F SYST BP >= 140 MM HG: CPT | Performed by: PHYSICIAN ASSISTANT

## 2020-06-03 PROCEDURE — 1160F RVW MEDS BY RX/DR IN RCRD: CPT | Performed by: PHYSICIAN ASSISTANT

## 2020-06-03 PROCEDURE — 3008F BODY MASS INDEX DOCD: CPT | Performed by: PHYSICIAN ASSISTANT

## 2020-06-03 PROCEDURE — 4040F PNEUMOC VAC/ADMIN/RCVD: CPT | Performed by: PHYSICIAN ASSISTANT

## 2020-06-03 PROCEDURE — 3079F DIAST BP 80-89 MM HG: CPT | Performed by: PHYSICIAN ASSISTANT

## 2020-06-03 RX ORDER — AMLODIPINE BESYLATE 2.5 MG/1
2.5 TABLET ORAL DAILY
Qty: 30 TABLET | Refills: 5 | Status: SHIPPED | OUTPATIENT
Start: 2020-06-03 | End: 2020-06-26 | Stop reason: SDUPTHER

## 2020-06-03 RX ORDER — ADHESIVE BANDAGE 3/4"
BANDAGE TOPICAL 2 TIMES DAILY
Qty: 1 EACH | Refills: 0 | Status: SHIPPED | OUTPATIENT
Start: 2020-06-03

## 2020-06-03 RX ORDER — ONDANSETRON 4 MG/1
4 TABLET, ORALLY DISINTEGRATING ORAL EVERY 8 HOURS PRN
Qty: 20 TABLET | Refills: 0 | Status: SHIPPED | OUTPATIENT
Start: 2020-06-03 | End: 2021-05-18

## 2020-06-03 RX ORDER — HYDROCHLOROTHIAZIDE 25 MG/1
25 TABLET ORAL DAILY
Qty: 30 TABLET | Refills: 5 | Status: SHIPPED | OUTPATIENT
Start: 2020-06-03 | End: 2020-06-26 | Stop reason: SDUPTHER

## 2020-06-03 RX ORDER — HYDROXYZINE HYDROCHLORIDE 25 MG/1
25 TABLET, FILM COATED ORAL EVERY 6 HOURS PRN
Qty: 30 TABLET | Refills: 0 | Status: SHIPPED | OUTPATIENT
Start: 2020-06-03 | End: 2021-03-23

## 2020-06-03 RX ORDER — OLOPATADINE HYDROCHLORIDE 1 MG/ML
1 SOLUTION/ DROPS OPHTHALMIC 2 TIMES DAILY
Qty: 5 ML | Refills: 2 | Status: SHIPPED | OUTPATIENT
Start: 2020-06-03 | End: 2021-05-18 | Stop reason: SDUPTHER

## 2020-06-04 ENCOUNTER — TELEPHONE (OUTPATIENT)
Dept: FAMILY MEDICINE CLINIC | Facility: CLINIC | Age: 75
End: 2020-06-04

## 2020-06-26 DIAGNOSIS — I10 ESSENTIAL HYPERTENSION: ICD-10-CM

## 2020-06-26 DIAGNOSIS — M54.42 ACUTE LEFT-SIDED LOW BACK PAIN WITH LEFT-SIDED SCIATICA: ICD-10-CM

## 2020-06-26 DIAGNOSIS — M54.50 LOWER BACK PAIN: ICD-10-CM

## 2020-06-26 RX ORDER — GABAPENTIN 100 MG/1
100 CAPSULE ORAL
Qty: 30 CAPSULE | Refills: 0 | Status: SHIPPED | OUTPATIENT
Start: 2020-06-26 | End: 2020-07-29

## 2020-06-29 RX ORDER — AMLODIPINE BESYLATE 2.5 MG/1
2.5 TABLET ORAL DAILY
Qty: 90 TABLET | Refills: 1 | Status: SHIPPED | OUTPATIENT
Start: 2020-06-29 | End: 2021-03-23

## 2020-06-29 RX ORDER — HYDROCHLOROTHIAZIDE 25 MG/1
25 TABLET ORAL DAILY
Qty: 90 TABLET | Refills: 1 | Status: SHIPPED | OUTPATIENT
Start: 2020-06-29 | End: 2020-12-24

## 2020-06-30 ENCOUNTER — TELEPHONE (OUTPATIENT)
Dept: FAMILY MEDICINE CLINIC | Facility: CLINIC | Age: 75
End: 2020-06-30

## 2020-06-30 NOTE — TELEPHONE ENCOUNTER
Left message for patient to call back the office to update chart prior to appointment for tomorrow per protocol  Await callback from patient

## 2020-07-01 ENCOUNTER — OFFICE VISIT (OUTPATIENT)
Dept: FAMILY MEDICINE CLINIC | Facility: CLINIC | Age: 75
End: 2020-07-01

## 2020-07-01 VITALS
TEMPERATURE: 97 F | WEIGHT: 129 LBS | HEART RATE: 77 BPM | DIASTOLIC BLOOD PRESSURE: 80 MMHG | HEIGHT: 60 IN | RESPIRATION RATE: 16 BRPM | SYSTOLIC BLOOD PRESSURE: 140 MMHG | BODY MASS INDEX: 25.32 KG/M2

## 2020-07-01 DIAGNOSIS — R42 DIZZINESS: ICD-10-CM

## 2020-07-01 DIAGNOSIS — N28.1 RENAL CYST: ICD-10-CM

## 2020-07-01 DIAGNOSIS — J30.2 SEASONAL ALLERGIES: Primary | ICD-10-CM

## 2020-07-01 DIAGNOSIS — I10 ESSENTIAL HYPERTENSION: ICD-10-CM

## 2020-07-01 PROCEDURE — 3077F SYST BP >= 140 MM HG: CPT | Performed by: PHYSICIAN ASSISTANT

## 2020-07-01 PROCEDURE — 99214 OFFICE O/P EST MOD 30 MIN: CPT | Performed by: PHYSICIAN ASSISTANT

## 2020-07-01 PROCEDURE — 4004F PT TOBACCO SCREEN RCVD TLK: CPT | Performed by: PHYSICIAN ASSISTANT

## 2020-07-01 PROCEDURE — 3079F DIAST BP 80-89 MM HG: CPT | Performed by: PHYSICIAN ASSISTANT

## 2020-07-01 PROCEDURE — 1160F RVW MEDS BY RX/DR IN RCRD: CPT | Performed by: PHYSICIAN ASSISTANT

## 2020-07-01 PROCEDURE — 4040F PNEUMOC VAC/ADMIN/RCVD: CPT | Performed by: PHYSICIAN ASSISTANT

## 2020-07-01 RX ORDER — CETIRIZINE HYDROCHLORIDE 10 MG/1
10 TABLET ORAL DAILY
Qty: 30 TABLET | Refills: 2 | Status: SHIPPED | OUTPATIENT
Start: 2020-07-01

## 2020-07-01 NOTE — ASSESSMENT & PLAN NOTE
Orthostasic negative  Advised to have labs drawn which were ordered by Rheumatology in 4/2020    Likely related to seasonal allergies

## 2020-07-01 NOTE — PROGRESS NOTES
Assessment/Plan:    Essential hypertension  Bp readings at pharmacy 130s/80s  Bp today 140/80  Advised to continue amlodipine 2 5 mg daily along with HCTZ 25 mg daily  Discussed following a low-sodium diet    Renal cyst  History of renal cyst-will send for CT scan of abd/pelvis    Seasonal allergies  Change Claritin to Zyrtec 10 mg po qd  May continue Flonase, Astelin, and Patanol  Declined referral to allergist    Dizziness  Orthostasic negative  Advised to have labs drawn which were ordered by Rheumatology in 4/2020  Likely related to seasonal allergies      Diagnoses and all orders for this visit:    Seasonal allergies  -     cetirizine (ZyrTEC) 10 mg tablet; Take 1 tablet (10 mg total) by mouth daily    Renal cyst  -     CT abdomen pelvis wo contrast; Future    Essential hypertension    Dizziness        Subjective:      Patient ID: Yadiel Thayer is a 77 y o  female f/u HTN and worsening allergies  HPI   HTN- BPcuff on backorder  Has been going to the pharmacy for bp checks  Reports readings are usually in 130s/80s  Taking HCTZ 25 mg daily along with amlodipine 2 5 mg daily  Denies any adverse effects  C/o worsening seasonal allergies  Complains of postnasal drip, nasal congestion, runny nose, ear fullness, itchy and watery eyes  Taking Claritin 10 mg daily, Patanol eyedrops, Flonase and Astelin as directed with minimal improvement  C/o dizziness when transitioning from a sitting to standing position  The dizziness lasts for a few seconds  During these times she tries to fall she feels off balance  No falls, palpitation, chest pain or SOB  Pt has h/o vertigo  H/o enlarging renal cyst, last CT scan 2015                       The following portions of the patient's history were reviewed and updated as appropriate: allergies, current medications, past family history, past medical history, past social history, past surgical history and problem list     Review of Systems   Constitutional: Negative for chills, fatigue and fever  HENT: Positive for congestion, postnasal drip, rhinorrhea and sinus pressure  Negative for ear discharge, ear pain, facial swelling, sneezing, trouble swallowing and voice change  Ear fullness   Eyes: Positive for discharge (watery)  Respiratory: Negative for cough, chest tightness, shortness of breath and wheezing  Cardiovascular: Negative for chest pain and palpitations  Gastrointestinal: Negative for abdominal pain, constipation, diarrhea, nausea and vomiting  Genitourinary: Negative for difficulty urinating  Musculoskeletal: Negative for arthralgias, myalgias, neck pain and neck stiffness  Skin: Negative for rash and wound  Allergic/Immunologic: Positive for environmental allergies  Neurological: Positive for dizziness  Negative for weakness, light-headedness, numbness and headaches  Psychiatric/Behavioral: Negative for agitation and behavioral problems  The patient is not nervous/anxious  Objective:      /80 (BP Location: Left arm, Patient Position: Sitting, Cuff Size: Large)   Pulse 77   Temp (!) 97 °F (36 1 °C) (Tympanic)   Resp 16   Ht 5' (1 524 m)   Wt 58 5 kg (129 lb)   BMI 25 19 kg/m²          Physical Exam   Constitutional: She is oriented to person, place, and time  She appears well-developed and well-nourished  No distress  HENT:   Head: Normocephalic and atraumatic  Right Ear: Hearing, tympanic membrane, external ear and ear canal normal    Left Ear: Hearing, tympanic membrane, external ear and ear canal normal    Nose: No rhinorrhea  Mouth/Throat: Oropharynx is clear and moist and mucous membranes are normal    Eyes: Pupils are equal, round, and reactive to light  Conjunctivae and EOM are normal    Neck: Normal range of motion  Neck supple  No tracheal deviation present  Cardiovascular: Normal rate, regular rhythm and normal heart sounds  No murmur heard    Pulmonary/Chest: Effort normal and breath sounds normal  No respiratory distress  She has no wheezes  Musculoskeletal: She exhibits no edema or deformity  Neurological: She is alert and oriented to person, place, and time  Psychiatric: She has a normal mood and affect   Her behavior is normal  Thought content normal

## 2020-07-01 NOTE — ASSESSMENT & PLAN NOTE
Change Claritin to Zyrtec 10 mg po qd  May continue Flonase, Astelin, and Patanol  Declined referral to allergist

## 2020-07-01 NOTE — ASSESSMENT & PLAN NOTE
Bp readings at pharmacy 130s/80s  Bp today 140/80  Advised to continue amlodipine 2 5 mg daily along with HCTZ 25 mg daily  Discussed following a low-sodium diet

## 2020-07-10 ENCOUNTER — TELEPHONE (OUTPATIENT)
Dept: FAMILY MEDICINE CLINIC | Facility: CLINIC | Age: 75
End: 2020-07-10

## 2020-07-10 NOTE — TELEPHONE ENCOUNTER
Received message from Toi Hamilton,  from [facility] inquiring if patient should be on [address]  Called, left message advising patient is taking Amlodopine 2 5mg and HCTZ 25mg    ([phone])

## 2020-07-16 DIAGNOSIS — M05.79 RHEUMATOID ARTHRITIS INVOLVING MULTIPLE SITES WITH POSITIVE RHEUMATOID FACTOR (HCC): ICD-10-CM

## 2020-07-16 DIAGNOSIS — M05.80 POLYARTHRITIS WITH POSITIVE RHEUMATOID FACTOR (HCC): ICD-10-CM

## 2020-07-16 RX ORDER — HYDROXYCHLOROQUINE SULFATE 200 MG/1
TABLET, FILM COATED ORAL
Qty: 90 TABLET | Refills: 0 | Status: SHIPPED | OUTPATIENT
Start: 2020-07-16 | End: 2020-10-13

## 2020-07-20 ENCOUNTER — APPOINTMENT (OUTPATIENT)
Dept: LAB | Facility: HOSPITAL | Age: 75
End: 2020-07-20
Payer: COMMERCIAL

## 2020-07-20 LAB
25(OH)D3 SERPL-MCNC: 11.7 NG/ML (ref 30–100)
ALBUMIN SERPL BCP-MCNC: 3 G/DL (ref 3.5–5)
ALP SERPL-CCNC: 88 U/L (ref 46–116)
ALT SERPL W P-5'-P-CCNC: 13 U/L (ref 12–78)
ANION GAP SERPL CALCULATED.3IONS-SCNC: 5 MMOL/L (ref 4–13)
AST SERPL W P-5'-P-CCNC: 8 U/L (ref 5–45)
BASOPHILS # BLD AUTO: 0.08 THOUSANDS/ΜL (ref 0–0.1)
BASOPHILS NFR BLD AUTO: 1 % (ref 0–1)
BILIRUB SERPL-MCNC: 0.24 MG/DL (ref 0.2–1)
BUN SERPL-MCNC: 23 MG/DL (ref 5–25)
CALCIUM SERPL-MCNC: 9.1 MG/DL (ref 8.3–10.1)
CHLORIDE SERPL-SCNC: 106 MMOL/L (ref 100–108)
CO2 SERPL-SCNC: 28 MMOL/L (ref 21–32)
CREAT SERPL-MCNC: 1.4 MG/DL (ref 0.6–1.3)
CRP SERPL QL: 7.1 MG/L
EOSINOPHIL # BLD AUTO: 0.64 THOUSAND/ΜL (ref 0–0.61)
EOSINOPHIL NFR BLD AUTO: 7 % (ref 0–6)
ERYTHROCYTE [DISTWIDTH] IN BLOOD BY AUTOMATED COUNT: 14.2 % (ref 11.6–15.1)
ERYTHROCYTE [SEDIMENTATION RATE] IN BLOOD: 41 MM/HOUR (ref 0–20)
GFR SERPL CREATININE-BSD FRML MDRD: 37 ML/MIN/1.73SQ M
GLUCOSE P FAST SERPL-MCNC: 94 MG/DL (ref 65–99)
HCT VFR BLD AUTO: 42.1 % (ref 34.8–46.1)
HGB BLD-MCNC: 13.4 G/DL (ref 11.5–15.4)
IMM GRANULOCYTES # BLD AUTO: 0.03 THOUSAND/UL (ref 0–0.2)
IMM GRANULOCYTES NFR BLD AUTO: 0 % (ref 0–2)
LYMPHOCYTES # BLD AUTO: 1.9 THOUSANDS/ΜL (ref 0.6–4.47)
LYMPHOCYTES NFR BLD AUTO: 20 % (ref 14–44)
MCH RBC QN AUTO: 30.1 PG (ref 26.8–34.3)
MCHC RBC AUTO-ENTMCNC: 31.8 G/DL (ref 31.4–37.4)
MCV RBC AUTO: 95 FL (ref 82–98)
MONOCYTES # BLD AUTO: 0.54 THOUSAND/ΜL (ref 0.17–1.22)
MONOCYTES NFR BLD AUTO: 6 % (ref 4–12)
NEUTROPHILS # BLD AUTO: 6.41 THOUSANDS/ΜL (ref 1.85–7.62)
NEUTS SEG NFR BLD AUTO: 66 % (ref 43–75)
NRBC BLD AUTO-RTO: 0 /100 WBCS
PLATELET # BLD AUTO: 251 THOUSANDS/UL (ref 149–390)
PMV BLD AUTO: 12.9 FL (ref 8.9–12.7)
POTASSIUM SERPL-SCNC: 4.1 MMOL/L (ref 3.5–5.3)
PROT SERPL-MCNC: 7.1 G/DL (ref 6.4–8.2)
RBC # BLD AUTO: 4.45 MILLION/UL (ref 3.81–5.12)
SODIUM SERPL-SCNC: 139 MMOL/L (ref 136–145)
TSH SERPL DL<=0.05 MIU/L-ACNC: 1.19 UIU/ML (ref 0.36–3.74)
WBC # BLD AUTO: 9.6 THOUSAND/UL (ref 4.31–10.16)

## 2020-07-20 PROCEDURE — 80053 COMPREHEN METABOLIC PANEL: CPT | Performed by: INTERNAL MEDICINE

## 2020-07-20 PROCEDURE — 86140 C-REACTIVE PROTEIN: CPT | Performed by: INTERNAL MEDICINE

## 2020-07-20 PROCEDURE — 85025 COMPLETE CBC W/AUTO DIFF WBC: CPT | Performed by: INTERNAL MEDICINE

## 2020-07-20 PROCEDURE — 85652 RBC SED RATE AUTOMATED: CPT | Performed by: INTERNAL MEDICINE

## 2020-07-20 PROCEDURE — 82306 VITAMIN D 25 HYDROXY: CPT | Performed by: INTERNAL MEDICINE

## 2020-07-20 PROCEDURE — 87340 HEPATITIS B SURFACE AG IA: CPT | Performed by: INTERNAL MEDICINE

## 2020-07-20 PROCEDURE — 86704 HEP B CORE ANTIBODY TOTAL: CPT | Performed by: INTERNAL MEDICINE

## 2020-07-20 PROCEDURE — 86705 HEP B CORE ANTIBODY IGM: CPT | Performed by: INTERNAL MEDICINE

## 2020-07-20 PROCEDURE — 36415 COLL VENOUS BLD VENIPUNCTURE: CPT | Performed by: INTERNAL MEDICINE

## 2020-07-20 PROCEDURE — 86235 NUCLEAR ANTIGEN ANTIBODY: CPT | Performed by: INTERNAL MEDICINE

## 2020-07-20 PROCEDURE — 84443 ASSAY THYROID STIM HORMONE: CPT | Performed by: INTERNAL MEDICINE

## 2020-07-20 PROCEDURE — 86803 HEPATITIS C AB TEST: CPT | Performed by: INTERNAL MEDICINE

## 2020-07-20 PROCEDURE — 86480 TB TEST CELL IMMUN MEASURE: CPT | Performed by: INTERNAL MEDICINE

## 2020-07-21 LAB
ENA SS-A AB SER-ACNC: 0.2 AI (ref 0–0.9)
ENA SS-B AB SER-ACNC: <0.2 AI (ref 0–0.9)
HBV CORE AB SER QL: NORMAL
HBV CORE IGM SER QL: NORMAL
HBV SURFACE AG SER QL: NORMAL
HCV AB SER QL: NORMAL

## 2020-07-22 LAB
GAMMA INTERFERON BACKGROUND BLD IA-ACNC: 0.07 IU/ML
M TB IFN-G BLD-IMP: NEGATIVE
M TB IFN-G CD4+ BCKGRND COR BLD-ACNC: -0.01 IU/ML
M TB IFN-G CD4+ BCKGRND COR BLD-ACNC: -0.02 IU/ML
MITOGEN IGNF BCKGRD COR BLD-ACNC: 6.85 IU/ML

## 2020-07-25 DIAGNOSIS — M54.50 LOWER BACK PAIN: ICD-10-CM

## 2020-07-25 DIAGNOSIS — M54.42 ACUTE LEFT-SIDED LOW BACK PAIN WITH LEFT-SIDED SCIATICA: ICD-10-CM

## 2020-07-29 ENCOUNTER — OFFICE VISIT (OUTPATIENT)
Dept: RHEUMATOLOGY | Facility: CLINIC | Age: 75
End: 2020-07-29
Payer: COMMERCIAL

## 2020-07-29 VITALS
WEIGHT: 129 LBS | TEMPERATURE: 98.1 F | SYSTOLIC BLOOD PRESSURE: 140 MMHG | DIASTOLIC BLOOD PRESSURE: 100 MMHG | BODY MASS INDEX: 25.32 KG/M2 | HEIGHT: 60 IN

## 2020-07-29 DIAGNOSIS — M05.79 RHEUMATOID ARTHRITIS INVOLVING MULTIPLE SITES WITH POSITIVE RHEUMATOID FACTOR (HCC): Primary | ICD-10-CM

## 2020-07-29 DIAGNOSIS — Z79.899 HIGH RISK MEDICATION USE: ICD-10-CM

## 2020-07-29 DIAGNOSIS — E55.9 VITAMIN D DEFICIENCY: ICD-10-CM

## 2020-07-29 PROCEDURE — 3008F BODY MASS INDEX DOCD: CPT | Performed by: PHYSICIAN ASSISTANT

## 2020-07-29 PROCEDURE — 3077F SYST BP >= 140 MM HG: CPT | Performed by: INTERNAL MEDICINE

## 2020-07-29 PROCEDURE — 4040F PNEUMOC VAC/ADMIN/RCVD: CPT | Performed by: INTERNAL MEDICINE

## 2020-07-29 PROCEDURE — 3080F DIAST BP >= 90 MM HG: CPT | Performed by: INTERNAL MEDICINE

## 2020-07-29 PROCEDURE — 3008F BODY MASS INDEX DOCD: CPT | Performed by: INTERNAL MEDICINE

## 2020-07-29 PROCEDURE — 4004F PT TOBACCO SCREEN RCVD TLK: CPT | Performed by: INTERNAL MEDICINE

## 2020-07-29 PROCEDURE — 99215 OFFICE O/P EST HI 40 MIN: CPT | Performed by: INTERNAL MEDICINE

## 2020-07-29 PROCEDURE — 1160F RVW MEDS BY RX/DR IN RCRD: CPT | Performed by: INTERNAL MEDICINE

## 2020-07-29 RX ORDER — ERGOCALCIFEROL (VITAMIN D2) 1250 MCG
50000 CAPSULE ORAL WEEKLY
Qty: 12 CAPSULE | Refills: 1 | Status: SHIPPED | OUTPATIENT
Start: 2020-07-29 | End: 2021-05-18 | Stop reason: ALTCHOICE

## 2020-07-29 RX ORDER — GABAPENTIN 100 MG/1
100 CAPSULE ORAL
Qty: 30 CAPSULE | Refills: 0 | Status: SHIPPED | OUTPATIENT
Start: 2020-07-29 | End: 2020-08-27

## 2020-07-29 RX ORDER — FOLIC ACID 1 MG/1
1 TABLET ORAL DAILY
Qty: 30 TABLET | Refills: 5 | Status: SHIPPED | OUTPATIENT
Start: 2020-07-29 | End: 2021-01-24

## 2020-07-29 NOTE — PATIENT INSTRUCTIONS
Do x-rays today  Schedule DEXA scan  Do labs before next visit  Start folic acid daily  Take methotrexate 4 tabs once a week  Start high-dose Vit  D pill once a week    Return to clinic in 3 months - [place]    Rheumatoid Arthritis   AMBULATORY CARE:   Rheumatoid arthritis  is a long-term autoimmune disease that causes inflammation and damage to your joints  RA causes your body's immune system to attack the synovial membrane (lining) in your joints  RA can also affect other organs, such as your eyes, heart, or lungs  It may also increase your risk of osteoporosis (weakened bones)  Common symptoms include the following:   · Joint pain and stiffness that lasts longer than 1 hour    · Swollen joints in the same joint on both sides of your body    · Loss of joint movement     · Firm, round nodules (growths) on your joints    · Fatigue or muscle weakness    · Loss of appetite or weight loss  Seek care immediately if:   · You have increased joint swelling, pain, or redness  · You have sudden shortness of breath  · You lose feeling in your hands or feet  Contact your healthcare provider or rheumatologist if:   · You have a fever  · Your skin is itchy, swollen, or has a rash  · Your symptoms are getting worse, even with treatment  · You have questions or concerns about your condition or care  Treatment for rheumatoid arthritis  may include any of the following:  · Antirheumatics  help slow the progress of RA, and reduce pain, stiffness, and inflammation  · NSAIDs , such as ibuprofen, help decrease swelling, pain, and fever  This medicine is available with or without a doctor's order  NSAIDs can cause stomach bleeding or kidney problems in certain people  If you take blood thinner medicine, always ask your healthcare provider if NSAIDs are safe for you  Always read the medicine label and follow directions  · Steroids  help decrease inflammation      · Biologic therapy  helps decrease joint swelling, pain, and stiffness  These medicines increase the risk of serious infection and require careful monitoring  · Take your medicine as directed  Contact your healthcare provider if you think your medicine is not helping or if you have side effects  Tell him of her if you are allergic to any medicine  Keep a list of the medicines, vitamins, and herbs you take  Include the amounts, and when and why you take them  Bring the list or the pill bottles to follow-up visits  Carry your medicine list with you in case of an emergency  · Surgery  may be needed to remove all or part of your joint  An implant may be placed to help reduce pain and repair the joint  Manage your symptoms:   · Go to physical and occupational therapy as directed  A physical therapist can teach you exercises to help improve movement and strength, and to decrease pain  An occupational therapist can teach you skills to help with your daily activities  · Use support devices  You may be given splints or braces to help your joints rest and to decrease inflammation  · Rest when your joints are painful  Limit your activities until your symptoms improve  Gradually start your normal activities when you can do them without pain  Avoid motions and activities that cause strain on your joints, such as heavy exercise and lifting  · Apply ice or heat  Both can help decrease swelling and pain  Use an ice pack, or put crushed ice in a plastic bag  Cover it with a towel and place it on your joint for 15 to 20 minutes every hour or as directed  You can apply heat for 20 minutes every 2 hours  Heat treatment includes hot packs or a warm compress  · Maintain a healthy weight  to decrease the strain on joints in your back, knees, ankles, and feet  Ask your healthcare provider how much you should weigh  Ask him to help you create a weight loss plan if you are overweight  · Physical activity  can help increase strength and flexibility   Be as active as possible while avoiding things that increase your pain  Ask your healthcare provider or rheumatologist about the best exercise plan for you  Ask your healthcare provider about vaccines: You may be at an increased risk for infections due to RA and its treatment  Vaccines may prevent infections from various viruses  Follow up with your healthcare provider as directed:  Write down your questions so you remember to ask them during your visits  © 2017 [address] [employer] Information is for End User's use only and may not be sold, redistributed or otherwise used for commercial purposes  All illustrations and images included in CareNotes® are the copyrighted property of [employer]  or Dewayne Melendez  The above information is an  only  It is not intended as medical advice for individual conditions or treatments  Talk to your doctor, nurse or pharmacist before following any medical regimen to see if it is safe and effective for you

## 2020-07-29 NOTE — PROGRESS NOTES
Assessment and Plan: Mike Pedro is a 77 y o  female who presents for follow-up of her seropositive Rheumatoid arthritis  She saw Dr Paz Caal as a Telemedicine visit at her initial visit last visit  Her HERRON-28 score today is elevated at 4 79, consistent with moderate disease activity  Since patient has been unable to tolerate hydroxychloroquine, given the the severity of her disease activity and elevated inflammatory markers, she would likely benefit from advancing therapy to methotrexate; started her on methotrexate 10 mg p o  Weekly with daily folic acid  The risks and benefits of this medication reviewed with patient  On her recent bloodwork, she also was found to have vitamin-D deficiency, so have prescribed ergocalciferol 50,000 units p o  Weekly  Ordered repeat ESR/CRP inflammatory markers for disease activity monitoring and CBC/CMP for methotrexate side effect monitoring before next clinic visit  Also ordered DEXA scan to evaluate for osteoporosis  Asked patient to complete the hand and feet x-rays today ordered by Dr Paz Caal at her last visit in order to get her current baseline of any RA changes  Will reassess how patient is doing in 3 months  Plan:  Diagnoses and all orders for this visit:    Rheumatoid arthritis involving multiple sites with positive rheumatoid factor (HCC)  -     methotrexate 2 5 mg tablet; 4 tablet po weekly (take all 4 tablets on the same day every week)  -     folic acid (FOLVITE) 1 mg tablet; Take 1 tablet (1 mg total) by mouth daily  -     CBC and differential; Future  -     Comprehensive metabolic panel; Future  -     C-reactive protein; Future  -     Sedimentation rate, automated; Future    High risk medication use - Benefits and risks of methotrexate use, including but not limited to gastrointestinal disturbances such as diarrhea, hair loss, fatigue, stomatitis, leukopenia, lung hypersensitivity reaction, hepatotoxicity, and lymphoma were discussed with the patient  Baseline viral hepatitis panel and TB quantiferon are negative  CBC/CMP will be monitored regularly  Patient was also prescribed Folic Acid 1 mg po daily to take while on methotrexate to help prevent side effects  Patient counseled on abstinence from alcohol while using methotrexate  -     folic acid (FOLVITE) 1 mg tablet; Take 1 tablet (1 mg total) by mouth daily  -     CBC and differential; Future  -     Comprehensive metabolic panel; Future    Vitamin D deficiency  -     ergocalciferol (ERGOCALCIFEROL) 1 25 MG (79524 UT) capsule; Take 1 capsule (50,000 Units total) by mouth once a week    Follow-up plan: Return to clinic in 3 months at [address]:  Patient is a 78-year-old female who originally presented 4/14/20 as a telemedicine visit with Dr Dominic Jay to establish rheumatology care for seropositive RA with high titer CCP antibody  Patient was a poor historian and history was limited  She denied ever seeing a rheumatologist in the past and it was unclear how long she had issues with clinical RA  Her labs showed positive rheumatoid factor and high titer CCP antibody from last year  She reported joint pain and stiffness in her fingers with morning stiffness for up to 1 hour  She otherwise denied other joint involvement  She mainly complained of her lumbar spine pain for which she follows with Pain Management and neuro surgery  It was discussed with her that given the lab workup showing seropositive RA and her symptoms of the hand stiffness in the morning, it was recommended for her to be started on treatment  She was started on Plaquenil since she previously had never been on treatment and is mainly complaining of only hand involvement, with next step being methotrexate  Updated bloodwork and hand and feet x-rays were ordered   Plan was to follow-up with patient in-clinic in 3 months to reassess how she is doing on Plaquenil and determine need for escalation in therapy  HPI   Vasiliy Triplett is a 77 y o   female who presents for follow-up of her seropositive Rheumatoid arthritis  She admits that she was diagnosed with RA several years ago, but was barely put on any medication  She admits to her mother having bad knees  Patient admits to her hands getting cramped, and her ankles and shoulders getting bad  She admits to getting stiffness in her neck, shoulders, and fingers in the morning for half an hour  She admits that she took hydroxychloroquine for 2 days, but stopped it since it caused dizziness, and was not feeling well on it  She admits to smoking cigarettes, 1 pack lasting her 3 days, and denies alcohol use  The following portions of the patient's history were reviewed and updated as appropriate: allergies, current medications, past family history, past medical history, past social history, past surgical history and problem list     Review of Systems:   Review of Systems   Constitutional: Negative for fatigue and unexpected weight change  HENT: Negative for mouth sores  Respiratory: Negative for cough and shortness of breath  Gastrointestinal: Negative for constipation and diarrhea  Musculoskeletal: Positive for arthralgias and neck pain  Negative for back pain, joint swelling and myalgias  Skin: Negative for color change and rash  Neurological: Negative for weakness  Psychiatric/Behavioral: Negative for sleep disturbance         Home Medications:     Current Outpatient Medications:     acetaminophen (TYLENOL) 500 mg tablet, Take 1 tablet (500 mg total) by mouth every 6 (six) hours as needed for mild pain, Disp: 60 tablet, Rfl: 0    albuterol (2 5 mg/3 mL) 0 083 % nebulizer solution, Take 1 vial (2 5 mg total) by nebulization every 4 (four) hours as needed for wheezing or shortness of breath, Disp: 75 vial, Rfl: 0    amLODIPine (NORVASC) 2 5 mg tablet, Take 1 tablet (2 5 mg total) by mouth daily, Disp: 90 tablet, Rfl: 1    azelastine (ASTELIN) 0 1 % nasal spray, 2 sprays into each nostril 2 (two) times a day Use in each nostril as directed, Disp: 1 Bottle, Rfl: 2    Blood Pressure Monitoring (BLOOD PRESSURE CUFF) MISC, by Does not apply route 2 (two) times a day, Disp: 1 each, Rfl: 0    cetirizine (ZyrTEC) 10 mg tablet, Take 1 tablet (10 mg total) by mouth daily, Disp: 30 tablet, Rfl: 2    dicyclomine (BENTYL) 10 mg capsule, Take 1 capsule (10 mg total) by mouth 4 (four) times a day (before meals and at bedtime), Disp: 30 capsule, Rfl: 3    fluticasone (FLONASE) 50 mcg/act nasal spray, instill 2 sprays into each nostril once daily, Disp: 16 g, Rfl: 3    hydrochlorothiazide (HYDRODIURIL) 25 mg tablet, Take 1 tablet (25 mg total) by mouth daily, Disp: 90 tablet, Rfl: 1    hydroxychloroquine (PLAQUENIL) 200 mg tablet, take 1 tablet by mouth once daily with breakfast, Disp: 90 tablet, Rfl: 0    olopatadine (PATANOL) 0 1 % ophthalmic solution, Administer 1 drop to both eyes 2 (two) times a day, Disp: 5 mL, Rfl: 2    ondansetron (ZOFRAN-ODT) 4 mg disintegrating tablet, Take 1 tablet (4 mg total) by mouth every 8 (eight) hours as needed for vomiting, Disp: 20 tablet, Rfl: 0    pantoprazole (PROTONIX) 40 mg tablet, Take 1 tablet (40 mg total) by mouth daily, Disp: 30 tablet, Rfl: 3    VENTOLIN  (90 Base) MCG/ACT inhaler, Inhale 2 puffs every 6 (six) hours as needed for wheezing, Disp: 1 Inhaler, Rfl: 3    ergocalciferol (ERGOCALCIFEROL) 1 25 MG (89352 UT) capsule, Take 1 capsule (50,000 Units total) by mouth once a week, Disp: 12 capsule, Rfl: 1    folic acid (FOLVITE) 1 mg tablet, Take 1 tablet (1 mg total) by mouth daily, Disp: 30 tablet, Rfl: 5    gabapentin (NEURONTIN) 100 mg capsule, TAKE 1 CAPSULE (100 MG TOTAL) BY MOUTH DAILY AT BEDTIME, Disp: 30 capsule, Rfl: 0    hydrOXYzine HCL (ATARAX) 25 mg tablet, Take 1 tablet (25 mg total) by mouth every 6 (six) hours as needed for anxiety (Patient not taking: Reported on 7/29/2020), Disp: 30 tablet, Rfl: 0    methotrexate 2 5 mg tablet, 4 tablet po weekly (take all 4 tablets on the same day every week), Disp: 20 tablet, Rfl: 3    Objective:    Vitals:    07/29/20 0912   BP: 140/100   BP Location: Left arm   Patient Position: Sitting   Cuff Size: Standard   Temp: 98 1 °F (36 7 °C)   TempSrc: Tympanic   Weight: 58 5 kg (129 lb)   Height: 5' (1 524 m)       Physical Exam  Constitutional:       General: She is not in acute distress  Appearance: She is well-developed  HENT:      Head: Normocephalic and atraumatic  Eyes:      General: Lids are normal  No scleral icterus  Conjunctiva/sclera: Conjunctivae normal    Neck:      Musculoskeletal: Neck supple  No muscular tenderness  Cardiovascular:      Rate and Rhythm: Normal rate and regular rhythm  Heart sounds: S1 normal and S2 normal  No murmur  No friction rub  Pulmonary:      Effort: Pulmonary effort is normal  No tachypnea or respiratory distress  Breath sounds: Normal breath sounds  No wheezing, rhonchi or rales  Musculoskeletal:      Comments: Limited flexion at bilateral shoulders; bilateral shoulder and upper trapezius tenderness; right fourth trigger finger palmar nodule palpable   Skin:     General: Skin is warm and dry  Findings: No rash  Nails: There is no clubbing  Neurological:      Mental Status: She is alert  Sensory: No sensory deficit  Psychiatric:         Behavior: Behavior normal  Behavior is cooperative         Musculoskeletal--Peripheral Joint Exam:  Physical Exam     Tenderness:   RUE: wrist  LUE: wrist  Right hand: 1st MCP, 2nd PIP, 3rd PIP and 4th PIP  Left hand: 1st MCP, 2nd PIP, 3rd PIP and 4th PIP    HERRON-28 tender joint count: 10  HERRON-28 swollen joint count: 0  ESR (mm/hr): 41  Patient global assessment: 30  HERRON-28 score: 4 79 (Moderate Disease Activity)    Disease Activity Monitoring  ESR: 26 (1/17/19) --> 41 (7/20/20)   CRP: 4 8 (1/17/19) --> 7 1 (7/20/20)      Reviewed labs and imaging  Imaging:   Lumbar Spine x-rays 2/3/20  IMPRESSION:  Mild lower lumbar spine degenerative change without evidence of instability on flexion or extension views  No acute findings  MRI Lumbar Spine w/o Contrast 2/3/20  IMPRESSION:  1  Severe degenerative canal stenosis at level L4-5   2   Disc bulge and superimposed right paracentral disc protrusion at level L5-S1 without significant canal narrowing  There is abutment/possible impingement of right S1 nerve root  Correlate for right S1 radiculopathy  Labs:   Telemedicine on 04/14/2020   Component Date Value Ref Range Status    TSH 3RD GENERATON 07/20/2020 1 190  0 358 - 3 740 uIU/mL Final      The recommended reference ranges for TSH during pregnancy are as follows:   First trimester 0 1 to 2 5 uIU/mL   Second trimester  0 2 to 3 0 uIU/mL   Third trimester 0 3 to 3 0 uIU/m    Note: Normal ranges may not apply to patients who are transgender, non-binary, or whose legal sex, sex at birth, and gender identity differ  Using supplements with high doses of biotin 20 to more than 300 times greater than the adequate daily intake for adults of 30 mcg/day as established by the [place] [employer], can cause falsely depress results      Vit D, 25-Hydroxy 07/20/2020 11 7* 30 0 - 100 0 ng/mL Final    Hepatitis B Surface Ag 07/20/2020 Non-reactive  Non-reactive, NonReactive - Confirmed Final    Hepatitis C Ab 07/20/2020 Non-reactive  Non-reactive Final    Hep B C IgM 07/20/2020 Non-reactive  Non-reactive Final    Hep B Core Total Ab 07/20/2020 Non-reactive  Non-reactive Final    CRP 07/20/2020 7 1* <3 0 mg/L Final    Sodium 07/20/2020 139  136 - 145 mmol/L Final    Potassium 07/20/2020 4 1  3 5 - 5 3 mmol/L Final    Chloride 07/20/2020 106  100 - 108 mmol/L Final    CO2 07/20/2020 28  21 - 32 mmol/L Final    ANION GAP 07/20/2020 5  4 - 13 mmol/L Final    BUN 07/20/2020 23  5 - 25 mg/dL Final    Creatinine 07/20/2020 1 40* 0 60 - 1 30 mg/dL Final    Standardized to IDMS reference method    Glucose, Fasting 07/20/2020 94  65 - 99 mg/dL Final      Specimen collection should occur prior to Sulfasalazine administration due to the potential for falsely depressed results  Specimen collection should occur prior to Sulfapyridine administration due to the potential for falsely elevated results   Calcium 07/20/2020 9 1  8 3 - 10 1 mg/dL Final    AST 07/20/2020 8  5 - 45 U/L Final      Specimen collection should occur prior to Sulfasalazine administration due to the potential for falsely depressed results   ALT 07/20/2020 13  12 - 78 U/L Final      Specimen collection should occur prior to Sulfasalazine and/or Sulfapyridine administration due to the potential for falsely depressed results   Alkaline Phosphatase 07/20/2020 88  46 - 116 U/L Final    Total Protein 07/20/2020 7 1  6 4 - 8 2 g/dL Final    Albumin 07/20/2020 3 0* 3 5 - 5 0 g/dL Final    Total Bilirubin 07/20/2020 0 24  0 20 - 1 00 mg/dL Final      Use of this assay is not recommended for patients undergoing treatment with eltrombopag due to the potential for falsely elevated results      eGFR 07/20/2020 37  ml/min/1 73sq m Final    WBC 07/20/2020 9 60  4 31 - 10 16 Thousand/uL Final    RBC 07/20/2020 4 45  3 81 - 5 12 Million/uL Final    Hemoglobin 07/20/2020 13 4  11 5 - 15 4 g/dL Final    Hematocrit 07/20/2020 42 1  34 8 - 46 1 % Final    MCV 07/20/2020 95  82 - 98 fL Final    MCH 07/20/2020 30 1  26 8 - 34 3 pg Final    MCHC 07/20/2020 31 8  31 4 - 37 4 g/dL Final    RDW 07/20/2020 14 2  11 6 - 15 1 % Final    MPV 07/20/2020 12 9* 8 9 - 12 7 fL Final    Platelets 14/94/6452 251  149 - 390 Thousands/uL Final    nRBC 07/20/2020 0  /100 WBCs Final    Neutrophils Relative 07/20/2020 66  43 - 75 % Final    Immat GRANS % 07/20/2020 0  0 - 2 % Final    Lymphocytes Relative 07/20/2020 20  14 - 44 % Final    Monocytes Relative 07/20/2020 6  4 - 12 % Final    Eosinophils Relative 07/20/2020 7* 0 - 6 % Final    Basophils Relative 07/20/2020 1  0 - 1 % Final    Neutrophils Absolute 07/20/2020 6 41  1 85 - 7 62 Thousands/µL Final    Immature Grans Absolute 07/20/2020 0 03  0 00 - 0 20 Thousand/uL Final    Lymphocytes Absolute 07/20/2020 1 90  0 60 - 4 47 Thousands/µL Final    Monocytes Absolute 07/20/2020 0 54  0 17 - 1 22 Thousand/µL Final    Eosinophils Absolute 07/20/2020 0 64* 0 00 - 0 61 Thousand/µL Final    Basophils Absolute 07/20/2020 0 08  0 00 - 0 10 Thousands/µL Final    SS-A (RO) Ab 07/20/2020 0 2  0 0 - 0 9 AI Final    SS-B (LA) Ab 07/20/2020 <0 2  0 0 - 0 9 AI Final    Sed Rate 07/20/2020 41* 0 - 20 mm/hour Final    QFT Nil 07/20/2020 0 07  0 - 8 0 IU/ml Final    QFT TB1-NIL 07/20/2020 -0 01  IU/ml Final    QFT TB2-NIL 07/20/2020 -0 02  IU/ml Final    QFT Mitogen-NIL 07/20/2020 6 85  IU/ml Final    QFT Final Interpretation 07/20/2020 Negative  Negative Final    No Interferon-gamma response to M  tuberculosis antigens detected  Infection with M  tuberculosis is unlikely  A single negative result does not exclude infection with M  tuberculosis  In patients at high risk for M  tuberculosis infection, a second test should be considered in accordance with the 2017 ATS/IDSA/CDC Clinical Practice Guidelines for Diagnosis of Tuberculosis in Adults and Children  False negative results can be a result of incorrect blood sample collection or handling of the specimen affecting lymphocyte function     Admission on 01/20/2020, Discharged on 01/20/2020   Component Date Value Ref Range Status    WBC 01/20/2020 11 37* 4 31 - 10 16 Thousand/uL Final    RBC 01/20/2020 4 59  3 81 - 5 12 Million/uL Final    Hemoglobin 01/20/2020 13 4  11 5 - 15 4 g/dL Final    Hematocrit 01/20/2020 42 4  34 8 - 46 1 % Final    MCV 01/20/2020 92  82 - 98 fL Final    MCH 01/20/2020 29 2  26 8 - 34 3 pg Final    MCHC 01/20/2020 31 6  31 4 - 37 4 g/dL Final    RDW 01/20/2020 14 5  11 6 - 15 1 % Final    MPV 01/20/2020 11 6  8 9 - 12 7 fL Final    Platelets 78/51/1546 252  149 - 390 Thousands/uL Final    nRBC 01/20/2020 0  /100 WBCs Final    Neutrophils Relative 01/20/2020 74  43 - 75 % Final    Immat GRANS % 01/20/2020 0  0 - 2 % Final    Lymphocytes Relative 01/20/2020 18  14 - 44 % Final    Monocytes Relative 01/20/2020 5  4 - 12 % Final    Eosinophils Relative 01/20/2020 2  0 - 6 % Final    Basophils Relative 01/20/2020 1  0 - 1 % Final    Neutrophils Absolute 01/20/2020 8 44* 1 85 - 7 62 Thousands/µL Final    Immature Grans Absolute 01/20/2020 0 03  0 00 - 0 20 Thousand/uL Final    Lymphocytes Absolute 01/20/2020 2 01  0 60 - 4 47 Thousands/µL Final    Monocytes Absolute 01/20/2020 0 59  0 17 - 1 22 Thousand/µL Final    Eosinophils Absolute 01/20/2020 0 23  0 00 - 0 61 Thousand/µL Final    Basophils Absolute 01/20/2020 0 07  0 00 - 0 10 Thousands/µL Final    Sodium 01/20/2020 144  136 - 145 mmol/L Final    Potassium 01/20/2020 4 4  3 5 - 5 3 mmol/L Final    Chloride 01/20/2020 113* 100 - 108 mmol/L Final    CO2 01/20/2020 24  21 - 32 mmol/L Final    ANION GAP 01/20/2020 7  4 - 13 mmol/L Final    BUN 01/20/2020 14  5 - 25 mg/dL Final    Creatinine 01/20/2020 1 04  0 60 - 1 30 mg/dL Final    Standardized to IDMS reference method    Glucose 01/20/2020 92  65 - 140 mg/dL Final      If the patient is fasting, the ADA then defines impaired fasting glucose as > 100 mg/dL and diabetes as > or equal to 123 mg/dL  Specimen collection should occur prior to Sulfasalazine administration due to the potential for falsely depressed results  Specimen collection should occur prior to Sulfapyridine administration due to the potential for falsely elevated results      Calcium 01/20/2020 8 9  8 3 - 10 1 mg/dL Final    eGFR 01/20/2020 53  ml/min/1 73sq m Final

## 2020-08-26 ENCOUNTER — TELEPHONE (OUTPATIENT)
Dept: OBGYN CLINIC | Facility: HOSPITAL | Age: 75
End: 2020-08-26

## 2020-08-26 NOTE — TELEPHONE ENCOUNTER
Patient sees Dr Albino Rushing at [facility] is calling in wanting to speak with practice admin e-mail forwarded over to her to contact her back at [phone]

## 2020-08-27 DIAGNOSIS — M54.50 LOWER BACK PAIN: ICD-10-CM

## 2020-08-27 DIAGNOSIS — K21.9 GASTROESOPHAGEAL REFLUX DISEASE WITHOUT ESOPHAGITIS: ICD-10-CM

## 2020-08-27 DIAGNOSIS — M54.42 ACUTE LEFT-SIDED LOW BACK PAIN WITH LEFT-SIDED SCIATICA: ICD-10-CM

## 2020-08-27 RX ORDER — GABAPENTIN 100 MG/1
100 CAPSULE ORAL
Qty: 30 CAPSULE | Refills: 0 | Status: SHIPPED | OUTPATIENT
Start: 2020-08-27 | End: 2020-10-05

## 2020-08-27 RX ORDER — PANTOPRAZOLE SODIUM 40 MG/1
40 TABLET, DELAYED RELEASE ORAL DAILY
Qty: 30 TABLET | Refills: 3 | Status: SHIPPED | OUTPATIENT
Start: 2020-08-27 | End: 2020-12-27

## 2020-08-27 NOTE — TELEPHONE ENCOUNTER
Hany Gonzales at [facility] is calling to speak with the practice admin in reference to a patient complaint   The call was transferred to [facility] at [phone]

## 2020-09-25 DIAGNOSIS — M54.42 ACUTE LEFT-SIDED LOW BACK PAIN WITH LEFT-SIDED SCIATICA: ICD-10-CM

## 2020-09-25 DIAGNOSIS — M54.50 LOWER BACK PAIN: ICD-10-CM

## 2020-09-25 RX ORDER — GABAPENTIN 100 MG/1
100 CAPSULE ORAL
Qty: 30 CAPSULE | Refills: 0 | OUTPATIENT
Start: 2020-09-25

## 2020-10-05 DIAGNOSIS — M54.42 ACUTE LEFT-SIDED LOW BACK PAIN WITH LEFT-SIDED SCIATICA: ICD-10-CM

## 2020-10-05 DIAGNOSIS — M54.50 LOWER BACK PAIN: ICD-10-CM

## 2020-10-05 RX ORDER — GABAPENTIN 100 MG/1
100 CAPSULE ORAL
Qty: 90 CAPSULE | Refills: 1 | Status: SHIPPED | OUTPATIENT
Start: 2020-10-05 | End: 2021-03-23

## 2020-10-13 DIAGNOSIS — M05.79 RHEUMATOID ARTHRITIS INVOLVING MULTIPLE SITES WITH POSITIVE RHEUMATOID FACTOR (HCC): ICD-10-CM

## 2020-10-13 DIAGNOSIS — M05.80 POLYARTHRITIS WITH POSITIVE RHEUMATOID FACTOR (HCC): ICD-10-CM

## 2020-10-13 RX ORDER — HYDROXYCHLOROQUINE SULFATE 200 MG/1
TABLET, FILM COATED ORAL
Qty: 90 TABLET | Refills: 0 | Status: SHIPPED | OUTPATIENT
Start: 2020-10-13 | End: 2021-05-18

## 2020-12-24 DIAGNOSIS — I10 ESSENTIAL HYPERTENSION: ICD-10-CM

## 2020-12-24 RX ORDER — HYDROCHLOROTHIAZIDE 25 MG/1
TABLET ORAL
Qty: 90 TABLET | Refills: 1 | Status: SHIPPED | OUTPATIENT
Start: 2020-12-24 | End: 2021-05-18 | Stop reason: SDUPTHER

## 2020-12-27 DIAGNOSIS — K21.9 GASTROESOPHAGEAL REFLUX DISEASE WITHOUT ESOPHAGITIS: ICD-10-CM

## 2020-12-27 RX ORDER — PANTOPRAZOLE SODIUM 40 MG/1
40 TABLET, DELAYED RELEASE ORAL DAILY
Qty: 30 TABLET | Refills: 3 | Status: SHIPPED | OUTPATIENT
Start: 2020-12-27 | End: 2021-04-27

## 2021-01-12 NOTE — TELEPHONE ENCOUNTER
Called patient again to update chart for tomorrow's appt per protocol  Left message for patient to call back the office  2

## 2021-01-21 DIAGNOSIS — E55.9 VITAMIN D DEFICIENCY: ICD-10-CM

## 2021-01-21 RX ORDER — ERGOCALCIFEROL 1.25 MG/1
CAPSULE ORAL
Qty: 12 CAPSULE | Refills: 1 | OUTPATIENT
Start: 2021-01-21

## 2021-01-23 DIAGNOSIS — Z79.899 HIGH RISK MEDICATION USE: ICD-10-CM

## 2021-01-23 DIAGNOSIS — M05.79 RHEUMATOID ARTHRITIS INVOLVING MULTIPLE SITES WITH POSITIVE RHEUMATOID FACTOR (HCC): ICD-10-CM

## 2021-01-23 DIAGNOSIS — J30.2 SEASONAL ALLERGIES: ICD-10-CM

## 2021-01-24 RX ORDER — FOLIC ACID 1 MG/1
TABLET ORAL
Qty: 30 TABLET | Refills: 5 | Status: SHIPPED | OUTPATIENT
Start: 2021-01-24

## 2021-01-26 RX ORDER — FLUTICASONE PROPIONATE 50 MCG
SPRAY, SUSPENSION (ML) NASAL
Qty: 16 G | Refills: 3 | Status: SHIPPED | OUTPATIENT
Start: 2021-01-26 | End: 2021-05-18 | Stop reason: SDUPTHER

## 2021-03-23 ENCOUNTER — TELEPHONE (OUTPATIENT)
Dept: FAMILY MEDICINE CLINIC | Facility: CLINIC | Age: 76
End: 2021-03-23

## 2021-03-23 ENCOUNTER — OFFICE VISIT (OUTPATIENT)
Dept: FAMILY MEDICINE CLINIC | Facility: CLINIC | Age: 76
End: 2021-03-23

## 2021-03-23 VITALS
WEIGHT: 134.6 LBS | RESPIRATION RATE: 18 BRPM | SYSTOLIC BLOOD PRESSURE: 160 MMHG | DIASTOLIC BLOOD PRESSURE: 110 MMHG | HEIGHT: 60 IN | OXYGEN SATURATION: 98 % | HEART RATE: 105 BPM | TEMPERATURE: 97.8 F | BODY MASS INDEX: 26.42 KG/M2

## 2021-03-23 DIAGNOSIS — J45.20 MILD INTERMITTENT ASTHMA, UNSPECIFIED WHETHER COMPLICATED: ICD-10-CM

## 2021-03-23 DIAGNOSIS — M25.512 ACUTE PAIN OF LEFT SHOULDER: ICD-10-CM

## 2021-03-23 DIAGNOSIS — M81.0 AGE RELATED OSTEOPOROSIS, UNSPECIFIED PATHOLOGICAL FRACTURE PRESENCE: ICD-10-CM

## 2021-03-23 DIAGNOSIS — Z00.00 MEDICARE ANNUAL WELLNESS VISIT, SUBSEQUENT: ICD-10-CM

## 2021-03-23 DIAGNOSIS — J45.21 MILD INTERMITTENT ASTHMA WITH ACUTE EXACERBATION: Primary | Chronic | ICD-10-CM

## 2021-03-23 DIAGNOSIS — Z12.2 ENCOUNTER FOR SCREENING FOR LUNG CANCER: ICD-10-CM

## 2021-03-23 DIAGNOSIS — I10 ESSENTIAL HYPERTENSION: ICD-10-CM

## 2021-03-23 DIAGNOSIS — Z13.6 ENCOUNTER FOR SCREENING FOR ABDOMINAL AORTIC ANEURYSM (AAA) IN PATIENT 50 YEARS OF AGE OR OLDER WITH HISTORY OF SMOKING: ICD-10-CM

## 2021-03-23 DIAGNOSIS — Z00.00 HEALTHCARE MAINTENANCE: Primary | ICD-10-CM

## 2021-03-23 DIAGNOSIS — Z87.891 ENCOUNTER FOR SCREENING FOR ABDOMINAL AORTIC ANEURYSM (AAA) IN PATIENT 50 YEARS OF AGE OR OLDER WITH HISTORY OF SMOKING: ICD-10-CM

## 2021-03-23 DIAGNOSIS — Z12.11 SCREENING FOR COLON CANCER: ICD-10-CM

## 2021-03-23 DIAGNOSIS — E55.9 VITAMIN D DEFICIENCY: ICD-10-CM

## 2021-03-23 DIAGNOSIS — R73.03 PREDIABETES: ICD-10-CM

## 2021-03-23 DIAGNOSIS — Z13.6 SCREENING FOR CARDIOVASCULAR CONDITION: ICD-10-CM

## 2021-03-23 DIAGNOSIS — Z12.31 SCREENING MAMMOGRAM, ENCOUNTER FOR: ICD-10-CM

## 2021-03-23 PROBLEM — F17.200 ENCOUNTER FOR SCREENING FOR MALIGNANT NEOPLASM OF LUNG IN CURRENT SMOKER WITH 30 PACK YEAR HISTORY OR GREATER: Status: ACTIVE | Noted: 2021-03-23

## 2021-03-23 PROCEDURE — 99213 OFFICE O/P EST LOW 20 MIN: CPT | Performed by: PHYSICIAN ASSISTANT

## 2021-03-23 PROCEDURE — 3077F SYST BP >= 140 MM HG: CPT | Performed by: PHYSICIAN ASSISTANT

## 2021-03-23 PROCEDURE — 1125F AMNT PAIN NOTED PAIN PRSNT: CPT | Performed by: PHYSICIAN ASSISTANT

## 2021-03-23 PROCEDURE — 4004F PT TOBACCO SCREEN RCVD TLK: CPT | Performed by: PHYSICIAN ASSISTANT

## 2021-03-23 PROCEDURE — 3080F DIAST BP >= 90 MM HG: CPT | Performed by: PHYSICIAN ASSISTANT

## 2021-03-23 PROCEDURE — G0439 PPPS, SUBSEQ VISIT: HCPCS | Performed by: PHYSICIAN ASSISTANT

## 2021-03-23 PROCEDURE — 3288F FALL RISK ASSESSMENT DOCD: CPT | Performed by: PHYSICIAN ASSISTANT

## 2021-03-23 PROCEDURE — 1101F PT FALLS ASSESS-DOCD LE1/YR: CPT | Performed by: PHYSICIAN ASSISTANT

## 2021-03-23 PROCEDURE — 3725F SCREEN DEPRESSION PERFORMED: CPT | Performed by: PHYSICIAN ASSISTANT

## 2021-03-23 PROCEDURE — 1160F RVW MEDS BY RX/DR IN RCRD: CPT | Performed by: PHYSICIAN ASSISTANT

## 2021-03-23 PROCEDURE — 3008F BODY MASS INDEX DOCD: CPT | Performed by: PHYSICIAN ASSISTANT

## 2021-03-23 PROCEDURE — 1170F FXNL STATUS ASSESSED: CPT | Performed by: PHYSICIAN ASSISTANT

## 2021-03-23 RX ORDER — AMLODIPINE BESYLATE 5 MG/1
5 TABLET ORAL DAILY
Qty: 90 TABLET | Refills: 1 | Status: SHIPPED | OUTPATIENT
Start: 2021-03-23 | End: 2021-09-30

## 2021-03-23 RX ORDER — ALBUTEROL SULFATE 90 UG/1
2 AEROSOL, METERED RESPIRATORY (INHALATION) EVERY 6 HOURS PRN
Qty: 3 INHALER | Refills: 3 | Status: SHIPPED | OUTPATIENT
Start: 2021-03-23 | End: 2022-02-14 | Stop reason: SDUPTHER

## 2021-03-23 NOTE — PROGRESS NOTES
Assessment and Plan:     Problem List Items Addressed This Visit        Respiratory    Mild intermittent asthma (Chronic)     Last used Albuterol inhaler months ago  Refill albuterol inhaler         Relevant Medications    Ventolin  (90 Base) MCG/ACT inhaler       Cardiovascular and Mediastinum    Essential hypertension     Increase amlodipine 5 mg daily  Continue hydrochlorothiazide 25 mg daily    Discussed following a low-sodium diet  Increase physical activity as tolerated  Follow-up in 4 weeks         Relevant Medications    amLODIPine (NORVASC) 5 mg tablet    Other Relevant Orders    Lipid panel    Comprehensive metabolic panel       Musculoskeletal and Integument    Age related osteoporosis     Reported history of osteoporosis  Will check DEXA scan         Relevant Orders    DXA bone density spine hip and pelvis       Other    Screening mammogram, encounter for     Send for screening mammogram         Prediabetes     Will check hemoglobin A1c         Relevant Orders    Hemoglobin A1C    Screening for colon cancer     Last colonoscopy 2005  Will refer to GI for screening colonoscopy         Relevant Orders    Ambulatory referral to Gastroenterology    Healthcare maintenance - Primary    Relevant Orders    Mammo screening bilateral w 3d & cad    Vitamin D deficiency     Currently takes vitamin-D 1000 units daily  Will check vitamin D level         Relevant Orders    Vitamin D 25 hydroxy    Encounter for screening for abdominal aortic aneurysm (AAA) in patient 48years of age or older with history of smoking     Send for screening AAA         Relevant Orders    US abdominal aorta screening aaa    Acute pain of left shoulder     Ice/heat prn  Tylenol PRN  May use OTC arthritic creams  Declined PT feels PT worsens her pain  Return to office if symptoms persist or worsen         Medicare annual wellness visit, subsequent     Check labs  Pt declined vaccines at this time               Other Visit Diagnoses Screening for cardiovascular condition        Relevant Orders    Lipid panel           Preventive health issues were discussed with patient, and age appropriate screening tests were ordered as noted in patient's After Visit Summary  Personalized health advice and appropriate referrals for health education or preventive services given if needed, as noted in patient's After Visit Summary       History of Present Illness:     Patient presents for Medicare Annual Wellness visit    Patient Care Team:  Carolyn Moulton PA-C as PCP - General (Family Medicine)  Pina Velez MD     Problem List:     Patient Active Problem List   Diagnosis    Rupture of ulnar collateral ligament of right thumb    Primary osteoarthritis of first carpometacarpal joint of right hand    Mild intermittent asthma    Other chronic sinusitis    Screening mammogram, encounter for    Cigarette nicotine dependence without complication    Seasonal allergies    Trigger finger, right index finger    Panic attack    Essential hypertension    Breast cyst, right    Classic migraine with aura    Deficient knowledge    GERD (gastroesophageal reflux disease)    Renal cyst    Trigger finger of right thumb    Abnormal EKG    Prediabetes    Intermittent palpitations    Chronic left hip pain    Trigger finger, right little finger    Intersection syndrome    Dizziness    Polyarthritis with positive rheumatoid factor ([address] )    Medicare annual wellness visit, initial    Acute left-sided low back pain with left-sided sciatica    Screening for colon cancer    Viral syndrome    Irritable bowel syndrome with diarrhea    Allergies    Seasonal allergic conjunctivitis    Anxiety    Age related osteoporosis    Healthcare maintenance    Vitamin D deficiency    Encounter for screening for abdominal aortic aneurysm (AAA) in patient 48years of age or older with history of smoking    Acute pain of left shoulder    Medicare annual wellness visit, subsequent    Encounter for screening for malignant neoplasm of lung in current smoker with 30 pack year history or greater      Past Medical and Surgical History:     Past Medical History:   Diagnosis Date    Asthma     Asthmatic bronchitis     Last Assessed: 10/7/2014     Chronic diarrhea     Last Assessed: 8/20/2015     Fibromyalgia     Focal nodular hyperplasia of liver     Last Assessed: 6/11/2015    Herpes zoster     Last Assessed: 3/18/2016    Hypertension     IBS (irritable bowel syndrome)     Intermittent palpitations     Irritable bowel syndrome     Osteoarthritis     Vertigo      Past Surgical History:   Procedure Laterality Date    BREAST BIOPSY      SMALL INTESTINE SURGERY        Family History:     Family History   Problem Relation Age of Onset    Heart attack Mother     Other Mother         Aspiration of Vomit     Asthma Father     Breast cancer Sister     Arthritis Family     Other Family         Musculoskeletal Disease     Osteoporosis Family       Social History:     E-Cigarette/Vaping    E-Cigarette Use Never User      E-Cigarette/Vaping Substances    Nicotine No     THC No     CBD No     Flavoring No     Other No     Unknown No      Social History     Socioeconomic History    Marital status:       Spouse name: None    Number of children: None    Years of education: None    Highest education level: None   Occupational History    Occupation: Unemployed    Social Needs    Financial resource strain: None    Food insecurity     Worry: None     Inability: None    Transportation needs     Medical: None     Non-medical: None   Tobacco Use    Smoking status: Current Every Day Smoker     Packs/day: 0 50     Types: Cigarettes    Smokeless tobacco: Never Used   Substance and Sexual Activity    Alcohol use: No    Drug use: No    Sexual activity: Not Currently   Lifestyle    Physical activity     Days per week: None     Minutes per session: None    Stress: None   Relationships    Social connections     Talks on phone: None     Gets together: None     Attends Hinduism service: None     Active member of club or organization: None     Attends meetings of clubs or organizations: None     Relationship status: None    Intimate partner violence     Fear of current or ex partner: None     Emotionally abused: None     Physically abused: None     Forced sexual activity: None   Other Topics Concern    None   Social History Narrative    Mental Disability     Exercising Regularly     Lives with adult children       Medications and Allergies:     Current Outpatient Medications   Medication Sig Dispense Refill    acetaminophen (TYLENOL) 500 mg tablet Take 1 tablet (500 mg total) by mouth every 6 (six) hours as needed for mild pain 60 tablet 0    albuterol (2 5 mg/3 mL) 0 083 % nebulizer solution Take 1 vial (2 5 mg total) by nebulization every 4 (four) hours as needed for wheezing or shortness of breath 75 vial 0    azelastine (ASTELIN) 0 1 % nasal spray 2 sprays into each nostril 2 (two) times a day Use in each nostril as directed 1 Bottle 2    Blood Pressure Monitoring (BLOOD PRESSURE CUFF) MISC by Does not apply route 2 (two) times a day 1 each 0    cetirizine (ZyrTEC) 10 mg tablet Take 1 tablet (10 mg total) by mouth daily 30 tablet 2    ergocalciferol (ERGOCALCIFEROL) 1 25 MG (77643 UT) capsule Take 1 capsule (50,000 Units total) by mouth once a week 12 capsule 1    fluticasone (FLONASE) 50 mcg/act nasal spray instill 2 sprays into each nostril once daily 16 g 3    folic acid (FOLVITE) 1 mg tablet take 1 tablet by mouth daily 30 tablet 5    hydrochlorothiazide (HYDRODIURIL) 25 mg tablet take 1 tablet by mouth once daily 90 tablet 1    olopatadine (PATANOL) 0 1 % ophthalmic solution Administer 1 drop to both eyes 2 (two) times a day 5 mL 2    ondansetron (ZOFRAN-ODT) 4 mg disintegrating tablet Take 1 tablet (4 mg total) by mouth every 8 (eight) hours as needed for vomiting 20 tablet 0    Ventolin  (90 Base) MCG/ACT inhaler Inhale 2 puffs every 6 (six) hours as needed for wheezing 1 Inhaler 3    amLODIPine (NORVASC) 5 mg tablet Take 1 tablet (5 mg total) by mouth daily 90 tablet 1    dicyclomine (BENTYL) 10 mg capsule Take 1 capsule (10 mg total) by mouth 4 (four) times a day (before meals and at bedtime) 30 capsule 3    hydroxychloroquine (PLAQUENIL) 200 mg tablet take 1 tablet by mouth once daily with breakfast 90 tablet 0    methotrexate 2 5 mg tablet 4 tablet po weekly (take all 4 tablets on the same day every week) 20 tablet 3    pantoprazole (PROTONIX) 40 mg tablet TAKE 1 TABLET (40 MG TOTAL) BY MOUTH DAILY 30 tablet 3     No current facility-administered medications for this visit  Allergies   Allergen Reactions    Ambrosia Artemisiifolia (Ragweed) Skin Test Facial Swelling    Codeine Other (See Comments)     Heart races    Epinephrine      Pt reports "it makes my heart race, they told me I cant take it "    Ibuprofen Other (See Comments)     Heart racing    Morphine Other (See Comments)     Heart racing    Pollen Extract Sneezing    Tramadol Other (See Comments)     Heart racing      Immunizations:     Immunization History   Administered Date(s) Administered    Pneumococcal Polysaccharide PPV23 01/01/2003, 10/01/2008    Tdap 06/08/2018      Health Maintenance:         Topic Date Due    Colorectal Cancer Screening  03/25/2015    MAMMOGRAM  01/10/2019    Hepatitis C Screening  Completed         Topic Date Due    COVID-19 Vaccine (1) Never done      Medicare Health Risk Assessment:     BP (!) 160/110 (BP Location: Left arm, Patient Position: Sitting, Cuff Size: Standard)   Pulse 105   Temp 97 8 °F (36 6 °C) (Temporal)   Resp 18   Ht 5' (1 524 m)   Wt 61 1 kg (134 lb 9 6 oz)   SpO2 98%   BMI 26 29 kg/m²      Joseph Hooper is here for her Subsequent Wellness visit  Health Risk Assessment:   Patient rates overall health as fair  Patient feels that their physical health rating is slightly worse  Patient is satisfied with their life  Eyesight was rated as same  Hearing was rated as same  Patient feels that their emotional and mental health rating is same  Patients states they are sometimes angry  Patient states they are always unusually tired/fatigued  Pain experienced in the last 7 days has been a lot  Patient's pain rating has been 9/10  Patient states that she has experienced no weight loss or gain in last 6 months  Pain r/t left shoulder pain    Depression Screening:   PHQ-2 Score: 0      Fall Risk Screening: In the past year, patient has experienced: no history of falling in past year      Urinary Incontinence Screening:   Patient has not leaked urine accidently in the last six months  Home Safety:  Patient has trouble with stairs inside or outside of their home  Patient has working smoke alarms and has working carbon monoxide detector  Home safety hazards include: none  Nutrition:   Current diet is Regular and Other (please comment)  Very particular diet, patient is unable to eat anything with bacteria  Medications:   Patient is not currently taking any over-the-counter supplements  Patient is able to manage medications  Activities of Daily Living (ADLs)/Instrumental Activities of Daily Living (IADLs):   Walk and transfer into and out of bed and chair?: Yes  Dress and groom yourself?: Yes    Bathe or shower yourself?: Yes    Feed yourself?  Yes  Do your laundry/housekeeping?: Yes  Manage your money, pay your bills and track your expenses?: Yes  Make your own meals?: Yes    Do your own shopping?: Yes    Previous Hospitalizations:   Any hospitalizations or ED visits within the last 12 months?: No      Advance Care Planning:   Living will: Yes    Advanced directive: Yes      Cognitive Screening:   Provider or family/friend/caregiver concerned regarding cognition?: No    PREVENTIVE SCREENINGS      Cardiovascular Screening:    General: Screening Current      Diabetes Screening:     General: Screening Current      Breast Cancer Screening:       Due for: Mammogram        Cervical Cancer Screening:    General: Screening Not Indicated      Osteoporosis Screening:      Due for: DXA Axial      Abdominal Aortic Aneurysm (AAA) Screening:      Due for: Screening AAA Ultrasound      Lung Cancer Screening:     General: Screening Not Indicated    Due for: Low Dose CT (LDCT)      Hepatitis C Screening:    General: Screening Current    Screening, Brief Intervention, and Referral to Treatment (SBIRT)    Screening  Typical number of drinks in a day: 0  Typical number of drinks in a week: 0  Interpretation: Low risk drinking behavior  Other Counseling Topics:   Calcium and vitamin D intake and regular weightbearing exercise         [address], PA-C

## 2021-03-23 NOTE — TELEPHONE ENCOUNTER
Call from [employer] on [facility] regarding  order for Ventolin   They need it changed to generic, Insurance doesn't cover name brand

## 2021-03-23 NOTE — ASSESSMENT & PLAN NOTE
Ice/heat prn  Tylenol PRN  May use OTC arthritic creams  Declined PT feels PT worsens her pain  Return to office if symptoms persist or worsen

## 2021-03-23 NOTE — PROGRESS NOTES
Assessment/Plan:    Essential hypertension  Increase amlodipine 5 mg daily  Continue hydrochlorothiazide 25 mg daily  Discussed following a low-sodium diet  Increase physical activity as tolerated  Follow-up in 4 weeks    Acute pain of left shoulder  Ice/heat prn  Tylenol PRN  May use OTC arthritic creams  Declined PT feels PT worsens her pain  Return to office if symptoms persist or worsen    Age related osteoporosis  Reported history of osteoporosis  Will check DEXA scan    Encounter for screening for abdominal aortic aneurysm (AAA) in patient 48years of age or older with history of smoking  Send for screening AAA    Medicare annual wellness visit, subsequent  Check labs  Pt declined vaccines at this time        Mild intermittent asthma  Last used Albuterol inhaler months ago  Refill albuterol inhaler    Prediabetes  Will check hemoglobin A1c    Screening for colon cancer  Last colonoscopy 2005  Will refer to GI for screening colonoscopy    Screening mammogram, encounter for  Send for screening mammogram    Vitamin D deficiency  Currently takes vitamin-D 1000 units daily  Will check vitamin D level    Encounter for screening for malignant neoplasm of lung in current smoker with 30 pack year history or greater  Pt started smoking as a teenager and has smoked 1 PPD > 30 yrs  Currently smokes 1/2 PPD      Diagnoses and all orders for this visit:    Healthcare maintenance  -     Mammo screening bilateral w 3d & cad; Future    Age related osteoporosis, unspecified pathological fracture presence  -     DXA bone density spine hip and pelvis; Future    Essential hypertension  -     Lipid panel; Future  -     Comprehensive metabolic panel; Future  -     amLODIPine (NORVASC) 5 mg tablet; Take 1 tablet (5 mg total) by mouth daily    Screening for cardiovascular condition  -     Lipid panel; Future    Prediabetes  -     Hemoglobin A1C; Future    Vitamin D deficiency  -     Vitamin D 25 hydroxy;  Future    Encounter for screening for lung cancer  -     CT lung screening program; Future    Screening for colon cancer  -     Ambulatory referral to Gastroenterology; Future    Encounter for screening for abdominal aortic aneurysm (AAA) in patient 48years of age or older with history of smoking  -      abdominal aorta screening aaa; Future    Mild intermittent asthma, unspecified whether complicated  -     Ventolin  (90 Base) MCG/ACT inhaler; Inhale 2 puffs every 6 (six) hours as needed for wheezing    Acute pain of left shoulder    Screening mammogram, encounter for    Medicare annual wellness visit, subsequent        Subjective:      Patient ID: Zan Palencia is a 77 y o  female presents today for complaint of left shoulder pain  Shoulder Pain   The pain is present in the left shoulder  This is a recurrent problem  There has been no history of extremity trauma  The problem occurs constantly  The problem has been gradually worsening  The quality of the pain is described as aching  The pain is at a severity of 9/10  Associated symptoms include a limited range of motion and tingling  Pertinent negatives include no fever, inability to bear weight, itching, joint locking, joint swelling, numbness or stiffness  The symptoms are aggravated by activity  She has tried acetaminophen for the symptoms  The treatment provided mild relief  Family history includes rheumatoid arthritis  Her past medical history is significant for osteoarthritis  The following portions of the patient's history were reviewed and updated as appropriate: allergies, current medications, past family history, past medical history, past social history, past surgical history and problem list     Review of Systems   Constitutional: Negative for chills, fatigue and fever  HENT: Positive for postnasal drip and rhinorrhea  Respiratory: Negative for cough, chest tightness, shortness of breath and wheezing      Cardiovascular: Negative for chest pain and palpitations  Gastrointestinal: Negative for abdominal pain, constipation, diarrhea, nausea and vomiting  Genitourinary: Negative for difficulty urinating  Musculoskeletal: Positive for arthralgias  Negative for joint swelling, myalgias, neck pain, neck stiffness and stiffness  Skin: Negative for itching, rash and wound  Allergic/Immunologic: Positive for environmental allergies  Neurological: Positive for tingling  Negative for dizziness, weakness, light-headedness, numbness and headaches  Psychiatric/Behavioral: Negative for agitation, behavioral problems, dysphoric mood and sleep disturbance  The patient is not nervous/anxious  All other systems reviewed and are negative  Objective:      BP (!) 160/110 (BP Location: Left arm, Patient Position: Sitting, Cuff Size: Standard)   Pulse 105   Temp 97 8 °F (36 6 °C) (Temporal)   Resp 18   Ht 5' (1 524 m)   Wt 61 1 kg (134 lb 9 6 oz)   SpO2 98%   BMI 26 29 kg/m²          Physical Exam  Constitutional:       General: She is not in acute distress  Appearance: Normal appearance  She is well-developed  She is not ill-appearing  HENT:      Head: Normocephalic and atraumatic  Right Ear: Tympanic membrane, ear canal and external ear normal  There is no impacted cerumen  Left Ear: Tympanic membrane, ear canal and external ear normal  There is no impacted cerumen  Nose: Rhinorrhea present  Comments: Bilateral turbinates enlarged inflamed Left> right  Eyes:      Conjunctiva/sclera: Conjunctivae normal    Neck:      Musculoskeletal: Normal range of motion and neck supple  Cardiovascular:      Rate and Rhythm: Normal rate and regular rhythm  Heart sounds: Normal heart sounds  No murmur  Pulmonary:      Effort: Pulmonary effort is normal  No respiratory distress  Breath sounds: Normal breath sounds  No wheezing  Musculoskeletal:      Left shoulder: She exhibits tenderness (TTP at bicep groove)   She exhibits normal range of motion (Pain with forward flexion), no swelling, no effusion, no crepitus, no deformity, no pain and normal strength  Neurological:      Mental Status: She is alert and oriented to person, place, and time  Psychiatric:         Behavior: Behavior normal          Thought Content:  Thought content normal

## 2021-03-23 NOTE — ASSESSMENT & PLAN NOTE
Increase amlodipine 5 mg daily  Continue hydrochlorothiazide 25 mg daily    Discussed following a low-sodium diet  Increase physical activity as tolerated  Follow-up in 4 weeks

## 2021-03-23 NOTE — PATIENT INSTRUCTIONS
Medicare Preventive Visit Patient Instructions  Thank you for completing your Welcome to Medicare Visit or Medicare Annual Wellness Visit today  Your next wellness visit will be due in one year (3/24/2022)  The screening/preventive services that you may require over the next 5-10 years are detailed below  Some tests may not apply to you based off risk factors and/or age  Screening tests ordered at today's visit but not completed yet may show as past due  Also, please note that scanned in results may not display below  Preventive Screenings:  Service Recommendations Previous Testing/Comments   Colorectal Cancer Screening  * Colonoscopy    * Fecal Occult Blood Test (FOBT)/Fecal Immunochemical Test (FIT)  * Fecal DNA/Cologuard Test  * Flexible Sigmoidoscopy Age: 54-65 years old   Colonoscopy: every 10 years (may be performed more frequently if at higher risk)  OR  FOBT/FIT: every 1 year  OR  Cologuard: every 3 years  OR  Sigmoidoscopy: every 5 years  Screening may be recommended earlier than age 48 if at higher risk for colorectal cancer  Also, an individualized decision between you and your healthcare provider will decide whether screening between the ages of 74-80 would be appropriate  Colonoscopy: 03/25/2005  FOBT/FIT: Not on file  Cologuard: Not on file  Sigmoidoscopy: Not on file          Breast Cancer Screening Age: 36 years old  Frequency: every 1-2 years  Not required if history of left and right mastectomy Mammogram: 01/10/2017        Cervical Cancer Screening Between the ages of 21-29, pap smear recommended once every 3 years  Between the ages of 33-67, can perform pap smear with HPV co-testing every 5 years     Recommendations may differ for women with a history of total hysterectomy, cervical cancer, or abnormal pap smears in past  Pap Smear: Not on file    Screening Not Indicated   Hepatitis C Screening Once for adults born between [facility]  More frequently in patients at high risk for Hepatitis C Hep C Antibody: Not on file    Screening Current   Diabetes Screening 1-2 times per year if you're at risk for diabetes or have pre-diabetes Fasting glucose: 94 mg/dL   A1C: 6 4 %    Screening Current   Cholesterol Screening Once every 5 years if you don't have a lipid disorder  May order more often based on risk factors  Lipid panel: 12/13/2016    Screening Current     Other Preventive Screenings Covered by Medicare:  1  Abdominal Aortic Aneurysm (AAA) Screening: covered once if your at risk  You're considered to be at risk if you have a family history of AAA  2  Lung Cancer Screening: covers low dose CT scan once per year if you meet all of the following conditions: (1) Age 50-69; (2) No signs or symptoms of lung cancer; (3) Current smoker or have quit smoking within the last 15 years; (4) You have a tobacco smoking history of at least 30 pack years (packs per day multiplied by number of years you smoked); (5) You get a written order from a healthcare provider  3  Glaucoma Screening: covered annually if you're considered high risk: (1) You have diabetes OR (2) Family history of glaucoma OR (3)  aged 48 and older OR (3)  American aged 72 and older  3  Osteoporosis Screening: covered every 2 years if you meet one of the following conditions: (1) You're estrogen deficient and at risk for osteoporosis based off medical history and other findings; (2) Have a vertebral abnormality; (3) On glucocorticoid therapy for more than 3 months; (4) Have primary hyperparathyroidism; (5) On osteoporosis medications and need to assess response to drug therapy  · Last bone density test (DXA Scan): Not on file  5  HIV Screening: covered annually if you're between the age of 12-76  Also covered annually if you are younger than 13 and older than 72 with risk factors for HIV infection  For pregnant patients, it is covered up to 3 times per pregnancy      Immunizations:  Immunization Recommendations Influenza Vaccine Annual influenza vaccination during flu season is recommended for all persons aged >= 6 months who do not have contraindications   Pneumococcal Vaccine (Prevnar and Pneumovax)  * Prevnar = PCV13  * Pneumovax = PPSV23   Adults 25-60 years old: 1-3 doses may be recommended based on certain risk factors  Adults 72 years old: Prevnar (PCV13) vaccine recommended followed by Pneumovax (PPSV23) vaccine  If already received PPSV23 since turning 65, then PCV13 recommended at least one year after PPSV23 dose  Hepatitis B Vaccine 3 dose series if at intermediate or high risk (ex: diabetes, end stage renal disease, liver disease)   Tetanus (Td) Vaccine - COST NOT COVERED BY MEDICARE PART B Following completion of primary series, a booster dose should be given every 10 years to maintain immunity against tetanus  Td may also be given as tetanus wound prophylaxis  Tdap Vaccine - COST NOT COVERED BY MEDICARE PART B Recommended at least once for all adults  For pregnant patients, recommended with each pregnancy  Shingles Vaccine (Shingrix) - COST NOT COVERED BY MEDICARE PART B  2 shot series recommended in those aged 48 and above     Health Maintenance Due:      Topic Date Due    Colorectal Cancer Screening  03/25/2015    MAMMOGRAM  01/10/2019    Hepatitis C Screening  Completed     Immunizations Due:      Topic Date Due    COVID-19 Vaccine (1) Never done    Pneumococcal Vaccine: 65+ Years (1 of 1 - PPSV23) 10/01/2013    Influenza Vaccine (1) Never done     Advance Directives   What are advance directives? Advance directives are legal documents that state your wishes and plans for medical care  These plans are made ahead of time in case you lose your ability to make decisions for yourself  Advance directives can apply to any medical decision, such as the treatments you want, and if you want to donate organs  What are the types of advance directives?   There are many types of advance directives, and each state has rules about how to use them  You may choose a combination of any of the following:  · Living will: This is a written record of the treatment you want  You can also choose which treatments you do not want, which to limit, and which to stop at a certain time  This includes surgery, medicine, IV fluid, and tube feedings  · Durable power of  for healthcare [facility]): This is a written record that states who you want to make healthcare choices for you when you are unable to make them for yourself  This person, called a proxy, is usually a family member or a friend  You may choose more than 1 proxy  · Do not resuscitate (DNR) order:  A DNR order is used in case your heart stops beating or you stop breathing  It is a request not to have certain forms of treatment, such as CPR  A DNR order may be included in other types of advance directives  · Medical directive: This covers the care that you want if you are in a coma, near death, or unable to make decisions for yourself  You can list the treatments you want for each condition  Treatment may include pain medicine, surgery, blood transfusions, dialysis, IV or tube feedings, and a ventilator (breathing machine)  · Values history: This document has questions about your views, beliefs, and how you feel and think about life  This information can help others choose the care that you would choose  Why are advance directives important? An advance directive helps you control your care  Although spoken wishes may be used, it is better to have your wishes written down  Spoken wishes can be misunderstood, or not followed  Treatments may be given even if you do not want them  An advance directive may make it easier for your family to make difficult choices about your care  Cigarette Smoking and Your Health   Risks to your health if you smoke:  Nicotine and other chemicals found in tobacco damage every cell in your body   Even if you are a light smoker, you have an increased risk for cancer, heart disease, and lung disease  If you are pregnant or have diabetes, smoking increases your risk for complications  Benefits to your health if you stop smoking:   · You decrease respiratory symptoms such as coughing, wheezing, and shortness of breath  · You reduce your risk for cancers of the lung, mouth, throat, kidney, bladder, pancreas, stomach, and cervix  If you already have cancer, you increase the benefits of chemotherapy  You also reduce your risk for cancer returning or a second cancer from developing  · You reduce your risk for heart disease, blood clots, heart attack, and stroke  · You reduce your risk for lung infections, and diseases such as pneumonia, asthma, chronic bronchitis, and emphysema  · Your circulation improves  More oxygen can be delivered to your body  If you have diabetes, you lower your risk for complications, such as kidney, artery, and eye diseases  You also lower your risk for nerve damage  Nerve damage can lead to amputations, poor vision, and blindness  · You improve your body's ability to heal and to fight infections  For more information and support to stop smoking:   · [employer]  Phone: 9- 548 - [phone]  Web Address: www [employer]  Weight Management   Why it is important to manage your weight:  Being overweight increases your risk of health conditions such as heart disease, high blood pressure, type 2 diabetes, and certain types of cancer  It can also increase your risk for osteoarthritis, sleep apnea, and other respiratory problems  Aim for a slow, steady weight loss  Even a small amount of weight loss can lower your risk of health problems  How to lose weight safely:  A safe and healthy way to lose weight is to eat fewer calories and get regular exercise  You can lose up about 1 pound a week by decreasing the number of calories you eat by 500 calories each day     Healthy meal plan for weight management:  A healthy meal plan includes a variety of foods, contains fewer calories, and helps you stay healthy  A healthy meal plan includes the following:  · Eat whole-grain foods more often  A healthy meal plan should contain fiber  Fiber is the part of grains, fruits, and vegetables that is not broken down by your body  Whole-grain foods are healthy and provide extra fiber in your diet  Some examples of whole-grain foods are whole-wheat breads and pastas, oatmeal, brown rice, and bulgur  · Eat a variety of vegetables every day  Include dark, leafy greens such as spinach, kale, donny greens, and mustard greens  Eat yellow and orange vegetables such as carrots, sweet potatoes, and winter squash  · Eat a variety of fruits every day  Choose fresh or canned fruit (canned in its own juice or light syrup) instead of juice  Fruit juice has very little or no fiber  · Eat low-fat dairy foods  Drink fat-free (skim) milk or 1% milk  Eat fat-free yogurt and low-fat cottage cheese  Try low-fat cheeses such as mozzarella and other reduced-fat cheeses  · Choose meat and other protein foods that are low in fat  Choose beans or other legumes such as split peas or lentils  Choose fish, skinless poultry (chicken or turkey), or lean cuts of red meat (beef or pork)  Before you cook meat or poultry, cut off any visible fat  · Use less fat and oil  Try baking foods instead of frying them  Add less fat, such as margarine, sour cream, regular salad dressing and mayonnaise to foods  Eat fewer high-fat foods  Some examples of high-fat foods include french fries, doughnuts, ice cream, and cakes  · Eat fewer sweets  Limit foods and drinks that are high in sugar  This includes candy, cookies, regular soda, and sweetened drinks  Exercise:  Exercise at least 30 minutes per day on most days of the week  Some examples of exercise include walking, biking, dancing, and swimming   You can also fit in more physical activity by taking the stairs instead of the elevator or parking farther away from stores  Ask your healthcare provider about the best exercise plan for you  © Copyright [employer] 2018 Information is for End User's use only and may not be sold, redistributed or otherwise used for commercial purposes   All illustrations and images included in CareNotes® are the copyrighted property of [employer]  or [address] [place] [address] [employer]

## 2021-04-27 DIAGNOSIS — K21.9 GASTROESOPHAGEAL REFLUX DISEASE WITHOUT ESOPHAGITIS: ICD-10-CM

## 2021-04-27 RX ORDER — PANTOPRAZOLE SODIUM 40 MG/1
40 TABLET, DELAYED RELEASE ORAL DAILY
Qty: 30 TABLET | Refills: 3 | Status: SHIPPED | OUTPATIENT
Start: 2021-04-27

## 2021-05-04 ENCOUNTER — TELEPHONE (OUTPATIENT)
Dept: GASTROENTEROLOGY | Facility: CLINIC | Age: 76
End: 2021-05-04

## 2021-05-11 ENCOUNTER — TELEPHONE (OUTPATIENT)
Dept: FAMILY MEDICINE CLINIC | Facility: CLINIC | Age: 76
End: 2021-05-11

## 2021-05-11 NOTE — TELEPHONE ENCOUNTER
Spoke with patient scheduled follow up with [facility] on 5/18/2021

## 2021-05-11 NOTE — TELEPHONE ENCOUNTER
----- Message from Sosa Guardado sent at 5/10/2021  6:38 PM EDT -----  Pt was due to return to follow up in April--   Please call to set up appointment

## 2021-05-17 ENCOUNTER — VBI (OUTPATIENT)
Dept: ADMINISTRATIVE | Facility: OTHER | Age: 76
End: 2021-05-17

## 2021-05-18 ENCOUNTER — OFFICE VISIT (OUTPATIENT)
Dept: FAMILY MEDICINE CLINIC | Facility: CLINIC | Age: 76
End: 2021-05-18

## 2021-05-18 VITALS
WEIGHT: 143.6 LBS | SYSTOLIC BLOOD PRESSURE: 142 MMHG | TEMPERATURE: 97.9 F | BODY MASS INDEX: 28.19 KG/M2 | RESPIRATION RATE: 18 BRPM | HEART RATE: 83 BPM | DIASTOLIC BLOOD PRESSURE: 100 MMHG | OXYGEN SATURATION: 99 % | HEIGHT: 60 IN

## 2021-05-18 DIAGNOSIS — J30.2 SEASONAL ALLERGIES: ICD-10-CM

## 2021-05-18 DIAGNOSIS — H10.10 SEASONAL ALLERGIC CONJUNCTIVITIS: ICD-10-CM

## 2021-05-18 DIAGNOSIS — E55.9 VITAMIN D DEFICIENCY: ICD-10-CM

## 2021-05-18 DIAGNOSIS — M25.511 CHRONIC PAIN OF BOTH SHOULDERS: ICD-10-CM

## 2021-05-18 DIAGNOSIS — M25.512 CHRONIC PAIN OF BOTH SHOULDERS: ICD-10-CM

## 2021-05-18 DIAGNOSIS — I10 ESSENTIAL HYPERTENSION: Primary | ICD-10-CM

## 2021-05-18 DIAGNOSIS — G89.29 CHRONIC PAIN OF BOTH SHOULDERS: ICD-10-CM

## 2021-05-18 PROCEDURE — 3080F DIAST BP >= 90 MM HG: CPT | Performed by: PHYSICIAN ASSISTANT

## 2021-05-18 PROCEDURE — 4004F PT TOBACCO SCREEN RCVD TLK: CPT | Performed by: PHYSICIAN ASSISTANT

## 2021-05-18 PROCEDURE — 3008F BODY MASS INDEX DOCD: CPT | Performed by: PHYSICIAN ASSISTANT

## 2021-05-18 PROCEDURE — 99214 OFFICE O/P EST MOD 30 MIN: CPT | Performed by: PHYSICIAN ASSISTANT

## 2021-05-18 PROCEDURE — 1160F RVW MEDS BY RX/DR IN RCRD: CPT | Performed by: PHYSICIAN ASSISTANT

## 2021-05-18 PROCEDURE — 3077F SYST BP >= 140 MM HG: CPT | Performed by: PHYSICIAN ASSISTANT

## 2021-05-18 RX ORDER — HYDROCHLOROTHIAZIDE 25 MG/1
25 TABLET ORAL DAILY
Qty: 90 TABLET | Refills: 1 | Status: SHIPPED | OUTPATIENT
Start: 2021-05-18 | End: 2021-12-06

## 2021-05-18 RX ORDER — MULTIVIT-MIN/IRON/FOLIC ACID/K 18-600-40
2000 CAPSULE ORAL DAILY
Qty: 30 CAPSULE | Refills: 5 | Status: SHIPPED | OUTPATIENT
Start: 2021-05-18

## 2021-05-18 RX ORDER — AZELASTINE 1 MG/ML
2 SPRAY, METERED NASAL 2 TIMES DAILY
Qty: 30 ML | Refills: 2 | Status: SHIPPED | OUTPATIENT
Start: 2021-05-18 | End: 2021-09-14 | Stop reason: SDUPTHER

## 2021-05-18 RX ORDER — FLUTICASONE PROPIONATE 50 MCG
2 SPRAY, SUSPENSION (ML) NASAL DAILY
Qty: 16 G | Refills: 3 | Status: SHIPPED | OUTPATIENT
Start: 2021-05-18 | End: 2021-07-13

## 2021-05-18 RX ORDER — OLOPATADINE HYDROCHLORIDE 1 MG/ML
1 SOLUTION/ DROPS OPHTHALMIC 2 TIMES DAILY
Qty: 5 ML | Refills: 2 | Status: SHIPPED | OUTPATIENT
Start: 2021-05-18

## 2021-05-18 NOTE — ASSESSMENT & PLAN NOTE
Has been off vitamin D for several months, restart Vitamin D 2000 units daily  Encouraged to have drawn soon

## 2021-05-18 NOTE — PROGRESS NOTES
Assessment/Plan:    Vitamin D deficiency  Has been off vitamin D for several months, restart Vitamin D 2000 units daily  Encouraged to have drawn soon     Seasonal allergies   Refill Flonase and Astelin     Seasonal allergic conjunctivitis   Refill Patanol drops    Essential hypertension  BP improved  Continue  Amlodipine 5 mg daily along with hydrochlorothiazide 25 mg daily   Continue following a low-sodium diet  Increasing physical activity as tolerated    Chronic pain of both shoulders  Again declined referral to PT   patient agreed to ortho referral - will refer   Continue Tylenol PRN    BMI Counseling: Body mass index is 28 04 kg/m²  The BMI is above normal  Nutrition recommendations include reducing portion sizes, moderation in carbohydrate intake and increasing intake of lean protein  Exercise recommendations include exercising 3-5 times per week  Diagnoses and all orders for this visit:    Essential hypertension  -     hydrochlorothiazide (HYDRODIURIL) 25 mg tablet; Take 1 tablet (25 mg total) by mouth daily    Chronic pain of both shoulders  -     Ambulatory referral to Orthopedic Surgery; Future    Seasonal allergies  -     fluticasone (FLONASE) 50 mcg/act nasal spray; 2 sprays into each nostril daily  -     azelastine (ASTELIN) 0 1 % nasal spray; 2 sprays into each nostril 2 (two) times a day Use in each nostril as directed    Seasonal allergic conjunctivitis  -     olopatadine (PATANOL) 0 1 % ophthalmic solution; Administer 1 drop to both eyes 2 (two) times a day    Vitamin D deficiency  -     Cholecalciferol (Vitamin D) 50 MCG (2000 UT) CAPS; Take 1 capsule (2,000 Units total) by mouth daily        Subjective:      Patient ID: Francesco Stevenson is a 77 y o  female presents today for f/u HTN and bilateral shoulder pain    Amlodipine increased to 5 mg at last visit  Continues on Hydrochlorothiazide 25 mg po qd  Is not checking her bp  Tolerating medication well        Continues with bilateral shoulder pain with now right shoulder > left  Shoulder pain is constant worse at night  Reports receiving steroid injections in the past with significant improvement  Patient was offered PT at last visit but declined  Takes Tylenol with minimal relief  Requesting refill on Astelin and Flonase nasal sprays and Patanol eye drops  The following portions of the patient's history were reviewed and updated as appropriate: allergies, current medications, past family history, past medical history, past social history, past surgical history and problem list     Review of Systems   Constitutional: Negative for activity change, appetite change, chills, fatigue and fever  Respiratory: Negative for cough, chest tightness, shortness of breath and wheezing  Cardiovascular: Negative for chest pain and palpitations  Gastrointestinal: Negative for abdominal pain, constipation, diarrhea, nausea and vomiting  Genitourinary: Negative for difficulty urinating  Musculoskeletal: Positive for arthralgias  Negative for myalgias, neck pain and neck stiffness  Skin: Negative for rash and wound  Neurological: Negative for dizziness, weakness, light-headedness, numbness and headaches  Psychiatric/Behavioral: Negative for agitation and behavioral problems  The patient is not nervous/anxious  Objective:      /100 (BP Location: Left arm, Patient Position: Sitting, Cuff Size: Standard)   Pulse 83   Temp 97 9 °F (36 6 °C) (Temporal)   Resp 18   Ht 5' (1 524 m)   Wt 65 1 kg (143 lb 9 6 oz)   SpO2 99%   BMI 28 04 kg/m²          Physical Exam  Constitutional:       General: She is not in acute distress  Appearance: Normal appearance  She is well-developed  She is not ill-appearing  HENT:      Head: Normocephalic and atraumatic  Eyes:      Conjunctiva/sclera: Conjunctivae normal    Neck:      Musculoskeletal: Normal range of motion and neck supple     Cardiovascular:      Rate and Rhythm: Normal rate and regular rhythm  Heart sounds: Normal heart sounds  No murmur  Pulmonary:      Effort: Pulmonary effort is normal  No respiratory distress  Breath sounds: Normal breath sounds  No wheezing  Musculoskeletal:         General: No deformity  Right shoulder: She exhibits decreased range of motion (Limited ROM with forward flexion) and tenderness (Ac joint)  She exhibits no swelling and no effusion  Left shoulder: She exhibits decreased range of motion and tenderness (Bicep  groove)  She exhibits no bony tenderness, no swelling, no effusion and no crepitus  Lymphadenopathy:      Cervical: No cervical adenopathy  Neurological:      Mental Status: She is alert and oriented to person, place, and time  Psychiatric:         Mood and Affect: Mood normal          Behavior: Behavior normal          Thought Content:  Thought content normal          Judgment: Judgment normal

## 2021-05-18 NOTE — ASSESSMENT & PLAN NOTE
BP improved  Continue  Amlodipine 5 mg daily along with hydrochlorothiazide 25 mg daily   Continue following a low-sodium diet  Increasing physical activity as tolerated

## 2021-05-18 NOTE — ASSESSMENT & PLAN NOTE
Again declined referral to PT   patient agreed to ortho referral - will refer   Continue Tylenol PRN

## 2021-07-09 DIAGNOSIS — J30.2 SEASONAL ALLERGIES: ICD-10-CM

## 2021-07-13 RX ORDER — FLUTICASONE PROPIONATE 50 MCG
SPRAY, SUSPENSION (ML) NASAL
Qty: 16 G | Refills: 3 | Status: SHIPPED | OUTPATIENT
Start: 2021-07-13 | End: 2021-08-31 | Stop reason: SDUPTHER

## 2021-07-16 ENCOUNTER — VBI (OUTPATIENT)
Dept: ADMINISTRATIVE | Facility: OTHER | Age: 76
End: 2021-07-16

## 2021-08-20 ENCOUNTER — TELEMEDICINE (OUTPATIENT)
Dept: FAMILY MEDICINE CLINIC | Facility: CLINIC | Age: 76
End: 2021-08-20

## 2021-08-20 VITALS — BODY MASS INDEX: 28.04 KG/M2 | HEIGHT: 60 IN

## 2021-08-20 DIAGNOSIS — Z20.822 EXPOSURE TO CONFIRMED CASE OF COVID-19: Primary | ICD-10-CM

## 2021-08-20 PROCEDURE — G2025 DIS SITE TELE SVCS RHC/FQHC: HCPCS | Performed by: FAMILY MEDICINE

## 2021-08-20 NOTE — PROGRESS NOTES
COVID-19 Outpatient Progress Note    Assessment/Plan:    Problem List Items Addressed This Visit        Other    Exposure to confirmed case of COVID-19 - Primary     - Patient with recent exposure to grand daughter who tested positive  Date of exposure was yesterday  - Patient is currently asymptomatic except for Rhinorrhea which she reports is at baseline for her due to allergies  -  COVID test ordered and  discussed with patient to hold off COVID testing till about 3-5 days after exposure or till she becomes symptomatic   - Advised to continue to wear mask when around people and to  Continue to disinfect/ santize frequent contact surface areas  - ED precautions given and patient to schedule appointment for follow up         Relevant Orders    Novel Coronavirus (COVID-19), PCR [facility] Collected at   [facility] or [facility]           [address]     Your healthcare provider and/or public health staff have evaluated you and have determined that you do not need to remain in the hospital at this time  At this time you can be isolated at home where you will be monitored by staff from your local or state health department  You should carefully follow the prevention and isolation steps below until a healthcare provider or local or state health department says that you can return to your normal activities  Stay home except to get medical care     People who are mildly ill with COVID-19 are able to isolate at home during their illness  You should restrict activities outside your home, except for getting medical care  Do not go to work, school, or public areas  Avoid using public transportation, ride-sharing, or taxis  Separate yourself from other people and animals in your home     People: As much as possible, you should stay in a specific room and away from other people in your home  Also, you should use a separate bathroom, if available  Animals:  You should restrict contact with pets and other animals while you are sick with COVID-19, just like you would around other people  Although there have not been reports of pets or other animals becoming sick with COVID-19, it is still recommended that people sick with COVID-19 limit contact with animals until more information is known about the virus  When possible, have another member of your household care for your animals while you are sick  If you are sick with COVID-19, avoid contact with your pet, including petting, snuggling, being kissed or licked, and sharing food  If you must care for your pet or be around animals while you are sick, wash your hands before and after you interact with pets and wear a facemask  See COVID-19 and Animals for more information  Call ahead before visiting your doctor     If you have a medical appointment, call the healthcare provider and tell them that you have or may have COVID-19  This will help the healthcare providers office take steps to keep other people from getting infected or exposed  Wear a facemask     You should wear a facemask when you are around other people (e g , sharing a room or vehicle) or pets and before you enter a healthcare providers office  If you are not able to wear a facemask (for example, because it causes trouble breathing), then people who live with you should not stay in the same room with you, or they should wear a facemask if they enter your room  Cover your coughs and sneezes     Cover your mouth and nose with a tissue when you cough or sneeze  Throw used tissues in a lined trash can  Immediately wash your hands with soap and water for at least 20 seconds or, if soap and water are not available, clean your hands with an alcohol-based hand  that contains at least 60% alcohol       Clean your hands often     Wash your hands often with soap and water for at least 20 seconds, especially after blowing your nose, coughing, or sneezing; going to the bathroom; and before eating or preparing food  If soap and water are not readily available, use an alcohol-based hand  with at least 60% alcohol, covering all surfaces of your hands and rubbing them together until they feel dry  Soap and water are the best option if hands are visibly dirty  Avoid touching your eyes, nose, and mouth with unwashed hands  Avoid sharing personal household items     You should not share dishes, drinking glasses, cups, eating utensils, towels, or bedding with other people or pets in your home  After using these items, they should be washed thoroughly with soap and water  Clean all high-touch surfaces everyday     High touch surfaces include counters, tabletops, doorknobs, bathroom fixtures, toilets, phones, keyboards, tablets, and bedside tables  Also, clean any surfaces that may have blood, stool, or body fluids on them  Use a household cleaning spray or wipe, according to the label instructions  Labels contain instructions for safe and effective use of the cleaning product including precautions you should take when applying the product, such as wearing gloves and making sure you have good ventilation during use of the product  Monitor your symptoms     Seek prompt medical attention if your illness is worsening (e g , difficulty breathing)  Before seeking care, call your healthcare provider and tell them that you have, or are being evaluated for, COVID-19  Put on a facemask before you enter the facility  These steps will help the healthcare providers office to keep other people in the office or waiting room from getting infected or exposed  Ask your healthcare provider to call the local or state health department  Persons who are placed under active monitoring or facilitated self-monitoring should follow instructions provided by their local health department or occupational health professionals, as appropriate    If you have a medical emergency and need to call 911, notify the dispatch personnel that you have, or are being evaluated for COVID-19  If possible, put on a facemask before emergency medical services arrive  Discontinuing home isolation     Patients with confirmed COVID-19 should remain under home isolation precautions until the following conditions are met:   § They have had no fever for at least 24 hours (that is one full day of no fever without the use medicine that reduces fevers)  AND  § other symptoms have improved (for example, when their cough or shortness of breath have improved)  AND  § at least 10 days have passed since their symptoms first appeared  Patients with confirmed COVID-19 should also notify close contacts (including their workplace) and ask that they self-quarantine  Currently, close contact is defined as being within 6 feet for 10 minutes or more from the period 48 hours before symptom onset to the time at which the patient went into isolation  Close contacts of patients diagnosed with COVID-19 should be instructed by the patient to self-quarantine for 14 days from the last time of their last contact with the patient  Source: [employer]      I spent about 10 minutes during this encounter      Verification of patient location:    Patient is located in the following state in which I hold an active license [place]    Encounter provider Zuly Huang MD    Provider located at [address] [place] [address]   [phone]    Recent Visits  No visits were found meeting these conditions  Showing recent visits within past 7 days and meeting all other requirements  Today's Visits  Date Type Provider Dept   08/20/21 Telemedicine Pao Vargas 11 today's visits and meeting all other requirements  Future Appointments  No visits were found meeting these conditions    Showing future appointments within next 150 days and meeting all other requirements     This virtual check-in was done via telephone and she agrees to proceed  Patient agrees to participate in a virtual check in via telephone or video visit instead of presenting to the office to address urgent/immediate medical needs  Patient is aware this is a billable service  After connecting through Telephone, the patient was identified by name and date of birth  Lilian Epstein was informed that this was a telemedicine visit and that the exam was being conducted confidentially over secure lines  My office door was closed  No one else was in the room  Lilian Epstein acknowledged consent and understanding of privacy and security of the telemedicine visit  I informed the patient that I have reviewed her record in Epic and presented the opportunity for her to ask any questions regarding the visit today  The patient agreed to participate  It was my intent to perform this visit via video technology but the patient was not able to do a video connection so the visit was completed via audio telephone only  Subjective:   Lilian Epstein is a 77 y o  female who is concerned about COVID-19  Patient is currently asymptomatic  Patient's symptoms include rhinorrhea  Patient denies fever, chills, fatigue, malaise, congestion, sore throat, anosmia, loss of taste, cough, shortness of breath, chest tightness, abdominal pain, nausea, vomiting, diarrhea, myalgias and headaches       Date of exposure: 8/19/2021  COVID-19 vaccination status: Not vaccinated    Exposure:   Contact with a person who is under investigation (PUI) for or who is positive for COVID-19 within the last 14 days?: Yes    Patient grand daughter, was exposed to Sylvie and tested positive yesterday     No results found for: [facility], [address], [place] [address]  Past Medical History:   Diagnosis Date    Asthma     Asthmatic bronchitis     Last Assessed: 10/7/2014     Chronic diarrhea Last Assessed: 8/20/2015     Fibromyalgia     Focal nodular hyperplasia of liver     Last Assessed: 6/11/2015    Herpes zoster     Last Assessed: 3/18/2016    Hypertension     IBS (irritable bowel syndrome)     Intermittent palpitations     Irritable bowel syndrome     Osteoarthritis     Vertigo      Past Surgical History:   Procedure Laterality Date    BREAST BIOPSY      SMALL INTESTINE SURGERY       Current Outpatient Medications   Medication Sig Dispense Refill    acetaminophen (TYLENOL) 500 mg tablet Take 1 tablet (500 mg total) by mouth every 6 (six) hours as needed for mild pain 60 tablet 0    albuterol (2 5 mg/3 mL) 0 083 % nebulizer solution Take 1 vial (2 5 mg total) by nebulization every 4 (four) hours as needed for wheezing or shortness of breath 75 vial 0    albuterol (PROVENTIL HFA,VENTOLIN HFA) 90 mcg/act inhaler Inhale 2 puffs every 6 (six) hours as needed for wheezing or shortness of breath 3 Inhaler 3    amLODIPine (NORVASC) 5 mg tablet Take 1 tablet (5 mg total) by mouth daily 90 tablet 1    azelastine (ASTELIN) 0 1 % nasal spray 2 sprays into each nostril 2 (two) times a day Use in each nostril as directed 30 mL 2    Blood Pressure Monitoring (BLOOD PRESSURE CUFF) MISC by Does not apply route 2 (two) times a day 1 each 0    cetirizine (ZyrTEC) 10 mg tablet Take 1 tablet (10 mg total) by mouth daily 30 tablet 2    Cholecalciferol (Vitamin D) 50 MCG (2000 UT) CAPS Take 1 capsule (2,000 Units total) by mouth daily 30 capsule 5    fluticasone (FLONASE) 50 mcg/act nasal spray instill 2 sprays into each nostril once daily 16 g 3    folic acid (FOLVITE) 1 mg tablet take 1 tablet by mouth daily 30 tablet 5    hydrochlorothiazide (HYDRODIURIL) 25 mg tablet Take 1 tablet (25 mg total) by mouth daily 90 tablet 1    olopatadine (PATANOL) 0 1 % ophthalmic solution Administer 1 drop to both eyes 2 (two) times a day 5 mL 2    pantoprazole (PROTONIX) 40 mg tablet TAKE 1 TABLET (40 MG TOTAL) BY MOUTH DAILY 30 tablet 3     No current facility-administered medications for this visit  Allergies   Allergen Reactions    Ambrosia Artemisiifolia (Ragweed) Skin Test Facial Swelling    Codeine Other (See Comments)     Heart races    Epinephrine      Pt reports "it makes my heart race, they told me I cant take it "    Ibuprofen Other (See Comments)     Heart racing    Morphine Other (See Comments)     Heart racing    Pollen Extract Sneezing    Tramadol Other (See Comments)     Heart racing       Review of Systems   Constitutional: Negative for chills, fatigue and fever  HENT: Positive for rhinorrhea  Negative for congestion and sore throat  Respiratory: Negative for cough, chest tightness and shortness of breath  Gastrointestinal: Negative for abdominal pain, diarrhea, nausea and vomiting  Musculoskeletal: Negative for myalgias  Neurological: Negative for headaches  Objective:    Vitals:    08/20/21 1321   Height: 5' (1 524 m)       Physical Exam  Constitutional:       Comments: Unable to perform  Visit was a telephone encounter    Pulmonary:      Effort: No respiratory distress  VIRTUAL VISIT DISCLAIMER    Clearance Kayce verbally agrees to participate in [employer]  Pt is aware that [employer] could be limited without vital signs or the ability to perform a full hands-on physical Gopalwallace Olmstead understands she or the provider may request at any time to terminate the video visit and request the patient to seek care or treatment in person

## 2021-08-20 NOTE — ASSESSMENT & PLAN NOTE
- Patient with recent exposure to grand daughter who tested positive  Date of exposure was yesterday  - Patient is currently asymptomatic except for Rhinorrhea which she reports is at baseline for her due to allergies  -  COVID test ordered and  discussed with patient to hold off COVID testing till about 3-5 days after exposure or till she becomes symptomatic   - Advised to continue to wear mask when around people and to  Continue to disinfect/ santize frequent contact surface areas     - ED precautions given and patient to schedule appointment for follow up

## 2021-08-23 PROCEDURE — U0003 INFECTIOUS AGENT DETECTION BY NUCLEIC ACID (DNA OR RNA); SEVERE ACUTE RESPIRATORY SYNDROME CORONAVIRUS 2 (SARS-COV-2) (CORONAVIRUS DISEASE [COVID-19]), AMPLIFIED PROBE TECHNIQUE, MAKING USE OF HIGH THROUGHPUT TECHNOLOGIES AS DESCRIBED BY CMS-2020-01-R: HCPCS | Performed by: FAMILY MEDICINE

## 2021-08-23 PROCEDURE — U0005 INFEC AGEN DETEC AMPLI PROBE: HCPCS | Performed by: FAMILY MEDICINE

## 2021-08-24 ENCOUNTER — TELEPHONE (OUTPATIENT)
Dept: FAMILY MEDICINE CLINIC | Facility: CLINIC | Age: 76
End: 2021-08-24

## 2021-08-24 ENCOUNTER — TELEMEDICINE (OUTPATIENT)
Dept: FAMILY MEDICINE CLINIC | Facility: CLINIC | Age: 76
End: 2021-08-24

## 2021-08-24 DIAGNOSIS — U07.1 TELEHEALTH ENCOUNTER FOR CONFIRMED COVID-19: Primary | ICD-10-CM

## 2021-08-24 PROCEDURE — G0071 COMM SVCS BY RHC/FQHC 5 MIN: HCPCS | Performed by: FAMILY MEDICINE

## 2021-08-24 RX ORDER — GUAIFENESIN 600 MG
1200 TABLET, EXTENDED RELEASE 12 HR ORAL EVERY 12 HOURS SCHEDULED
Qty: 15 TABLET | Refills: 0 | Status: SHIPPED | OUTPATIENT
Start: 2021-08-24 | End: 2022-03-30

## 2021-08-24 NOTE — ASSESSMENT & PLAN NOTE
- Patient tested positive for COVID on 08/23/2021  Patient reports worsening symptoms of nasal congestion, cough and wheezing  Of note, patient has wheezing and cough at baseline due to asthma   - Patient was advised to quarantine for 10 days,  wear mask around people, and disinfect frequent surface areas  - Discussed that  patient can use Motrin/Tylenol for fever/chills and Mucinex for nasal congestion and  Tessalon Perles for cough  - ED precautions for worsening symptoms  - patient will follow-up on Friday 08/27/2021 or sooner if needed

## 2021-08-24 NOTE — TELEPHONE ENCOUNTER
Dr Anaya Ours wants Earnestine Whitmanr to schedule virtual covid + f/up for [facility] 8/27  Left ms 8/24

## 2021-08-24 NOTE — PROGRESS NOTES
COVID-19 Outpatient Progress Note    Assessment/Plan:    Problem List Items Addressed This Visit        Other    Telehealth encounter for confirmed COVID-19 - Primary     - Patient tested positive for COVID on 08/23/2021  Patient reports worsening symptoms of nasal congestion, cough and wheezing  Of note, patient has wheezing and cough at baseline due to asthma   - Patient was advised to quarantine for 10 days,  wear mask around people, and disinfect frequent surface areas  - Discussed that  patient can use Motrin/Tylenol for fever/chills and Mucinex for nasal congestion and  Tessalon Perles for cough  - ED precautions for worsening symptoms  - patient will follow-up on Friday 08/27/2021 or sooner if needed  Relevant Medications    guaiFENesin (MUCINEX) 600 mg 12 hr tablet         Disposition:     I recommended continued isolation until at least 24 hours have passed since recovery defined as resolution of fever without the use of fever-reducing medications AND improvement in COVID symptoms AND 10 days have passed since onset of symptoms (or 10 days have passed since date of first positive viral diagnostic test for asymptomatic patients)  I have spent 9 minutes directly with the patient          Verification of patient location:    Patient is located in the following state in which I hold an active license [place]    Encounter provider Adri Alba MD    Provider located at [address] [place] [address]   [phone]    Recent Visits  Date Type Provider Dept   08/20/21 Telemedicine Adri Alba MD [facility] recent visits within past 7 days and meeting all other requirements  Today's Visits  Date Type Provider Dept   08/24/21 Telemedicine Pao Samaniego 11 today's visits and meeting all other requirements  Future Appointments  No visits were found meeting these conditions  Showing future appointments within next 150 days and meeting all other requirements     This virtual check-in was done via telephone and she agrees to proceed  Patient agrees to participate in a virtual check in via telephone or video visit instead of presenting to the office to address urgent/immediate medical needs  Patient is aware this is a billable service  After connecting through Telephone, the patient was identified by name and date of birth  Sarina Herbert was informed that this was a telemedicine visit and that the exam was being conducted confidentially over secure lines  My office door was closed  No one else was in the room  Sarina Herbert acknowledged consent and understanding of privacy and security of the telemedicine visit  I informed the patient that I have reviewed her record in Epic and presented the opportunity for her to ask any questions regarding the visit today  The patient agreed to participate  It was my intent to perform this visit via video technology but the patient was not able to do a video connection so the visit was completed via audio telephone only  Subjective:   Sarina Herbert is a 77 y o  female who has been screened for COVID-19  Symptom change since last report: worsening  Patient's symptoms include nasal congestion and cough  Patient denies fever, chills, fatigue, malaise, sore throat, anosmia, loss of taste, shortness of breath, chest tightness, abdominal pain, nausea, vomiting, diarrhea, myalgias and headaches  Date of exposure: 8/19/2021  Date of positive COVID-19 PCR: 8/23/2021  COVID-19 vaccination status: Not vaccinated    Talia Slater has been staying home and has isolated themselves in her home  She is taking care to not share personal items and is cleaning all surfaces that are touched often, like counters, tabletops, and doorknobs using household cleaning sprays or wipes  She is wearing a mask when she leaves her room       Patient has nasal congestion and wheezing at baseline, but now worse       Lab Results   Component Value Date    SARSCOV2 Positive (A) 08/23/2021     Past Medical History:   Diagnosis Date    Asthma     Asthmatic bronchitis     Last Assessed: 10/7/2014     Chronic diarrhea     Last Assessed: 8/20/2015     Fibromyalgia     Focal nodular hyperplasia of liver     Last Assessed: 6/11/2015    Herpes zoster     Last Assessed: 3/18/2016    Hypertension     IBS (irritable bowel syndrome)     Intermittent palpitations     Irritable bowel syndrome     Osteoarthritis     Vertigo      Past Surgical History:   Procedure Laterality Date    BREAST BIOPSY      SMALL INTESTINE SURGERY       Current Outpatient Medications   Medication Sig Dispense Refill    acetaminophen (TYLENOL) 500 mg tablet Take 1 tablet (500 mg total) by mouth every 6 (six) hours as needed for mild pain 60 tablet 0    albuterol (2 5 mg/3 mL) 0 083 % nebulizer solution Take 1 vial (2 5 mg total) by nebulization every 4 (four) hours as needed for wheezing or shortness of breath 75 vial 0    albuterol (PROVENTIL HFA,VENTOLIN HFA) 90 mcg/act inhaler Inhale 2 puffs every 6 (six) hours as needed for wheezing or shortness of breath 3 Inhaler 3    amLODIPine (NORVASC) 5 mg tablet Take 1 tablet (5 mg total) by mouth daily 90 tablet 1    azelastine (ASTELIN) 0 1 % nasal spray 2 sprays into each nostril 2 (two) times a day Use in each nostril as directed 30 mL 2    Blood Pressure Monitoring (BLOOD PRESSURE CUFF) MISC by Does not apply route 2 (two) times a day 1 each 0    cetirizine (ZyrTEC) 10 mg tablet Take 1 tablet (10 mg total) by mouth daily 30 tablet 2    Cholecalciferol (Vitamin D) 50 MCG (2000 UT) CAPS Take 1 capsule (2,000 Units total) by mouth daily 30 capsule 5    fluticasone (FLONASE) 50 mcg/act nasal spray instill 2 sprays into each nostril once daily 16 g 3    folic acid (FOLVITE) 1 mg tablet take 1 tablet by mouth daily 30 tablet 5    guaiFENesin (MUCINEX) 600 mg 12 hr tablet Take 2 tablets (1,200 mg total) by mouth every 12 (twelve) hours 15 tablet 0    hydrochlorothiazide (HYDRODIURIL) 25 mg tablet Take 1 tablet (25 mg total) by mouth daily 90 tablet 1    olopatadine (PATANOL) 0 1 % ophthalmic solution Administer 1 drop to both eyes 2 (two) times a day 5 mL 2    pantoprazole (PROTONIX) 40 mg tablet TAKE 1 TABLET (40 MG TOTAL) BY MOUTH DAILY 30 tablet 3     No current facility-administered medications for this visit  Allergies   Allergen Reactions    Ambrosia Artemisiifolia (Ragweed) Skin Test Facial Swelling    Codeine Other (See Comments)     Heart races    Epinephrine      Pt reports "it makes my heart race, they told me I cant take it "    Ibuprofen Other (See Comments)     Heart racing    Morphine Other (See Comments)     Heart racing    Pollen Extract Sneezing    Tramadol Other (See Comments)     Heart racing       Review of Systems   Constitutional: Negative for chills, fatigue and fever  HENT: Positive for congestion  Negative for sore throat  Respiratory: Positive for cough  Negative for chest tightness and shortness of breath  Gastrointestinal: Negative for abdominal pain, diarrhea, nausea and vomiting  Musculoskeletal: Negative for myalgias  Neurological: Negative for headaches  Objective: There were no vitals filed for this visit  Physical Exam  Constitutional:       Comments: Physical exam limited  Visit was via telephone  Patient did not appear to be in any respiratory distress  She was able to speaking full sentences with no dyspnea appreciated         VIRTUAL VISIT DISCLAIMER    Heather Maldonado verbally agrees to participate in [employer]   Pt is aware that [employer] could be limited without vital signs or the ability to perform a full hands-on physical Chen Prince understands she or the provider may request at any time to terminate the video visit and request the patient to seek care or treatment in person

## 2021-08-25 ENCOUNTER — TELEMEDICINE (OUTPATIENT)
Dept: FAMILY MEDICINE CLINIC | Facility: CLINIC | Age: 76
End: 2021-08-25

## 2021-08-25 DIAGNOSIS — J45.20 MILD INTERMITTENT ASTHMA, UNSPECIFIED WHETHER COMPLICATED: ICD-10-CM

## 2021-08-25 DIAGNOSIS — U07.1 TELEHEALTH ENCOUNTER FOR CONFIRMED COVID-19: Primary | ICD-10-CM

## 2021-08-25 PROCEDURE — G0071 COMM SVCS BY RHC/FQHC 5 MIN: HCPCS | Performed by: FAMILY MEDICINE

## 2021-08-25 RX ORDER — ALBUTEROL SULFATE 2.5 MG/3ML
2.5 SOLUTION RESPIRATORY (INHALATION) EVERY 4 HOURS PRN
Qty: 3 ML | Refills: 0 | Status: SHIPPED | OUTPATIENT
Start: 2021-08-25

## 2021-08-25 NOTE — PROGRESS NOTES
COVID-19 Outpatient Progress Note    Assessment/Plan:    Problem List Items Addressed This Visit        Respiratory    Mild intermittent asthma (Chronic)     Stable and well controlled  Still occasionally wheezes  Provided refill for Albuterol          Relevant Medications    albuterol (2 5 mg/3 mL) 0 083 % nebulizer solution       Other    Telehealth encounter for confirmed COVID-19 - Primary     - Patient tested positive for  COVID on 8/23/2021    - Patient's reports symptoms of cough and nasal congestion  But denies any other symptoms or complains  Of note, patient has cough and congestion at baseline due to asthma   - Discussed with patient the role of Monoclonal antibody treatment in treatment of mild -moderate COVID  Patient deferred and will continue symptom management with  Mucinex and Tessalon pearls  - will continue with daily COVID visit and   ED precautions provided for worsening symptoms  Disposition:     I recommended continued isolation until at least 24 hours have passed since recovery defined as resolution of fever without the use of fever-reducing medications AND improvement in COVID symptoms AND 10 days have passed since onset of symptoms (or 10 days have passed since date of first positive viral diagnostic test for asymptomatic patients)  I have spent 7 minutes directly with the patient          Verification of patient location:    Patient is located in the following state in which I hold an active license [place]    Encounter provider Herrera Cruz MD    Provider located at [address] [place] [address]   [phone]    Recent Visits  Date Type Provider Dept   08/24/21 Telephone [place] [address], [address]   08/24/21 MD Freda Leiva   08/20/21 Telemedicine [facility] recent visits within past 7 days and meeting all other requirements  Today's Visits  Date Type Provider Dept   08/25/21 Telemedicine [facility] today's visits and meeting all other requirements  Future Appointments  No visits were found meeting these conditions  Showing future appointments within next 150 days and meeting all other requirements     This virtual check-in was done via telephone and she agrees to proceed  Patient agrees to participate in a virtual check in via telephone or video visit instead of presenting to the office to address urgent/immediate medical needs  Patient is aware this is a billable service  After connecting through Telephone, the patient was identified by name and date of birth  Renetta Moy was informed that this was a telemedicine visit and that the exam was being conducted confidentially over secure lines  My office door was closed  No one else was in the room  Renetta Moy acknowledged consent and understanding of privacy and security of the telemedicine visit  I informed the patient that I have reviewed her record in Epic and presented the opportunity for her to ask any questions regarding the visit today  The patient agreed to participate  It was my intent to perform this visit via video technology but the patient was not able to do a video connection so the visit was completed via audio telephone only  Subjective:   Renetta Moy is a 77 y o  female who has been screened for COVID-19  Symptom change since last report: unchanged  Patient's symptoms include nasal congestion and cough  Patient denies fever, chills, fatigue, malaise, rhinorrhea, sore throat, anosmia, loss of taste, shortness of breath, chest tightness, nausea, vomiting, diarrhea, myalgias and headaches  Date of exposure: 8/19/2021  Date of positive COVID-19 PCR: 8/23/2021  COVID-19 vaccination status: Not vaccinated    Lcay Zepeda has been staying home and has isolated themselves in her home   She is taking care to not share personal items and is cleaning all surfaces that are touched often, like counters, tabletops, and doorknobs using household cleaning sprays or wipes  She is wearing a mask when she leaves her room       Lab Results   Component Value Date    SARSCOV2 Positive (A) 08/23/2021     Past Medical History:   Diagnosis Date    Asthma     Asthmatic bronchitis     Last Assessed: 10/7/2014     Chronic diarrhea     Last Assessed: 8/20/2015     Fibromyalgia     Focal nodular hyperplasia of liver     Last Assessed: 6/11/2015    Herpes zoster     Last Assessed: 3/18/2016    Hypertension     IBS (irritable bowel syndrome)     Intermittent palpitations     Irritable bowel syndrome     Osteoarthritis     Vertigo      Past Surgical History:   Procedure Laterality Date    BREAST BIOPSY      SMALL INTESTINE SURGERY       Current Outpatient Medications   Medication Sig Dispense Refill    acetaminophen (TYLENOL) 500 mg tablet Take 1 tablet (500 mg total) by mouth every 6 (six) hours as needed for mild pain 60 tablet 0    albuterol (2 5 mg/3 mL) 0 083 % nebulizer solution Take 3 mL (2 5 mg total) by nebulization every 4 (four) hours as needed for wheezing or shortness of breath 3 mL 0    albuterol (PROVENTIL HFA,VENTOLIN HFA) 90 mcg/act inhaler Inhale 2 puffs every 6 (six) hours as needed for wheezing or shortness of breath 3 Inhaler 3    amLODIPine (NORVASC) 5 mg tablet Take 1 tablet (5 mg total) by mouth daily 90 tablet 1    azelastine (ASTELIN) 0 1 % nasal spray 2 sprays into each nostril 2 (two) times a day Use in each nostril as directed 30 mL 2    Blood Pressure Monitoring (BLOOD PRESSURE CUFF) MISC by Does not apply route 2 (two) times a day 1 each 0    cetirizine (ZyrTEC) 10 mg tablet Take 1 tablet (10 mg total) by mouth daily 30 tablet 2    Cholecalciferol (Vitamin D) 50 MCG (2000 UT) CAPS Take 1 capsule (2,000 Units total) by mouth daily 30 capsule 5    fluticasone (FLONASE) 50 mcg/act nasal spray instill 2 sprays into each nostril once daily 16 g 3    folic acid (FOLVITE) 1 mg tablet take 1 tablet by mouth daily 30 tablet 5    guaiFENesin (MUCINEX) 600 mg 12 hr tablet Take 2 tablets (1,200 mg total) by mouth every 12 (twelve) hours 15 tablet 0    hydrochlorothiazide (HYDRODIURIL) 25 mg tablet Take 1 tablet (25 mg total) by mouth daily 90 tablet 1    olopatadine (PATANOL) 0 1 % ophthalmic solution Administer 1 drop to both eyes 2 (two) times a day 5 mL 2    pantoprazole (PROTONIX) 40 mg tablet TAKE 1 TABLET (40 MG TOTAL) BY MOUTH DAILY 30 tablet 3     No current facility-administered medications for this visit  Allergies   Allergen Reactions    Ambrosia Artemisiifolia (Ragweed) Skin Test Facial Swelling    Codeine Other (See Comments)     Heart races    Epinephrine      Pt reports "it makes my heart race, they told me I cant take it "    Ibuprofen Other (See Comments)     Heart racing    Morphine Other (See Comments)     Heart racing    Pollen Extract Sneezing    Tramadol Other (See Comments)     Heart racing       Review of Systems   Constitutional: Negative for chills, fatigue and fever  HENT: Positive for congestion  Negative for rhinorrhea and sore throat  Respiratory: Positive for cough  Negative for chest tightness and shortness of breath  Gastrointestinal: Negative for diarrhea, nausea and vomiting  Musculoskeletal: Negative for myalgias  Neurological: Negative for headaches  Objective: There were no vitals filed for this visit  Physical Exam  Constitutional:       Comments: Physical  exam limited  Patient appears stable, able to  Speak  In full sentences in no respiratory distress         VIRTUAL VISIT DISCLAIMER    Abdirashid Dion verbally agrees to participate in [employer]   Pt is aware that [employer] could be limited without vital signs or the ability to perform a full hands-on physical Reather Darrenjone understands she or the provider may request at any time to terminate the video visit and request the patient to seek care or treatment in person

## 2021-08-27 ENCOUNTER — TELEMEDICINE (OUTPATIENT)
Dept: FAMILY MEDICINE CLINIC | Facility: CLINIC | Age: 76
End: 2021-08-27

## 2021-08-27 DIAGNOSIS — U07.1 TELEHEALTH ENCOUNTER FOR CONFIRMED COVID-19: Primary | ICD-10-CM

## 2021-08-27 PROCEDURE — G0071 COMM SVCS BY RHC/FQHC 5 MIN: HCPCS | Performed by: FAMILY MEDICINE

## 2021-08-27 NOTE — ASSESSMENT & PLAN NOTE
- Day 5 since patient tested positive for COVID on 8/23/2021    - Patient reports that her symptoms of nasal congestion or cough  are stable  Denies any other symptoms of fever, chills, headaches, dizziness, dysgeusia, anosmia   - Discussed with patient in detail about COVID vaccination after viral illness has passed  - Discussed available treatment options with the  role of Monoclonal antibody treatment in treatment of mild -moderate COVID  Patient deferred and will continue symptom management with  Mucinex and Tessalon pearls  Patient will  medication from pharmacy today     - will continue with daily COVID visit and   ED precautions provided for worsening symptoms

## 2021-08-27 NOTE — PROGRESS NOTES
COVID-19 Outpatient Progress Note    Assessment/Plan:    Problem List Items Addressed This Visit        Other    Telehealth encounter for confirmed COVID-19 - Primary     - Day 5 since patient tested positive for COVID on 8/23/2021    - Patient reports that her symptoms of nasal congestion or cough  are stable  Denies any other symptoms of fever, chills, headaches, dizziness, dysgeusia, anosmia   - Discussed with patient in detail about COVID vaccination after viral illness has passed  - Discussed available treatment options with the  role of Monoclonal antibody treatment in treatment of mild -moderate COVID  Patient deferred and will continue symptom management with  Mucinex and Tessalon pearls  Patient will  medication from pharmacy today  - will continue with daily COVID visit and   ED precautions provided for worsening symptoms              Disposition:     I recommended continued isolation until at least 24 hours have passed since recovery defined as resolution of fever without the use of fever-reducing medications AND improvement in COVID symptoms AND 10 days have passed since onset of symptoms (or 10 days have passed since date of first positive viral diagnostic test for asymptomatic patients)  I have spent 7 minutes directly with the patient          Verification of patient location:    Patient is located in the following state in which I hold an active license [place]    Encounter provider Lion Acuña MD    Provider located at [address] [place] [address]   [phone]    Recent Visits  Date Type Provider Dept   08/25/21 Telemedicine MD Freda Causey   08/24/21 Telephone ANTHONY Canales   08/24/21 Telemedicine MD Freda Causey   08/20/21 Telemedicine Pao [facility] 11 recent visits within past 7 days and meeting all other requirements  Today's Visits  Date Type Provider Dept   08/27/21 Telemedicine [facility] today's visits and meeting all other requirements  Future Appointments  No visits were found meeting these conditions  Showing future appointments within next 150 days and meeting all other requirements     This virtual check-in was done via telephone and she agrees to proceed  Patient agrees to participate in a virtual check in via telephone or video visit instead of presenting to the office to address urgent/immediate medical needs  Patient is aware this is a billable service  After connecting through Telephone, the patient was identified by name and date of birth  Renetta Moy was informed that this was a telemedicine visit and that the exam was being conducted confidentially over secure lines  My office door was closed  Renetta Moy acknowledged consent and understanding of privacy and security of the telemedicine visit  I informed the patient that I have reviewed her record in Epic and presented the opportunity for her to ask any questions regarding the visit today  The patient agreed to participate  It was my intent to perform this visit via video technology but the patient was not able to do a video connection so the visit was completed via audio telephone only  Subjective:   Renetta Moy is a 77 y o  female who has been screened for COVID-19  Symptom change since last report: unchanged  Patient's symptoms include nasal congestion (Nasal congestion ) and cough  Patient denies fever, chills, fatigue, shortness of breath, chest tightness, abdominal pain, nausea, vomiting, diarrhea and headaches  Date of exposure: 8/19/2021  Date of positive COVID-19 PCR: 8/23/2021  COVID-19 vaccination status: Not vaccinated    Lacy Zepeda has been staying home and has isolated themselves in her home   She is taking care to not share personal items and is cleaning all surfaces that are touched often, like counters, tabletops, and doorknobs using household cleaning sprays or wipes  She is wearing a mask when she leaves her room       Lab Results   Component Value Date    SARSCOV2 Positive (A) 08/23/2021     Past Medical History:   Diagnosis Date    Asthma     Asthmatic bronchitis     Last Assessed: 10/7/2014     Chronic diarrhea     Last Assessed: 8/20/2015     Fibromyalgia     Focal nodular hyperplasia of liver     Last Assessed: 6/11/2015    Herpes zoster     Last Assessed: 3/18/2016    Hypertension     IBS (irritable bowel syndrome)     Intermittent palpitations     Irritable bowel syndrome     Osteoarthritis     Vertigo      Past Surgical History:   Procedure Laterality Date    BREAST BIOPSY      SMALL INTESTINE SURGERY       Current Outpatient Medications   Medication Sig Dispense Refill    acetaminophen (TYLENOL) 500 mg tablet Take 1 tablet (500 mg total) by mouth every 6 (six) hours as needed for mild pain 60 tablet 0    albuterol (2 5 mg/3 mL) 0 083 % nebulizer solution Take 3 mL (2 5 mg total) by nebulization every 4 (four) hours as needed for wheezing or shortness of breath 3 mL 0    albuterol (PROVENTIL HFA,VENTOLIN HFA) 90 mcg/act inhaler Inhale 2 puffs every 6 (six) hours as needed for wheezing or shortness of breath 3 Inhaler 3    amLODIPine (NORVASC) 5 mg tablet Take 1 tablet (5 mg total) by mouth daily 90 tablet 1    azelastine (ASTELIN) 0 1 % nasal spray 2 sprays into each nostril 2 (two) times a day Use in each nostril as directed 30 mL 2    Blood Pressure Monitoring (BLOOD PRESSURE CUFF) MISC by Does not apply route 2 (two) times a day 1 each 0    cetirizine (ZyrTEC) 10 mg tablet Take 1 tablet (10 mg total) by mouth daily 30 tablet 2    Cholecalciferol (Vitamin D) 50 MCG (2000 UT) CAPS Take 1 capsule (2,000 Units total) by mouth daily 30 capsule 5    fluticasone (FLONASE) 50 mcg/act nasal spray instill 2 sprays into each nostril once daily 16 g 3    folic acid (FOLVITE) 1 mg tablet take 1 tablet by mouth daily 30 tablet 5    guaiFENesin (MUCINEX) 600 mg 12 hr tablet Take 2 tablets (1,200 mg total) by mouth every 12 (twelve) hours 15 tablet 0    hydrochlorothiazide (HYDRODIURIL) 25 mg tablet Take 1 tablet (25 mg total) by mouth daily 90 tablet 1    olopatadine (PATANOL) 0 1 % ophthalmic solution Administer 1 drop to both eyes 2 (two) times a day 5 mL 2    pantoprazole (PROTONIX) 40 mg tablet TAKE 1 TABLET (40 MG TOTAL) BY MOUTH DAILY 30 tablet 3     No current facility-administered medications for this visit  Allergies   Allergen Reactions    Ambrosia Artemisiifolia (Ragweed) Skin Test Facial Swelling    Codeine Other (See Comments)     Heart races    Epinephrine      Pt reports "it makes my heart race, they told me I cant take it "    Ibuprofen Other (See Comments)     Heart racing    Morphine Other (See Comments)     Heart racing    Pollen Extract Sneezing    Tramadol Other (See Comments)     Heart racing       Review of Systems   Constitutional: Negative for chills, fatigue and fever  HENT: Positive for congestion (Nasal congestion )  Respiratory: Positive for cough  Negative for chest tightness and shortness of breath  Cardiovascular: Negative for chest pain and palpitations  Gastrointestinal: Negative for abdominal pain, diarrhea, nausea and vomiting  Genitourinary: Negative for difficulty urinating and frequency  Musculoskeletal: Negative  Skin: Negative  Neurological: Negative for dizziness, light-headedness and headaches  Psychiatric/Behavioral: Negative  Objective: There were no vitals filed for this visit  Physical Exam  Constitutional:       Comments: Physical exam limited  Per telephone encounter,  Patient does not appear to be in no acute or respiratory distress  Spoke in full sentences          VIRTUAL VISIT DISCLAIMER    Dariela Duron verbally agrees to participate in [employer]   Pt is aware that [employer] could be limited without vital signs or the ability to perform a full hands-on physical [employer] understands she or the provider may request at any time to terminate the video visit and request the patient to seek care or treatment in person

## 2021-08-28 ENCOUNTER — APPOINTMENT (EMERGENCY)
Dept: RADIOLOGY | Facility: HOSPITAL | Age: 76
End: 2021-08-28
Payer: COMMERCIAL

## 2021-08-28 ENCOUNTER — HOSPITAL ENCOUNTER (EMERGENCY)
Facility: HOSPITAL | Age: 76
Discharge: HOME | End: 2021-08-28
Attending: EMERGENCY MEDICINE | Admitting: EMERGENCY MEDICINE
Payer: COMMERCIAL

## 2021-08-28 VITALS
DIASTOLIC BLOOD PRESSURE: 99 MMHG | TEMPERATURE: 98.9 F | HEART RATE: 101 BPM | SYSTOLIC BLOOD PRESSURE: 176 MMHG | RESPIRATION RATE: 18 BRPM | OXYGEN SATURATION: 93 %

## 2021-08-28 DIAGNOSIS — R06.2 WHEEZING: ICD-10-CM

## 2021-08-28 DIAGNOSIS — U07.1 COVID-19: ICD-10-CM

## 2021-08-28 DIAGNOSIS — R05.9 COUGH: Primary | ICD-10-CM

## 2021-08-28 PROCEDURE — 71045 X-RAY EXAM CHEST 1 VIEW: CPT

## 2021-08-28 PROCEDURE — 99284 EMERGENCY DEPT VISIT MOD MDM: CPT | Performed by: EMERGENCY MEDICINE

## 2021-08-28 PROCEDURE — 99283 EMERGENCY DEPT VISIT LOW MDM: CPT

## 2021-08-28 RX ORDER — OXYMETAZOLINE HYDROCHLORIDE 0.05 G/100ML
1 SPRAY NASAL ONCE
Status: COMPLETED | OUTPATIENT
Start: 2021-08-28 | End: 2021-08-28

## 2021-08-28 RX ORDER — ALBUTEROL SULFATE 90 UG/1
6 AEROSOL, METERED RESPIRATORY (INHALATION) ONCE
Status: COMPLETED | OUTPATIENT
Start: 2021-08-28 | End: 2021-08-28

## 2021-08-28 RX ADMIN — ALBUTEROL SULFATE 6 PUFF: 90 AEROSOL, METERED RESPIRATORY (INHALATION) at 20:13

## 2021-08-28 RX ADMIN — OXYMETAZOLINE HYDROCHLORIDE 1 SPRAY: 0.05 SPRAY NASAL at 21:27

## 2021-08-28 NOTE — ED PROVIDER NOTES
History  Chief Complaint   Patient presents with    Breathing Problem     pt stated since she has been COVID+, she is c/o wheezing in her chest, pt stated she felt tight and was using her ablbuterol pump     81-year-old female history of asthma, recently tested positive for COVID on , presenting due to wheezing and cough  States that her cough developed a few days ago and has increased in frequency and is now more productive  Says that she has been having intermittent wheezing and used her home inhaler 2 times earlier today with minimal improvement  States that she typically does not require her inhaler more than every few months  Denies any fevers, chest pain, dyspnea at rest or with exertion, abdominal pain, nausea vomiting, hemoptysis, calf swelling  Prior to Admission Medications   Prescriptions Last Dose Informant Patient Reported? Taking?    Blood Pressure Monitoring (BLOOD PRESSURE CUFF) MISC  Self No No   Sig: by Does not apply route 2 (two) times a day   Cholecalciferol (Vitamin D) 50 MCG (2000 UT) CAPS  Self No Yes   Sig: Take 1 capsule (2,000 Units total) by mouth daily   acetaminophen (TYLENOL) 500 mg tablet  Self No No   Sig: Take 1 tablet (500 mg total) by mouth every 6 (six) hours as needed for mild pain   albuterol (2 5 mg/3 mL) 0 083 % nebulizer solution   No Yes   Sig: Take 3 mL (2 5 mg total) by nebulization every 4 (four) hours as needed for wheezing or shortness of breath   albuterol (PROVENTIL HFA,VENTOLIN HFA) 90 mcg/act inhaler  Self No Yes   Sig: Inhale 2 puffs every 6 (six) hours as needed for wheezing or shortness of breath   amLODIPine (NORVASC) 5 mg tablet  Self No Yes   Sig: Take 1 tablet (5 mg total) by mouth daily   azelastine (ASTELIN) 0 1 % nasal spray  Self No No   Si sprays into each nostril 2 (two) times a day Use in each nostril as directed   cetirizine (ZyrTEC) 10 mg tablet  Self No Yes   Sig: Take 1 tablet (10 mg total) by mouth daily   fluticasone (FLONASE) 50 mcg/act nasal spray  Self No Yes   Sig: instill 2 sprays into each nostril once daily   folic acid (FOLVITE) 1 mg tablet  Self No Yes   Sig: take 1 tablet by mouth daily   guaiFENesin (MUCINEX) 600 mg 12 hr tablet   No Yes   Sig: Take 2 tablets (1,200 mg total) by mouth every 12 (twelve) hours   hydrochlorothiazide (HYDRODIURIL) 25 mg tablet  Self No Yes   Sig: Take 1 tablet (25 mg total) by mouth daily   olopatadine (PATANOL) 0 1 % ophthalmic solution  Self No No   Sig: Administer 1 drop to both eyes 2 (two) times a day   pantoprazole (PROTONIX) 40 mg tablet  Self No Yes   Sig: TAKE 1 TABLET (40 MG TOTAL) BY MOUTH DAILY      Facility-Administered Medications: None       Past Medical History:   Diagnosis Date    Asthma     Asthmatic bronchitis     Last Assessed: 10/7/2014     Chronic diarrhea     Last Assessed: 8/20/2015     Fibromyalgia     Focal nodular hyperplasia of liver     Last Assessed: 6/11/2015    Herpes zoster     Last Assessed: 3/18/2016    Hypertension     IBS (irritable bowel syndrome)     Intermittent palpitations     Irritable bowel syndrome     Osteoarthritis     Vertigo        Past Surgical History:   Procedure Laterality Date    BREAST BIOPSY      SMALL INTESTINE SURGERY         Family History   Problem Relation Age of Onset    Heart attack Mother     Other Mother         Aspiration of Vomit     Asthma Father     Breast cancer Sister     Arthritis Family     Other Family         Musculoskeletal Disease     Osteoporosis Family      I have reviewed and agree with the history as documented      E-Cigarette/Vaping    E-Cigarette Use Never User      E-Cigarette/Vaping Substances    Nicotine No     THC No     CBD No     Flavoring No     Other No     Unknown No      Social History     Tobacco Use    Smoking status: Current Every Day Smoker     Packs/day: 0 50     Types: Cigarettes    Smokeless tobacco: Never Used   Vaping Use    Vaping Use: Never used Substance Use Topics    Alcohol use: No    Drug use: No        Review of Systems   Constitutional: Negative for chills and fever  HENT: Negative for ear pain and sore throat  Eyes: Negative for pain and visual disturbance  Respiratory: Positive for cough, chest tightness and wheezing  Negative for shortness of breath  Cardiovascular: Negative for chest pain and palpitations  Gastrointestinal: Negative for abdominal pain and vomiting  Genitourinary: Negative for dysuria  Musculoskeletal: Negative for arthralgias and back pain  Skin: Negative for color change and rash  Neurological: Negative for syncope  All other systems reviewed and are negative  Physical Exam  ED Triage Vitals   Temperature Pulse Respirations Blood Pressure SpO2   08/28/21 2013 08/28/21 1927 08/28/21 1927 08/28/21 1927 08/28/21 1927   98 9 °F (37 2 °C) 101 18 (!) 176/99 96 %      Temp Source Heart Rate Source Patient Position - Orthostatic VS BP Location FiO2 (%)   08/28/21 2013 08/28/21 1927 08/28/21 1927 08/28/21 1927 --   Oral Monitor Lying Right arm       Pain Score       --                    Orthostatic Vital Signs  Vitals:    08/28/21 1927   BP: (!) 176/99   Pulse: 101   Patient Position - Orthostatic VS: Lying       Physical Exam  Vitals and nursing note reviewed  Constitutional:       General: She is not in acute distress  Appearance: She is well-developed  HENT:      Head: Normocephalic and atraumatic  Eyes:      Conjunctiva/sclera: Conjunctivae normal    Cardiovascular:      Rate and Rhythm: Normal rate and regular rhythm  Heart sounds: No murmur heard  Pulmonary:      Effort: Pulmonary effort is normal  No respiratory distress  Breath sounds: No stridor  Wheezing present  No rales  Abdominal:      General: There is no distension  Musculoskeletal:         General: Normal range of motion  Cervical back: Normal range of motion  Skin:     General: Skin is warm and dry  Neurological:      Mental Status: She is alert and oriented to person, place, and time  Psychiatric:         Mood and Affect: Mood normal          Behavior: Behavior normal          ED Medications  Medications   albuterol (PROVENTIL HFA,VENTOLIN HFA) inhaler 6 puff (6 puffs Inhalation Given 8/28/21 2013)   oxymetazoline (AFRIN) 0 05 % nasal spray 1 spray (1 spray Each Nare Given 8/28/21 2127)       Diagnostic Studies  Results Reviewed     None                 XR chest 1 view portable    (Results Pending)         Procedures  Procedures      ED Course               Identification of Seniors at Risk      Most Recent Value   (ISAR) Identification of Seniors at Risk   Before the illness or injury that brought you to the Emergency, did you need someone to help you on a regular basis? 0 Filed at: 08/28/2021 1927   In the last 24 hours, have you needed more help than usual?  0 Filed at: 08/28/2021 1927   Have you been hospitalized for one or more nights during the past 6 months? 0 Filed at: 08/28/2021 1927   In general, do you see well? 1 Filed at: 08/28/2021 1927   In general, do you have serious problems with your memory? 0 Filed at: 08/28/2021 1927   Do you take more than three different medications every day? 1 Filed at: 08/28/2021 1927   ISAR Score  2 Filed at: 08/28/2021 1927                              MDM  Number of Diagnoses or Management Options  Cough  COVID-19  Wheezing  Diagnosis management comments: No acute findings on chest x-ray on oxygen saturation was in mid 90s with no significant drop with ambulation  Significant improvement and comfort after 6 puffs of albuterol inhaler  Patient is safe for discharge at this time with outpatient follow-up  Strict return precautions advised as patient does have risk factors for worsening COVID  Discharged in stable condition  Patient declined any refill of home asthma medications        Disposition  Final diagnoses:   Cough   Wheezing   COVID-19     Time reflects when diagnosis was documented in both MDM as applicable and the Disposition within this note     Time User Action Codes Description Comment    8/28/2021  8:59 PM Maris Blue Add [R05] Cough     8/28/2021  8:59 PM Bennett Cruz Arm Add [R06 2] Wheezing     8/28/2021  8:59 PM Alireza Miller, [address] [U07 1] COVID-19       ED Disposition     ED Disposition Condition Date/Time Comment    Discharge Stable Sat Aug 28, 2021  8:59 PM Brooks Cheney discharge to home/self care              Follow-up Information     Follow up With Specialties Details Why [address] [facility], [facility], Physician Assistant   [facility] [address] [facility]  [phone]            Discharge Medication List as of 8/28/2021  9:00 PM      CONTINUE these medications which have NOT CHANGED    Details   albuterol (2 5 mg/3 mL) 0 083 % nebulizer solution Take 3 mL (2 5 mg total) by nebulization every 4 (four) hours as needed for wheezing or shortness of breath, Starting Wed 8/25/2021, Normal      albuterol (PROVENTIL HFA,VENTOLIN HFA) 90 mcg/act inhaler Inhale 2 puffs every 6 (six) hours as needed for wheezing or shortness of breath, Starting Tue 3/23/2021, Normal      amLODIPine (NORVASC) 5 mg tablet Take 1 tablet (5 mg total) by mouth daily, Starting Tue 3/23/2021, Normal      cetirizine (ZyrTEC) 10 mg tablet Take 1 tablet (10 mg total) by mouth daily, Starting Wed 7/1/2020, Normal      Cholecalciferol (Vitamin D) 50 MCG (2000 UT) CAPS Take 1 capsule (2,000 Units total) by mouth daily, Starting Tue 5/18/2021, Normal      fluticasone (FLONASE) 50 mcg/act nasal spray instill 2 sprays into each nostril once daily, Normal      folic acid (FOLVITE) 1 mg tablet take 1 tablet by mouth daily, Normal      guaiFENesin (MUCINEX) 600 mg 12 hr tablet Take 2 tablets (1,200 mg total) by mouth every 12 (twelve) hours, Starting Tue 8/24/2021, Normal      hydrochlorothiazide (HYDRODIURIL) 25 mg tablet Take 1 tablet (25 mg total) by mouth daily, Starting Tue 5/18/2021, Normal      pantoprazole (PROTONIX) 40 mg tablet TAKE 1 TABLET (40 MG TOTAL) BY MOUTH DAILY, Starting Tue 4/27/2021, Normal      acetaminophen (TYLENOL) 500 mg tablet Take 1 tablet (500 mg total) by mouth every 6 (six) hours as needed for mild pain, Starting Mon 8/27/2018, Normal      azelastine (ASTELIN) 0 1 % nasal spray 2 sprays into each nostril 2 (two) times a day Use in each nostril as directed, Starting Tue 5/18/2021, Normal      Blood Pressure Monitoring (BLOOD PRESSURE CUFF) MISC by Does not apply route 2 (two) times a day, Starting Wed 6/3/2020, Print      olopatadine (PATANOL) 0 1 % ophthalmic solution Administer 1 drop to both eyes 2 (two) times a day, Starting Tue 5/18/2021, Normal           No discharge procedures on file  PDMP Review     None           ED Provider  Attending physically available and evaluated Javan Payne I managed the patient along with the ED Attending      Electronically Signed by         Chrissy Harrison DO  08/29/21 8622

## 2021-08-29 NOTE — DISCHARGE INSTRUCTIONS
You did not have any acute findings on your chest x-ray and your oxygen saturation was normal   If you develop severe worsening shortness of breath please return to the emergency department for evaluation  Follow-up with your primary care physician in regards to recent hospital visit

## 2021-08-31 ENCOUNTER — TELEMEDICINE (OUTPATIENT)
Dept: FAMILY MEDICINE CLINIC | Facility: CLINIC | Age: 76
End: 2021-08-31

## 2021-08-31 DIAGNOSIS — J30.2 SEASONAL ALLERGIES: ICD-10-CM

## 2021-08-31 DIAGNOSIS — U07.1 COVID-19: Primary | ICD-10-CM

## 2021-08-31 PROCEDURE — 1160F RVW MEDS BY RX/DR IN RCRD: CPT | Performed by: FAMILY MEDICINE

## 2021-08-31 PROCEDURE — 99214 OFFICE O/P EST MOD 30 MIN: CPT | Performed by: FAMILY MEDICINE

## 2021-08-31 PROCEDURE — 4004F PT TOBACCO SCREEN RCVD TLK: CPT | Performed by: FAMILY MEDICINE

## 2021-08-31 RX ORDER — FLUTICASONE PROPIONATE 50 MCG
2 SPRAY, SUSPENSION (ML) NASAL DAILY
Qty: 16 G | Refills: 3 | Status: SHIPPED | OUTPATIENT
Start: 2021-08-31 | End: 2022-03-30 | Stop reason: SDUPTHER

## 2021-08-31 NOTE — PROGRESS NOTES
COVID-19 Outpatient Progress Note    Assessment/Plan:    Problem List Items Addressed This Visit        Other    Seasonal allergies    Relevant Medications    fluticasone (FLONASE) 50 mcg/act nasal spray    COVID-19 - Primary     Exposure on 8/19  Tested positive on 8/23  Current symptoms include  Has been using Albuterol 6 puffs q6h prn, wheezing is improved  Has been using Robitussin  Has been using Afrin, advised to discontinue and resume Flonase - refilled  Advised vaccine in 90 days  Follow up in 2 days                  Disposition:     I recommended continued isolation until at least 24 hours have passed since recovery defined as resolution of fever without the use of fever-reducing medications AND improvement in COVID symptoms AND 10 days have passed since onset of symptoms (or 10 days have passed since date of first positive viral diagnostic test for asymptomatic patients)  I have spent 15 minutes directly with the patient  Verification of patient location:    Patient is located in the following state in which I hold an active license [place]    Encounter provider Jazz Villarreal DO    Provider located at [address] [place] [address]   [phone]    Recent Visits  Date Type Provider Dept   08/27/21 Telemedicine Mati Arellano MD [facility]   08/25/21 Anupam Herrera MD [facility]   08/24/21 Telephone [address], ANTHONY [facility]   08/24/21 Telemedicine Mati Arellano MD [facility]   Showing recent visits within past 7 days and meeting all other requirements  Today's Visits  Date Type Provider Dept   08/31/21 Telemedicine Jazz Villarreal DO [facility]   Showing today's visits and meeting all other requirements  Future Appointments  No visits were found meeting these conditions    Showing future appointments within next 150 days and meeting all other requirements     This virtual check-in was done via [employer] and patient was informed that this is a secure, HIPAA-compliant platform  She agrees to proceed  Patient agrees to participate in a virtual check in via telephone or video visit instead of presenting to the office to address urgent/immediate medical needs  Patient is aware this is a billable service  After connecting through [facility], the patient was identified by name and date of birth  Laura Kulkarni was informed that this was a telemedicine visit and that the exam was being conducted confidentially over secure lines  My office door was closed  No one else was in the room  Laura Kulkarni acknowledged consent and understanding of privacy and security of the telemedicine visit  I informed the patient that I have reviewed her record in Epic and presented the opportunity for her to ask any questions regarding the visit today  The patient agreed to participate  Subjective:   Laura Kulkarni is a 77 y o  female who has been screened for COVID-19  Symptom change since last report: unchanged  Patient's symptoms include fatigue, malaise, nasal congestion, cough, shortness of breath, nausea, diarrhea and myalgias  Patient denies fever, chills, rhinorrhea, sore throat, anosmia, loss of taste, chest tightness, abdominal pain, vomiting and headaches  Date of exposure: 8/19/2021  Date of positive COVID-19 PCR: 8/23/2021  COVID-19 vaccination status: Not vaccinated    Les Del Valle has been staying home and has isolated themselves in her home  She is taking care to not share personal items and is cleaning all surfaces that are touched often, like counters, tabletops, and doorknobs using household cleaning sprays or wipes  She is wearing a mask when she leaves her room  She was seen in the ED on 8/28 for wheezing and cough, increased inhaler use  CXR at that time was negative for acute cardiopulmonary disease and SpO2 was maintained above 90% without any supplemental O2   She had significant improvement in her symptoms after six puffs of Albuterol and was discharged to home in stable condition      Lab Results   Component Value Date    SARSCOV2 Positive (A) 08/23/2021     Past Medical History:   Diagnosis Date    Asthma     Asthmatic bronchitis     Last Assessed: 10/7/2014     Chronic diarrhea     Last Assessed: 8/20/2015     Fibromyalgia     Focal nodular hyperplasia of liver     Last Assessed: 6/11/2015    Herpes zoster     Last Assessed: 3/18/2016    Hypertension     IBS (irritable bowel syndrome)     Intermittent palpitations     Irritable bowel syndrome     Osteoarthritis     Vertigo      Past Surgical History:   Procedure Laterality Date    BREAST BIOPSY      SMALL INTESTINE SURGERY       Current Outpatient Medications   Medication Sig Dispense Refill    acetaminophen (TYLENOL) 500 mg tablet Take 1 tablet (500 mg total) by mouth every 6 (six) hours as needed for mild pain 60 tablet 0    albuterol (2 5 mg/3 mL) 0 083 % nebulizer solution Take 3 mL (2 5 mg total) by nebulization every 4 (four) hours as needed for wheezing or shortness of breath 3 mL 0    albuterol (PROVENTIL HFA,VENTOLIN HFA) 90 mcg/act inhaler Inhale 2 puffs every 6 (six) hours as needed for wheezing or shortness of breath 3 Inhaler 3    amLODIPine (NORVASC) 5 mg tablet Take 1 tablet (5 mg total) by mouth daily 90 tablet 1    azelastine (ASTELIN) 0 1 % nasal spray 2 sprays into each nostril 2 (two) times a day Use in each nostril as directed 30 mL 2    Blood Pressure Monitoring (BLOOD PRESSURE CUFF) MISC by Does not apply route 2 (two) times a day 1 each 0    cetirizine (ZyrTEC) 10 mg tablet Take 1 tablet (10 mg total) by mouth daily 30 tablet 2    Cholecalciferol (Vitamin D) 50 MCG (2000 UT) CAPS Take 1 capsule (2,000 Units total) by mouth daily 30 capsule 5    fluticasone (FLONASE) 50 mcg/act nasal spray 2 sprays into each nostril daily 16 g 3    folic acid (FOLVITE) 1 mg tablet take 1 tablet by mouth daily 30 tablet 5    guaiFENesin (MUCINEX) 600 mg 12 hr tablet Take 2 tablets (1,200 mg total) by mouth every 12 (twelve) hours 15 tablet 0    hydrochlorothiazide (HYDRODIURIL) 25 mg tablet Take 1 tablet (25 mg total) by mouth daily 90 tablet 1    olopatadine (PATANOL) 0 1 % ophthalmic solution Administer 1 drop to both eyes 2 (two) times a day 5 mL 2    pantoprazole (PROTONIX) 40 mg tablet TAKE 1 TABLET (40 MG TOTAL) BY MOUTH DAILY 30 tablet 3     No current facility-administered medications for this visit  Allergies   Allergen Reactions    Ambrosia Artemisiifolia (Ragweed) Skin Test Facial Swelling    Codeine Other (See Comments)     Heart races    Epinephrine      Pt reports "it makes my heart race, they told me I cant take it "    Ibuprofen Other (See Comments)     Heart racing    Morphine Other (See Comments)     Heart racing    Pollen Extract Sneezing    Tramadol Other (See Comments)     Heart racing       Review of Systems   Constitutional: Positive for fatigue  Negative for chills and fever  HENT: Positive for congestion  Negative for rhinorrhea and sore throat  Respiratory: Positive for cough and shortness of breath  Negative for chest tightness  Gastrointestinal: Positive for diarrhea and nausea  Negative for abdominal pain and vomiting  Musculoskeletal: Positive for myalgias  Neurological: Negative for headaches  Objective: There were no vitals filed for this visit  Physical Exam   It was my intent to perform this visit via video technology but the patient was not able to do a video connection so the visit was completed via audio telephone only  VIRTUAL VISIT DISCLAIMER    Ike Blanca verbally agrees to participate in [employer]   Pt is aware that [employer] could be limited without vital signs or the ability to perform a full hands-on physical Yolette Otoole understands she or the provider may request at any time to terminate the video visit and request the patient to seek care or treatment in person

## 2021-08-31 NOTE — ASSESSMENT & PLAN NOTE
Exposure on 8/19  Tested positive on 8/23  Current symptoms include  Has been using Albuterol 6 puffs q6h prn, wheezing is improved  Has been using Robitussin  Has been using Afrin, advised to discontinue and resume Flonase - refilled  Advised vaccine in 90 days  Follow up in 2 days

## 2021-09-05 NOTE — ED ATTENDING ATTESTATION
8/28/2021  IJaimie MD, saw and evaluated the patient  I have discussed the patient with the resident/non-physician practitioner and agree with the resident's/non-physician practitioner's findings, Plan of Care, and MDM as documented in the resident's/non-physician practitioner's note, except where noted  All available labs and Radiology studies were reviewed  I was present for key portions of any procedure(s) performed by the resident/non-physician practitioner and I was immediately available to provide assistance  At this point I agree with the current assessment done in the Emergency Department  I have conducted an independent evaluation of this patient a history and physical is as follows:    ED Course     Patient presents for evaluation secondary to diagnosis of COVID positive 5 days ago  Patient has had a progressively worsening cough since that time and has been using her inhaler  No fevers or weakness  No additional complaints  Exam: AAOx3, NAD, RRR, wheezing, S/NT/ND  A/P:  The COVID  Will check chest x-ray to evaluate for pneumonia  Patient oxygenating well  Will give inhaler and reassess      Critical Care Time  Procedures

## 2021-09-14 ENCOUNTER — OFFICE VISIT (OUTPATIENT)
Dept: FAMILY MEDICINE CLINIC | Facility: CLINIC | Age: 76
End: 2021-09-14

## 2021-09-14 VITALS
DIASTOLIC BLOOD PRESSURE: 94 MMHG | HEART RATE: 102 BPM | SYSTOLIC BLOOD PRESSURE: 130 MMHG | TEMPERATURE: 98.1 F | BODY MASS INDEX: 26.86 KG/M2 | RESPIRATION RATE: 18 BRPM | HEIGHT: 60 IN | WEIGHT: 136.8 LBS | OXYGEN SATURATION: 96 %

## 2021-09-14 DIAGNOSIS — J45.31 MILD PERSISTENT ASTHMA WITH ACUTE EXACERBATION: Primary | ICD-10-CM

## 2021-09-14 DIAGNOSIS — J30.2 SEASONAL ALLERGIES: ICD-10-CM

## 2021-09-14 DIAGNOSIS — I10 ESSENTIAL HYPERTENSION: ICD-10-CM

## 2021-09-14 DIAGNOSIS — K64.4 EXTERNAL HEMORRHOID: ICD-10-CM

## 2021-09-14 PROBLEM — U07.1 TELEHEALTH ENCOUNTER FOR CONFIRMED COVID-19: Status: RESOLVED | Noted: 2021-08-24 | Resolved: 2021-09-14

## 2021-09-14 PROBLEM — B34.9 VIRAL SYNDROME: Status: RESOLVED | Noted: 2020-03-03 | Resolved: 2021-09-14

## 2021-09-14 PROBLEM — J45.901 MILD ASTHMA WITH ACUTE EXACERBATION: Status: ACTIVE | Noted: 2021-09-14

## 2021-09-14 PROBLEM — Z00.00 MEDICARE ANNUAL WELLNESS VISIT, SUBSEQUENT: Status: RESOLVED | Noted: 2021-03-23 | Resolved: 2021-09-14

## 2021-09-14 PROBLEM — Z12.31 SCREENING MAMMOGRAM, ENCOUNTER FOR: Status: RESOLVED | Noted: 2018-05-16 | Resolved: 2021-09-14

## 2021-09-14 PROBLEM — Z00.00 MEDICARE ANNUAL WELLNESS VISIT, INITIAL: Status: RESOLVED | Noted: 2020-01-23 | Resolved: 2021-09-14

## 2021-09-14 PROBLEM — Z12.11 SCREENING FOR COLON CANCER: Status: RESOLVED | Noted: 2020-02-11 | Resolved: 2021-09-14

## 2021-09-14 LAB — SL AMB POCT FECES OCC BLD: NEGATIVE

## 2021-09-14 PROCEDURE — 1160F RVW MEDS BY RX/DR IN RCRD: CPT | Performed by: PHYSICIAN ASSISTANT

## 2021-09-14 PROCEDURE — 3075F SYST BP GE 130 - 139MM HG: CPT | Performed by: PHYSICIAN ASSISTANT

## 2021-09-14 PROCEDURE — 4004F PT TOBACCO SCREEN RCVD TLK: CPT | Performed by: PHYSICIAN ASSISTANT

## 2021-09-14 PROCEDURE — 3080F DIAST BP >= 90 MM HG: CPT | Performed by: PHYSICIAN ASSISTANT

## 2021-09-14 PROCEDURE — 99213 OFFICE O/P EST LOW 20 MIN: CPT | Performed by: PHYSICIAN ASSISTANT

## 2021-09-14 PROCEDURE — 82270 OCCULT BLOOD FECES: CPT | Performed by: PHYSICIAN ASSISTANT

## 2021-09-14 PROCEDURE — 3008F BODY MASS INDEX DOCD: CPT | Performed by: PHYSICIAN ASSISTANT

## 2021-09-14 RX ORDER — AZELASTINE 1 MG/ML
2 SPRAY, METERED NASAL 2 TIMES DAILY
Qty: 30 ML | Refills: 2 | Status: SHIPPED | OUTPATIENT
Start: 2021-09-14 | End: 2022-03-30 | Stop reason: SDUPTHER

## 2021-09-14 RX ORDER — PREDNISONE 20 MG/1
TABLET ORAL
Qty: 10 TABLET | Refills: 0 | Status: SHIPPED | OUTPATIENT
Start: 2021-09-14 | End: 2021-10-05

## 2021-09-14 RX ORDER — HYDROCORTISONE 25 MG/G
CREAM TOPICAL 2 TIMES DAILY
Qty: 28 G | Refills: 1 | Status: SHIPPED | OUTPATIENT
Start: 2021-09-14

## 2021-09-14 NOTE — ASSESSMENT & PLAN NOTE
Prednisone 20 mg BID x 5 days  May decrease albuterol to 2 puffs q4-6 hrs prn  If sx persist will send for PFTs  F/u in 2 weeks

## 2021-09-14 NOTE — PROGRESS NOTES
Assessment/Plan:    External hemorrhoid   Anusol cream BID   Discussed following of high-fiber diet    Mild asthma with acute exacerbation  Prednisone 20 mg BID x 5 days  May decrease albuterol to 2 puffs q4-6 hrs prn  If sx persist will send for PFTs  F/u in 2 weeks    Essential hypertension  Bp stable  Continue current medications    Seasonal allergies   Refill Astelin nasal spray      Diagnoses and all orders for this visit:    Mild persistent asthma with acute exacerbation  -     predniSONE 20 mg tablet; Prednisone 20 mg po bid x 5 day    External hemorrhoid  -     hydrocortisone (ANUSOL-HC) 2 5 % rectal cream; Apply topically 2 (two) times a day    Seasonal allergies  -     azelastine (ASTELIN) 0 1 % nasal spray; 2 sprays into each nostril 2 (two) times a day Use in each nostril as directed    Essential hypertension        Subjective:      Patient ID: Shari Jama is a 77 y o  female presents today for f/u  Pt was seen in ED on 8/28/2021 for breathing problems  She tested + for COVID and advised to use Albuterol inhaler 6 puffs PRN  She reports she continues with wheezing and chest tightness  Is using her albuterol inhaler q6hrs with minimal relief  Asthma  She complains of chest tightness, cough, sputum production and wheezing  There is no shortness of breath  This is a recurrent problem  The current episode started more than 1 year ago  The problem occurs constantly  The problem has been gradually worsening  The cough is productive and productive of sputum  Associated symptoms include ear congestion, nasal congestion, postnasal drip, rhinorrhea and sneezing  Pertinent negatives include no appetite change, chest pain, dyspnea on exertion, ear pain, fever, heartburn, malaise/fatigue, orthopnea or sore throat  Her symptoms are aggravated by nothing  Her symptoms are alleviated by beta-agonist  Her symptoms are not alleviated by beta-agonist  Risk factors for lung disease include smoking/tobacco exposure   Her past medical history is significant for asthma  C/o bleeding hemorrhoids x 2 episode on Sunday  Last BM yesterday, form no bleeding  Had intermittent diarrhea with COVID  The following portions of the patient's history were reviewed and updated as appropriate: allergies, current medications, past family history, past medical history, past social history, past surgical history and problem list     Review of Systems   Constitutional: Negative for appetite change, diaphoresis, fever and malaise/fatigue  HENT: Positive for congestion (chronic nasal congestion), postnasal drip, rhinorrhea and sneezing  Negative for ear pain and sore throat  Respiratory: Positive for cough, sputum production, chest tightness and wheezing  Negative for shortness of breath  Cardiovascular: Negative for chest pain, dyspnea on exertion and palpitations  Gastrointestinal: Negative for heartburn          hemorrhoids   Musculoskeletal: Positive for back pain (chronic)  Allergic/Immunologic: Positive for environmental allergies  Psychiatric/Behavioral: Negative  Objective:      /94 (BP Location: Left arm, Patient Position: Sitting, Cuff Size: Standard)   Pulse 102   Temp 98 1 °F (36 7 °C) (Temporal)   Resp 18   Ht 5' (1 524 m)   Wt 62 1 kg (136 lb 12 8 oz)   SpO2 96%   BMI 26 72 kg/m²          Physical Exam  Constitutional:       General: She is not in acute distress  Appearance: Normal appearance  She is well-developed  She is not ill-appearing  HENT:      Head: Normocephalic and atraumatic  Eyes:      Conjunctiva/sclera: Conjunctivae normal    Cardiovascular:      Rate and Rhythm: Normal rate and regular rhythm  Heart sounds: Normal heart sounds  No murmur heard  Pulmonary:      Effort: Pulmonary effort is normal  No respiratory distress  Breath sounds: Wheezing (diffuses throughout bilateral lower lung fields) present  No rhonchi     Genitourinary:     Comments: Non thrombosed external hemorrhoid   Hemoccult negative   Musculoskeletal:         General: No deformity  Cervical back: Normal range of motion and neck supple  Neurological:      Mental Status: She is alert and oriented to person, place, and time  Psychiatric:         Mood and Affect: Mood normal          Behavior: Behavior normal          Thought Content:  Thought content normal          Judgment: Judgment normal

## 2021-09-30 DIAGNOSIS — I10 ESSENTIAL HYPERTENSION: ICD-10-CM

## 2021-09-30 RX ORDER — AMLODIPINE BESYLATE 5 MG/1
5 TABLET ORAL DAILY
Qty: 90 TABLET | Refills: 1 | Status: SHIPPED | OUTPATIENT
Start: 2021-09-30 | End: 2022-03-03

## 2021-10-05 ENCOUNTER — OFFICE VISIT (OUTPATIENT)
Dept: FAMILY MEDICINE CLINIC | Facility: CLINIC | Age: 76
End: 2021-10-05

## 2021-10-05 VITALS
BODY MASS INDEX: 26.9 KG/M2 | RESPIRATION RATE: 18 BRPM | HEART RATE: 102 BPM | HEIGHT: 60 IN | WEIGHT: 137 LBS | OXYGEN SATURATION: 97 % | SYSTOLIC BLOOD PRESSURE: 140 MMHG | TEMPERATURE: 98.7 F | DIASTOLIC BLOOD PRESSURE: 90 MMHG

## 2021-10-05 DIAGNOSIS — M05.80 POLYARTHRITIS WITH POSITIVE RHEUMATOID FACTOR (HCC): Primary | ICD-10-CM

## 2021-10-05 DIAGNOSIS — R25.2 CRAMP OF LIMB: ICD-10-CM

## 2021-10-05 DIAGNOSIS — J45.21 MILD INTERMITTENT ASTHMA WITH ACUTE EXACERBATION: Chronic | ICD-10-CM

## 2021-10-05 DIAGNOSIS — F41.9 ANXIETY: ICD-10-CM

## 2021-10-05 PROCEDURE — 99214 OFFICE O/P EST MOD 30 MIN: CPT | Performed by: PHYSICIAN ASSISTANT

## 2021-10-05 PROCEDURE — 4004F PT TOBACCO SCREEN RCVD TLK: CPT | Performed by: PHYSICIAN ASSISTANT

## 2021-10-05 PROCEDURE — 3080F DIAST BP >= 90 MM HG: CPT | Performed by: PHYSICIAN ASSISTANT

## 2021-10-05 PROCEDURE — 3725F SCREEN DEPRESSION PERFORMED: CPT | Performed by: PHYSICIAN ASSISTANT

## 2021-10-05 PROCEDURE — 1160F RVW MEDS BY RX/DR IN RCRD: CPT | Performed by: PHYSICIAN ASSISTANT

## 2021-10-05 PROCEDURE — 3077F SYST BP >= 140 MM HG: CPT | Performed by: PHYSICIAN ASSISTANT

## 2021-10-05 PROCEDURE — 3008F BODY MASS INDEX DOCD: CPT | Performed by: PHYSICIAN ASSISTANT

## 2021-10-05 RX ORDER — HYDROXYZINE HYDROCHLORIDE 25 MG/1
25 TABLET, FILM COATED ORAL EVERY 6 HOURS PRN
Qty: 30 TABLET | Refills: 0 | Status: SHIPPED | OUTPATIENT
Start: 2021-10-05 | End: 2022-03-30 | Stop reason: SDUPTHER

## 2021-10-15 ENCOUNTER — APPOINTMENT (OUTPATIENT)
Dept: LAB | Facility: HOSPITAL | Age: 76
End: 2021-10-15
Payer: COMMERCIAL

## 2021-10-15 ENCOUNTER — TELEPHONE (OUTPATIENT)
Dept: FAMILY MEDICINE CLINIC | Facility: CLINIC | Age: 76
End: 2021-10-15

## 2021-10-15 DIAGNOSIS — E55.9 VITAMIN D DEFICIENCY: ICD-10-CM

## 2021-10-15 DIAGNOSIS — I10 ESSENTIAL HYPERTENSION: ICD-10-CM

## 2021-10-15 DIAGNOSIS — Z13.6 SCREENING FOR CARDIOVASCULAR CONDITION: ICD-10-CM

## 2021-10-15 DIAGNOSIS — R73.03 PREDIABETES: ICD-10-CM

## 2021-10-15 LAB
25(OH)D3 SERPL-MCNC: 16.4 NG/ML (ref 30–100)
ALBUMIN SERPL BCP-MCNC: 3 G/DL (ref 3.5–5)
ALP SERPL-CCNC: 107 U/L (ref 46–116)
ALT SERPL W P-5'-P-CCNC: 17 U/L (ref 12–78)
ANION GAP SERPL CALCULATED.3IONS-SCNC: 5 MMOL/L (ref 4–13)
AST SERPL W P-5'-P-CCNC: 12 U/L (ref 5–45)
BILIRUB SERPL-MCNC: 0.17 MG/DL (ref 0.2–1)
BUN SERPL-MCNC: 28 MG/DL (ref 5–25)
CALCIUM ALBUM COR SERPL-MCNC: 10 MG/DL (ref 8.3–10.1)
CALCIUM SERPL-MCNC: 9.2 MG/DL (ref 8.3–10.1)
CHLORIDE SERPL-SCNC: 110 MMOL/L (ref 100–108)
CHOLEST SERPL-MCNC: 182 MG/DL (ref 50–200)
CO2 SERPL-SCNC: 25 MMOL/L (ref 21–32)
CREAT SERPL-MCNC: 1.47 MG/DL (ref 0.6–1.3)
EST. AVERAGE GLUCOSE BLD GHB EST-MCNC: 126 MG/DL
GFR SERPL CREATININE-BSD FRML MDRD: 35 ML/MIN/1.73SQ M
GLUCOSE P FAST SERPL-MCNC: 112 MG/DL (ref 65–99)
HBA1C MFR BLD: 6 %
HDLC SERPL-MCNC: 33 MG/DL
LDLC SERPL CALC-MCNC: 113 MG/DL (ref 0–100)
NONHDLC SERPL-MCNC: 149 MG/DL
POTASSIUM SERPL-SCNC: 3.5 MMOL/L (ref 3.5–5.3)
PROT SERPL-MCNC: 7.5 G/DL (ref 6.4–8.2)
SODIUM SERPL-SCNC: 140 MMOL/L (ref 136–145)
TRIGL SERPL-MCNC: 178 MG/DL

## 2021-10-15 PROCEDURE — 36415 COLL VENOUS BLD VENIPUNCTURE: CPT

## 2021-10-15 PROCEDURE — 80053 COMPREHEN METABOLIC PANEL: CPT

## 2021-10-15 PROCEDURE — 82306 VITAMIN D 25 HYDROXY: CPT

## 2021-10-15 PROCEDURE — 80061 LIPID PANEL: CPT

## 2021-10-15 PROCEDURE — 83036 HEMOGLOBIN GLYCOSYLATED A1C: CPT

## 2021-10-19 ENCOUNTER — TELEPHONE (OUTPATIENT)
Dept: FAMILY MEDICINE CLINIC | Facility: CLINIC | Age: 76
End: 2021-10-19

## 2021-10-19 DIAGNOSIS — R79.89 ELEVATED SERUM CREATININE: ICD-10-CM

## 2021-10-19 DIAGNOSIS — E55.9 VITAMIN D DEFICIENCY: Primary | ICD-10-CM

## 2021-10-19 RX ORDER — ERGOCALCIFEROL 1.25 MG/1
CAPSULE ORAL
Qty: 8 CAPSULE | Refills: 0 | Status: SHIPPED | OUTPATIENT
Start: 2021-10-19 | End: 2022-01-04

## 2021-11-04 ENCOUNTER — VBI (OUTPATIENT)
Dept: ADMINISTRATIVE | Facility: OTHER | Age: 76
End: 2021-11-04

## 2021-12-04 DIAGNOSIS — I10 ESSENTIAL HYPERTENSION: ICD-10-CM

## 2021-12-06 RX ORDER — HYDROCHLOROTHIAZIDE 25 MG/1
25 TABLET ORAL DAILY
Qty: 90 TABLET | Refills: 1 | Status: SHIPPED | OUTPATIENT
Start: 2021-12-06 | End: 2022-03-30 | Stop reason: SDUPTHER

## 2022-01-04 ENCOUNTER — CONSULT (OUTPATIENT)
Dept: NEPHROLOGY | Facility: CLINIC | Age: 77
End: 2022-01-04
Payer: MEDICARE

## 2022-01-04 VITALS
WEIGHT: 136 LBS | HEIGHT: 60 IN | BODY MASS INDEX: 26.7 KG/M2 | DIASTOLIC BLOOD PRESSURE: 90 MMHG | SYSTOLIC BLOOD PRESSURE: 140 MMHG

## 2022-01-04 DIAGNOSIS — R79.89 ELEVATED SERUM CREATININE: ICD-10-CM

## 2022-01-04 DIAGNOSIS — N18.31 STAGE 3A CHRONIC KIDNEY DISEASE (HCC): Primary | ICD-10-CM

## 2022-01-04 LAB
SL AMB  POCT GLUCOSE, UA: NEGATIVE
SL AMB LEUKOCYTE ESTERASE,UA: NEGATIVE
SL AMB POCT BILIRUBIN,UA: NEGATIVE
SL AMB POCT BLOOD,UA: NEGATIVE
SL AMB POCT CLARITY,UA: ABNORMAL
SL AMB POCT COLOR,UA: YELLOW
SL AMB POCT KETONES,UA: NEGATIVE
SL AMB POCT NITRITE,UA: NEGATIVE
SL AMB POCT PH,UA: 5
SL AMB POCT SPECIFIC GRAVITY,UA: 1.01
SL AMB POCT URINE PROTEIN: 15
SL AMB POCT UROBILINOGEN: NEGATIVE

## 2022-01-04 PROCEDURE — 81002 URINALYSIS NONAUTO W/O SCOPE: CPT | Performed by: STUDENT IN AN ORGANIZED HEALTH CARE EDUCATION/TRAINING PROGRAM

## 2022-01-04 PROCEDURE — 99204 OFFICE O/P NEW MOD 45 MIN: CPT | Performed by: STUDENT IN AN ORGANIZED HEALTH CARE EDUCATION/TRAINING PROGRAM

## 2022-01-04 NOTE — PATIENT INSTRUCTIONS
Thank you for coming to your visit today  As we discussed you kidney function is abnormal , your creatinine is 1 4mg/dL  Your electrolytes are normal  Please follow the recommendations below        Recommend low sodium (salt) food     Avoid nonsteroidal anti-inflammatory drugs such as Naprosyn, ibuprofen, Aleve, Advil, Celebrex, Meloxicam (Mobic) etc   You can use Tylenol as needed if you do not have any liver condition to be concerned about     Try to avoid medications such as pantoprazole or  Protonix/Nexium or Esomeprazole)/Prilosec or omeprazole/Prevacid or lansoprazole/AcipHex or Rabeprazole  If you are able to, use Pepcid as this is safer for your kidneys       Try to exercise at least 30 minutes 3 days a week to begin with with an ultimate goal of 5 days a week for at least 30 minutes    Next visit 3 months       Db Alas MD  Nephrology Attending

## 2022-01-04 NOTE — PROGRESS NOTES
NEPHROLOGY OUTPATIENT CONSULTATION   Crystal Rodriguez 77 y o  female MRN: [id number]  Date: 1/4/2022  Reason for consultation:   Chief Complaint   Patient presents with    Consult       ASSESSMENT AND PLAN:   77y o  year old female with PMH of asthma, HTN, COVID 19 in 8/2021, OA  Patient had BRE in 07/2021 , creatinine elevated to 1 4mg/dL from normal baseline  Patient presents for initial consultation for elevated creatinine  PLAN:    #CKD G3b    Baseline creatinine:1 4m g/dL since 07/2020   Current creatinine:stable at 1 4mg/dL    Etiology: unknown , likely secondary to nephroangiosclerosis in the settings of hypertension but other etiologies should be r/o   Plan:   Ultrasound    UA, UPCr   Basic GN work up   Nevaeh Biggs Avoid nephrotoxic agents such as NSAIDs      #Volume/hypertension:   Volume:euvolemic    Blood pressure:hypertensive 140/90   Low sodium diet    Advised to maintain a good BP control to prevent progression of CKD       #Acid base disorder   serum HCO3 25 , goal >21     #CKD-MBD   Corrected Calcium 10mg/dL   Phosphorus (goal <5 5)   25-OH vitamin D (goal >30ng/ml) by KDIGO guidelines 2017    #hypovitaminosis D   · 25-OH vitamin D 16 4  · S/p 50,000 a week       #Anemia:  · Current hemoglobin:13 4mg/dL at goal       HISTORY OF PRESENT ILLNESS:  Crystal Rodriguez is a 77y o  year old female with PMH of asthma, HTN, COVID 19 in 8/2021, OA  Patient had BRE in 07/2021 , creatinine elevated to 1 4mg/dL from normal baseline  Patient presents for initial consultation for elevated creatinine  REVIEW OF SYSTEMS:    More than 10 point review of systems were obtained and discussed in length with the patient  Complete review of systems were negative / unremarkable except mentioned in the HPI section      Review of Systems - Psychological ROS: negative  Ophthalmic ROS: negative  ENT ROS: negative  Hematological and Lymphatic ROS: negative  Endocrine ROS: negative  Respiratory ROS: no cough, shortness of breath, or wheezing  Cardiovascular ROS: no chest pain or dyspnea on exertion  Gastrointestinal ROS: positive for - change in stools  Genito-Urinary ROS: no dysuria, trouble voiding, or hematuria  Musculoskeletal ROS: positive for - cramps   Neurological ROS: no TIA or stroke symptoms  Dermatological ROS: negative     PHYSICAL EXAM:  Vitals:    01/04/22 1119   BP: 140/90   Weight: 61 7 kg (136 lb)   Height: 5' (1 524 m)     Body mass index is 26 56 kg/m²      Physical Exam     General:  no acute distress at this time  Skin:  No acute rash  Eyes:  No scleral icterus and noninjected  ENT:  Normocephalic, atraumati  Neck:  Supple, no jugular venous distention  Back:  No CVA tenderness  Chest:  Clear to auscultation percussion, good respiratory effort, no use of accessory respiratory muscles  CVS:  Regular rate and rhythm without a murmur rub  appreciable  Abdomen:  soft and nontender  Extremities:  No LE edema   Neuro:  No gross focality  Psych:  Alert and very cooperative     PAST MEDICAL HISTORY:  Past Medical History:   Diagnosis Date    Asthma     Asthmatic bronchitis     Last Assessed: 10/7/2014     Chronic diarrhea     Last Assessed: 8/20/2015     Fibromyalgia     Focal nodular hyperplasia of liver     Last Assessed: 6/11/2015    Herpes zoster     Last Assessed: 3/18/2016    Hypertension     IBS (irritable bowel syndrome)     Intermittent palpitations     Irritable bowel syndrome     Osteoarthritis     Vertigo        PAST SURGICAL HISTORY:  Past Surgical History:   Procedure Laterality Date    BREAST BIOPSY      SMALL INTESTINE SURGERY         ALLERGIES:  Allergies   Allergen Reactions    Ambrosia Artemisiifolia (Ragweed) Skin Test Facial Swelling    Codeine Other (See Comments)     Heart races    Epinephrine      Pt reports "it makes my heart race, they told me I cant take it "    Ibuprofen Other (See Comments)     Heart racing    Morphine Other (See Comments)     Heart racing    Pollen Extract Sneezing    Tramadol Other (See Comments)     Heart racing       SOCIAL HISTORY:  Social History     Substance and Sexual Activity   Alcohol Use No     Social History     Substance and Sexual Activity   Drug Use No     Social History     Tobacco Use   Smoking Status Current Every Day Smoker    Packs/day: 0 50    Types: Cigarettes   Smokeless Tobacco Never Used       FAMILY HISTORY:  Family History   Problem Relation Age of Onset    Heart attack Mother     Other Mother         Aspiration of Vomit     Asthma Father     Breast cancer Sister     Arthritis Family     Other Family         Musculoskeletal Disease     Osteoporosis Family        MEDICATIONS:    Current Outpatient Medications:     acetaminophen (TYLENOL) 500 mg tablet, Take 1 tablet (500 mg total) by mouth every 6 (six) hours as needed for mild pain, Disp: 60 tablet, Rfl: 0    albuterol (2 5 mg/3 mL) 0 083 % nebulizer solution, Take 3 mL (2 5 mg total) by nebulization every 4 (four) hours as needed for wheezing or shortness of breath, Disp: 3 mL, Rfl: 0    albuterol (PROVENTIL HFA,VENTOLIN HFA) 90 mcg/act inhaler, Inhale 2 puffs every 6 (six) hours as needed for wheezing or shortness of breath, Disp: 3 Inhaler, Rfl: 3    amLODIPine (NORVASC) 5 mg tablet, TAKE 1 TABLET (5 MG TOTAL) BY MOUTH DAILY, Disp: 90 tablet, Rfl: 1    azelastine (ASTELIN) 0 1 % nasal spray, 2 sprays into each nostril 2 (two) times a day Use in each nostril as directed, Disp: 30 mL, Rfl: 2    Blood Pressure Monitoring (BLOOD PRESSURE CUFF) MISC, by Does not apply route 2 (two) times a day, Disp: 1 each, Rfl: 0    cetirizine (ZyrTEC) 10 mg tablet, Take 1 tablet (10 mg total) by mouth daily, Disp: 30 tablet, Rfl: 2    Cholecalciferol (Vitamin D) 50 MCG (2000 UT) CAPS, Take 1 capsule (2,000 Units total) by mouth daily, Disp: 30 capsule, Rfl: 5    fluticasone (FLONASE) 50 mcg/act nasal spray, 2 sprays into each nostril daily, Disp: 16 g, Rfl: 3    folic acid (FOLVITE) 1 mg tablet, take 1 tablet by mouth daily, Disp: 30 tablet, Rfl: 5    guaiFENesin (MUCINEX) 600 mg 12 hr tablet, Take 2 tablets (1,200 mg total) by mouth every 12 (twelve) hours, Disp: 15 tablet, Rfl: 0    hydrochlorothiazide (HYDRODIURIL) 25 mg tablet, TAKE 1 TABLET (25 MG TOTAL) BY MOUTH DAILY, Disp: 90 tablet, Rfl: 1    hydrocortisone (ANUSOL-HC) 2 5 % rectal cream, Apply topically 2 (two) times a day, Disp: 28 g, Rfl: 1    hydrOXYzine HCL (ATARAX) 25 mg tablet, Take 1 tablet (25 mg total) by mouth every 6 (six) hours as needed for anxiety, Disp: 30 tablet, Rfl: 0    olopatadine (PATANOL) 0 1 % ophthalmic solution, Administer 1 drop to both eyes 2 (two) times a day, Disp: 5 mL, Rfl: 2    pantoprazole (PROTONIX) 40 mg tablet, TAKE 1 TABLET (40 MG TOTAL) BY MOUTH DAILY, Disp: 30 tablet, Rfl: 3    Lab Results:   Results for orders placed or performed in visit on 01/04/22   POCT urine dip   Result Value Ref Range    LEUKOCYTE ESTERASE,UA negative     NITRITE,UA negative     SL AMB POCT UROBILINOGEN negative     POCT URINE PROTEIN 15      PH,UA 5     BLOOD,UA negative     SPECIFIC GRAVITY,UA 1 015     KETONES,UA negative     BILIRUBIN,UA negative     GLUCOSE, UA negative      COLOR,UA yellow     CLARITY,UA cloudy        Portions of the record may have been created with voice recognition software  Occasional wrong word or "sound a like" substitutions may have occurred due to the inherent limitations of voice recognition software  Read the chart carefully and recognize, using context, where substitutions have occurred

## 2022-01-05 ENCOUNTER — APPOINTMENT (OUTPATIENT)
Dept: LAB | Facility: HOSPITAL | Age: 77
End: 2022-01-05
Attending: STUDENT IN AN ORGANIZED HEALTH CARE EDUCATION/TRAINING PROGRAM
Payer: MEDICARE

## 2022-01-05 DIAGNOSIS — R79.89 ELEVATED SERUM CREATININE: ICD-10-CM

## 2022-01-05 LAB
ALBUMIN SERPL BCP-MCNC: 3.3 G/DL (ref 3.5–5)
ALP SERPL-CCNC: 103 U/L (ref 46–116)
ALT SERPL W P-5'-P-CCNC: 14 U/L (ref 12–78)
ANION GAP SERPL CALCULATED.3IONS-SCNC: 4 MMOL/L (ref 4–13)
AST SERPL W P-5'-P-CCNC: 9 U/L (ref 5–45)
BACTERIA UR QL AUTO: ABNORMAL /HPF
BILIRUB SERPL-MCNC: 1.27 MG/DL (ref 0.2–1)
BILIRUB UR QL STRIP: NEGATIVE
BUN SERPL-MCNC: 20 MG/DL (ref 5–25)
CALCIUM ALBUM COR SERPL-MCNC: 10 MG/DL (ref 8.3–10.1)
CALCIUM SERPL-MCNC: 9.4 MG/DL (ref 8.3–10.1)
CHLORIDE SERPL-SCNC: 109 MMOL/L (ref 100–108)
CLARITY UR: ABNORMAL
CO2 SERPL-SCNC: 29 MMOL/L (ref 21–32)
COLOR UR: YELLOW
CREAT SERPL-MCNC: 1.51 MG/DL (ref 0.6–1.3)
CREAT UR-MCNC: 90.2 MG/DL
GFR SERPL CREATININE-BSD FRML MDRD: 33 ML/MIN/1.73SQ M
GLUCOSE P FAST SERPL-MCNC: 94 MG/DL (ref 65–99)
GLUCOSE UR STRIP-MCNC: NEGATIVE MG/DL
HGB UR QL STRIP.AUTO: NEGATIVE
HYALINE CASTS #/AREA URNS LPF: ABNORMAL /LPF
KETONES UR STRIP-MCNC: NEGATIVE MG/DL
LEUKOCYTE ESTERASE UR QL STRIP: NEGATIVE
MAGNESIUM SERPL-MCNC: 2.5 MG/DL (ref 1.6–2.6)
NITRITE UR QL STRIP: NEGATIVE
NON-SQ EPI CELLS URNS QL MICRO: ABNORMAL /HPF
PH UR STRIP.AUTO: 5.5 [PH]
POTASSIUM SERPL-SCNC: 3.3 MMOL/L (ref 3.5–5.3)
PROT SERPL-MCNC: 7.6 G/DL (ref 6.4–8.2)
PROT UR STRIP-MCNC: NEGATIVE MG/DL
PROT UR-MCNC: 14 MG/DL
PROT/CREAT UR: 0.16 MG/G{CREAT} (ref 0–0.1)
RBC #/AREA URNS AUTO: ABNORMAL /HPF
SODIUM SERPL-SCNC: 142 MMOL/L (ref 136–145)
SP GR UR STRIP.AUTO: 1.02 (ref 1–1.03)
UROBILINOGEN UR QL STRIP.AUTO: 0.2 E.U./DL
WBC #/AREA URNS AUTO: ABNORMAL /HPF

## 2022-01-05 PROCEDURE — 84156 ASSAY OF PROTEIN URINE: CPT | Performed by: STUDENT IN AN ORGANIZED HEALTH CARE EDUCATION/TRAINING PROGRAM

## 2022-01-05 PROCEDURE — 86334 IMMUNOFIX E-PHORESIS SERUM: CPT

## 2022-01-05 PROCEDURE — 83735 ASSAY OF MAGNESIUM: CPT

## 2022-01-05 PROCEDURE — 80053 COMPREHEN METABOLIC PANEL: CPT

## 2022-01-05 PROCEDURE — 84165 PROTEIN E-PHORESIS SERUM: CPT | Performed by: PATHOLOGY

## 2022-01-05 PROCEDURE — 86160 COMPLEMENT ANTIGEN: CPT

## 2022-01-05 PROCEDURE — 84165 PROTEIN E-PHORESIS SERUM: CPT

## 2022-01-05 PROCEDURE — 82570 ASSAY OF URINE CREATININE: CPT | Performed by: STUDENT IN AN ORGANIZED HEALTH CARE EDUCATION/TRAINING PROGRAM

## 2022-01-05 PROCEDURE — 81001 URINALYSIS AUTO W/SCOPE: CPT | Performed by: STUDENT IN AN ORGANIZED HEALTH CARE EDUCATION/TRAINING PROGRAM

## 2022-01-05 PROCEDURE — 36415 COLL VENOUS BLD VENIPUNCTURE: CPT | Performed by: STUDENT IN AN ORGANIZED HEALTH CARE EDUCATION/TRAINING PROGRAM

## 2022-01-05 PROCEDURE — 86038 ANTINUCLEAR ANTIBODIES: CPT | Performed by: STUDENT IN AN ORGANIZED HEALTH CARE EDUCATION/TRAINING PROGRAM

## 2022-01-05 PROCEDURE — 83883 ASSAY NEPHELOMETRY NOT SPEC: CPT | Performed by: STUDENT IN AN ORGANIZED HEALTH CARE EDUCATION/TRAINING PROGRAM

## 2022-01-05 PROCEDURE — 86334 IMMUNOFIX E-PHORESIS SERUM: CPT | Performed by: PATHOLOGY

## 2022-01-06 LAB
ALBUMIN SERPL ELPH-MCNC: 3.59 G/DL (ref 3.5–5)
ALBUMIN SERPL ELPH-MCNC: 52.1 % (ref 52–65)
ALPHA1 GLOB SERPL ELPH-MCNC: 0.39 G/DL (ref 0.1–0.4)
ALPHA1 GLOB SERPL ELPH-MCNC: 5.7 % (ref 2.5–5)
ALPHA2 GLOB SERPL ELPH-MCNC: 1.1 G/DL (ref 0.4–1.2)
ALPHA2 GLOB SERPL ELPH-MCNC: 16 % (ref 7–13)
BETA GLOB ABNORMAL SERPL ELPH-MCNC: 0.4 G/DL (ref 0.4–0.8)
BETA1 GLOB SERPL ELPH-MCNC: 5.8 % (ref 5–13)
BETA2 GLOB SERPL ELPH-MCNC: 6.7 % (ref 2–8)
BETA2+GAMMA GLOB SERPL ELPH-MCNC: 0.46 G/DL (ref 0.2–0.5)
C3 SERPL-MCNC: 157 MG/DL (ref 90–180)
C4 SERPL-MCNC: 30 MG/DL (ref 10–40)
GAMMA GLOB ABNORMAL SERPL ELPH-MCNC: 0.95 G/DL (ref 0.5–1.6)
GAMMA GLOB SERPL ELPH-MCNC: 13.7 % (ref 12–22)
IGG/ALB SER: 1.09 {RATIO} (ref 1.1–1.8)
INTERPRETATION UR IFE-IMP: NORMAL
PROT PATTERN SERPL ELPH-IMP: ABNORMAL
PROT SERPL-MCNC: 6.9 G/DL (ref 6.4–8.2)

## 2022-01-07 LAB
KAPPA LC FREE SER-MCNC: 67.3 MG/L (ref 3.3–19.4)
KAPPA LC FREE/LAMBDA FREE SER: 1.46 {RATIO} (ref 0.26–1.65)
LAMBDA LC FREE SERPL-MCNC: 46.2 MG/L (ref 5.7–26.3)
RYE IGE QN: NEGATIVE

## 2022-01-10 ENCOUNTER — TELEPHONE (OUTPATIENT)
Dept: NEPHROLOGY | Facility: CLINIC | Age: 77
End: 2022-01-10

## 2022-01-10 DIAGNOSIS — E87.6 HYPOKALEMIA: Primary | ICD-10-CM

## 2022-01-10 RX ORDER — POTASSIUM CHLORIDE 20 MEQ/1
20 TABLET, EXTENDED RELEASE ORAL 2 TIMES DAILY
Qty: 21 TABLET | Refills: 0 | Status: SHIPPED | OUTPATIENT
Start: 2022-01-10 | End: 2022-03-30

## 2022-01-10 NOTE — TELEPHONE ENCOUNTER
I called patient to discuss most recent blood results  Patient is hypokalemic, serum potassium is 3 3mEq/L  Patient complains of diarrhea she states that can't eat fresh food (fruit and salads)  Patient follow up with GI and was told she has an infection on her stomach  Results for Canary Massing (MRN [id number]) as of 1/10/2022 13:44   Ref   Range 1/5/2022 08:13   Sodium Latest Ref Range: 136 - 145 mmol/L 142   Potassium Latest Ref Range: 3 5 - 5 3 mmol/L 3 3 (L)   Chloride Latest Ref Range: 100 - 108 mmol/L 109 (H)   CO2 Latest Ref Range: 21 - 32 mmol/L 29   Anion Gap Latest Ref Range: 4 - 13 mmol/L 4   BUN Latest Ref Range: 5 - 25 mg/dL 20   Creatinine Latest Ref Range: 0 60 - 1 30 mg/dL 1 51 (H)   GLUCOSE FASTING Latest Ref Range: 65 - 99 mg/dL 94   Calcium Latest Ref Range: 8 3 - 10 1 mg/dL 9 4   CORRECTED CALCIUM Latest Ref Range: 8 3 - 10 1 mg/dL 10 0   AST Latest Ref Range: 5 - 45 U/L 9   ALT Latest Ref Range: 12 - 78 U/L 14   Alkaline Phosphatase Latest Ref Range: 46 - 116 U/L 103   Total Protein Latest Ref Range: 6 4 - 8 2 g/dL 7 6   Albumin Latest Ref Range: 3 5 - 5 0 g/dL 3 3 (L)   TOTAL BILIRUBIN Latest Ref Range: 0 20 - 1 00 mg/dL 1 27 (H)   eGFR Latest Units: ml/min/1 73sq m 33   Magnesium Latest Ref Range: 1 6 - 2 6 mg/dL 2 5         Plan:   Start potassium chloride 20mEq a day   Repeat BMP in 2 weeks

## 2022-01-21 ENCOUNTER — TELEPHONE (OUTPATIENT)
Dept: RHEUMATOLOGY | Facility: CLINIC | Age: 77
End: 2022-01-21

## 2022-01-21 NOTE — TELEPHONE ENCOUNTER
Patient was scheduled 1/27 at 8am for NP appointment  Patient is a follow up  Last saw Giovani Sandoval in July 2020 and told to follow up with her at [facility] [address]  I called patient and left her a detailed message  I advised the NP appointment needed to be cancelled as she is a follow up and needed to be scheduled as one  I also advised that she needed to follow up with Dr Rivas  I provided call back number to get scheduled correctly

## 2022-02-14 ENCOUNTER — OFFICE VISIT (OUTPATIENT)
Dept: FAMILY MEDICINE CLINIC | Facility: CLINIC | Age: 77
End: 2022-02-14

## 2022-02-14 VITALS
OXYGEN SATURATION: 98 % | WEIGHT: 137 LBS | BODY MASS INDEX: 26.9 KG/M2 | HEART RATE: 95 BPM | SYSTOLIC BLOOD PRESSURE: 140 MMHG | DIASTOLIC BLOOD PRESSURE: 80 MMHG | HEIGHT: 60 IN | RESPIRATION RATE: 18 BRPM | TEMPERATURE: 97.2 F

## 2022-02-14 DIAGNOSIS — E55.9 VITAMIN D DEFICIENCY: ICD-10-CM

## 2022-02-14 DIAGNOSIS — I10 ESSENTIAL HYPERTENSION: Primary | ICD-10-CM

## 2022-02-14 DIAGNOSIS — M25.50 POLYARTHRALGIA: ICD-10-CM

## 2022-02-14 DIAGNOSIS — N18.31 STAGE 3A CHRONIC KIDNEY DISEASE (HCC): ICD-10-CM

## 2022-02-14 DIAGNOSIS — J32.8 OTHER CHRONIC SINUSITIS: Chronic | ICD-10-CM

## 2022-02-14 DIAGNOSIS — J45.30 MILD PERSISTENT ASTHMA WITHOUT COMPLICATION: ICD-10-CM

## 2022-02-14 DIAGNOSIS — R17 ELEVATED BILIRUBIN: ICD-10-CM

## 2022-02-14 PROBLEM — U07.1 COVID-19: Status: RESOLVED | Noted: 2021-08-20 | Resolved: 2022-02-14

## 2022-02-14 PROBLEM — J45.901 MILD ASTHMA WITH ACUTE EXACERBATION: Status: RESOLVED | Noted: 2021-09-14 | Resolved: 2022-02-14

## 2022-02-14 PROBLEM — Z13.6 ENCOUNTER FOR SCREENING FOR ABDOMINAL AORTIC ANEURYSM (AAA) IN PATIENT 50 YEARS OF AGE OR OLDER WITH HISTORY OF SMOKING: Status: RESOLVED | Noted: 2021-03-23 | Resolved: 2022-02-14

## 2022-02-14 PROBLEM — Z00.00 HEALTHCARE MAINTENANCE: Status: RESOLVED | Noted: 2021-03-23 | Resolved: 2022-02-14

## 2022-02-14 PROBLEM — Z87.891 ENCOUNTER FOR SCREENING FOR ABDOMINAL AORTIC ANEURYSM (AAA) IN PATIENT 50 YEARS OF AGE OR OLDER WITH HISTORY OF SMOKING: Status: RESOLVED | Noted: 2021-03-23 | Resolved: 2022-02-14

## 2022-02-14 PROCEDURE — 3079F DIAST BP 80-89 MM HG: CPT | Performed by: FAMILY MEDICINE

## 2022-02-14 PROCEDURE — 99214 OFFICE O/P EST MOD 30 MIN: CPT | Performed by: FAMILY MEDICINE

## 2022-02-14 PROCEDURE — 3077F SYST BP >= 140 MM HG: CPT | Performed by: FAMILY MEDICINE

## 2022-02-14 RX ORDER — ALBUTEROL SULFATE 90 UG/1
2 AEROSOL, METERED RESPIRATORY (INHALATION) EVERY 6 HOURS PRN
Qty: 18 G | Refills: 3 | Status: SHIPPED | OUTPATIENT
Start: 2022-02-14

## 2022-02-14 RX ORDER — FLUTICASONE PROPIONATE 44 MCG
2 AEROSOL WITH ADAPTER (GRAM) INHALATION 2 TIMES DAILY
Qty: 10.6 G | Refills: 3 | Status: SHIPPED | OUTPATIENT
Start: 2022-02-14 | End: 2022-06-27

## 2022-02-14 NOTE — ASSESSMENT & PLAN NOTE
Lab Results   Component Value Date    EGFR 33 01/05/2022    EGFR 35 10/15/2021    EGFR 37 07/20/2020    CREATININE 1 51 (H) 01/05/2022    CREATININE 1 47 (H) 10/15/2021    CREATININE 1 40 (H) 07/20/2020   followed by nephrology   Avoid nephrotoxic agents and maintain BP control

## 2022-02-14 NOTE — ASSESSMENT & PLAN NOTE
Given the use of daily albuterol discussed adding maintenance therapy   Will start with FLOVENT BID - counseled to use mouthwash/rinse mouth out after each use  Reviewed goal to decrease need of albuterol use  Follow up with PCP in 4-6 weeks

## 2022-02-14 NOTE — ASSESSMENT & PLAN NOTE
No records found in chart  Discussed referral for new ENT  Declined at this time, states has learned how to live with it

## 2022-02-14 NOTE — PROGRESS NOTES
Assessment/Plan:    Mild persistent asthma without complication  Given the use of daily albuterol discussed adding maintenance therapy   Will start with FLOVENT BID - counseled to use mouthwash/rinse mouth out after each use  Reviewed goal to decrease need of albuterol use  Follow up with PCP in 4-6 weeks  Other chronic sinusitis  No records found in chart  Discussed referral for new ENT  Declined at this time, states has learned how to live with it  Essential hypertension  At goal  Continue HCTZ and Norvasc      Stage 3a chronic kidney disease [facility])  Lab Results   Component Value Date    EGFR 33 01/05/2022    EGFR 35 10/15/2021    EGFR 37 07/20/2020    CREATININE 1 51 (H) 01/05/2022    CREATININE 1 47 (H) 10/15/2021    CREATININE 1 40 (H) 07/20/2020   followed by nephrology   Avoid nephrotoxic agents and maintain BP control        Diagnoses and all orders for this visit:    Essential hypertension    Mild persistent asthma without complication  -     albuterol (PROVENTIL HFA,VENTOLIN HFA) 90 mcg/act inhaler; Inhale 2 puffs every 6 (six) hours as needed for wheezing or shortness of breath  -     fluticasone (Flovent HFA) 44 mcg/act inhaler; Inhale 2 puffs 2 (two) times a day Rinse mouth after use  Other chronic sinusitis    Elevated bilirubin  Comments:  noted on labs in Jan 2020  repeat labs recommend and will manage based on results  Orders:  -     Comprehensive metabolic panel; Future  -     Bilirubin, Total and Direct; Future    Vitamin D deficiency  -     Vitamin D 25 hydroxy; Future    Polyarthralgia  Comments:  has had multiple evals in past by various specialist  recommend SM referral for comprehensive treatmetn plan evaluation   Orders:  -     Cancel: Ambulatory Referral to Sports Medicine; Future  -     Ambulatory Referral to Sports Medicine;  Future    Stage 3a chronic kidney disease (HCC)          Subjective:      Chief Complaint   Patient presents with    Hypertension     follow up    Asthma Patient ID: Lyndsay Anne is a 77 y o  female  HPI   Here for routine follow up and lab review  HTN: denies CP, SOB, headaches, edema  Has CKD3 and followed by nephrology  Asthma - denies cough, SOB but states uses her pump every night - only has albuterol at home    Multiple MSK issues that hare long term: right wrist, right shoulder, left hip, left ankle  Has been seen by ortho in the past and states is "high risk" for surgery and can't be done  Right shoulder - hurts to mvoe can't elevate about 90 - has received injections in the past which helped for awhile    Has chronic sinusitis - state was referred to ENT years ago and was told needs to see a specialist in [facility] for the surgery given it's complexity  Records not available  The following portions of the patient's history were reviewed and updated as appropriate: allergies, current medications, past family history, past medical history, past social history, past surgical history and problem list     Review of Systems   Eyes: Positive for visual disturbance  Respiratory: Negative for shortness of breath  Cardiovascular: Negative for chest pain  Gastrointestinal: Positive for diarrhea (hx IBS)  Musculoskeletal: Positive for arthralgias, back pain, joint swelling and myalgias  Neurological: Positive for numbness  Objective:    /80 (BP Location: Left arm, Patient Position: Sitting, Cuff Size: Standard)   Pulse 95   Temp (!) 97 2 °F (36 2 °C) (Temporal)   Resp 18   Ht 5' (1 524 m)   Wt 62 1 kg (137 lb)   SpO2 98%   BMI 26 76 kg/m²      Physical Exam  Constitutional:       Appearance: Normal appearance  HENT:      Head: Normocephalic and atraumatic  Nose:      Comments: Boggy nasal turbinates, left > right  Cardiovascular:      Rate and Rhythm: Normal rate and regular rhythm  Heart sounds: Normal heart sounds  Pulmonary:      Effort: Pulmonary effort is normal       Breath sounds: Normal breath sounds   No wheezing  Musculoskeletal:      Right shoulder: Tenderness present  No swelling, deformity or effusion  Decreased range of motion  Normal strength  Right wrist: Deformity present  Cervical back: Normal range of motion  Neurological:      Mental Status: She is alert

## 2022-02-24 ENCOUNTER — APPOINTMENT (OUTPATIENT)
Dept: LAB | Facility: HOSPITAL | Age: 77
End: 2022-02-24
Attending: STUDENT IN AN ORGANIZED HEALTH CARE EDUCATION/TRAINING PROGRAM
Payer: MEDICARE

## 2022-02-24 ENCOUNTER — HOSPITAL ENCOUNTER (OUTPATIENT)
Dept: RADIOLOGY | Facility: HOSPITAL | Age: 77
Discharge: HOME | End: 2022-02-24
Attending: ORTHOPAEDIC SURGERY
Payer: MEDICARE

## 2022-02-24 ENCOUNTER — OFFICE VISIT (OUTPATIENT)
Dept: OBGYN CLINIC | Facility: HOSPITAL | Age: 77
End: 2022-02-24
Payer: MEDICARE

## 2022-02-24 VITALS
WEIGHT: 142 LBS | HEART RATE: 92 BPM | SYSTOLIC BLOOD PRESSURE: 123 MMHG | BODY MASS INDEX: 27.88 KG/M2 | HEIGHT: 60 IN | DIASTOLIC BLOOD PRESSURE: 79 MMHG

## 2022-02-24 DIAGNOSIS — M25.511 RIGHT SHOULDER PAIN, UNSPECIFIED CHRONICITY: ICD-10-CM

## 2022-02-24 DIAGNOSIS — R17 ELEVATED BILIRUBIN: ICD-10-CM

## 2022-02-24 DIAGNOSIS — M75.01 ADHESIVE CAPSULITIS OF RIGHT SHOULDER: ICD-10-CM

## 2022-02-24 DIAGNOSIS — G89.29 CHRONIC RIGHT SHOULDER PAIN: ICD-10-CM

## 2022-02-24 DIAGNOSIS — E87.6 HYPOKALEMIA: ICD-10-CM

## 2022-02-24 DIAGNOSIS — M25.511 CHRONIC RIGHT SHOULDER PAIN: ICD-10-CM

## 2022-02-24 DIAGNOSIS — E55.9 VITAMIN D DEFICIENCY: ICD-10-CM

## 2022-02-24 DIAGNOSIS — M75.81 TENDINITIS OF RIGHT ROTATOR CUFF: Primary | ICD-10-CM

## 2022-02-24 LAB
25(OH)D3 SERPL-MCNC: 52.3 NG/ML (ref 30–100)
ALBUMIN SERPL BCP-MCNC: 3 G/DL (ref 3.5–5)
ALP SERPL-CCNC: 95 U/L (ref 46–116)
ALT SERPL W P-5'-P-CCNC: 11 U/L (ref 12–78)
ANION GAP SERPL CALCULATED.3IONS-SCNC: 6 MMOL/L (ref 4–13)
AST SERPL W P-5'-P-CCNC: 9 U/L (ref 5–45)
BILIRUB DIRECT SERPL-MCNC: 0.05 MG/DL (ref 0–0.2)
BILIRUB SERPL-MCNC: 0.16 MG/DL (ref 0.2–1)
BUN SERPL-MCNC: 16 MG/DL (ref 5–25)
CALCIUM ALBUM COR SERPL-MCNC: 10.2 MG/DL (ref 8.3–10.1)
CALCIUM SERPL-MCNC: 9.4 MG/DL (ref 8.3–10.1)
CHLORIDE SERPL-SCNC: 107 MMOL/L (ref 100–108)
CO2 SERPL-SCNC: 26 MMOL/L (ref 21–32)
CREAT SERPL-MCNC: 1.34 MG/DL (ref 0.6–1.3)
GFR SERPL CREATININE-BSD FRML MDRD: 38 ML/MIN/1.73SQ M
GLUCOSE P FAST SERPL-MCNC: 103 MG/DL (ref 65–99)
POTASSIUM SERPL-SCNC: 3.4 MMOL/L (ref 3.5–5.3)
PROT SERPL-MCNC: 7.6 G/DL (ref 6.4–8.2)
SODIUM SERPL-SCNC: 139 MMOL/L (ref 136–145)

## 2022-02-24 PROCEDURE — 73030 X-RAY EXAM OF SHOULDER: CPT

## 2022-02-24 PROCEDURE — 36415 COLL VENOUS BLD VENIPUNCTURE: CPT

## 2022-02-24 PROCEDURE — 99213 OFFICE O/P EST LOW 20 MIN: CPT | Performed by: ORTHOPAEDIC SURGERY

## 2022-02-24 PROCEDURE — 80053 COMPREHEN METABOLIC PANEL: CPT

## 2022-02-24 PROCEDURE — 20610 DRAIN/INJ JOINT/BURSA W/O US: CPT | Performed by: ORTHOPAEDIC SURGERY

## 2022-02-24 PROCEDURE — 82306 VITAMIN D 25 HYDROXY: CPT

## 2022-02-24 PROCEDURE — 82248 BILIRUBIN DIRECT: CPT

## 2022-02-24 RX ORDER — BUPIVACAINE HYDROCHLORIDE 2.5 MG/ML
2 INJECTION, SOLUTION INFILTRATION; PERINEURAL
Status: COMPLETED | OUTPATIENT
Start: 2022-02-24 | End: 2022-02-24

## 2022-02-24 RX ORDER — BETAMETHASONE SODIUM PHOSPHATE AND BETAMETHASONE ACETATE 3; 3 MG/ML; MG/ML
12 INJECTION, SUSPENSION INTRA-ARTICULAR; INTRALESIONAL; INTRAMUSCULAR; SOFT TISSUE
Status: COMPLETED | OUTPATIENT
Start: 2022-02-24 | End: 2022-02-24

## 2022-02-24 RX ADMIN — BETAMETHASONE SODIUM PHOSPHATE AND BETAMETHASONE ACETATE 12 MG: 3; 3 INJECTION, SUSPENSION INTRA-ARTICULAR; INTRALESIONAL; INTRAMUSCULAR; SOFT TISSUE at 11:00

## 2022-02-24 RX ADMIN — BUPIVACAINE HYDROCHLORIDE 2 ML: 2.5 INJECTION, SOLUTION INFILTRATION; PERINEURAL at 11:00

## 2022-02-24 NOTE — PATIENT INSTRUCTIONS
Diagnosis ICD-10-CM Associated Orders   1  Tendinitis of right rotator cuff  M75 81 XR shoulder 2+ vw right     Large joint arthrocentesis: R subacromial bursa   2  Chronic right shoulder pain  M25 511 Ambulatory referral to Orthopedic Surgery    G89 29 Large joint arthrocentesis: R subacromial bursa   3  Adhesive capsulitis of right shoulder  M75 01 Large joint arthrocentesis: R subacromial bursa         Return if symptoms worsen or fail to improve, for re-check on an as needed basis (PRN)

## 2022-02-24 NOTE — PROGRESS NOTES
Assessment:   Diagnosis ICD-10-CM Associated Orders   1  Tendinitis of right rotator cuff  M75 81 XR shoulder 2+ vw right     Large joint arthrocentesis: R subacromial bursa   2  Chronic right shoulder pain  M25 511 Ambulatory referral to Orthopedic Surgery    G89 29 Large joint arthrocentesis: R subacromial bursa   3  Adhesive capsulitis of right shoulder  M75 01 Large joint arthrocentesis: R subacromial bursa       Plan:  Diagnostics reviewed and physical exam performed  Diagnosis, treatment options and associated risks were discussed with the patient including no treatment, nonsurgical treatment and potential for surgical intervention  The patient was given the opportunity to ask questions regarding each  It was explained to the patient that based upon the clinical and radiographic findings, at this point in time, this is not a surgical problem  Treatment for the above diagnoses and associated symptoms of this nature is generally conservative including a physician directed physical therapy program, improved fitness/weight loss, anti-inflammatories, and potentially various injections  She was offered, accepted, performed injection of cortisone to her right shoulder subacromial space today for symptomatic relief  She tolerated procedure well  Ice and post injection protocol advised  Weightbearing activities as tolerated  Recommend an offered a course of physical therapy however she declined as a result of her chronic condition, fibromyalgia, can only have aquatic therapy  To do next visit:  Return if symptoms worsen or fail to improve, for re-check on an as needed basis (PRN)  The above stated was discussed in layman's terms and the patient expressed understanding  All questions were answered to the patient's satisfaction         Scribe Attestation    I,:  Moe Welsh am acting as a scribe while in the presence of the attending physician :       I,:  Alo Cabrales MD personally performed the services described in this documentation    as scribed in my presence :             Subjective:   Ailyn Martin is a 77 y o  female who presents for evaluation of her right shoulder due to pain  She states she has had chronic right shoulder pain  She has weakness as well as limited function with repetitive use at or above shoulder level  Her pain is laterally of her right upper extremity  It does not radiate  Denies any numbness or tingling  If she lays on her right side for prolonged periods of time at night it wakes her up  She states she responded favorably to an injection of cortisone centuries ago  She has fibromyalgia         Review of systems negative unless otherwise specified in HPI  Review of Systems    Past Medical History:   Diagnosis Date    Asthma     Asthmatic bronchitis     Last Assessed: 10/7/2014     Chronic diarrhea     Last Assessed: 8/20/2015     Fibromyalgia     Focal nodular hyperplasia of liver     Last Assessed: 6/11/2015    Herpes zoster     Last Assessed: 3/18/2016    Hypertension     IBS (irritable bowel syndrome)     Intermittent palpitations     Irritable bowel syndrome     Osteoarthritis     Vertigo        Past Surgical History:   Procedure Laterality Date    BREAST BIOPSY      SMALL INTESTINE SURGERY         Family History   Problem Relation Age of Onset    Heart attack Mother     Other Mother         Aspiration of Vomit     Asthma Father     Breast cancer Sister     Arthritis Family     Other Family         Musculoskeletal Disease     Osteoporosis Family        Social History     Occupational History    Occupation: Unemployed    Tobacco Use    Smoking status: Current Every Day Smoker     Packs/day: 0 50     Types: Cigarettes    Smokeless tobacco: Never Used   Vaping Use    Vaping Use: Never used   Substance and Sexual Activity    Alcohol use: No    Drug use: No    Sexual activity: Not Currently     Partners: Male         Current Outpatient Medications:     acetaminophen (TYLENOL) 500 mg tablet, Take 1 tablet (500 mg total) by mouth every 6 (six) hours as needed for mild pain, Disp: 60 tablet, Rfl: 0    albuterol (2 5 mg/3 mL) 0 083 % nebulizer solution, Take 3 mL (2 5 mg total) by nebulization every 4 (four) hours as needed for wheezing or shortness of breath, Disp: 3 mL, Rfl: 0    albuterol (PROVENTIL HFA,VENTOLIN HFA) 90 mcg/act inhaler, Inhale 2 puffs every 6 (six) hours as needed for wheezing or shortness of breath, Disp: 18 g, Rfl: 3    amLODIPine (NORVASC) 5 mg tablet, TAKE 1 TABLET (5 MG TOTAL) BY MOUTH DAILY, Disp: 90 tablet, Rfl: 1    azelastine (ASTELIN) 0 1 % nasal spray, 2 sprays into each nostril 2 (two) times a day Use in each nostril as directed, Disp: 30 mL, Rfl: 2    Blood Pressure Monitoring (BLOOD PRESSURE CUFF) MISC, by Does not apply route 2 (two) times a day, Disp: 1 each, Rfl: 0    cetirizine (ZyrTEC) 10 mg tablet, Take 1 tablet (10 mg total) by mouth daily, Disp: 30 tablet, Rfl: 2    Cholecalciferol (Vitamin D) 50 MCG (2000 UT) CAPS, Take 1 capsule (2,000 Units total) by mouth daily, Disp: 30 capsule, Rfl: 5    fluticasone (FLONASE) 50 mcg/act nasal spray, 2 sprays into each nostril daily, Disp: 16 g, Rfl: 3    fluticasone (Flovent HFA) 44 mcg/act inhaler, Inhale 2 puffs 2 (two) times a day Rinse mouth after use , Disp: 10 6 g, Rfl: 3    folic acid (FOLVITE) 1 mg tablet, take 1 tablet by mouth daily, Disp: 30 tablet, Rfl: 5    guaiFENesin (MUCINEX) 600 mg 12 hr tablet, Take 2 tablets (1,200 mg total) by mouth every 12 (twelve) hours, Disp: 15 tablet, Rfl: 0    hydrochlorothiazide (HYDRODIURIL) 25 mg tablet, TAKE 1 TABLET (25 MG TOTAL) BY MOUTH DAILY, Disp: 90 tablet, Rfl: 1    hydrocortisone (ANUSOL-HC) 2 5 % rectal cream, Apply topically 2 (two) times a day, Disp: 28 g, Rfl: 1    hydrOXYzine HCL (ATARAX) 25 mg tablet, Take 1 tablet (25 mg total) by mouth every 6 (six) hours as needed for anxiety, Disp: 30 tablet, Rfl: 0    olopatadine (PATANOL) 0 1 % ophthalmic solution, Administer 1 drop to both eyes 2 (two) times a day, Disp: 5 mL, Rfl: 2    pantoprazole (PROTONIX) 40 mg tablet, TAKE 1 TABLET (40 MG TOTAL) BY MOUTH DAILY, Disp: 30 tablet, Rfl: 3    potassium chloride (K-DUR,KLOR-CON) 20 mEq tablet, Take 1 tablet (20 mEq total) by mouth 2 (two) times a day, Disp: 21 tablet, Rfl: 0    Allergies   Allergen Reactions    Ambrosia Artemisiifolia (Ragweed) Skin Test Facial Swelling    Codeine Other (See Comments)     Heart races    Epinephrine      Pt reports "it makes my heart race, they told me I cant take it "    Ibuprofen Other (See Comments)     Heart racing    Morphine Other (See Comments)     Heart racing    Pollen Extract Sneezing    Tramadol Other (See Comments)     Heart racing            Vitals:    02/24/22 1015   BP: 123/79   Pulse: 92       Objective:                    Right Shoulder Exam     Tenderness   Right shoulder tenderness location: anterior capsule and greater tuberosity laterally  Range of Motion   Forward flexion: 130 (passively to 140 limited by pain)     Muscle Strength   Supraspinatus: 4/5     Tests   Impingement: positive    Other   Erythema: absent  Sensation: normal    Comments:    Pain with resistance to supraspinatus muscle testing            Diagnostics, reviewed and taken today if performed as documented: The attending physician has personally reviewed the pertinent films in PACS and interpretation is as follows:    Right shoulder x-rays taken and reviewed in the office today show:  No fracture dislocation, well located glenohumeral joint  No appreciable glenohumeral joint degeneration  Cystic changes greater tuberosity  Mild AC joint degeneration  Type 2 acromion      Procedures, if performed today:    Large joint arthrocentesis: R subacromial bursa  Universal Protocol:  Consent: Verbal consent obtained    Risks and benefits: risks, benefits and alternatives were discussed  Consent given by: patient  Time out: Immediately prior to procedure a "time out" was called to verify the correct patient, procedure, equipment, support staff and site/side marked as required  Timeout called at: 2/24/2022 10:58 AM   Patient understanding: patient states understanding of the procedure being performed  Site marked: the operative site was marked  Patient identity confirmed: verbally with patient    Supporting Documentation  Indications: pain and diagnostic evaluation   Procedure Details  Location: shoulder - R subacromial bursa  Preparation: Patient was prepped and draped in the usual sterile fashion  Needle size: 22 G  Ultrasound guidance: no  Approach: posterior  Medications administered: 2 mL bupivacaine 0 25 %; 12 mg betamethasone acetate-betamethasone sodium phosphate 6 (3-3) mg/mL    Patient tolerance: patient tolerated the procedure well with no immediate complications  Dressing:  Sterile dressing applied            Portions of the record may have been created with voice recognition software  Occasional wrong word or "sound a like" substitutions may have occurred due to the inherent limitations of voice recognition software  Read the chart carefully and recognize, using context, where substitutions have occurred

## 2022-02-27 DIAGNOSIS — M25.552 LEFT HIP PAIN: Primary | ICD-10-CM

## 2022-03-02 ENCOUNTER — HOSPITAL ENCOUNTER (OUTPATIENT)
Dept: RADIOLOGY | Facility: HOSPITAL | Age: 77
Discharge: HOME | End: 2022-03-02
Attending: ORTHOPAEDIC SURGERY
Payer: MEDICARE

## 2022-03-02 ENCOUNTER — OFFICE VISIT (OUTPATIENT)
Dept: OBGYN CLINIC | Facility: HOSPITAL | Age: 77
End: 2022-03-02
Payer: MEDICARE

## 2022-03-02 VITALS
DIASTOLIC BLOOD PRESSURE: 86 MMHG | HEIGHT: 60 IN | SYSTOLIC BLOOD PRESSURE: 125 MMHG | BODY MASS INDEX: 27.88 KG/M2 | HEART RATE: 90 BPM | WEIGHT: 142 LBS

## 2022-03-02 DIAGNOSIS — M25.552 LEFT HIP PAIN: ICD-10-CM

## 2022-03-02 DIAGNOSIS — M70.62 TROCHANTERIC BURSITIS OF LEFT HIP: Primary | ICD-10-CM

## 2022-03-02 PROCEDURE — 99213 OFFICE O/P EST LOW 20 MIN: CPT | Performed by: ORTHOPAEDIC SURGERY

## 2022-03-02 PROCEDURE — 73502 X-RAY EXAM HIP UNI 2-3 VIEWS: CPT

## 2022-03-02 PROCEDURE — 20610 DRAIN/INJ JOINT/BURSA W/O US: CPT | Performed by: ORTHOPAEDIC SURGERY

## 2022-03-02 RX ORDER — BUPIVACAINE HYDROCHLORIDE 2.5 MG/ML
2 INJECTION, SOLUTION INFILTRATION; PERINEURAL
Status: COMPLETED | OUTPATIENT
Start: 2022-03-02 | End: 2022-03-02

## 2022-03-02 RX ORDER — METHYLPREDNISOLONE ACETATE 40 MG/ML
2 INJECTION, SUSPENSION INTRA-ARTICULAR; INTRALESIONAL; INTRAMUSCULAR; SOFT TISSUE
Status: COMPLETED | OUTPATIENT
Start: 2022-03-02 | End: 2022-03-02

## 2022-03-02 RX ADMIN — BUPIVACAINE HYDROCHLORIDE 2 ML: 2.5 INJECTION, SOLUTION INFILTRATION; PERINEURAL at 11:08

## 2022-03-02 RX ADMIN — METHYLPREDNISOLONE ACETATE 2 ML: 40 INJECTION, SUSPENSION INTRA-ARTICULAR; INTRALESIONAL; INTRAMUSCULAR; SOFT TISSUE at 11:08

## 2022-03-02 NOTE — PROGRESS NOTES
Assessment:  1  Trochanteric bursitis of left hip         Plan:  Left hip trochanteric bursitis with history of lumbar pathology  · Left hip radiograph displays minimal arthritis  · Patient encouraged to participate in physical therapy yet she declines as this flares her fibromyalgia  · Patient encouraged to use ice frequently on left lateral hip and lumbar spine  · The patient was provided with left trochanteric bursa steroid injection  The patient tolerated the procedure well  ·   The patient should follow up in 6 weeks      To do next visit:  Return in about 6 weeks (around 4/13/2022) for follow up of right shoulder and left hip  The above stated was discussed in layman's terms and the patient expressed understanding  All questions were answered to the patient's satisfaction  Scribe Attestation    I,:  Tre Willson am acting as a scribe while in the presence of the attending physician :       I,:  Rodriguez Ugarte MD personally performed the services described in this documentation    as scribed in my presence :             Subjective:   Chaz Carolina is a 77 y o  female who presents for initial evaluation of left hip  She has been seen days ago for right shoulder issues and provided with right subacromial steroid injection with benefit, 2/24/2022  She has had longstanding history of left hip symptoms and was told it may be her spine  Today she complains of low back tingling with left anterior and  lateral hip and thigh to the posterior calf  She rates her pain 5/10 and 9/10 at its worse  Lying or sitting in "wrong" position aggravates while rest alleviates  She does use Tylenol for pain with limited benefit  She states she had lateral hip injection several months ago at the ED  She denies past surgery of left hip or spine    She mentions seeing a neurosurgeon in past       Review of systems negative unless otherwise specified in HPI    Past Medical History:   Diagnosis Date    Asthma     Asthmatic bronchitis     Last Assessed: 10/7/2014     Chronic diarrhea     Last Assessed: 8/20/2015     Fibromyalgia     Focal nodular hyperplasia of liver     Last Assessed: 6/11/2015    Herpes zoster     Last Assessed: 3/18/2016    Hypertension     IBS (irritable bowel syndrome)     Intermittent palpitations     Irritable bowel syndrome     Osteoarthritis     Vertigo        Past Surgical History:   Procedure Laterality Date    BREAST BIOPSY      SMALL INTESTINE SURGERY         Family History   Problem Relation Age of Onset    Heart attack Mother     Other Mother         Aspiration of Vomit     Asthma Father     Breast cancer Sister     Arthritis Family     Other Family         Musculoskeletal Disease     Osteoporosis Family        Social History     Occupational History    Occupation: Unemployed    Tobacco Use    Smoking status: Current Every Day Smoker     Packs/day: 0 50     Types: Cigarettes    Smokeless tobacco: Never Used   Vaping Use    Vaping Use: Never used   Substance and Sexual Activity    Alcohol use: No    Drug use: No    Sexual activity: Not Currently     Partners: Male         Current Outpatient Medications:     acetaminophen (TYLENOL) 500 mg tablet, Take 1 tablet (500 mg total) by mouth every 6 (six) hours as needed for mild pain, Disp: 60 tablet, Rfl: 0    albuterol (2 5 mg/3 mL) 0 083 % nebulizer solution, Take 3 mL (2 5 mg total) by nebulization every 4 (four) hours as needed for wheezing or shortness of breath, Disp: 3 mL, Rfl: 0    albuterol (PROVENTIL HFA,VENTOLIN HFA) 90 mcg/act inhaler, Inhale 2 puffs every 6 (six) hours as needed for wheezing or shortness of breath, Disp: 18 g, Rfl: 3    amLODIPine (NORVASC) 5 mg tablet, TAKE 1 TABLET (5 MG TOTAL) BY MOUTH DAILY, Disp: 90 tablet, Rfl: 1    azelastine (ASTELIN) 0 1 % nasal spray, 2 sprays into each nostril 2 (two) times a day Use in each nostril as directed, Disp: 30 mL, Rfl: 2    Blood Pressure Monitoring (BLOOD PRESSURE CUFF) MISC, by Does not apply route 2 (two) times a day, Disp: 1 each, Rfl: 0    cetirizine (ZyrTEC) 10 mg tablet, Take 1 tablet (10 mg total) by mouth daily, Disp: 30 tablet, Rfl: 2    Cholecalciferol (Vitamin D) 50 MCG (2000 UT) CAPS, Take 1 capsule (2,000 Units total) by mouth daily, Disp: 30 capsule, Rfl: 5    fluticasone (FLONASE) 50 mcg/act nasal spray, 2 sprays into each nostril daily, Disp: 16 g, Rfl: 3    fluticasone (Flovent HFA) 44 mcg/act inhaler, Inhale 2 puffs 2 (two) times a day Rinse mouth after use , Disp: 10 6 g, Rfl: 3    folic acid (FOLVITE) 1 mg tablet, take 1 tablet by mouth daily, Disp: 30 tablet, Rfl: 5    guaiFENesin (MUCINEX) 600 mg 12 hr tablet, Take 2 tablets (1,200 mg total) by mouth every 12 (twelve) hours, Disp: 15 tablet, Rfl: 0    hydrochlorothiazide (HYDRODIURIL) 25 mg tablet, TAKE 1 TABLET (25 MG TOTAL) BY MOUTH DAILY, Disp: 90 tablet, Rfl: 1    hydrocortisone (ANUSOL-HC) 2 5 % rectal cream, Apply topically 2 (two) times a day, Disp: 28 g, Rfl: 1    hydrOXYzine HCL (ATARAX) 25 mg tablet, Take 1 tablet (25 mg total) by mouth every 6 (six) hours as needed for anxiety, Disp: 30 tablet, Rfl: 0    olopatadine (PATANOL) 0 1 % ophthalmic solution, Administer 1 drop to both eyes 2 (two) times a day, Disp: 5 mL, Rfl: 2    pantoprazole (PROTONIX) 40 mg tablet, TAKE 1 TABLET (40 MG TOTAL) BY MOUTH DAILY, Disp: 30 tablet, Rfl: 3    potassium chloride (K-DUR,KLOR-CON) 20 mEq tablet, Take 1 tablet (20 mEq total) by mouth 2 (two) times a day, Disp: 21 tablet, Rfl: 0    Allergies   Allergen Reactions    Ambrosia Artemisiifolia (Ragweed) Skin Test Facial Swelling    Codeine Other (See Comments)     Heart races    Epinephrine      Pt reports "it makes my heart race, they told me I cant take it "    Ibuprofen Other (See Comments)     Heart racing    Morphine Other (See Comments)     Heart racing    Pollen Extract Sneezing    Tramadol Other (See Comments)     Heart racing            Vitals:    03/02/22 1028   BP: 125/86   Pulse: 90       Objective:  Physical exam  · General: Awake, Alert, Oriented  · Eyes: Pupils equal, round and reactive to light  · Heart: regular rate and rhythm  · Lungs: No audible wheezing  · Abdomen: soft                    Ortho Exam  Left hip:  TTP over greater trochanter  Good arc of motion  Patient sits comfortably in chair with hip flexed at 90 degrees  Patient stands from seated position without assistance  Calf compartments soft and supple  Sensation intact  Toes are warm sensate and mobile      Diagnostics, reviewed and taken today if performed as documented: The attending physician has personally reviewed the pertinent films in PACS and interpretation is as follows:  Left hip x-ray:   No arthritis in hip joint  Procedures, if performed today:    Large joint arthrocentesis: L greater trochanteric bursa  Universal Protocol:  Consent: Verbal consent obtained  Risks and benefits: risks, benefits and alternatives were discussed  Consent given by: patient  Time out: Immediately prior to procedure a "time out" was called to verify the correct patient, procedure, equipment, support staff and site/side marked as required    Timeout called at: 3/2/2022 11:04 AM   Patient understanding: patient states understanding of the procedure being performed  Site marked: the operative site was marked  Patient identity confirmed: verbally with patient    Supporting Documentation  Indications: pain   Procedure Details  Location: hip - L greater trochanteric bursa  Preparation: Patient was prepped and draped in the usual sterile fashion  Needle size: 22 G  Ultrasound guidance: no  Approach: anterolateral  Medications administered: 2 mL bupivacaine 0 25 %; 2 mL methylPREDNISolone acetate 40 mg/mL    Patient tolerance: patient tolerated the procedure well with no immediate complications  Dressing:  Sterile dressing applied            Portions of the record may have been created with voice recognition software  Occasional wrong word or "sound a like" substitutions may have occurred due to the inherent limitations of voice recognition software  Read the chart carefully and recognize, using context, where substitutions have occurred

## 2022-03-04 ENCOUNTER — TELEPHONE (OUTPATIENT)
Dept: NEPHROLOGY | Facility: CLINIC | Age: 77
End: 2022-03-04

## 2022-03-05 DIAGNOSIS — M25.572 PAIN, JOINT, ANKLE AND FOOT, LEFT: Primary | ICD-10-CM

## 2022-03-07 ENCOUNTER — TELEPHONE (OUTPATIENT)
Dept: NEPHROLOGY | Facility: CLINIC | Age: 77
End: 2022-03-07

## 2022-03-09 ENCOUNTER — OFFICE VISIT (OUTPATIENT)
Dept: OBGYN CLINIC | Facility: HOSPITAL | Age: 77
End: 2022-03-09
Payer: MEDICARE

## 2022-03-09 ENCOUNTER — HOSPITAL ENCOUNTER (OUTPATIENT)
Dept: RADIOLOGY | Facility: HOSPITAL | Age: 77
Discharge: HOME | End: 2022-03-09
Attending: ORTHOPAEDIC SURGERY
Payer: MEDICARE

## 2022-03-09 VITALS
BODY MASS INDEX: 27.88 KG/M2 | WEIGHT: 142 LBS | HEART RATE: 101 BPM | HEIGHT: 60 IN | DIASTOLIC BLOOD PRESSURE: 77 MMHG | SYSTOLIC BLOOD PRESSURE: 118 MMHG

## 2022-03-09 DIAGNOSIS — M25.572 PAIN, JOINT, ANKLE AND FOOT, LEFT: ICD-10-CM

## 2022-03-09 DIAGNOSIS — S93.402A SPRAIN OF UNSPECIFIED LIGAMENT OF LEFT ANKLE, INITIAL ENCOUNTER: Primary | ICD-10-CM

## 2022-03-09 PROCEDURE — 73610 X-RAY EXAM OF ANKLE: CPT

## 2022-03-09 PROCEDURE — 99213 OFFICE O/P EST LOW 20 MIN: CPT | Performed by: ORTHOPAEDIC SURGERY

## 2022-03-09 NOTE — PATIENT INSTRUCTIONS
Ankle Exercises   AMBULATORY CARE:   What you need to know about ankle exercises: Ankle exercises help strengthen your ankle and improve its function after injury  These are beginning exercises  Ask your healthcare provider if you need to see a physical therapist for more advanced exercises  · Do these exercises 3 to 5 days a week , or as directed by your healthcare provider  Ask if you should perform the exercises on each ankle  · Do the exercises in the order that your healthcare provider recommends  This will help prevent swelling, chronic pain, and reinjury  Start with range of motion exercises  Then progress to strengthening exercises, and finally to balancing exercises  · Warm up before you do ankle exercises  Walk or ride a stationary bike for 5 to 10 minutes to prepare your ankle for movement  · Stop if you feel pain  It is normal to feel some discomfort at first  Regular exercise will help decrease your discomfort over time  How to perform range of motion exercises safely:  Begin with range of motion exercises to improve flexibility  Ask your healthcare provider when you can progress to strengthening exercises  · Ankle alphabet:  Sit on a chair so that your feet do not touch the floor  Use your big toe to write each letter of the alphabet  Use only your foot and ankle, and keep your movements small  Do 2 sets  · Calf stretches:      ? Sitting calf stretches with a towel:  Sit on the floor with both legs out straight in front of you  Loop a towel around the ball of your injured foot  Grasp the ends of the towel and pull it toward you  Keep your leg and back straight  Do not lean forward as you pull the towel  Hold for 30 seconds  Then relax for 30 seconds  Do 2 sets of 10          ? Standing calf stretches:  Stand facing a wall with the foot that is not injured forward and your knee slightly bent   Keep the leg with the injured foot straight and behind you with your toes pointed in slightly  With both heels flat on the floor, press your hips forward  Do not arch your back  Hold for 30 seconds, and then relax for 30 seconds  Do 2 sets of 10  Repeat with your leg bent  Do 2 sets of 10  How to perform strengthening exercises safely:  After you can perform range of motion exercises without pain, you may begin strengthening exercises  Ask your healthcare provider when you can progress to balancing exercises  · Ankle movement in 4 directions:  Sit on the floor with your legs straight in front of you  Keep your heels on the floor for support  ? Dorsiflexion:  Begin with your toes pointing straight up  Pull your toes toward your body  Slowly return to the starting position  Do 3 sets of 5      ? Plantar flexion:  Begin with your toes pointing straight up  Push your toes away from your body  Slowly return to the starting position  Do 3 sets of 5          ? Inversion:  Begin with your toes pointing straight up  Push your toes inward, toward each other  Slowly return to the starting position  Do 3 sets of 5      ? Eversion:  Begin with your toes pointing straight up  Push your toes outward, away from each other  Slowly return to the starting position  Do 3 sets of 5        · Toe curls with a towel:  Sit on a chair so that both of your feet are flat on the floor  Place a small towel on the floor in front of your injured foot  Grab the center of the towel with your toes and curl the towel toward you  Relax and repeat  Do 1 set of 5          · Stigler pick-ups:  Sit on a chair so that both of your feet are flat on the floor  Place 20 marbles on the floor in front of your injured foot  Use your toes to  one marble at a time and place it into a bowl  Repeat until you have picked up all the marbles  Do 1 set  · Heel raises:      ? Single leg heel raises:  Stand with your weight evenly on both feet  Hold on to a chair or a wall for balance   Lift the foot that is not injured off the floor so all your weight is placed on your injured foot  Raise the heel of your injured foot as high as you can  Slowly lower your heel to the floor  Do 1 set of 10          ? Double leg heel raises:  Stand with your weight evenly on both feet  Hold on to a chair or a wall for balance  Raise both of your heels as high as you can  Slowly lower your heels to the floor  Do 1 set of 10  · Heel and toe walks:      ? Heel walks:  Begin in a standing position  Lift your toes off the floor and walk on your heels  Keep your toes lifted as high as possible  Do 2 sets of 10          ? Toe walks:  Begin in a standing position  Lift your heels off the floor and walk on the balls and toes of your feet  Keep your heels lifted as high as possible  Do 2 sets of 10  How to perform a balance exercise safely:  After you can perform strengthening exercises without pain, you may do this beginning balancing exercise  Ask your healthcare provider for more advanced balance exercises  · Single leg stance:  Stand with your weight evenly on both feet, or hold on to a chair or a wall  Do not lean to the side  Lift the foot that is not injured off the floor so all your weight is placed on your injured foot  Balance on your injured foot  Ask your healthcare provider how long to hold this position  Contact your healthcare provider if:   · Your pain becomes worse  · You have new pain  · You have questions or concerns about your condition, care, or exercise program     © Copyright [employer] 2022 Information is for End User's use only and may not be sold, redistributed or otherwise used for commercial purposes  All illustrations and images included in CareNotes® are the copyrighted property of [employer]  or [employer]   The above information is an  only  It is not intended as medical advice for individual conditions or treatments   Talk to your doctor, nurse or pharmacist before following any medical regimen to see if it is safe and effective for you

## 2022-03-09 NOTE — PROGRESS NOTES
Orthopaedics Office Visit - 1st Patient Visit    ASSESSMENT/PLAN:    Assessment:   Left chronic ankle sprain   Mild flatfoot deformity      Plan:   - weightbear as tolerated left lower extremity     - Patient fitted with a wraptor ankle brace maintain as directed  - Begin to maintain insoles for shoes   -  Offered patient physical therapy  Patient declined at this time   - Over the counter analgesics as needed / directed   - Ice / heat as directed   - Follow up PRN       To Do Next Visit:  PRN     _____________________________________________________  CHIEF COMPLAINT:  Chief Complaint   Patient presents with    Left Ankle - Pain         SUBJECTIVE:  Félix Bynum is a 77 y o  female who presents   To the office for initial evaluation of her left ankle  Patient states that she "broke her ankle 15 months ago"  Patient was treated conservatively with physical therapy and bracing  Patient states that she continues to have pain over the lateral aspect of the ankle which is intermittent in nature  Patient states that she when she weightbears for prolonged periods of time she has episodes where the ankle feels like it is going to "give way "   Patient states she has been maintaining a over-the-counter brace for the ankle with mild relief of symptoms  Patient denies any surgical intervention to the ankle  Patient does admit to having some numbness and tingling over the dorsal aspect of her foot  Patient offers no other complaints at this time      PAST MEDICAL HISTORY:  Past Medical History:   Diagnosis Date    Asthma     Asthmatic bronchitis     Last Assessed: 10/7/2014     Chronic diarrhea     Last Assessed: 8/20/2015     Fibromyalgia     Focal nodular hyperplasia of liver     Last Assessed: 6/11/2015    Herpes zoster     Last Assessed: 3/18/2016    Hypertension     IBS (irritable bowel syndrome)     Intermittent palpitations     Irritable bowel syndrome     Osteoarthritis     Vertigo        PAST SURGICAL HISTORY:  Past Surgical History:   Procedure Laterality Date    BREAST BIOPSY      SMALL INTESTINE SURGERY         FAMILY HISTORY:  Family History   Problem Relation Age of Onset    Heart attack Mother     Other Mother         Aspiration of Vomit     Asthma Father     Breast cancer Sister     Arthritis Family     Other Family         Musculoskeletal Disease     Osteoporosis Family        SOCIAL HISTORY:  Social History     Tobacco Use    Smoking status: Current Every Day Smoker     Packs/day: 0 50     Types: Cigarettes    Smokeless tobacco: Never Used   Vaping Use    Vaping Use: Never used   Substance Use Topics    Alcohol use: No    Drug use: No       MEDICATIONS:    Current Outpatient Medications:     acetaminophen (TYLENOL) 500 mg tablet, Take 1 tablet (500 mg total) by mouth every 6 (six) hours as needed for mild pain, Disp: 60 tablet, Rfl: 0    albuterol (2 5 mg/3 mL) 0 083 % nebulizer solution, Take 3 mL (2 5 mg total) by nebulization every 4 (four) hours as needed for wheezing or shortness of breath, Disp: 3 mL, Rfl: 0    albuterol (PROVENTIL HFA,VENTOLIN HFA) 90 mcg/act inhaler, Inhale 2 puffs every 6 (six) hours as needed for wheezing or shortness of breath, Disp: 18 g, Rfl: 3    amLODIPine (NORVASC) 5 mg tablet, TAKE 1 TABLET (5 MG TOTAL) BY MOUTH DAILY, Disp: 90 tablet, Rfl: 0    azelastine (ASTELIN) 0 1 % nasal spray, 2 sprays into each nostril 2 (two) times a day Use in each nostril as directed, Disp: 30 mL, Rfl: 2    Blood Pressure Monitoring (BLOOD PRESSURE CUFF) MISC, by Does not apply route 2 (two) times a day, Disp: 1 each, Rfl: 0    cetirizine (ZyrTEC) 10 mg tablet, Take 1 tablet (10 mg total) by mouth daily, Disp: 30 tablet, Rfl: 2    Cholecalciferol (Vitamin D) 50 MCG (2000 UT) CAPS, Take 1 capsule (2,000 Units total) by mouth daily, Disp: 30 capsule, Rfl: 5    fluticasone (FLONASE) 50 mcg/act nasal spray, 2 sprays into each nostril daily, Disp: 16 g, Rfl: 3   fluticasone (Flovent HFA) 44 mcg/act inhaler, Inhale 2 puffs 2 (two) times a day Rinse mouth after use , Disp: 10 6 g, Rfl: 3    folic acid (FOLVITE) 1 mg tablet, take 1 tablet by mouth daily, Disp: 30 tablet, Rfl: 5    guaiFENesin (MUCINEX) 600 mg 12 hr tablet, Take 2 tablets (1,200 mg total) by mouth every 12 (twelve) hours, Disp: 15 tablet, Rfl: 0    hydrochlorothiazide (HYDRODIURIL) 25 mg tablet, TAKE 1 TABLET (25 MG TOTAL) BY MOUTH DAILY, Disp: 90 tablet, Rfl: 1    hydrocortisone (ANUSOL-HC) 2 5 % rectal cream, Apply topically 2 (two) times a day, Disp: 28 g, Rfl: 1    hydrOXYzine HCL (ATARAX) 25 mg tablet, Take 1 tablet (25 mg total) by mouth every 6 (six) hours as needed for anxiety, Disp: 30 tablet, Rfl: 0    olopatadine (PATANOL) 0 1 % ophthalmic solution, Administer 1 drop to both eyes 2 (two) times a day, Disp: 5 mL, Rfl: 2    pantoprazole (PROTONIX) 40 mg tablet, TAKE 1 TABLET (40 MG TOTAL) BY MOUTH DAILY, Disp: 30 tablet, Rfl: 3    potassium chloride (K-DUR,KLOR-CON) 20 mEq tablet, Take 1 tablet (20 mEq total) by mouth 2 (two) times a day, Disp: 21 tablet, Rfl: 0    ALLERGIES:  Allergies   Allergen Reactions    Ambrosia Artemisiifolia (Ragweed) Skin Test Facial Swelling    Codeine Other (See Comments)     Heart races    Epinephrine      Pt reports "it makes my heart race, they told me I cant take it "    Ibuprofen Other (See Comments)     Heart racing    Morphine Other (See Comments)     Heart racing    Pollen Extract Sneezing    Tramadol Other (See Comments)     Heart racing       REVIEW OF SYSTEMS:  MSK: left ankle pain   Neuro: WNL   Pertinent items are otherwise noted in HPI  A comprehensive review of systems was otherwise negative      LABS:  HgA1c:   Lab Results   Component Value Date    HGBA1C 6 0 (H) 10/15/2021     BMP:   Lab Results   Component Value Date    GLUCOSE 92 05/19/2015    CALCIUM 9 4 02/24/2022     05/19/2015    K 3 4 (L) 02/24/2022    CO2 26 02/24/2022     02/24/2022    BUN 16 02/24/2022    CREATININE 1 34 (H) 02/24/2022     CBC: No components found for: CBC    _____________________________________________________  PHYSICAL EXAMINATION:  Vital signs: /77   Pulse 101   Ht 5' (1 524 m)   Wt 64 4 kg (142 lb)   BMI 27 73 kg/m²   General: No acute distress, awake and alert  Psychiatric: Mood and affect appear appropriate  HEENT: Trachea Midline, No torticollis, no apparent facial trauma  Cardiovascular: No audible murmurs; Extremities appear perfused  Pulmonary: No audible wheezing or stridor  Skin: No open lesions; see further details (if any) below      MUSCULOSKELETAL EXAMINATION:  Left ankle examination:  - Patient sitting comfortably in the office in no acute distress   -   No acute visible abnormalities present to the left ankle  Flatfoot deformity present  -   Mild tenderness palpation noted over the anterior lateral aspect of the ankle  No other bony or soft tissue tenderness palpation noted at this time   - NV intact    _____________________________________________________  STUDIES REVIEWED:  I personally reviewed the images and interpretation is as follows:  Left ankle XR 3 views:    Old avulsion fracture noted on the distal fibula no acute  Osseous abnormalities present  PROCEDURES PERFORMED:  No procedures were performed at this time  Vicki Cardoso PA-C - assisting    Juan Pablo Jain MD                Portions of the record may have been created with voice recognition software  Occasional wrong word or "sound a like" substitutions may have occurred due to the inherent limitations of voice recognition software  Read the chart carefully and recognize, using context, where substitutions have occurred

## 2022-03-30 ENCOUNTER — OFFICE VISIT (OUTPATIENT)
Dept: FAMILY MEDICINE CLINIC | Facility: CLINIC | Age: 77
End: 2022-03-30

## 2022-03-30 VITALS
TEMPERATURE: 97.7 F | HEART RATE: 92 BPM | RESPIRATION RATE: 16 BRPM | SYSTOLIC BLOOD PRESSURE: 128 MMHG | WEIGHT: 139.6 LBS | HEIGHT: 60 IN | BODY MASS INDEX: 27.41 KG/M2 | DIASTOLIC BLOOD PRESSURE: 88 MMHG

## 2022-03-30 DIAGNOSIS — Z23 NEED FOR SHINGLES VACCINE: ICD-10-CM

## 2022-03-30 DIAGNOSIS — Z13.6 SCREENING FOR CARDIOVASCULAR CONDITION: ICD-10-CM

## 2022-03-30 DIAGNOSIS — Z12.11 SCREENING FOR COLORECTAL CANCER: ICD-10-CM

## 2022-03-30 DIAGNOSIS — Z00.00 MEDICARE ANNUAL WELLNESS VISIT, SUBSEQUENT: Primary | ICD-10-CM

## 2022-03-30 DIAGNOSIS — R73.03 PREDIABETES: ICD-10-CM

## 2022-03-30 DIAGNOSIS — I10 ESSENTIAL HYPERTENSION: ICD-10-CM

## 2022-03-30 DIAGNOSIS — J30.2 SEASONAL ALLERGIES: ICD-10-CM

## 2022-03-30 DIAGNOSIS — H04.203 WATERY EYES: ICD-10-CM

## 2022-03-30 DIAGNOSIS — K21.9 GASTROESOPHAGEAL REFLUX DISEASE WITHOUT ESOPHAGITIS: ICD-10-CM

## 2022-03-30 DIAGNOSIS — F41.9 ANXIETY: ICD-10-CM

## 2022-03-30 DIAGNOSIS — Z12.2 ENCOUNTER FOR SCREENING FOR LUNG CANCER: ICD-10-CM

## 2022-03-30 DIAGNOSIS — F17.200 TOBACCO DEPENDENCE: ICD-10-CM

## 2022-03-30 DIAGNOSIS — Z12.12 SCREENING FOR COLORECTAL CANCER: ICD-10-CM

## 2022-03-30 DIAGNOSIS — T78.40XD ALLERGY, SUBSEQUENT ENCOUNTER: ICD-10-CM

## 2022-03-30 DIAGNOSIS — Z12.31 ENCOUNTER FOR SCREENING MAMMOGRAM FOR BREAST CANCER: ICD-10-CM

## 2022-03-30 DIAGNOSIS — M81.0 AGE RELATED OSTEOPOROSIS, UNSPECIFIED PATHOLOGICAL FRACTURE PRESENCE: ICD-10-CM

## 2022-03-30 PROBLEM — T78.40XA ALLERGIES: Status: RESOLVED | Noted: 2020-06-03 | Resolved: 2022-03-30

## 2022-03-30 PROBLEM — R25.2 CRAMP OF LIMB: Status: RESOLVED | Noted: 2021-10-05 | Resolved: 2022-03-30

## 2022-03-30 PROBLEM — S93.402A SPRAIN OF UNSPECIFIED LIGAMENT OF LEFT ANKLE, INITIAL ENCOUNTER: Status: RESOLVED | Noted: 2022-03-09 | Resolved: 2022-03-30

## 2022-03-30 PROBLEM — M54.42 ACUTE LEFT-SIDED LOW BACK PAIN WITH LEFT-SIDED SCIATICA: Status: RESOLVED | Noted: 2020-01-27 | Resolved: 2022-03-30

## 2022-03-30 PROBLEM — R42 DIZZINESS: Status: RESOLVED | Noted: 2019-01-17 | Resolved: 2022-03-30

## 2022-03-30 PROCEDURE — 99214 OFFICE O/P EST MOD 30 MIN: CPT | Performed by: PHYSICIAN ASSISTANT

## 2022-03-30 PROCEDURE — 3074F SYST BP LT 130 MM HG: CPT | Performed by: PHYSICIAN ASSISTANT

## 2022-03-30 PROCEDURE — G0439 PPPS, SUBSEQ VISIT: HCPCS | Performed by: PHYSICIAN ASSISTANT

## 2022-03-30 PROCEDURE — 3079F DIAST BP 80-89 MM HG: CPT | Performed by: PHYSICIAN ASSISTANT

## 2022-03-30 RX ORDER — ONDANSETRON 4 MG/1
4 TABLET, ORALLY DISINTEGRATING ORAL EVERY 6 HOURS PRN
Qty: 20 TABLET | Refills: 0 | Status: SHIPPED | OUTPATIENT
Start: 2022-03-30

## 2022-03-30 RX ORDER — ZOSTER VACCINE RECOMBINANT, ADJUVANTED 50 MCG/0.5
0.5 KIT INTRAMUSCULAR ONCE
Qty: 1 EACH | Refills: 1 | Status: SHIPPED | OUTPATIENT
Start: 2022-03-30 | End: 2022-03-30

## 2022-03-30 RX ORDER — HYDROCHLOROTHIAZIDE 12.5 MG/1
12.5 TABLET ORAL DAILY
Qty: 90 TABLET | Refills: 1 | Status: SHIPPED | OUTPATIENT
Start: 2022-03-30

## 2022-03-30 RX ORDER — AZELASTINE 1 MG/ML
2 SPRAY, METERED NASAL 2 TIMES DAILY
Qty: 30 ML | Refills: 2 | Status: SHIPPED | OUTPATIENT
Start: 2022-03-30

## 2022-03-30 RX ORDER — AMLODIPINE BESYLATE 5 MG/1
5 TABLET ORAL DAILY
Qty: 90 TABLET | Refills: 0 | Status: SHIPPED | OUTPATIENT
Start: 2022-03-30

## 2022-03-30 RX ORDER — HYDROXYZINE HYDROCHLORIDE 25 MG/1
25 TABLET, FILM COATED ORAL EVERY 6 HOURS PRN
Qty: 30 TABLET | Refills: 0 | Status: SHIPPED | OUTPATIENT
Start: 2022-03-30 | End: 2022-08-02

## 2022-03-30 RX ORDER — FLUTICASONE PROPIONATE 50 MCG
2 SPRAY, SUSPENSION (ML) NASAL DAILY
Qty: 16 G | Refills: 3 | Status: SHIPPED | OUTPATIENT
Start: 2022-03-30 | End: 2022-07-27

## 2022-03-30 NOTE — ASSESSMENT & PLAN NOTE
Order provided for screening mammogram  Will send for CT lung screening  Pt is a current smoker and had smoked 1 PPD > 30 years    Currently smoking 1/2 pack  Declined Pneumovax today  Will send Shingrix to patient's pharmacy to be administered by pharmacist  Declined referral for colonoscopy, agree to FIT  Will check labs

## 2022-03-30 NOTE — PROGRESS NOTES
Assessment/Plan:    Age related osteoporosis  Reported history of osteoporosis  Pt refuses bisphosphonates due to chronic stomach problems  Dexa scan ordered last years which pt has now scheduled for 5/14/2022  Discussed starting Prolia since it's an injection and should not cause any problems with her stomach  She plans to think about it  Anxiety  Trial of hydroxyzine 25 mg q6hrs PRN  Decline starting a SSRI    Essential hypertension  Noted hypokalemia on 1/2022, will decrease HCTZ to 12 5 mg po qd  Continue amlodipine 5 mg daily  Discussed regular blood pressure checks    Medicare annual wellness visit, subsequent  Order provided for screening mammogram  Will send for CT lung screening  Pt is a current smoker and had smoked 1 PPD > 30 years  Currently smoking 1/2 pack  Declined Pneumovax today  Will send Shingrix to patient's pharmacy to be administered by pharmacist  Declined referral for colonoscopy, agree to FIT  Will check labs         Watery eyes  Chronic watery eyes will refer to Ophthalmology    Seasonal allergies  Severe seasonal allergies  Continue Zyrtec 10 mg daily  Refill for Flonase  Start Astelin 2 sprays BID  Will refer to Allergy      Diagnoses and all orders for this visit:    Medicare annual wellness visit, subsequent  -     Hemoglobin A1C; Future  -     Zoster Vac Recomb Adjuvanted (Shingrix) 50 MCG/0 5ML SUSR; Inject 0 5 mL into a muscle once for 1 dose Repeat dose in 2 to 6 months    Age related osteoporosis, unspecified pathological fracture presence    Screening for cardiovascular condition  -     Lipid panel; Future    Encounter for screening mammogram for breast cancer  -     Mammo screening bilateral w 3d & cad; Future    Screening for colorectal cancer  -     Occult Blood, Fecal Immunochemical (FIT);  Future    Encounter for screening for lung cancer  -     CT lung screening program; Future    Tobacco dependence    Watery eyes  -     Ambulatory Referral to Ophthalmology; Future    Seasonal allergies  -     Ambulatory Referral to Allergy; Future  -     azelastine (ASTELIN) 0 1 % nasal spray; 2 sprays into each nostril 2 (two) times a day Use in each nostril as directed  -     fluticasone (FLONASE) 50 mcg/act nasal spray; 2 sprays into each nostril daily    Gastroesophageal reflux disease without esophagitis  -     ondansetron (Zofran ODT) 4 mg disintegrating tablet; Take 1 tablet (4 mg total) by mouth every 6 (six) hours as needed for nausea or vomiting    Essential hypertension  -     amLODIPine (NORVASC) 5 mg tablet; Take 1 tablet (5 mg total) by mouth daily  -     hydrochlorothiazide (HYDRODIURIL) 12 5 mg tablet; Take 1 tablet (12 5 mg total) by mouth daily  -     Comprehensive metabolic panel; Future    Anxiety  -     hydrOXYzine HCL (ATARAX) 25 mg tablet; Take 1 tablet (25 mg total) by mouth every 6 (six) hours as needed for anxiety    Prediabetes  -     Hemoglobin A1C; Future    Need for shingles vaccine  -     Zoster Vac Recomb Adjuvanted (Shingrix) 50 MCG/0 5ML SUSR; Inject 0 5 mL into a muscle once for 1 dose Repeat dose in 2 to 6 months    Allergy, subsequent encounter        Subjective:      Patient ID: Crystal Rodriguez is a 77 y o  female presents today for f/u  HTN- taking amlodipine 5 mg along with hydrochlorothiazide 25 mg daily  Potassium 3 3 1/2021  Started on kdur 20 MEQ BID x 10 days by nephrologist      C/o severe seasonal allergies with postnasal drip  Patient takes Zyrtec 10 mg daily and uses Flonase daily w/o any improvement  Also, c/o watery itchy eyes and feels her eyesight is worsening  Requesting a referral to Ophthalmology    Has occasional anxiety  In the past prescribed Xanax with good results  Was prescribed atarax however, it was not covered by her insurance  Does not wasn't to take a medication daily  Denies any depression        The following portions of the patient's history were reviewed and updated as appropriate: allergies, current medications, past family history, past medical history, past social history, past surgical history and problem list     Review of Systems   Constitutional: Negative for chills, fatigue and fever  HENT: Positive for congestion, postnasal drip and rhinorrhea  Eyes: Positive for visual disturbance  Watery eyes   Respiratory: Negative for cough, chest tightness, shortness of breath and wheezing  Cardiovascular: Negative for chest pain and palpitations  Gastrointestinal: Negative for abdominal pain, constipation, diarrhea, nausea and vomiting  Genitourinary: Negative for difficulty urinating  Musculoskeletal: Positive for arthralgias and myalgias  Negative for neck pain and neck stiffness  Skin: Negative for rash and wound  Psychiatric/Behavioral: Negative for agitation, behavioral problems and sleep disturbance  The patient is nervous/anxious  Objective:      /88 (BP Location: Left arm, Patient Position: Sitting, Cuff Size: Standard)   Pulse 92   Temp 97 7 °F (36 5 °C) (Temporal)   Resp 16   Ht 5' (1 524 m)   Wt 63 3 kg (139 lb 9 6 oz)   BMI 27 26 kg/m²          Physical Exam  Constitutional:       General: She is not in acute distress  Appearance: She is well-developed  HENT:      Head: Normocephalic and atraumatic  Right Ear: Tympanic membrane, ear canal and external ear normal  There is no impacted cerumen  Left Ear: Tympanic membrane and external ear normal  There is no impacted cerumen  Nose:      Right Turbinates: Enlarged and swollen  Left Turbinates: Enlarged and swollen  Eyes:      Conjunctiva/sclera: Conjunctivae normal    Cardiovascular:      Rate and Rhythm: Normal rate and regular rhythm  Heart sounds: Normal heart sounds  No murmur heard  Pulmonary:      Effort: Pulmonary effort is normal  No respiratory distress  Breath sounds: Normal breath sounds  No wheezing  Musculoskeletal:         General: No deformity        Cervical back: Normal range of motion and neck supple  Lymphadenopathy:      Cervical: No cervical adenopathy  Neurological:      Mental Status: She is alert and oriented to person, place, and time  Psychiatric:         Mood and Affect: Mood normal          Behavior: Behavior normal          Thought Content:  Thought content normal          Judgment: Judgment normal

## 2022-03-30 NOTE — ASSESSMENT & PLAN NOTE
Noted hypokalemia on 1/2022, will decrease HCTZ to 12 5 mg po qd    Continue amlodipine 5 mg daily  Discussed regular blood pressure checks

## 2022-03-30 NOTE — ASSESSMENT & PLAN NOTE
Severe seasonal allergies  Continue Zyrtec 10 mg daily  Refill for Flonase  Start Astelin 2 sprays BID  Will refer to Allergy

## 2022-03-30 NOTE — ASSESSMENT & PLAN NOTE
Reported history of osteoporosis  Pt refuses bisphosphonates due to chronic stomach problems  Dexa scan ordered last years which pt has now scheduled for 5/14/2022  Discussed starting Prolia since it's an injection and should not cause any problems with her stomach  She plans to think about it

## 2022-03-30 NOTE — PROGRESS NOTES
Frank Singh is here for her Subsequent Wellness visit  Health Risk Assessment:   Patient rates overall health as fair  Patient feels that their physical health rating is slightly worse  Patient is satisfied with their life  Eyesight was rated as much worse  Hearing was rated as same  Patient feels that their emotional and mental health rating is same  Patients states they are never, rarely angry  Patient states they are often unusually tired/fatigued  Pain experienced in the last 7 days has been a lot  Patient's pain rating has been 8/10  Patient states that she has experienced no weight loss or gain in last 6 months  Generalized body aches    Depression Screening:   PHQ-2 Score: 0      Fall Risk Screening: In the past year, patient has experienced: no history of falling in past year      Urinary Incontinence Screening:   Patient has not leaked urine accidently in the last six months  Home Safety:  Patient does not have trouble with stairs inside or outside of their home  Patient has working smoke alarms and has working carbon monoxide detector  Home safety hazards include: none  Nutrition:   Current diet is Other (please comment)  Patient states she cannot have fruits and vegetables due to stomach issues  Medications:   Patient is currently taking over-the-counter supplements  OTC medications include: see medication list  Patient is able to manage medications  Patient takes vitamins    Activities of Daily Living (ADLs)/Instrumental Activities of Daily Living (IADLs):   Walk and transfer into and out of bed and chair?: Yes  Dress and groom yourself?: Yes    Bathe or shower yourself?: Yes    Feed yourself?  Yes  Do your laundry/housekeeping?: Yes  Manage your money, pay your bills and track your expenses?: Yes  Make your own meals?: Yes    Do your own shopping?: Yes    Previous Hospitalizations:   Any hospitalizations or ED visits within the last 12 months?: No      Advance Care Planning:   Living will: No Five wishes given: No      Cognitive Screening:   Provider or family/friend/caregiver concerned regarding cognition?: No    PREVENTIVE SCREENINGS      Cardiovascular Screening:    General: Screening Current    Due for: Lipid Panel      Diabetes Screening:     General: Screening Current    Due for: Blood Glucose      Colorectal Cancer Screening:       Due for: FOBT/FIT      Breast Cancer Screening:       Due for: Mammogram        Cervical Cancer Screening:    General: Screening Not Indicated      Osteoporosis Screening:    General: History Osteoporosis      Abdominal Aortic Aneurysm (AAA) Screening:        General: Patient Declines      Lung Cancer Screening:     General: Screening Not Indicated    Due for: Low Dose CT (LDCT)      Hepatitis C Screening:    General: Screening Current    Screening, Brief Intervention, and Referral to Treatment (SBIRT)    Screening  Typical number of drinks in a day: 0  Typical number of drinks in a week: 0  Interpretation: Low risk drinking behavior  AUDIT-C Screenin) How often did you have a drink containing alcohol in the past year? never  2) How many drinks did you have on a typical day when you were drinking in the past year? 0  3) How often did you have 6 or more drinks on one occasion in the past year? never    AUDIT-C Score: 0  Interpretation: Score 0-2 (female): Negative screen for alcohol misuse    Single Item Drug Screening:  How often have you used an illegal drug (including marijuana) or a prescription medication for non-medical reasons in the past year? never    Single Item Drug Screen Score: 0  Interpretation: Negative screen for possible drug use disorder    Other Counseling Topics:   Sunscreen and regular weightbearing exercise and calcium and vitamin D intake

## 2022-03-30 NOTE — PATIENT INSTRUCTIONS
Medicare Preventive Visit Patient Instructions  Thank you for completing your Welcome to Medicare Visit or Medicare Annual Wellness Visit today  Your next wellness visit will be due in one year (3/31/2023)  The screening/preventive services that you may require over the next 5-10 years are detailed below  Some tests may not apply to you based off risk factors and/or age  Screening tests ordered at today's visit but not completed yet may show as past due  Also, please note that scanned in results may not display below  Preventive Screenings:  Service Recommendations Previous Testing/Comments   Colorectal Cancer Screening  * Colonoscopy    * Fecal Occult Blood Test (FOBT)/Fecal Immunochemical Test (FIT)  * Fecal DNA/Cologuard Test  * Flexible Sigmoidoscopy Age: 54-65 years old   Colonoscopy: every 10 years (may be performed more frequently if at higher risk)  OR  FOBT/FIT: every 1 year  OR  Cologuard: every 3 years  OR  Sigmoidoscopy: every 5 years  Screening may be recommended earlier than age 48 if at higher risk for colorectal cancer  Also, an individualized decision between you and your healthcare provider will decide whether screening between the ages of 74-80 would be appropriate  Colonoscopy: 03/25/2005  FOBT/FIT: Not on file  Cologuard: Not on file  Sigmoidoscopy: Not on file          Breast Cancer Screening Age: 36 years old  Frequency: every 1-2 years  Not required if history of left and right mastectomy Mammogram: 01/10/2017        Cervical Cancer Screening Between the ages of 21-29, pap smear recommended once every 3 years  Between the ages of 33-67, can perform pap smear with HPV co-testing every 5 years     Recommendations may differ for women with a history of total hysterectomy, cervical cancer, or abnormal pap smears in past  Pap Smear: Not on file    Screening Not Indicated   Hepatitis C Screening Once for adults born between [facility]  More frequently in patients at high risk for Hepatitis C Hep C Antibody: Not on file    Screening Current   Diabetes Screening 1-2 times per year if you're at risk for diabetes or have pre-diabetes Fasting glucose: 103 mg/dL   A1C: 6 0 %    Screening Current   Cholesterol Screening Once every 5 years if you don't have a lipid disorder  May order more often based on risk factors  Lipid panel: 10/15/2021    Screening Current     Other Preventive Screenings Covered by Medicare:  1  Abdominal Aortic Aneurysm (AAA) Screening: covered once if your at risk  You're considered to be at risk if you have a family history of AAA  2  Lung Cancer Screening: covers low dose CT scan once per year if you meet all of the following conditions: (1) Age 50-69; (2) No signs or symptoms of lung cancer; (3) Current smoker or have quit smoking within the last 15 years; (4) You have a tobacco smoking history of at least 30 pack years (packs per day multiplied by number of years you smoked); (5) You get a written order from a healthcare provider  3  Glaucoma Screening: covered annually if you're considered high risk: (1) You have diabetes OR (2) Family history of glaucoma OR (3)  aged 48 and older OR (3)  American aged 72 and older  3  Osteoporosis Screening: covered every 2 years if you meet one of the following conditions: (1) You're estrogen deficient and at risk for osteoporosis based off medical history and other findings; (2) Have a vertebral abnormality; (3) On glucocorticoid therapy for more than 3 months; (4) Have primary hyperparathyroidism; (5) On osteoporosis medications and need to assess response to drug therapy  · Last bone density test (DXA Scan): Not on file  5  HIV Screening: covered annually if you're between the age of 12-76  Also covered annually if you are younger than 13 and older than 72 with risk factors for HIV infection  For pregnant patients, it is covered up to 3 times per pregnancy      Immunizations:  Immunization Recommendations Influenza Vaccine Annual influenza vaccination during flu season is recommended for all persons aged >= 6 months who do not have contraindications   Pneumococcal Vaccine (Prevnar and Pneumovax)  * Prevnar = PCV13  * Pneumovax = PPSV23   Adults 25-60 years old: 1-3 doses may be recommended based on certain risk factors  Adults 72 years old: Prevnar (PCV13) vaccine recommended followed by Pneumovax (PPSV23) vaccine  If already received PPSV23 since turning 65, then PCV13 recommended at least one year after PPSV23 dose  Hepatitis B Vaccine 3 dose series if at intermediate or high risk (ex: diabetes, end stage renal disease, liver disease)   Tetanus (Td) Vaccine - COST NOT COVERED BY MEDICARE PART B Following completion of primary series, a booster dose should be given every 10 years to maintain immunity against tetanus  Td may also be given as tetanus wound prophylaxis  Tdap Vaccine - COST NOT COVERED BY MEDICARE PART B Recommended at least once for all adults  For pregnant patients, recommended with each pregnancy  Shingles Vaccine (Shingrix) - COST NOT COVERED BY MEDICARE PART B  2 shot series recommended in those aged 48 and above     Health Maintenance Due:      Topic Date Due    Colorectal Cancer Screening  03/25/2015    Breast Cancer Screening: Mammogram  01/10/2019    Hepatitis C Screening  Completed     Immunizations Due:      Topic Date Due    Pneumococcal Vaccine: 65+ Years (2 of 2 - PPSV23) 10/01/2013     Advance Directives   What are advance directives? Advance directives are legal documents that state your wishes and plans for medical care  These plans are made ahead of time in case you lose your ability to make decisions for yourself  Advance directives can apply to any medical decision, such as the treatments you want, and if you want to donate organs  What are the types of advance directives? There are many types of advance directives, and each state has rules about how to use them   You may choose a combination of any of the following:  · Living will: This is a written record of the treatment you want  You can also choose which treatments you do not want, which to limit, and which to stop at a certain time  This includes surgery, medicine, IV fluid, and tube feedings  · Durable power of  for healthcare [facility]): This is a written record that states who you want to make healthcare choices for you when you are unable to make them for yourself  This person, called a proxy, is usually a family member or a friend  You may choose more than 1 proxy  · Do not resuscitate (DNR) order:  A DNR order is used in case your heart stops beating or you stop breathing  It is a request not to have certain forms of treatment, such as CPR  A DNR order may be included in other types of advance directives  · Medical directive: This covers the care that you want if you are in a coma, near death, or unable to make decisions for yourself  You can list the treatments you want for each condition  Treatment may include pain medicine, surgery, blood transfusions, dialysis, IV or tube feedings, and a ventilator (breathing machine)  · Values history: This document has questions about your views, beliefs, and how you feel and think about life  This information can help others choose the care that you would choose  Why are advance directives important? An advance directive helps you control your care  Although spoken wishes may be used, it is better to have your wishes written down  Spoken wishes can be misunderstood, or not followed  Treatments may be given even if you do not want them  An advance directive may make it easier for your family to make difficult choices about your care  Cigarette Smoking and Your Health   Risks to your health if you smoke:  Nicotine and other chemicals found in tobacco damage every cell in your body   Even if you are a light smoker, you have an increased risk for cancer, heart disease, and lung disease  If you are pregnant or have diabetes, smoking increases your risk for complications  Benefits to your health if you stop smoking:   · You decrease respiratory symptoms such as coughing, wheezing, and shortness of breath  · You reduce your risk for cancers of the lung, mouth, throat, kidney, bladder, pancreas, stomach, and cervix  If you already have cancer, you increase the benefits of chemotherapy  You also reduce your risk for cancer returning or a second cancer from developing  · You reduce your risk for heart disease, blood clots, heart attack, and stroke  · You reduce your risk for lung infections, and diseases such as pneumonia, asthma, chronic bronchitis, and emphysema  · Your circulation improves  More oxygen can be delivered to your body  If you have diabetes, you lower your risk for complications, such as kidney, artery, and eye diseases  You also lower your risk for nerve damage  Nerve damage can lead to amputations, poor vision, and blindness  · You improve your body's ability to heal and to fight infections  For more information and support to stop smoking:   · [employer]  Phone: 4- 278 - 638-5088  Web Address: [employer]  Weight Management   Why it is important to manage your weight:  Being overweight increases your risk of health conditions such as heart disease, high blood pressure, type 2 diabetes, and certain types of cancer  It can also increase your risk for osteoarthritis, sleep apnea, and other respiratory problems  Aim for a slow, steady weight loss  Even a small amount of weight loss can lower your risk of health problems  How to lose weight safely:  A safe and healthy way to lose weight is to eat fewer calories and get regular exercise  You can lose up about 1 pound a week by decreasing the number of calories you eat by 500 calories each day     Healthy meal plan for weight management:  A healthy meal plan includes a variety of foods, contains fewer calories, and helps you stay healthy  A healthy meal plan includes the following:  · Eat whole-grain foods more often  A healthy meal plan should contain fiber  Fiber is the part of grains, fruits, and vegetables that is not broken down by your body  Whole-grain foods are healthy and provide extra fiber in your diet  Some examples of whole-grain foods are whole-wheat breads and pastas, oatmeal, brown rice, and bulgur  · Eat a variety of vegetables every day  Include dark, leafy greens such as spinach, kale, donny greens, and mustard greens  Eat yellow and orange vegetables such as carrots, sweet potatoes, and winter squash  · Eat a variety of fruits every day  Choose fresh or canned fruit (canned in its own juice or light syrup) instead of juice  Fruit juice has very little or no fiber  · Eat low-fat dairy foods  Drink fat-free (skim) milk or 1% milk  Eat fat-free yogurt and low-fat cottage cheese  Try low-fat cheeses such as mozzarella and other reduced-fat cheeses  · Choose meat and other protein foods that are low in fat  Choose beans or other legumes such as split peas or lentils  Choose fish, skinless poultry (chicken or turkey), or lean cuts of red meat (beef or pork)  Before you cook meat or poultry, cut off any visible fat  · Use less fat and oil  Try baking foods instead of frying them  Add less fat, such as margarine, sour cream, regular salad dressing and mayonnaise to foods  Eat fewer high-fat foods  Some examples of high-fat foods include french fries, doughnuts, ice cream, and cakes  · Eat fewer sweets  Limit foods and drinks that are high in sugar  This includes candy, cookies, regular soda, and sweetened drinks  Exercise:  Exercise at least 30 minutes per day on most days of the week  Some examples of exercise include walking, biking, dancing, and swimming   You can also fit in more physical activity by taking the stairs instead of the elevator or parking farther away from stores  Ask your healthcare provider about the best exercise plan for you  © Copyright [employer] 2018 Information is for End User's use only and may not be sold, redistributed or otherwise used for commercial purposes   All illustrations and images included in CareNotes® are the copyrighted property of A D A M , Inc  or [address] [place] [address] [facility]

## 2022-06-20 ENCOUNTER — TELEMEDICINE (OUTPATIENT)
Dept: FAMILY MEDICINE CLINIC | Facility: CLINIC | Age: 77
End: 2022-06-20

## 2022-06-20 VITALS — TEMPERATURE: 98.8 F

## 2022-06-20 DIAGNOSIS — J45.901 EXACERBATION OF ASTHMA, UNSPECIFIED ASTHMA SEVERITY, UNSPECIFIED WHETHER PERSISTENT: Primary | ICD-10-CM

## 2022-06-20 PROCEDURE — 3079F DIAST BP 80-89 MM HG: CPT | Performed by: FAMILY MEDICINE

## 2022-06-20 PROCEDURE — 3074F SYST BP LT 130 MM HG: CPT | Performed by: FAMILY MEDICINE

## 2022-06-20 PROCEDURE — 99213 OFFICE O/P EST LOW 20 MIN: CPT | Performed by: FAMILY MEDICINE

## 2022-06-20 RX ORDER — PREDNISOLONE SODIUM PHOSPHATE 20 MG/5ML
20 SOLUTION ORAL 2 TIMES DAILY
Qty: 50 ML | Refills: 0 | Status: SHIPPED | OUTPATIENT
Start: 2022-06-20 | End: 2022-06-25

## 2022-06-20 NOTE — PROGRESS NOTES
Virtual Brief Visit    Patient is located in the following state in which I hold an active license [place]      Assessment/Plan:    Problem List Items Addressed This Visit        Respiratory    Exacerbation of asthma - Primary     Symptomatic for 2 days with audible wheezing, history of mild persistent asthma, last exacerbation 2021  Most likely appears to be another exacerbation  Tried to order p o  prednisone, not on formulary/not covered  Spoke to the pharmacist   Will try the alternative prednisolone oral solution 20 mg b i d  X 5 days  Prescription sent to the pharmacy  Continue p r n  albuterol as needed  Follow-up on Wednesday  Patient instructed to get home COVID test before the follow-up  If positive can do a virtual visit  Relevant Medications    prednisoLONE Sodium Phosphate 20 MG/5ML SOLN          Recent Visits  No visits were found meeting these conditions  Showing recent visits within past 7 days and meeting all other requirements  Today's Visits  Date Type Provider Dept   06/20/22 Telemedicine Rubia Portillo MD [facility]   Showing today's visits and meeting all other requirements  Future Appointments  No visits were found meeting these conditions  Showing future appointments within next 150 days and meeting all other requirements     Assessment/Plan:    Exacerbation of asthma  Symptomatic for 2 days with audible wheezing, history of mild persistent asthma, last exacerbation 2021  Most likely appears to be another exacerbation  Tried to order p o  prednisone, not on formulary/not covered  Spoke to the pharmacist   Will try the alternative prednisolone oral solution 20 mg b i d  X 5 days  Prescription sent to the pharmacy  Continue p r n  albuterol as needed  Follow-up on Wednesday  Patient instructed to get home COVID test before the follow-up  If positive can do a virtual visit  Review of Systems   Constitutional: Negative for chills and fever     HENT: Positive for rhinorrhea  Negative for ear pain and sore throat  Eyes: Negative for pain and visual disturbance  Respiratory: Positive for cough, shortness of breath and wheezing  Cardiovascular: Negative for chest pain and palpitations  Gastrointestinal: Negative for abdominal pain and vomiting  Skin: Negative for color change and rash         The following portions of the patient's history were reviewed and updated as appropriate: allergies, current medications, past family history, past medical history, past social history, past surgical history and problem list       I spent 20 minutes directly with the patient during this visit

## 2022-06-20 NOTE — ASSESSMENT & PLAN NOTE
Symptomatic for 2 days with audible wheezing, history of mild persistent asthma, last exacerbation 2021  Most likely appears to be another exacerbation  Tried to order p o  prednisone, not on formulary/not covered  Spoke to the pharmacist   Will try the alternative prednisolone oral solution 20 mg b i d  X 5 days  Prescription sent to the pharmacy  Continue p r n  albuterol as needed  Follow-up on Wednesday  Patient instructed to get home COVID test before the follow-up  If positive can do a virtual visit    ER precautions reviewed

## 2022-06-21 ENCOUNTER — TELEPHONE (OUTPATIENT)
Dept: FAMILY MEDICINE CLINIC | Facility: CLINIC | Age: 77
End: 2022-06-21

## 2022-06-21 NOTE — TELEPHONE ENCOUNTER
Patient called to make follow up from 6/20 [facility] appt with Dr Dennie Province  The summary stated to make it virtual pending results of "home Covid test"  She doesn't have a home test kit  Can we put in order for her to come here tomorrow to get tested?   I told her someone would call to let her know   She is scheduled for [facility] appt on 6/23/22 with Dr Dennie Province in the afternoon

## 2022-06-22 NOTE — TELEPHONE ENCOUNTER
Pt has a virtual appointment with me tomorrow, will reassess tomorrow for the need of COVID test, given she is already being treated with steroids for asthma exacerbation   If needed will put the order for COVID test tomorrow after further assessment

## 2022-06-23 ENCOUNTER — TELEMEDICINE (OUTPATIENT)
Dept: FAMILY MEDICINE CLINIC | Facility: CLINIC | Age: 77
End: 2022-06-23

## 2022-06-23 DIAGNOSIS — J45.901 EXACERBATION OF PERSISTENT ASTHMA, UNSPECIFIED ASTHMA SEVERITY: Primary | ICD-10-CM

## 2022-06-23 PROCEDURE — 3074F SYST BP LT 130 MM HG: CPT | Performed by: FAMILY MEDICINE

## 2022-06-23 PROCEDURE — 3079F DIAST BP 80-89 MM HG: CPT | Performed by: FAMILY MEDICINE

## 2022-06-23 PROCEDURE — 99213 OFFICE O/P EST LOW 20 MIN: CPT | Performed by: FAMILY MEDICINE

## 2022-06-23 NOTE — PROGRESS NOTES
Virtual Brief Visit    Patient is located in the following state in which I hold an active license [place]      Assessment/Plan:    Problem List Items Addressed This Visit        Respiratory    Exacerbation of asthma - Primary     Resolving  Currently on day 4 of p o steroids  Symptomatic with nasal congestion which is improving  No SOB, wheezing  Patient can continue Robitussin p r n  Encouraged to complete total 5 days of steroids  Continue p r n  Albuterol  Patient can follow-up as needed given improved symptoms  ER precautions reviewed  Recent Visits  Date Type Provider Dept   06/21/22 Telephone MD Freda Varma   06/20/22 Telemedicine MD Freda Sesay   Showing recent visits within past 7 days and meeting all other requirements  Today's Visits  Date Type Provider Dept   06/23/22 Telemedicine MD Freda Varma   Showing today's visits and meeting all other requirements  Future Appointments  No visits were found meeting these conditions  Showing future appointments within next 150 days and meeting all other requirements     Review of Systems   Constitutional: Negative for chills and fever  HENT: Positive for congestion  Negative for ear pain and sore throat  Respiratory: Negative for cough and shortness of breath  Cardiovascular: Negative for chest pain and palpitations  Gastrointestinal: Negative for abdominal pain and vomiting  All other systems reviewed and are negative          I spent 20 minutes directly with the patient during this visit

## 2022-06-23 NOTE — ASSESSMENT & PLAN NOTE
Resolving  Currently on day 4 of p o steroids  Symptomatic with nasal congestion which is improving  No SOB, wheezing  Patient can continue Robitussin p r n  Encouraged to complete total 5 days of steroids  Continue p r n  Albuterol  Patient can follow-up as needed given improved symptoms  ER precautions reviewed

## 2022-06-27 DIAGNOSIS — J45.30 MILD PERSISTENT ASTHMA WITHOUT COMPLICATION: ICD-10-CM

## 2022-06-27 RX ORDER — FLUTICASONE PROPIONATE 44 MCG
AEROSOL WITH ADAPTER (GRAM) INHALATION
Qty: 10.6 G | Refills: 3 | Status: SHIPPED | OUTPATIENT
Start: 2022-06-27

## 2022-07-25 ENCOUNTER — TELEPHONE (OUTPATIENT)
Dept: FAMILY MEDICINE CLINIC | Facility: CLINIC | Age: 77
End: 2022-07-25

## 2022-07-25 NOTE — TELEPHONE ENCOUNTER
Patient wants to ask about paperwork regarding a pet for emotional support and dog passed away and patient got a new one and the landlord requiring something in writing Patient can be reached at [phone]

## 2022-07-27 DIAGNOSIS — J30.2 SEASONAL ALLERGIES: ICD-10-CM

## 2022-07-27 RX ORDER — FLUTICASONE PROPIONATE 50 MCG
SPRAY, SUSPENSION (ML) NASAL
Qty: 16 G | Refills: 3 | Status: SHIPPED | OUTPATIENT
Start: 2022-07-27 | End: 2022-09-01 | Stop reason: SDUPTHER

## 2022-08-02 ENCOUNTER — OFFICE VISIT (OUTPATIENT)
Dept: FAMILY MEDICINE CLINIC | Facility: CLINIC | Age: 77
End: 2022-08-02

## 2022-08-02 VITALS
DIASTOLIC BLOOD PRESSURE: 88 MMHG | BODY MASS INDEX: 26.46 KG/M2 | RESPIRATION RATE: 18 BRPM | HEART RATE: 98 BPM | TEMPERATURE: 98.2 F | WEIGHT: 134.8 LBS | HEIGHT: 60 IN | SYSTOLIC BLOOD PRESSURE: 130 MMHG | OXYGEN SATURATION: 97 %

## 2022-08-02 DIAGNOSIS — Z59.9 FINANCIAL DIFFICULTIES: Primary | ICD-10-CM

## 2022-08-02 DIAGNOSIS — Z59.41 FOOD INSECURITY: ICD-10-CM

## 2022-08-02 DIAGNOSIS — Z59.82 INABILITY TO ACQUIRE TRANSPORTATION: ICD-10-CM

## 2022-08-02 DIAGNOSIS — F41.9 ANXIETY: ICD-10-CM

## 2022-08-02 PROCEDURE — 99213 OFFICE O/P EST LOW 20 MIN: CPT | Performed by: PHYSICIAN ASSISTANT

## 2022-08-02 PROCEDURE — 3075F SYST BP GE 130 - 139MM HG: CPT | Performed by: PHYSICIAN ASSISTANT

## 2022-08-02 PROCEDURE — 3079F DIAST BP 80-89 MM HG: CPT | Performed by: PHYSICIAN ASSISTANT

## 2022-08-02 SDOH — ECONOMIC STABILITY - INCOME SECURITY: PROBLEM RELATED TO HOUSING AND ECONOMIC CIRCUMSTANCES, UNSPECIFIED: Z59.9

## 2022-08-02 SDOH — ECONOMIC STABILITY - FOOD INSECURITY: FOOD INSECURITY: Z59.41

## 2022-08-02 SDOH — ECONOMIC STABILITY - TRANSPORTATION SECURITY: TRANSPORTATION INSECURITY: Z59.82

## 2022-08-02 NOTE — PROGRESS NOTES
Assessment/Plan:    Anxiety  Has declined SSRI in the past and continued to do so  Feels having her ANTHONY has helped with anxiety  Letter as requested listing conditions that qualifies her as disabled/handicap  Diagnoses and all orders for this visit:    Financial difficulties  -     Ambulatory referral to social work care management program; Future    Inability to acquire transportation  -     Ambulatory referral to social work care management program; Future    Food insecurity  -     Ambulatory referral to social work care management program; Future    Anxiety    Patient encouraged to schedule appointment for DEXA scan, get labs done, and schedule appointment with nephrology  Subjective:      Patient ID: Tal Mathew is a 69 y o  female presents today for letter listing her chronic conditions that qualified her as disable/ handicap for her   She recently got a new dog after her dog of 17 years die last year  Her new dog is a certified emotional support dog that came with documentation  She reports  is not willing to accept that documentation  Daughter is disabled and provided  with documentation regarding disability and he did not except her form either  The following portions of the patient's history were reviewed and updated as appropriate: allergies, current medications, past family history, past medical history, past social history, past surgical history and problem list     Review of Systems   Constitutional: Negative for fever  Psychiatric/Behavioral: Negative for agitation and dysphoric mood  The patient is not nervous/anxious  Objective:      /88   Pulse 98   Temp 98 2 °F (36 8 °C) (Temporal)   Resp 18   Ht 5' (1 524 m)   Wt 61 1 kg (134 lb 12 8 oz)   SpO2 97%   BMI 26 33 kg/m²          Physical Exam  Constitutional:       General: She is not in acute distress  Appearance: Normal appearance   She is not ill-appearing  Pulmonary:      Effort: Pulmonary effort is normal  No respiratory distress  Neurological:      Mental Status: She is alert     Psychiatric:         Mood and Affect: Mood normal

## 2022-08-02 NOTE — ASSESSMENT & PLAN NOTE
Has declined SSRI in the past and continued to do so  Feels having her ANTHONY has helped with anxiety

## 2022-08-02 NOTE — LETTER
August 2, 2022     Patient: Raquel Hernandez  YOB: 1945  Date of Visit: 8/2/2022      To Whom it May Concern:    Raquel Hernandez is under my professional care  Jerry Dsouza was seen in my office on 8/2/2022  Jerry Dsouza has polyarthritis, chronic hip pain, HTN, rheumatoid arthritis, lumbar radiculopathy, fibromyalgia, and anxiety  These conditions and symptoms have qualified patient for disability  If you have any questions or concerns, please don't hesitate to call           Sincerely,          Fernando Archuleta PA-C        CC: No Recipients

## 2022-08-05 ENCOUNTER — PATIENT OUTREACH (OUTPATIENT)
Dept: FAMILY MEDICINE CLINIC | Facility: CLINIC | Age: 77
End: 2022-08-05

## 2022-08-05 NOTE — PROGRESS NOTES
STEVIE MCKEE received referral from provider Elliott Adams to assist patient with financial difficulties, inability to acquire transportation, and food insecurity  STEVIE MCKEE outreached patient regarding same  STEVIE MCKEE introduced self and explained role  STEVIE MCKEE first inquired about transportation needs  Patient reported that she still drives, however, she has a difficult time affording gas at times  STEVIE MCKEE informed patient of her transportation benefit with her Corin Reynaga  Patient reported that she is already aware that she has this transportation for medical appointments  STEVIE MCKEE offered to assist with [employer] application, patient denied and reported that it takes too long  STEVIE MCKEE then inquired about food insecurity  Patient reported that she has SNAP benefits, however, they do not last her the entire month  STEVIE MCKEE provided her with information on local food blankenship in her area  Patient reported that she is receiving SSI and is residing with her daughter in housing  She reported that her rent has recently increased to $405 a month  STEVIE MCKEE informed her of agencies that will potentially assist with rental assistance if ever necessary  STEVIE MCKEE provided patient with contact information to reach out in the future if she is ever in need of support  Patient denied any further needs at this time  STEVIE MCKEE will continue to be available to provide psychosocial support as necessary

## 2022-08-25 DIAGNOSIS — I10 ESSENTIAL HYPERTENSION: ICD-10-CM

## 2022-08-25 RX ORDER — AMLODIPINE BESYLATE 5 MG/1
TABLET ORAL
Qty: 90 TABLET | Refills: 0 | Status: SHIPPED | OUTPATIENT
Start: 2022-08-25

## 2022-09-01 ENCOUNTER — OFFICE VISIT (OUTPATIENT)
Dept: FAMILY MEDICINE CLINIC | Facility: CLINIC | Age: 77
End: 2022-09-01

## 2022-09-01 VITALS
SYSTOLIC BLOOD PRESSURE: 121 MMHG | RESPIRATION RATE: 18 BRPM | DIASTOLIC BLOOD PRESSURE: 81 MMHG | OXYGEN SATURATION: 95 % | TEMPERATURE: 97.6 F | WEIGHT: 133.4 LBS | HEIGHT: 60 IN | HEART RATE: 98 BPM | BODY MASS INDEX: 26.19 KG/M2

## 2022-09-01 DIAGNOSIS — J30.9 CHRONIC ALLERGIC RHINITIS: ICD-10-CM

## 2022-09-01 DIAGNOSIS — R26.2 AMBULATORY DYSFUNCTION: ICD-10-CM

## 2022-09-01 DIAGNOSIS — I10 ESSENTIAL HYPERTENSION: Primary | ICD-10-CM

## 2022-09-01 DIAGNOSIS — F41.9 ANXIETY: ICD-10-CM

## 2022-09-01 DIAGNOSIS — K21.9 GASTROESOPHAGEAL REFLUX DISEASE WITHOUT ESOPHAGITIS: ICD-10-CM

## 2022-09-01 DIAGNOSIS — H10.10 SEASONAL ALLERGIC CONJUNCTIVITIS: ICD-10-CM

## 2022-09-01 PROBLEM — J45.901 EXACERBATION OF ASTHMA: Status: RESOLVED | Noted: 2021-09-14 | Resolved: 2022-09-01

## 2022-09-01 PROCEDURE — 3074F SYST BP LT 130 MM HG: CPT | Performed by: PHYSICIAN ASSISTANT

## 2022-09-01 PROCEDURE — 3079F DIAST BP 80-89 MM HG: CPT | Performed by: PHYSICIAN ASSISTANT

## 2022-09-01 PROCEDURE — 99214 OFFICE O/P EST MOD 30 MIN: CPT | Performed by: PHYSICIAN ASSISTANT

## 2022-09-01 RX ORDER — HYDROCHLOROTHIAZIDE 12.5 MG/1
12.5 TABLET ORAL DAILY
Qty: 90 TABLET | Refills: 1 | Status: SHIPPED | OUTPATIENT
Start: 2022-09-01

## 2022-09-01 RX ORDER — OLOPATADINE HYDROCHLORIDE 1 MG/ML
1 SOLUTION/ DROPS OPHTHALMIC 2 TIMES DAILY
Qty: 5 ML | Refills: 2 | Status: SHIPPED | OUTPATIENT
Start: 2022-09-01

## 2022-09-01 RX ORDER — PANTOPRAZOLE SODIUM 20 MG/1
20 TABLET, DELAYED RELEASE ORAL DAILY
Qty: 90 TABLET | Refills: 1 | Status: SHIPPED | OUTPATIENT
Start: 2022-09-01

## 2022-09-01 RX ORDER — FLUTICASONE PROPIONATE 50 MCG
2 SPRAY, SUSPENSION (ML) NASAL DAILY
Qty: 16 G | Refills: 3 | Status: SHIPPED | OUTPATIENT
Start: 2022-09-01

## 2022-09-01 RX ORDER — AZELASTINE 1 MG/ML
2 SPRAY, METERED NASAL 2 TIMES DAILY
Qty: 30 ML | Refills: 2 | Status: SHIPPED | OUTPATIENT
Start: 2022-09-01

## 2022-09-01 NOTE — ASSESSMENT & PLAN NOTE
Patient is requesting walker for unsteady gait for acute on chronic lower back flares that tend to cause trouble ambulating and unsteady gait  Consulted with Neurosurgery in the past  Will order rollator to assist with

## 2022-09-01 NOTE — ASSESSMENT & PLAN NOTE
Continues to decline medication and  at this time    Continue stress relieving techniques  Return to clinic if symptoms worsen

## 2022-09-01 NOTE — PROGRESS NOTES
Assessment/Plan:    Essential hypertension  Stable on HCTZ 12 5 mg along with amlodipine 5 mg daily  Patient advised to continue current medication  Patient encouraged to have labs drawn    GERD (gastroesophageal reflux disease)  Discussed with patient possible complications caused by chronic use of PPI  With shared decision making pt amicable to decreasing pantoprazole 20 mg daily  Encouraged to continue following a GERD free diet  Return to office for increase symptoms of GERD    Anxiety  Continues to decline medication and  at this time  Continue stress relieving techniques  Return to clinic if symptoms worsen    Chronic allergic rhinitis  Chronic allergic rhinitis seen by allergist in the past  Refill Flonase and Astelin    Ambulatory dysfunction  Patient is requesting walker for unsteady gait for acute on chronic lower back flares that tend to cause trouble ambulating and unsteady gait  Consulted with Neurosurgery in the past  Will order rollator to assist with        Diagnoses and all orders for this visit:    Essential hypertension  -     hydrochlorothiazide (HYDRODIURIL) 12 5 mg tablet; Take 1 tablet (12 5 mg total) by mouth daily    Gastroesophageal reflux disease without esophagitis  -     pantoprazole (PROTONIX) 20 mg tablet;  Take 1 tablet (20 mg total) by mouth daily    Ambulatory dysfunction  -     Walker    Seasonal allergic conjunctivitis  -     olopatadine (PATANOL) 0 1 % ophthalmic solution; Administer 1 drop to both eyes 2 (two) times a day    Anxiety    Chronic allergic rhinitis  -     azelastine (ASTELIN) 0 1 % nasal spray; 2 sprays into each nostril 2 (two) times a day Use in each nostril as directed  -     fluticasone (FLONASE) 50 mcg/act nasal spray; 2 sprays into each nostril daily    Other orders  -     Rollator      Subjective:      Patient ID: Jannie Elise is a 69 y o  female presents today for f/u anxiety     Is having some issues with her landlord which tends to increase her anxiety  Otherwise, she feels her anxiety is well controlled  GERD-patient prescribed pantoprazole 40 mg daily  Ran out and was taking OTC omeprazole 20 mg daily with some improvement  She felt symptoms were better controlled on pantoprazole  Follows a GERD free diet  Requesting a walker for unsteady gait that tends to occur when she has an acute on chronic lower back flare  Flares occur at any given time  H/o severe degeneration canal stenosis L4-L5  Pt is very active and feels a walker well help maintain her quality of life  Requesting refill on her nasal sprays and allergy eyedrops  Chronic AR with mild- moderated relief  The following portions of the patient's history were reviewed and updated as appropriate: allergies, current medications, past family history, past medical history, past social history, past surgical history and problem list     Review of Systems   Constitutional: Negative  Respiratory: Negative  Cardiovascular: Negative  Musculoskeletal: Positive for back pain (chronic) and gait problem  Allergic/Immunologic: Positive for environmental allergies  Psychiatric/Behavioral: Negative for dysphoric mood  The patient is not nervous/anxious  Objective:      /81 (BP Location: Left arm, Patient Position: Sitting, Cuff Size: Standard)   Pulse 98   Temp 97 6 °F (36 4 °C) (Temporal)   Resp 18   Ht 5' (1 524 m)   Wt 60 5 kg (133 lb 6 4 oz)   SpO2 95%   BMI 26 05 kg/m²          Physical Exam  Constitutional:       General: She is not in acute distress  Appearance: Normal appearance  She is well-developed  She is not ill-appearing  Cardiovascular:      Rate and Rhythm: Normal rate and regular rhythm  Heart sounds: Normal heart sounds  No murmur heard  Pulmonary:      Effort: Pulmonary effort is normal  No respiratory distress  Breath sounds: Normal breath sounds  No wheezing  Musculoskeletal:         General: No deformity        Cervical back: Normal range of motion and neck supple  Neurological:      Mental Status: She is alert and oriented to person, place, and time  Gait: Gait normal    Psychiatric:         Mood and Affect: Mood normal          Behavior: Behavior normal          Thought Content:  Thought content normal          Judgment: Judgment normal

## 2022-09-01 NOTE — ASSESSMENT & PLAN NOTE
Stable on HCTZ 12 5 mg along with amlodipine 5 mg daily      Patient advised to continue current medication  Patient encouraged to have labs drawn

## 2022-09-01 NOTE — ASSESSMENT & PLAN NOTE
Discussed with patient possible complications caused by chronic use of PPI  With shared decision making pt amicable to decreasing pantoprazole 20 mg daily    Encouraged to continue following a GERD free diet  Return to office for increase symptoms of GERD

## 2022-09-02 LAB
DME PARACHUTE DELIVERY DATE REQUESTED: NORMAL
DME PARACHUTE ITEM DESCRIPTION: NORMAL
DME PARACHUTE ITEM DESCRIPTION: NORMAL
DME PARACHUTE ORDER STATUS: NORMAL
DME PARACHUTE SUPPLIER NAME: NORMAL
DME PARACHUTE SUPPLIER PHONE: NORMAL

## 2022-09-06 ENCOUNTER — TELEPHONE (OUTPATIENT)
Dept: FAMILY MEDICINE CLINIC | Facility: CLINIC | Age: 77
End: 2022-09-06

## 2022-09-06 NOTE — TELEPHONE ENCOUNTER
Folder (To be completed by) - Dr Matias Mancia     Name of Form - [employer]/ Confirmation of verbal order for home medical equipment     Color folder (RED)    Form to be Faxed to [phone]    Patient was made aware of the 7 business day form policy

## 2022-09-12 NOTE — TELEPHONE ENCOUNTER
Form reviewed and signed by Dr Arash Souza  Form placed in the Red Folder in the [facility]  Please scan and fax to [phone]   Thanks

## 2022-09-22 ENCOUNTER — HOSPITAL ENCOUNTER (EMERGENCY)
Facility: HOSPITAL | Age: 77
Discharge: HOME | End: 2022-09-22
Attending: EMERGENCY MEDICINE
Payer: MEDICARE

## 2022-09-22 ENCOUNTER — APPOINTMENT (OUTPATIENT)
Dept: RADIOLOGY | Facility: HOSPITAL | Age: 77
End: 2022-09-22
Payer: MEDICARE

## 2022-09-22 VITALS
DIASTOLIC BLOOD PRESSURE: 104 MMHG | RESPIRATION RATE: 22 BRPM | OXYGEN SATURATION: 93 % | TEMPERATURE: 98.3 F | HEART RATE: 103 BPM | SYSTOLIC BLOOD PRESSURE: 160 MMHG

## 2022-09-22 DIAGNOSIS — J45.901 ASTHMA EXACERBATION: Primary | ICD-10-CM

## 2022-09-22 PROCEDURE — 99284 EMERGENCY DEPT VISIT MOD MDM: CPT | Performed by: PHYSICIAN ASSISTANT

## 2022-09-22 PROCEDURE — 94644 CONT INHLJ TX 1ST HOUR: CPT

## 2022-09-22 PROCEDURE — 99285 EMERGENCY DEPT VISIT HI MDM: CPT

## 2022-09-22 RX ORDER — ALBUTEROL SULFATE 2.5 MG/3ML
2.5 SOLUTION RESPIRATORY (INHALATION) EVERY 6 HOURS PRN
Qty: 90 ML | Refills: 0 | Status: SHIPPED | OUTPATIENT
Start: 2022-09-22 | End: 2022-09-28 | Stop reason: SDUPTHER

## 2022-09-22 RX ORDER — SODIUM CHLORIDE FOR INHALATION 0.9 %
3 VIAL, NEBULIZER (ML) INHALATION ONCE
Status: COMPLETED | OUTPATIENT
Start: 2022-09-22 | End: 2022-09-22

## 2022-09-22 RX ORDER — PREDNISONE 20 MG/1
40 TABLET ORAL DAILY
Qty: 8 TABLET | Refills: 0 | Status: SHIPPED | OUTPATIENT
Start: 2022-09-22 | End: 2022-09-26

## 2022-09-22 RX ADMIN — ALBUTEROL SULFATE 10 MG: 2.5 SOLUTION RESPIRATORY (INHALATION) at 12:44

## 2022-09-22 RX ADMIN — IPRATROPIUM BROMIDE 1 MG: 0.5 SOLUTION RESPIRATORY (INHALATION) at 12:44

## 2022-09-22 RX ADMIN — ISODIUM CHLORIDE 3 ML: 0.03 SOLUTION RESPIRATORY (INHALATION) at 12:44

## 2022-09-22 RX ADMIN — DEXAMETHASONE 10 MG: 4 TABLET ORAL at 13:41

## 2022-09-22 NOTE — ED PROVIDER NOTES
History  Chief Complaint   Patient presents with    Shortness of Breath     Pt c/o SOB and cough starting yesterday, pt has hx of asthma, no relief with rescue inhaler and is out of breathing treatments at home, denies CP      Diogo Scottie is a 68 yo who presents to the ED today with asthma exacerbation states there are two types of asthma  One with wheezing and another where it feels tight And thi sit he tight feeling  Says out of medications at home (and hasn't been taking her Flovent), and does continue to smoke  Needs steroids and inhaler today  Has mild dry cough  No fevers  No URI symptoms  Prior to Admission Medications   Prescriptions Last Dose Informant Patient Reported? Taking?    Blood Pressure Monitoring (BLOOD PRESSURE CUFF) MISC  Self No No   Sig: by Does not apply route 2 (two) times a day   Cholecalciferol (Vitamin D) 50 MCG (2000 UT) CAPS  Self No No   Sig: Take 1 capsule (2,000 Units total) by mouth daily   acetaminophen (TYLENOL) 500 mg tablet  Self No No   Sig: Take 1 tablet (500 mg total) by mouth every 6 (six) hours as needed for mild pain   albuterol (2 5 mg/3 mL) 0 083 % nebulizer solution  Self No No   Sig: Take 3 mL (2 5 mg total) by nebulization every 4 (four) hours as needed for wheezing or shortness of breath   albuterol (PROVENTIL HFA,VENTOLIN HFA) 90 mcg/act inhaler  Self No No   Sig: Inhale 2 puffs every 6 (six) hours as needed for wheezing or shortness of breath   amLODIPine (NORVASC) 5 mg tablet  Self No No   Sig: take 1 tablet by mouth daily   azelastine (ASTELIN) 0 1 % nasal spray   No No   Si sprays into each nostril 2 (two) times a day Use in each nostril as directed   cetirizine (ZyrTEC) 10 mg tablet  Self No No   Sig: Take 1 tablet (10 mg total) by mouth daily   fluticasone (FLONASE) 50 mcg/act nasal spray   No No   Si sprays into each nostril daily   fluticasone (Flovent HFA) 44 mcg/act inhaler  Self No No   Sig: inhale 2 puffs by mouth and INTO THE LUNGS twice a day RINSE MOUTH AFTER USE   folic acid (FOLVITE) 1 mg tablet  Self No No   Sig: take 1 tablet by mouth daily   Patient not taking: No sig reported   hydrochlorothiazide (HYDRODIURIL) 12 5 mg tablet   No No   Sig: Take 1 tablet (12 5 mg total) by mouth daily   hydrocortisone (ANUSOL-HC) 2 5 % rectal cream  Self No No   Sig: Apply topically 2 (two) times a day   olopatadine (PATANOL) 0 1 % ophthalmic solution   No No   Sig: Administer 1 drop to both eyes 2 (two) times a day   ondansetron (Zofran ODT) 4 mg disintegrating tablet  Self No No   Sig: Take 1 tablet (4 mg total) by mouth every 6 (six) hours as needed for nausea or vomiting   pantoprazole (PROTONIX) 20 mg tablet   No No   Sig: Take 1 tablet (20 mg total) by mouth daily      Facility-Administered Medications: None       Past Medical History:   Diagnosis Date    Asthma     Asthmatic bronchitis     Last Assessed: 10/7/2014     Chronic diarrhea     Last Assessed: 8/20/2015     Fibromyalgia     Focal nodular hyperplasia of liver     Last Assessed: 6/11/2015    Herpes zoster     Last Assessed: 3/18/2016    Hypertension     IBS (irritable bowel syndrome)     Intermittent palpitations     Irritable bowel syndrome     Lumbar radiculopathy     Osteoarthritis     Vertigo        Past Surgical History:   Procedure Laterality Date    BREAST BIOPSY      SMALL INTESTINE SURGERY         Family History   Problem Relation Age of Onset    Heart attack Mother     Other Mother         Aspiration of Vomit     Asthma Father     Breast cancer Sister     Arthritis Family     Other Family         Musculoskeletal Disease     Osteoporosis Family      I have reviewed and agree with the history as documented      E-Cigarette/Vaping    E-Cigarette Use Never User      E-Cigarette/Vaping Substances    Nicotine No     THC No     CBD No     Flavoring No     Other No     Unknown No      Social History     Tobacco Use    Smoking status: Current Every Day Smoker Packs/day: 0 50     Types: Cigarettes    Smokeless tobacco: Never Used   Vaping Use    Vaping Use: Never used   Substance Use Topics    Alcohol use: No    Drug use: No       Review of Systems   Constitutional: Negative for chills, fatigue and fever  HENT: Negative for congestion, rhinorrhea and sore throat  Eyes: Negative for visual disturbance  Respiratory: Positive for cough, chest tightness and wheezing  Negative for shortness of breath  Cardiovascular: Negative for chest pain, palpitations and leg swelling  Gastrointestinal: Negative for abdominal pain, diarrhea, nausea and vomiting  Musculoskeletal: Negative for back pain  Skin: Negative for rash  Allergic/Immunologic: Negative for immunocompromised state  Neurological: Negative for dizziness, weakness, light-headedness and headaches  Psychiatric/Behavioral: Negative for behavioral problems  All other systems reviewed and are negative  Physical Exam  Physical Exam  Vitals and nursing note reviewed  Constitutional:       General: She is not in acute distress  Appearance: She is well-developed  She is not ill-appearing, toxic-appearing or diaphoretic  HENT:      Head: Normocephalic and atraumatic  Eyes:      Conjunctiva/sclera: Conjunctivae normal       Pupils: Pupils are equal, round, and reactive to light  Cardiovascular:      Rate and Rhythm: Normal rate and regular rhythm  Heart sounds: Normal heart sounds  No murmur heard  Pulmonary:      Effort: Pulmonary effort is normal  No accessory muscle usage or respiratory distress  Breath sounds: No stridor  Wheezing present  Abdominal:      Palpations: Abdomen is soft  Tenderness: There is no abdominal tenderness  Musculoskeletal:         General: Normal range of motion  Cervical back: Normal range of motion and neck supple  Skin:     General: Skin is warm and dry  Findings: No rash  Neurological:      General: No focal deficit present  Mental Status: She is alert  Cranial Nerves: No cranial nerve deficit  Psychiatric:         Mood and Affect: Mood normal          Behavior: Behavior normal          Vital Signs  ED Triage Vitals [09/22/22 1154]   Temperature Pulse Respirations Blood Pressure SpO2   98 3 °F (36 8 °C) 103 22 (!) 160/104 93 %      Temp Source Heart Rate Source Patient Position - Orthostatic VS BP Location FiO2 (%)   Oral Monitor Sitting Left arm --      Pain Score       --           Vitals:    09/22/22 1154   BP: (!) 160/104   Pulse: 103   Patient Position - Orthostatic VS: Sitting         Visual Acuity      ED Medications  Medications   albuterol inhalation solution 10 mg (10 mg Nebulization Given 9/22/22 1244)     And   ipratropium (ATROVENT) 0 02 % inhalation solution 1 mg (1 mg Nebulization Given 9/22/22 1244)     And   sodium chloride 0 9 % inhalation solution 3 mL (3 mL Nebulization Given 9/22/22 1244)   dexamethasone (DECADRON) tablet 10 mg (10 mg Oral Given 9/22/22 1341)       Diagnostic Studies  Results Reviewed     None                 No orders to display              Procedures  Procedures         ED Course         patient refused cxr after treatment as she feels much better  understands to return to ED if having fevers, worsenign wheezing/sob  Identification of Seniors at [address] [place] [address] Most Recent Value   (ISAR) Identification of Seniors at Risk    Before the illness or injury that brought you to the Emergency, did you need someone to help you on a regular basis? 1 Filed at: 09/22/2022 1157   In the last 24 hours, have you needed more help than usual? 0 Filed at: 09/22/2022 1157   Have you been hospitalized for one or more nights during the past 6 months? 0 Filed at: 09/22/2022 1157   In general, do you see well? 0 Filed at: 09/22/2022 1157   In general, do you have serious problems with your memory? 0 Filed at: 09/22/2022 1157   Do you take more than three different medications every day?  1 Filed at: 09/22/2022 1157   ISAR Score 2 Filed at: 09/22/2022 1157            MDM    Disposition  Final diagnoses:   Asthma exacerbation     Time reflects when diagnosis was documented in both MDM as applicable and the Disposition within this note     Time User Action Codes Description Comment    9/22/2022  2:03 PM Velna Cowden Add [Y37 [id number]] Asthma exacerbation       ED Disposition     ED Disposition   Discharge    Condition   Stable    Date/Time   Thu Sep 22, 2022  2:01 PM    [address] discharge to home/self care  Follow-up Information     Follow up With Specialties Details Why Contact Info Additional [address] Emergency Department Emergency Medicine  If symptoms worsen [address] [phone]  [address] [facility] Emergency Department, [address], [address]          Discharge Medication List as of 9/22/2022  2:06 PM      START taking these medications    Details   !! albuterol (2 5 mg/3 mL) 0 083 % nebulizer solution Take 3 mL (2 5 mg total) by nebulization every 6 (six) hours as needed for wheezing or shortness of breath Dispense 30vials  , Starting Thu 9/22/2022, Until Sat 10/22/2022 at 2359, Normal      predniSONE 20 mg tablet Take 2 tablets (40 mg total) by mouth daily for 4 days, Starting Thu 9/22/2022, Until Mon 9/26/2022, Normal       !! - Potential duplicate medications found  Please discuss with provider        CONTINUE these medications which have NOT CHANGED    Details   acetaminophen (TYLENOL) 500 mg tablet Take 1 tablet (500 mg total) by mouth every 6 (six) hours as needed for mild pain, Starting Mon 8/27/2018, Normal      !! albuterol (2 5 mg/3 mL) 0 083 % nebulizer solution Take 3 mL (2 5 mg total) by nebulization every 4 (four) hours as needed for wheezing or shortness of breath, Starting Wed 8/25/2021, Normal      albuterol (PROVENTIL HFA,VENTOLIN HFA) 90 mcg/act inhaler Inhale 2 puffs every 6 (six) hours as needed for wheezing or shortness of breath, Starting Mon 2/14/2022, Normal      amLODIPine (NORVASC) 5 mg tablet take 1 tablet by mouth daily, Normal      azelastine (ASTELIN) 0 1 % nasal spray 2 sprays into each nostril 2 (two) times a day Use in each nostril as directed, Starting Thu 9/1/2022, Normal      Blood Pressure Monitoring (BLOOD PRESSURE CUFF) MISC by Does not apply route 2 (two) times a day, Starting Wed 6/3/2020, Print      cetirizine (ZyrTEC) 10 mg tablet Take 1 tablet (10 mg total) by mouth daily, Starting Wed 7/1/2020, Normal      Cholecalciferol (Vitamin D) 50 MCG (2000 UT) CAPS Take 1 capsule (2,000 Units total) by mouth daily, Starting Tue 5/18/2021, Normal      fluticasone (FLONASE) 50 mcg/act nasal spray 2 sprays into each nostril daily, Starting Thu 9/1/2022, Normal      fluticasone (Flovent HFA) 44 mcg/act inhaler inhale 2 puffs by mouth and INTO THE LUNGS twice a day RINSE MOUTH AFTER USE, Normal      folic acid (FOLVITE) 1 mg tablet take 1 tablet by mouth daily, Normal      hydrochlorothiazide (HYDRODIURIL) 12 5 mg tablet Take 1 tablet (12 5 mg total) by mouth daily, Starting Thu 9/1/2022, Normal      hydrocortisone (ANUSOL-HC) 2 5 % rectal cream Apply topically 2 (two) times a day, Starting Tue 9/14/2021, Normal      olopatadine (PATANOL) 0 1 % ophthalmic solution Administer 1 drop to both eyes 2 (two) times a day, Starting Thu 9/1/2022, Normal      ondansetron (Zofran ODT) 4 mg disintegrating tablet Take 1 tablet (4 mg total) by mouth every 6 (six) hours as needed for nausea or vomiting, Starting Wed 3/30/2022, Normal      pantoprazole (PROTONIX) 20 mg tablet Take 1 tablet (20 mg total) by mouth daily, Starting Thu 9/1/2022, Normal       !! - Potential duplicate medications found  Please discuss with provider  No discharge procedures on file      PDMP Review     None          ED Provider  Electronically Signed by Juhi Benson PA-C  09/22/22 1421

## 2022-09-28 ENCOUNTER — OFFICE VISIT (OUTPATIENT)
Dept: FAMILY MEDICINE CLINIC | Facility: CLINIC | Age: 77
End: 2022-09-28

## 2022-09-28 VITALS
BODY MASS INDEX: 26.15 KG/M2 | TEMPERATURE: 98.4 F | RESPIRATION RATE: 20 BRPM | OXYGEN SATURATION: 98 % | DIASTOLIC BLOOD PRESSURE: 88 MMHG | HEIGHT: 60 IN | SYSTOLIC BLOOD PRESSURE: 130 MMHG | HEART RATE: 99 BPM | WEIGHT: 133.2 LBS

## 2022-09-28 DIAGNOSIS — J45.31 MILD PERSISTENT ASTHMA WITH EXACERBATION: ICD-10-CM

## 2022-09-28 DIAGNOSIS — K21.9 GASTROESOPHAGEAL REFLUX DISEASE WITHOUT ESOPHAGITIS: ICD-10-CM

## 2022-09-28 DIAGNOSIS — J06.9 VIRAL UPPER RESPIRATORY TRACT INFECTION: ICD-10-CM

## 2022-09-28 DIAGNOSIS — J45.31 MILD PERSISTENT ASTHMA WITH ACUTE EXACERBATION: Primary | ICD-10-CM

## 2022-09-28 PROBLEM — J45.20 MILD INTERMITTENT ASTHMA: Status: ACTIVE | Noted: 2022-09-28

## 2022-09-28 PROBLEM — J45.20 MILD INTERMITTENT ASTHMA: Status: RESOLVED | Noted: 2022-09-28 | Resolved: 2022-09-28

## 2022-09-28 PROCEDURE — 99214 OFFICE O/P EST MOD 30 MIN: CPT | Performed by: FAMILY MEDICINE

## 2022-09-28 PROCEDURE — 3079F DIAST BP 80-89 MM HG: CPT | Performed by: FAMILY MEDICINE

## 2022-09-28 PROCEDURE — 3075F SYST BP GE 130 - 139MM HG: CPT | Performed by: FAMILY MEDICINE

## 2022-09-28 RX ORDER — ALBUTEROL SULFATE 2.5 MG/3ML
2.5 SOLUTION RESPIRATORY (INHALATION) EVERY 6 HOURS PRN
Qty: 90 ML | Refills: 0 | Status: SHIPPED | OUTPATIENT
Start: 2022-09-28 | End: 2022-10-28

## 2022-09-28 RX ORDER — LORATADINE 10 MG/1
10 TABLET ORAL DAILY
Qty: 30 TABLET | Refills: 3 | Status: SHIPPED | OUTPATIENT
Start: 2022-09-28 | End: 2023-01-26

## 2022-09-28 RX ORDER — ONDANSETRON 4 MG/1
4 TABLET, ORALLY DISINTEGRATING ORAL EVERY 6 HOURS PRN
Qty: 20 TABLET | Refills: 3 | Status: SHIPPED | OUTPATIENT
Start: 2022-09-28

## 2022-09-28 RX ORDER — GUANFACINE 2 MG/1
2 TABLET ORAL
Qty: 14 TABLET | Refills: 0 | Status: SHIPPED | OUTPATIENT
Start: 2022-09-28 | End: 2022-09-29 | Stop reason: CLARIF

## 2022-09-28 NOTE — ASSESSMENT & PLAN NOTE
-Congestion, sore throat, and sinus pressure started 9/22  No signs of bacterial origin (no green colored sputum, less than 10 days)  Likely viral  -Symptomatic relief: will order Mucinex  -Will switch from zyrtec to Claritin as patient has long term use of zyrtec and feels it is no longer working and providing symptom relief

## 2022-09-28 NOTE — ASSESSMENT & PLAN NOTE
- hx of GERD, long term use of ondansetron prn for nausea and vomiting without side effects or complications  Pt states she only uses this medication 2-3 times a month  -Will refill today, discussed the side effects of chronic use of ondansetron and side effects of frequent use of ondansetron  Discussed the importance of re-evaluation if needing medication more frequently

## 2022-09-28 NOTE — PROGRESS NOTES
Name: Vasiliy Triplett      : [id number]      MRN: [id number]  Encounter Provider: Yareli Anguiano MD  Encounter Date: 2022   Encounter department: [address] [place] [address]  Mild persistent asthma with acute exacerbation  Assessment & Plan:  -patient seen in the ED on  for asthma exacerbation  Given 40 mg of prednisone for 4 days, received 1 dose of IV steroids in the ED  Started on albuterol nebulizer  -reports good compliance with her home inhalers  Flovent bid, albuterol inhaler prn  Reports infrequent use of albuterol in the last year    -No asthma exacerbation in the past year  Last Asthma exacerbation requiring steroids in    -Pt does not need refills    -No wheezing and normal breath sounds on PE, no further steroids required at this time  -Will refill albuterol nebulizer at this time  Advised patient to continue Flovent inhaler as prescribed  -Will follow up in 4 weeks        2  Viral upper respiratory tract infection  Assessment & Plan:  -Congestion, sore throat, and sinus pressure started   No signs of bacterial origin (no green colored sputum, less than 10 days)  Likely viral  -Symptomatic relief: will order Mucinex  -Will switch from zyrtec to Claritin as patient has long term use of zyrtec and feels it is no longer working and providing symptom relief  Orders:  -     loratadine (CLARITIN) 10 mg tablet; Take 1 tablet (10 mg total) by mouth daily    3  Gastroesophageal reflux disease without esophagitis  Assessment & Plan:  - hx of GERD, long term use of ondansetron prn for nausea and vomiting without side effects or complications  Pt states she only uses this medication 2-3 times a month  -Will refill today, discussed the side effects of chronic use of ondansetron and side effects of frequent use of ondansetron  Discussed the importance of re-evaluation if needing medication more frequently       Orders:  - ondansetron (Zofran ODT) 4 mg disintegrating tablet; Take 1 tablet (4 mg total) by mouth every 6 (six) hours as needed for nausea or vomiting    4  Mild persistent asthma with exacerbation  -     albuterol (2 5 mg/3 mL) 0 083 % nebulizer solution; Take 3 mL (2 5 mg total) by nebulization every 6 (six) hours as needed for wheezing or shortness of breath Dispense 30vials  Subjective    Patient was seen and evaluated in the ED on 9/22 for SOB and was dx with asthma exacerbation  Patient was given a 4 day course of oral steroids and received 1 dose of IV steroids in the ID  She was also prescribed albuterol nebulizer in the ED  Patient states she finished her full course of oral steroids  She notes that while taking steroids she felt like she was moving air better and felt more "open"  Patient states that her SOB has improved however she is still experiencing SOB  She also states she gets fits of coughing that are non productive and is experiencing wheezing  Patient gets relief from albuterol nebulizer treatment  She states she is using her inhalers as prescribed  Patient states she has smoked for 40+ years 1/2 pack a day but has cut back currently to 4 cigarettes  a day as she does not feel well  Patient also states she has been having a congested nose and sinus pressure as well as sore throat which began around 9/22  She states her cough is nonproductive and she has not had any sputum or rhinorrhea  HPI  Review of Systems   Constitutional: Negative for chills and fever  HENT: Positive for sinus pressure and sore throat  Eyes: Negative for visual disturbance  Respiratory: Positive for cough, shortness of breath and wheezing  Cardiovascular: Negative for chest pain and palpitations  Gastrointestinal: Negative for nausea and vomiting  Genitourinary: Negative for difficulty urinating and dyspareunia  Musculoskeletal: Negative for arthralgias and myalgias     Neurological: Negative for dizziness, light-headedness and headaches         Current Outpatient Medications on File Prior to Visit   Medication Sig    acetaminophen (TYLENOL) 500 mg tablet Take 1 tablet (500 mg total) by mouth every 6 (six) hours as needed for mild pain    albuterol (PROVENTIL HFA,VENTOLIN HFA) 90 mcg/act inhaler Inhale 2 puffs every 6 (six) hours as needed for wheezing or shortness of breath    azelastine (ASTELIN) 0 1 % nasal spray 2 sprays into each nostril 2 (two) times a day Use in each nostril as directed    Blood Pressure Monitoring (BLOOD PRESSURE CUFF) MISC by Does not apply route 2 (two) times a day    cetirizine (ZyrTEC) 10 mg tablet Take 1 tablet (10 mg total) by mouth daily    Cholecalciferol (Vitamin D) 50 MCG (2000 UT) CAPS Take 1 capsule (2,000 Units total) by mouth daily    fluticasone (FLONASE) 50 mcg/act nasal spray 2 sprays into each nostril daily    fluticasone (Flovent HFA) 44 mcg/act inhaler inhale 2 puffs by mouth and INTO THE LUNGS twice a day RINSE MOUTH AFTER USE    folic acid (FOLVITE) 1 mg tablet take 1 tablet by mouth daily (Patient taking differently: as needed)    olopatadine (PATANOL) 0 1 % ophthalmic solution Administer 1 drop to both eyes 2 (two) times a day    pantoprazole (PROTONIX) 20 mg tablet Take 1 tablet (20 mg total) by mouth daily    albuterol (2 5 mg/3 mL) 0 083 % nebulizer solution Take 3 mL (2 5 mg total) by nebulization every 4 (four) hours as needed for wheezing or shortness of breath (Patient not taking: Reported on 9/28/2022)    amLODIPine (NORVASC) 5 mg tablet take 1 tablet by mouth daily    hydrochlorothiazide (HYDRODIURIL) 12 5 mg tablet Take 1 tablet (12 5 mg total) by mouth daily    hydrocortisone (ANUSOL-HC) 2 5 % rectal cream Apply topically 2 (two) times a day (Patient not taking: Reported on 9/28/2022)       Objective     /88 (BP Location: Right arm, Patient Position: Sitting, Cuff Size: Standard)   Pulse 99   Temp 98 4 °F (36 9 °C) (Temporal) Resp 20   Ht 5' (1 524 m)   Wt 60 4 kg (133 lb 3 2 oz)   SpO2 98%   BMI 26 01 kg/m²     Physical Exam  Constitutional:       General: She is not in acute distress  HENT:      Head: Normocephalic and atraumatic  Nose:      Comments: Erythematous, swollen turbinates      Mouth/Throat:      Comments: Postnasal drip   Cardiovascular:      Rate and Rhythm: Normal rate and regular rhythm  Pulses: Normal pulses  Heart sounds: No murmur heard  Pulmonary:      Effort: Pulmonary effort is normal  No respiratory distress  Breath sounds: Normal breath sounds  No wheezing  Abdominal:      General: Abdomen is flat  Bowel sounds are normal       Palpations: Abdomen is soft  Tenderness: There is no abdominal tenderness  Musculoskeletal:         General: Normal range of motion  Skin:     General: Skin is warm  Neurological:      General: No focal deficit present  Mental Status: She is alert and oriented to person, place, and time         Mel Goodwin MD

## 2022-10-11 PROBLEM — Z12.12 SCREENING FOR COLORECTAL CANCER: Status: RESOLVED | Noted: 2022-03-30 | Resolved: 2022-10-11

## 2022-10-11 PROBLEM — Z12.11 SCREENING FOR COLORECTAL CANCER: Status: RESOLVED | Noted: 2022-03-30 | Resolved: 2022-10-11

## 2022-10-11 PROBLEM — Z00.00 MEDICARE ANNUAL WELLNESS VISIT, SUBSEQUENT: Status: RESOLVED | Noted: 2021-03-23 | Resolved: 2022-10-11

## 2022-10-12 ENCOUNTER — TELEPHONE (OUTPATIENT)
Dept: FAMILY MEDICINE CLINIC | Facility: CLINIC | Age: 77
End: 2022-10-12

## 2022-10-12 NOTE — TELEPHONE ENCOUNTER
Received fax from patient's pharmacy stating Guanfacine 2mg tablets require prior authorization  After chart review, medication was discontinued  Please advise if medication is still active and if prior authorization should be submitted to insurance  Thanks!

## 2022-10-23 DIAGNOSIS — J45.30 MILD PERSISTENT ASTHMA WITHOUT COMPLICATION: ICD-10-CM

## 2022-10-24 RX ORDER — FLUTICASONE PROPIONATE 44 UG/1
AEROSOL, METERED RESPIRATORY (INHALATION)
Qty: 10.6 G | Refills: 3 | Status: SHIPPED | OUTPATIENT
Start: 2022-10-24

## 2022-11-09 ENCOUNTER — OFFICE VISIT (OUTPATIENT)
Dept: FAMILY MEDICINE CLINIC | Facility: CLINIC | Age: 77
End: 2022-11-09

## 2022-11-09 VITALS
BODY MASS INDEX: 26.7 KG/M2 | HEART RATE: 96 BPM | HEIGHT: 60 IN | DIASTOLIC BLOOD PRESSURE: 81 MMHG | RESPIRATION RATE: 16 BRPM | OXYGEN SATURATION: 98 % | WEIGHT: 136 LBS | SYSTOLIC BLOOD PRESSURE: 153 MMHG | TEMPERATURE: 98.1 F

## 2022-11-09 DIAGNOSIS — K64.4 EXTERNAL HEMORRHOID: ICD-10-CM

## 2022-11-09 DIAGNOSIS — J45.30 MILD PERSISTENT ASTHMA: Primary | ICD-10-CM

## 2022-11-09 DIAGNOSIS — J06.9 VIRAL UPPER RESPIRATORY TRACT INFECTION: ICD-10-CM

## 2022-11-09 RX ORDER — HYDROCORTISONE 25 MG/G
CREAM TOPICAL 2 TIMES DAILY
Qty: 28 G | Refills: 0 | Status: SHIPPED | OUTPATIENT
Start: 2022-11-09

## 2022-11-09 RX ORDER — BUDESONIDE AND FORMOTEROL FUMARATE DIHYDRATE 80; 4.5 UG/1; UG/1
2 AEROSOL RESPIRATORY (INHALATION) 2 TIMES DAILY
Qty: 10.2 G | Refills: 0 | Status: SHIPPED | OUTPATIENT
Start: 2022-11-09

## 2022-11-09 NOTE — ASSESSMENT & PLAN NOTE
- Continue Flonase daily  - Currently taking Claritin, recommend switching oral allergy medication to one she has not tried recently (allegral or zyzal)

## 2022-11-09 NOTE — ASSESSMENT & PLAN NOTE
- Last exacerbation requiring steroids 9/22/2     - Reports good compliance with her home inhalers  Flovent bid, albuterol inhaler prn   - No wheezing and normal breath sounds on PE   - Requiring albuterol nebulizer twice a day on 3-4 days of the week  Plan:  - Will stop Flovent and begin Symbicort due to patient's increased need for albuterol nebulizer   - Will continue albuterol nebulizer and albuterol inhaler q6 prn  - Will follow up in 3 weeks  Birth Control Pills Pregnancy And Lactation Text: This medication should be avoided if pregnant and for the first 30 days post-partum.

## 2022-11-09 NOTE — PROGRESS NOTES
Assessment/Plan:    Mild persistent asthma  - Last exacerbation requiring steroids 9/22/2     - Reports good compliance with her home inhalers  Flovent bid, albuterol inhaler prn   - No wheezing and normal breath sounds on PE   - Requiring albuterol nebulizer twice a day on 3-4 days of the week  Plan:  - Will stop Flovent and begin Symbicort due to patient's increased need for albuterol nebulizer   - Will continue albuterol nebulizer and albuterol inhaler q6 prn  - Will follow up in 3 weeks  Viral upper respiratory tract infection  -Congestion, sinus pressure, rhinorrhea No signs of bacterial origin (no green colored sputum, less than 10 days)  Likely viral  -Symptomatic relief, advised to take Mucinex   - Advised to return to care if fevers, chills, change in color of sputum, or duration longer than 10 days  Chronic allergic rhinitis  - Continue Flonase daily  - Currently taking Claritin, recommend switching oral allergy medication to one she has not tried recently (allegral or zyzal)        Diagnoses and all orders for this visit:    Mild persistent asthma  -     budesonide-formoterol (Symbicort) 80-4 5 MCG/ACT inhaler; Inhale 2 puffs 2 (two) times a day Rinse mouth after use  External hemorrhoid  -     hydrocortisone (ANUSOL-HC) 2 5 % rectal cream; Apply topically 2 (two) times a day    Viral upper respiratory tract infection        Subjective:      Patient ID: Zan Palencia is a 69 y o  female  Patient states that she has been experiencing rhinorrhea, cough, sinus pressure, and watery eye discharge for the past month  She states that the cough is nonproductive  Patient states that her rhinorrhea is yellow in color  She states that symptoms are worse in the morning and improve throughout the day  Patient states she has been taking Claritin every day as well as using Flonase daily without noticing relief of symptoms   She also states she has been using her albuterol nebulizer 2 times a day 3-4 times a week  The following portions of the patient's history were reviewed and updated as appropriate: allergies, current medications, past family history, past medical history, past social history, past surgical history and problem list     Review of Systems   Constitutional: Negative for chills and fever  HENT: Positive for congestion, rhinorrhea, sinus pressure and sore throat  Eyes: Positive for discharge and itching  Respiratory: Positive for cough  Negative for shortness of breath  Cardiovascular: Negative for chest pain and palpitations  Gastrointestinal: Negative for nausea and vomiting  Genitourinary: Negative for difficulty urinating and dysuria  Musculoskeletal: Negative for arthralgias and myalgias  Allergic/Immunologic: Positive for environmental allergies  Neurological: Negative for dizziness and light-headedness  Objective:      /81 (BP Location: Left arm, Patient Position: Sitting, Cuff Size: Standard)   Pulse 96   Temp 98 1 °F (36 7 °C) (Temporal)   Resp 16   Ht 5' (1 524 m)   Wt 61 7 kg (136 lb)   SpO2 98%   BMI 26 56 kg/m²          Physical Exam  Constitutional:       General: She is not in acute distress  HENT:      Head: Normocephalic and atraumatic  Nose: Congestion and rhinorrhea present  Mouth/Throat:      Mouth: Mucous membranes are moist       Pharynx: No oropharyngeal exudate or posterior oropharyngeal erythema  Eyes:      General:         Right eye: Discharge (watery, clear) present  Left eye: Discharge (watery, clear) present  Extraocular Movements: Extraocular movements intact  Pupils: Pupils are equal, round, and reactive to light  Cardiovascular:      Rate and Rhythm: Normal rate and regular rhythm  Pulses: Normal pulses  Heart sounds: Normal heart sounds  No murmur heard  Pulmonary:      Effort: Pulmonary effort is normal  No respiratory distress  Breath sounds: Normal breath sounds   No wheezing  Abdominal:      General: Bowel sounds are normal       Tenderness: There is no abdominal tenderness  Musculoskeletal:         General: Normal range of motion  Cervical back: Normal range of motion  Skin:     General: Skin is warm  Neurological:      General: No focal deficit present  Mental Status: She is alert and oriented to person, place, and time

## 2022-11-09 NOTE — ASSESSMENT & PLAN NOTE
-Congestion, sinus pressure, rhinorrhea No signs of bacterial origin (no green colored sputum, less than 10 days)  Likely viral  -Symptomatic relief, advised to take Mucinex   - Advised to return to care if fevers, chills, change in color of sputum, or duration longer than 10 days

## 2022-11-23 DIAGNOSIS — I10 ESSENTIAL HYPERTENSION: ICD-10-CM

## 2022-11-23 RX ORDER — AMLODIPINE BESYLATE 5 MG/1
TABLET ORAL
Qty: 90 TABLET | Refills: 0 | Status: SHIPPED | OUTPATIENT
Start: 2022-11-23

## 2022-11-27 PROBLEM — J06.9 VIRAL UPPER RESPIRATORY TRACT INFECTION: Status: RESOLVED | Noted: 2018-10-05 | Resolved: 2022-11-27

## 2023-01-17 ENCOUNTER — HOSPITAL ENCOUNTER (EMERGENCY)
Facility: HOSPITAL | Age: 78
Discharge: HOME | End: 2023-01-17
Attending: EMERGENCY MEDICINE

## 2023-01-17 VITALS
HEART RATE: 104 BPM | SYSTOLIC BLOOD PRESSURE: 148 MMHG | DIASTOLIC BLOOD PRESSURE: 90 MMHG | RESPIRATION RATE: 18 BRPM | OXYGEN SATURATION: 99 % | TEMPERATURE: 99.1 F

## 2023-01-17 DIAGNOSIS — R11.10 VOMITING: ICD-10-CM

## 2023-01-17 DIAGNOSIS — J06.9 VIRAL URI: Primary | ICD-10-CM

## 2023-01-17 LAB
ANION GAP SERPL CALCULATED.3IONS-SCNC: 6 MMOL/L (ref 4–13)
BASOPHILS # BLD AUTO: 0.07 THOUSANDS/ÂΜL (ref 0–0.1)
BASOPHILS NFR BLD AUTO: 0 % (ref 0–1)
BUN SERPL-MCNC: 21 MG/DL (ref 5–25)
CALCIUM SERPL-MCNC: 9.3 MG/DL (ref 8.3–10.1)
CHLORIDE SERPL-SCNC: 107 MMOL/L (ref 96–108)
CO2 SERPL-SCNC: 26 MMOL/L (ref 21–32)
CREAT SERPL-MCNC: 1.39 MG/DL (ref 0.6–1.3)
EOSINOPHIL # BLD AUTO: 0.09 THOUSAND/ÂΜL (ref 0–0.61)
EOSINOPHIL NFR BLD AUTO: 1 % (ref 0–6)
ERYTHROCYTE [DISTWIDTH] IN BLOOD BY AUTOMATED COUNT: 15.4 % (ref 11.6–15.1)
FLUAV RNA RESP QL NAA+PROBE: NEGATIVE
FLUBV RNA RESP QL NAA+PROBE: NEGATIVE
GFR SERPL CREATININE-BSD FRML MDRD: 36 ML/MIN/1.73SQ M
GLUCOSE SERPL-MCNC: 118 MG/DL (ref 65–140)
HCT VFR BLD AUTO: 38.5 % (ref 34.8–46.1)
HGB BLD-MCNC: 12.6 G/DL (ref 11.5–15.4)
IMM GRANULOCYTES # BLD AUTO: 0.11 THOUSAND/UL (ref 0–0.2)
IMM GRANULOCYTES NFR BLD AUTO: 1 % (ref 0–2)
LYMPHOCYTES # BLD AUTO: 1.83 THOUSANDS/ÂΜL (ref 0.6–4.47)
LYMPHOCYTES NFR BLD AUTO: 10 % (ref 14–44)
MCH RBC QN AUTO: 29 PG (ref 26.8–34.3)
MCHC RBC AUTO-ENTMCNC: 32.7 G/DL (ref 31.4–37.4)
MCV RBC AUTO: 89 FL (ref 82–98)
MONOCYTES # BLD AUTO: 1.03 THOUSAND/ÂΜL (ref 0.17–1.22)
MONOCYTES NFR BLD AUTO: 6 % (ref 4–12)
NEUTROPHILS # BLD AUTO: 15.59 THOUSANDS/ÂΜL (ref 1.85–7.62)
NEUTS SEG NFR BLD AUTO: 82 % (ref 43–75)
NRBC BLD AUTO-RTO: 0 /100 WBCS
PLATELET # BLD AUTO: 295 THOUSANDS/UL (ref 149–390)
PMV BLD AUTO: 11.8 FL (ref 8.9–12.7)
POTASSIUM SERPL-SCNC: 3.3 MMOL/L (ref 3.5–5.3)
RBC # BLD AUTO: 4.34 MILLION/UL (ref 3.81–5.12)
RSV RNA RESP QL NAA+PROBE: NEGATIVE
SARS-COV-2 RNA RESP QL NAA+PROBE: NEGATIVE
SODIUM SERPL-SCNC: 139 MMOL/L (ref 135–147)
WBC # BLD AUTO: 18.72 THOUSAND/UL (ref 4.31–10.16)

## 2023-01-17 RX ORDER — ONDANSETRON 4 MG/1
4 TABLET, ORALLY DISINTEGRATING ORAL ONCE
Status: COMPLETED | OUTPATIENT
Start: 2023-01-17 | End: 2023-01-17

## 2023-01-17 RX ADMIN — ONDANSETRON 4 MG: 4 TABLET, ORALLY DISINTEGRATING ORAL at 08:42

## 2023-01-17 RX ADMIN — DEXAMETHASONE 10 MG: 2 TABLET ORAL at 08:42

## 2023-01-17 NOTE — DISCHARGE INSTRUCTIONS
You were seen in the Emergency Department today for a sore throat  You make take Tylenol for pain control  Please follow-up with your PCP regarding your cancer screenings  Try to eat Potassium rich foods at home  Please follow up with your primary care doctor in 2-3 days as needed  Please return to the Emergency Department if you experience worsening of your current symptoms, severe pain, difficulty swallowing, neck stiffness, or any other concerning symptoms

## 2023-01-17 NOTE — ED PROVIDER NOTES
History  Chief Complaint   Patient presents with   • Sore Throat     Pt presents ambulatory c/o 9/10 throat pain accompanied with multiple episodes of vomiting x2 days  Patient is a 15-year-old female with a past medical history of asthma and hypertension who presented with a sore throat  Her sore throat began on  and is located on the right side of her throat  Prior to this, she had several episodes of NBNB vomiting  Her vomiting resolved  She also has associated right ear pain  She has cough and congestion but states that this is chronic of her allergies  She has painful swallowing but no difficulty swallowing  She has tried gargling with salt water, Tylenol, and Motrin without relief  She does not wear dentures  She denies fever, difficulty handling secretions, or neck stiffness  Prior to Admission Medications   Prescriptions Last Dose Informant Patient Reported? Taking?    Blood Pressure Monitoring (BLOOD PRESSURE CUFF) MISC   No No   Sig: by Does not apply route 2 (two) times a day   Cholecalciferol (Vitamin D) 50 MCG (2000 UT) CAPS   No No   Sig: Take 1 capsule (2,000 Units total) by mouth daily   acetaminophen (TYLENOL) 500 mg tablet   No No   Sig: Take 1 tablet (500 mg total) by mouth every 6 (six) hours as needed for mild pain   albuterol (2 5 mg/3 mL) 0 083 % nebulizer solution   No No   Sig: Take 3 mL (2 5 mg total) by nebulization every 4 (four) hours as needed for wheezing or shortness of breath   albuterol (PROVENTIL HFA,VENTOLIN HFA) 90 mcg/act inhaler   No No   Sig: Inhale 2 puffs every 6 (six) hours as needed for wheezing or shortness of breath   amLODIPine (NORVASC) 5 mg tablet   No No   Sig: take 1 tablet by mouth daily   azelastine (ASTELIN) 0 1 % nasal spray   No No   Si sprays into each nostril 2 (two) times a day Use in each nostril as directed   budesonide-formoterol (Symbicort) 80-4 5 MCG/ACT inhaler   No No   Sig: Inhale 2 puffs 2 (two) times a day Rinse mouth after use     cetirizine (ZyrTEC) 10 mg tablet   No No   Sig: Take 1 tablet (10 mg total) by mouth daily   fluticasone (FLONASE) 50 mcg/act nasal spray   No No   Si sprays into each nostril daily   folic acid (FOLVITE) 1 mg tablet   No No   Sig: take 1 tablet by mouth daily   Patient taking differently: as needed   hydrochlorothiazide (HYDRODIURIL) 12 5 mg tablet   No No   Sig: Take 1 tablet (12 5 mg total) by mouth daily   Patient not taking: Reported on 2022   hydrocortisone (ANUSOL-HC) 2 5 % rectal cream   No No   Sig: Apply topically 2 (two) times a day   loratadine (CLARITIN) 10 mg tablet   No No   Sig: Take 1 tablet (10 mg total) by mouth daily   Patient not taking: Reported on 2022   olopatadine (PATANOL) 0 1 % ophthalmic solution   No No   Sig: Administer 1 drop to both eyes 2 (two) times a day   ondansetron (Zofran ODT) 4 mg disintegrating tablet   No No   Sig: Take 1 tablet (4 mg total) by mouth every 6 (six) hours as needed for nausea or vomiting   pantoprazole (PROTONIX) 20 mg tablet   No No   Sig: Take 1 tablet (20 mg total) by mouth daily      Facility-Administered Medications: None       Past Medical History:   Diagnosis Date   • Asthma    • Asthmatic bronchitis     Last Assessed: 10/7/2014    • Chronic diarrhea     Last Assessed: 2015    • Fibromyalgia    • Focal nodular hyperplasia of liver     Last Assessed: 2015   • Herpes zoster     Last Assessed: 3/18/2016   • Hypertension    • IBS (irritable bowel syndrome)    • Intermittent palpitations    • Irritable bowel syndrome    • Lumbar radiculopathy    • Osteoarthritis    • Vertigo        Past Surgical History:   Procedure Laterality Date   • BREAST BIOPSY     • SMALL INTESTINE SURGERY         Family History   Problem Relation Age of Onset   • Heart attack Mother    • Other Mother         Aspiration of Vomit    • Asthma Father    • Breast cancer Sister    • Arthritis Family    • Other Family         Musculoskeletal Disease    • Osteoporosis Family      I have reviewed and agree with the history as documented  E-Cigarette/Vaping   • E-Cigarette Use Never User      E-Cigarette/Vaping Substances   • Nicotine No    • THC No    • CBD No    • Flavoring No    • Other No    • Unknown No      Social History     Tobacco Use   • Smoking status: Every Day     Packs/day: 0 50     Types: Cigarettes   • Smokeless tobacco: Never   Vaping Use   • Vaping Use: Never used   Substance Use Topics   • Alcohol use: No   • Drug use: No        Review of Systems   Constitutional: Negative for chills and fever  HENT: Positive for ear pain and sore throat  Eyes: Negative for pain and visual disturbance  Respiratory: Negative for cough and shortness of breath  Cardiovascular: Negative for chest pain and palpitations  Gastrointestinal: Negative for abdominal pain and vomiting  Genitourinary: Negative for dysuria and hematuria  Musculoskeletal: Negative for arthralgias and back pain  Skin: Negative for color change and rash  Neurological: Negative for seizures and syncope  All other systems reviewed and are negative  Physical Exam  ED Triage Vitals [01/17/23 0754]   Temperature Pulse Respirations Blood Pressure SpO2   99 1 °F (37 3 °C) 104 18 148/90 99 %      Temp Source Heart Rate Source Patient Position - Orthostatic VS BP Location FiO2 (%)   Oral Monitor Sitting Right arm --      Pain Score       9             Orthostatic Vital Signs  Vitals:    01/17/23 0754   BP: 148/90   Pulse: 104   Patient Position - Orthostatic VS: Sitting       Physical Exam  Vitals and nursing note reviewed  Constitutional:       General: She is not in acute distress  Appearance: She is well-developed  HENT:      Head: Normocephalic and atraumatic  Right Ear: No tenderness  Tympanic membrane is not erythematous  Left Ear: No tenderness  Tympanic membrane is not erythematous  Nose: Congestion and rhinorrhea present        Mouth/Throat: Mouth: Mucous membranes are moist       Pharynx: Uvula midline  Posterior oropharyngeal erythema present  No oropharyngeal exudate or uvula swelling  Tonsils: No tonsillar exudate  Eyes:      Extraocular Movements: Extraocular movements intact  Conjunctiva/sclera: Conjunctivae normal    Cardiovascular:      Rate and Rhythm: Normal rate and regular rhythm  Heart sounds: No murmur heard  Pulmonary:      Effort: Pulmonary effort is normal  No respiratory distress  Breath sounds: Normal breath sounds  Abdominal:      Palpations: Abdomen is soft  Tenderness: There is no abdominal tenderness  Musculoskeletal:         General: No swelling  Cervical back: Neck supple  Lymphadenopathy:      Cervical: Cervical adenopathy present  Skin:     General: Skin is warm and dry  Capillary Refill: Capillary refill takes less than 2 seconds  Neurological:      Mental Status: She is alert  Psychiatric:         Mood and Affect: Mood normal          ED Medications  Medications   ondansetron (ZOFRAN-ODT) dispersible tablet 4 mg (4 mg Oral Given 1/17/23 0842)   dexamethasone (DECADRON) tablet 10 mg (10 mg Oral Given 1/17/23 0842)       Diagnostic Studies  Results Reviewed     Procedure Component Value Units Date/Time    COVID/FLU/RSV [[id number]]  (Normal) Collected: 01/17/23 0935    Lab Status: Final result Specimen: Nares from Nose Updated: 01/17/23 1029     SARS-CoV-2 Negative     INFLUENZA A PCR Negative     INFLUENZA B PCR Negative     RSV PCR Negative    Narrative:      FOR PEDIATRIC PATIENTS - copy/paste COVID Guidelines URL to browser: [email or web address]    SARS-CoV-2 assay is a Nucleic Acid Amplification assay intended for the  qualitative detection of nucleic acid from SARS-CoV-2 in nasopharyngeal  swabs  Results are for the presumptive identification of SARS-CoV-2 RNA      Positive results are indicative of infection with SARS-CoV-2, the virus  causing COVID-19, but do not rule out bacterial infection or co-infection  with other viruses  Laboratories within the [place] and its  territories are required to report all positive results to the appropriate  public health authorities  Negative results do not preclude SARS-CoV-2  infection and should not be used as the sole basis for treatment or other  patient management decisions  Negative results must be combined with  clinical observations, patient history, and epidemiological information  This test has not been FDA cleared or approved  This test has been authorized by FDA under an Emergency Use Authorization  (EUA)  This test is only authorized for the duration of time the  declaration that circumstances exist justifying the authorization of the  emergency use of an in vitro diagnostic tests for detection of SARS-CoV-2  virus and/or diagnosis of COVID-19 infection under section 564(b)(1) of  the Act, 21 U  S C  869PXP-3(Y)(7), unless the authorization is terminated  or revoked sooner  The test has been validated but independent review by FDA  and CLIA is pending  Test performed using [employer] [facility] Gene[employer]pert: This RT-PCR assay targets N2,  a region unique to SARS-CoV-2  A conserved region in the E-gene was chosen  for pan-Sarbecovirus detection which includes SARS-CoV-2  According to CMS-2020-01-R, this platform meets the definition of high-throughput technology      Basic metabolic panel [[id number]]  (Abnormal) Collected: 01/17/23 0841    Lab Status: Final result Specimen: Blood from Arm, Right Updated: 01/17/23 0918     Sodium 139 mmol/L      Potassium 3 3 mmol/L      Chloride 107 mmol/L      CO2 26 mmol/L      ANION GAP 6 mmol/L      BUN 21 mg/dL      Creatinine 1 39 mg/dL      Glucose 118 mg/dL      Calcium 9 3 mg/dL      eGFR 36 ml/min/1 73sq m     Narrative:      [place] guidelines for Chronic Kidney Disease (CKD):   •  Stage 1 with normal or high GFR (GFR > 90 mL/min/1 73 square meters)  •  Stage 2 Mild CKD (GFR = 60-89 mL/min/1 73 square meters)  •  Stage 3A Moderate CKD (GFR = 45-59 mL/min/1 73 square meters)  •  Stage 3B Moderate CKD (GFR = 30-44 mL/min/1 73 square meters)  •  Stage 4 Severe CKD (GFR = 15-29 mL/min/1 73 square meters)  •  Stage 5 End Stage CKD (GFR <15 mL/min/1 73 square meters)  Note: GFR calculation is accurate only with a steady state creatinine    CBC and differential [[id number]]  (Abnormal) Collected: 01/17/23 0841    Lab Status: Final result Specimen: Blood from Arm, Right Updated: 01/17/23 0907     WBC 18 72 Thousand/uL      RBC 4 34 Million/uL      Hemoglobin 12 6 g/dL      Hematocrit 38 5 %      MCV 89 fL      MCH 29 0 pg      MCHC 32 7 g/dL      RDW 15 4 %      MPV 11 8 fL      Platelets 525 Thousands/uL      nRBC 0 /100 WBCs      Neutrophils Relative 82 %      Immat GRANS % 1 %      Lymphocytes Relative 10 %      Monocytes Relative 6 %      Eosinophils Relative 1 %      Basophils Relative 0 %      Neutrophils Absolute 15 59 Thousands/µL      Immature Grans Absolute 0 11 Thousand/uL      Lymphocytes Absolute 1 83 Thousands/µL      Monocytes Absolute 1 03 Thousand/µL      Eosinophils Absolute 0 09 Thousand/µL      Basophils Absolute 0 07 Thousands/µL                  No orders to display         Procedures  Procedures      ED Course  ED Course as of 01/17/23 1214   Tue Jan 17, 2023   0912 WBC(!): 18 72   0925 Potassium(!): 3 3   0925 Creatinine(!): 1 39                             SBIRT 20yo+    Flowsheet Row Most Recent Value   SBIRT (25 yo +)    In order to provide better care to our patients, we are screening all of our patients for alcohol and drug use  Would it be okay to ask you these screening questions? Yes Filed at: 01/17/2023 0848   Initial Alcohol Screen: US AUDIT-C     1  How often do you have a drink containing alcohol? 0 Filed at: 01/17/2023 0848   2   How many drinks containing alcohol do you have on a typical day you are drinking? 0 Filed at: 01/17/2023 0848   3a  Male UNDER 65: How often do you have five or more drinks on one occasion? 0 Filed at: 01/17/2023 0848   3b  FEMALE Any Age, or MALE 65+: How often do you have 4 or more drinks on one occassion? 0 Filed at: 01/17/2023 0848   Audit-C Score 0 Filed at: 01/17/2023 1696   PASTOR: How many times in the past year have you    Used an illegal drug or used a prescription medication for non-medical reasons? Never Filed at: 01/17/2023 0848                Medical Decision Making  Patient is a 44-year-old female with a past medical history of asthma and hypertension who presented with a sore throat  Differential dx: Viral illness, strep pharyngitis     Patient's symptoms are most likely viral given her symptoms of cough, congestion, and vomiting  Her sore throat is less likely caused by strep given additional symptoms  She does not have neck stiffness, trismus, or difficulty controlling secretions  Will order CBC and BMP due to patient's poor oral intake  We will treat her symptomatically with Zofran and Decadron  Patient's labs did not show evidence of an BRE  Her WBC was elevated likely in the setting of a viral illness  Her potassium was slightly decreased at 3 3  She was told to increase potassium rich foods in her diet  Patient follows up with her PCP for cancer screenings due to history of smoking  She would like to follow-up with them for her CT screening  She was given return precautions and discharged from the ED  Viral URI: acute illness or injury  Vomiting: acute illness or injury  Amount and/or Complexity of Data Reviewed  Labs: ordered  Decision-making details documented in ED Course  Risk  Prescription drug management              Disposition  Final diagnoses:   Viral URI   Vomiting     Time reflects when diagnosis was documented in both MDM as applicable and the Disposition within this note     Time User Action Codes Description Comment 1/17/2023  9:27 AM Earnstine Hikes Add [J06 9] Viral URI     1/17/2023  9:27 AM Earnstine Hikes Add [R11 10] Vomiting       ED Disposition     ED Disposition   Discharge    Condition   Stable    Date/Time   Tue Jan 17, 2023  9:26 AM    Comment   Bhaskar Walker discharge to home/self care                 Follow-up Information     Follow up With Specialties Details Why Contact Info Additional [address] [facility]  [place] [address] [id number]  [address] [place] [address], [facility] Enoc Martinez, Gomez Stallworth [address] [place] [address] 11          Discharge Medication List as of 1/17/2023  9:33 AM      CONTINUE these medications which have NOT CHANGED    Details   acetaminophen (TYLENOL) 500 mg tablet Take 1 tablet (500 mg total) by mouth every 6 (six) hours as needed for mild pain, Starting Mon 8/27/2018, Normal      albuterol (2 5 mg/3 mL) 0 083 % nebulizer solution Take 3 mL (2 5 mg total) by nebulization every 4 (four) hours as needed for wheezing or shortness of breath, Starting Wed 8/25/2021, Normal      albuterol (PROVENTIL HFA,VENTOLIN HFA) 90 mcg/act inhaler Inhale 2 puffs every 6 (six) hours as needed for wheezing or shortness of breath, Starting Mon 2/14/2022, Normal      amLODIPine (NORVASC) 5 mg tablet take 1 tablet by mouth daily, Normal      azelastine (ASTELIN) 0 1 % nasal spray 2 sprays into each nostril 2 (two) times a day Use in each nostril as directed, Starting u 9/1/2022, Normal      Blood Pressure Monitoring (BLOOD PRESSURE CUFF) MISC by Does not apply route 2 (two) times a day, Starting Wed 6/3/2020, Print      budesonide-formoterol (Symbicort) 80-4 5 MCG/ACT inhaler Inhale 2 puffs 2 (two) times a day Rinse mouth after use , Starting Wed 11/9/2022, Normal      cetirizine (ZyrTEC) 10 mg tablet Take 1 tablet (10 mg total) by mouth daily, Starting Wed 7/1/2020, Normal      Cholecalciferol (Vitamin D) 50 MCG (2000 UT) CAPS Take 1 capsule (2,000 Units total) by mouth daily, Starting Tue 5/18/2021, Normal      fluticasone (FLONASE) 50 mcg/act nasal spray 2 sprays into each nostril daily, Starting Thu 4/4/3114, Normal      folic acid (FOLVITE) 1 mg tablet take 1 tablet by mouth daily, Normal      hydrochlorothiazide (HYDRODIURIL) 12 5 mg tablet Take 1 tablet (12 5 mg total) by mouth daily, Starting Thu 9/1/2022, Normal      hydrocortisone (ANUSOL-HC) 2 5 % rectal cream Apply topically 2 (two) times a day, Starting Wed 11/9/2022, Normal      loratadine (CLARITIN) 10 mg tablet Take 1 tablet (10 mg total) by mouth daily, Starting Wed 9/28/2022, Until Thu 1/26/2023, Normal      olopatadine (PATANOL) 0 1 % ophthalmic solution Administer 1 drop to both eyes 2 (two) times a day, Starting Thu 9/1/2022, Normal      ondansetron (Zofran ODT) 4 mg disintegrating tablet Take 1 tablet (4 mg total) by mouth every 6 (six) hours as needed for nausea or vomiting, Starting Wed 9/28/2022, Normal      pantoprazole (PROTONIX) 20 mg tablet Take 1 tablet (20 mg total) by mouth daily, Starting Thu 9/1/2022, Normal           No discharge procedures on file  PDMP Review     None           ED Provider  Attending physically available and evaluated Sunday Yohana MULLINS managed the patient along with the ED Attending      Electronically Signed by         Medardo Gordon MD  01/17/23 5208

## 2023-01-17 NOTE — ED ATTENDING ATTESTATION
1/17/2023  IErica MD, saw and evaluated the patient  I have discussed the patient with the resident/non-physician practitioner and agree with the resident's/non-physician practitioner's findings, Plan of Care, and MDM as documented in the resident's/non-physician practitioner's note, except where noted  All available labs and Radiology studies were reviewed  I was present for key portions of any procedure(s) performed by the resident/non-physician practitioner and I was immediately available to provide assistance  At this point I agree with the current assessment done in the Emergency Department  I have conducted an independent evaluation of this patient a history and physical is as follows: This is a 67-year-old female who presents to the emergency department with sore throat  Patient states that on Sunday she had numerous episodes of vomiting and was essentially vomiting all day long  The patient states that the vomiting was nonbloody nonbilious patient she did not have diarrhea  She denies abdominal pain  She does have headache  Patient states that the vomiting has stopped, but she has ongoing nausea, and feels like if she moves the wrong way, she might start vomiting again  The patient began yesterday with sore throat which is primarily on the right side and radiates into her neck and her ear  The patient states that she has prior history of sinus problems and ear problems, but that this feels different  She states it is very painful to swallow  She has no difficulty breathing  She does not feel any swelling underneath her mouth  The patient is edentulous, and she states that she does not wear dentures  The patient denies chest pain, difficulty breathing, cough, or abdominal pain currently  She states that she does not believe she has been having fevers  Her review of systems otherwise negative in 12 systems reviewed    Patient past medical history includes prediabetes, as well as a smoking history  On exam the patient is awake, alert, interactive  She is slightly tachycardic  She has normal work of breathing  On HEENT exam, her conjunctiva are pink  She has a fair amount of mucus in her nose, with inflamed nasal turbinates  Her oropharynx is clear with some mild bilateral erythema  There is no asymmetry  She does not have any trismus  The floor of her mouth is soft  There are no lesions along her gumline  Neck exam, I do not appreciate any masses  Patient may have some faint cervical adenopathy in her right anterior chain, but these do not appear to be immobile  The patient does not have any supraclavicular nodes  Her heart is regular tachycardic without murmurs, rubs, or gallops  Her lungs are clear with good air movement throughout  Extremities are intact without lateralizing edema  She is neurologically nonfocal with normal skin and back exam   Medical decision making: This is a 18-year-old woman with history of prediabetes as well as underlying lung disease who smokes who is presenting with sore throat and vomiting with poor p o  for 2 days  We will plan to check labs because of patient's risk for BRE given her age and her underlying comorbidities  Additionally, we will treat her symptoms  The patient's neck exam is reassuring as is her throat exam, but I do not see a source for her sore throat  We will plan to give her a dose of steroids as well as NSAIDs  Patient states that she has been getting her routine cancer screening on account of her smoking through her primary care doctor and that she has good follow-up  The patient is scheduled for outpatient CT and restratification in March    Most likely diagnosis is viral illness  ED Course         Critical Care Time  Procedures

## 2023-01-19 ENCOUNTER — OFFICE VISIT (OUTPATIENT)
Dept: FAMILY MEDICINE CLINIC | Facility: CLINIC | Age: 78
End: 2023-01-19

## 2023-01-19 VITALS
TEMPERATURE: 98.2 F | RESPIRATION RATE: 20 BRPM | WEIGHT: 132 LBS | DIASTOLIC BLOOD PRESSURE: 77 MMHG | HEIGHT: 60 IN | BODY MASS INDEX: 25.91 KG/M2 | HEART RATE: 58 BPM | OXYGEN SATURATION: 98 % | SYSTOLIC BLOOD PRESSURE: 111 MMHG

## 2023-01-19 DIAGNOSIS — J18.9 PNEUMONIA OF RIGHT MIDDLE LOBE DUE TO INFECTIOUS ORGANISM: Primary | ICD-10-CM

## 2023-01-19 RX ORDER — FEXOFENADINE HCL 180 MG/1
180 TABLET ORAL DAILY
COMMUNITY

## 2023-01-19 RX ORDER — AZITHROMYCIN 250 MG/1
TABLET, FILM COATED ORAL
Qty: 6 TABLET | Refills: 0 | Status: SHIPPED | OUTPATIENT
Start: 2023-01-19 | End: 2023-01-23

## 2023-01-19 NOTE — ASSESSMENT & PLAN NOTE
Could still be viral, but given that crackles was noted at right middle lobe and everywhere else in the lung is clear, there is increased risk of bacterial secondary infection  Will treat with antibiotic

## 2023-01-19 NOTE — PROGRESS NOTES
Name: Priyanka Rawls      : [id number]      MRN: [id number]  Encounter Provider: Iris Fuentes MD  Encounter Date: 2023   Encounter department: [address] [place] [address]  Pneumonia of right middle lobe due to infectious organism  Assessment & Plan:  Could still be viral, but given that crackles was noted at right middle lobe and everywhere else in the lung is clear, there is increased risk of bacterial secondary infection  Will treat with antibiotic  Orders:  -     azithromycin (ZITHROMAX) 250 mg tablet; Take 2 tabs on day 1, then 1 tab daily on day 2 to day 5  Subjective      Patient went to ER on  diagnosed with URI  Patient was given 3 pills of steroid which was helpful for her breathing in the nose, but now feel that symptoms coming back, pain at submandibular, coughing, congestion, has been using daily asthma medication  Viral illness began this past  (5 days ago)    Review of Systems   Constitutional: Positive for appetite change and chills  Negative for fever  HENT: Positive for sore throat  Negative for ear pain  Pain at right submandibular lymph node   Eyes: Negative for pain and visual disturbance  Respiratory: Positive for cough and shortness of breath  Cardiovascular: Negative for chest pain and palpitations  Gastrointestinal: Negative for abdominal pain and vomiting  Genitourinary: Negative for dysuria and hematuria  Musculoskeletal: Negative for arthralgias and back pain  Skin: Negative for color change and rash  Neurological: Negative for seizures and syncope  Hematological: Positive for adenopathy         Current Outpatient Medications on File Prior to Visit   Medication Sig   • acetaminophen (TYLENOL) 500 mg tablet Take 1 tablet (500 mg total) by mouth every 6 (six) hours as needed for mild pain   • albuterol (2 5 mg/3 mL) 0 083 % nebulizer solution Take 3 mL (2 5 mg total) by nebulization every 4 (four) hours as needed for wheezing or shortness of breath   • albuterol (PROVENTIL HFA,VENTOLIN HFA) 90 mcg/act inhaler Inhale 2 puffs every 6 (six) hours as needed for wheezing or shortness of breath   • amLODIPine (NORVASC) 5 mg tablet take 1 tablet by mouth daily   • azelastine (ASTELIN) 0 1 % nasal spray 2 sprays into each nostril 2 (two) times a day Use in each nostril as directed   • Blood Pressure Monitoring (BLOOD PRESSURE CUFF) MISC by Does not apply route 2 (two) times a day   • budesonide-formoterol (Symbicort) 80-4 5 MCG/ACT inhaler Inhale 2 puffs 2 (two) times a day Rinse mouth after use     • Cholecalciferol (Vitamin D) 50 MCG (2000 UT) CAPS Take 1 capsule (2,000 Units total) by mouth daily   • fexofenadine (ALLEGRA) 180 MG tablet Take 180 mg by mouth daily   • fluticasone (FLONASE) 50 mcg/act nasal spray 2 sprays into each nostril daily   • folic acid (FOLVITE) 1 mg tablet take 1 tablet by mouth daily (Patient taking differently: as needed)   • hydrochlorothiazide (HYDRODIURIL) 12 5 mg tablet Take 1 tablet (12 5 mg total) by mouth daily   • hydrocortisone (ANUSOL-HC) 2 5 % rectal cream Apply topically 2 (two) times a day   • olopatadine (PATANOL) 0 1 % ophthalmic solution Administer 1 drop to both eyes 2 (two) times a day   • ondansetron (Zofran ODT) 4 mg disintegrating tablet Take 1 tablet (4 mg total) by mouth every 6 (six) hours as needed for nausea or vomiting   • pantoprazole (PROTONIX) 20 mg tablet Take 1 tablet (20 mg total) by mouth daily   • loratadine (CLARITIN) 10 mg tablet Take 1 tablet (10 mg total) by mouth daily (Patient not taking: Reported on 11/9/2022)   • [DISCONTINUED] cetirizine (ZyrTEC) 10 mg tablet Take 1 tablet (10 mg total) by mouth daily (Patient not taking: Reported on 1/19/2023)       Objective     /77 (BP Location: Left arm, Patient Position: Sitting, Cuff Size: Large)   Pulse 58   Temp 98 2 °F (36 8 °C) (Temporal)   Resp 20   Ht 5' (1 524 m)   Wt 59 9 kg (132 lb)   SpO2 98%   BMI 25 78 kg/m²     Physical Exam  Vitals reviewed  Constitutional:       Appearance: Normal appearance  She is well-developed  HENT:      Head: Normocephalic and atraumatic  Right Ear: External ear normal       Left Ear: External ear normal       Nose: Nose normal    Eyes:      Conjunctiva/sclera: Conjunctivae normal       Pupils: Pupils are equal, round, and reactive to light  Neck:      Comments: Right submandibular node about 1 cm, tender to palpation  Cardiovascular:      Rate and Rhythm: Normal rate and regular rhythm  Pulses: Normal pulses  Heart sounds: Normal heart sounds  Pulmonary:      Effort: Pulmonary effort is normal       Breath sounds: Rales present  Comments: Crackles noted at right middle lobe, all other area of the lung was clear  Abdominal:      General: Bowel sounds are normal       Palpations: Abdomen is soft  Musculoskeletal:         General: Normal range of motion  Cervical back: Normal range of motion and neck supple  Lymphadenopathy:      Cervical: Cervical adenopathy present  Skin:     General: Skin is warm  Neurological:      General: No focal deficit present  Mental Status: She is alert and oriented to person, place, and time  Psychiatric:         Behavior: Behavior normal          Thought Content:  Thought content normal          Judgment: Judgment normal        Radha Bronson MD

## 2023-03-17 DIAGNOSIS — J45.20 MILD INTERMITTENT ASTHMA, UNSPECIFIED WHETHER COMPLICATED: ICD-10-CM

## 2023-03-17 DIAGNOSIS — J45.30 MILD PERSISTENT ASTHMA: ICD-10-CM

## 2023-03-17 DIAGNOSIS — J45.30 MILD PERSISTENT ASTHMA WITHOUT COMPLICATION: ICD-10-CM

## 2023-03-17 RX ORDER — BUDESONIDE AND FORMOTEROL FUMARATE DIHYDRATE 80; 4.5 UG/1; UG/1
2 AEROSOL RESPIRATORY (INHALATION) 2 TIMES DAILY
Qty: 10.2 G | Refills: 0 | Status: SHIPPED | OUTPATIENT
Start: 2023-03-17

## 2023-03-17 RX ORDER — ALBUTEROL SULFATE 90 UG/1
2 AEROSOL, METERED RESPIRATORY (INHALATION) EVERY 6 HOURS PRN
Qty: 18 G | Refills: 3 | Status: SHIPPED | OUTPATIENT
Start: 2023-03-17

## 2023-03-17 RX ORDER — ALBUTEROL SULFATE 2.5 MG/3ML
2.5 SOLUTION RESPIRATORY (INHALATION) EVERY 4 HOURS PRN
Qty: 3 ML | Refills: 0 | Status: SHIPPED | OUTPATIENT
Start: 2023-03-17

## 2023-03-17 NOTE — TELEPHONE ENCOUNTER
Good Afternoon Dr Erin Rene  Please review and refill if appropriate  [facility] protocols are not met   Thanks

## 2023-03-20 PROBLEM — J18.9 PNEUMONIA OF RIGHT MIDDLE LOBE DUE TO INFECTIOUS ORGANISM: Status: RESOLVED | Noted: 2023-01-19 | Resolved: 2023-03-20

## 2023-03-28 DIAGNOSIS — I10 ESSENTIAL HYPERTENSION: ICD-10-CM

## 2023-03-28 DIAGNOSIS — K21.9 GASTROESOPHAGEAL REFLUX DISEASE WITHOUT ESOPHAGITIS: ICD-10-CM

## 2023-03-28 RX ORDER — HYDROCHLOROTHIAZIDE 12.5 MG/1
TABLET ORAL
Qty: 90 TABLET | Refills: 1 | Status: SHIPPED | OUTPATIENT
Start: 2023-03-28

## 2023-03-28 RX ORDER — AMLODIPINE BESYLATE 5 MG/1
TABLET ORAL
Qty: 90 TABLET | Refills: 0 | Status: SHIPPED | OUTPATIENT
Start: 2023-03-28

## 2023-03-28 RX ORDER — PANTOPRAZOLE SODIUM 20 MG/1
TABLET, DELAYED RELEASE ORAL
Qty: 90 TABLET | Refills: 1 | Status: SHIPPED | OUTPATIENT
Start: 2023-03-28

## 2023-04-12 PROBLEM — H53.9 VISION ABNORMALITIES: Status: ACTIVE | Noted: 2023-04-12

## 2023-04-19 PROBLEM — H35.9 RETINA DISORDER: Status: ACTIVE | Noted: 2023-04-19

## 2023-04-24 ENCOUNTER — OFFICE VISIT (OUTPATIENT)
Dept: FAMILY MEDICINE CLINIC | Facility: CLINIC | Age: 78
End: 2023-04-24

## 2023-04-24 VITALS
HEART RATE: 111 BPM | RESPIRATION RATE: 17 BRPM | DIASTOLIC BLOOD PRESSURE: 81 MMHG | BODY MASS INDEX: 25.52 KG/M2 | WEIGHT: 130 LBS | SYSTOLIC BLOOD PRESSURE: 121 MMHG | OXYGEN SATURATION: 97 % | TEMPERATURE: 97.9 F | HEIGHT: 60 IN

## 2023-04-24 DIAGNOSIS — J45.30 MILD PERSISTENT ASTHMA WITHOUT COMPLICATION: ICD-10-CM

## 2023-04-24 DIAGNOSIS — J45.31 MILD PERSISTENT ASTHMA WITH EXACERBATION: Primary | ICD-10-CM

## 2023-04-24 DIAGNOSIS — I10 ESSENTIAL HYPERTENSION: ICD-10-CM

## 2023-04-24 DIAGNOSIS — J45.30 MILD PERSISTENT ASTHMA: ICD-10-CM

## 2023-04-24 DIAGNOSIS — J30.9 CHRONIC ALLERGIC RHINITIS: ICD-10-CM

## 2023-04-24 DIAGNOSIS — Z12.31 ENCOUNTER FOR SCREENING MAMMOGRAM FOR MALIGNANT NEOPLASM OF BREAST: ICD-10-CM

## 2023-04-24 DIAGNOSIS — K21.9 GASTROESOPHAGEAL REFLUX DISEASE WITHOUT ESOPHAGITIS: ICD-10-CM

## 2023-04-24 DIAGNOSIS — E55.9 VITAMIN D DEFICIENCY: ICD-10-CM

## 2023-04-24 DIAGNOSIS — J45.20 MILD INTERMITTENT ASTHMA, UNSPECIFIED WHETHER COMPLICATED: ICD-10-CM

## 2023-04-24 DIAGNOSIS — Z13.820 SCREENING FOR OSTEOPOROSIS: ICD-10-CM

## 2023-04-24 RX ORDER — HYDROCHLOROTHIAZIDE 12.5 MG/1
12.5 TABLET ORAL DAILY
Qty: 90 TABLET | Refills: 1 | Status: SHIPPED | OUTPATIENT
Start: 2023-04-24

## 2023-04-24 RX ORDER — PREDNISONE 20 MG/1
40 TABLET ORAL DAILY
Qty: 10 TABLET | Refills: 0 | Status: SHIPPED | OUTPATIENT
Start: 2023-04-24 | End: 2023-04-29

## 2023-04-24 RX ORDER — MULTIVIT-MIN/IRON/FOLIC ACID/K 18-600-40
2000 CAPSULE ORAL DAILY
Qty: 30 CAPSULE | Refills: 5 | Status: SHIPPED | OUTPATIENT
Start: 2023-04-24

## 2023-04-24 RX ORDER — BUDESONIDE AND FORMOTEROL FUMARATE DIHYDRATE 80; 4.5 UG/1; UG/1
2 AEROSOL RESPIRATORY (INHALATION) 2 TIMES DAILY
Qty: 10.2 G | Refills: 0 | Status: SHIPPED | OUTPATIENT
Start: 2023-04-24

## 2023-04-24 RX ORDER — AMLODIPINE BESYLATE 5 MG/1
5 TABLET ORAL DAILY
Qty: 90 TABLET | Refills: 0 | Status: SHIPPED | OUTPATIENT
Start: 2023-04-24

## 2023-04-24 RX ORDER — FLUTICASONE PROPIONATE 50 MCG
2 SPRAY, SUSPENSION (ML) NASAL DAILY
Qty: 16 G | Refills: 3 | Status: SHIPPED | OUTPATIENT
Start: 2023-04-24

## 2023-04-24 RX ORDER — AZELASTINE 1 MG/ML
2 SPRAY, METERED NASAL 2 TIMES DAILY
Qty: 30 ML | Refills: 2 | Status: SHIPPED | OUTPATIENT
Start: 2023-04-24

## 2023-04-24 RX ORDER — ALBUTEROL SULFATE 2.5 MG/3ML
2.5 SOLUTION RESPIRATORY (INHALATION) EVERY 4 HOURS PRN
Qty: 3 ML | Refills: 0 | Status: SHIPPED | OUTPATIENT
Start: 2023-04-24

## 2023-04-24 RX ORDER — ALBUTEROL SULFATE 90 UG/1
2 AEROSOL, METERED RESPIRATORY (INHALATION) EVERY 6 HOURS PRN
Qty: 18 G | Refills: 3 | Status: SHIPPED | OUTPATIENT
Start: 2023-04-24

## 2023-04-24 RX ORDER — PANTOPRAZOLE SODIUM 20 MG/1
20 TABLET, DELAYED RELEASE ORAL DAILY
Qty: 90 TABLET | Refills: 1 | Status: SHIPPED | OUTPATIENT
Start: 2023-04-24

## 2023-04-24 RX ORDER — ONDANSETRON 4 MG/1
4 TABLET, ORALLY DISINTEGRATING ORAL EVERY 6 HOURS PRN
Qty: 20 TABLET | Refills: 3 | Status: SHIPPED | OUTPATIENT
Start: 2023-04-24

## 2023-04-24 NOTE — PROGRESS NOTES
Name: Navdeep Bauer      : [id number]      MRN: [id number]  Encounter Provider: Carly Duggan MD  Encounter Date: 2023   Encounter department: [address] [place] [address]  Mild persistent asthma with exacerbation  Assessment & Plan:  Chronic with acute exacerbation 2/2 sinus infection  Uses Allegra and nasal spray, Symbicort as prescribed  Needing Albuterol 4x/day PRN but usually does not need it  PE notable for mild wheezing in R middle lung  Pt insists on steroids given significant wheezing in the morning and tightness in chest with SOB though she appears well and not in resp distress currently  - Continue to use allergy, nasal spray, and asthma meds  - Prednisone 40mg for 5 days for acute asthma exacerbation   - f/u afterwards for med management, recommend PFT's after acute illness for further staging as pt also smokes without dx of COPD  - due for [facility] visit     Orders:  -     predniSONE 20 mg tablet; Take 2 tablets (40 mg total) by mouth daily for 5 days    2  Mild intermittent asthma, unspecified whether complicated  -     albuterol (2 5 mg/3 mL) 0 083 % nebulizer solution; Take 3 mL (2 5 mg total) by nebulization every 4 (four) hours as needed for wheezing or shortness of breath    3  Mild persistent asthma without complication  Assessment & Plan:  Chronic with acute exacerbation 2/2 sinus infection  Uses Allegra and nasal spray, Symbicort as prescribed  Needing Albuterol 4x/day PRN but usually does not need it  PE notable for mild wheezing in R middle lung  Pt insists on steroids given significant wheezing in the morning and tightness in chest with SOB though she appears well and not in resp distress currently  - Continue to use allergy, nasal spray, and asthma meds    - Prednisone 40mg for 5 days for acute asthma exacerbation   - f/u afterwards for med management, recommend PFT's after acute illness for further staging as pt also smokes without dx of COPD  - due for [facility] visit     Orders:  -     albuterol (PROVENTIL HFA,VENTOLIN HFA) 90 mcg/act inhaler; Inhale 2 puffs every 6 (six) hours as needed for wheezing or shortness of breath    4  Essential hypertension  -     amLODIPine (NORVASC) 5 mg tablet; Take 1 tablet (5 mg total) by mouth daily  -     hydrochlorothiazide (HYDRODIURIL) 12 5 mg tablet; Take 1 tablet (12 5 mg total) by mouth daily    5  Chronic allergic rhinitis  Assessment & Plan:  Acute symptoms  Continue Flonase daily  Currently taking Claritin    Orders:  -     fluticasone (FLONASE) 50 mcg/act nasal spray; 2 sprays into each nostril daily  -     azelastine (ASTELIN) 0 1 % nasal spray; 2 sprays into each nostril 2 (two) times a day Use in each nostril as directed    6  Mild persistent asthma  Assessment & Plan:  Chronic with acute exacerbation 2/2 sinus infection  Uses Allegra and nasal spray, Symbicort as prescribed  Needing Albuterol 4x/day PRN but usually does not need it  PE notable for mild wheezing in R middle lung  Pt insists on steroids given significant wheezing in the morning and tightness in chest with SOB though she appears well and not in resp distress currently  - Continue to use allergy, nasal spray, and asthma meds  - Prednisone 40mg for 5 days for acute asthma exacerbation   - f/u afterwards for med management, recommend PFT's after acute illness for further staging as pt also smokes without dx of COPD  - due for [facility] visit     Orders:  -     budesonide-formoterol (Symbicort) 80-4 5 MCG/ACT inhaler; Inhale 2 puffs 2 (two) times a day Rinse mouth after use  7  Vitamin D deficiency  -     Cholecalciferol (Vitamin D) 50 MCG (2000 UT) CAPS; Take 1 capsule (2,000 Units total) by mouth daily    8  Gastroesophageal reflux disease without esophagitis  -     ondansetron (Zofran ODT) 4 mg disintegrating tablet;  Take 1 tablet (4 mg total) by mouth every 6 (six) hours as needed for nausea or vomiting  -     pantoprazole (PROTONIX) 20 mg tablet; Take 1 tablet (20 mg total) by mouth daily    9  Encounter for screening mammogram for malignant neoplasm of breast  -     Mammo screening bilateral w 3d & cad; Future; Expected date: 04/24/2023    10  Screening for osteoporosis  -     DXA bone density spine hip and pelvis; Future; Expected date: 04/24/2023         Subjective      HPI   68 yo female presenting for worsening of asthma  Chronic asthma with exacerbation on weekend from sinus infection symptoms  Coughing, Congested, postnasal drip, sinus pain, ear tingling  No fevers/chills  Uses Allegra, nose spray  Uses Symbicort as prescribed, has been needing Albuterol 4x/day since exacerbation but usually not need it this often  Says in the morning she is severely wheezy and has tightness in chest and can barely breathe  Review of Systems   Constitutional: Negative for chills and fever  HENT: Positive for congestion, ear pain, postnasal drip, rhinorrhea, sinus pain, sneezing and sore throat  Negative for hearing loss  Eyes: Positive for discharge (watery) and itching  Respiratory: Positive for cough, chest tightness and shortness of breath  Cardiovascular: Negative for chest pain and palpitations  Gastrointestinal: Negative for constipation, diarrhea, nausea and vomiting  Musculoskeletal: Positive for arthralgias and myalgias  Allergic/Immunologic: Positive for environmental allergies         Current Outpatient Medications on File Prior to Visit   Medication Sig   • acetaminophen (TYLENOL) 500 mg tablet Take 1 tablet (500 mg total) by mouth every 6 (six) hours as needed for mild pain   • Blood Pressure Monitoring (BLOOD PRESSURE CUFF) MISC by Does not apply route 2 (two) times a day   • escitalopram (LEXAPRO) 10 mg tablet Take 1 tablet (10 mg total) by mouth daily   • fexofenadine (ALLEGRA) 180 MG tablet Take 180 mg by mouth daily   • folic acid (FOLVITE) 1 mg tablet take 1 tablet by mouth daily (Patient taking differently: as needed) • hydrocortisone (ANUSOL-HC) 2 5 % rectal cream Apply topically 2 (two) times a day   • loratadine (CLARITIN) 10 mg tablet Take 1 tablet (10 mg total) by mouth daily   • olopatadine (PATANOL) 0 1 % ophthalmic solution Administer 1 drop to both eyes 2 (two) times a day   • [DISCONTINUED] albuterol (2 5 mg/3 mL) 0 083 % nebulizer solution Take 3 mL (2 5 mg total) by nebulization every 4 (four) hours as needed for wheezing or shortness of breath   • [DISCONTINUED] albuterol (PROVENTIL HFA,VENTOLIN HFA) 90 mcg/act inhaler Inhale 2 puffs every 6 (six) hours as needed for wheezing or shortness of breath   • [DISCONTINUED] amLODIPine (NORVASC) 5 mg tablet take 1 tablet by mouth once daily   • [DISCONTINUED] azelastine (ASTELIN) 0 1 % nasal spray 2 sprays into each nostril 2 (two) times a day Use in each nostril as directed   • [DISCONTINUED] budesonide-formoterol (Symbicort) 80-4 5 MCG/ACT inhaler Inhale 2 puffs 2 (two) times a day Rinse mouth after use  • [DISCONTINUED] Cholecalciferol (Vitamin D) 50 MCG (2000 UT) CAPS Take 1 capsule (2,000 Units total) by mouth daily   • [DISCONTINUED] fluticasone (FLONASE) 50 mcg/act nasal spray instill 2 sprays into each nostril once daily   • [DISCONTINUED] hydrochlorothiazide (HYDRODIURIL) 12 5 mg tablet take 1 tablet by mouth once daily   • [DISCONTINUED] ondansetron (Zofran ODT) 4 mg disintegrating tablet Take 1 tablet (4 mg total) by mouth every 6 (six) hours as needed for nausea or vomiting   • [DISCONTINUED] pantoprazole (PROTONIX) 20 mg tablet take 1 tablet by mouth once daily       Objective     /81 (BP Location: Left arm, Patient Position: Sitting, Cuff Size: Standard)   Pulse (!) 111   Temp 97 9 °F (36 6 °C) (Temporal)   Resp 17   Ht 5' (1 524 m)   Wt 59 kg (130 lb)   SpO2 97%   BMI 25 39 kg/m²     Physical Exam  Vitals reviewed  Constitutional:       General: She is not in acute distress  Appearance: Normal appearance   She is not ill-appearing, toxic-appearing or diaphoretic  HENT:      Head: Normocephalic and atraumatic  Right Ear: External ear normal       Left Ear: External ear normal       Nose: Nose normal       Mouth/Throat:      Mouth: Mucous membranes are moist       Pharynx: Oropharynx is clear  Eyes:      General:         Right eye: No discharge  Left eye: No discharge  Extraocular Movements: Extraocular movements intact  Conjunctiva/sclera: Conjunctivae normal    Cardiovascular:      Rate and Rhythm: Normal rate and regular rhythm  Pulses: Normal pulses  Heart sounds: Normal heart sounds  No murmur heard  Pulmonary:      Effort: Pulmonary effort is normal  No respiratory distress  Breath sounds: Wheezing (mild on R middle lung field) present  Abdominal:      General: Abdomen is flat  Bowel sounds are normal  There is no distension  Palpations: Abdomen is soft  Tenderness: There is no abdominal tenderness  Musculoskeletal:         General: Normal range of motion  Skin:     General: Skin is warm and dry  Neurological:      Mental Status: She is alert and oriented to person, place, and time  Mental status is at baseline  Psychiatric:         Mood and Affect: Mood normal          Behavior: Behavior normal          Thought Content:  Thought content normal          Judgment: Judgment normal        Bard Olesya MD

## 2023-04-24 NOTE — ASSESSMENT & PLAN NOTE
Chronic with acute exacerbation 2/2 sinus infection  Uses Allegra and nasal spray, Symbicort as prescribed  Needing Albuterol 4x/day PRN but usually does not need it  PE notable for mild wheezing in R middle lung  Pt insists on steroids given significant wheezing in the morning and tightness in chest with SOB though she appears well and not in resp distress currently  - Continue to use allergy, nasal spray, and asthma meds    - Prednisone 40mg for 5 days for acute asthma exacerbation   - f/u afterwards for med management, recommend PFT's after acute illness for further staging as pt also smokes without dx of COPD  - due for MAW visit

## 2023-04-24 NOTE — PATIENT INSTRUCTIONS
Asthma   AMBULATORY CARE:   Asthma  is a lung disease that makes breathing difficult  Chronic inflammation and reactions to triggers narrow the airways in your lungs  Asthma can become life-threatening if it is not managed  Cough-variant asthma  is a type of asthma that causes a dry cough that keeps coming back  A dry cough may be your only symptom, or you may also have chest tightness  These symptoms may be caused by exercise or exposure to odors, allergens, or respiratory tract infections  Cough-variant asthma is treated the same way as typical asthma  Common symptoms include the following:   Coughing    Wheezing    Shortness of breath    Chest tightness    Call your local emergency number (911 in the [address]) if:   You have severe shortness of breath  The skin around your neck and ribs pulls in with each breath  Your peak flow numbers are in the red zone of your AAP  Seek care immediately if:   You have shortness of breath, even after you take your short-term medicine as directed  Your lips or nails turn blue or gray  Call your doctor or asthma specialist if:   You run out of medicine before your next refill is due  Your symptoms get worse  You need to take more medicine than usual to control your symptoms  You have questions or concerns about your condition or care  Treatment for asthma  will depend on how severe your asthma is  Medicine may be used to decrease inflammation, open airways, and make it easier to breathe  Medicines may be inhaled, taken as a pill, or injected  Short-term medicines relieve your symptoms quickly  Long-term medicines are used to prevent future attacks  Other medicines may be needed if your regular medicines are not able to prevent attacks  You may also need medicine to help control your allergies  Manage and prevent future asthma attacks: Follow your asthma action plan  This is a written plan that you and your healthcare provider create   It explains which medicine you need and when to change doses if necessary  It also explains how you can monitor symptoms and use a peak flow meter  The meter measures how well your lungs are working  Manage other health conditions , such as allergies, acid reflux, and sleep apnea  Identify and avoid triggers  These may include pets, dust mites, mold, and cockroaches  Do not smoke or be around others who smoke  Nicotine and other chemicals in cigarettes and cigars can cause lung damage  Ask your healthcare provider for information if you currently smoke and need help to quit  E-cigarettes or smokeless tobacco still contain nicotine  Talk to your healthcare provider before you use these products  Ask about the flu vaccine  The flu can make your asthma worse  You may need a yearly flu shot  Follow up with your healthcare provider as directed: You will need to return to make sure your medicine is working and your symptoms are controlled  You may be referred to an asthma or allergy specialist  Joy Watts may be asked to keep a record of your peak flow values and bring it with you to your appointments  Write down your questions so you remember to ask them during your visits  © Copyright [employer] 2022 Information is for End User's use only and may not be sold, redistributed or otherwise used for commercial purposes  The above information is an  only  It is not intended as medical advice for individual conditions or treatments  Talk to your doctor, nurse or pharmacist before following any medical regimen to see if it is safe and effective for you

## 2023-04-26 ENCOUNTER — APPOINTMENT (EMERGENCY)
Dept: RADIOLOGY | Facility: HOSPITAL | Age: 78
End: 2023-04-26

## 2023-04-26 ENCOUNTER — HOSPITAL ENCOUNTER (OUTPATIENT)
Facility: HOSPITAL | Age: 78
Setting detail: OBSERVATION
Discharge: HOME | End: 2023-04-27
Attending: EMERGENCY MEDICINE | Admitting: FAMILY MEDICINE

## 2023-04-26 DIAGNOSIS — J18.9 COMMUNITY ACQUIRED PNEUMONIA OF RIGHT UPPER LOBE OF LUNG: ICD-10-CM

## 2023-04-26 DIAGNOSIS — J18.9 PNEUMONIA: ICD-10-CM

## 2023-04-26 DIAGNOSIS — J98.59 MEDIASTINAL MASS: ICD-10-CM

## 2023-04-26 DIAGNOSIS — E04.9 GOITER: ICD-10-CM

## 2023-04-26 DIAGNOSIS — R65.20 SEVERE SEPSIS (HCC): Primary | ICD-10-CM

## 2023-04-26 DIAGNOSIS — A41.9 SEVERE SEPSIS (HCC): Primary | ICD-10-CM

## 2023-04-26 DIAGNOSIS — R06.00 DYSPNEA: ICD-10-CM

## 2023-04-26 PROBLEM — N18.32 STAGE 3B CHRONIC KIDNEY DISEASE (HCC): Status: ACTIVE | Noted: 2022-01-04

## 2023-04-26 LAB
2HR DELTA HS TROPONIN: 2 NG/L
4HR DELTA HS TROPONIN: 5 NG/L
ALBUMIN SERPL BCP-MCNC: 3.4 G/DL (ref 3.5–5)
ALP SERPL-CCNC: 90 U/L (ref 46–116)
ALT SERPL W P-5'-P-CCNC: 13 U/L (ref 12–78)
ANION GAP SERPL CALCULATED.3IONS-SCNC: 8 MMOL/L (ref 4–13)
APTT PPP: 28 SECONDS (ref 23–37)
AST SERPL W P-5'-P-CCNC: 10 U/L (ref 5–45)
BASE EX.OXY STD BLDV CALC-SCNC: 93.6 % (ref 60–80)
BASE EXCESS BLDV CALC-SCNC: -0.9 MMOL/L
BASOPHILS # BLD AUTO: 0.03 THOUSANDS/ΜL (ref 0–0.1)
BASOPHILS NFR BLD AUTO: 0 % (ref 0–1)
BILIRUB SERPL-MCNC: 0.16 MG/DL (ref 0.2–1)
BILIRUB UR QL STRIP: NEGATIVE
BUN SERPL-MCNC: 26 MG/DL (ref 5–25)
CALCIUM ALBUM COR SERPL-MCNC: 10.4 MG/DL (ref 8.3–10.1)
CALCIUM SERPL-MCNC: 9.9 MG/DL (ref 8.3–10.1)
CARDIAC TROPONIN I PNL SERPL HS: 10 NG/L
CARDIAC TROPONIN I PNL SERPL HS: 13 NG/L
CARDIAC TROPONIN I PNL SERPL HS: 8 NG/L
CHLORIDE SERPL-SCNC: 109 MMOL/L (ref 96–108)
CLARITY UR: NORMAL
CO2 SERPL-SCNC: 22 MMOL/L (ref 21–32)
COLOR UR: NORMAL
CREAT SERPL-MCNC: 1.5 MG/DL (ref 0.6–1.3)
EOSINOPHIL # BLD AUTO: 0 THOUSAND/ΜL (ref 0–0.61)
EOSINOPHIL NFR BLD AUTO: 0 % (ref 0–6)
ERYTHROCYTE [DISTWIDTH] IN BLOOD BY AUTOMATED COUNT: 15.5 % (ref 11.6–15.1)
FLUAV RNA RESP QL NAA+PROBE: NEGATIVE
FLUBV RNA RESP QL NAA+PROBE: NEGATIVE
GFR SERPL CREATININE-BSD FRML MDRD: 33 ML/MIN/1.73SQ M
GLUCOSE SERPL-MCNC: 124 MG/DL (ref 65–140)
GLUCOSE UR STRIP-MCNC: NEGATIVE MG/DL
HCO3 BLDV-SCNC: 21 MMOL/L (ref 24–30)
HCT VFR BLD AUTO: 41.4 % (ref 34.8–46.1)
HGB BLD-MCNC: 13.5 G/DL (ref 11.5–15.4)
HGB UR QL STRIP.AUTO: NEGATIVE
IMM GRANULOCYTES # BLD AUTO: 0.11 THOUSAND/UL (ref 0–0.2)
IMM GRANULOCYTES NFR BLD AUTO: 1 % (ref 0–2)
INR PPP: 0.97 (ref 0.84–1.19)
KETONES UR STRIP-MCNC: NEGATIVE MG/DL
LACTATE SERPL-SCNC: 1.6 MMOL/L (ref 0.5–2)
LACTATE SERPL-SCNC: 2.3 MMOL/L (ref 0.5–2)
LEUKOCYTE ESTERASE UR QL STRIP: NEGATIVE
LYMPHOCYTES # BLD AUTO: 1.53 THOUSANDS/ΜL (ref 0.6–4.47)
LYMPHOCYTES NFR BLD AUTO: 11 % (ref 14–44)
MCH RBC QN AUTO: 29.2 PG (ref 26.8–34.3)
MCHC RBC AUTO-ENTMCNC: 32.6 G/DL (ref 31.4–37.4)
MCV RBC AUTO: 89 FL (ref 82–98)
MONOCYTES # BLD AUTO: 0.63 THOUSAND/ΜL (ref 0.17–1.22)
MONOCYTES NFR BLD AUTO: 5 % (ref 4–12)
NEUTROPHILS # BLD AUTO: 11.55 THOUSANDS/ΜL (ref 1.85–7.62)
NEUTS SEG NFR BLD AUTO: 83 % (ref 43–75)
NITRITE UR QL STRIP: NEGATIVE
NRBC BLD AUTO-RTO: 0 /100 WBCS
O2 CT BLDV-SCNC: 17.6 ML/DL
PCO2 BLDV: 27.5 MM HG (ref 42–50)
PH BLDV: 7.5 [PH] (ref 7.3–7.4)
PH UR STRIP.AUTO: 5.5 [PH]
PLATELET # BLD AUTO: 369 THOUSANDS/UL (ref 149–390)
PMV BLD AUTO: 11.4 FL (ref 8.9–12.7)
PO2 BLDV: 89.9 MM HG (ref 35–45)
POTASSIUM SERPL-SCNC: 3.7 MMOL/L (ref 3.5–5.3)
PROCALCITONIN SERPL-MCNC: 0.06 NG/ML
PROT SERPL-MCNC: 7.8 G/DL (ref 6.4–8.4)
PROT UR STRIP-MCNC: NEGATIVE MG/DL
PROTHROMBIN TIME: 13.1 SECONDS (ref 11.6–14.5)
RBC # BLD AUTO: 4.63 MILLION/UL (ref 3.81–5.12)
RSV RNA RESP QL NAA+PROBE: NEGATIVE
SARS-COV-2 RNA RESP QL NAA+PROBE: NEGATIVE
SODIUM SERPL-SCNC: 139 MMOL/L (ref 135–147)
SP GR UR STRIP.AUTO: 1.01 (ref 1–1.03)
TSH SERPL DL<=0.05 MIU/L-ACNC: 0.38 UIU/ML (ref 0.45–4.5)
UROBILINOGEN UR STRIP-ACNC: <2 MG/DL
WBC # BLD AUTO: 13.85 THOUSAND/UL (ref 4.31–10.16)

## 2023-04-26 RX ORDER — NICOTINE 21 MG/24HR
1 PATCH, TRANSDERMAL 24 HOURS TRANSDERMAL DAILY
Status: DISCONTINUED | OUTPATIENT
Start: 2023-04-26 | End: 2023-04-27 | Stop reason: HOSPADM

## 2023-04-26 RX ORDER — ACETAMINOPHEN 325 MG/1
650 TABLET ORAL EVERY 6 HOURS PRN
Status: DISCONTINUED | OUTPATIENT
Start: 2023-04-26 | End: 2023-04-27 | Stop reason: HOSPADM

## 2023-04-26 RX ORDER — AZITHROMYCIN 500 MG/1
250 TABLET, FILM COATED ORAL EVERY 24 HOURS
Status: DISCONTINUED | OUTPATIENT
Start: 2023-04-27 | End: 2023-04-27 | Stop reason: HOSPADM

## 2023-04-26 RX ORDER — SODIUM CHLORIDE, SODIUM GLUCONATE, SODIUM ACETATE, POTASSIUM CHLORIDE, MAGNESIUM CHLORIDE, SODIUM PHOSPHATE, DIBASIC, AND POTASSIUM PHOSPHATE .53; .5; .37; .037; .03; .012; .00082 G/100ML; G/100ML; G/100ML; G/100ML; G/100ML; G/100ML; G/100ML
500 INJECTION, SOLUTION INTRAVENOUS ONCE
Status: COMPLETED | OUTPATIENT
Start: 2023-04-26 | End: 2023-04-26

## 2023-04-26 RX ORDER — ONDANSETRON 2 MG/ML
4 INJECTION INTRAMUSCULAR; INTRAVENOUS ONCE
Status: COMPLETED | OUTPATIENT
Start: 2023-04-26 | End: 2023-04-26

## 2023-04-26 RX ORDER — HYDROCHLOROTHIAZIDE 12.5 MG/1
12.5 TABLET ORAL EVERY EVENING
Status: DISCONTINUED | OUTPATIENT
Start: 2023-04-26 | End: 2023-04-27 | Stop reason: HOSPADM

## 2023-04-26 RX ORDER — LORATADINE 10 MG/1
10 TABLET ORAL DAILY
Status: DISCONTINUED | OUTPATIENT
Start: 2023-04-26 | End: 2023-04-27 | Stop reason: HOSPADM

## 2023-04-26 RX ORDER — AMLODIPINE BESYLATE 5 MG/1
5 TABLET ORAL EVERY EVENING
Status: DISCONTINUED | OUTPATIENT
Start: 2023-04-26 | End: 2023-04-27 | Stop reason: HOSPADM

## 2023-04-26 RX ORDER — FLUTICASONE PROPIONATE 50 MCG
2 SPRAY, SUSPENSION (ML) NASAL DAILY
Status: DISCONTINUED | OUTPATIENT
Start: 2023-04-26 | End: 2023-04-27 | Stop reason: HOSPADM

## 2023-04-26 RX ORDER — HEPARIN SODIUM 5000 [USP'U]/ML
5000 INJECTION, SOLUTION INTRAVENOUS; SUBCUTANEOUS EVERY 8 HOURS SCHEDULED
Status: DISCONTINUED | OUTPATIENT
Start: 2023-04-26 | End: 2023-04-27 | Stop reason: HOSPADM

## 2023-04-26 RX ORDER — PANTOPRAZOLE SODIUM 20 MG/1
20 TABLET, DELAYED RELEASE ORAL
Status: DISCONTINUED | OUTPATIENT
Start: 2023-04-27 | End: 2023-04-27 | Stop reason: HOSPADM

## 2023-04-26 RX ORDER — ALBUTEROL SULFATE 90 UG/1
2 AEROSOL, METERED RESPIRATORY (INHALATION) EVERY 6 HOURS PRN
Status: DISCONTINUED | OUTPATIENT
Start: 2023-04-26 | End: 2023-04-27 | Stop reason: HOSPADM

## 2023-04-26 RX ORDER — ALBUTEROL SULFATE 2.5 MG/3ML
2.5 SOLUTION RESPIRATORY (INHALATION) ONCE
Status: COMPLETED | OUTPATIENT
Start: 2023-04-26 | End: 2023-04-26

## 2023-04-26 RX ORDER — AZITHROMYCIN 500 MG/1
500 TABLET, FILM COATED ORAL EVERY 24 HOURS
Status: COMPLETED | OUTPATIENT
Start: 2023-04-26 | End: 2023-04-26

## 2023-04-26 RX ORDER — BUDESONIDE AND FORMOTEROL FUMARATE DIHYDRATE 80; 4.5 UG/1; UG/1
2 AEROSOL RESPIRATORY (INHALATION) 2 TIMES DAILY
Status: DISCONTINUED | OUTPATIENT
Start: 2023-04-26 | End: 2023-04-27 | Stop reason: HOSPADM

## 2023-04-26 RX ORDER — ONDANSETRON 4 MG/1
4 TABLET, ORALLY DISINTEGRATING ORAL EVERY 6 HOURS PRN
Status: DISCONTINUED | OUTPATIENT
Start: 2023-04-26 | End: 2023-04-27 | Stop reason: HOSPADM

## 2023-04-26 RX ADMIN — ALBUTEROL SULFATE 2.5 MG: 2.5 SOLUTION RESPIRATORY (INHALATION) at 13:29

## 2023-04-26 RX ADMIN — AMLODIPINE BESYLATE 5 MG: 5 TABLET ORAL at 16:43

## 2023-04-26 RX ADMIN — HYDROCHLOROTHIAZIDE 12.5 MG: 12.5 TABLET ORAL at 16:43

## 2023-04-26 RX ADMIN — SODIUM CHLORIDE, SODIUM GLUCONATE, SODIUM ACETATE, POTASSIUM CHLORIDE, MAGNESIUM CHLORIDE, SODIUM PHOSPHATE, DIBASIC, AND POTASSIUM PHOSPHATE 500 ML: .53; .5; .37; .037; .03; .012; .00082 INJECTION, SOLUTION INTRAVENOUS at 11:22

## 2023-04-26 RX ADMIN — SODIUM CHLORIDE, SODIUM GLUCONATE, SODIUM ACETATE, POTASSIUM CHLORIDE, MAGNESIUM CHLORIDE, SODIUM PHOSPHATE, DIBASIC, AND POTASSIUM PHOSPHATE 500 ML: .53; .5; .37; .037; .03; .012; .00082 INJECTION, SOLUTION INTRAVENOUS at 13:25

## 2023-04-26 RX ADMIN — NICOTINE 1 PATCH: 14 PATCH, EXTENDED RELEASE TRANSDERMAL at 16:43

## 2023-04-26 RX ADMIN — AZITHROMYCIN 500 MG: 500 TABLET, FILM COATED ORAL at 16:43

## 2023-04-26 RX ADMIN — ONDANSETRON 4 MG: 2 INJECTION INTRAMUSCULAR; INTRAVENOUS at 11:32

## 2023-04-26 RX ADMIN — IOHEXOL 85 ML: 350 INJECTION, SOLUTION INTRAVENOUS at 12:32

## 2023-04-26 RX ADMIN — CEFTRIAXONE SODIUM 1000 MG: 10 INJECTION, POWDER, FOR SOLUTION INTRAVENOUS at 14:16

## 2023-04-26 RX ADMIN — HEPARIN SODIUM 5000 UNITS: 5000 INJECTION INTRAVENOUS; SUBCUTANEOUS at 16:43

## 2023-04-26 RX ADMIN — ACETAMINOPHEN 650 MG: 325 TABLET ORAL at 16:50

## 2023-04-26 NOTE — ED ATTENDING ATTESTATION
4/26/2023  IKadie, DO, saw and evaluated the patient  I have discussed the patient with the resident/non-physician practitioner and agree with the resident's/non-physician practitioner's findings, Plan of Care, and MDM as documented in the resident's/non-physician practitioner's note, except where noted  All available labs and Radiology studies were reviewed  I was present for key portions of any procedure(s) performed by the resident/non-physician practitioner and I was immediately available to provide assistance  At this point I agree with the current assessment done in the Emergency Department  I have conducted an independent evaluation of this patient a history and physical is as follows:    Patient is a 69-year-old female with a history of asthma, allergic rhinitis, vitamin D deficiency, irritable bowel syndrome, polyarthritis, says about a week ago she been having some mild nasal congestion, runny nose and stuffiness  Multiple for members had similar symptoms, they all tested negative for COVID   2 days ago seen by the primary care physician, started on prednisone 40 mg daily for concern for possible asthma  She says today she woke feeling much more short of breath and having increased wheezing  No fever, no chills  No chest pain, no palpitations, no recent travel history, antibiotics or hospitalizations  General:  Patient is well-appearing  Head:  Atraumatic  Eyes:  Conjunctiva pink  ENT:  Mucous membranes are moist  Neck:  Supple  Cardiac:  S1-S2, without murmurs  Lungs: Patient has some rhonchi isolated to the right upper lobe, no accessory muscle usage, otherwise lungs are clear  Abdomen:  Soft, nontender, normal bowel sounds, no CVA tenderness  Extremities:  Normal range of motion   Back: No warmth or redness to the back  There is no CVA tenderness, no spinal tenderness, no warmth or fluctuance    Neurologic:  Awake, fluent speech, normal comprehension, sensation intact and symmetric in the b/l  legs  Strength is 5/5 at the bilateral hips, knees, ankles, reflexes are 2/4 at the bilateral knees and ankles  Can dorsiflex & plantarflex great toes bilaterally without difficulty, no saddle anesthesia, negative straight leg raise bilaterally  AAOx3  Skin:  Pink warm and dry, no rash  Psychiatric:  Alert, pleasant, cooperative      ED Course  ED Course as of 04/26/23 1406   Wed Apr 26, 2023   1318 Sepsis alert called     ECG interpreted by me, sinus rhythm, rate of 127, no acute ischemic or infarctive changes, no ST segment elevation or depression    CTA ED chest PE Study   Final Result      With mild compromised by respiratory motion and suboptimal opacification of the pulmonary arteries, no acute pulmonary emboli  Mild groundglass opacity in the right upper lobe, infectious or inflammatory  Debris in the right lower lobe bronchi which could be due to bronchitis  8 mm anterior mediastinal nodule, question lymph node or thymoma  Follow-up could be obtained with a chest CT with no contrast in 6 months  Goiter  The study was marked in [facility] for immediate notification and follow-up              Workstation performed: [id number]         XR chest 1 view portable   ED Interpretation   Per my independent interpretation:  no acute cardiopulmoary process when compared to prior XR imaging           Labs Reviewed   CBC AND DIFFERENTIAL - Abnormal       Result Value Ref Range Status    WBC 13 85 (*) 4 31 - 10 16 Thousand/uL Final    RBC 4 63  3 81 - 5 12 Million/uL Final    Hemoglobin 13 5  11 5 - 15 4 g/dL Final    Hematocrit 41 4  34 8 - 46 1 % Final    MCV 89  82 - 98 fL Final    MCH 29 2  26 8 - 34 3 pg Final    MCHC 32 6  31 4 - 37 4 g/dL Final    RDW 15 5 (*) 11 6 - 15 1 % Final    MPV 11 4  8 9 - 12 7 fL Final    Platelets 024  990 - 390 Thousands/uL Final    nRBC 0  /100 WBCs Final    Neutrophils Relative 83 (*) 43 - 75 % Final    Immat GRANS % 1  0 - 2 % Final Lymphocytes Relative 11 (*) 14 - 44 % Final    Monocytes Relative 5  4 - 12 % Final    Eosinophils Relative 0  0 - 6 % Final    Basophils Relative 0  0 - 1 % Final    Neutrophils Absolute 11 55 (*) 1 85 - 7 62 Thousands/µL Final    Immature Grans Absolute 0 11  0 00 - 0 20 Thousand/uL Final    Lymphocytes Absolute 1 53  0 60 - 4 47 Thousands/µL Final    Monocytes Absolute 0 63  0 17 - 1 22 Thousand/µL Final    Eosinophils Absolute 0 00  0 00 - 0 61 Thousand/µL Final    Basophils Absolute 0 03  0 00 - 0 10 Thousands/µL Final   COMPREHENSIVE METABOLIC PANEL - Abnormal    Sodium 139  135 - 147 mmol/L Final    Potassium 3 7  3 5 - 5 3 mmol/L Final    Chloride 109 (*) 96 - 108 mmol/L Final    CO2 22  21 - 32 mmol/L Final    ANION GAP 8  4 - 13 mmol/L Final    BUN 26 (*) 5 - 25 mg/dL Final    Creatinine 1 50 (*) 0 60 - 1 30 mg/dL Final    Comment: Standardized to IDMS reference method    Glucose 124  65 - 140 mg/dL Final    Comment: If the patient is fasting, the ADA then defines impaired fasting glucose as > 100 mg/dL and diabetes as > or equal to 123 mg/dL  Specimen collection should occur prior to Sulfasalazine administration due to the potential for falsely depressed results  Specimen collection should occur prior to Sulfapyridine administration due to the potential for falsely elevated results  Calcium 9 9  8 3 - 10 1 mg/dL Final    Corrected Calcium 10 4 (*) 8 3 - 10 1 mg/dL Final    AST 10  5 - 45 U/L Final    Comment: Specimen collection should occur prior to Sulfasalazine administration due to the potential for falsely depressed results  ALT 13  12 - 78 U/L Final    Comment: Specimen collection should occur prior to Sulfasalazine and/or Sulfapyridine administration due to the potential for falsely depressed results       Alkaline Phosphatase 90  46 - 116 U/L Final    Total Protein 7 8  6 4 - 8 4 g/dL Final    Albumin 3 4 (*) 3 5 - 5 0 g/dL Final    Total Bilirubin 0 16 (*) 0 20 - 1 00 mg/dL Final Comment: Use of this assay is not recommended for patients undergoing treatment with eltrombopag due to the potential for falsely elevated results  eGFR 33  ml/min/1 73sq m Final    Narrative:     [facility] guidelines for Chronic Kidney Disease (CKD):   •  Stage 1 with normal or high GFR (GFR > 90 mL/min/1 73 square meters)  •  Stage 2 Mild CKD (GFR = 60-89 mL/min/1 73 square meters)  •  Stage 3A Moderate CKD (GFR = 45-59 mL/min/1 73 square meters)  •  Stage 3B Moderate CKD (GFR = 30-44 mL/min/1 73 square meters)  •  Stage 4 Severe CKD (GFR = 15-29 mL/min/1 73 square meters)  •  Stage 5 End Stage CKD (GFR <15 mL/min/1 73 square meters)  Note: GFR calculation is accurate only with a steady state creatinine   LACTIC ACID, PLASMA (W/REFLEX IF RESULT > 2 0) - Abnormal    LACTIC ACID 2 3 (*) 0 5 - 2 0 mmol/L Final    Narrative:     Result may be elevated if tourniquet was used during collection  BLOOD GAS, VENOUS - Abnormal    pH, Philippe 7 500 (*) 7 300 - 7 400 Final    pCO2, Philippe 27 5 (*) 42 0 - 50 0 mm Hg Final    pO2, Philippe 89 9 (*) 35 0 - 45 0 mm Hg Final    HCO3, Philippe 21 0 (*) 24 - 30 mmol/L Final    Base Excess, Philippe -0 9  mmol/L Final    O2 Content, Philippe 17 6  ml/dL Final    O2 HGB, VENOUS 93 6 (*) 60 0 - 80 0 % Final   COVID19, INFLUENZA A/B, RSV PCR, [facility] - Normal    SARS-CoV-2 Negative  Negative Final    Comment:      INFLUENZA A PCR Negative  Negative Final    Comment:      INFLUENZA B PCR Negative  Negative Final    Comment:      RSV PCR Negative  Negative Final    Comment:      Narrative:     FOR PEDIATRIC PATIENTS - copy/paste COVID Guidelines URL to browser: [email or web address]  [email or web address]    SARS-CoV-2 assay is a Nucleic Acid Amplification assay intended for the  qualitative detection of nucleic acid from SARS-CoV-2 in nasopharyngeal  swabs  Results are for the presumptive identification of SARS-CoV-2 RNA      Positive results are indicative "of infection with SARS-CoV-2, the virus  causing COVID-19, but do not rule out bacterial infection or co-infection  with other viruses  Laboratories within the [place] and its  territories are required to report all positive results to the appropriate  public health authorities  Negative results do not preclude SARS-CoV-2  infection and should not be used as the sole basis for treatment or other  patient management decisions  Negative results must be combined with  clinical observations, patient history, and epidemiological information  This test has not been FDA cleared or approved  This test has been authorized by FDA under an Emergency Use Authorization  (EUA)  This test is only authorized for the duration of time the  declaration that circumstances exist justifying the authorization of the  emergency use of an in vitro diagnostic tests for detection of SARS-CoV-2  virus and/or diagnosis of COVID-19 infection under section 564(b)(1) of  the Act, 21 U  S C  712XYP-3(E)(7), unless the authorization is terminated  or revoked sooner  The test has been validated but independent review by FDA  and CLIA is pending  Test performed using [employer] GeneXpert: This RT-PCR assay targets N2,  a region unique to SARS-CoV-2  A conserved region in the E-gene was chosen  for pan-Sarbecovirus detection which includes SARS-CoV-2  According to CMS-2020-01-R, this platform meets the definition of high-throughput technology  HS TROPONIN I 0HR - Normal    hs TnI 0hr 8  \"Refer to ACS Flowchart\"- see link ng/L Final    Comment:                                              Initial (time 0) result  If >=50 ng/L, Myocardial injury suggested ;  Type of myocardial injury and treatment strategy  to be determined  If 5-49 ng/L, a delta result at 2 hours and or 4 hours will be needed to further evaluate    If <4 ng/L, and chest pain has been >3 hours since onset, patient may qualify for discharge based on the HEART score in the " ED   If <5 ng/L and <3hours since onset of chest pain, a delta result at 2 hours will be needed to further evaluate  HS Troponin 99th Percentile URL of a Health Population=12 ng/L with a 95% Confidence Interval of 8-18 ng/L  Second Troponin (time 2 hours)  If calculated delta >= 20 ng/L,  Myocardial injury suggested ; Type of myocardial injury and treatment strategy to be determined  If 5-49 ng/L and the calculated delta is 5-19 ng/L, consult medical service for evaluation  Continue evaluation for ischemia on ecg and other possible etiology and repeat hs troponin at 4 hours  If delta is <5 ng/L at 2 hours, consider discharge based on risk stratification via the HEART score (if in ED), or BYRON risk score in IP/Observation  HS Troponin 99th Percentile URL of a Health Population=12 ng/L with a 95% Confidence Interval of 8-18 ng/L  PROTIME-INR - Normal    Protime 13 1  11 6 - 14 5 seconds Final    INR 0 97  0 84 - 1 19 Final   APTT - Normal    PTT 28  23 - 37 seconds Final    Comment: Therapeutic Heparin Range =  60-90 seconds   PROCALCITONIN TEST - Normal    Procalcitonin 0 06  <=0 25 ng/ml Final    Comment: Suspected Lower Respiratory Tract Infection (LRTI):  - LESS than or EQUAL to 0 25 ng/mL:   low likelihood for bacterial LRTI; antibiotics DISCOURAGED   - GREATER than 0 25 ng/mL:   increased likelihood for bacterial LRTI; antibiotics ENCOURAGED  Suspected Sepsis:  - Strongly consider initiating antibiotics in ALL UNSTABLE patients  - LESS than or EQUAL to 0 5 ng/mL:   low likelihood for bacterial sepsis; antibiotics DISCOURAGED   - GREATER than 0 5 ng/mL:   increased likelihood for bacterial sepsis; antibiotics ENCOURAGED   - GREATER than 2 ng/mL:   high risk for severe sepsis / septic shock; antibiotics strongly ENCOURAGED  Decisions on antibiotic use should not be based solely on Procalcitonin (PCT) levels   If PCT is low but uncertainty exists with stopping antibiotics, repeat PCT in 6-24 "hours to confirm the low level  If antibiotics are administered (regardless if initial PCT was high or low), repeat PCT every 1-2 days to consider early antibiotic cessation (when GREATER than 80% decrease from the peak OR when PCT drops below designated cutoffs, whichever comes first), so long as the infection is NOT one that typically requires prolonged treatment durations (e g , bone/joint infections, endocarditis, Staph  aureus bacteremia)  Situations of FALSE-POSITIVE Procalcitonin values:  1) Newborns < 67 hours old  2) Massive stress from severe trauma / burns, major surgery, acute pancreatitis, cardiogenic / hemorrhagic shock, sickle cell crisis, or other organ perfusion abnormalities  3) Malaria and some Candidal infections  4) Treatment with agents that stimulate cytokines (e g , OKT3, anti-lymphocyte globulins, alemtuzumab, IL-2, granulocyte transfusion [NOT GCSFs])  5) Chronic renal disease causes elevated baseline levels (consider GREATER than 0 75 ng/mL as an abnormal cut-off); initiating HD/CRRT may cause transient decreases  6) Paraneoplastic syndromes from medullary thyroid or SCLC, some forms of vasculitis, and acute [id number] disease    Situations of FALSE-NEGATIVE Procalcitonin values:  1) Too early in clinical course for PCT to have reached its peak (may repeat in 6-24 hours to confirm low level)  2) Localized infection WITHOUT systemic (SIRS / sepsis) response (e g , an abscess, osteomyelitis, cystitis)  3) Mycobacteria (e g , Tuberculosis, MAC)  4) Cystic fibrosis exacerbations     HS TROPONIN I 2HR - Normal    hs TnI 2hr 10  \"Refer to ACS Flowchart\"- see link ng/L Final    Comment:                                              Initial (time 0) result  If >=50 ng/L, Myocardial injury suggested ;  Type of myocardial injury and treatment strategy  to be determined  If 5-49 ng/L, a delta result at 2 hours and or 4 hours will be needed to further evaluate    If <4 ng/L, and chest pain has " been >3 hours since onset, patient may qualify for discharge based on the HEART score in the ED  If <5 ng/L and <3hours since onset of chest pain, a delta result at 2 hours will be needed to further evaluate  HS Troponin 99th Percentile URL of a Health Population=12 ng/L with a 95% Confidence Interval of 8-18 ng/L  Second Troponin (time 2 hours)  If calculated delta >= 20 ng/L,  Myocardial injury suggested ; Type of myocardial injury and treatment strategy to be determined  If 5-49 ng/L and the calculated delta is 5-19 ng/L, consult medical service for evaluation  Continue evaluation for ischemia on ecg and other possible etiology and repeat hs troponin at 4 hours  If delta is <5 ng/L at 2 hours, consider discharge based on risk stratification via the HEART score (if in ED), or BYRON risk score in IP/Observation  HS Troponin 99th Percentile URL of a Health Population=12 ng/L with a 95% Confidence Interval of 8-18 ng/L  Delta 2hr hsTnI 2  <20 ng/L Final   LACTIC ACID 2 HOUR - Normal    LACTIC ACID 1 6  0 5 - 2 0 mmol/L Final    Narrative:     Result may be elevated if tourniquet was used during collection  BLOOD CULTURE   BLOOD CULTURE   UA W REFLEX TO MICROSCOPIC WITH REFLEX TO CULTURE    Color, UA Light Yellow   Final    Clarity, UA Turbid   Final    Specific Gravity, UA 1 009  1 003 - 1 030 Final    pH, UA 5 5  4 5, 5 0, 5 5, 6 0, 6 5, 7 0, 7 5, 8 0 Final    Leukocytes, UA Negative  Negative Final    Nitrite, UA Negative  Negative Final    Protein, UA Negative  Negative mg/dl Final    Glucose, UA Negative  Negative mg/dl Final    Ketones, UA Negative  Negative mg/dl Final    Urobilinogen, UA <2 0  <2 0 mg/dl mg/dl Final    Bilirubin, UA Negative  Negative Final    Occult Blood, UA Negative  Negative Final   HS TROPONIN I 4HR       Patient presented tachycardic, lung sounds concerning for pneumonia  Initial chest x-ray showed no acute pathology however on CT does show evidence of pneumonia    Blood cultures were obtained prior to the administration of antibiotics  Patient has elevated lactic acid consistent with severe sepsis but no evidence of septic shock  MEDICAL DECISION MAKING CODING    The differential diagnosis before testing included (but is not limited to) acute asthma exacerbation, acute viral syndrome, acute pneumonia and acute pneumothorax which is a medical condition that poses a threat to life/function  Chronic conditions affecting care: As per HPI    COLLECTION AND INTERPRETATION OF DATA  I reviewed prior external notes, including April 24, 2023 PCP office visit note    I ordered each unique test  Tests reviewed personally by me:  ECG: See my ED course  Labs: As above  Imaging: I independently reviewed the chest x-ray interpreted by me shows no acute cardiopulmonary disease, chest CT shows pneumonia    Discussion with other providers: Admitting physician      RISK    Treatment:  Patient admitted      Critical Care Time  Procedures    35 minutes critical care time    Please see separate procedure note for details

## 2023-04-26 NOTE — ASSESSMENT & PLAN NOTE
"Lab Results   Component Value Date    EGFR 37 04/27/2023    EGFR 33 04/26/2023    EGFR 36 01/17/2023    CREATININE 1 37 (H) 04/27/2023    CREATININE 1 50 (H) 04/26/2023    CREATININE 1 39 (H) 01/17/2023     Patient takes ASA x2 daily for reported migraines and shoulder pain  Pt says she was advised not to take motrin and codeine for her heart  Allergies documented include ibuprofen and codeine, reaction: \"heart races  \"  Pt reports that Tylenol causes GI upset/stomach pain  She understands that     Assessment  Appears to be new baseline Creatinine  Plan  - avoid nephrotoxic drugs     "

## 2023-04-26 NOTE — H&P
"      H&P - Family [facility] Residency, Kyle Avendaño 1945, 69 y o  female  MRN: [id number]    Unit/Bed#: DG3 211-02 Encounter: [id number]  Primary Care Provider: Ivan Verdin MD      Admission Date: 4/26/2023 1017    Assessments & Plans:   Plans discussed with [facility] team and finalization is pending attending physicianattestation  * Sepsis [facility])  Assessment & Plan  70 yo presents with reportedly sudden onset dyspnea, and URI symptoms including worsening congestion and new cough since this weekend  CTA was performed in ED in setting of sudden onset dyspnea and tachycardia:   Impression:  · \"With mild compromised by respiratory motion and suboptimal opacification of the pulmonary arteries, no acute pulmonary emboli  · Mild groundglass opacity in the right upper lobe, infectious or inflammatory  · Debris in the right lower lobe bronchi which could be due to bronchitis  · 8 mm anterior mediastinal nodule, question lymph node or thymoma  Follow-up could be obtained with a chest CT with no contrast in 6 months  · Goiter  \"  In ED, pt was administered 1L of fluids and ceftriazone after BC and urine collected  In ED, for symptom management, pt was given albuterol and zofran  Lactic acidosis resolved after fluids 2 3--> 1 6  UA unremarkable  Assessment  Pt met severe sepsis criteria with tachycardia, elevated WBC, and lactic acidosis  Source of infection is pneumonia with PSI risk score 3  Pneumonia evidenced by symptoms, exam with mild wheezing R>L and on imaging  Pt is hemodynamically stable and in no acute distress  Respiratory status stable and pt is on room air  WBC can be elevated in setting of prednisone use (see Mild persistent asthma with exacerbation)  Plan  - c/w rocephin    - Start Z-jen  - If Blood cultures are negative for 24 hours and pt symptomatically improving, d/c rocephin and continue Z-jen  - AM CBC with diff        Mild persistent asthma with " "exacerbation  Assessment & Plan  Home medications: Symbicort 2 puffs BID, albuterol inhaler PRN, allegra, flonase  When pt is feeling healthy, she uses albuterol inhaler <1 a month  Pt was seen in the [address] [place] [address] office on Monday, 4/24/23, and was diagnosed with a mild asthma exacerbation and prescribed 5 day course of prednisone 20 mg daily  Pt started it in Monday  Assessment  Chronic mild persistent asthma with acute mild exacerbation  In setting of daily smoking, pt may have COPD  Plan  - hold steroids in setting of mild wheezes and no respiratory distress  - c/w home meds  - encourage performing PFTs that were ordered outpatient  Cigarette nicotine dependence without complication  Assessment & Plan  Pt smokes daily 0 5 packs a day  Plan  - Smoking cessation counseling  - Nicotine patch 14 mg daily    Goiter  Assessment & Plan  Goiter seen on CTA from 4/26  Denies symptoms of hyper or hypothyroid  Plan  - Add on TSH    Stage 3b chronic kidney disease [facility])  Assessment & Plan  Lab Results   Component Value Date    EGFR 33 04/26/2023    EGFR 36 01/17/2023    EGFR 38 02/24/2022    CREATININE 1 50 (H) 04/26/2023    CREATININE 1 39 (H) 01/17/2023    CREATININE 1 34 (H) 02/24/2022     Patient takes ASA x2 daily for reported migraines and shoulder pain  Pt says she was advised not to take motrin and codeine for her heart  Allergies documented include ibuprofen and codeine, reaction: \"heart races  \"  Pt reports that Tylenol causes GI upset/stomach pain  She understands that     Assessment  Appears to be new baseline Creatinine  Plan  - avoid nephrotoxic drugs       Vitamin D deficiency  Assessment & Plan  C/w daily Vit D supplement    GERD (gastroesophageal reflux disease)  Assessment & Plan  Assessment  Asymptomatic and stable    Plan  - C/w home med Protonix 20 mg    Essential hypertension  Assessment & Plan  Home meds: Norvasc 5mg daily, HCTZ 12 5 mg daily - pt takes meds in the " evening  Assessment  Stable    Plan  - continue home meds with hold parameters      Patient Active Problem List   Diagnosis   • Rupture of ulnar collateral ligament of right thumb   • Primary osteoarthritis of first carpometacarpal joint of right hand   • Mild persistent asthma with exacerbation   • Other chronic sinusitis   • Cigarette nicotine dependence without complication   • Chronic allergic rhinitis   • Trigger finger, right index finger   • Panic attack   • Essential hypertension   • Breast cyst, right   • Classic migraine with aura   • GERD (gastroesophageal reflux disease)   • Renal cyst   • Trigger finger of right thumb   • Abnormal EKG   • Prediabetes   • Intermittent palpitations   • Chronic left hip pain   • Trigger finger, right little finger   • Intersection syndrome   • Polyarthritis with positive rheumatoid factor ([facility])   • Irritable bowel syndrome with diarrhea   • Seasonal allergic conjunctivitis   • Anxiety   • Age related osteoporosis   • Vitamin D deficiency   • Chronic pain of both shoulders   • External hemorrhoid   • Stage 3b chronic kidney disease (HCC)   • Tendinitis of right rotator cuff   • Trochanteric bursitis of left hip   • Watery eyes   • Financial difficulties   • Ambulatory dysfunction   • Vision abnormalities   • Retina disorder   • Sepsis ([facility] [address] )   • Goiter       Diet: Diet Regular; Regular House  VTE Pharm PPX: Heparin  VTE Mech PPX: sequential compression device    Code Status:  Level 1 - Full Code      Disposition: Admit to Observation under Med-surg   Consult: None    Chief Complaints:   Shortness of Breath (Feels SOB and coughing x1 week)      History of Presenting Illness:   68 yo female is admitted for pneumonia and sepsis  PMH includes mild persistent asthma, HTN, tobacco abuse, GERD  Pt presented to [facility] ED with sudden onset of dyspnea on 4/26  She reports worsening SOB, worsening congestion and rhinorrhea, new productive cough since this weekend   Pt's cough is a/w with nausea, denies vomiting  Pt feels like she has difficulty breathing due to congestion  Pt was evaluated in clinic on Monday and dx with mild asthma exacerbation and prescribed prednisone 40 mg daily for 5 days after pt persistence  Pt is a daily smoker and requests for a nicotine patch  Pt met sepsis criteria on admission with tachycardia, elevated WBC (13 85), lactic 2 3  Due to c/o sudden onset dyspnea and tachycardia, CTA was done  No PE was found  Imaging identified mild opacities suggesting infection vs inflammation seen right upper lobe  Debris in right bronchi suggesting bronchitis      In ED: fluids, Bcx, Rocephin, Albuterol  ED Management:     ED Triage Vitals   Temperature Pulse Respirations Blood Pressure SpO2   04/26/23 1013 04/26/23 1013 04/26/23 1013 04/26/23 1013 04/26/23 1013   98 2 °F (36 8 °C) (!) 137 20 (!) 151/107 100 %      Temp src Heart Rate Source Patient Position - Orthostatic VS BP Location FiO2 (%)   -- 04/26/23 1013 04/26/23 1013 04/26/23 1013 --    Monitor Lying Right arm       Pain Score       04/26/23 1650       5         Medications   albuterol (PROVENTIL HFA,VENTOLIN HFA) inhaler 2 puff (has no administration in time range)   amLODIPine (NORVASC) tablet 5 mg (5 mg Oral Given 4/26/23 1643)   budesonide-formoterol (SYMBICORT) 80-4 5 MCG/ACT inhaler 2 puff (has no administration in time range)   loratadine (CLARITIN) tablet 10 mg (10 mg Oral Refused 4/26/23 1643)   fluticasone (FLONASE) 50 mcg/act nasal spray 2 spray (has no administration in time range)   hydrochlorothiazide (HYDRODIURIL) tablet 12 5 mg (12 5 mg Oral Given 4/26/23 1643)   pantoprazole (PROTONIX) EC tablet 20 mg (has no administration in time range)   acetaminophen (TYLENOL) tablet 650 mg (650 mg Oral Given 4/26/23 1650)   nicotine (NICODERM CQ) 14 mg/24hr TD 24 hr patch 1 patch (1 patch Transdermal Medication Applied 4/26/23 1643)   heparin (porcine) subcutaneous injection 5,000 Units (5,000 Units Subcutaneous Given 4/26/23 1643)   ondansetron (ZOFRAN-ODT) dispersible tablet 4 mg (has no administration in time range)   cefTRIAXone (ROCEPHIN) 1,000 mg in dextrose 5 % 50 mL IVPB (has no administration in time range)   azithromycin (ZITHROMAX) tablet 500 mg (500 mg Oral Given 4/26/23 1643)     Followed by   azithromycin (ZITHROMAX) tablet 250 mg (has no administration in time range)   multi-electrolyte (ISOLYTE-S PH 7 4) bolus 500 mL (0 mL Intravenous Stopped 4/26/23 1301)   ondansetron (ZOFRAN) injection 4 mg (4 mg Intravenous Given 4/26/23 1132)   iohexol (OMNIPAQUE) 350 MG/ML injection (SINGLE-DOSE) 85 mL (85 mL Intravenous Given 4/26/23 1232)   cefTRIAXone (ROCEPHIN) 1,000 mg in dextrose 5 % 50 mL IVPB (0 mg Intravenous Stopped 4/26/23 1500)   multi-electrolyte (ISOLYTE-S PH 7 4) bolus 500 mL (0 mL Intravenous Stopped 4/26/23 1418)   albuterol inhalation solution 2 5 mg (2 5 mg Nebulization Given 4/26/23 1329)       Review of System:   Review of Systems   Constitutional: Negative for activity change, chills, diaphoresis and fever  HENT: Positive for congestion and rhinorrhea  Respiratory: Positive for cough, shortness of breath and wheezing  Cardiovascular: Negative for chest pain and palpitations  Gastrointestinal: Positive for nausea  Negative for abdominal pain, constipation, diarrhea and vomiting  Genitourinary: Negative for dysuria, frequency and hematuria  Neurological: Positive for headaches (pt endorses daily headaches, does not currenlty have one)       History:     Past Medical History:   Diagnosis Date   • Asthma    • Asthmatic bronchitis     Last Assessed: 10/7/2014    • Chronic diarrhea     Last Assessed: 8/20/2015    • Fibromyalgia    • Focal nodular hyperplasia of liver     Last Assessed: 6/11/2015   • Herpes zoster     Last Assessed: 3/18/2016   • Hypertension    • IBS (irritable bowel syndrome)    • Intermittent palpitations    • Irritable bowel syndrome    • Lumbar radiculopathy    • Osteoarthritis    • Vertigo      Past Surgical History:   Procedure Laterality Date   • BREAST BIOPSY     • SMALL INTESTINE SURGERY       Social History     Socioeconomic History   • Marital status:      Spouse name: None   • Number of children: None   • Years of education: None   • Highest education level: None   Occupational History   • Occupation: Unemployed    Tobacco Use   • Smoking status: Every Day     Packs/day: 0 50     Types: Cigarettes   • Smokeless tobacco: Never   Vaping Use   • Vaping Use: Never used   Substance and Sexual Activity   • Alcohol use: No   • Drug use: No   • Sexual activity: Not Currently     Partners: Male   Other Topics Concern   • None   Social History Narrative    Mental Disability     Exercising Regularly     Lives with adult children      Social Determinants of Health     Financial Resource Strain: High Risk   • Difficulty of Paying Living Expenses: Very hard   Food Insecurity: Food Insecurity Present   • Worried About Running Out of Food in the Last Year: Sometimes true   • Ran Out of Food in the Last Year: Sometimes true   Transportation Needs: Unmet Transportation Needs   • Lack of Transportation (Medical): Yes   • Lack of Transportation (Non-Medical): Yes   Physical Activity: Sufficiently Active   • Days of Exercise per Week: 5 days   • Minutes of Exercise per Session: 60 min   Stress: Stress Concern Present   • Feeling of Stress :  To some extent   Social Connections: Not on file   Intimate Partner Violence: Not At Risk   • Fear of Current or Ex-Partner: No   • Emotionally Abused: No   • Physically Abused: No   • Sexually Abused: No   Housing Stability: Low Risk    • Unable to Pay for Housing in the Last Year: No   • Number of Places Lived in the Last Year: 1   • Unstable Housing in the Last Year: No     Family History   Problem Relation Age of Onset   • Heart attack Mother    • Other Mother         Aspiration of Vomit    • Asthma Father    • Breast cancer Sister    • Arthritis Family    • Other Family         Musculoskeletal Disease    • Osteoporosis Family        Medications & Allergies:   all medications and allergies reviewed  Ambrosia Artemisiifolia (Ragweed) Skin Test  Codeine  Epinephrine  Ibuprofen  Morphine  Pollen Extract  Tramadol  Eggs Or Egg-Derived Products - Food Allergy    PTA Medications:  No current facility-administered medications on file prior to encounter  Current Outpatient Medications on File Prior to Encounter   Medication Sig Dispense Refill   • acetaminophen (TYLENOL) 500 mg tablet Take 1 tablet (500 mg total) by mouth every 6 (six) hours as needed for mild pain 60 tablet 0   • albuterol (2 5 mg/3 mL) 0 083 % nebulizer solution Take 3 mL (2 5 mg total) by nebulization every 4 (four) hours as needed for wheezing or shortness of breath 3 mL 0   • albuterol (PROVENTIL HFA,VENTOLIN HFA) 90 mcg/act inhaler Inhale 2 puffs every 6 (six) hours as needed for wheezing or shortness of breath 18 g 3   • amLODIPine (NORVASC) 5 mg tablet Take 1 tablet (5 mg total) by mouth daily 90 tablet 0   • azelastine (ASTELIN) 0 1 % nasal spray 2 sprays into each nostril 2 (two) times a day Use in each nostril as directed 30 mL 2   • Blood Pressure Monitoring (BLOOD PRESSURE CUFF) MISC by Does not apply route 2 (two) times a day 1 each 0   • budesonide-formoterol (Symbicort) 80-4 5 MCG/ACT inhaler Inhale 2 puffs 2 (two) times a day Rinse mouth after use   10 2 g 0   • Cholecalciferol (Vitamin D) 50 MCG (2000 UT) CAPS Take 1 capsule (2,000 Units total) by mouth daily 30 capsule 5   • escitalopram (LEXAPRO) 10 mg tablet Take 1 tablet (10 mg total) by mouth daily 90 tablet 3   • fexofenadine (ALLEGRA) 180 MG tablet Take 180 mg by mouth daily     • fluticasone (FLONASE) 50 mcg/act nasal spray 2 sprays into each nostril daily 16 g 3   • folic acid (FOLVITE) 1 mg tablet take 1 tablet by mouth daily (Patient taking differently: as needed) 30 tablet 5   • hydrochlorothiazide (HYDRODIURIL) 12 5 mg tablet Take 1 tablet (12 5 mg total) by mouth daily 90 tablet 1   • hydrocortisone (ANUSOL-HC) 2 5 % rectal cream Apply topically 2 (two) times a day 28 g 0   • loratadine (CLARITIN) 10 mg tablet Take 1 tablet (10 mg total) by mouth daily 30 tablet 3   • olopatadine (PATANOL) 0 1 % ophthalmic solution Administer 1 drop to both eyes 2 (two) times a day 5 mL 2   • ondansetron (Zofran ODT) 4 mg disintegrating tablet Take 1 tablet (4 mg total) by mouth every 6 (six) hours as needed for nausea or vomiting 20 tablet 3   • pantoprazole (PROTONIX) 20 mg tablet Take 1 tablet (20 mg total) by mouth daily 90 tablet 1   • predniSONE 20 mg tablet Take 2 tablets (40 mg total) by mouth daily for 5 days 10 tablet 0         Objective & Vitals:     Vitals: /76   Pulse (!) 110   Temp 98 9 °F (37 2 °C)   Resp 16   SpO2 95%       Intake/Output Summary (Last 24 hours) at 4/26/2023 1800  Last data filed at 4/26/2023 1418  Gross per 24 hour   Intake 1000 ml   Output --   Net 1000 ml       Invasive Devices     Peripheral Intravenous Line  Duration           Peripheral IV 04/26/23 Left Antecubital <1 day                  Labs: I have personally reviewed pertinent reports      Recent Results (from the past 24 hour(s))   CBC and differential    Collection Time: 04/26/23 10:54 AM   Result Value Ref Range    WBC 13 85 (H) 4 31 - 10 16 Thousand/uL    RBC 4 63 3 81 - 5 12 Million/uL    Hemoglobin 13 5 11 5 - 15 4 g/dL    Hematocrit 41 4 34 8 - 46 1 %    MCV 89 82 - 98 fL    MCH 29 2 26 8 - 34 3 pg    MCHC 32 6 31 4 - 37 4 g/dL    RDW 15 5 (H) 11 6 - 15 1 %    MPV 11 4 8 9 - 12 7 fL    Platelets 433 075 - 926 Thousands/uL    nRBC 0 /100 WBCs    Neutrophils Relative 83 (H) 43 - 75 %    Immat GRANS % 1 0 - 2 %    Lymphocytes Relative 11 (L) 14 - 44 %    Monocytes Relative 5 4 - 12 %    Eosinophils Relative 0 0 - 6 %    Basophils Relative 0 0 - 1 %    Neutrophils Absolute 11 55 (H) 1 85 - 7 62 Thousands/µL    Immature "Grans Absolute 0 11 0 00 - 0 20 Thousand/uL    Lymphocytes Absolute 1 53 0 60 - 4 47 Thousands/µL    Monocytes Absolute 0 63 0 17 - 1 22 Thousand/µL    Eosinophils Absolute 0 00 0 00 - 0 61 Thousand/µL    Basophils Absolute 0 03 0 00 - 0 10 Thousands/µL   Comprehensive metabolic panel    Collection Time: 04/26/23 10:54 AM   Result Value Ref Range    Sodium 139 135 - 147 mmol/L    Potassium 3 7 3 5 - 5 3 mmol/L    Chloride 109 (H) 96 - 108 mmol/L    CO2 22 21 - 32 mmol/L    ANION GAP 8 4 - 13 mmol/L    BUN 26 (H) 5 - 25 mg/dL    Creatinine 1 50 (H) 0 60 - 1 30 mg/dL    Glucose 124 65 - 140 mg/dL    Calcium 9 9 8 3 - 10 1 mg/dL    Corrected Calcium 10 4 (H) 8 3 - 10 1 mg/dL    AST 10 5 - 45 U/L    ALT 13 12 - 78 U/L    Alkaline Phosphatase 90 46 - 116 U/L    Total Protein 7 8 6 4 - 8 4 g/dL    Albumin 3 4 (L) 3 5 - 5 0 g/dL    Total Bilirubin 0 16 (L) 0 20 - 1 00 mg/dL    eGFR 33 ml/min/1 73sq m   HS Troponin 0hr (reflex protocol)    Collection Time: 04/26/23 10:54 AM   Result Value Ref Range    hs TnI 0hr 8 \"Refer to ACS Flowchart\"- see link ng/L   Procalcitonin    Collection Time: 04/26/23 10:54 AM   Result Value Ref Range    Procalcitonin 0 06 <=0 25 ng/ml   TSH, 3rd generation    Collection Time: 04/26/23 10:54 AM   Result Value Ref Range    TSH 3RD GENERATON 0 375 (L) 0 450 - 4 500 uIU/mL   Protime-INR    Collection Time: 04/26/23 11:06 AM   Result Value Ref Range    Protime 13 1 11 6 - 14 5 seconds    INR 0 97 0 84 - 1 19   APTT    Collection Time: 04/26/23 11:06 AM   Result Value Ref Range    PTT 28 23 - 37 seconds   COVID/FLU/RSV    Collection Time: 04/26/23 11:06 AM    Specimen: Nose; Nares   Result Value Ref Range    SARS-CoV-2 Negative Negative    INFLUENZA A PCR Negative Negative    INFLUENZA B PCR Negative Negative    RSV PCR Negative Negative   Lactic acid, plasma (w/reflex if result > 2 0)    Collection Time: 04/26/23 11:12 AM   Result Value Ref Range    LACTIC ACID 2 3 (HH) 0 5 - 2 0 mmol/L   Blood " "culture    Collection Time: 04/26/23 11:12 AM    Specimen: Arm, Left; Blood   Result Value Ref Range    Blood Culture Received in Microbiology Lab  Culture in Progress  Blood gas, venous    Collection Time: 04/26/23 11:12 AM   Result Value Ref Range    pH, Philippe 7 500 (H) 7 300 - 7 400    pCO2, Philippe 27 5 (L) 42 0 - 50 0 mm Hg    pO2, Philippe 89 9 (H) 35 0 - 45 0 mm Hg    HCO3, Philippe 21 0 (L) 24 - 30 mmol/L    Base Excess, Philippe -0 9 mmol/L    O2 Content, Philippe 17 6 ml/dL    O2 HGB, VENOUS 93 6 (H) 60 0 - 80 0 %   Blood culture    Collection Time: 04/26/23 11:28 AM    Specimen: Arm, Right; Blood   Result Value Ref Range    Blood Culture Received in Microbiology Lab  Culture in Progress  HS Troponin I 2hr    Collection Time: 04/26/23 12:50 PM   Result Value Ref Range    hs TnI 2hr 10 \"Refer to ACS Flowchart\"- see link ng/L    Delta 2hr hsTnI 2 <20 ng/L   Lactic acid 2 Hours    Collection Time: 04/26/23 12:50 PM   Result Value Ref Range    LACTIC ACID 1 6 0 5 - 2 0 mmol/L   UA w Reflex to Microscopic w Reflex to Culture    Collection Time: 04/26/23  1:50 PM    Specimen: Urine, Clean Catch   Result Value Ref Range    Color, UA Light Yellow     Clarity, UA Turbid     Specific [place], UA 1 009 1 003 - 1 030    pH, UA 5 5 4 5, 5 0, 5 5, 6 0, 6 5, 7 0, 7 5, 8 0    Leukocytes, UA Negative Negative    Nitrite, UA Negative Negative    Protein, UA Negative Negative mg/dl    Glucose, UA Negative Negative mg/dl    Ketones, UA Negative Negative mg/dl    Urobilinogen, UA <2 0 <2 0 mg/dl mg/dl    Bilirubin, UA Negative Negative    Occult Blood, UA Negative Negative   HS Troponin I 4hr    Collection Time: 04/26/23  3:50 PM   Result Value Ref Range    hs TnI 4hr 13 \"Refer to ACS Flowchart\"- see link ng/L    Delta 4hr hsTnI 5 <20 ng/L       Imaging:     I have personally reviewed pertinent reports      CTA ED chest PE Study    Result Date: 4/26/2023  Impression: With mild compromised by respiratory motion and suboptimal opacification of the " pulmonary arteries, no acute pulmonary emboli  Mild groundglass opacity in the right upper lobe, infectious or inflammatory  Debris in the right lower lobe bronchi which could be due to bronchitis  8 mm anterior mediastinal nodule, question lymph node or thymoma  Follow-up could be obtained with a chest CT with no contrast in 6 months  Goiter  The study was marked in [facility] for immediate notification and follow-up  Workstation performed: [id number]       EKG, Pathology, and Other Studies:   I have personally reviewed pertinent reports  Physical Exam:   Physical Exam  Constitutional:       General: She is not in acute distress  Appearance: She is not ill-appearing or toxic-appearing  HENT:      Head: Normocephalic and atraumatic  Right Ear: External ear normal       Left Ear: External ear normal    Eyes:      General: No scleral icterus  Right eye: No discharge  Left eye: No discharge  Extraocular Movements: Extraocular movements intact  Conjunctiva/sclera: Conjunctivae normal    Cardiovascular:      Rate and Rhythm: Normal rate and regular rhythm  Heart sounds: Normal heart sounds  Pulmonary:      Effort: Pulmonary effort is normal  No respiratory distress  Breath sounds: Wheezing (R>L mild wheezes present) present  No rales  Comments: On room air  Abdominal:      General: Bowel sounds are normal       Palpations: Abdomen is soft  Tenderness: There is no abdominal tenderness  Musculoskeletal:      Right lower leg: No edema  Left lower leg: No edema  Skin:     General: Skin is warm and dry  Neurological:      General: No focal deficit present  Mental Status: She is alert  Psychiatric:         Mood and Affect: Mood normal          Behavior: Behavior normal          Malik Chisholm, DO  PGY-2, Family Medicine  04/26/23  6:00 PM    Dear reader, please be aware that portions of my note contain dictated text   I have done my best to proof-read "this note prior to signing  However, there may be occasional unnoticed errors pertaining to \"sound-alike\" words and/or grammar during my dictation process  If there is any words or information that is unclear or appears erroneous, please kindly let me know and I will clarify and/or addend my notes accordingly  Thank you for your understanding      "

## 2023-04-26 NOTE — SEPSIS NOTE
"  Sepsis Note   Vito Aguilar 69 y o  female MRN: [id number]  Unit/Bed#: ED 22 Encounter: [id number]       Initial Sepsis Screening     Row Name 04/26/23 1314 04/26/23 1312             Is the patient's history suggestive of a new or worsening infection? Yes (Proceed)  -CL No  -CL       Suspected source of infection pneumonia  -CL --       Indicate SIRS criteria Tachycardia > 90 bpm;Leukocytosis (WBC > 45548 IJL) OR Leukopenia (WBC <4000 IJL) OR Bandemia (WBC >10% bands)  -CL --       Are two or more of the above signs & symptoms of infection both present and new to the patient? Yes (Proceed)  -CL --       Assess for evidence of organ dysfunction: Are any of the below criteria present within 6 hours of suspected infection and SIRS criteria that are NOT considered to be chronic conditions? Lactate > 2 0  -CL --       Date of presentation of severe sepsis 04/26/23  -CL --       Time of presentation of severe sepsis 1314  -CL --       Sepsis Note: Click \"NEXT\" below (NOT \"close\") to generate sepsis note based on above information  YES (proceed by clicking \"NEXT\")  -CL --             User Key  (r) = Recorded By, (t) = Taken By, (c) = Cosigned By    [address] Name Provider Vadim Conde MD Physician                    There is no height or weight on file to calculate BMI    Wt Readings from Last 1 Encounters:   04/24/23 59 kg (130 lb)        Ideal body weight: 45 5 kg (100 lb 4 9 oz)  Adjusted ideal body weight: 50 9 kg (112 lb 3 oz)  "

## 2023-04-26 NOTE — ED PROCEDURE NOTE
PROCEDURE  CriticalCare Time    Date/Time: 4/26/2023 2:06 PM  Performed by: Halle Truong DO  Authorized by:  Halle Truong DO     Critical care provider statement:     Critical care time (minutes):  35    Critical care time was exclusive of:  Separately billable procedures and treating other patients and teaching time    Critical care was necessary to treat or prevent imminent or life-threatening deterioration of the following conditions:  Sepsis    Critical care was time spent personally by me on the following activities:  Blood draw for specimens, obtaining history from patient or surrogate, development of treatment plan with patient or surrogate, discussions with primary provider, evaluation of patient's response to treatment, examination of patient, review of old charts, re-evaluation of patient's condition, ordering and review of radiographic studies, ordering and review of laboratory studies and ordering and performing treatments and interventions    I assumed direction of critical care for this patient from another provider in my specialty: no           Halle Truong DO  04/26/23 1400

## 2023-04-26 NOTE — ED PROVIDER NOTES
History  Chief Complaint   Patient presents with   • Shortness of Breath     Feels SOB and coughing x1 week     Patient is a 71-year-old female, with a history significant for hypertension, tobacco use (I spent 2 minutes discussing tobacco and patient's health/cessation with the patient), and asthma, who presents to the ED today due to sudden onset dyspnea without associated chest pain or leg pain  Patient states that, for about the last week, she has had upper respiratory symptoms consisting of congestion, rhinorrhea, nonproductive cough  Patient does have a sick contact in the household  Patient was evaluated by PCP for the symptoms and prescribed prednisone 40 mg  Patient has completed 2 days of this medication  She has also been using her inhaler to try remit her symptoms but this provides fleeting relief over a period of seconds before her symptoms returned to baseline  Patient also reports a non-itchy rash and no other household members are with this symptom  Patient denies history of VTE  Patient is without other concerns at this time  Prior to Admission Medications   Prescriptions Last Dose Informant Patient Reported? Taking?    Blood Pressure Monitoring (BLOOD PRESSURE CUFF) MISC   No No   Sig: by Does not apply route 2 (two) times a day   Cholecalciferol (Vitamin D) 50 MCG (2000 UT) CAPS   No No   Sig: Take 1 capsule (2,000 Units total) by mouth daily   acetaminophen (TYLENOL) 500 mg tablet   No No   Sig: Take 1 tablet (500 mg total) by mouth every 6 (six) hours as needed for mild pain   albuterol (2 5 mg/3 mL) 0 083 % nebulizer solution   No No   Sig: Take 3 mL (2 5 mg total) by nebulization every 4 (four) hours as needed for wheezing or shortness of breath   albuterol (PROVENTIL HFA,VENTOLIN HFA) 90 mcg/act inhaler   No No   Sig: Inhale 2 puffs every 6 (six) hours as needed for wheezing or shortness of breath   amLODIPine (NORVASC) 5 mg tablet   No No   Sig: Take 1 tablet (5 mg total) by mouth daily   azelastine (ASTELIN) 0 1 % nasal spray   No No   Si sprays into each nostril 2 (two) times a day Use in each nostril as directed   budesonide-formoterol (Symbicort) 80-4 5 MCG/ACT inhaler   No No   Sig: Inhale 2 puffs 2 (two) times a day Rinse mouth after use    escitalopram (LEXAPRO) 10 mg tablet   No No   Sig: Take 1 tablet (10 mg total) by mouth daily   fexofenadine (ALLEGRA) 180 MG tablet   Yes No   Sig: Take 180 mg by mouth daily   fluticasone (FLONASE) 50 mcg/act nasal spray   No No   Si sprays into each nostril daily   folic acid (FOLVITE) 1 mg tablet   No No   Sig: take 1 tablet by mouth daily   Patient taking differently: as needed   hydrochlorothiazide (HYDRODIURIL) 12 5 mg tablet   No No   Sig: Take 1 tablet (12 5 mg total) by mouth daily   hydrocortisone (ANUSOL-HC) 2 5 % rectal cream   No No   Sig: Apply topically 2 (two) times a day   loratadine (CLARITIN) 10 mg tablet   No No   Sig: Take 1 tablet (10 mg total) by mouth daily   olopatadine (PATANOL) 0 1 % ophthalmic solution   No No   Sig: Administer 1 drop to both eyes 2 (two) times a day   ondansetron (Zofran ODT) 4 mg disintegrating tablet   No No   Sig: Take 1 tablet (4 mg total) by mouth every 6 (six) hours as needed for nausea or vomiting   pantoprazole (PROTONIX) 20 mg tablet   No No   Sig: Take 1 tablet (20 mg total) by mouth daily   predniSONE 20 mg tablet   No No   Sig: Take 2 tablets (40 mg total) by mouth daily for 5 days      Facility-Administered Medications: None       Past Medical History:   Diagnosis Date   • Asthma    • Asthmatic bronchitis     Last Assessed: 10/7/2014    • Chronic diarrhea     Last Assessed: 2015    • Fibromyalgia    • Focal nodular hyperplasia of liver     Last Assessed: 2015   • Herpes zoster     Last Assessed: 3/18/2016   • Hypertension    • IBS (irritable bowel syndrome)    • Intermittent palpitations    • Irritable bowel syndrome    • Lumbar radiculopathy    • Osteoarthritis    • Vertigo        Past Surgical History:   Procedure Laterality Date   • BREAST BIOPSY     • SMALL INTESTINE SURGERY         Family History   Problem Relation Age of Onset   • Heart attack Mother    • Other Mother         Aspiration of Vomit    • Asthma Father    • Breast cancer Sister    • Arthritis Family    • Other Family         Musculoskeletal Disease    • Osteoporosis Family      I have reviewed and agree with the history as documented  E-Cigarette/Vaping   • E-Cigarette Use Never User      E-Cigarette/Vaping Substances   • Nicotine No    • THC No    • CBD No    • Flavoring No    • Other No    • Unknown No      Social History     Tobacco Use   • Smoking status: Every Day     Packs/day: 0 50     Types: Cigarettes   • Smokeless tobacco: Never   Vaping Use   • Vaping Use: Never used   Substance Use Topics   • Alcohol use: No   • Drug use: No        Review of Systems   Constitutional: Negative for fever  HENT: Negative for trouble swallowing  Eyes: Negative for visual disturbance  Respiratory: Positive for cough, shortness of breath and wheezing  Cardiovascular: Negative for chest pain and leg swelling  Gastrointestinal: Negative for abdominal pain  Endocrine: Negative for polyuria  Genitourinary: Negative for dysuria  Musculoskeletal: Negative for gait problem  Skin: Positive for rash  Allergic/Immunologic: Negative for environmental allergies  Neurological: Negative for weakness and numbness  Hematological: Negative for adenopathy  Psychiatric/Behavioral: Negative for confusion         Physical Exam  ED Triage Vitals [04/26/23 1013]   Temperature Pulse Respirations Blood Pressure SpO2   98 2 °F (36 8 °C) (!) 137 20 (!) 151/107 100 %      Temp src Heart Rate Source Patient Position - Orthostatic VS BP Location FiO2 (%)   -- Monitor Lying Right arm --      Pain Score       --             Orthostatic Vital Signs  Vitals:    04/26/23 1013 04/26/23 1113 04/26/23 1300 04/26/23 1330   BP: (!) 151/107   128/75   Pulse: (!) 137 (!) 111 (!) 110 98   Patient Position - Orthostatic VS: Lying          Physical Exam  Vitals and nursing note reviewed  Constitutional:       General: She is not in acute distress  Appearance: She is not toxic-appearing  HENT:      Head: Normocephalic and atraumatic  Right Ear: External ear normal       Left Ear: External ear normal       Nose: Nose normal  No rhinorrhea  Mouth/Throat:      Mouth: Mucous membranes are moist       Pharynx: Oropharynx is clear  No oropharyngeal exudate or posterior oropharyngeal erythema  Comments: Uvula midline  No oropharyngeal or submandibular mass/swelling  Eyes:      General: No scleral icterus  Right eye: No discharge  Left eye: No discharge  Conjunctiva/sclera: Conjunctivae normal       Pupils: Pupils are equal, round, and reactive to light  Neck:      Comments: Patient is spontaneously rotating their neck to the left and right during the history and physical exam interaction without difficulty or apparent discomfort    Cardiovascular:      Rate and Rhythm: Regular rhythm  Tachycardia present  Pulses: Normal pulses  Heart sounds: Normal heart sounds  No murmur heard  No friction rub  No gallop  Comments: 2+ Radial  Pulmonary:      Effort: Pulmonary effort is normal  No respiratory distress  Breath sounds: No stridor  Wheezing and rhonchi (Right upper lung field) present  No rales  Abdominal:      General: Abdomen is flat  There is no distension  Palpations: Abdomen is soft  Tenderness: There is no abdominal tenderness  There is no right CVA tenderness, left CVA tenderness, guarding or rebound  Musculoskeletal:         General: No tenderness (No calf pain with squeeze bilaterally) or deformity  Cervical back: Neck supple  No tenderness  No muscular tenderness  Right lower leg: No edema  Left lower leg: No edema     Lymphadenopathy:      Cervical: No cervical adenopathy  Skin:     General: Skin is warm and dry  Capillary Refill: Capillary refill takes less than 2 seconds  Comments: Nikolsky negative small areas of scabbing on the arms  Neurological:      Mental Status: She is alert  Comments: Patient is speaking clearly in complete sentences  Patient is answering appropriately and able follow commands  Patient is moving all four extremities spontaneously  No facial droop  Tongue midline        Psychiatric:         Mood and Affect: Mood normal          Behavior: Behavior normal          ED Medications  Medications   cefTRIAXone (ROCEPHIN) 1,000 mg in dextrose 5 % 50 mL IVPB (1,000 mg Intravenous New Bag 4/26/23 1416)   multi-electrolyte (ISOLYTE-S PH 7 4) bolus 500 mL (0 mL Intravenous Stopped 4/26/23 1301)   ondansetron (ZOFRAN) injection 4 mg (4 mg Intravenous Given 4/26/23 1132)   iohexol (OMNIPAQUE) 350 MG/ML injection (SINGLE-DOSE) 85 mL (85 mL Intravenous Given 4/26/23 1232)   multi-electrolyte (ISOLYTE-S PH 7 4) bolus 500 mL (0 mL Intravenous Stopped 4/26/23 1418)   albuterol inhalation solution 2 5 mg (2 5 mg Nebulization Given 4/26/23 1329)       Diagnostic Studies  Results Reviewed     Procedure Component Value Units Date/Time    UA w Reflex to Microscopic w Reflex to Culture [[id number]] Collected: 04/26/23 1350    Lab Status: Final result Specimen: Urine, Clean Catch Updated: 04/26/23 1406     Color, UA Light Yellow     Clarity, UA Turbid     Specific Gravity, UA 1 009     pH, UA 5 5     Leukocytes, UA Negative     Nitrite, UA Negative     Protein, UA Negative mg/dl      Glucose, UA Negative mg/dl      Ketones, UA Negative mg/dl      Urobilinogen, UA <2 0 mg/dl      Bilirubin, UA Negative     Occult Blood, UA Negative    HS Troponin I 2hr [[id number]]  (Normal) Collected: 04/26/23 1250    Lab Status: Final result Specimen: Blood from Arm, Left Updated: 04/26/23 1330     hs TnI 2hr 10 ng/L      Delta 2hr hsTnI 2 ng/L     Lactic acid 2 Hours [[id number]]  (Normal) Collected: 04/26/23 1250    Lab Status: Final result Specimen: Blood from Arm, Left Updated: 04/26/23 1325     LACTIC ACID 1 6 mmol/L     Narrative:      Result may be elevated if tourniquet was used during collection  Lactic acid, plasma (w/reflex if result > 2 0) [[id number]]  (Abnormal) Collected: 04/26/23 1112    Lab Status: Final result Specimen: Blood from Arm, Left Updated: 04/26/23 1215     LACTIC ACID 2 3 mmol/L     Narrative:      Result may be elevated if tourniquet was used during collection  COVID/FLU/RSV [[id number]]  (Normal) Collected: 04/26/23 1106    Lab Status: Final result Specimen: Nares from Nose Updated: 04/26/23 1208     SARS-CoV-2 Negative     INFLUENZA A PCR Negative     INFLUENZA B PCR Negative     RSV PCR Negative    Narrative:      FOR PEDIATRIC PATIENTS - copy/paste COVID Guidelines URL to browser: [email or web address]  [employer]    SARS-CoV-2 assay is a Nucleic Acid Amplification assay intended for the  qualitative detection of nucleic acid from SARS-CoV-2 in nasopharyngeal  swabs  Results are for the presumptive identification of SARS-CoV-2 RNA  Positive results are indicative of infection with SARS-CoV-2, the virus  causing COVID-19, but do not rule out bacterial infection or co-infection  with other viruses  Laboratories within the [place] and its  territories are required to report all positive results to the appropriate  public health authorities  Negative results do not preclude SARS-CoV-2  infection and should not be used as the sole basis for treatment or other  patient management decisions  Negative results must be combined with  clinical observations, patient history, and epidemiological information  This test has not been FDA cleared or approved  This test has been authorized by FDA under an Emergency Use Authorization  (EUA)   This test is only authorized for the duration of time the  declaration that circumstances exist justifying the authorization of the  emergency use of an in vitro diagnostic tests for detection of SARS-CoV-2  virus and/or diagnosis of COVID-19 infection under section 564(b)(1) of  the Act, 21 U  S C  166EED-9(J)(6), unless the authorization is terminated  or revoked sooner  The test has been validated but independent review by FDA  and CLIA is pending  Test performed using [employer] GeneXpert: This RT-PCR assay targets N2,  a region unique to SARS-CoV-2  A conserved region in the E-gene was chosen  for pan-Sarbecovirus detection which includes SARS-CoV-2  According to CMS-2020-01-R, this platform meets the definition of high-throughput technology      HS Troponin I 4hr [[id number]]     Lab Status: No result Specimen: Blood     Comprehensive metabolic panel [[id number]]  (Abnormal) Collected: 04/26/23 1054    Lab Status: Final result Specimen: Blood from Arm, Left Updated: 04/26/23 1156     Sodium 139 mmol/L      Potassium 3 7 mmol/L      Chloride 109 mmol/L      CO2 22 mmol/L      ANION GAP 8 mmol/L      BUN 26 mg/dL      Creatinine 1 50 mg/dL      Glucose 124 mg/dL      Calcium 9 9 mg/dL      Corrected Calcium 10 4 mg/dL      AST 10 U/L      ALT 13 U/L      Alkaline Phosphatase 90 U/L      Total Protein 7 8 g/dL      Albumin 3 4 g/dL      Total Bilirubin 0 16 mg/dL      eGFR 33 ml/min/1 73sq m     Narrative:      Jose guidelines for Chronic Kidney Disease (CKD):   •  Stage 1 with normal or high GFR (GFR > 90 mL/min/1 73 square meters)  •  Stage 2 Mild CKD (GFR = 60-89 mL/min/1 73 square meters)  •  Stage 3A Moderate CKD (GFR = 45-59 mL/min/1 73 square meters)  •  Stage 3B Moderate CKD (GFR = 30-44 mL/min/1 73 square meters)  •  Stage 4 Severe CKD (GFR = 15-29 mL/min/1 73 square meters)  •  Stage 5 End Stage CKD (GFR <15 mL/min/1 73 square meters)  Note: GFR calculation is accurate only with a steady state creatinine    Procalcitonin [[id number]]  (Normal) Collected: 04/26/23 1054    Lab Status: Final result Specimen: Blood from Arm, Left Updated: 04/26/23 1154     Procalcitonin 0 06 ng/ml     Protime-INR [[id number]]  (Normal) Collected: 04/26/23 1106    Lab Status: Final result Specimen: Blood from Arm, Left Updated: 04/26/23 1149     Protime 13 1 seconds      INR 0 97    APTT [[id number]]  (Normal) Collected: 04/26/23 1106    Lab Status: Final result Specimen: Blood from Arm, Left Updated: 04/26/23 1149     PTT 28 seconds     HS Troponin 0hr (reflex protocol) [[id number]]  (Normal) Collected: 04/26/23 1054    Lab Status: Final result Specimen: Blood from Arm, Left Updated: 04/26/23 1135     hs TnI 0hr 8 ng/L     Blood gas, venous [[id number]]  (Abnormal) Collected: 04/26/23 1112    Lab Status: Final result Specimen: Blood from Arm, Left Updated: 04/26/23 1133     pH, Philippe 7 500     pCO2, Philippe 27 5 mm Hg      pO2, Philippe 89 9 mm Hg      HCO3, Philippe 21 0 mmol/L      Base Excess, Philippe -0 9 mmol/L      O2 Content, Philippe 17 6 ml/dL      O2 HGB, VENOUS 93 6 %     Blood culture [[id number]] Collected: 04/26/23 1128    Lab Status: In process Specimen: Blood from Arm, Right Updated: 04/26/23 1131    Blood culture [[id number]] Collected: 04/26/23 1112    Lab Status:  In process Specimen: Blood from Arm, Left Updated: 04/26/23 1121    CBC and differential [[id number]]  (Abnormal) Collected: 04/26/23 1054    Lab Status: Final result Specimen: Blood from Arm, Left Updated: 04/26/23 1109     WBC 13 85 Thousand/uL      RBC 4 63 Million/uL      Hemoglobin 13 5 g/dL      Hematocrit 41 4 %      MCV 89 fL      MCH 29 2 pg      MCHC 32 6 g/dL      RDW 15 5 %      MPV 11 4 fL      Platelets 386 Thousands/uL      nRBC 0 /100 WBCs      Neutrophils Relative 83 %      Immat GRANS % 1 %      Lymphocytes Relative 11 %      Monocytes Relative 5 %      Eosinophils Relative 0 %      Basophils Relative 0 %      Neutrophils Absolute 11 55 Thousands/µL      Immature Grans Absolute 0 11 Thousand/uL      Lymphocytes Absolute 1 53 Thousands/µL      Monocytes Absolute 0 63 Thousand/µL      Eosinophils Absolute 0 00 Thousand/µL      Basophils Absolute 0 03 Thousands/µL                  CTA ED chest PE Study   Final Result by Gabe Hernandez MD (04/26 1308)      With mild compromised by respiratory motion and suboptimal opacification of the pulmonary arteries, no acute pulmonary emboli  Mild groundglass opacity in the right upper lobe, infectious or inflammatory  Debris in the right lower lobe bronchi which could be due to bronchitis  8 mm anterior mediastinal nodule, question lymph node or thymoma  Follow-up could be obtained with a chest CT with no contrast in 6 months  Goiter  The study was marked in [facility] for immediate notification and follow-up  Workstation performed: [id number]         XR chest 1 view portable   ED Interpretation by Bree Knowles MD (04/26 1152)   Per my independent interpretation:  no acute cardiopulmoary process when compared to prior XR imaging             Procedures  Procedures      ED Course  ED Course as of 04/26/23 1428   Wed Apr 26, 2023   1109 WBC(!): 13 85  High   1151 Results reviewed with patient and daughter  As there is no focal pneumonia on x-ray (patient does not meet sepsis criteria due to infection not suspected),  suspicion for PE given sudden onset dyspnea  Patient and daughter agreeable with CT imaging for PE study   1220 ECG per my independent interpretation: Tachycardic rate, regular rhythm, no ectopy, normal axis, no ST elevations or depressions     1220 LACTIC ACID(!!): 2 3  High   1315 Possible infectious changes seen on CT imaging in the right upper lobe  Patient now meets criteria for sepsis  Antibiotics ordered  1315 PSI class III   1325 Results reviewed with patient and daughter  They are agreeable with admission  Patient requesting neb for comfort   This was ordered "Initial Sepsis Screening     Row Name 04/26/23 1314 04/26/23 1312             Is the patient's history suggestive of a new or worsening infection? Yes (Proceed)  -CL No  -CL       Suspected source of infection pneumonia  -CL --       Indicate SIRS criteria Tachycardia > 90 bpm;Leukocytosis (WBC > 10078 IJL) OR Leukopenia (WBC <4000 IJL) OR Bandemia (WBC >10% bands)  -CL --       Are two or more of the above signs & symptoms of infection both present and new to the patient? Yes (Proceed)  -CL --       Assess for evidence of organ dysfunction: Are any of the below criteria present within 6 hours of suspected infection and SIRS criteria that are NOT considered to be chronic conditions? Lactate > 2 0  -CL --       Date of presentation of severe sepsis 04/26/23  -CL --       Time of presentation of severe sepsis 1314  -CL --       Sepsis Note: Click \"NEXT\" below (NOT \"close\") to generate sepsis note based on above information   YES (proceed by clicking \"NEXT\")  -CL --             User Key  (r) = Recorded By, (t) = Taken By, (c) = Cosigned By    [address] Name Provider Type    Freddy Meeks MD Physician                    Juan Jose Garcia Criteria for PE    Flowsheet Row Most Recent Value   Ramos' Criteria for PE    Clinical signs and symptoms of DVT 0 Filed at: 04/26/2023 1209   PE is primary diagnosis or equally likely 3 Filed at: 04/26/2023 1209   HR >100 1 5 Filed at: 04/26/2023 1209   Immobilization at least 3 days or Surgery in the previous 4 weeks 0 Filed at: 04/26/2023 1209   Previous, objectively diagnosed PE or DVT 0 Filed at: 04/26/2023 1209   Hemoptysis 0 Filed at: 04/26/2023 1209   Malignancy with treatment within 6 months or palliative 0 Filed at: 04/26/2023 1209   Wells' Criteria Total 4 5 Filed at: 04/26/2023 1209            Medical Decision Making  Patient is a 71-year-old female, with a history significant for hypertension, tobacco use (I spent 2 minutes discussing tobacco and patient's health/cessation " with the patient), and asthma, who presents to the ED today due to sudden onset dyspnea without associated chest pain or leg pain  Patient states that, for about the last week, she has had upper respiratory symptoms consisting of congestion, rhinorrhea, nonproductive cough  Patient does have a sick contact in the household  Patient was evaluated by PCP for the symptoms and prescribed prednisone 40 mg  Patient has completed 2 days of this medication  She has also been using her inhaler to try remit her symptoms but this provides fleeting relief over a period of seconds before her symptoms returned to baseline  Patient also reports a non-itchy rash and no other household members are with this symptom  Patient denies history of VTE  Patient is currently afebrile, tachycardic, otherwise stable  Her physical exam is notable for focal rhonchi/wheezing in the right upper lung field as well as tachycardia  This presentation is concerning for: Viral syndrome, pneumothorax  I also considered ACS  Unlikely acute asthma exacerbation  Will investigate with sepsis panel order set, troponin, x-ray imaging  Will manage with fluids and further based upon work-up  - No language barrier    - History obtained from patient and her daughter    - There are no limitations to the history obtained  - External record review performed of previous charting was performed by me  - I independently reviewed and interpreted ordered labs, ECGs, and imaging from this encounter   - Patient's medication regimen reviewed  - Patient's comorbidities factored into diagnosis considerations and disposition planning    - I discussed the patient's need for admission with AbuGracie  Amount and/or Complexity of Data Reviewed  Labs: ordered  Decision-making details documented in ED Course  Radiology: ordered and independent interpretation performed  Risk  Prescription drug management    Decision regarding hospitalization  Disposition  Final diagnoses:   Goiter   Dyspnea   Mediastinal mass   [facility] ([facility] )   Community acquired pneumonia of right upper lobe of lung   Pneumonia     Time reflects when diagnosis was documented in both MDM as applicable and the Disposition within this note     Time User Action Codes Description Comment    4/26/2023  1:13 PM Rebbeca Heir Add [E04 9] Goiter     4/26/2023  1:13 PM Rebbeca Heir A Add [R06 00] Dyspnea     4/26/2023  1:13 PM Rebbeca Heir A Add [J98 59] Mediastinal mass     4/26/2023  1:17 PM Atoka Pile Add [A41 9,  R65 20] Severe sepsis ([facility] )     4/26/2023  1:17 PM Atoka Pile Add [J18 9] Community acquired pneumonia of right upper lobe of lung     4/26/2023  2:00 PM Rebbeca Heir A Modify [E04 9] Goiter     4/26/2023  2:00 PM Rebbeca Heir A Modify [A41 9,  R65 20] Severe sepsis ([facility] )     4/26/2023  2:00 PM Rebbeca Heir A Add [J18 9] Pneumonia       ED Disposition     ED Disposition   Admit    Condition   Stable    Date/Time   Wed Apr 26, 2023  2:00 PM    Comment   Case was discussed with FM and the patient's admission status was agreed to be Admission Status: observation status to the service of Dr Za Lima   Follow-up Information    None         Patient's Medications   Discharge Prescriptions    No medications on file     No discharge procedures on file  PDMP Review     None           ED Provider  Attending physically available and evaluated Sola Reilly I managed the patient along with the ED Attending      Electronically Signed by         Stan Jimenez MD  04/26/23 [id number]

## 2023-04-26 NOTE — ASSESSMENT & PLAN NOTE
"68 yo presents with reportedly sudden onset dyspnea, and URI symptoms including worsening congestion and new cough since this weekend  CTA was performed in ED in setting of sudden onset dyspnea and tachycardia:   Impression:  · \"With mild compromised by respiratory motion and suboptimal opacification of the pulmonary arteries, no acute pulmonary emboli  · Mild groundglass opacity in the right upper lobe, infectious or inflammatory  · Debris in the right lower lobe bronchi which could be due to bronchitis  · 8 mm anterior mediastinal nodule, question lymph node or thymoma  Follow-up could be obtained with a chest CT with no contrast in 6 months  · Goiter  \"  In ED, pt was administered 1L of fluids and ceftriazone after BC and urine collected  In ED, for symptom management, pt was given albuterol and zofran  Lactic acidosis resolved after fluids 2 3--> 1 6  UA unremarkable  Assessment  Pt met severe sepsis criteria with tachycardia, elevated WBC, and lactic acidosis  Source of infection is pneumonia with PSI risk score 3  Pneumonia evidenced by symptoms, exam with mild wheezing R>L and on imaging  Pt is hemodynamically stable and in no acute distress  Respiratory status stable and pt is on room air  WBC can be elevated in setting of prednisone use (see Mild persistent asthma with exacerbation)  Plan  - c/w rocephin    - Start Z-jen  - If Blood cultures are negative for 24 hours and pt symptomatically improving, d/c rocephin and continue Z-jen  - AM CBC with diff      "

## 2023-04-26 NOTE — SEPSIS NOTE
Sepsis Note   Sil Cárdenas 69 y o  female MRN: [id number]  Unit/Bed#: ED 22 Encounter: [id number]       Initial Sepsis Screening     Row Name 04/26/23 1312                Is the patient's history suggestive of a new or worsening infection? No  -CL              User Key  (r) = Recorded By, (t) = Taken By, (c) = Cosigned By    [address] Name Provider Mable Paulino MD Physician                    There is no height or weight on file to calculate BMI    Wt Readings from Last 1 Encounters:   04/24/23 59 kg (130 lb)        Ideal body weight: 45 5 kg (100 lb 4 9 oz)  Adjusted ideal body weight: 50 9 kg (112 lb 3 oz)

## 2023-04-26 NOTE — ASSESSMENT & PLAN NOTE
Home meds: Norvasc 5mg daily, HCTZ 12 5 mg daily - pt takes meds in the evening      Assessment  Stable    Plan  - continue home meds with hold parameters

## 2023-04-26 NOTE — ASSESSMENT & PLAN NOTE
Home medications: Symbicort 2 puffs BID, albuterol inhaler PRN, allegra, flonase  When pt is feeling healthy, she uses albuterol inhaler <1 a month  Pt was seen in the [address] [place] [address] office on Monday, 4/24/23, and was diagnosed with a mild asthma exacerbation and prescribed 5 day course of prednisone 20 mg daily  Pt started it in Monday  Assessment  Chronic mild persistent asthma with acute mild exacerbation  In setting of daily smoking, pt may have COPD  Plan  - hold steroids in setting of mild wheezes and no respiratory distress  - c/w home meds  - encourage performing PFTs that were ordered outpatient

## 2023-04-26 NOTE — ASSESSMENT & PLAN NOTE
Pt smokes daily 0 5 packs a day      Plan  - Smoking cessation counseling  - Nicotine patch 14 mg daily

## 2023-04-27 VITALS
SYSTOLIC BLOOD PRESSURE: 112 MMHG | RESPIRATION RATE: 20 BRPM | TEMPERATURE: 98.2 F | DIASTOLIC BLOOD PRESSURE: 63 MMHG | HEART RATE: 91 BPM | OXYGEN SATURATION: 93 %

## 2023-04-27 PROBLEM — A41.9 SEPSIS (HCC): Status: RESOLVED | Noted: 2023-04-26 | Resolved: 2023-04-27

## 2023-04-27 LAB
ANION GAP SERPL CALCULATED.3IONS-SCNC: 6 MMOL/L (ref 4–13)
ATRIAL RATE: 127 BPM
BASOPHILS # BLD AUTO: 0.06 THOUSANDS/ΜL (ref 0–0.1)
BASOPHILS NFR BLD AUTO: 1 % (ref 0–1)
BUN SERPL-MCNC: 22 MG/DL (ref 5–25)
CALCIUM SERPL-MCNC: 9.2 MG/DL (ref 8.3–10.1)
CHLORIDE SERPL-SCNC: 109 MMOL/L (ref 96–108)
CO2 SERPL-SCNC: 25 MMOL/L (ref 21–32)
CREAT SERPL-MCNC: 1.37 MG/DL (ref 0.6–1.3)
EOSINOPHIL # BLD AUTO: 0.03 THOUSAND/ΜL (ref 0–0.61)
EOSINOPHIL NFR BLD AUTO: 0 % (ref 0–6)
ERYTHROCYTE [DISTWIDTH] IN BLOOD BY AUTOMATED COUNT: 15.6 % (ref 11.6–15.1)
GFR SERPL CREATININE-BSD FRML MDRD: 37 ML/MIN/1.73SQ M
GLUCOSE SERPL-MCNC: 102 MG/DL (ref 65–140)
HCT VFR BLD AUTO: 37.9 % (ref 34.8–46.1)
HGB BLD-MCNC: 12.3 G/DL (ref 11.5–15.4)
IMM GRANULOCYTES # BLD AUTO: 0.06 THOUSAND/UL (ref 0–0.2)
IMM GRANULOCYTES NFR BLD AUTO: 1 % (ref 0–2)
LYMPHOCYTES # BLD AUTO: 3.49 THOUSANDS/ΜL (ref 0.6–4.47)
LYMPHOCYTES NFR BLD AUTO: 32 % (ref 14–44)
MAGNESIUM SERPL-MCNC: 2.5 MG/DL (ref 1.6–2.6)
MCH RBC QN AUTO: 29.1 PG (ref 26.8–34.3)
MCHC RBC AUTO-ENTMCNC: 32.5 G/DL (ref 31.4–37.4)
MCV RBC AUTO: 90 FL (ref 82–98)
MONOCYTES # BLD AUTO: 0.7 THOUSAND/ΜL (ref 0.17–1.22)
MONOCYTES NFR BLD AUTO: 6 % (ref 4–12)
NEUTROPHILS # BLD AUTO: 6.65 THOUSANDS/ΜL (ref 1.85–7.62)
NEUTS SEG NFR BLD AUTO: 60 % (ref 43–75)
NRBC BLD AUTO-RTO: 0 /100 WBCS
P AXIS: 87 DEGREES
PHOSPHATE SERPL-MCNC: 3.8 MG/DL (ref 2.3–4.1)
PLATELET # BLD AUTO: 312 THOUSANDS/UL (ref 149–390)
PMV BLD AUTO: 11.4 FL (ref 8.9–12.7)
POTASSIUM SERPL-SCNC: 3.5 MMOL/L (ref 3.5–5.3)
PR INTERVAL: 126 MS
QRS AXIS: 72 DEGREES
QRSD INTERVAL: 84 MS
QT INTERVAL: 316 MS
QTC INTERVAL: 459 MS
RBC # BLD AUTO: 4.23 MILLION/UL (ref 3.81–5.12)
SODIUM SERPL-SCNC: 140 MMOL/L (ref 135–147)
T WAVE AXIS: 87 DEGREES
T4 FREE SERPL-MCNC: 1.07 NG/DL (ref 0.76–1.46)
VENTRICULAR RATE: 127 BPM
WBC # BLD AUTO: 10.99 THOUSAND/UL (ref 4.31–10.16)

## 2023-04-27 RX ORDER — BENZONATATE 100 MG/1
100 CAPSULE ORAL 3 TIMES DAILY PRN
Qty: 20 CAPSULE | Refills: 0 | Status: SHIPPED | OUTPATIENT
Start: 2023-04-27

## 2023-04-27 RX ORDER — POTASSIUM CHLORIDE 20 MEQ/1
40 TABLET, EXTENDED RELEASE ORAL ONCE
Status: COMPLETED | OUTPATIENT
Start: 2023-04-27 | End: 2023-04-27

## 2023-04-27 RX ORDER — GUAIFENESIN 600 MG/1
1200 TABLET, EXTENDED RELEASE ORAL EVERY 12 HOURS SCHEDULED
Qty: 28 TABLET | Refills: 0 | Status: SHIPPED | OUTPATIENT
Start: 2023-04-27 | End: 2023-05-04

## 2023-04-27 RX ORDER — AZITHROMYCIN 250 MG/1
250 TABLET, FILM COATED ORAL EVERY 24 HOURS
Qty: 4 TABLET | Refills: 0 | Status: SHIPPED | OUTPATIENT
Start: 2023-04-27 | End: 2023-05-01

## 2023-04-27 RX ORDER — PREDNISONE 20 MG/1
40 TABLET ORAL DAILY
Status: DISCONTINUED | OUTPATIENT
Start: 2023-04-27 | End: 2023-04-27 | Stop reason: HOSPADM

## 2023-04-27 RX ADMIN — HEPARIN SODIUM 5000 UNITS: 5000 INJECTION INTRAVENOUS; SUBCUTANEOUS at 06:30

## 2023-04-27 RX ADMIN — FLUTICASONE PROPIONATE 2 SPRAY: 50 SPRAY, METERED NASAL at 09:04

## 2023-04-27 RX ADMIN — LORATADINE 10 MG: 10 TABLET ORAL at 09:03

## 2023-04-27 RX ADMIN — PANTOPRAZOLE SODIUM 20 MG: 20 TABLET, DELAYED RELEASE ORAL at 06:30

## 2023-04-27 RX ADMIN — BUDESONIDE AND FORMOTEROL FUMARATE DIHYDRATE 2 PUFF: 80; 4.5 AEROSOL RESPIRATORY (INHALATION) at 09:04

## 2023-04-27 RX ADMIN — POTASSIUM CHLORIDE 40 MEQ: 1500 TABLET, EXTENDED RELEASE ORAL at 09:03

## 2023-04-27 RX ADMIN — ACETAMINOPHEN 650 MG: 325 TABLET ORAL at 02:18

## 2023-04-27 RX ADMIN — NICOTINE 1 PATCH: 14 PATCH, EXTENDED RELEASE TRANSDERMAL at 09:03

## 2023-04-27 NOTE — DISCHARGE SUMMARY
Discharge Summary - Antoine Estes 69 y o  female MRN: [id number]    Unit/Bed#: CW2 211-02 Encounter: [id number]    Admission Date: 4/26/2023  Discharge Date: 4/27/2023     Admission Diagnosis: Mediastinal mass [J98 59]  Pneumonia [J18 9]  Dyspnea [R06 00]  SOB (shortness of breath) [R06 02]  Goiter [E04 9]  Severe sepsis ([address] ) [A41 9, R65 20]  Community acquired pneumonia of right upper lobe of lung [J18 9]    Important Physician Related Follow Up:   · Follow up with PCP    HPI: Per H&P on 4/26/23  70 yo female is admitted for pneumonia and sepsis  PMH includes mild persistent asthma, HTN, tobacco abuse, GERD  Pt [facility] with sudden onset of dyspnea on 4/26  She reports worsening SOB, worsening congestion and rhinorrhea, new productive cough since this weekend  Pt's cough is a/w with nausea, denies vomiting  Pt feels like she has difficulty breathing due to congestion  Pt was evaluated in clinic on Monday and dx with mild asthma exacerbation and prescribed prednisone 40 mg daily for 5 days after pt persistence  Pt is a daily smoker and requests for a nicotine patch       Pt met sepsis criteria on admission with tachycardia, elevated WBC (13 85), lactic 2 3  Due to c/o sudden onset dyspnea and tachycardia, CTA was done  No PE was found  Imaging identified mild opacities suggesting infection vs inflammation seen right upper lobe  Debris in right bronchi suggesting bronchitis      In ED: fluids, Bcx, Rocephin, Albuterol    Hospital Course:   Upon admission to the hospital patient was continued on azithromycin and received one dose  She will need to complete four more days of azithromycin for a total course of 5 days of antibiotics  Patient was home prednisone 40 mg was held on day of admission then restarted on 4/27/23  She will need to continue 3 more days of prednisone for a total course of 5 days of steroids  Patient with congestion and rhinorrhea with cough   Will rx tessalon Perles as needed as well as mucinex for symptom management  Patient will need to complete PFTs as outpatient, PFTs have already been ordered  Blood culture cx showed no growth at 24 hours x2  Incidental finding of 8 mm anterior mediastinal module as well as low TSH and T4 wnl which will need to be followed up outpatient  Smoking cessation discussed patient not amenable at this time  Will need further discussions with PCP regarding smoking cessation  On day of discharge, patient states she is feeling much better and denies fevers, chills, SOB, difficulties breathing, CP, or palpitations  Patient ambulating in room independently  She is tolerating regular PO diet without n/v or difficulties  Patient remained afebrile, vital signs were stable, lactic acid decreased to normal limits and wbc was downtrending  Patient stable for discharge to home with close follow up with PCP  Procedures Performed:   Orders Placed This Encounter   Procedures   • Critical Care   none     Significant Findings, Care, Treatment and Services Provided:   · TSH 0 375  · T4 1 07   · Blood cx: NG @ 24 hours   · UA negative   · CTA chest: Mild groundglass opacity in the right upper lobe, infectious or inflammatory  8 mm anterior mediastinal nodule, question lymph node or thymoma  Follow-up could be obtained with a chest CT with no contrast in 6 months  Debris in RLL bronchi which could be due to bronchitis  Complications: none     Discharge Diagnosis:   Active Problems:    Mild persistent asthma with exacerbation    Cigarette nicotine dependence without complication    Essential hypertension    GERD (gastroesophageal reflux disease)    Vitamin D deficiency    Stage 3b chronic kidney disease ([facility] )    Goiter      Exam on Day of Discharge:  Physical Exam  Constitutional:       General: She is not in acute distress  Appearance: Normal appearance  She is not ill-appearing  HENT:      Head: Normocephalic and atraumatic        Nose: Congestion and rhinorrhea present  Eyes:      Extraocular Movements: Extraocular movements intact  Cardiovascular:      Rate and Rhythm: Normal rate and regular rhythm  Pulses: Normal pulses  Heart sounds: Normal heart sounds  No murmur heard  Pulmonary:      Effort: Pulmonary effort is normal  No respiratory distress  Breath sounds: Wheezing (scant expiratory wheezes ) present  Abdominal:      General: Abdomen is flat  Bowel sounds are normal       Palpations: Abdomen is soft  Tenderness: There is no abdominal tenderness  Musculoskeletal:         General: Normal range of motion  Cervical back: Normal range of motion  Right lower leg: No edema  Left lower leg: No edema  Skin:     General: Skin is warm  Neurological:      General: No focal deficit present  Mental Status: She is alert and oriented to person, place, and time  Motor: No weakness  Gait: Gait normal          Condition at Discharge: good     Discharge instructions/Information to patient and family:   See after visit summary for information provided to patient and family  Provisions for Follow-Up Care:  See after visit summary for information related to follow-up care and any pertinent home health orders  Disposition: Home    Planned Readmission: No    Discharge Statement   I spent 45 minutes discharging the patient  This time was spent on the day of discharge  I had direct contact with the patient on the day of discharge  Additional documentation is required if more than 30 minutes were spent on discharge  Discharge Medications:  See after visit summary for reconciled discharge medications provided to patient and family   Of note significant medication changes made this admission:  · Azithromycin 250 mg for 4 days  · Tessalon perles prn   · Mucinex q12h for 7 days   CONTINUE taking these medications    CONTINUE taking these medications    acetaminophen 500 mg tablet  Commonly known as: TYLENOL  Take 1 tablet (500 mg total) by mouth every 6 (six) hours as needed for mild pain  Refills: 0  Last time this was given: 650 mg on April 27, 2023  2:18 AM  Signed by: Shiloh Alexander DO    * albuterol (2 5 mg/3 mL) 0 083 % nebulizer solution  Take 3 mL (2 5 mg total) by nebulization every 4 (four) hours as needed for wheezing or shortness of breath  Refills: 0  Last time this was given: Ask your nurse or doctor  Signed by: Tevin Horner MD    * albuterol 90 mcg/act inhaler  Commonly known as: PROVENTIL HFA,VENTOLIN HFA  Inhale 2 puffs every 6 (six) hours as needed for wheezing or shortness of breath  Refills: 3  Last time this was given: Ask your nurse or doctor  Doctor's comments: Substitution to a formulary equivalent within the same pharmaceutical class is authorized  Signed by: Tevin Horner MD    amLODIPine 5 mg tablet  Commonly known as: NORVASC  Take 1 tablet (5 mg total) by mouth daily  Refills: 0  Last time this was given: 5 mg on April 26, 2023  4:43 PM  Signed by: Tevin Horner MD  Next Dose Due: 4/28/23    azelastine 0 1 % nasal spray  Commonly known as: ASTELIN  2 sprays into each nostril 2 (two) times a day Use in each nostril as directed  Refills: 2  Signed by: Tevin Horner MD    budesonide-formoterol 80-4 5 MCG/ACT inhaler  Commonly known as: Symbicort  Inhale 2 puffs 2 (two) times a day Rinse mouth after use    Refills: 0  Last time this was given: 2 puffs on April 27, 2023  9:04 AM  Signed by: Tevin Horner MD  Next Dose Due: 4/27/23    escitalopram 10 mg tablet  Commonly known as: LEXAPRO  Take 1 tablet (10 mg total) by mouth daily  Refills: 3  Signed by: Tim Mcclure MD    fexofenadine 180 MG tablet  Commonly known as: ALLEGRA  Take 180 mg by mouth daily  Refills: 0    fluticasone 50 mcg/act nasal spray  Commonly known as: FLONASE  2 sprays into each nostril daily  Refills: 3  Last time this was given: 2 sprays on April 27, 2023  9:04 AM  Signed by: Tevin Horner MD  Next Dose Due: 4/28/23 hydrochlorothiazide 12 5 mg tablet  Commonly known as: HYDRODIURIL  Take 1 tablet (12 5 mg total) by mouth daily  Refills: 1  Last time this was given: 12 5 mg on April 26, 2023  4:43 PM  Signed by: Pennie Hill MD    hydrocortisone 2 5 % rectal cream  Commonly known as: ANUSOL-HC  Apply topically 2 (two) times a day  Refills: 0  Signed by: Keyon Layton MD    loratadine 10 mg tablet  Commonly known as: CLARITIN  Take 1 tablet (10 mg total) by mouth daily  Refills: 3  Last time this was given: 10 mg on April 27, 2023  9:03 AM  Signed by: Keyon Layton MD  Next Dose Due: 4/28/23    olopatadine 0 1 % ophthalmic solution  Commonly known as: PATANOL  Administer 1 drop to both eyes 2 (two) times a day  Refills: 2  Signed by: Mike Infante PA-C    ondansetron 4 mg disintegrating tablet  Commonly known as: Zofran ODT  Take 1 tablet (4 mg total) by mouth every 6 (six) hours as needed for nausea or vomiting  Refills: 3  Last time this was given: Ask your nurse or doctor  Signed by: Pennie Hill MD    pantoprazole 20 mg tablet  Commonly known as: PROTONIX  Take 1 tablet (20 mg total) by mouth daily  Refills: 1  Last time this was given: 20 mg on April 27, 2023  6:30 AM  Signed by: Pennie Hill MD  Next Dose Due: 4/28/23    predniSONE 20 mg tablet  Take 2 tablets (40 mg total) by mouth daily for 5 days  Refills: 0  Signed by: Pennie Hill MD    Vitamin D 50 MCG (2000 UT) Caps  Take 1 capsule (2,000 Units total) by mouth daily  Refills: 5  Signed by: MD Frankie Church 92 PGY1  4/27/2023 11:46 am

## 2023-04-27 NOTE — DISCHARGE INSTR - AVS FIRST PAGE
Continue antibiotic (azithromycin) for four days  Complete all antibiotics even if symptoms improve

## 2023-04-27 NOTE — RESPIRATORY THERAPY NOTE
RT Protocol Note  Lavell Shaw 69 y o  female MRN: [id number]  Unit/Bed#: CW2 211-02 Encounter: [id number]    Assessment    Principal Problem:    Sepsis ([facility] )  Active Problems:    Mild persistent asthma with exacerbation    Cigarette nicotine dependence without complication    Essential hypertension    GERD (gastroesophageal reflux disease)    Vitamin D deficiency    Stage 3b chronic kidney disease ([facility] )    Goiter      Home Pulmonary Medications:  Albuterol MDI PRN / Symbicort       Past Medical History:   Diagnosis Date    Asthma     Asthmatic bronchitis     Last Assessed: 10/7/2014     Chronic diarrhea     Last Assessed: 8/20/2015     Fibromyalgia     Focal nodular hyperplasia of liver     Last Assessed: 6/11/2015    Herpes zoster     Last Assessed: 3/18/2016    Hypertension     IBS (irritable bowel syndrome)     Intermittent palpitations     Irritable bowel syndrome     Lumbar radiculopathy     Osteoarthritis     Vertigo      Social History     Socioeconomic History    Marital status:       Spouse name: None    Number of children: None    Years of education: None    Highest education level: None   Occupational History    Occupation: Unemployed    Tobacco Use    Smoking status: Every Day     Packs/day: 0 50     Types: Cigarettes    Smokeless tobacco: Never   Vaping Use    Vaping Use: Never used   Substance and Sexual Activity    Alcohol use: No    Drug use: No    Sexual activity: Not Currently     Partners: Male   Other Topics Concern    None   Social History Narrative    Mental Disability     Exercising Regularly     Lives with adult children      Social Determinants of Health     Financial Resource Strain: High Risk    Difficulty of Paying Living Expenses: Very hard   Food Insecurity: Food Insecurity Present    Worried About Running Out of Food in the Last Year: Sometimes true    Ran Out of Food in the Last Year: Sometimes true   Transportation Needs: Unmet Transportation Needs    Lack of Transportation (Medical): Yes    Lack of Transportation (Non-Medical): Yes   Physical Activity: Sufficiently Active    Days of Exercise per Week: 5 days    Minutes of Exercise per Session: 60 min   Stress: Stress Concern Present    Feeling of Stress : To some extent   Social Connections: Not on file   Intimate Partner Violence: Not At Risk    Fear of Current or Ex-Partner: No    Emotionally Abused: No    Physically Abused: No    Sexually Abused: No   Housing Stability: Low Risk     Unable to Pay for Housing in the Last Year: No    Number of [place] in the Last Year: 1    Unstable Housing in the Last Year: No       Subjective         Objective    Physical Exam:   Assessment Type: Assess only  General Appearance: Awake  Respiratory Pattern: Normal  Chest Assessment: Chest expansion symmetrical  Bilateral Breath Sounds: Diminished    Vitals:  Blood pressure 110/70, pulse 96, temperature 98 9 °F (37 2 °C), temperature source Oral, resp  rate 14, SpO2 95 %  Imaging and other studies: I have personally reviewed pertinent reports  Plan    Respiratory Plan: Home Bronchodilator Patient pathway        Resp Comments: (P) Pt ordered on MDI so protocol done at this time, pt here w/  pneumonia and sepsis  Pt has hx of Asthma and takes PRN albuterol MDI at home and symbicort  Will leave pt MDI PRN per protocol at this time, no distress noted currently   Pt on RA and lungs are diminished

## 2023-04-27 NOTE — UTILIZATION REVIEW
Initial Clinical Review    Admission: Date/Time/Statement:   Admission Orders (From admission, onward)     Ordered        04/26/23 1400  Place in Observation  Once                      Orders Placed This Encounter   Procedures   • Place in Observation     Standing Status:   Standing     Number of Occurrences:   1     Order Specific Question:   Level of Care     Answer:   Med Surg [16]     ED Arrival Information     Expected   -    Arrival   4/26/2023 10:11    Acuity   Emergent            Means of arrival   Walk-In    Escorted by   Self    Service   Family Medicine    Admission type   Emergency            Arrival complaint   SOB           Chief Complaint   Patient presents with   • Shortness of Breath     Feels SOB and coughing x1 week       Initial Presentation: 69 y o  female is admitted for pneumonia and sepsis  PMH includes mild persistent asthma, HTN, tobacco abuse, GERD  Pt presented to [facility] ED with sudden onset of dyspnea on 4/26  She reports worsening SOB, worsening congestion and rhinorrhea, new productive cough since this weekend  Pt's cough is a/w with nausea, denies vomiting  Pt feels like she has difficulty breathing due to congestion  Pt was evaluated in clinic on Monday and dx with mild asthma exacerbation and prescribed prednisone 40 mg daily for 5 days after pt persistence  Pt is a daily smoker and requests for a nicotine patch       Pt met sepsis criteria on admission with tachycardia, elevated WBC (13 85), lactic 2 3  Due to c/o sudden onset dyspnea and tachycardia, CTA was done  No PE was found  Imaging identified mild opacities suggesting infection vs inflammation seen right upper lobe   Debris in right bronchi suggesting bronchitis      In ED: fluids, Bcx, Rocephin, Albuterol    ADMIT OBSERVATION STATUS      Date:     Day 2:      ED Triage Vitals   Temperature Pulse Respirations Blood Pressure SpO2   04/26/23 1013 04/26/23 1013 04/26/23 1013 04/26/23 1013 04/26/23 1013   98 2 °F (36 8 °C) (!) 137 20 (!) 151/107 100 %      Temp Source Heart Rate Source Patient Position - Orthostatic VS BP Location FiO2 (%)   04/26/23 2323 04/26/23 1013 04/26/23 1013 04/26/23 1013 --   Oral Monitor Lying Right arm       Pain Score       04/26/23 1650       5          Wt Readings from Last 1 Encounters:   04/24/23 59 kg (130 lb)     Additional Vital Signs:   Date/Time Temp Pulse Resp BP MAP (mmHg) SpO2 O2 Device Patient Position - Orthostatic VS   04/27/23 06:53:24 98 2 °F (36 8 °C) 91 20 112/63 79 93 % None (Room air) Lying   04/26/23 23:23:16 98 9 °F (37 2 °C) 96 14 110/70 83 95 % -- Lying   04/26/23 19:28:30 -- 108 Abnormal  -- -- -- 94 % -- --   04/26/23 1920 -- 107 Abnormal  -- 91/60 70 95 % -- --   04/26/23 1545 -- 110 Abnormal  -- 129/76 94 95 % -- --   04/26/23 15:40:11 98 9 °F (37 2 °C) 101 -- -- -- 96 % -- --   04/26/23 1330 -- 98 16 128/75 96 96 % None (Room air) --   04/26/23 1300 -- 110 Abnormal  20 -- -- 99 % None (Room air) --   04/26/23 1113 -- 111 Abnormal  -- -- -- -- -- --   04/26/23 1013 98 2 °F (36 8 °C) 137 Abnormal  20 151/107 Abnormal  -- 100 % -- Lying       Pertinent Labs/Diagnostic Test Results:   CTA ED chest PE Study   Final Result by Mirta Childs MD (04/26 1308)      With mild compromised by respiratory motion and suboptimal opacification of the pulmonary arteries, no acute pulmonary emboli  Mild groundglass opacity in the right upper lobe, infectious or inflammatory  Debris in the right lower lobe bronchi which could be due to bronchitis  8 mm anterior mediastinal nodule, question lymph node or thymoma  Follow-up could be obtained with a chest CT with no contrast in 6 months  Goiter  The study was marked in [facility] for immediate notification and follow-up              Workstation performed: [id number]         XR chest 1 view portable   ED Interpretation by Eliot Bojoqruez MD (04/26 1352)   Per my independent interpretation:  no acute cardiopulmoary process when compared to prior XR imaging       Final Result by Domenico Colorado MD (04/27 0730)      No acute cardiopulmonary disease                    Workstation performed: [id number]           Results from last 7 days   Lab Units 04/26/23  1106   SARS-COV-2  Negative     Results from last 7 days   Lab Units 04/27/23  0508 04/26/23  1054   WBC Thousand/uL 10 99* 13 85*   HEMOGLOBIN g/dL 12 3 13 5   HEMATOCRIT % 37 9 41 4   PLATELETS Thousands/uL 312 369   NEUTROS ABS Thousands/µL 6 65 11 55*         Results from last 7 days   Lab Units 04/27/23  0508 04/26/23  1054   SODIUM mmol/L 140 139   POTASSIUM mmol/L 3 5 3 7   CHLORIDE mmol/L 109* 109*   CO2 mmol/L 25 22   ANION GAP mmol/L 6 8   BUN mg/dL 22 26*   CREATININE mg/dL 1 37* 1 50*   EGFR ml/min/1 73sq m 37 33   CALCIUM mg/dL 9 2 9 9   MAGNESIUM mg/dL 2 5  --    PHOSPHORUS mg/dL 3 8  --      Results from last 7 days   Lab Units 04/26/23  1054   AST U/L 10   ALT U/L 13   ALK PHOS U/L 90   TOTAL PROTEIN g/dL 7 8   ALBUMIN g/dL 3 4*   TOTAL BILIRUBIN mg/dL 0 16*         Results from last 7 days   Lab Units 04/27/23  0508 04/26/23  1054   GLUCOSE RANDOM mg/dL 102 124             No results found for: BETA-HYDROXYBUTYRATE       Results from last 7 days   Lab Units 04/26/23  1112   PH DANIA  7 500*   PCO2 DANIA mm Hg 27 5*   PO2 DANIA mm Hg 89 9*   HCO3 DANIA mmol/L 21 0*   BASE EXC DANIA mmol/L -0 9   O2 CONTENT DANIA ml/dL 17 6   O2 HGB, VENOUS % 93 6*             Results from last 7 days   Lab Units 04/26/23  1550 04/26/23  1250 04/26/23  1054   HS TNI 0HR ng/L  --   --  8   HS TNI 2HR ng/L  --  10  --    HSTNI D2 ng/L  --  2  --    HS TNI 4HR ng/L 13  --   --    HSTNI D4 ng/L 5  --   --          Results from last 7 days   Lab Units 04/26/23  1106   PROTIME seconds 13 1   INR  0 97   PTT seconds 28     Results from last 7 days   Lab Units 04/26/23  1054   TSH 3RD GENERATON uIU/mL 0 375*     Results from last 7 days   Lab Units 04/26/23  1054   PROCALCITONIN ng/ml 0 06     Results from last 7 days   Lab Units 04/26/23  1250 04/26/23  1112   LACTIC ACID mmol/L 1 6 2 3*                                         Results from last 7 days   Lab Units 04/26/23  1350   CLARITY UA  Turbid   COLOR UA  Light Yellow   SPEC GRAV UA  1 009   PH UA  5 5   GLUCOSE UA mg/dl Negative   KETONES UA mg/dl Negative   BLOOD UA  Negative   PROTEIN UA mg/dl Negative   NITRITE UA  Negative   BILIRUBIN UA  Negative   UROBILINOGEN UA (BE) mg/dl <2 0   LEUKOCYTES UA  Negative     Results from last 7 days   Lab Units 04/26/23  1106   INFLUENZA A PCR  Negative   INFLUENZA B PCR  Negative   RSV PCR  Negative                             Results from last 7 days   Lab Units 04/26/23  1128 04/26/23  1112   BLOOD CULTURE  Received in Microbiology Lab  Culture in Progress  Received in Microbiology Lab  Culture in Progress                 ED Treatment:   Medication Administration from 04/26/2023 1011 to 04/26/2023 1520       Date/Time Order Dose Route Action Action by Comments     04/26/2023 1301 EDT multi-electrolyte (ISOLYTE-S PH 7 4) bolus 500 mL 0 mL Intravenous Stopped Anum Im, RN --     04/26/2023 1122 EDT multi-electrolyte (ISOLYTE-S PH 7 4) bolus 500 mL 500 mL Intravenous New Bag Anum Im, RN --     04/26/2023 1132 EDT ondansetron (ZOFRAN) injection 4 mg 4 mg Intravenous Given Zuleika Fonseca, RN --     04/26/2023 1232 EDT iohexol (OMNIPAQUE) 350 MG/ML injection (SINGLE-DOSE) 85 mL 85 mL Intravenous Given Jacobameena Stalling --     04/26/2023 1500 EDT cefTRIAXone (ROCEPHIN) 1,000 mg in dextrose 5 % 50 mL IVPB 0 mg Intravenous Stopped Vicente Morn, RN --     04/26/2023 1416 EDT cefTRIAXone (ROCEPHIN) 1,000 mg in dextrose 5 % 50 mL IVPB 1,000 mg Intravenous New Bag Anum Im, RN --     04/26/2023 1418 EDT multi-electrolyte (ISOLYTE-S PH 7 4) bolus 500 mL 0 mL Intravenous Stopped Anum Im, RN --     04/26/2023 1325 EDT multi-electrolyte (ISOLYTE-S PH 7 4) bolus 500 mL 500 mL Intravenous New Bag Jaclyn Orlando RN --     04/26/2023 1329 EDT albuterol inhalation solution 2 5 mg 2 5 mg Nebulization Given Jaclyn Orlando RN --        Past Medical History:   Diagnosis Date   • Asthma    • Asthmatic bronchitis     Last Assessed: 10/7/2014    • Chronic diarrhea     Last Assessed: 8/20/2015    • Fibromyalgia    • Focal nodular hyperplasia of liver     Last Assessed: 6/11/2015   • Herpes zoster     Last Assessed: 3/18/2016   • Hypertension    • IBS (irritable bowel syndrome)    • Intermittent palpitations    • Irritable bowel syndrome    • Lumbar radiculopathy    • Osteoarthritis    • Vertigo      Present on Admission:  • Cigarette nicotine dependence without complication  • Essential hypertension  • GERD (gastroesophageal reflux disease)  • Mild persistent asthma with exacerbation  • Stage 3b chronic kidney disease (HCC)  • Vitamin D deficiency      Admitting Diagnosis: Mediastinal mass [J98 59]  Pneumonia [J18 9]  Dyspnea [R06 00]  SOB (shortness of breath) [R06 02]  Goiter [E04 9]  Severe sepsis ([facility] [address] ) [A41 9, R65 20]  Community acquired pneumonia of right upper lobe of lung [J18 9]  Age/Sex: 69 y o  female  Admission Orders:  Scheduled Medications:  amLODIPine, 5 mg, Oral, QPM  azithromycin, 250 mg, Oral, Q24H  budesonide-formoterol, 2 puff, Inhalation, BID  cefTRIAXone, 1,000 mg, Intravenous, Q24H  fluticasone, 2 spray, Nasal, Daily  heparin (porcine), 5,000 Units, Subcutaneous, Q8H [address]  hydrochlorothiazide, 12 5 mg, Oral, QPM  loratadine, 10 mg, Oral, Daily  nicotine, 1 patch, Transdermal, Daily  pantoprazole, 20 mg, Oral, Daily Before Breakfast  potassium chloride, 40 mEq, Oral, Once      Continuous IV Infusions:     PRN Meds:  acetaminophen, 650 mg, Oral, Q6H PRN  albuterol, 2 puff, Inhalation, Q6H PRN  ondansetron, 4 mg, Oral, Q6H PRN        None    Network Utilization Review Department  ATTENTION: Please call with any questions or concerns to [phone] and carefully listen to the prompts so that you are directed to the right person  All voicemails are confidential   Torito Najera all requests for admission clinical reviews, approved or denied determinations and any other requests to dedicated fax number below belonging to the campus where the patient is receiving treatment   List of dedicated fax numbers for the Facilities:  [address] DENIALS (Administrative/Medical Necessity) [phone]   [address] (Maternity/NICU/Pediatrics) [phone]   [address] Maris Hallman [phone]   [facility] [place] [address] [phone]   [address] [address] Ann-Marie Martinez [facility] 28 [phone]   [address] [facility] [address] [facility] [address] 815 [facility] [phone]

## 2023-04-28 ENCOUNTER — TELEPHONE (OUTPATIENT)
Dept: FAMILY MEDICINE CLINIC | Facility: CLINIC | Age: 78
End: 2023-04-28

## 2023-04-28 NOTE — TELEPHONE ENCOUNTER
----- Message from Pam Bautista MD sent at 4/27/2023  3:54 PM EDT -----  Reinaldo Kessler afternoon,    Antoine Estes was just discharged from the hospital today (4/27)  She will need a transition of care appointment within 1-2 weeks  Can you please call patient and schedule   Thank you for your assistance!     -Tiffanie Tom

## 2023-05-01 ENCOUNTER — TRANSITIONAL CARE MANAGEMENT (OUTPATIENT)
Dept: FAMILY MEDICINE CLINIC | Facility: CLINIC | Age: 78
End: 2023-05-01

## 2023-05-01 LAB
BACTERIA BLD CULT: NORMAL
BACTERIA BLD CULT: NORMAL

## 2023-05-03 ENCOUNTER — OFFICE VISIT (OUTPATIENT)
Dept: FAMILY MEDICINE CLINIC | Facility: CLINIC | Age: 78
End: 2023-05-03

## 2023-05-03 VITALS
DIASTOLIC BLOOD PRESSURE: 84 MMHG | HEIGHT: 62 IN | WEIGHT: 129.8 LBS | HEART RATE: 88 BPM | BODY MASS INDEX: 23.89 KG/M2 | SYSTOLIC BLOOD PRESSURE: 112 MMHG

## 2023-05-03 DIAGNOSIS — F41.9 ANXIETY: ICD-10-CM

## 2023-05-03 DIAGNOSIS — J45.31 MILD PERSISTENT ASTHMA WITH EXACERBATION: Primary | ICD-10-CM

## 2023-05-03 DIAGNOSIS — E04.1 THYROID NODULE: ICD-10-CM

## 2023-05-03 NOTE — PROGRESS NOTES
Name: Berry Chang      : 2023      MRN: [id number]  Encounter Provider: Mamadou Hamilton MD  Encounter Date: 5/3/2023   Encounter department: [address] [place] [address]  Mild persistent asthma with exacerbation  Assessment & Plan:  - Last exacerbation  hospital admission required prednisone x5 days and azithromycin x5 days  States symptoms have improved,   - Reports good compliance with her home inhalers  Symbicort 2 puffs bid and albuterol inhaler prn   - No wheezing and normal breath sounds on PE   - Requiring albuterol nebulizer 1-2 times a week   - patient smokes aprox one pack a week    Plan:  - Continue Symbicort  - continue albuterol inhaler q6 prn  - Will order PFT's as patient has not had this done and concern for asthma and COPD    - discussed smoking cessation at this visit  Patient not ready to quit yet, however, is considering cutting down on cigarette use  Is interested in nicotine patches at the time she is ready to work on smoking cessation    - Will follow up in 3 months     Orders:  -     Complete PFT with post bronchodilator; Future    2  Anxiety  Assessment & Plan:  Increased anxiety x1 month  Associated with difficulty falling asleep, racing thoughts, difficulty concentrating, and feeling shaky  Pulse 96 in clinic today  Patient followed with therapy and psychiatrist in the past, however, [facility] psychiatrist retired and has not seen psychiatrist or therapist in over a year  States was rx'd xanax in the past and would like to continue this treatment  Patient rx'd escitalopram 10 mg at previous visit however patient did not begin taking medication due to concern of side effects  TATIANNA score of 15 today       Plan:  - Will refer to psychiatry   - Will refer for appointment with clinical psychologist Next scheduled follow up with  Dr Dominic Reyna   - Offered to prescribe patient Remeron prn for sleep however patient declined as she states she does not want to start any new medications due to concern for side effects    - Patient does not wish to trial SSRI due to concern for side effects  Orders:  -     Ambulatory Referral to Psychiatry; Future    3  Thyroid nodule  Assessment & Plan:  Incidental finding on chest CTA of 8 mm anterior mediastinal nodule concerning for lymph node or thymoma  Also noted goiter on CTA  TSH 4/27/23 low 0 375  T4  4/26/23 wnl 1 07  Asymptomatic, no family hx of thyroid cancer  Plan: Will order ultrasound of thyroid  Will follow up with results  Orders:  -     US thyroid; Future; Expected date: 05/03/2023         Subjective      Patient presents to clinic today to follow up from hospital admission for asthma exacerbation with imaging concerning for CAP  Patient completed a 5 day course of prednisone and a total course of azithromycin of five days  She states she is feeling much better at this time and notes that she is not experiencing SOB and her congestion has improved as well  Patient states the only symptom that is still occurring is cough, mainly occurring in AM  Patient does not that she continues to smoke  Patient denies fevers, chills, or malaise, cp, n/v at this time  During admission patient had an incidental finding of 8 mm anterior mediastinal nodule  She denies symptoms of hyper or hypo thyroid  Patient denesi hx of thyroid cancer in family  Patient states she continues to have anxiety for the past month  Symptoms of anxiety have interfered with her ability to sleep  She states for the past month she has felt shaky and preoccupied with worry  Patient was prescribed lexapro , however, she did not start taking this medication as she is concerned about side effects  Patient states she feels her anxiety is related to increased stress in her life with responsibilities as well as things going on with her children and grandchildren that she is responsible for   She states she saw therapy and psychiatrist in the past for anxiety but has not seen either in several years  Review of Systems   Constitutional: Negative for chills and fever  HENT: Negative for congestion, rhinorrhea and sore throat  Eyes: Negative for redness and visual disturbance  Respiratory: Positive for cough  Negative for shortness of breath and wheezing  Cardiovascular: Negative for chest pain and palpitations  Gastrointestinal: Negative for abdominal pain, constipation and nausea  Endocrine: Negative for cold intolerance, heat intolerance and polydipsia  Genitourinary: Negative for difficulty urinating and dysuria  Musculoskeletal: Negative for arthralgias and myalgias  Skin: Negative for rash  Allergic/Immunologic: Negative for environmental allergies and food allergies  Neurological: Negative for dizziness and headaches  Current Outpatient Medications on File Prior to Visit   Medication Sig    acetaminophen (TYLENOL) 500 mg tablet Take 1 tablet (500 mg total) by mouth every 6 (six) hours as needed for mild pain    albuterol (2 5 mg/3 mL) 0 083 % nebulizer solution Take 3 mL (2 5 mg total) by nebulization every 4 (four) hours as needed for wheezing or shortness of breath    albuterol (PROVENTIL HFA,VENTOLIN HFA) 90 mcg/act inhaler Inhale 2 puffs every 6 (six) hours as needed for wheezing or shortness of breath    amLODIPine (NORVASC) 5 mg tablet Take 1 tablet (5 mg total) by mouth daily    azelastine (ASTELIN) 0 1 % nasal spray 2 sprays into each nostril 2 (two) times a day Use in each nostril as directed    budesonide-formoterol (Symbicort) 80-4 5 MCG/ACT inhaler Inhale 2 puffs 2 (two) times a day Rinse mouth after use      Cholecalciferol (Vitamin D) 50 MCG (2000 UT) CAPS Take 1 capsule (2,000 Units total) by mouth daily    fexofenadine (ALLEGRA) 180 MG tablet Take 180 mg by mouth daily    fluticasone (FLONASE) 50 mcg/act nasal spray 2 sprays into each nostril daily    folic acid "(FOLVITE) 1 mg tablet take 1 tablet by mouth daily (Patient taking differently: as needed)    hydrochlorothiazide (HYDRODIURIL) 12 5 mg tablet Take 1 tablet (12 5 mg total) by mouth daily    hydrocortisone (ANUSOL-HC) 2 5 % rectal cream Apply topically 2 (two) times a day    pantoprazole (PROTONIX) 20 mg tablet Take 1 tablet (20 mg total) by mouth daily    benzonatate (TESSALON PERLES) 100 mg capsule Take 1 capsule (100 mg total) by mouth 3 (three) times a day as needed for cough (Patient not taking: Reported on 5/3/2023)    Blood Pressure Monitoring (BLOOD PRESSURE CUFF) MISC by Does not apply route 2 (two) times a day    escitalopram (LEXAPRO) 10 mg tablet Take 1 tablet (10 mg total) by mouth daily (Patient not taking: Reported on 5/3/2023)    guaiFENesin (MUCINEX) 600 mg 12 hr tablet Take 2 tablets (1,200 mg total) by mouth every 12 (twelve) hours for 7 days (Patient not taking: Reported on 5/3/2023)    loratadine (CLARITIN) 10 mg tablet Take 1 tablet (10 mg total) by mouth daily    olopatadine (PATANOL) 0 1 % ophthalmic solution Administer 1 drop to both eyes 2 (two) times a day (Patient not taking: Reported on 5/3/2023)    ondansetron (Zofran ODT) 4 mg disintegrating tablet Take 1 tablet (4 mg total) by mouth every 6 (six) hours as needed for nausea or vomiting (Patient not taking: Reported on 5/3/2023)       Objective     /84   Pulse 88   Ht 5' 2 4\" (1 585 m)   Wt 58 9 kg (129 lb 12 8 oz)   BMI 23 44 kg/m²     Physical Exam  Constitutional:       General: She is not in acute distress  Appearance: Normal appearance  HENT:      Head: Normocephalic and atraumatic  Cardiovascular:      Rate and Rhythm: Normal rate and regular rhythm  Pulses: Normal pulses  Heart sounds: No murmur heard  Pulmonary:      Effort: Pulmonary effort is normal  No respiratory distress  Breath sounds: Normal breath sounds  No wheezing, rhonchi or rales     Abdominal:      General: Bowel sounds " are normal       Palpations: Abdomen is soft  Tenderness: There is no abdominal tenderness  Musculoskeletal:         General: Normal range of motion  Cervical back: Normal range of motion  Right lower leg: No edema  Left lower leg: No edema  Skin:     General: Skin is warm  Neurological:      General: No focal deficit present  Mental Status: She is alert and oriented to person, place, and time         Azalia Will MD

## 2023-05-03 NOTE — ASSESSMENT & PLAN NOTE
Increased anxiety x1 month  Associated with difficulty falling asleep, racing thoughts, difficulty concentrating, and feeling shaky  Pulse 96 in clinic today  Patient followed with therapy and psychiatrist in the past, however, [facility] [place] psychiatrist retired and has not seen psychiatrist or therapist in over a year  States was rx'd xanax in the past and would like to continue this treatment  Patient rx'd escitalopram 10 mg at previous visit however patient did not begin taking medication due to concern of side effects  TATIANNA score of 15 today  Plan:  - Will refer to psychiatry   - Will refer for appointment with clinical psychologist Next scheduled follow up with  Dr Marleny Shrestha   - Offered to prescribe patient Remeron prn for sleep however patient declined as she states she does not want to start any new medications due to concern for side effects    - Patient does not wish to trial SSRI due to concern for side effects

## 2023-05-03 NOTE — ASSESSMENT & PLAN NOTE
- Last exacerbation 4/26 hospital admission required prednisone x5 days and azithromycin x5 days  States symptoms have improved,   - Reports good compliance with her home inhalers  Symbicort 2 puffs bid and albuterol inhaler prn   - No wheezing and normal breath sounds on PE   - Requiring albuterol nebulizer 1-2 times a week   - patient smokes aprox one pack a week    Plan:  - Continue Symbicort  - continue albuterol inhaler q6 prn  - Will order PFT's as patient has not had this done and concern for asthma and COPD    - discussed smoking cessation at this visit  Patient not ready to quit yet, however, is considering cutting down on cigarette use   Is interested in nicotine patches at the time she is ready to work on smoking cessation    - Will follow up in 3 months

## 2023-05-03 NOTE — ASSESSMENT & PLAN NOTE
Incidental finding on chest CTA of 8 mm anterior mediastinal nodule concerning for lymph node or thymoma  Also noted goiter on CTA  TSH 4/27/23 low 0 375  T4  4/26/23 wnl 1 07  Asymptomatic, no family hx of thyroid cancer  Plan: Will order ultrasound of thyroid  Will follow up with results

## 2023-05-04 ENCOUNTER — TELEPHONE (OUTPATIENT)
Dept: PSYCHIATRY | Facility: CLINIC | Age: 78
End: 2023-05-04

## 2023-07-01 DIAGNOSIS — J45.30 MILD PERSISTENT ASTHMA: ICD-10-CM

## 2023-07-03 RX ORDER — DILTIAZEM HYDROCHLORIDE 60 MG/1
TABLET, FILM COATED ORAL
Qty: 10.2 G | Refills: 0 | Status: SHIPPED | OUTPATIENT
Start: 2023-07-03

## 2023-07-06 ENCOUNTER — CLINICAL SUPPORT (OUTPATIENT)
Dept: FAMILY MEDICINE CLINIC | Facility: CLINIC | Age: 78
End: 2023-07-06

## 2023-07-06 DIAGNOSIS — F41.9 ANXIETY: Primary | ICD-10-CM

## 2023-07-06 NOTE — PROGRESS NOTES
Data    Devonte Gill was referred to me by her family physician. In the room with me today is Dr. Africa Zayas, who conducted most of the interview. Devonte Gill reported a chief complaint of anxiety. She explains that her anxiety is mostly somatic(shoulder tightness) and cognitive (worrying thoughts). In the past she saw a psychiatrist and therapist. She was prescribed xanax as needed. She reported that this intervention was effective for her. Per Devonte Gill, she has stopped seeing her psychiatrist because she retired, and the place she used to go was closed. She also reported sleep disturbance associated with anxiety. She wakes up in the middle of the night with heart palpitations. She finds it difficult to fall asleep again, and engages in this cycle multiple times during the night. During this visit, we addressed some SDOH issues that are contributing to her anxiety. Food insecurity: she reported speaking to sw before, and that the food pantry is not a good option for her. She has a sensitive stomach and the food at the pantry are often . She does have food stamps. Housing: she does have stable housing. However, she feels constricted due to house regulations and inability to express herself. Transportation; she has a car and can come to appts. Brief genogram  Partner  She does not have a partner. Children   one daughter somewhat of a close relationship  Grandchild  Her grandson is autistic. She takes care of him at times. IP  She has limited support system. She explained that her family only creates drama and the same goes for her friends. Assessment & Plan    Patient reports multiple sources of anxiety. Most of her anxiety is manifested somatically and cognitively. The anxiety also disturbs her sleep pattern. She denied nightmares. Proposed to patient the use of worrying schedule as an strategy to cope with anxiety. She was not interested in that.  Propose to patient practicing with boundaries to manage relational anxiety. She was not interested in that either. She reported being interested in connecting with a psychiatrist and therapist to manage her symptoms. I will place a referral to social work. Additionally, I provided her with the number for preventive measures.

## 2023-07-22 ENCOUNTER — APPOINTMENT (EMERGENCY)
Dept: RADIOLOGY | Facility: HOSPITAL | Age: 78
End: 2023-07-22
Payer: MEDICARE

## 2023-07-22 ENCOUNTER — HOSPITAL ENCOUNTER (EMERGENCY)
Facility: HOSPITAL | Age: 78
Discharge: HOME | End: 2023-07-22
Attending: EMERGENCY MEDICINE
Payer: MEDICARE

## 2023-07-22 VITALS
RESPIRATION RATE: 18 BRPM | OXYGEN SATURATION: 97 % | SYSTOLIC BLOOD PRESSURE: 136 MMHG | DIASTOLIC BLOOD PRESSURE: 81 MMHG | HEART RATE: 106 BPM | TEMPERATURE: 98.6 F

## 2023-07-22 DIAGNOSIS — J34.89 SINUS PAIN: ICD-10-CM

## 2023-07-22 DIAGNOSIS — J45.901 ASTHMA EXACERBATION: Primary | ICD-10-CM

## 2023-07-22 LAB
2HR DELTA HS TROPONIN: -1 NG/L
ANION GAP SERPL CALCULATED.3IONS-SCNC: 6 MMOL/L
BASOPHILS # BLD AUTO: 0.05 THOUSANDS/ÂΜL (ref 0–0.1)
BASOPHILS NFR BLD AUTO: 0 % (ref 0–1)
BUN SERPL-MCNC: 18 MG/DL (ref 5–25)
CALCIUM SERPL-MCNC: 8.8 MG/DL (ref 8.3–10.1)
CARDIAC TROPONIN I PNL SERPL HS: 10 NG/L
CARDIAC TROPONIN I PNL SERPL HS: 11 NG/L
CHLORIDE SERPL-SCNC: 111 MMOL/L (ref 96–108)
CO2 SERPL-SCNC: 24 MMOL/L (ref 21–32)
CREAT SERPL-MCNC: 1.35 MG/DL (ref 0.6–1.3)
EOSINOPHIL # BLD AUTO: 0.23 THOUSAND/ÂΜL (ref 0–0.61)
EOSINOPHIL NFR BLD AUTO: 2 % (ref 0–6)
ERYTHROCYTE [DISTWIDTH] IN BLOOD BY AUTOMATED COUNT: 15 % (ref 11.6–15.1)
GFR SERPL CREATININE-BSD FRML MDRD: 37 ML/MIN/1.73SQ M
GLUCOSE SERPL-MCNC: 99 MG/DL (ref 65–140)
HCT VFR BLD AUTO: 37 % (ref 34.8–46.1)
HGB BLD-MCNC: 12.6 G/DL (ref 11.5–15.4)
IMM GRANULOCYTES # BLD AUTO: 0.05 THOUSAND/UL (ref 0–0.2)
IMM GRANULOCYTES NFR BLD AUTO: 0 % (ref 0–2)
LYMPHOCYTES # BLD AUTO: 1.91 THOUSANDS/ÂΜL (ref 0.6–4.47)
LYMPHOCYTES NFR BLD AUTO: 17 % (ref 14–44)
MCH RBC QN AUTO: 30.1 PG (ref 26.8–34.3)
MCHC RBC AUTO-ENTMCNC: 34.1 G/DL (ref 31.4–37.4)
MCV RBC AUTO: 89 FL (ref 82–98)
MONOCYTES # BLD AUTO: 0.66 THOUSAND/ÂΜL (ref 0.17–1.22)
MONOCYTES NFR BLD AUTO: 6 % (ref 4–12)
NEUTROPHILS # BLD AUTO: 8.39 THOUSANDS/ÂΜL (ref 1.85–7.62)
NEUTS SEG NFR BLD AUTO: 75 % (ref 43–75)
NRBC BLD AUTO-RTO: 0 /100 WBCS
PLATELET # BLD AUTO: 306 THOUSANDS/UL (ref 149–390)
PMV BLD AUTO: 11.8 FL (ref 8.9–12.7)
POTASSIUM SERPL-SCNC: 3.3 MMOL/L (ref 3.5–5.3)
RBC # BLD AUTO: 4.18 MILLION/UL (ref 3.81–5.12)
SODIUM SERPL-SCNC: 141 MMOL/L (ref 135–147)
WBC # BLD AUTO: 11.29 THOUSAND/UL (ref 4.31–10.16)

## 2023-07-22 PROCEDURE — 36415 COLL VENOUS BLD VENIPUNCTURE: CPT

## 2023-07-22 PROCEDURE — 96374 THER/PROPH/DIAG INJ IV PUSH: CPT

## 2023-07-22 PROCEDURE — 93005 ELECTROCARDIOGRAM TRACING: CPT

## 2023-07-22 PROCEDURE — 80048 BASIC METABOLIC PNL TOTAL CA: CPT

## 2023-07-22 PROCEDURE — 99285 EMERGENCY DEPT VISIT HI MDM: CPT

## 2023-07-22 PROCEDURE — 85025 COMPLETE CBC W/AUTO DIFF WBC: CPT

## 2023-07-22 PROCEDURE — 71046 X-RAY EXAM CHEST 2 VIEWS: CPT

## 2023-07-22 PROCEDURE — 94644 CONT INHLJ TX 1ST HOUR: CPT

## 2023-07-22 PROCEDURE — 84484 ASSAY OF TROPONIN QUANT: CPT

## 2023-07-22 RX ORDER — SODIUM CHLORIDE FOR INHALATION 0.9 %
3 VIAL, NEBULIZER (ML) INHALATION ONCE
Status: COMPLETED | OUTPATIENT
Start: 2023-07-22 | End: 2023-07-22

## 2023-07-22 RX ORDER — AZITHROMYCIN 250 MG/1
TABLET, FILM COATED ORAL
Qty: 6 TABLET | Refills: 0 | Status: SHIPPED | OUTPATIENT
Start: 2023-07-22 | End: 2023-07-26

## 2023-07-22 RX ORDER — METHYLPREDNISOLONE SODIUM SUCCINATE 125 MG/2ML
125 INJECTION, POWDER, LYOPHILIZED, FOR SOLUTION INTRAMUSCULAR; INTRAVENOUS ONCE
Status: COMPLETED | OUTPATIENT
Start: 2023-07-22 | End: 2023-07-22

## 2023-07-22 RX ORDER — AZITHROMYCIN 250 MG/1
500 TABLET, FILM COATED ORAL ONCE
Status: COMPLETED | OUTPATIENT
Start: 2023-07-22 | End: 2023-07-22

## 2023-07-22 RX ORDER — PREDNISONE 20 MG/1
40 TABLET ORAL DAILY
Qty: 8 TABLET | Refills: 0 | Status: SHIPPED | OUTPATIENT
Start: 2023-07-22 | End: 2023-07-30

## 2023-07-22 RX ADMIN — AZITHROMYCIN MONOHYDRATE 500 MG: 250 TABLET ORAL at 21:41

## 2023-07-22 RX ADMIN — Medication 3 ML: at 19:46

## 2023-07-22 RX ADMIN — METHYLPREDNISOLONE SODIUM SUCCINATE 125 MG: 125 INJECTION, POWDER, FOR SOLUTION INTRAMUSCULAR; INTRAVENOUS at 19:46

## 2023-07-22 RX ADMIN — IPRATROPIUM BROMIDE 1 MG: 0.5 SOLUTION RESPIRATORY (INHALATION) at 19:46

## 2023-07-22 RX ADMIN — ALBUTEROL SULFATE 10 MG: 2.5 SOLUTION RESPIRATORY (INHALATION) at 19:46

## 2023-07-23 LAB
ATRIAL RATE: 104 BPM
P AXIS: 70 DEGREES
PR INTERVAL: 138 MS
QRS AXIS: 11 DEGREES
QRSD INTERVAL: 90 MS
QT INTERVAL: 352 MS
QTC INTERVAL: 462 MS
T WAVE AXIS: 55 DEGREES
VENTRICULAR RATE: 104 BPM

## 2023-07-23 PROCEDURE — 93010 ELECTROCARDIOGRAM REPORT: CPT | Performed by: INTERNAL MEDICINE

## 2023-07-23 NOTE — ED PROVIDER NOTES
History  Chief Complaint   Patient presents with   • Asthma     X2 days with congestion and coughing. Also c/o L shoulder pain X 3 days     23-year-old female with past medical history significant for asthma, hypertension, long-term smoking history is now presenting with several days of worsening sinus congestion, and now shortness of breath. Patient does report that the symptoms are similar to prior asthma exacerbations. She states she does feel like she is wheezing more than normal.  She has been using her albuterol inhaler more often over the past few days. She is denying any fever, chills, change in her baseline cough, increased sputum production, or any abdominal pain, nausea, vomiting, diarrhea, or constipation. Patient does report some feelings of chest tightness which she says are consistent with prior asthma exacerbations. She did report during triage that she was having some left shoulder pain which on further questioning seems to be localized to the left trapezius area and is very easily replicable with movement of the head and palpation of the left trapezius. Prior to Admission Medications   Prescriptions Last Dose Informant Patient Reported? Taking?    Blood Pressure Monitoring (BLOOD PRESSURE CUFF) MISC  Self No No   Sig: by Does not apply route 2 (two) times a day   Patient not taking: Reported on 7/24/2023   Cholecalciferol (Vitamin D) 50 MCG (2000 UT) CAPS   No No   Sig: Take 1 capsule (2,000 Units total) by mouth daily   Symbicort 80-4.5 MCG/ACT inhaler   No No   Sig: inhale 2 puffs by mouth twice a day Rinse mouth after use   acetaminophen (TYLENOL) 500 mg tablet  Self No No   Sig: Take 1 tablet (500 mg total) by mouth every 6 (six) hours as needed for mild pain   albuterol (2.5 mg/3 mL) 0.083 % nebulizer solution   No No   Sig: Take 3 mL (2.5 mg total) by nebulization every 4 (four) hours as needed for wheezing or shortness of breath   albuterol (PROVENTIL HFA,VENTOLIN HFA) 90 mcg/act inhaler   No No   Sig: Inhale 2 puffs every 6 (six) hours as needed for wheezing or shortness of breath   amLODIPine (NORVASC) 5 mg tablet   No No   Sig: Take 1 tablet (5 mg total) by mouth daily   azelastine (ASTELIN) 0.1 % nasal spray   No No   Si sprays into each nostril 2 (two) times a day Use in each nostril as directed   benzonatate (TESSALON PERLES) 100 mg capsule   No No   Sig: Take 1 capsule (100 mg total) by mouth 3 (three) times a day as needed for cough   Patient not taking: Reported on 5/3/2023   escitalopram (LEXAPRO) 10 mg tablet   No No   Sig: Take 1 tablet (10 mg total) by mouth daily   Patient not taking: Reported on 5/3/2023   fexofenadine (ALLEGRA) 180 MG tablet  Self Yes No   Sig: Take 180 mg by mouth daily   fluticasone (FLONASE) 50 mcg/act nasal spray   No No   Si sprays into each nostril daily   folic acid (FOLVITE) 1 mg tablet  Self No No   Sig: take 1 tablet by mouth daily   Patient taking differently: as needed   hydrochlorothiazide (HYDRODIURIL) 12.5 mg tablet   No No   Sig: Take 1 tablet (12.5 mg total) by mouth daily   hydrocortisone (ANUSOL-HC) 2.5 % rectal cream  Self No No   Sig: Apply topically 2 (two) times a day   loratadine (CLARITIN) 10 mg tablet  Self No No   Sig: Take 1 tablet (10 mg total) by mouth daily   Patient not taking: Reported on 2023   olopatadine (PATANOL) 0.1 % ophthalmic solution  Self No No   Sig: Administer 1 drop to both eyes 2 (two) times a day   Patient not taking: Reported on 5/3/2023   pantoprazole (PROTONIX) 20 mg tablet   No No   Sig: Take 1 tablet (20 mg total) by mouth daily   Patient not taking: Reported on 2023      Facility-Administered Medications: None       Past Medical History:   Diagnosis Date   • Asthma    • Asthmatic bronchitis     Last Assessed: 10/7/2014    • Chronic diarrhea     Last Assessed: 2015    • Fibromyalgia    • Focal nodular hyperplasia of liver     Last Assessed: 2015   • Herpes zoster     Last Assessed: 3/18/2016   • Hypertension    • IBS (irritable bowel syndrome)    • Intermittent palpitations    • Irritable bowel syndrome    • Lumbar radiculopathy    • Osteoarthritis    • Vertigo        Past Surgical History:   Procedure Laterality Date   • BREAST BIOPSY     • SMALL INTESTINE SURGERY         Family History   Problem Relation Age of Onset   • Heart attack Mother    • Other Mother         Aspiration of Vomit    • Asthma Father    • Breast cancer Sister    • Arthritis Family    • Other Family         Musculoskeletal Disease    • Osteoporosis Family      I have reviewed and agree with the history as documented. E-Cigarette/Vaping   • E-Cigarette Use Never User      E-Cigarette/Vaping Substances   • Nicotine No    • THC No    • CBD No    • Flavoring No    • Other No    • Unknown No      Social History     Tobacco Use   • Smoking status: Every Day     Packs/day: 0.50     Types: Cigarettes   • Smokeless tobacco: Never   Vaping Use   • Vaping Use: Never used   Substance Use Topics   • Alcohol use: No   • Drug use: No        Review of Systems   Constitutional: Positive for fatigue. Negative for chills and fever. HENT: Positive for sinus pain. Negative for ear pain and sore throat. Eyes: Negative for pain and visual disturbance. Respiratory: Positive for cough (baseline), chest tightness, shortness of breath and wheezing. Negative for apnea. Cardiovascular: Negative for palpitations and leg swelling. Gastrointestinal: Negative for abdominal pain and vomiting. Genitourinary: Negative for dysuria and hematuria. Musculoskeletal: Negative for arthralgias and back pain. Skin: Negative for color change and rash. Neurological: Negative for seizures and syncope. All other systems reviewed and are negative.       Physical Exam  ED Triage Vitals [07/22/23 1824]   Temperature Pulse Respirations Blood Pressure SpO2   98.6 °F (37 °C) (!) 106 18 136/81 97 %      Temp Source Heart Rate Source Patient Position - Orthostatic VS BP Location FiO2 (%)   Oral Monitor -- -- --      Pain Score       7             Orthostatic Vital Signs  Vitals:    07/22/23 1824   BP: 136/81   Pulse: (!) 106       Physical Exam  Vitals and nursing note reviewed. Constitutional:       General: She is not in acute distress. Appearance: Normal appearance. She is well-developed and normal weight. She is not ill-appearing. HENT:      Head: Normocephalic and atraumatic. Right Ear: External ear normal.      Left Ear: External ear normal.      Nose: Nose normal. No congestion or rhinorrhea. Mouth/Throat:      Mouth: Mucous membranes are moist.      Pharynx: Oropharynx is clear. No oropharyngeal exudate or posterior oropharyngeal erythema. Eyes:      General: No scleral icterus. Extraocular Movements: Extraocular movements intact. Conjunctiva/sclera: Conjunctivae normal.      Pupils: Pupils are equal, round, and reactive to light. Cardiovascular:      Rate and Rhythm: Regular rhythm. Tachycardia present. Pulses: Normal pulses. Heart sounds: Normal heart sounds. No murmur heard. Pulmonary:      Effort: No respiratory distress. Breath sounds: Wheezing present. No rhonchi. Abdominal:      General: Abdomen is flat. There is no distension. Palpations: Abdomen is soft. Tenderness: There is no abdominal tenderness. There is no guarding. Musculoskeletal:         General: No swelling. Cervical back: Neck supple. No rigidity. Right lower leg: No edema. Left lower leg: No edema. Lymphadenopathy:      Cervical: No cervical adenopathy. Skin:     General: Skin is warm and dry. Capillary Refill: Capillary refill takes less than 2 seconds. Coloration: Skin is not jaundiced. Findings: No rash. Neurological:      General: No focal deficit present. Mental Status: She is alert and oriented to person, place, and time. Mental status is at baseline.    Psychiatric: Mood and Affect: Mood normal.         Behavior: Behavior normal.         ED Medications  Medications   albuterol inhalation solution 10 mg (10 mg Nebulization Given 7/1946)     And   ipratropium (ATROVENT) 0.02 % inhalation solution 1 mg (1 mg Nebulization Given 7/1946)     And   sodium chloride 0.9 % inhalation solution 3 mL (3 mL Nebulization Given 7/1946)   methylPREDNISolone sodium succinate (Solu-MEDROL) injection 125 mg (125 mg Intravenous Given 7/1946)   azithromycin (ZITHROMAX) tablet 500 mg (500 mg Oral Given 7/22/23 2141)       Diagnostic Studies  Results Reviewed     Procedure Component Value Units Date/Time    HS Troponin I 2hr [[id number]]  (Normal) Collected: 07/22/23 2215    Lab Status: Final result Specimen: Blood from Arm, Right Updated: 07/22/23 2257     hs TnI 2hr 10 ng/L      Delta 2hr hsTnI -1 ng/L     HS Troponin 0hr (reflex protocol) [[id number]]  (Normal) Collected: 07/22/23 2027    Lab Status: Final result Specimen: Blood from Arm, Right Updated: 07/22/23 2116     hs TnI 0hr 11 ng/L     Basic metabolic panel [[id number]]  (Abnormal) Collected: 07/1945    Lab Status: Final result Specimen: Blood from Arm, Right Updated: 07/22/23 2012     Sodium 141 mmol/L      Potassium 3.3 mmol/L      Chloride 111 mmol/L      CO2 24 mmol/L      ANION GAP 6 mmol/L      BUN 18 mg/dL      Creatinine 1.35 mg/dL      Glucose 99 mg/dL      Calcium 8.8 mg/dL      eGFR 37 ml/min/1.73sq m     Narrative:      [place] guidelines for Chronic Kidney Disease (CKD):   •  Stage 1 with normal or high GFR (GFR > 90 mL/min/1.73 square meters)  •  Stage 2 Mild CKD (GFR = 60-89 mL/min/1.73 square meters)  •  Stage 3A Moderate CKD (GFR = 45-59 mL/min/1.73 square meters)  •  Stage 3B Moderate CKD (GFR = 30-44 mL/min/1.73 square meters)  •  Stage 4 Severe CKD (GFR = 15-29 mL/min/1.73 square meters)  •  Stage 5 End Stage CKD (GFR <15 mL/min/1.73 square meters)  Note: GFR calculation is accurate only with a steady state creatinine    CBC and differential [[id number]]  (Abnormal) Collected: 07/1945    Lab Status: Final result Specimen: Blood from Arm, Right Updated: 07/22/23 2004     WBC 11.29 Thousand/uL      RBC 4.18 Million/uL      Hemoglobin 12.6 g/dL      Hematocrit 37.0 %      MCV 89 fL      MCH 30.1 pg      MCHC 34.1 g/dL      RDW 15.0 %      MPV 11.8 fL      Platelets 877 Thousands/uL      nRBC 0 /100 WBCs      Neutrophils Relative 75 %      Immat GRANS % 0 %      Lymphocytes Relative 17 %      Monocytes Relative 6 %      Eosinophils Relative 2 %      Basophils Relative 0 %      Neutrophils Absolute 8.39 Thousands/µL      Immature Grans Absolute 0.05 Thousand/uL      Lymphocytes Absolute 1.91 Thousands/µL      Monocytes Absolute 0.66 Thousand/µL      Eosinophils Absolute 0.23 Thousand/µL      Basophils Absolute 0.05 Thousands/µL                  XR chest 2 views   ED Interpretation by Anahi Cai DO (07/22 2031)   Chest x-ray interpreted by me, shows no acute cardiopulmonary disease, no acute change from April 26, 2023      Final Result by Andry Gipson MD (07/23 1101)      No acute cardiopulmonary disease. 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 Workstation performed: [id number]               Procedures  Procedures      ED Course  ED Course as of 07/25/23 0109   Sat Jul 22, 2023 1933 ECG interpreted by me. Date: 7/22/2023. Time: 8907. Rate: 104 bpm.  Axis: Normal.  Rhythm: Regular. There are T wave inversions and flattening noted in aVL, lead III, and slight ST depression noted in V4 and V5. No reciprocal elevations. No pathologic Q waves. Interpretation: Abnormal ECG, seems similar to prior from 4/26/2023.   2111 Patient reassessed at bedside, having significantly improved symptoms after long nebulizer treatment. We will continue to evaluate cardiac work-up.   However if negative for acute ischemia, may be appropriate for discharge with close PCP follow-up and strict return precautions. If discharging, would prescribe course of steroids, ensure that patient has a spacer device for her albuterol inhaler, azithromycin course for 5 days, and ensure the patient has enough supplies at home to make it through the weekend at least.    2119 hs TnI 0hr: 11  Needs delta                               SBIRT 22yo+    Flowsheet Row Most Recent Value   Initial Alcohol Screen: US AUDIT-C     1. How often do you have a drink containing alcohol? 0 Filed at: 07/22/2023 1826   2. How many drinks containing alcohol do you have on a typical day you are drinking? 0 Filed at: 07/22/2023 1826   3a. Male UNDER 65: How often do you have five or more drinks on one occasion? 0 Filed at: 07/22/2023 1826   3b. FEMALE Any Age, or MALE 65+: How often do you have 4 or more drinks on one occassion? 0 Filed at: 07/22/2023 1826   Audit-C Score 0 Filed at: 07/22/2023 1826   PASTOR: How many times in the past year have you. .. Used an illegal drug or used a prescription medication for non-medical reasons? Never Filed at: 07/22/2023 1826                Medical Decision Making  Assessment/plan: 20-year-old female with known history of asthma now presenting with shortness of breath that has been progressively worsening for the past few days, not sudden in onset, no leg swelling, no recent hospitalizations since April, no recent immobilizations, no recent surgeries, no unintentional weight loss, is having progressive wheezing and chest tightness as well. Suspicious for asthma exacerbation, cannot exclude infectious causes such as pneumonia, cannot exclude pneumothorax, will evaluate further with CBC, BMP, chest x-ray, and will treat empirically for now with dose of steroid and albuterol/Pratropium inhalation solution. Will reevaluate frequently. Patient is chest tightness will be evaluated with troponin and ECG.     Reassessment/disposition: Patient has only minimal wheezing on reexamination, vital signs have improved significantly, shows no signs of increased work of breathing at this time. Patient was able to exert without any significant desaturation event and maintain saturations above 92% at all times. Will discharge back into care of her family members who agreed that they will stay with her and observe her closely throughout the evening. They will ensure that she takes her inhalers correctly (we reviewed at length the proper way to use albuterol inhaler) and discharged on course of prednisone and azithromycin for asthma/COPD exacerbation. Patient and family member/caretaker were told to bring patient immediately back to emergency department if she were to demonstrate any signs of increased work of breathing which includes tachypnea, accessory muscle use, or confusion/altered mental status. They expressed understanding of these return precautions and agreed to follow-up plan with PCP and pulmonology as described above. Amount and/or Complexity of Data Reviewed  Labs: ordered. Decision-making details documented in ED Course. Radiology: ordered and independent interpretation performed. Risk  Prescription drug management. Disposition  Final diagnoses:   Asthma exacerbation   Sinus pain     Time reflects when diagnosis was documented in both MDM as applicable and the Disposition within this note     Time User Action Codes Description Comment    7/22/2023  9:07 PM [facility] Add [Z27.350] Asthma exacerbation     7/22/2023  9:07 PM [facility] Add [J34.89] Sinus pain       ED Disposition     ED Disposition   Discharge    Condition   Stable    Date/Time   Sat Jul 22, 2023 11:05 PM    Comment   Purvis Skiff discharge to home/self care.                Follow-up Information     Follow up With Specialties Details Why Contact Info Additional [address] [place] [address] Medicine Schedule an appointment as soon as possible for a visit  for follow up [address]  [address], [address] [facility], [place], [place], [address] [facility] Emergency Department Emergency Medicine Go to  As needed, If symptoms worsen [address] [id number]  [facility] Emergency Department, [address], [place], [place], [facility]          Discharge Medication List as of 7/22/2023 11:05 PM      START taking these medications    Details   azithromycin (ZITHROMAX) 250 mg tablet Take 2 tablets today then 1 tablet daily x 4 days, Normal      predniSONE 20 mg tablet Take 2 tablets (40 mg total) by mouth daily, Starting Sat 7/22/2023, Normal         CONTINUE these medications which have NOT CHANGED    Details   acetaminophen (TYLENOL) 500 mg tablet Take 1 tablet (500 mg total) by mouth every 6 (six) hours as needed for mild pain, Starting Mon 8/27/2018, Normal      albuterol (2.5 mg/3 mL) 0.083 % nebulizer solution Take 3 mL (2.5 mg total) by nebulization every 4 (four) hours as needed for wheezing or shortness of breath, Starting Mon 4/24/2023, Normal      albuterol (PROVENTIL HFA,VENTOLIN HFA) 90 mcg/act inhaler Inhale 2 puffs every 6 (six) hours as needed for wheezing or shortness of breath, Starting Mon 4/24/2023, Normal      amLODIPine (NORVASC) 5 mg tablet Take 1 tablet (5 mg total) by mouth daily, Starting Mon 4/24/2023, Normal      azelastine (ASTELIN) 0.1 % nasal spray 2 sprays into each nostril 2 (two) times a day Use in each nostril as directed, Starting Mon 4/24/2023, Normal      benzonatate (TESSALON PERLES) 100 mg capsule Take 1 capsule (100 mg total) by mouth 3 (three) times a day as needed for cough, Starting u 4/27/2023, Normal      Blood Pressure Monitoring (BLOOD PRESSURE CUFF) MISC by Does not apply route 2 (two) times a day, Starting Wed 6/3/2020, Print      Cholecalciferol (Vitamin D) 50 MCG (2000 UT) CAPS Take 1 capsule (2,000 Units total) by mouth daily, Starting Mon 4/24/2023, Normal      escitalopram (LEXAPRO) 10 mg tablet Take 1 tablet (10 mg total) by mouth daily, Starting Wed 4/19/2023, Until Sat 4/13/2024, Normal      fexofenadine (ALLEGRA) 180 MG tablet Take 180 mg by mouth daily, Historical Med      fluticasone (FLONASE) 50 mcg/act nasal spray 2 sprays into each nostril daily, Starting Mon 1/39/3595, Normal      folic acid (FOLVITE) 1 mg tablet take 1 tablet by mouth daily, Normal      hydrochlorothiazide (HYDRODIURIL) 12.5 mg tablet Take 1 tablet (12.5 mg total) by mouth daily, Starting Mon 4/24/2023, Normal      hydrocortisone (ANUSOL-HC) 2.5 % rectal cream Apply topically 2 (two) times a day, Starting Wed 11/9/2022, Normal      loratadine (CLARITIN) 10 mg tablet Take 1 tablet (10 mg total) by mouth daily, Starting Wed 9/28/2022, Until Mon 4/24/2023, Normal      olopatadine (PATANOL) 0.1 % ophthalmic solution Administer 1 drop to both eyes 2 (two) times a day, Starting Thu 9/1/2022, Normal      pantoprazole (PROTONIX) 20 mg tablet Take 1 tablet (20 mg total) by mouth daily, Starting Mon 4/24/2023, Normal      Symbicort 80-4.5 MCG/ACT inhaler inhale 2 puffs by mouth twice a day Rinse mouth after use, Normal      ondansetron (Zofran ODT) 4 mg disintegrating tablet Take 1 tablet (4 mg total) by mouth every 6 (six) hours as needed for nausea or vomiting, Starting Mon 4/24/2023, Normal           No discharge procedures on file. PDMP Review     None           ED Provider  Attending physically available and evaluated Josiah Zuniga. I managed the patient along with the ED Attending.     Electronically Signed by         Shelley Perdomo MD  07/25/23 4976

## 2023-07-23 NOTE — ED ATTENDING ATTESTATION
7/22/2023  ITara DO, saw and evaluated the patient. I have discussed the patient with the resident/non-physician practitioner and agree with the resident's/non-physician practitioner's findings, Plan of Care, and MDM as documented in the resident's/non-physician practitioner's note, except where noted. All available labs and Radiology studies were reviewed. I was present for key portions of any procedure(s) performed by the resident/non-physician practitioner and I was immediately available to provide assistance. At this point I agree with the current assessment done in the Emergency Department. I have conducted an independent evaluation of this patient a history and physical is as follows:    Patient is a 60-year-old female history of IBS, asthma, hypertension who says for the last 3 days she has had some sinus congestion making it difficult for her to breathe, feeling her asthma is also getting little worse with some increased wheezing and she awoke and noticed that she had discomfort in what she initially described to the nurses his left shoulder but on further questioning its more near the base of the left neck, worse with pressing on or moving. No trauma, no headache, no fever, no chills, no chest pain, no palpitations. She uses a maintenance inhaler as well as an albuterol rescue inhaler without a spacer, and has not been getting significant relief with the rescue inhaler. No fever, no chills, no recent hospitalizations, no recent prolonged travel or immobilizations. No recent antibiotics. General:  Patient is well-appearing  Head:  Atraumatic  Eyes:  Conjunctiva pink  ENT:  Mucous membranes are moist  Neck:  Supple, slight tenderness to the musculature at the base of the left neck. No warmth or redness or abscess formation.   Cardiac:  S1-S2, without murmurs  Lungs: Very trace end expiratory wheeze, no rales, no rhonchi, no accessory muscle usage  Abdomen:  Soft, nontender, normal bowel sounds, no CVA tenderness, no tympany, no rigidity, no guarding  Extremities:  Normal range of motion, no pedal edema or calf asymmetry, radial pulses are equal and symmetric bilaterally  Neurologic:  Awake, fluent speech, normal comprehension, AAOx3  Skin:  Pink warm and dry  Psychiatric:  Alert, pleasant, cooperative      ED Course  ED Course as of 07/22/23 2044   Sat Jul 22, 2023 2031 Chronic renal insufficiency, chronic CO2 elevation. Potassium of 3.3 is most likely secondary to transient shifts from albuterol and not clinically significant. 2040 ECG interpreted by me, sinus rhythm, rate of 104, there is very slight 1 mm ST segment depression in lead II, no ST segment elevation, ECG from April 26, 2023 has inferior lateral ST segment depression which is not present today with the exception of the small ST segment elevation in lead II     XR chest 2 views   ED Interpretation   Chest x-ray interpreted by me, shows no acute cardiopulmonary disease, no acute change from April 26, 2023            I assessed the patient after she had received her breathing treatments, she said she was feeling significantly better after the treatments. Serial cardiac biomarkers are unremarkable, believe this may be a mild COPD exacerbation. Do not believe this represents acute coronary syndrome. No evidence of pneumonia on the chest x-ray. MEDICAL DECISION MAKING CODING      Patient presents with acute new problem with:  Threat to life or bodily function      Chronic conditions affecting care: As per HPI    COLLECTION AND INTERPRETATION OF DATA  I reviewed prior external notes, including April 26, 2023 ECG    I ordered each unique test  Tests reviewed personally by me:  ECG: See my ED course  Labs: See above  Imaging: I independently reviewed the chest x-ray as noted above and found no acute pathology.     Tests considered but not ordered: I do not believe this patient's complaints are from pulmonary embolism and I believe they would most likely be harmed through false positive test results and other complications of testing by further pursuing the diagnosis of pulmonary embolism. RISK  Drugs (OTC, Rx, Controlled substances): Prescription management  All of the patient's current prescription medications should be continued.       Social Determinants of Health:  Presentation to ED outside of business hours or on night shift        Critical Care Time  Procedures

## 2023-07-23 NOTE — DISCHARGE INSTRUCTIONS
Please  and take your prescriptions and monitor your symptoms closely at home. If you are feeling significantly more short of breath, if you are having fevers, or if you are having chest pain please come back to the emergency department. Otherwise please call your family medicine doctor early next week for follow-up appointment as soon as possible. Continue to take your scheduled maintenance inhaler exactly as prescribed and use your albuterol inhaler scheduled for the next 24 hours every 6 hours. Be sure to  your spacer device and let the pharmacist show you how to use. Be sure you give the albuterol enough time in your lungs to get all the way to the edges before exhale.

## 2023-07-24 ENCOUNTER — OFFICE VISIT (OUTPATIENT)
Dept: FAMILY MEDICINE CLINIC | Facility: CLINIC | Age: 78
End: 2023-07-24

## 2023-07-24 ENCOUNTER — TELEPHONE (OUTPATIENT)
Dept: FAMILY MEDICINE CLINIC | Facility: CLINIC | Age: 78
End: 2023-07-24

## 2023-07-24 VITALS
HEIGHT: 62 IN | SYSTOLIC BLOOD PRESSURE: 152 MMHG | HEART RATE: 114 BPM | WEIGHT: 127 LBS | TEMPERATURE: 98.7 F | BODY MASS INDEX: 23.37 KG/M2 | DIASTOLIC BLOOD PRESSURE: 85 MMHG

## 2023-07-24 DIAGNOSIS — Z72.0 TOBACCO ABUSE: ICD-10-CM

## 2023-07-24 DIAGNOSIS — K21.9 GASTROESOPHAGEAL REFLUX DISEASE WITHOUT ESOPHAGITIS: ICD-10-CM

## 2023-07-24 DIAGNOSIS — E04.1 THYROID NODULE: ICD-10-CM

## 2023-07-24 DIAGNOSIS — J45.50 SEVERE PERSISTENT ASTHMA, UNSPECIFIED WHETHER COMPLICATED: Primary | ICD-10-CM

## 2023-07-24 PROCEDURE — 99213 OFFICE O/P EST LOW 20 MIN: CPT | Performed by: FAMILY MEDICINE

## 2023-07-24 PROCEDURE — 3079F DIAST BP 80-89 MM HG: CPT | Performed by: FAMILY MEDICINE

## 2023-07-24 PROCEDURE — 3077F SYST BP >= 140 MM HG: CPT | Performed by: FAMILY MEDICINE

## 2023-07-24 RX ORDER — NICOTINE 21 MG/24HR
1 PATCH, TRANSDERMAL 24 HOURS TRANSDERMAL EVERY 24 HOURS
Qty: 28 PATCH | Refills: 0 | Status: SHIPPED | OUTPATIENT
Start: 2023-07-24

## 2023-07-24 RX ORDER — ONDANSETRON 4 MG/1
4 TABLET, ORALLY DISINTEGRATING ORAL EVERY 6 HOURS PRN
Qty: 20 TABLET | Refills: 3 | Status: SHIPPED | OUTPATIENT
Start: 2023-07-24

## 2023-07-24 NOTE — ASSESSMENT & PLAN NOTE
69 y.o. female presents for acute COPD exacerbation. Chronic asthma with frequent exacerbations   Spirometry: ordered by never done   mMRC score of +1 and COPD AT score of 16 with multiple exacerbations a year not requiring hospitalization, but frequent ED visits. Assessment:   - diffuse rhonchi and SOB during conversation   - currently not requiring O2 at home   - patient was given prednisone as well as azithromycin on discharge to the hospital     Plan:   - will switch daily Symbicort to LABA/LAMA combo with suspicion that patient may have COPD   - recommended continuing daily antihistamine   -Stressed the importance of obtaining PFTs to rule out COPD diagnosis  -Recommended to continue albuterol inhaler every 6 hours tapering tomorrow to every 8 hours and eventually only as needed.

## 2023-07-24 NOTE — ASSESSMENT & PLAN NOTE
Incidental finding on chest CTA of 8 mm anterior mediastinal nodule concerning for lymph node or thymoma. Also noted goiter on CTA. TSH 4/27/23 low 0.375  T4  4/26/23 wnl 1.07  Asymptomatic, no family hx of thyroid cancer. Plan:   Will re-order ultrasound of thyroid as patient was not aware it was placed

## 2023-07-24 NOTE — TELEPHONE ENCOUNTER
Patient was in today, 7/24, to see Dr. Rosario Lovett. Upon checking out, patient asked to follow up on discussion about working with a . Please advise if there is any follow up or appt scheduling to be done. Patient's next appt is with Dr. Mary Davis 8/4.

## 2023-07-24 NOTE — ASSESSMENT & PLAN NOTE
Approximately 60 pack years in total  Chronic severe asthma, suspicion for COPD    Plan:  Patient encouraged to stop smoking and requested nicotine patches sent to the pharmacy  Obtain PFTs to rule out COPD

## 2023-07-24 NOTE — PROGRESS NOTES
Name: Savannah Borden      : [id number]      MRN: [id number]  Encounter Provider: Rebecca Fisher DO  Encounter Date: 2023   Encounter department: [address]     1. Severe persistent asthma, unspecified whether complicated  Assessment & Plan:  69 y.o. female presents for acute COPD exacerbation. Chronic asthma with frequent exacerbations   Spirometry: ordered by never done   mMRC score of +1 and COPD AT score of 16 with multiple exacerbations a year not requiring hospitalization, but frequent ED visits. Assessment:   - diffuse rhonchi and SOB during conversation   - currently not requiring O2 at home   - patient was given prednisone as well as azithromycin on discharge to the hospital     Plan:   - will switch daily Symbicort to LABA/LAMA combo with suspicion that patient may have COPD   - recommended continuing daily antihistamine   -Stressed the importance of obtaining PFTs to rule out COPD diagnosis  -Recommended to continue albuterol inhaler every 6 hours tapering tomorrow to every 8 hours and eventually only as needed. Orders:  -     umeclidinium-vilanterol 62.5-25 mcg/actuation inhaler; Inhale 1 puff daily  -     Complete PFT with post bronchodilator; Future    2. Thyroid nodule  Assessment & Plan:  Incidental finding on chest CTA of 8 mm anterior mediastinal nodule concerning for lymph node or thymoma. Also noted goiter on CTA. TSH 23 low 0.375  T4  23 wnl 1.07  Asymptomatic, no family hx of thyroid cancer. Plan: Will re-order ultrasound of thyroid as patient was not aware it was placed    Orders:  -     US thyroid; Future; Expected date: 2023    3.  Tobacco abuse  Assessment & Plan:  Approximately 60 pack years in total  Chronic severe asthma, suspicion for COPD    Plan:  Patient encouraged to stop smoking and requested nicotine patches sent to the pharmacy  Obtain PFTs to rule out COPD    Orders:  - nicotine (NICODERM CQ) 14 mg/24hr TD 24 hr patch; Place 1 patch on the skin over 24 hours every 24 hours    4. Gastroesophageal reflux disease without esophagitis  -     ondansetron (Zofran ODT) 4 mg disintegrating tablet; Take 1 tablet (4 mg total) by mouth every 6 (six) hours as needed for nausea or vomiting         Subjective      HPI   26-year-old female with severe persistent asthma presents today for an ED follow-up for an asthma exacerbation. She states that she became progressively short of breath to the point where she was unable to breathe comfortably at home. She went to the emergency department and was given steroids as well as azithromycin x5 days. She has now been on steroids for 3 days as well as azithromycin for 2 and states that she is feeling better, but states that she is still short of breath. She states that she is taking a controller inhaler of Symbicort daily, this was switched from an inhaler she was taking previously and states that she noticed more exacerbations from the switch. She was previously on Atrovent daily. She would like to trial switching to a different inhaler and is agreeable to obtaining PFTs. Review of Systems   Constitutional: Negative for chills and fever. HENT: Positive for congestion, postnasal drip, rhinorrhea, sinus pressure and sinus pain. Eyes: Negative for visual disturbance. Respiratory: Positive for cough, chest tightness, shortness of breath and wheezing. Cardiovascular: Negative for chest pain and palpitations. Gastrointestinal: Negative for abdominal pain and vomiting. Genitourinary: Negative for dysuria and hematuria. Musculoskeletal: Negative for arthralgias and back pain. Skin: Negative for color change and rash. Neurological: Negative for dizziness and headaches. All other systems reviewed and are negative.       Current Outpatient Medications on File Prior to Visit   Medication Sig   • acetaminophen (TYLENOL) 500 mg tablet Take 1 tablet (500 mg total) by mouth every 6 (six) hours as needed for mild pain   • albuterol (2.5 mg/3 mL) 0.083 % nebulizer solution Take 3 mL (2.5 mg total) by nebulization every 4 (four) hours as needed for wheezing or shortness of breath   • albuterol (PROVENTIL HFA,VENTOLIN HFA) 90 mcg/act inhaler Inhale 2 puffs every 6 (six) hours as needed for wheezing or shortness of breath   • amLODIPine (NORVASC) 5 mg tablet Take 1 tablet (5 mg total) by mouth daily   • azelastine (ASTELIN) 0.1 % nasal spray 2 sprays into each nostril 2 (two) times a day Use in each nostril as directed   • azithromycin (ZITHROMAX) 250 mg tablet Take 2 tablets today then 1 tablet daily x 4 days   • Cholecalciferol (Vitamin D) 50 MCG (2000 UT) CAPS Take 1 capsule (2,000 Units total) by mouth daily   • fexofenadine (ALLEGRA) 180 MG tablet Take 180 mg by mouth daily   • fluticasone (FLONASE) 50 mcg/act nasal spray 2 sprays into each nostril daily   • folic acid (FOLVITE) 1 mg tablet take 1 tablet by mouth daily (Patient taking differently: as needed)   • hydrochlorothiazide (HYDRODIURIL) 12.5 mg tablet Take 1 tablet (12.5 mg total) by mouth daily   • hydrocortisone (ANUSOL-HC) 2.5 % rectal cream Apply topically 2 (two) times a day   • predniSONE 20 mg tablet Take 2 tablets (40 mg total) by mouth daily   • Symbicort 80-4.5 MCG/ACT inhaler inhale 2 puffs by mouth twice a day Rinse mouth after use   • [DISCONTINUED] ondansetron (Zofran ODT) 4 mg disintegrating tablet Take 1 tablet (4 mg total) by mouth every 6 (six) hours as needed for nausea or vomiting   • benzonatate (TESSALON PERLES) 100 mg capsule Take 1 capsule (100 mg total) by mouth 3 (three) times a day as needed for cough (Patient not taking: Reported on 5/3/2023)   • Blood Pressure Monitoring (BLOOD PRESSURE CUFF) MISC by Does not apply route 2 (two) times a day (Patient not taking: Reported on 7/24/2023)   • escitalopram (LEXAPRO) 10 mg tablet Take 1 tablet (10 mg total) by mouth daily (Patient not taking: Reported on 5/3/2023)   • loratadine (CLARITIN) 10 mg tablet Take 1 tablet (10 mg total) by mouth daily (Patient not taking: Reported on 7/24/2023)   • olopatadine (PATANOL) 0.1 % ophthalmic solution Administer 1 drop to both eyes 2 (two) times a day (Patient not taking: Reported on 5/3/2023)   • pantoprazole (PROTONIX) 20 mg tablet Take 1 tablet (20 mg total) by mouth daily (Patient not taking: Reported on 7/24/2023)       Objective     /85 (BP Location: Left arm, Patient Position: Sitting, Cuff Size: Adult)   Pulse (!) 114   Temp 98.7 °F (37.1 °C) (Temporal)   Ht 5' 2" (1.575 m)   Wt 57.6 kg (127 lb)   BMI 23.23 kg/m²     Physical Exam  Constitutional:       General: She is not in acute distress. Appearance: Normal appearance. She is not ill-appearing. HENT:      Head: Normocephalic and atraumatic. Nose: Nose normal.      Mouth/Throat:      Mouth: Mucous membranes are moist.      Pharynx: Oropharynx is clear. Eyes:      General: No scleral icterus. Conjunctiva/sclera: Conjunctivae normal.      Pupils: Pupils are equal, round, and reactive to light. Cardiovascular:      Rate and Rhythm: Normal rate and regular rhythm. Pulses: Normal pulses. Heart sounds: Normal heart sounds. No murmur heard. No friction rub. No gallop. Pulmonary:      Effort: Pulmonary effort is normal.      Breath sounds: Rhonchi (BL Lower lobes ) present. No wheezing or rales. Abdominal:      General: Abdomen is flat. Bowel sounds are normal.      Palpations: Abdomen is soft. There is no mass. Tenderness: There is no abdominal tenderness. Musculoskeletal:         General: No swelling. Cervical back: Neck supple. Right lower leg: No edema. Left lower leg: No edema. Skin:     General: Skin is warm and dry. Neurological:      General: No focal deficit present. Mental Status: She is alert and oriented to person, place, and time.    Psychiatric: Mood and Affect: Mood normal.         Behavior: Behavior normal.       Daryl Miss Yext, DO

## 2023-07-25 ENCOUNTER — HOSPITAL ENCOUNTER (OUTPATIENT)
Dept: RADIOLOGY | Facility: HOSPITAL | Age: 78
Discharge: HOME | End: 2023-07-25
Payer: MEDICARE

## 2023-07-25 DIAGNOSIS — E04.1 THYROID NODULE: ICD-10-CM

## 2023-07-25 DIAGNOSIS — F41.9 ANXIETY: Primary | ICD-10-CM

## 2023-07-25 PROCEDURE — 76536 US EXAM OF HEAD AND NECK: CPT

## 2023-07-26 ENCOUNTER — PATIENT OUTREACH (OUTPATIENT)
Dept: FAMILY MEDICINE CLINIC | Facility: CLINIC | Age: 78
End: 2023-07-26

## 2023-07-26 ENCOUNTER — APPOINTMENT (EMERGENCY)
Dept: RADIOLOGY | Facility: HOSPITAL | Age: 78
DRG: 202 | End: 2023-07-26
Payer: MEDICARE

## 2023-07-26 ENCOUNTER — HOSPITAL ENCOUNTER (EMERGENCY)
Facility: HOSPITAL | Age: 78
Discharge: HOME | DRG: 202 | End: 2023-07-26
Attending: EMERGENCY MEDICINE
Payer: MEDICARE

## 2023-07-26 VITALS
RESPIRATION RATE: 22 BRPM | OXYGEN SATURATION: 100 % | WEIGHT: 130 LBS | HEART RATE: 94 BPM | BODY MASS INDEX: 23.92 KG/M2 | SYSTOLIC BLOOD PRESSURE: 124 MMHG | DIASTOLIC BLOOD PRESSURE: 62 MMHG | HEIGHT: 62 IN | TEMPERATURE: 97.7 F

## 2023-07-26 DIAGNOSIS — R06.2 WHEEZING: ICD-10-CM

## 2023-07-26 DIAGNOSIS — J06.9 VIRAL UPPER RESPIRATORY TRACT INFECTION: ICD-10-CM

## 2023-07-26 DIAGNOSIS — F41.9 ANXIETY: Primary | ICD-10-CM

## 2023-07-26 DIAGNOSIS — J45.901 ASTHMA EXACERBATION: Primary | ICD-10-CM

## 2023-07-26 LAB
ATRIAL RATE: 114 BPM
P AXIS: 67 DEGREES
PR INTERVAL: 116 MS
QRS AXIS: 16 DEGREES
QRSD INTERVAL: 82 MS
QT INTERVAL: 344 MS
QTC INTERVAL: 474 MS
T WAVE AXIS: 52 DEGREES
VENTRICULAR RATE: 114 BPM

## 2023-07-26 PROCEDURE — 96375 TX/PRO/DX INJ NEW DRUG ADDON: CPT

## 2023-07-26 PROCEDURE — 99285 EMERGENCY DEPT VISIT HI MDM: CPT

## 2023-07-26 PROCEDURE — 93005 ELECTROCARDIOGRAM TRACING: CPT

## 2023-07-26 PROCEDURE — 99291 CRITICAL CARE FIRST HOUR: CPT | Performed by: EMERGENCY MEDICINE

## 2023-07-26 PROCEDURE — 71250 CT THORAX DX C-: CPT

## 2023-07-26 PROCEDURE — 93010 ELECTROCARDIOGRAM REPORT: CPT | Performed by: INTERNAL MEDICINE

## 2023-07-26 PROCEDURE — G1004 CDSM NDSC: HCPCS

## 2023-07-26 PROCEDURE — 96365 THER/PROPH/DIAG IV INF INIT: CPT

## 2023-07-26 PROCEDURE — 94640 AIRWAY INHALATION TREATMENT: CPT

## 2023-07-26 RX ORDER — METHYLPREDNISOLONE SODIUM SUCCINATE 40 MG/ML
40 INJECTION, POWDER, LYOPHILIZED, FOR SOLUTION INTRAMUSCULAR; INTRAVENOUS ONCE
Status: COMPLETED | OUTPATIENT
Start: 2023-07-26 | End: 2023-07-26

## 2023-07-26 RX ORDER — SODIUM CHLORIDE FOR INHALATION 0.9 %
12 VIAL, NEBULIZER (ML) INHALATION ONCE
Status: COMPLETED | OUTPATIENT
Start: 2023-07-26 | End: 2023-07-26

## 2023-07-26 RX ORDER — MAGNESIUM SULFATE HEPTAHYDRATE 40 MG/ML
2 INJECTION, SOLUTION INTRAVENOUS ONCE
Status: COMPLETED | OUTPATIENT
Start: 2023-07-26 | End: 2023-07-26

## 2023-07-26 RX ADMIN — ISODIUM CHLORIDE 12 ML: 0.03 SOLUTION RESPIRATORY (INHALATION) at 17:38

## 2023-07-26 RX ADMIN — METHYLPREDNISOLONE SODIUM SUCCINATE 40 MG: 40 INJECTION, POWDER, FOR SOLUTION INTRAMUSCULAR; INTRAVENOUS at 17:44

## 2023-07-26 RX ADMIN — ALBUTEROL SULFATE 5 MG: 2.5 SOLUTION RESPIRATORY (INHALATION) at 17:38

## 2023-07-26 RX ADMIN — IPRATROPIUM BROMIDE 1 MG: 0.5 SOLUTION RESPIRATORY (INHALATION) at 17:38

## 2023-07-26 RX ADMIN — MAGNESIUM SULFATE HEPTAHYDRATE 2 G: 40 INJECTION, SOLUTION INTRAVENOUS at 17:45

## 2023-07-26 NOTE — ED PROVIDER NOTES
History  Chief Complaint   Patient presents with   • Asthma     Pt reports feeling short of breath since last Saturday, hx of asthma, reports using inhaler today. HPI    Melecio Camarillo is a 69 y.o. female PMH asthma, thyroid nodules, IBS, presenting with SOB and cough. She presented to [facility] ED [facility] for same. CXR, CBC, Troponin, BMP were unremarkable at that time. Patient received nebulizer treatment and was discharged home on azithromycin and prednisone. She says the azithromycin and prednisone did not alleviate her symptoms and that she has had persistent wheezing, SOB, sinus pressure, congestion, cough. She takes Symbicort BID at baseline but has been using her albuterol rescue inhaler every six hours. She has increased sputum production. ROS positive for increased urinary frequency. Prior to Admission Medications   Prescriptions Last Dose Informant Patient Reported? Taking?    Blood Pressure Monitoring (BLOOD PRESSURE CUFF) MISC  Self No No   Sig: by Does not apply route 2 (two) times a day   Patient not taking: Reported on 2023   Cholecalciferol (Vitamin D) 50 MCG ( UT) CAPS   No No   Sig: Take 1 capsule (2,000 Units total) by mouth daily   Symbicort 80-4.5 MCG/ACT inhaler   No No   Sig: inhale 2 puffs by mouth twice a day Rinse mouth after use   acetaminophen (TYLENOL) 500 mg tablet  Self No No   Sig: Take 1 tablet (500 mg total) by mouth every 6 (six) hours as needed for mild pain   albuterol (2.5 mg/3 mL) 0.083 % nebulizer solution   No No   Sig: Take 3 mL (2.5 mg total) by nebulization every 4 (four) hours as needed for wheezing or shortness of breath   albuterol (PROVENTIL HFA,VENTOLIN HFA) 90 mcg/act inhaler   No No   Sig: Inhale 2 puffs every 6 (six) hours as needed for wheezing or shortness of breath   amLODIPine (NORVASC) 5 mg tablet   No No   Sig: Take 1 tablet (5 mg total) by mouth daily   azelastine (ASTELIN) 0.1 % nasal spray   No No   Si sprays into each nostril 2 (two) times a day Use in each nostril as directed   azithromycin (ZITHROMAX) 250 mg tablet   No No   Sig: Take 2 tablets today then 1 tablet daily x 4 days   benzonatate (TESSALON PERLES) 100 mg capsule   No No   Sig: Take 1 capsule (100 mg total) by mouth 3 (three) times a day as needed for cough   Patient not taking: Reported on 5/3/2023   escitalopram (LEXAPRO) 10 mg tablet   No No   Sig: Take 1 tablet (10 mg total) by mouth daily   Patient not taking: Reported on 5/3/2023   fexofenadine (ALLEGRA) 180 MG tablet  Self Yes No   Sig: Take 180 mg by mouth daily   fluticasone (FLONASE) 50 mcg/act nasal spray   No No   Si sprays into each nostril daily   folic acid (FOLVITE) 1 mg tablet  Self No No   Sig: take 1 tablet by mouth daily   Patient taking differently: as needed   hydrochlorothiazide (HYDRODIURIL) 12.5 mg tablet   No No   Sig: Take 1 tablet (12.5 mg total) by mouth daily   hydrocortisone (ANUSOL-HC) 2.5 % rectal cream  Self No No   Sig: Apply topically 2 (two) times a day   loratadine (CLARITIN) 10 mg tablet  Self No No   Sig: Take 1 tablet (10 mg total) by mouth daily   Patient not taking: Reported on 2023   nicotine (NICODERM CQ) 14 mg/24hr TD 24 hr patch   No No   Sig: Place 1 patch on the skin over 24 hours every 24 hours   olopatadine (PATANOL) 0.1 % ophthalmic solution  Self No No   Sig: Administer 1 drop to both eyes 2 (two) times a day   Patient not taking: Reported on 5/3/2023   ondansetron (Zofran ODT) 4 mg disintegrating tablet   No No   Sig: Take 1 tablet (4 mg total) by mouth every 6 (six) hours as needed for nausea or vomiting   pantoprazole (PROTONIX) 20 mg tablet   No No   Sig: Take 1 tablet (20 mg total) by mouth daily   Patient not taking: Reported on 2023   predniSONE 20 mg tablet   No No   Sig: Take 2 tablets (40 mg total) by mouth daily   umeclidinium-vilanterol 62.5-25 mcg/actuation inhaler   No No   Sig: Inhale 1 puff daily      Facility-Administered Medications: None       Past Medical History:   Diagnosis Date   • Asthma    • Asthmatic bronchitis     Last Assessed: 10/7/2014    • Chronic diarrhea     Last Assessed: 8/20/2015    • Fibromyalgia    • Focal nodular hyperplasia of liver     Last Assessed: 6/11/2015   • Herpes zoster     Last Assessed: 3/18/2016   • Hypertension    • IBS (irritable bowel syndrome)    • Intermittent palpitations    • Irritable bowel syndrome    • Lumbar radiculopathy    • Osteoarthritis    • Vertigo        Past Surgical History:   Procedure Laterality Date   • BREAST BIOPSY     • SMALL INTESTINE SURGERY         Family History   Problem Relation Age of Onset   • Heart attack Mother    • Other Mother         Aspiration of Vomit    • Asthma Father    • Breast cancer Sister    • Arthritis Family    • Other Family         Musculoskeletal Disease    • Osteoporosis Family      I have reviewed and agree with the history as documented. E-Cigarette/Vaping   • E-Cigarette Use Never User      E-Cigarette/Vaping Substances   • Nicotine No    • THC No    • CBD No    • Flavoring No    • Other No    • Unknown No      Social History     Tobacco Use   • Smoking status: Every Day     Packs/day: 0.50     Types: Cigarettes   • Smokeless tobacco: Never   Vaping Use   • Vaping Use: Never used   Substance Use Topics   • Alcohol use: No   • Drug use: No        Review of Systems   Constitutional: Negative for activity change, appetite change, chills, diaphoresis, fatigue and fever. HENT: Positive for congestion, sinus pressure and sinus pain. Negative for postnasal drip, sneezing and sore throat. Respiratory: Positive for cough, chest tightness, shortness of breath and wheezing. Negative for apnea and choking. Cardiovascular: Negative for chest pain. Gastrointestinal: Negative for abdominal distention, abdominal pain, diarrhea, nausea and vomiting. Genitourinary: Positive for frequency. Negative for dysuria. Musculoskeletal: Negative for arthralgias and myalgias.    Skin: Negative for color change and pallor. Neurological: Positive for headaches. Negative for dizziness. Physical Exam  ED Triage Vitals [07/26/23 1623]   Temperature Pulse Respirations Blood Pressure SpO2   97.7 °F (36.5 °C) (!) 129 22 138/100 95 %      Temp Source Heart Rate Source Patient Position - Orthostatic VS BP Location FiO2 (%)   Oral Monitor Sitting Left arm --      Pain Score       No Pain             Orthostatic Vital Signs  Vitals:    07/26/23 1623 07/26/23 1745 07/26/23 1900   BP: 138/100 134/84 124/62   Pulse: (!) 129 90 94   Patient Position - Orthostatic VS: Sitting Lying        Physical Exam  Constitutional:       General: She is not in acute distress. Appearance: Normal appearance. She is normal weight. She is not ill-appearing or toxic-appearing. HENT:      Head: Normocephalic and atraumatic. Nose: Congestion present. Mouth/Throat:      Mouth: Mucous membranes are moist.   Eyes:      Conjunctiva/sclera: Conjunctivae normal.      Pupils: Pupils are equal, round, and reactive to light. Cardiovascular:      Rate and Rhythm: Normal rate and regular rhythm. Heart sounds: Normal heart sounds. No murmur heard. No friction rub. No gallop. Pulmonary:      Effort: Pulmonary effort is normal. No respiratory distress. Breath sounds: No stridor. Wheezing (Only in SUSAN) present. No rhonchi. Abdominal:      General: Abdomen is flat. There is no distension. Palpations: Abdomen is soft. Tenderness: There is no abdominal tenderness. There is no guarding. Skin:     General: Skin is warm and dry. Capillary Refill: Capillary refill takes less than 2 seconds. Coloration: Skin is not jaundiced or pale. Neurological:      General: No focal deficit present. Mental Status: She is alert and oriented to person, place, and time.          ED Medications  Medications   albuterol inhalation solution 5 mg (5 mg Nebulization Given 7/26/23 1738)   ipratropium (ATROVENT) 0.02 % inhalation solution 1 mg (1 mg Nebulization Given 7/26/23 1738)   sodium chloride 0.9 % inhalation solution 12 mL (12 mL Nebulization Given 7/26/23 1738)   methylPREDNISolone sodium succinate (Solu-MEDROL) injection 40 mg (40 mg Intravenous Given 7/26/23 1744)   magnesium sulfate 2 g/50 mL IVPB (premix) 2 g (0 g Intravenous Stopped 7/26/23 1805)       Diagnostic Studies  Results Reviewed     None                 CT chest wo contrast   Final Result by Indira Craig MD (07/26 1845)      Worsening debris in the bronchus intermedius and right middle and right lower lobe bronchi. Follow-up is needed to exclude a malignant endobronchial lesion. No acute pulmonary disease. Stable small loculated pericardial effusion. Stable anterior mediastinal nodule, lymph node or thymoma. Recommend follow-up with a chest CT with no contrast in 1 year. This study was marked in Epic for immediate notification and follow-up. Workstation performed: [id number]               Procedures  ECG 12 Lead Documentation Only    Date/Time: 7/26/2023 10:55 PM    Performed by: Olga Burgos MD  Authorized by: Olga Burgos MD    ECG reviewed by me, the ED Provider: yes    Patient location:  ED  Previous ECG:     Previous ECG:  Compared to current    Comparison ECG info:  ST segment abnormality in lead II, AVF, however unchanged from previous EKGs    Similarity:  No change  Interpretation:     Interpretation: normal    Rate:     ECG rate assessment: normal    Rhythm:     Rhythm: sinus rhythm    QRS:     QRS axis:  Normal  Conduction:     Conduction: normal    ST segments:     ST segments:  Abnormal    Depression:  II and aVF          ED Course  ED Course as of 07/26/23 2256 Wed Jul 26, 2023   1829 Patient receiving nebulizer, breathing improved. 1854 CT chest significant for worsening debris in the bronchus intermedius and right middle and right lower lobe bronchi.  Per radiology read -  Follow-up is needed to exclude a malignant endobronchial lesion.     Stable small loculated pericardial effusion.     Stable anterior mediastinal nodule, lymph node or thymoma. Recommend follow-up with a chest CT with no contrast in 1 year. 1858 Will send patient home with follow-up to pulmonology and family medicine. Will not repeat azithromycin and prednisone as patient just finished prednisone today                                       Medical Decision Making  Amount and/or Complexity of Data Reviewed  Labs: ordered. Radiology: ordered. Risk  Prescription drug management. Patient is a 69 y.o. female  presents to the ED with SOB, cough, sinus pressure. Vital signs stable; SpO2 95% in room air, not requiring NC. On exam patient is in no acute distress. Exam positive for wheezing in SUSAN, otherwise lungs clear to auscultation. History and physical exam most consistent with viral URI with mild asthma exacerbation. However, differential diagnosis included but not limited to pneumonia vs pleural effusion vs COPD. Plan nebulizer, mag, solumedrol. Will not repeat labs as patient recently received labs and were WNL. POCT urine ordered as patient reported increased frequency. CT chest w/o contrast given second presentation for SOB with focal wheezing in SUSAN; r/o occult pneumonia. View ED course above for further discussion on patient workup. On review of previous records patient recently say Dr. Giuliano Porras of [facility] during which she was switched from Symbicort to LABA/LAMA combo with suspicion that patient may have COPD. Unclear if patient is taking this new medication as Symbicort presented by patient today. PFTs recommended by family medicine. CT did not show any pneumonia; stable anterior mediastinal mass requiring follow up in one year. Bronchial debris requiring follow up to r/o cancer. Will provide pulm follow up.      All labs reviewed and utilized in the medical decision making process  All radiology studies independently viewed by me and interpreted by the radiologist.  I reviewed all testing with the patient. Disposition  Final diagnoses:   Wheezing   Viral upper respiratory tract infection   Asthma exacerbation     Time reflects when diagnosis was documented in both MDM as applicable and the Disposition within this note     Time User Action Codes Description Comment    7/26/2023  7:00 PM Sybil Finely [R06.2] Wheezing     7/26/2023  7:00 PM Sybil Finely [J06.9] Viral upper respiratory tract infection     7/26/2023  7:01 PM Albert Acosta [J45.901] Asthma exacerbation     7/26/2023  7:01 PM Shade Rede [R06.2] Wheezing     7/26/2023  7:01 PM [address] [place] [address] Asthma exacerbation       ED Disposition     ED Disposition   Discharge    Condition   Stable    Date/Time   Wed Jul 26, 2023  7:07 PM    Comment   Frank Luque discharge to home/self care.                Follow-up Information     Follow up With Specialties Details Why Contact Info Additional [address] [facility] Pulmonology Schedule an appointment as soon as possible for a visit in 1 day  [address] [place] [phone] [address] [place] [address], [phone]          Discharge Medication List as of 7/26/2023  7:07 PM      CONTINUE these medications which have NOT CHANGED    Details   acetaminophen (TYLENOL) 500 mg tablet Take 1 tablet (500 mg total) by mouth every 6 (six) hours as needed for mild pain, Starting Mon 8/27/2018, Normal      albuterol (2.5 mg/3 mL) 0.083 % nebulizer solution Take 3 mL (2.5 mg total) by nebulization every 4 (four) hours as needed for wheezing or shortness of breath, Starting Mon 4/24/2023, Normal      albuterol (PROVENTIL HFA,VENTOLIN HFA) 90 mcg/act inhaler Inhale 2 puffs every 6 (six) hours as needed for wheezing or shortness of breath, Starting Mon 4/24/2023, Normal      amLODIPine (NORVASC) 5 mg tablet Take 1 tablet (5 mg total) by mouth daily, Starting Mon 4/24/2023, Normal      azelastine (ASTELIN) 0.1 % nasal spray 2 sprays into each nostril 2 (two) times a day Use in each nostril as directed, Starting Mon 4/24/2023, Normal      azithromycin (ZITHROMAX) 250 mg tablet Take 2 tablets today then 1 tablet daily x 4 days, Normal      benzonatate (TESSALON PERLES) 100 mg capsule Take 1 capsule (100 mg total) by mouth 3 (three) times a day as needed for cough, Starting Thu 4/27/2023, Normal      Blood Pressure Monitoring (BLOOD PRESSURE CUFF) MISC by Does not apply route 2 (two) times a day, Starting Wed 6/3/2020, Print      Cholecalciferol (Vitamin D) 50 MCG (2000 UT) CAPS Take 1 capsule (2,000 Units total) by mouth daily, Starting Mon 4/24/2023, Normal      escitalopram (LEXAPRO) 10 mg tablet Take 1 tablet (10 mg total) by mouth daily, Starting Wed 4/19/2023, Until Sat 4/13/2024, Normal      fexofenadine (ALLEGRA) 180 MG tablet Take 180 mg by mouth daily, Historical Med      fluticasone (FLONASE) 50 mcg/act nasal spray 2 sprays into each nostril daily, Starting Mon 6/54/3671, Normal      folic acid (FOLVITE) 1 mg tablet take 1 tablet by mouth daily, Normal      hydrochlorothiazide (HYDRODIURIL) 12.5 mg tablet Take 1 tablet (12.5 mg total) by mouth daily, Starting Mon 4/24/2023, Normal      hydrocortisone (ANUSOL-HC) 2.5 % rectal cream Apply topically 2 (two) times a day, Starting Wed 11/9/2022, Normal      loratadine (CLARITIN) 10 mg tablet Take 1 tablet (10 mg total) by mouth daily, Starting Wed 9/28/2022, Until Mon 4/24/2023, Normal      nicotine (NICODERM CQ) 14 mg/24hr TD 24 hr patch Place 1 patch on the skin over 24 hours every 24 hours, Starting Mon 7/24/2023, Normal      olopatadine (PATANOL) 0.1 % ophthalmic solution Administer 1 drop to both eyes 2 (two) times a day, Starting Thu 9/1/2022, Normal      ondansetron (Zofran ODT) 4 mg disintegrating tablet Take 1 tablet (4 mg total) by mouth every 6 (six) hours as needed for nausea or vomiting, Starting Mon 7/24/2023, Normal      pantoprazole (PROTONIX) 20 mg tablet Take 1 tablet (20 mg total) by mouth daily, Starting Mon 4/24/2023, Normal      predniSONE 20 mg tablet Take 2 tablets (40 mg total) by mouth daily, Starting Sat 7/22/2023, Normal      Symbicort 80-4.5 MCG/ACT inhaler inhale 2 puffs by mouth twice a day Rinse mouth after use, Normal      umeclidinium-vilanterol 62.5-25 mcg/actuation inhaler Inhale 1 puff daily, Starting Mon 7/24/2023, Normal               PDMP Review     None           ED Provider  Attending physically available and evaluated Josiah Zuniga. I managed the patient along with the ED Attending.     Electronically Signed by         Nancy Baker MD  07/26/23 4623

## 2023-07-26 NOTE — ED ATTENDING ATTESTATION
7/26/2023  IJack MD, saw and evaluated the patient. I have discussed the patient with the resident/non-physician practitioner and agree with the resident's/non-physician practitioner's findings, Plan of Care, and MDM as documented in the resident's/non-physician practitioner's note, except where noted. All available labs and Radiology studies were reviewed. I was present for key portions of any procedure(s) performed by the resident/non-physician practitioner and I was immediately available to provide assistance. At this point I agree with the current assessment done in the Emergency Department. I have conducted an independent evaluation of this patient a history and physical is as follows:    ED Course         Critical Care Time  CriticalCare Time    Date/Time: 7/28/2023 8:12 AM    Performed by: Jack Dawson MD  Authorized by: Jack Dawson MD    Critical care provider statement:     Critical care time (minutes):  36    Critical care time was exclusive of:  Separately billable procedures and treating other patients and teaching time    Critical care was necessary to treat or prevent imminent or life-threatening deterioration of the following conditions:  Respiratory failure    Critical care was time spent personally by me on the following activities:  Obtaining history from patient or surrogate, development of treatment plan with patient or surrogate, examination of patient, re-evaluation of patient's condition, review of old charts, ordering and performing treatments and interventions and evaluation of patient's response to treatment        68 yo female with hx of asthma, seen in ed few days ago and had labs, cxr and udn. Pt sent home with zpak and prednisone but still having persistent wheezing and sob. Pt trying rescue inhaler. No fever, no n/v/d, no abodminal pain. Pt has mild cough with reclining. pmh htn, ibs, fibromyalgia.   Vss, afebrile, lungs with mild wheezes in left upper area, rrr, abdomen soft nontender, no calf tenderness. Ct chest, vogel neb, solumedrol, mag.

## 2023-07-26 NOTE — DISCHARGE INSTRUCTIONS
Your CT scan of your lungs did not show any pneumonia. You likely have a viral upper respiratory illness that is worsening your asthma. Your CT scan did show some debri in the right middle and right lower lobe bronchi requiring follow-up with pulmonology to rule out any cancerous lesion. Your CT also showed a stable mediastinal mass that will require CT follow up in 1 year, which your primary care doctor should order for you.

## 2023-07-26 NOTE — PROGRESS NOTES
STEVIE MCKEE was notified by another SW CM, Randal Irby, that Pt was calling looking to speak with [address] [facility] [facility] STEVIE MCKEE. STEVIE MCKEE completed chart review and noted that Pt had been involved with previous STEVIE  r/t food insecurity and transportation needs. However, Pt noted to already receive food stamps, has transportation through insurance and declined [address] [place] [address] services. Additionally, Pt was noted to be seen by Dr. Lenora Silva on 7/6/23 and with interest in OP [address] [place] [address] resources. Call placed to Pt this day with success. STEVIE MCKEE introduced self and role and satisfied with same. During discussion, Pt informed that she would like to establish with OP  services. STEVIE MCKEE offered to send resources r/t same to Pt for further exploration and scheduling of same and Pt in agreement. Address on file for Pt confirmed at this time. Additionally, Pt informed that she would like assistance with seeing if she could get new living room furniture. Pt inquired if the [employer] may be able to assist with same and requesting STEVIE MCKEE also assist with this matter. STEVIE MCKEE expressed understanding and to determine possible options if able. Pt satisfied with same. Through further discussion, Pt informed that she resides in housing with her daughter. Pt has no financial concerns r/t bills or food at this time. Pt has no concerns r/t transportation. Pt reports having no further needs, questions or concerns for STEVIE MCKEE at this time. All other questions addressed during discussion this day. STEVIE MCKEE will continue to follow and assist PRN.

## 2023-07-27 ENCOUNTER — TELEPHONE (OUTPATIENT)
Dept: PULMONOLOGY | Facility: CLINIC | Age: 78
End: 2023-07-27

## 2023-07-27 NOTE — TELEPHONE ENCOUNTER
Patient called in stating that she received a referral from the ED and would like to schedule an appt.

## 2023-07-28 ENCOUNTER — TELEPHONE (OUTPATIENT)
Dept: FAMILY MEDICINE CLINIC | Facility: CLINIC | Age: 78
End: 2023-07-28

## 2023-07-28 ENCOUNTER — HOSPITAL ENCOUNTER (INPATIENT)
Facility: HOSPITAL | Age: 78
LOS: 2 days | Discharge: HOME | DRG: 202 | End: 2023-07-30
Attending: EMERGENCY MEDICINE | Admitting: FAMILY MEDICINE
Payer: MEDICARE

## 2023-07-28 ENCOUNTER — APPOINTMENT (EMERGENCY)
Dept: RADIOLOGY | Facility: HOSPITAL | Age: 78
DRG: 202 | End: 2023-07-28
Payer: MEDICARE

## 2023-07-28 ENCOUNTER — TELEPHONE (OUTPATIENT)
Dept: OTHER | Facility: HOSPITAL | Age: 78
End: 2023-07-28

## 2023-07-28 DIAGNOSIS — R07.89 CHEST TIGHTNESS: ICD-10-CM

## 2023-07-28 DIAGNOSIS — R93.89 OPACITY NOTED ON IMAGING STUDY: ICD-10-CM

## 2023-07-28 DIAGNOSIS — R05.9 COUGH: ICD-10-CM

## 2023-07-28 DIAGNOSIS — R06.00 DYSPNEA: ICD-10-CM

## 2023-07-28 DIAGNOSIS — Z72.0 TOBACCO ABUSE: ICD-10-CM

## 2023-07-28 DIAGNOSIS — E04.1 THYROID NODULE: Primary | ICD-10-CM

## 2023-07-28 DIAGNOSIS — R91.8 OPACITY OF LUNG ON IMAGING STUDY: ICD-10-CM

## 2023-07-28 DIAGNOSIS — J45.51 SEVERE PERSISTENT ASTHMA WITH ACUTE EXACERBATION: ICD-10-CM

## 2023-07-28 DIAGNOSIS — J45.901 ASTHMA EXACERBATION: Primary | ICD-10-CM

## 2023-07-28 LAB
2HR DELTA HS TROPONIN: -2 NG/L
4HR DELTA HS TROPONIN: -3 NG/L
ALBUMIN SERPL BCP-MCNC: 3.3 G/DL (ref 3.5–5)
ALP SERPL-CCNC: 80 U/L (ref 46–116)
ALT SERPL W P-5'-P-CCNC: 15 U/L (ref 12–78)
ANION GAP SERPL CALCULATED.3IONS-SCNC: 9 MMOL/L
AST SERPL W P-5'-P-CCNC: 10 U/L (ref 5–45)
ATRIAL RATE: 106 BPM
BACTERIA UR QL AUTO: ABNORMAL /HPF
BASE EX.OXY STD BLDV CALC-SCNC: 87.3 % (ref 60–80)
BASE EXCESS BLDV CALC-SCNC: -1.3 MMOL/L
BASOPHILS # BLD AUTO: 0.06 THOUSANDS/ÂΜL (ref 0–0.1)
BASOPHILS NFR BLD AUTO: 0 % (ref 0–1)
BILIRUB SERPL-MCNC: 0.36 MG/DL (ref 0.2–1)
BILIRUB UR QL STRIP: NEGATIVE
BUN SERPL-MCNC: 29 MG/DL (ref 5–25)
CALCIUM ALBUM COR SERPL-MCNC: 10.3 MG/DL (ref 8.3–10.1)
CALCIUM SERPL-MCNC: 9.7 MG/DL (ref 8.3–10.1)
CARDIAC TROPONIN I PNL SERPL HS: 10 NG/L
CARDIAC TROPONIN I PNL SERPL HS: 11 NG/L
CARDIAC TROPONIN I PNL SERPL HS: 13 NG/L
CHLORIDE SERPL-SCNC: 105 MMOL/L (ref 96–108)
CLARITY UR: CLEAR
CO2 SERPL-SCNC: 22 MMOL/L (ref 21–32)
COLOR UR: YELLOW
CREAT SERPL-MCNC: 1.59 MG/DL (ref 0.6–1.3)
D DIMER PPP FEU-MCNC: 0.59 UG/ML FEU
EOSINOPHIL # BLD AUTO: 0.1 THOUSAND/ÂΜL (ref 0–0.61)
EOSINOPHIL NFR BLD AUTO: 1 % (ref 0–6)
ERYTHROCYTE [DISTWIDTH] IN BLOOD BY AUTOMATED COUNT: 15 % (ref 11.6–15.1)
FLUAV RNA RESP QL NAA+PROBE: NEGATIVE
FLUBV RNA RESP QL NAA+PROBE: NEGATIVE
GFR SERPL CREATININE-BSD FRML MDRD: 31 ML/MIN/1.73SQ M
GLUCOSE SERPL-MCNC: 108 MG/DL (ref 65–140)
GLUCOSE UR STRIP-MCNC: NEGATIVE MG/DL
HCO3 BLDV-SCNC: 22.1 MMOL/L (ref 24–30)
HCT VFR BLD AUTO: 40.9 % (ref 34.8–46.1)
HGB BLD-MCNC: 13.7 G/DL (ref 11.5–15.4)
HGB UR QL STRIP.AUTO: NEGATIVE
HYALINE CASTS #/AREA URNS LPF: ABNORMAL /LPF
IMM GRANULOCYTES # BLD AUTO: 0.14 THOUSAND/UL (ref 0–0.2)
IMM GRANULOCYTES NFR BLD AUTO: 1 % (ref 0–2)
KETONES UR STRIP-MCNC: NEGATIVE MG/DL
LEUKOCYTE ESTERASE UR QL STRIP: ABNORMAL
LIPASE SERPL-CCNC: 198 U/L (ref 73–393)
LYMPHOCYTES # BLD AUTO: 4.61 THOUSANDS/ÂΜL (ref 0.6–4.47)
LYMPHOCYTES NFR BLD AUTO: 30 % (ref 14–44)
MCH RBC QN AUTO: 29.3 PG (ref 26.8–34.3)
MCHC RBC AUTO-ENTMCNC: 33.5 G/DL (ref 31.4–37.4)
MCV RBC AUTO: 87 FL (ref 82–98)
MONOCYTES # BLD AUTO: 1.06 THOUSAND/ÂΜL (ref 0.17–1.22)
MONOCYTES NFR BLD AUTO: 7 % (ref 4–12)
NEUTROPHILS # BLD AUTO: 9.65 THOUSANDS/ÂΜL (ref 1.85–7.62)
NEUTS SEG NFR BLD AUTO: 61 % (ref 43–75)
NITRITE UR QL STRIP: NEGATIVE
NON-SQ EPI CELLS URNS QL MICRO: ABNORMAL /HPF
NRBC BLD AUTO-RTO: 0 /100 WBCS
O2 CT BLDV-SCNC: 15.8 ML/DL
P AXIS: 143 DEGREES
PCO2 BLDV: 32.9 MM HG (ref 42–50)
PH BLDV: 7.45 [PH] (ref 7.3–7.4)
PH UR STRIP.AUTO: 5 [PH] (ref 4.5–8)
PLATELET # BLD AUTO: 333 THOUSANDS/UL (ref 149–390)
PMV BLD AUTO: 12.2 FL (ref 8.9–12.7)
PO2 BLDV: 57.1 MM HG (ref 35–45)
POTASSIUM SERPL-SCNC: 3.1 MMOL/L (ref 3.5–5.3)
PR INTERVAL: 112 MS
PROCALCITONIN SERPL-MCNC: 0.08 NG/ML
PROT SERPL-MCNC: 7.3 G/DL (ref 6.4–8.4)
PROT UR STRIP-MCNC: NEGATIVE MG/DL
QRS AXIS: -8 DEGREES
QRSD INTERVAL: 84 MS
QT INTERVAL: 300 MS
QTC INTERVAL: 398 MS
RBC # BLD AUTO: 4.68 MILLION/UL (ref 3.81–5.12)
RBC #/AREA URNS AUTO: ABNORMAL /HPF
RSV RNA RESP QL NAA+PROBE: NEGATIVE
SARS-COV-2 RNA RESP QL NAA+PROBE: NEGATIVE
SODIUM SERPL-SCNC: 136 MMOL/L (ref 135–147)
SP GR UR STRIP.AUTO: 1.01 (ref 1–1.03)
T WAVE AXIS: 178 DEGREES
UROBILINOGEN UR QL STRIP.AUTO: 0.2 E.U./DL
VENTRICULAR RATE: 106 BPM
WBC # BLD AUTO: 15.62 THOUSAND/UL (ref 4.31–10.16)
WBC #/AREA URNS AUTO: ABNORMAL /HPF

## 2023-07-28 PROCEDURE — 83690 ASSAY OF LIPASE: CPT

## 2023-07-28 PROCEDURE — 94644 CONT INHLJ TX 1ST HOUR: CPT

## 2023-07-28 PROCEDURE — 71046 X-RAY EXAM CHEST 2 VIEWS: CPT

## 2023-07-28 PROCEDURE — 81001 URINALYSIS AUTO W/SCOPE: CPT

## 2023-07-28 PROCEDURE — 36415 COLL VENOUS BLD VENIPUNCTURE: CPT

## 2023-07-28 PROCEDURE — 99223 1ST HOSP IP/OBS HIGH 75: CPT | Performed by: INTERNAL MEDICINE

## 2023-07-28 PROCEDURE — 94640 AIRWAY INHALATION TREATMENT: CPT

## 2023-07-28 PROCEDURE — 94760 N-INVAS EAR/PLS OXIMETRY 1: CPT

## 2023-07-28 PROCEDURE — 84484 ASSAY OF TROPONIN QUANT: CPT

## 2023-07-28 PROCEDURE — 84145 PROCALCITONIN (PCT): CPT | Performed by: STUDENT IN AN ORGANIZED HEALTH CARE EDUCATION/TRAINING PROGRAM

## 2023-07-28 PROCEDURE — 0241U HB NFCT DS VIR RESP RNA 4 TRGT: CPT

## 2023-07-28 PROCEDURE — 82805 BLOOD GASES W/O2 SATURATION: CPT

## 2023-07-28 PROCEDURE — 99285 EMERGENCY DEPT VISIT HI MDM: CPT | Performed by: EMERGENCY MEDICINE

## 2023-07-28 PROCEDURE — 96375 TX/PRO/DX INJ NEW DRUG ADDON: CPT

## 2023-07-28 PROCEDURE — 93010 ELECTROCARDIOGRAM REPORT: CPT | Performed by: INTERNAL MEDICINE

## 2023-07-28 PROCEDURE — 85025 COMPLETE CBC W/AUTO DIFF WBC: CPT

## 2023-07-28 PROCEDURE — 96366 THER/PROPH/DIAG IV INF ADDON: CPT

## 2023-07-28 PROCEDURE — 85379 FIBRIN DEGRADATION QUANT: CPT

## 2023-07-28 PROCEDURE — 93005 ELECTROCARDIOGRAM TRACING: CPT

## 2023-07-28 PROCEDURE — 80053 COMPREHEN METABOLIC PANEL: CPT

## 2023-07-28 PROCEDURE — 96365 THER/PROPH/DIAG IV INF INIT: CPT

## 2023-07-28 PROCEDURE — 99285 EMERGENCY DEPT VISIT HI MDM: CPT

## 2023-07-28 RX ORDER — NICOTINE 21 MG/24HR
21 PATCH, TRANSDERMAL 24 HOURS TRANSDERMAL ONCE
Status: COMPLETED | OUTPATIENT
Start: 2023-07-28 | End: 2023-07-29

## 2023-07-28 RX ORDER — MAGNESIUM SULFATE HEPTAHYDRATE 40 MG/ML
2 INJECTION, SOLUTION INTRAVENOUS ONCE
Status: COMPLETED | OUTPATIENT
Start: 2023-07-28 | End: 2023-07-28

## 2023-07-28 RX ORDER — AZELASTINE 1 MG/ML
2 SPRAY, METERED NASAL 2 TIMES DAILY
Status: DISCONTINUED | OUTPATIENT
Start: 2023-07-28 | End: 2023-07-30 | Stop reason: HOSPADM

## 2023-07-28 RX ORDER — ESCITALOPRAM OXALATE 10 MG/1
10 TABLET ORAL DAILY
Status: DISCONTINUED | OUTPATIENT
Start: 2023-07-28 | End: 2023-07-30 | Stop reason: HOSPADM

## 2023-07-28 RX ORDER — SODIUM CHLORIDE FOR INHALATION 0.9 %
3 VIAL, NEBULIZER (ML) INHALATION ONCE
Status: COMPLETED | OUTPATIENT
Start: 2023-07-28 | End: 2023-07-28

## 2023-07-28 RX ORDER — LORATADINE 10 MG/1
10 TABLET ORAL DAILY
Status: DISCONTINUED | OUTPATIENT
Start: 2023-07-28 | End: 2023-07-30 | Stop reason: HOSPADM

## 2023-07-28 RX ORDER — HEPARIN SODIUM 5000 [USP'U]/ML
5000 INJECTION, SOLUTION INTRAVENOUS; SUBCUTANEOUS EVERY 8 HOURS SCHEDULED
Status: DISCONTINUED | OUTPATIENT
Start: 2023-07-28 | End: 2023-07-30 | Stop reason: HOSPADM

## 2023-07-28 RX ORDER — POTASSIUM CHLORIDE 20 MEQ/1
40 TABLET, EXTENDED RELEASE ORAL ONCE
Status: COMPLETED | OUTPATIENT
Start: 2023-07-28 | End: 2023-07-28

## 2023-07-28 RX ORDER — ACETAMINOPHEN 325 MG/1
650 TABLET ORAL EVERY 6 HOURS PRN
Status: DISCONTINUED | OUTPATIENT
Start: 2023-07-28 | End: 2023-07-29

## 2023-07-28 RX ORDER — FENTANYL CITRATE 50 UG/ML
25 INJECTION, SOLUTION INTRAMUSCULAR; INTRAVENOUS ONCE
Status: COMPLETED | OUTPATIENT
Start: 2023-07-28 | End: 2023-07-28

## 2023-07-28 RX ORDER — DEXAMETHASONE SODIUM PHOSPHATE 10 MG/ML
6 INJECTION, SOLUTION INTRAMUSCULAR; INTRAVENOUS ONCE
Status: COMPLETED | OUTPATIENT
Start: 2023-07-28 | End: 2023-07-28

## 2023-07-28 RX ORDER — SODIUM CHLORIDE FOR INHALATION 3 %
3 VIAL, NEBULIZER (ML) INHALATION
Status: DISCONTINUED | OUTPATIENT
Start: 2023-07-28 | End: 2023-07-28

## 2023-07-28 RX ORDER — PANTOPRAZOLE SODIUM 20 MG/1
20 TABLET, DELAYED RELEASE ORAL DAILY
Status: DISCONTINUED | OUTPATIENT
Start: 2023-07-28 | End: 2023-07-30 | Stop reason: HOSPADM

## 2023-07-28 RX ORDER — SODIUM CHLORIDE FOR INHALATION 3 %
4 VIAL, NEBULIZER (ML) INHALATION
Status: DISCONTINUED | OUTPATIENT
Start: 2023-07-28 | End: 2023-07-30 | Stop reason: HOSPADM

## 2023-07-28 RX ORDER — AMLODIPINE BESYLATE 5 MG/1
5 TABLET ORAL DAILY
Status: DISCONTINUED | OUTPATIENT
Start: 2023-07-28 | End: 2023-07-30 | Stop reason: HOSPADM

## 2023-07-28 RX ORDER — ALBUTEROL SULFATE 2.5 MG/3ML
5 SOLUTION RESPIRATORY (INHALATION)
Status: DISCONTINUED | OUTPATIENT
Start: 2023-07-28 | End: 2023-07-28

## 2023-07-28 RX ORDER — ONDANSETRON 4 MG/1
4 TABLET, ORALLY DISINTEGRATING ORAL EVERY 6 HOURS PRN
Status: DISCONTINUED | OUTPATIENT
Start: 2023-07-28 | End: 2023-07-30 | Stop reason: HOSPADM

## 2023-07-28 RX ORDER — HYDROCHLOROTHIAZIDE 12.5 MG/1
12.5 TABLET ORAL DAILY
Status: DISCONTINUED | OUTPATIENT
Start: 2023-07-28 | End: 2023-07-29

## 2023-07-28 RX ORDER — ALBUTEROL SULFATE 90 UG/1
2 AEROSOL, METERED RESPIRATORY (INHALATION) EVERY 4 HOURS PRN
Status: DISCONTINUED | OUTPATIENT
Start: 2023-07-28 | End: 2023-07-30 | Stop reason: HOSPADM

## 2023-07-28 RX ORDER — ACETAMINOPHEN 325 MG/1
650 TABLET ORAL ONCE
Status: DISCONTINUED | OUTPATIENT
Start: 2023-07-28 | End: 2023-07-28

## 2023-07-28 RX ORDER — BUDESONIDE AND FORMOTEROL FUMARATE DIHYDRATE 80; 4.5 UG/1; UG/1
2 AEROSOL RESPIRATORY (INHALATION) 2 TIMES DAILY
Status: DISCONTINUED | OUTPATIENT
Start: 2023-07-28 | End: 2023-07-30 | Stop reason: HOSPADM

## 2023-07-28 RX ORDER — MELATONIN
1000 DAILY
Status: DISCONTINUED | OUTPATIENT
Start: 2023-07-28 | End: 2023-07-30 | Stop reason: HOSPADM

## 2023-07-28 RX ORDER — BENZONATATE 100 MG/1
100 CAPSULE ORAL 3 TIMES DAILY PRN
Status: DISCONTINUED | OUTPATIENT
Start: 2023-07-28 | End: 2023-07-30 | Stop reason: HOSPADM

## 2023-07-28 RX ORDER — LEVALBUTEROL INHALATION SOLUTION 1.25 MG/3ML
1.25 SOLUTION RESPIRATORY (INHALATION)
Status: DISCONTINUED | OUTPATIENT
Start: 2023-07-28 | End: 2023-07-30 | Stop reason: HOSPADM

## 2023-07-28 RX ORDER — FLUTICASONE PROPIONATE 50 MCG
2 SPRAY, SUSPENSION (ML) NASAL DAILY
Status: DISCONTINUED | OUTPATIENT
Start: 2023-07-28 | End: 2023-07-30 | Stop reason: HOSPADM

## 2023-07-28 RX ADMIN — MAGNESIUM SULFATE HEPTAHYDRATE 2 G: 40 INJECTION, SOLUTION INTRAVENOUS at 12:14

## 2023-07-28 RX ADMIN — PANTOPRAZOLE SODIUM 20 MG: 20 TABLET, DELAYED RELEASE ORAL at 16:32

## 2023-07-28 RX ADMIN — NICOTINE 21 MG: 21 PATCH, EXTENDED RELEASE TRANSDERMAL at 12:36

## 2023-07-28 RX ADMIN — IPRATROPIUM BROMIDE 0.5 MG: 0.5 SOLUTION RESPIRATORY (INHALATION) at 16:38

## 2023-07-28 RX ADMIN — HEPARIN SODIUM 5000 UNITS: 5000 INJECTION INTRAVENOUS; SUBCUTANEOUS at 16:32

## 2023-07-28 RX ADMIN — ALBUTEROL SULFATE 2 PUFF: 90 AEROSOL, METERED RESPIRATORY (INHALATION) at 22:32

## 2023-07-28 RX ADMIN — AZELASTINE HYDROCHLORIDE 2 SPRAY: 137 SPRAY, METERED NASAL at 20:05

## 2023-07-28 RX ADMIN — IPRATROPIUM BROMIDE 1 MG: 0.5 SOLUTION RESPIRATORY (INHALATION) at 12:01

## 2023-07-28 RX ADMIN — Medication 1000 UNITS: at 16:32

## 2023-07-28 RX ADMIN — AMLODIPINE BESYLATE 5 MG: 5 TABLET ORAL at 16:32

## 2023-07-28 RX ADMIN — LEVALBUTEROL HYDROCHLORIDE 1.25 MG: 1.25 SOLUTION RESPIRATORY (INHALATION) at 21:24

## 2023-07-28 RX ADMIN — SODIUM CHLORIDE SOLN NEBU 3% 4 ML: 3 NEBU SOLN at 21:24

## 2023-07-28 RX ADMIN — ACETAMINOPHEN 650 MG: 325 TABLET, FILM COATED ORAL at 20:45

## 2023-07-28 RX ADMIN — ALBUTEROL SULFATE 10 MG: 2.5 SOLUTION RESPIRATORY (INHALATION) at 12:01

## 2023-07-28 RX ADMIN — POTASSIUM CHLORIDE 40 MEQ: 1500 TABLET, EXTENDED RELEASE ORAL at 20:45

## 2023-07-28 RX ADMIN — BUDESONIDE AND FORMOTEROL FUMARATE DIHYDRATE 2 PUFF: 80; 4.5 AEROSOL RESPIRATORY (INHALATION) at 20:06

## 2023-07-28 RX ADMIN — Medication 3 ML: at 12:01

## 2023-07-28 RX ADMIN — BENZONATATE 100 MG: 100 CAPSULE ORAL at 23:00

## 2023-07-28 RX ADMIN — POTASSIUM CHLORIDE 40 MEQ: 1500 TABLET, EXTENDED RELEASE ORAL at 23:00

## 2023-07-28 RX ADMIN — FENTANYL CITRATE 25 MCG: 50 INJECTION INTRAMUSCULAR; INTRAVENOUS at 12:35

## 2023-07-28 RX ADMIN — ALBUTEROL SULFATE 5 MG: 2.5 SOLUTION RESPIRATORY (INHALATION) at 16:31

## 2023-07-28 RX ADMIN — ESCITALOPRAM OXALATE 10 MG: 10 TABLET ORAL at 18:24

## 2023-07-28 RX ADMIN — FLUTICASONE PROPIONATE 2 SPRAY: 50 SPRAY, METERED NASAL at 16:32

## 2023-07-28 RX ADMIN — IPRATROPIUM BROMIDE 0.5 MG: 0.5 SOLUTION RESPIRATORY (INHALATION) at 21:24

## 2023-07-28 RX ADMIN — DEXAMETHASONE SODIUM PHOSPHATE 6 MG: 10 INJECTION, SOLUTION INTRAMUSCULAR; INTRAVENOUS at 12:14

## 2023-07-28 RX ADMIN — ONDANSETRON 4 MG: 4 TABLET, ORALLY DISINTEGRATING ORAL at 23:00

## 2023-07-28 RX ADMIN — HEPARIN SODIUM 5000 UNITS: 5000 INJECTION INTRAVENOUS; SUBCUTANEOUS at 23:00

## 2023-07-28 RX ADMIN — LORATADINE 10 MG: 10 TABLET ORAL at 16:32

## 2023-07-28 NOTE — TELEPHONE ENCOUNTER
[facility] rad called need a new order sent before pt can be scheduled    Should say US guided thyroid med testing     How many and where they are located

## 2023-07-28 NOTE — ED ATTENDING ATTESTATION
7/28/2023  ITyree MD, saw and evaluated the patient. I have discussed the patient with the resident/non-physician practitioner and agree with the resident's/non-physician practitioner's findings, Plan of Care, and MDM as documented in the resident's/non-physician practitioner's note, except where noted. All available labs and Radiology studies were reviewed. I was present for key portions of any procedure(s) performed by the resident/non-physician practitioner and I was immediately available to provide assistance. At this point I agree with the current assessment done in the Emergency Department. I have conducted an independent evaluation of this patient a history and physical is as follows:    ED Course         Critical Care Time  Procedures    70 yo female with hx of asthma, seen in ed twice in last week and here for persistent sob. Pt given vogel neb, steroids, abx outpatient. Pt with new cp worse with cough. No hx of pe/dvt. Vss, afebrile, lungs with wheezing, decreased bs, rrr, abdomen soft nontender, no pedal edema.   Cardiac addy eddy, vogel neb, decadron, mg.

## 2023-07-28 NOTE — RESPIRATORY THERAPY NOTE
Airway Clearance Protocol    07/28/23 1804   Respiratory Protocol   Protocol Initiated? Yes   Protocol Selection Airway Clearance   Language Barrier? No   Medical & Social History Reviewed? Yes   Diagnostic Studies Reviewed? Yes   Physical Assessment Performed? Yes   Airway Clearance Plan Flutter; Incentive Spirometer   Respiratory Assessment   Assessment Type Assess only   General Appearance Awake; Alert   Respiratory Pattern Normal   Chest Assessment Chest expansion symmetrical   Bilateral Breath Sounds Diminished   Resp Comments Pt ordered on flutter and IS in the ED. Pt started on ACP for flutter and IS.

## 2023-07-28 NOTE — ASSESSMENT & PLAN NOTE
Continue amlodipine 5mg daily  · Discontinued HCTZ 2/2 worsening renal function in PT with GFR< 60  · F/u blood pressures off HCTZ

## 2023-07-28 NOTE — TELEPHONE ENCOUNTER
Called patient to discuss results of thyroid ultrasound. Left voicemail. Will attempt to call patient again this afternoon.

## 2023-07-28 NOTE — QUICK NOTE
Pt seen on evening rounds. Pt does complain of intermittent muscle cramping in bilateral legs and hands and SOB with activity but otherwise doing well. Some decreased breath sounds on R side but no wheezing appreciated on exam. VS show tachycardia but stable.     Plan:  - hypokalemia at 3.1, has not yet been repleted and is most likely contributing to her muscle cramping   - will order potassium 80 mEQ for tonight  - recheck labs in AM    Nestor Yañez, [address]  8:03 PM

## 2023-07-28 NOTE — H&P
H&P - Family Medicine Residency, Aaron Howe 1945, 69 y.o. female. MRN: [id number]    Unit/Bed#Ankur Rodriguez Encounter: [id number]  Primary Care Provider: Dayday Ocasio MD      Admission Date: 7/28/2023 1142    Assessments & Plans:   Plans discussed with [facility] team and finalization is pending attending physician Dr. Evie Cisneros attestation. * Severe persistent asthma with acute exacerbation  Assessment & Plan  Multiple ED and office visits due to acute exacerbation of severe persistent asthma. Patient treated with nebulizer treatments each ED visit and discharged to home with steroids and azithromycin. Patient completed 5 days of steroids and azithromycin. Compliant with maintenance symbicort but requiring Albuterol inhaler q6hrs   ED management - Received IV Decadron 6mg, IV Mag, Albuterol/Atrovent nebs in ED. RSV/Flu/COVID negative, negative trops, CBC, CMP. Overall unremarkable CXR but appears hyperinflated       ASSESSMENT:  Patient reports chest tightness, phlegm, shortness of breath. Not currently in respiratory distress, O2 sats stable on room air.  Could also be contributed by undiagnosed COPD given tobacco use history, hyperinflation seen on CXR    PLAN:  · Continue maintenance Symbicort  · Albuterol and Atrovent nebulizer treatments scheduled   · Already received IV Decadron today, will reevaluate later tonight but can likely either transition to PO Prednisone taper tomorrow vs. Another dose of IV Solu-Medrol  · Pulmonology consult       Opacity noted on imaging study  Assessment & Plan  7/26 CT chest - worsening debris in the bronchus intermedius and right middle and right lower lobe bronchi, with recommended follow-up to exclude malignant endobronchial lesion  Risk factors include significant tobacco use history     Plan:  · Pulmonology consult     Stage 3b chronic kidney disease [facility])  Assessment & Plan  Lab Results   Component Value Date    EGFR 31 07/28/2023    EGFR 37 07/22/2023 EGFR 37 04/27/2023    CREATININE 1.59 (H) 07/28/2023    CREATININE 1.35 (H) 07/22/2023    CREATININE 1.37 (H) 04/27/2023     Baseline Cr 1.3-1.6  Avoid nephrotoxic agents    Anxiety  Assessment & Plan  On chart review, patient is supposed to be on Lexapro 10mg daily. Also reports she was previously on Xanax years ago, but nothing documented on PDMP  Patient requested to be back on Xanax as her anxiety has increased     Plan:  · Continue Lexapro 10mg daily  · Will hold on Xanax.  If patient becomes symptomatic due to anxiety, can consider giving one time dose of Ativan    Essential hypertension  Assessment & Plan  Continue amlodipine 5mg daily, HCTZ 12.5mg daily     Chronic allergic rhinitis  Assessment & Plan  Continue Claritin, Astelin nasal spray, Flonase     Cigarette nicotine dependence without complication  Assessment & Plan  Continue nicotine patches      Patient Active Problem List   Diagnosis   • Rupture of ulnar collateral ligament of right thumb   • Primary osteoarthritis of first carpometacarpal joint of right hand   • Mild persistent asthma with exacerbation   • Other chronic sinusitis   • Cigarette nicotine dependence without complication   • Chronic allergic rhinitis   • Trigger finger, right index finger   • Panic attack   • Essential hypertension   • Breast cyst, right   • Classic migraine with aura   • GERD (gastroesophageal reflux disease)   • Renal cyst   • Trigger finger of right thumb   • Abnormal EKG   • Prediabetes   • Intermittent palpitations   • Chronic left hip pain   • Trigger finger, right little finger   • Intersection syndrome   • Polyarthritis with positive rheumatoid factor (HCC)   • Irritable bowel syndrome with diarrhea   • Seasonal allergic conjunctivitis   • Anxiety   • Age related osteoporosis   • Vitamin D deficiency   • Chronic pain of both shoulders   • External hemorrhoid   • Stage 3b chronic kidney disease (HCC)   • Tendinitis of right rotator cuff   • Trochanteric bursitis of left hip   • Watery eyes   • Financial difficulties   • Ambulatory dysfunction   • Vision abnormalities   • Retina disorder   • Goiter   • Thyroid nodule   • Severe persistent asthma with acute exacerbation   • Tobacco abuse   • Opacity noted on imaging study       Diet: Diet Regular; Regular House  VTE Pharm PPX: Heparin  VTE Mech PPX: sequential compression device    Code Status:  Level 1 - Full Code      Disposition: Admit to Inpatient under Med-surg   Consult: IP CONSULT TO PULMONOLOGY    Chief Complaints:   Asthma (Has been feeling more SOB since Saturday, hx of asthma. Took inhaler this morning with no relief )      History of Presenting Illness:     20-year-old female presenting for acute exacerbation of severe persistent asthma. Other PMH includes nicotine use, hypertension, CKD, thyroid nodule, allergic rhinitis. Patient has been seen in the ED and PCPs office multiple times in the past week. Seen in the ED on 7/22 for wheezing, increased albuterol use, chest tightness, all symptoms consistent with prior asthma exacerbations. Patient received nebulizer treatments, IV Solu-Medrol, with symptom relief and was discharged with steroids and azithromycin. Later followed up at PCPs office where they switched her daily Symbicort to LABA/LAMA combo with suspicion that patient may have COPD. Instructed to obtain PFTs and continue albuterol inhaler as needed. Patient was again seen on 7/26 in the ED because her symptoms did not improve. She was given nebulizer treatments again, IV Solu-Medrol dose, IV mag. CT chest without contrast showed worsening debris in the bronchus intermedius and right middle and right lower lobe bronchi, with recommended follow-up to exclude malignant endobronchial lesion. Also seen to have stable anterior mediastinal nodule, lymph node, or thymoma.   Patient's symptoms improved after nebulizer treatment and patient was discharged to home with follow-up with PCP and pulmonology referral.    Patient presented to the ED again today because her symptoms have not improved. She didn't receive the new inhaler yet, but she's been taking the Symbicort daily. She just finished the steroids 2 days ago, and finished the azithromycin yesterday. Continues to have congestion, chest tightness and shortness of breath, but no wheezing or respiratory distress. No recent sick contacts, no fevers. Maintaining adequate PO intake. Last cigarette 2 days ago.      ED management:   - Tachycardic but otherwise hemodynamically stable  - Flu/RSV/Covid negative  - Trops, CBC, CMP WNL  - IV Decadron 6mg, IV Mag, Albuterol, Atrovent nebs  - CXR - negative on wet read        ED Management:     ED Triage Vitals [07/28/23 1141]   Temperature Pulse Respirations Blood Pressure SpO2   98.5 °F (36.9 °C) (!) 109 20 170/99 96 %      Temp Source Heart Rate Source Patient Position - Orthostatic VS BP Location FiO2 (%)   Oral Monitor Lying Right arm --      Pain Score       No Pain         Medications   acetaminophen (TYLENOL) tablet 650 mg (325 mg Oral Not Given 7/28/23 1238)   nicotine (NICODERM CQ) 21 mg/24 hr TD 24 hr patch 21 mg (21 mg Transdermal Medication Applied 7/28/23 1236)   albuterol inhalation solution 5 mg (5 mg Nebulization Given 7/28/23 1631)   ipratropium (ATROVENT) 0.02 % inhalation solution 0.5 mg (0.5 mg Nebulization Given 7/28/23 1638)   amLODIPine (NORVASC) tablet 5 mg (5 mg Oral Given 7/28/23 1632)   azelastine (ASTELIN) 0.1 % nasal spray 2 spray (has no administration in time range)   benzonatate (TESSALON PERLES) capsule 100 mg (has no administration in time range)   cholecalciferol (VITAMIN D3) tablet 1,000 Units (1,000 Units Oral Given 7/28/23 1632)   escitalopram (LEXAPRO) tablet 10 mg (has no administration in time range)   loratadine (CLARITIN) tablet 10 mg (10 mg Oral Given 7/28/23 1632)   fluticasone (FLONASE) 50 mcg/act nasal spray 2 spray (2 sprays Nasal Given 7/28/23 1632) hydrochlorothiazide (HYDRODIURIL) tablet 12.5 mg (has no administration in time range)   pantoprazole (PROTONIX) EC tablet 20 mg (20 mg Oral Given 7/28/23 1632)   budesonide-formoterol (SYMBICORT) 80-4.5 MCG/ACT inhaler 2 puff (has no administration in time range)   heparin (porcine) subcutaneous injection 5,000 Units (5,000 Units Subcutaneous Given 7/28/23 1632)   albuterol inhalation solution 10 mg (10 mg Nebulization Given 7/28/23 1201)     And   ipratropium (ATROVENT) 0.02 % inhalation solution 1 mg (1 mg Nebulization Given 7/28/23 1201)     And   sodium chloride 0.9 % inhalation solution 3 mL (3 mL Nebulization Given 7/28/23 1201)   magnesium sulfate 2 g/50 mL IVPB (premix) 2 g (0 g Intravenous Stopped 7/28/23 1453)   dexamethasone (PF) (DECADRON) injection 6 mg (6 mg Intravenous Given 7/28/23 1214)   fentanyl citrate (PF) 100 MCG/2ML 25 mcg (25 mcg Intravenous Given 7/28/23 1235)       Review of System:   Review of Systems   Constitutional: Negative for chills and fever. HENT: Negative for ear pain and sore throat. Eyes: Negative for pain and visual disturbance. Respiratory: Positive for cough and shortness of breath. Negative for wheezing and stridor. Cardiovascular: Negative for chest pain, palpitations and leg swelling. Gastrointestinal: Negative for abdominal pain, constipation, diarrhea, nausea and vomiting. Genitourinary: Positive for frequency. Negative for dysuria and hematuria. Musculoskeletal: Negative for arthralgias and back pain. Skin: Negative for color change and rash. Neurological: Negative for seizures and syncope. All other systems reviewed and are negative.       History:     Past Medical History:   Diagnosis Date   • Asthma    • Asthmatic bronchitis     Last Assessed: 10/7/2014    • Chronic diarrhea     Last Assessed: 8/20/2015    • Fibromyalgia    • Focal nodular hyperplasia of liver     Last Assessed: 6/11/2015   • Herpes zoster     Last Assessed: 3/18/2016   • Hypertension    • IBS (irritable bowel syndrome)    • Intermittent palpitations    • Irritable bowel syndrome    • Lumbar radiculopathy    • Osteoarthritis    • Vertigo      Past Surgical History:   Procedure Laterality Date   • BREAST BIOPSY     • SMALL INTESTINE SURGERY       Social History     Socioeconomic History   • Marital status:      Spouse name: None   • Number of children: None   • Years of education: None   • Highest education level: None   Occupational History   • Occupation: Unemployed    Tobacco Use   • Smoking status: Every Day     Packs/day: 0.50     Types: Cigarettes   • Smokeless tobacco: Never   Vaping Use   • Vaping Use: Never used   Substance and Sexual Activity   • Alcohol use: No   • Drug use: No   • Sexual activity: Not Currently     Partners: Male   Other Topics Concern   • None   Social History Narrative    Mental Disability     Exercising Regularly     Lives with adult children      Social Determinants of Health     Financial Resource Strain: High Risk (7/24/2023)    Overall Financial Resource Strain (CARDIA)    • Difficulty of Paying Living Expenses: Very hard   Food Insecurity: Food Insecurity Present (7/24/2023)    Hunger Vital Sign    • Worried About Running Out of Food in the Last Year: Often true    • Ran Out of Food in the Last Year: Often true   Transportation Needs: No Transportation Needs (7/24/2023)    PRAPARE - Transportation    • Lack of Transportation (Medical): No    • Lack of Transportation (Non-Medical): No   Physical Activity: Sufficiently Active (8/2/2022)    Exercise Vital Sign    • Days of Exercise per Week: 5 days    • Minutes of Exercise per Session: 60 min   Stress: Stress Concern Present (8/2/2022)    [facility]    • Feeling of Stress :  To some extent   Social Connections: Not on file   Intimate Partner Violence: Not At Risk (8/2/2022)    Humiliation, Afraid, Rape, and Kick questionnaire    • Fear of Current or Ex-Partner: No    • Emotionally Abused: No    • Physically Abused: No    • Sexually Abused: No   Housing Stability: Low Risk  (6/20/2022)    Housing Stability Vital Sign    • Unable to Pay for Housing in the Last Year: No    • Number of Places Lived in the Last Year: 1    • Unstable Housing in the Last Year: No     Family History   Problem Relation Age of Onset   • Heart attack Mother    • Other Mother         Aspiration of Vomit    • Asthma Father    • Breast cancer Sister    • Arthritis Family    • Other Family         Musculoskeletal Disease    • Osteoporosis Family        Medications & Allergies:   all medications and allergies reviewed  Ambrosia Artemisiifolia (Ragweed) Skin Test  Codeine  Epinephrine  Ibuprofen  Morphine  Pollen Extract  Tramadol  Eggs Or Egg-Derived Products - Food Allergy    PTA Medications:  No current facility-administered medications on file prior to encounter.      Current Outpatient Medications on File Prior to Encounter   Medication Sig Dispense Refill   • acetaminophen (TYLENOL) 500 mg tablet Take 1 tablet (500 mg total) by mouth every 6 (six) hours as needed for mild pain 60 tablet 0   • albuterol (2.5 mg/3 mL) 0.083 % nebulizer solution Take 3 mL (2.5 mg total) by nebulization every 4 (four) hours as needed for wheezing or shortness of breath 3 mL 0   • albuterol (PROVENTIL HFA,VENTOLIN HFA) 90 mcg/act inhaler Inhale 2 puffs every 6 (six) hours as needed for wheezing or shortness of breath 18 g 3   • amLODIPine (NORVASC) 5 mg tablet Take 1 tablet (5 mg total) by mouth daily 90 tablet 0   • azelastine (ASTELIN) 0.1 % nasal spray 2 sprays into each nostril 2 (two) times a day Use in each nostril as directed 30 mL 2   • benzonatate (TESSALON PERLES) 100 mg capsule Take 1 capsule (100 mg total) by mouth 3 (three) times a day as needed for cough (Patient not taking: Reported on 5/3/2023) 20 capsule 0   • Blood Pressure Monitoring (BLOOD PRESSURE CUFF) MISC by Does not apply route 2 (two) times a day (Patient not taking: Reported on 7/24/2023) 1 each 0   • Cholecalciferol (Vitamin D) 50 MCG (2000 UT) CAPS Take 1 capsule (2,000 Units total) by mouth daily 30 capsule 5   • escitalopram (LEXAPRO) 10 mg tablet Take 1 tablet (10 mg total) by mouth daily (Patient not taking: Reported on 5/3/2023) 90 tablet 3   • fexofenadine (ALLEGRA) 180 MG tablet Take 180 mg by mouth daily     • fluticasone (FLONASE) 50 mcg/act nasal spray 2 sprays into each nostril daily 16 g 3   • folic acid (FOLVITE) 1 mg tablet take 1 tablet by mouth daily (Patient taking differently: as needed) 30 tablet 5   • hydrochlorothiazide (HYDRODIURIL) 12.5 mg tablet Take 1 tablet (12.5 mg total) by mouth daily 90 tablet 1   • hydrocortisone (ANUSOL-HC) 2.5 % rectal cream Apply topically 2 (two) times a day 28 g 0   • loratadine (CLARITIN) 10 mg tablet Take 1 tablet (10 mg total) by mouth daily (Patient not taking: Reported on 7/24/2023) 30 tablet 3   • nicotine (NICODERM CQ) 14 mg/24hr TD 24 hr patch Place 1 patch on the skin over 24 hours every 24 hours 28 patch 0   • olopatadine (PATANOL) 0.1 % ophthalmic solution Administer 1 drop to both eyes 2 (two) times a day (Patient not taking: Reported on 5/3/2023) 5 mL 2   • ondansetron (Zofran ODT) 4 mg disintegrating tablet Take 1 tablet (4 mg total) by mouth every 6 (six) hours as needed for nausea or vomiting 20 tablet 3   • pantoprazole (PROTONIX) 20 mg tablet Take 1 tablet (20 mg total) by mouth daily (Patient not taking: Reported on 7/24/2023) 90 tablet 1   • predniSONE 20 mg tablet Take 2 tablets (40 mg total) by mouth daily 8 tablet 0   • Symbicort 80-4.5 MCG/ACT inhaler inhale 2 puffs by mouth twice a day Rinse mouth after use 10.2 g 0   • umeclidinium-vilanterol 62.5-25 mcg/actuation inhaler Inhale 1 puff daily 60 blister 0         Objective & Vitals:     Vitals: /81 (BP Location: Right arm)   Pulse (!) 118   Temp 98.5 °F (36.9 °C) (Oral)   Resp 20   SpO2 93%     No intake or output data in the 24 hours ending 07/28/23 1715    Invasive Devices     Peripheral Intravenous Line  Duration           Peripheral IV 07/28/23 Left Antecubital <1 day                  Labs: I have personally reviewed pertinent reports.     Recent Results (from the past 24 hour(s))   ECG 12 lead    Collection Time: 07/28/23 11:40 AM   Result Value Ref Range    Ventricular Rate 106 BPM    Atrial Rate 106 BPM    WV Interval 112 ms    QRSD Interval 84 ms    QT Interval 300 ms    QTC Interval 398 ms    P [place] 143 degrees    QRS Axis -8 degrees    T Wave Axis 178 degrees   CBC and differential    Collection Time: 07/28/23 12:02 PM   Result Value Ref Range    WBC 15.62 (H) 4.31 - 10.16 Thousand/uL    RBC 4.68 3.81 - 5.12 Million/uL    Hemoglobin 13.7 11.5 - 15.4 g/dL    Hematocrit 40.9 34.8 - 46.1 %    MCV 87 82 - 98 fL    MCH 29.3 26.8 - 34.3 pg    MCHC 33.5 31.4 - 37.4 g/dL    RDW 15.0 11.6 - 15.1 %    MPV 12.2 8.9 - 12.7 fL    Platelets 426 853 - 882 Thousands/uL    nRBC 0 /100 WBCs    Neutrophils Relative 61 43 - 75 %    Immat GRANS % 1 0 - 2 %    Lymphocytes Relative 30 14 - 44 %    Monocytes Relative 7 4 - 12 %    Eosinophils Relative 1 0 - 6 %    Basophils Relative 0 0 - 1 %    Neutrophils Absolute 9.65 (H) 1.85 - 7.62 Thousands/µL    Immature Grans Absolute 0.14 0.00 - 0.20 Thousand/uL    Lymphocytes Absolute 4.61 (H) 0.60 - 4.47 Thousands/µL    Monocytes Absolute 1.06 0.17 - 1.22 Thousand/µL    Eosinophils Absolute 0.10 0.00 - 0.61 Thousand/µL    Basophils Absolute 0.06 0.00 - 0.10 Thousands/µL   Blood gas, venous    Collection Time: 07/28/23 12:02 PM   Result Value Ref Range    pH, Philippe 7.445 (H) 7.300 - 7.400    pCO2, Philippe 32.9 (L) 42.0 - 50.0 mm Hg    pO2, Philippe 57.1 (H) 35.0 - 45.0 mm Hg    HCO3, Philippe 22.1 (L) 24 - 30 mmol/L    Base Excess, Philippe -1.3 mmol/L    O2 Content, Philippe 15.8 ml/dL    O2 HGB, VENOUS 87.3 (H) 60.0 - 80.0 %   HS Troponin 0hr (reflex protocol)    Collection Time: 07/28/23 12:02 PM   Result Value Ref Range    hs TnI 0hr 13 "Refer to ACS Flowchart"- see link ng/L   Comprehensive metabolic panel    Collection Time: 07/28/23 12:02 PM   Result Value Ref Range    Sodium 136 135 - 147 mmol/L    Potassium 3.1 (L) 3.5 - 5.3 mmol/L    Chloride 105 96 - 108 mmol/L    CO2 22 21 - 32 mmol/L    ANION GAP 9 mmol/L    BUN 29 (H) 5 - 25 mg/dL    Creatinine 1.59 (H) 0.60 - 1.30 mg/dL    Glucose 108 65 - 140 mg/dL    Calcium 9.7 8.3 - 10.1 mg/dL    Corrected Calcium 10.3 (H) 8.3 - 10.1 mg/dL    AST 10 5 - 45 U/L    ALT 15 12 - 78 U/L    Alkaline Phosphatase 80 46 - 116 U/L    Total Protein 7.3 6.4 - 8.4 g/dL    Albumin 3.3 (L) 3.5 - 5.0 g/dL    Total Bilirubin 0.36 0.20 - 1.00 mg/dL    eGFR 31 ml/min/1.73sq m   Lipase    Collection Time: 07/28/23 12:02 PM   Result Value Ref Range    Lipase 198 73 - 393 u/L   FLU/RSV/COVID - if FLU/RSV clinically relevant    Collection Time: 07/28/23 12:02 PM    Specimen: Nose; Nares   Result Value Ref Range    SARS-CoV-2 Negative Negative    INFLUENZA A PCR Negative Negative    INFLUENZA B PCR Negative Negative    RSV PCR Negative Negative   D-dimer, quantitative    Collection Time: 07/28/23 12:13 PM   Result Value Ref Range    D-Dimer, Quant 0.59 (H) <0.50 ug/ml FEU   Urine Macroscopic, POC    Collection Time: 07/28/23  2:14 PM   Result Value Ref Range    Color, UA Yellow     Clarity, UA Clear     pH, UA 5.0 4.5 - 8.0    Leukocytes, UA Small (A) Negative    Nitrite, UA Negative Negative    Protein, UA Negative Negative mg/dl    Glucose, UA Negative Negative mg/dl    Ketones, UA Negative Negative mg/dl    Urobilinogen, UA 0.2 0.2, 1.0 E.U./dl E.U./dl    Bilirubin, UA Negative Negative    Occult Blood, UA Negative Negative    Specific Gravity, UA 1.010 1.003 - 1.030   Urine Microscopic    Collection Time: 07/28/23  2:14 PM   Result Value Ref Range    RBC, UA 2-4 (A) None Seen, 1-2 /hpf    WBC, UA 2-4 (A) None Seen, 1-2 /hpf    Epithelial Cells Occasional None Seen, Occasional /hpf    Bacteria, UA Occasional None Seen, Occasional /hpf    Hyaline Casts, UA 0-3 (A) None Seen /lpf   HS Troponin I 2hr    Collection Time: 07/28/23  2:21 PM   Result Value Ref Range    hs TnI 2hr 11 "Refer to ACS Flowchart"- see link ng/L    Delta 2hr hsTnI -2 <20 ng/L       Imaging:     I have personally reviewed pertinent reports. No results found. EKG, Pathology, and Other Studies:   I have personally reviewed pertinent reports. Physical Exam:   Physical Exam  Vitals reviewed. Constitutional:       General: She is not in acute distress. HENT:      Head: Normocephalic and atraumatic. Nose: Congestion present. Eyes:      Extraocular Movements: Extraocular movements intact. Conjunctiva/sclera: Conjunctivae normal.   Cardiovascular:      Rate and Rhythm: Normal rate and regular rhythm. Heart sounds: Normal heart sounds. No murmur heard. Pulmonary:      Effort: Pulmonary effort is normal. No respiratory distress. Breath sounds: No stridor. No wheezing, rhonchi or rales. Comments: Good air movement  No wheezing following nebs   Chest:      Chest wall: No tenderness. Abdominal:      General: Abdomen is flat. Bowel sounds are normal. There is no distension. Palpations: Abdomen is soft. Tenderness: There is no abdominal tenderness. There is no guarding or rebound. Musculoskeletal:         General: Normal range of motion. Cervical back: Normal range of motion. Skin:     General: Skin is warm and dry. Neurological:      Mental Status: She is alert and oriented to person, place, and time. Psychiatric:         Mood and Affect: Mood normal.         Behavior: Behavior normal.           Lita Hook MD  PGY-3, Family Medicine  07/28/23  5:15 PM    Dear reader, please be aware that portions of my note contain dictated text. I have done my best to proof-read this note prior to signing.  However, there may be occasional unnoticed errors pertaining to "sound-alike" words and/or grammar during my dictation process. If there is any words or information that is unclear or appears erroneous, please kindly let me know and I will clarify and/or addend my notes accordingly. Thank you for your understanding.

## 2023-07-28 NOTE — RESPIRATORY THERAPY NOTE
RT Protocol Note  Gilma Goodwin 69 y.o. female MRN: [id number]  Unit/Bed#: QCB Encounter: [id number]    Assessment    Principal Problem:    Severe persistent asthma with acute exacerbation  Active Problems:    Cigarette nicotine dependence without complication    Chronic allergic rhinitis    Essential hypertension    Anxiety    Stage 3b chronic kidney disease ([address])    Opacity noted on imaging study      Home Pulmonary Medications:  Albuterol and Symbicort         Past Medical History:   Diagnosis Date    Asthma     Asthmatic bronchitis     Last Assessed: 10/7/2014     Chronic diarrhea     Last Assessed: 8/20/2015     Fibromyalgia     Focal nodular hyperplasia of liver     Last Assessed: 6/11/2015    Herpes zoster     Last Assessed: 3/18/2016    Hypertension     IBS (irritable bowel syndrome)     Intermittent palpitations     Irritable bowel syndrome     Lumbar radiculopathy     Osteoarthritis     Vertigo      Social History     Socioeconomic History    Marital status:       Spouse name: None    Number of children: None    Years of education: None    Highest education level: None   Occupational History    Occupation: Unemployed    Tobacco Use    Smoking status: Every Day     Packs/day: 0.50     Types: Cigarettes    Smokeless tobacco: Never   Vaping Use    Vaping Use: Never used   Substance and Sexual Activity    Alcohol use: No    Drug use: No    Sexual activity: Not Currently     Partners: Male   Other Topics Concern    None   Social History Narrative    Mental Disability     Exercising Regularly     Lives with adult children      Social Determinants of Health     Financial Resource Strain: High Risk (7/24/2023)    Overall Financial Resource Strain (CARDIA)     Difficulty of Paying Living Expenses: Very hard   Food Insecurity: Food Insecurity Present (7/24/2023)    Hunger Vital Sign     Worried About Running Out of Food in the Last Year: Often true     Ran Out of Food in the Last Year: Often true Transportation Needs: No Transportation Needs (7/24/2023)    PRAPARE - Transportation     Lack of Transportation (Medical): No     Lack of Transportation (Non-Medical): No   Physical Activity: Sufficiently Active (8/2/2022)    Exercise Vital Sign     Days of Exercise per Week: 5 days     Minutes of Exercise per Session: 60 min   Stress: Stress Concern Present (8/2/2022)    [facility]     Feeling of Stress : To some extent   Social Connections: Not on file   Intimate Partner Violence: Not At Risk (8/2/2022)    Humiliation, Afraid, Rape, and Kick questionnaire     Fear of Current or Ex-Partner: No     Emotionally Abused: No     Physically Abused: No     Sexually Abused: No   Housing Stability: [address]  (6/20/2022)    Housing Stability Vital Sign     Unable to Pay for Housing in the Last Year: No     Number of [address] in the Last Year: 1     Unstable Housing in the Last Year: No       Subjective         Objective    Physical Exam:   Assessment Type: (P) Assess only  General Appearance: (P) Awake, Alert  Respiratory Pattern: (P) Normal  Chest Assessment: (P) Chest expansion symmetrical  Bilateral Breath Sounds: (P) Diminished  O2 Device: (P) room air    Vitals:  Blood pressure 157/81, pulse (!) 118, temperature 98.5 °F (36.9 °C), temperature source Oral, resp. rate 20, SpO2 (P) 94 %. Imaging and other studies: I have personally reviewed pertinent reports. O2 Device: (P) room air     Plan    Respiratory Plan: (P) Mild Distress pathway        Resp Comments: (P) Patient brought to the ED for severe presistent asthma with acute exacerbation. Order patient on respiratory protocol and assessed at this time. Patient takes Albuterol MDI and Symbicort at home. At this time patient is on room air and recieving nebulizer treatment, breath sounds are diminished.  Pt states they have been short of breath since 7/22/23 and has needed to use her Albuterol MDI more and more. Will order patient on Atrovent and Xopenex TID. Will order patient Albutetrol MDI and Alubterol Nebulizer PRN. Will cont to altagracia pt Per Respiratory Protocol.

## 2023-07-28 NOTE — ASSESSMENT & PLAN NOTE
On chart review, patient is supposed to be on Lexapro 10mg daily. Also reports she was previously on Xanax years ago, but nothing documented on PDMP  Patient requested to be back on Xanax as her anxiety has increased     Plan:  · Continue Lexapro 10mg daily  · Will hold on Xanax.  If patient becomes symptomatic due to anxiety, can consider giving one time dose of Ativan

## 2023-07-28 NOTE — ASSESSMENT & PLAN NOTE
7/26 CT chest - worsening debris in the bronchus intermedius and right middle and right lower lobe bronchi, with recommended follow-up to exclude malignant endobronchial lesion  Risk factors include significant tobacco use history     Plan:  · Pulmonology consult - as under Severe persistent asthma

## 2023-07-28 NOTE — ASSESSMENT & PLAN NOTE
Multiple ED and office visits due to acute exacerbation of severe persistent asthma. Patient treated with nebulizer treatments each ED visit and discharged to home with steroids and azithromycin. Patient completed 5 days of steroids and azithromycin. Compliant with maintenance symbicort but requiring Albuterol inhaler q6hrs   ED management - Received IV Decadron 6mg, IV Mag, Albuterol/Atrovent nebs in ED. RSV/Flu/COVID negative, negative trops, CBC, CMP. Overall unremarkable CXR but appears hyperinflated       ASSESSMENT:  Patient reports chest tightness, phlegm, shortness of breath. Not currently in respiratory distress, O2 sats stable on room air. Could also be contributed by undiagnosed COPD given tobacco use history, hyperinflation seen on CXR    PLAN:  · Continue maintenance Symbicort  · Albuterol and Atrovent nebulizer treatments scheduled   · S/p IV Decadron. Consider Orapred for increasing O2 Requirements, recursion of wheeze. · Pulmonology consult   · Increase pulmonary toileting, incentive spirometry and flutter valve  · Chest PT  · Consider bronchoscopy on Monday.   · F/U Sputum culture

## 2023-07-28 NOTE — ED PROVIDER NOTES
History  Chief Complaint   Patient presents with   • Asthma     Has been feeling more SOB since Saturday, hx of asthma. Took inhaler this morning with no relief      50-year-old female with past medical history of asthma has been seen in this emergency department multiple times over the past week for similar symptoms of shortness of breath, cough, and fatigue. Patient reports that she has been using her inhaler as prescribed however it is helping her less and less over the past 24 hours especially. She notes that her cough is dry, nonproductive of sputum, however is becoming very frequent. Because she has been coughing so much she believes that she is now having pain on the bilateral costophrenic angles. She denies any unilateral leg swelling, history of blood clot, known malignancy, however on recent CT scan, there was visualized an endobronchial lesion of concern. She describes her shortness of breath as "I just cannot get enough air in". She has not been having fevers however has been feeling "flushed" at times. She states that she is very tired which she believes is because she has not been sleeping well at night due to shortness of breath and cough. She has been taking steroid at home and was loaded with steroid just a few days ago again here in the emergency department. She is denying any sore throat, but is having nasal congestion, and is denying any ear pain. She does endorse a sensation of chest tightness and pressure. Prior to Admission Medications   Prescriptions Last Dose Informant Patient Reported? Taking?    Blood Pressure Monitoring (BLOOD PRESSURE CUFF) MISC  Self No No   Sig: by Does not apply route 2 (two) times a day   Patient not taking: Reported on 7/24/2023   Cholecalciferol (Vitamin D) 50 MCG (2000 UT) CAPS   No No   Sig: Take 1 capsule (2,000 Units total) by mouth daily   Symbicort 80-4.5 MCG/ACT inhaler   No No   Sig: inhale 2 puffs by mouth twice a day Rinse mouth after use   acetaminophen (TYLENOL) 500 mg tablet  Self No No   Sig: Take 1 tablet (500 mg total) by mouth every 6 (six) hours as needed for mild pain   albuterol (2.5 mg/3 mL) 0.083 % nebulizer solution   No No   Sig: Take 3 mL (2.5 mg total) by nebulization every 4 (four) hours as needed for wheezing or shortness of breath   albuterol (PROVENTIL HFA,VENTOLIN HFA) 90 mcg/act inhaler   No No   Sig: Inhale 2 puffs every 6 (six) hours as needed for wheezing or shortness of breath   amLODIPine (NORVASC) 5 mg tablet   No No   Sig: Take 1 tablet (5 mg total) by mouth daily   azelastine (ASTELIN) 0.1 % nasal spray   No No   Si sprays into each nostril 2 (two) times a day Use in each nostril as directed   benzonatate (TESSALON PERLES) 100 mg capsule   No No   Sig: Take 1 capsule (100 mg total) by mouth 3 (three) times a day as needed for cough   Patient not taking: Reported on 5/3/2023   escitalopram (LEXAPRO) 10 mg tablet   No No   Sig: Take 1 tablet (10 mg total) by mouth daily   Patient not taking: Reported on 5/3/2023   fexofenadine (ALLEGRA) 180 MG tablet  Self Yes No   Sig: Take 180 mg by mouth daily   fluticasone (FLONASE) 50 mcg/act nasal spray   No No   Si sprays into each nostril daily   folic acid (FOLVITE) 1 mg tablet  Self No No   Sig: take 1 tablet by mouth daily   Patient taking differently: as needed   hydrochlorothiazide (HYDRODIURIL) 12.5 mg tablet   No No   Sig: Take 1 tablet (12.5 mg total) by mouth daily   hydrocortisone (ANUSOL-HC) 2.5 % rectal cream  Self No No   Sig: Apply topically 2 (two) times a day   loratadine (CLARITIN) 10 mg tablet  Self No No   Sig: Take 1 tablet (10 mg total) by mouth daily   Patient not taking: Reported on 2023   nicotine (NICODERM CQ) 14 mg/24hr TD 24 hr patch   No No   Sig: Place 1 patch on the skin over 24 hours every 24 hours   olopatadine (PATANOL) 0.1 % ophthalmic solution  Self No No   Sig: Administer 1 drop to both eyes 2 (two) times a day   Patient not taking: Reported on 5/3/2023   ondansetron (Zofran ODT) 4 mg disintegrating tablet   No No   Sig: Take 1 tablet (4 mg total) by mouth every 6 (six) hours as needed for nausea or vomiting   pantoprazole (PROTONIX) 20 mg tablet   No No   Sig: Take 1 tablet (20 mg total) by mouth daily   Patient not taking: Reported on 7/24/2023   predniSONE 20 mg tablet   No No   Sig: Take 2 tablets (40 mg total) by mouth daily   sodium chloride 3 % inhalation solution   No No   Sig: Take 4 mL by nebulization 2 (two) times a day for 10 days   umeclidinium-vilanterol 62.5-25 mcg/actuation inhaler   No No   Sig: Inhale 1 puff daily      Facility-Administered Medications: None       Past Medical History:   Diagnosis Date   • Asthma    • Asthmatic bronchitis     Last Assessed: 10/7/2014    • Chronic diarrhea     Last Assessed: 8/20/2015    • Fibromyalgia    • Focal nodular hyperplasia of liver     Last Assessed: 6/11/2015   • Herpes zoster     Last Assessed: 3/18/2016   • Hypertension    • IBS (irritable bowel syndrome)    • Intermittent palpitations    • Irritable bowel syndrome    • Lumbar radiculopathy    • Osteoarthritis    • Vertigo        Past Surgical History:   Procedure Laterality Date   • BREAST BIOPSY     • SMALL INTESTINE SURGERY         Family History   Problem Relation Age of Onset   • Heart attack Mother    • Other Mother         Aspiration of Vomit    • Asthma Father    • Breast cancer Sister    • Arthritis Family    • Other Family         Musculoskeletal Disease    • Osteoporosis Family      I have reviewed and agree with the history as documented.     E-Cigarette/Vaping   • E-Cigarette Use Never User      E-Cigarette/Vaping Substances   • Nicotine No    • THC No    • CBD No    • Flavoring No    • Other No    • Unknown No      Social History     Tobacco Use   • Smoking status: Every Day     Packs/day: 0.50     Types: Cigarettes   • Smokeless tobacco: Never   Vaping Use   • Vaping Use: Never used   Substance Use Topics   • Alcohol use: Not Currently   • Drug use: No        Review of Systems   Constitutional: Positive for activity change, appetite change and fatigue. Negative for chills and fever. HENT: Negative for ear pain and sore throat. Eyes: Negative for pain and visual disturbance. Respiratory: Positive for cough, chest tightness and shortness of breath. Cardiovascular: Positive for chest pain. Negative for palpitations and leg swelling. Gastrointestinal: Negative for abdominal pain, constipation, diarrhea, nausea and vomiting. Genitourinary: Negative for dysuria and hematuria. Musculoskeletal: Negative for arthralgias and back pain. Skin: Negative for color change and rash. Neurological: Positive for weakness (generalized). Negative for seizures and syncope. All other systems reviewed and are negative. Physical Exam  ED Triage Vitals [07/28/23 1141]   Temperature Pulse Respirations Blood Pressure SpO2   98.5 °F (36.9 °C) (!) 109 20 170/99 96 %      Temp Source Heart Rate Source Patient Position - Orthostatic VS BP Location FiO2 (%)   Oral Monitor Lying Right arm --      Pain Score       No Pain             Orthostatic Vital Signs  Vitals:    07/29/23 0731 07/29/23 1549 07/29/23 2251 07/30/23 0648   BP: 107/60 117/73 114/76 107/74   Pulse: 93 100 97 87   Patient Position - Orthostatic VS:           Physical Exam  Vitals and nursing note reviewed. Constitutional:       General: She is not in acute distress. Appearance: She is well-developed and normal weight. She is ill-appearing (frail appearing (chronic?)). HENT:      Head: Normocephalic and atraumatic. Right Ear: External ear normal.      Left Ear: External ear normal.      Nose: Nose normal. No congestion or rhinorrhea. Mouth/Throat:      Mouth: Mucous membranes are moist.      Pharynx: Oropharynx is clear. No oropharyngeal exudate or posterior oropharyngeal erythema. Eyes:      General: No scleral icterus.      Extraocular Movements: Extraocular movements intact. Conjunctiva/sclera: Conjunctivae normal.      Pupils: Pupils are equal, round, and reactive to light. Cardiovascular:      Rate and Rhythm: Regular rhythm. Tachycardia present. Pulses: Normal pulses. Heart sounds: Normal heart sounds. No murmur heard. Pulmonary:      Effort: Pulmonary effort is normal. No respiratory distress. Breath sounds: Wheezing present. No rhonchi or rales. Abdominal:      General: Abdomen is flat. There is no distension. Palpations: Abdomen is soft. Tenderness: There is no abdominal tenderness. There is no right CVA tenderness, left CVA tenderness or guarding. Musculoskeletal:         General: No swelling or tenderness. Cervical back: Neck supple. No rigidity. Right lower leg: No edema. Left lower leg: No edema. Lymphadenopathy:      Cervical: No cervical adenopathy. Skin:     General: Skin is warm and dry. Capillary Refill: Capillary refill takes less than 2 seconds. Coloration: Skin is not jaundiced. Findings: No bruising or rash. Neurological:      General: No focal deficit present. Mental Status: She is alert and oriented to person, place, and time. Mental status is at baseline.          ED Medications  Medications   albuterol inhalation solution 10 mg (10 mg Nebulization Given 7/28/23 1201)     And   ipratropium (ATROVENT) 0.02 % inhalation solution 1 mg (1 mg Nebulization Given 7/28/23 1201)     And   sodium chloride 0.9 % inhalation solution 3 mL (3 mL Nebulization Given 7/28/23 1201)   magnesium sulfate 2 g/50 mL IVPB (premix) 2 g (0 g Intravenous Stopped 7/28/23 1453)   dexamethasone (PF) (DECADRON) injection 6 mg (6 mg Intravenous Given 7/28/23 1214)   fentanyl citrate (PF) 100 MCG/2ML 25 mcg (25 mcg Intravenous Given 7/28/23 1235)   nicotine (NICODERM CQ) 21 mg/24 hr TD 24 hr patch 21 mg (21 mg Transdermal Patch Removed 7/29/23 1636)   potassium chloride (K-DUR,KLOR-CON) CR tablet 40 mEq (40 mEq Oral Given 7/28/23 2045)   potassium chloride (K-DUR,KLOR-CON) CR tablet 40 mEq (40 mEq Oral Given 7/28/23 2300)   ketorolac (TORADOL) injection 15 mg (15 mg Intravenous Given 7/29/23 0054)       Diagnostic Studies  Results Reviewed     Procedure Component Value Units Date/Time    Procalcitonin [[id number]]  (Normal) Collected: 07/28/23 1745    Lab Status: Final result Specimen: Blood from Arm, Left Updated: 07/28/23 1821     Procalcitonin 0.08 ng/ml     HS Troponin I 4hr [[id number]]  (Normal) Collected: 07/28/23 1643    Lab Status: Final result Specimen: Blood from Arm, Left Updated: 07/28/23 1730     hs TnI 4hr 10 ng/L      Delta 4hr hsTnI -3 ng/L     Sputum culture and Gram stain [[id number]]     Lab Status: No result Specimen: Expectorated Sputum     HS Troponin I 2hr [[id number]]  (Normal) Collected: 07/28/23 1421    Lab Status: Final result Specimen: Blood from Arm, Left Updated: 07/28/23 1457     hs TnI 2hr 11 ng/L      Delta 2hr hsTnI -2 ng/L     Urine Microscopic [[id number]]  (Abnormal) Collected: 07/28/23 1414    Lab Status: Final result Specimen: Urine, Other Updated: 07/28/23 1436     RBC, UA 2-4 /hpf      WBC, UA 2-4 /hpf      Epithelial Cells Occasional /hpf      Bacteria, UA Occasional /hpf      Hyaline Casts, UA 0-3 /lpf     Urine Macroscopic, POC [[id number]]  (Abnormal) Collected: 07/28/23 1414    Lab Status: Final result Specimen: Urine Updated: 07/28/23 1415     Color, UA Yellow     Clarity, UA Clear     pH, UA 5.0     Leukocytes, UA Small     Nitrite, UA Negative     Protein, UA Negative mg/dl      Glucose, UA Negative mg/dl      Ketones, UA Negative mg/dl      Urobilinogen, UA 0.2 E.U./dl      Bilirubin, UA Negative     Occult Blood, UA Negative     Specific Gravity, UA 1.010    Narrative:      CLINITEK RESULT    FLU/RSV/COVID - if FLU/RSV clinically relevant [[id number]]  (Normal) Collected: 07/28/23 1202    Lab Status: Final result Specimen: Nares from Nose Updated: 07/28/23 1329     SARS-CoV-2 Negative     INFLUENZA A PCR Negative     INFLUENZA B PCR Negative     RSV PCR Negative    Narrative:      FOR PEDIATRIC PATIENTS - copy/paste COVID Guidelines URL to browser: [email or web address]    SARS-CoV-2 assay is a Nucleic Acid Amplification assay intended for the  qualitative detection of nucleic acid from SARS-CoV-2 in nasopharyngeal  swabs. Results are for the presumptive identification of SARS-CoV-2 RNA. Positive results are indicative of infection with SARS-CoV-2, the virus  causing COVID-19, but do not rule out bacterial infection or co-infection  with other viruses. Laboratories within the [facility] and its  territories are required to report all positive results to the appropriate  public health authorities. Negative results do not preclude SARS-CoV-2  infection and should not be used as the sole basis for treatment or other  patient management decisions. Negative results must be combined with  clinical observations, patient history, and epidemiological information. This test has not been FDA cleared or approved. This test has been authorized by FDA under an Emergency Use Authorization  (EUA). This test is only authorized for the duration of time the  declaration that circumstances exist justifying the authorization of the  emergency use of an in vitro diagnostic tests for detection of SARS-CoV-2  virus and/or diagnosis of COVID-19 infection under section 564(b)(1) of  the Act, 21 U. S.C. 894ZVM-6(O)(7), unless the authorization is terminated  or revoked sooner. The test has been validated but independent review by FDA  and CLIA is pending. Test performed using [employer]: This RT-PCR assay targets N2,  a region unique to SARS-CoV-2. A conserved region in the E-gene was chosen  for pan-Sarbecovirus detection which includes SARS-CoV-2.     According to CMS-2020-01-R, this platform meets the definition of high-throughput technology.     HS Troponin 0hr (reflex protocol) [[id number]]  (Normal) Collected: 07/28/23 1202    Lab Status: Final result Specimen: Blood from Arm, Left Updated: 07/28/23 1302     hs TnI 0hr 13 ng/L     Comprehensive metabolic panel [[id number]]  (Abnormal) Collected: 07/28/23 1202    Lab Status: Final result Specimen: Blood from Arm, Left Updated: 07/28/23 1301     Sodium 136 mmol/L      Potassium 3.1 mmol/L      Chloride 105 mmol/L      CO2 22 mmol/L      ANION GAP 9 mmol/L      BUN 29 mg/dL      Creatinine 1.59 mg/dL      Glucose 108 mg/dL      Calcium 9.7 mg/dL      Corrected Calcium 10.3 mg/dL      AST 10 U/L      ALT 15 U/L      Alkaline Phosphatase 80 U/L      Total Protein 7.3 g/dL      Albumin 3.3 g/dL      Total Bilirubin 0.36 mg/dL      eGFR 31 ml/min/1.73sq m     Narrative:      [place] guidelines for Chronic Kidney Disease (CKD):   •  Stage 1 with normal or high GFR (GFR > 90 mL/min/1.73 square meters)  •  Stage 2 Mild CKD (GFR = 60-89 mL/min/1.73 square meters)  •  Stage 3A Moderate CKD (GFR = 45-59 mL/min/1.73 square meters)  •  Stage 3B Moderate CKD (GFR = 30-44 mL/min/1.73 square meters)  •  Stage 4 Severe CKD (GFR = 15-29 mL/min/1.73 square meters)  •  Stage 5 End Stage CKD (GFR <15 mL/min/1.73 square meters)  Note: GFR calculation is accurate only with a steady state creatinine    Lipase [[id number]]  (Normal) Collected: 07/28/23 1202    Lab Status: Final result Specimen: Blood from Arm, Left Updated: 07/28/23 1256     Lipase 198 u/L     Blood gas, venous [[id number]]  (Abnormal) Collected: 07/28/23 1202    Lab Status: Final result Specimen: Blood from Arm, Left Updated: 07/28/23 1252     pH, Philippe 7.445     pCO2, Philippe 32.9 mm Hg      pO2, Philippe 57.1 mm Hg      HCO3, Philippe 22.1 mmol/L      Base Excess, Philippe -1.3 mmol/L      O2 Content, Philippe 15.8 ml/dL      O2 HGB, VENOUS 87.3 %     D-dimer, quantitative [[id number]]  (Abnormal) Collected: 07/28/23 1213    Lab Status: Final result Specimen: Blood from Arm, Left Updated: 07/28/23 1252     D-Dimer, Quant 0.59 ug/ml FEU     Narrative: In the evaluation for possible pulmonary embolism, in the appropriate (Well's Score of 4 or less) patient, the age adjusted d-dimer cutoff for this patient can be calculated as:    Age x 0.01 (in ug/mL) for Age-adjusted D-dimer exclusion threshold for a patient over 50 years. CBC and differential [[id number]]  (Abnormal) Collected: 07/28/23 1202    Lab Status: Final result Specimen: Blood from Arm, Left Updated: 07/28/23 1238     WBC 15.62 Thousand/uL      RBC 4.68 Million/uL      Hemoglobin 13.7 g/dL      Hematocrit 40.9 %      MCV 87 fL      MCH 29.3 pg      MCHC 33.5 g/dL      RDW 15.0 %      MPV 12.2 fL      Platelets 666 Thousands/uL      nRBC 0 /100 WBCs      Neutrophils Relative 61 %      Immat GRANS % 1 %      Lymphocytes Relative 30 %      Monocytes Relative 7 %      Eosinophils Relative 1 %      Basophils Relative 0 %      Neutrophils Absolute 9.65 Thousands/µL      Immature Grans Absolute 0.14 Thousand/uL      Lymphocytes Absolute 4.61 Thousands/µL      Monocytes Absolute 1.06 Thousand/µL      Eosinophils Absolute 0.10 Thousand/µL      Basophils Absolute 0.06 Thousands/µL                  XR chest 2 views   Final Result by Carine Diane MD (07/28 9666)      No acute cardiopulmonary disease.                   Workstation performed: [id number]         CT chest wo contrast    (Results Pending)         Procedures  ECG 12 Lead Documentation Only    Date/Time: 7/28/2023 12:08 PM    Performed by: Tj Rios MD  Authorized by: Tj Rios MD    Indications / Diagnosis:  Chest tightness  ECG reviewed by me, the ED Provider: yes    Patient location:  ED  Previous ECG:     Comparison to cardiac monitor: Yes    Interpretation:     Interpretation: abnormal    Rate:     ECG rate:  108    ECG rate assessment: tachycardic    Rhythm:     Rhythm: sinus rhythm Ectopy:     Ectopy: none    QRS:     QRS axis:  Normal    QRS intervals:  Normal  Conduction:     Conduction: normal    ST segments:     ST segments:  Normal  T waves:     T waves: non-specific    Comments:      Some possible ectopic atrial beats visualized with abnormal P axis          ED Course  ED Course as of 07/31/23 2253 Fri Jul 28, 2023   1251 WBC(!): 15.62  Has been on steroids for approximately a week now, on differential does not appear to be a new left shift. Would suspect neutrophil demargination in setting of steroid therapy   1300 D-Dimer, Quant(!): 0.59  Age adjusted negative     1308 Creatinine(!): 1.59  Slightly worse than baseline 1.30-1. 50. HEART Risk Score    Flowsheet Row Most Recent Value   Heart Score Risk Calculator    History 1 Filed at: 07/31/2023 2252   ECG 1 Filed at: 07/31/2023 2252   Age 2 Filed at: 07/31/2023 2252   Risk Factors 2 Filed at: 07/31/2023 2252   Troponin 0 Filed at: 07/31/2023 2252   HEART Score 6 Filed at: 07/31/2023 2252                                Medical Decision Making  DDx considered: COPD/Asthma exacerbation, pneumothorax, ACS, acute anemia, new onset CHF, arrhythmia, new onset renal insufficiency, acute metabolic disturbance    I reviewed patients recent visits in EMR and based on multiple prior recent visits with similar symptoms and presentation I decided with the patient that she should be admitted to the hospital today for further management of her acute symptoms and optimization of her outpatient regimen. I see that there are concerns that the patient may not be able to use her inhalers properly and therefore alternative medications/medication routes may need to be explored in consultation with pulmonology team.          Amount and/or Complexity of Data Reviewed  Labs: ordered. Decision-making details documented in ED Course. Risk  OTC drugs. Prescription drug management.   Decision regarding hospitalization.             Disposition  Final diagnoses:   Asthma exacerbation   Cough   Dyspnea   Chest tightness     Time reflects when diagnosis was documented in both MDM as applicable and the Disposition within this note     Time User Action Codes Description Comment    7/28/2023  1:20 PM Benjamin Ao Add [J45.901] Asthma exacerbation     7/28/2023  1:20 PM Manuel Silva [R05.9] Cough     7/28/2023  1:21 PM Cerrillos Ao Add [R06.00] Dyspnea     7/28/2023  1:21 PM Cerrillos Ao Add [R07.89] Chest tightness     7/28/2023  3:43 PM Hayward Boroughs Add [R91.8] Opacity of lung on imaging study     7/28/2023  5:21 PM Fern Emily Add [Z72.0] Tobacco abuse     7/28/2023  5:21 PM Fern Emily Add [R93.89] Opacity noted on imaging study     7/30/2023 11:34 AM Hayward Boroughs Add [J45.51] Severe persistent asthma with acute exacerbation       ED Disposition     ED Disposition   Admit    Condition   Stable    Date/Time   Fri Jul 28, 2023  2:45 PM    Comment   Case was discussed with Dr. Shell Dong and the patient's admission status was agreed to be Admission Status: inpatient status to the service of Dr. Shea Curtis    None         Discharge Medication List as of 7/30/2023 11:55 AM      CONTINUE these medications which have NOT CHANGED    Details   acetaminophen (TYLENOL) 500 mg tablet Take 1 tablet (500 mg total) by mouth every 6 (six) hours as needed for mild pain, Starting Mon 8/27/2018, Normal      albuterol (2.5 mg/3 mL) 0.083 % nebulizer solution Take 3 mL (2.5 mg total) by nebulization every 4 (four) hours as needed for wheezing or shortness of breath, Starting Mon 4/24/2023, Normal      albuterol (PROVENTIL HFA,VENTOLIN HFA) 90 mcg/act inhaler Inhale 2 puffs every 6 (six) hours as needed for wheezing or shortness of breath, Starting Mon 4/24/2023, Normal      amLODIPine (NORVASC) 5 mg tablet Take 1 tablet (5 mg total) by mouth daily, Starting Mon 4/24/2023, Normal      azelastine (ASTELIN) 0.1 % nasal spray 2 sprays into each nostril 2 (two) times a day Use in each nostril as directed, Starting Mon 4/24/2023, Normal      benzonatate (TESSALON PERLES) 100 mg capsule Take 1 capsule (100 mg total) by mouth 3 (three) times a day as needed for cough, Starting Thu 4/27/2023, Normal      Blood Pressure Monitoring (BLOOD PRESSURE CUFF) MISC by Does not apply route 2 (two) times a day, Starting Wed 6/3/2020, Print      Cholecalciferol (Vitamin D) 50 MCG (2000 UT) CAPS Take 1 capsule (2,000 Units total) by mouth daily, Starting Mon 4/24/2023, Normal      escitalopram (LEXAPRO) 10 mg tablet Take 1 tablet (10 mg total) by mouth daily, Starting Wed 4/19/2023, Until Sat 4/13/2024, Normal      fexofenadine (ALLEGRA) 180 MG tablet Take 180 mg by mouth daily, Historical Med      fluticasone (FLONASE) 50 mcg/act nasal spray 2 sprays into each nostril daily, Starting Mon 7/31/2098, Normal      folic acid (FOLVITE) 1 mg tablet take 1 tablet by mouth daily, Normal      hydrocortisone (ANUSOL-HC) 2.5 % rectal cream Apply topically 2 (two) times a day, Starting Wed 11/9/2022, Normal      loratadine (CLARITIN) 10 mg tablet Take 1 tablet (10 mg total) by mouth daily, Starting Wed 9/28/2022, Until Mon 4/24/2023, Normal      nicotine (NICODERM CQ) 14 mg/24hr TD 24 hr patch Place 1 patch on the skin over 24 hours every 24 hours, Starting Mon 7/24/2023, Normal      olopatadine (PATANOL) 0.1 % ophthalmic solution Administer 1 drop to both eyes 2 (two) times a day, Starting u 9/1/2022, Normal      ondansetron (Zofran ODT) 4 mg disintegrating tablet Take 1 tablet (4 mg total) by mouth every 6 (six) hours as needed for nausea or vomiting, Starting Mon 7/24/2023, Normal      pantoprazole (PROTONIX) 20 mg tablet Take 1 tablet (20 mg total) by mouth daily, Starting Mon 4/24/2023, Normal      Symbicort 80-4.5 MCG/ACT inhaler inhale 2 puffs by mouth twice a day Rinse mouth after use, Normal         STOP taking these medications hydrochlorothiazide (HYDRODIURIL) 12.5 mg tablet Comments:   Reason for Stopping:         predniSONE 20 mg tablet Comments:   Reason for Stopping:         umeclidinium-vilanterol 62.5-25 mcg/actuation inhaler Comments:   Reason for Stopping:             Outpatient Discharge Orders   CT chest wo contrast   Standing Status: Future Standing Exp. Date: 07/30/27       PDMP Review     None           ED Provider  Attending physically available and evaluated Donna Kendall. I managed the patient along with the ED Attending.     Electronically Signed by         Esperanza Balderas MD  07/31/23 [id number]

## 2023-07-28 NOTE — Clinical Note
Case was discussed with Dr. Lillie Garcia and the patient's admission status was agreed to be Admission Status: inpatient status to the service of Dr. Efe Membreno

## 2023-07-28 NOTE — ASSESSMENT & PLAN NOTE
Lab Results   Component Value Date    EGFR 27 07/29/2023    EGFR 31 07/28/2023    EGFR 37 07/22/2023    CREATININE 1.78 (H) 07/29/2023    CREATININE 1.59 (H) 07/28/2023    CREATININE 1.35 (H) 07/22/2023     Baseline Cr 1.3-1.6  Avoid nephrotoxic agents  · Encouraged patient to increase hydration, will reevaluate in PM for potential IVF  · Discontinued HCTZ - will monitor Blood pressure

## 2023-07-28 NOTE — CONSULTS
PULMONOLOGY CONSULT NOTE     Name: Fabio Hurley   Age & Sex: 69 y.o. female   MRN: [id number]  Unit/Bed#[facility]   Encounter: [id number]    Reason for consultation: Shortness of breath    Requesting physician: Dr. Humphrey Rued:   1. Mucous Plugging  a. CT Chest scan showing debris in the bronchus intermedius, RML, RLL bronchi suspicious for mucous plugging vs malignancy  2. Emphysema  a. CT chest showing emphysematous changes  3. Tobacco use disorder  a. Smokes 1/4 ppd for decades  4. Asthma exacerbation  a. Per patient last PFT ~30 years ago. Plan:  • Aggressive pulmonary toileting  o with 3% hypertonic saline inhalation q6h  o Incentive spirometer  o Flutter valve  o CPT  • Collect Sputum culture, repeat procalcitonin  • Please hold on ABx for bronchoscopy, but if patient develops fevers, or repeat procalcitonin is positive then may start antibiotics. • Plan for bronchoscopy 7/31. • NPO 7/31 at midnight. • Pt will need PFTs as outpatient after acute episode. History of Present Illness   HPI:  Fabio Hurley is a 69 y.o. female with PMHx of Tobacco use, Asthma (diagnosed at age 37), GERD, who was admitted to the hospital for acute dyspnea. Patient reports being seen in ED on 7/22 for acute asthma exacerbation was treated with IV methylprednisolone and discharged with a 3 day course of prednisone. She returned to the ED on 7/26 for similar symptoms and was treated again with steroids which improved her symptoms and was discharged at that time. CT chest at that time showed "worsening debris int he bronchus intermedius and RML and RLL bronchi", for which she had an appointment with pulmonary next week. Today she returns again for dyspnea. She has been using her symbicort BID and Albuterol q6h since 7/22 without much improvement. She notes that she gets better with laying on her left side and worsens in any other position.  She denies fever, chills, sick contacts, N/V/D or any other symptoms at this time. She is a current smoker. Review of systems:  12 point review of systems was completed and was otherwise negative except as listed in HPI. Historical Information   Past Medical History:   Diagnosis Date   • Asthma    • Asthmatic bronchitis     Last Assessed: 10/7/2014    • Chronic diarrhea     Last Assessed: 8/20/2015    • Fibromyalgia    • Focal nodular hyperplasia of liver     Last Assessed: 6/11/2015   • Herpes zoster     Last Assessed: 3/18/2016   • Hypertension    • IBS (irritable bowel syndrome)    • Intermittent palpitations    • Irritable bowel syndrome    • Lumbar radiculopathy    • Osteoarthritis    • Vertigo      Past Surgical History:   Procedure Laterality Date   • BREAST BIOPSY     • SMALL INTESTINE SURGERY       Family History   Problem Relation Age of Onset   • Heart attack Mother    • Other Mother         Aspiration of Vomit    • Asthma Father    • Breast cancer Sister    • Arthritis Family    • Other Family         Musculoskeletal Disease    • Osteoporosis Family        Social History    Social History:   Social History     Socioeconomic History   • Marital status:       Spouse name: Not on file   • Number of children: Not on file   • Years of education: Not on file   • Highest education level: Not on file   Occupational History   • Occupation: Unemployed    Tobacco Use   • Smoking status: Every Day     Packs/day: 0.50     Types: Cigarettes   • Smokeless tobacco: Never   Vaping Use   • Vaping Use: Never used   Substance and Sexual Activity   • Alcohol use: No   • Drug use: No   • Sexual activity: Not Currently     Partners: Male   Other Topics Concern   • Not on file   Social History Narrative    Mental Disability     Exercising Regularly     Lives with adult children      Social Determinants of Health     Financial Resource Strain: High Risk (7/24/2023)    Overall Financial Resource Strain (CARDIA)    • Difficulty of Paying Living Expenses: Very hard   Food Insecurity: Food Insecurity Present (7/24/2023)    Hunger Vital Sign    • Worried About Running Out of Food in the Last Year: Often true    • Ran Out of Food in the Last Year: Often true   Transportation Needs: No Transportation Needs (7/24/2023)    PRAPARE - Transportation    • Lack of Transportation (Medical): No    • Lack of Transportation (Non-Medical): No   Physical Activity: Sufficiently Active (8/2/2022)    Exercise Vital Sign    • Days of Exercise per Week: 5 days    • Minutes of Exercise per Session: 60 min   Stress: Stress Concern Present (8/2/2022)    [facility]    • Feeling of Stress :  To some extent   Social Connections: Not on file   Intimate Partner Violence: Not At Risk (8/2/2022)    Humiliation, Afraid, Rape, and Kick questionnaire    • Fear of Current or Ex-Partner: No    • Emotionally Abused: No    • Physically Abused: No    • Sexually Abused: No   Housing Stability: Low Risk  (6/20/2022)    Housing Stability Vital Sign    • Unable to Pay for Housing in the Last Year: No    • Number of Places Lived in the Last Year: 1    • Unstable Housing in the Last Year: No         Meds/Allergies   Current Facility-Administered Medications   Medication Dose Route Frequency   • acetaminophen (TYLENOL) tablet 650 mg  650 mg Oral Once   • albuterol inhalation solution 5 mg  5 mg Nebulization TID   • amLODIPine (NORVASC) tablet 5 mg  5 mg Oral Daily   • azelastine (ASTELIN) 0.1 % nasal spray 2 spray  2 spray Each Nare BID   • benzonatate (TESSALON PERLES) capsule 100 mg  100 mg Oral TID PRN   • budesonide-formoterol (SYMBICORT) 80-4.5 MCG/ACT inhaler 2 puff  2 puff Inhalation BID   • cholecalciferol (VITAMIN D3) tablet 1,000 Units  1,000 Units Oral Daily   • escitalopram (LEXAPRO) tablet 10 mg  10 mg Oral Daily   • fluticasone (FLONASE) 50 mcg/act nasal spray 2 spray  2 spray Nasal Daily   • heparin (porcine) subcutaneous injection 5,000 Units  5,000 Units Subcutaneous Q8H [facility]   • hydrochlorothiazide (HYDRODIURIL) tablet 12.5 mg  12.5 mg Oral Daily   • ipratropium (ATROVENT) 0.02 % inhalation solution 0.5 mg  0.5 mg Nebulization TID   • loratadine (CLARITIN) tablet 10 mg  10 mg Oral Daily   • nicotine (NICODERM CQ) 21 mg/24 hr TD 24 hr patch 21 mg  21 mg Transdermal Once   • pantoprazole (PROTONIX) EC tablet 20 mg  20 mg Oral Daily   • sodium chloride 3 % inhalation solution 3 mL  3 mL Nebulization Q6H     (Not in a hospital admission)    Allergies   Allergen Reactions   • Ambrosia Artemisiifolia (Ragweed) Skin Test Facial Swelling   • Codeine Other (See Comments)     Heart races   • Epinephrine      Pt reports "it makes my heart race, they told me I cant take it."   • Ibuprofen Other (See Comments)     Heart racing   • Morphine Other (See Comments)     Heart racing   • Pollen Extract Sneezing   • Tramadol Other (See Comments)     Heart racing   • Eggs Or Egg-Derived Products - Food Allergy Diarrhea and GI Intolerance       Objective    Vitals: Blood pressure 157/81, pulse (!) 118, temperature 98.5 °F (36.9 °C), temperature source Oral, resp. rate 20, SpO2 93 %. There is no height or weight on file to calculate BMI. No intake or output data in the 24 hours ending 07/28/23 1725    Physical Exam  Vitals reviewed. Constitutional:       Appearance: Normal appearance. Comments: Laying on her left side. HENT:      Head: Normocephalic and atraumatic. Cardiovascular:      Rate and Rhythm: Tachycardia present. Pulses: Normal pulses. Heart sounds: Normal heart sounds. Pulmonary:      Effort: No respiratory distress. Breath sounds: Wheezing present. No rhonchi. Comments: Decreased air movement in RML and RLL of the lung  Musculoskeletal:      Cervical back: Neck supple. Neurological:      Mental Status: She is alert and oriented to person, place, and time. Psychiatric:         Mood and Affect: Mood normal.         Labs:  I have personally reviewed pertinent lab results. Laboratory and Diagnostics  Results from last 7 days   Lab Units 07/28/23  1202 07/22/23  1946   WBC Thousand/uL 15.62* 11.29*   HEMOGLOBIN g/dL 13.7 12.6   HEMATOCRIT % 40.9 37.0   PLATELETS Thousands/uL 333 306   NEUTROS PCT % 61 75   MONOS PCT % 7 6   EOS PCT % 1 2     Results from last 7 days   Lab Units 07/28/23  1202 07/22/23  1946   SODIUM mmol/L 136 141   POTASSIUM mmol/L 3.1* 3.3*   CHLORIDE mmol/L 105 111*   CO2 mmol/L 22 24   ANION GAP mmol/L 9 6   BUN mg/dL 29* 18   CREATININE mg/dL 1.59* 1.35*   CALCIUM mg/dL 9.7 8.8   GLUCOSE RANDOM mg/dL 108 99   ALT U/L 15  --    AST U/L 10  --    ALK PHOS U/L 80  --    ALBUMIN g/dL 3.3*  --    TOTAL BILIRUBIN mg/dL 0.36  --                                    Results from last 7 days   Lab Units 07/28/23  1213   D-DIMER QUANTITATIVE ug/ml FEU 0.59*                 Imaging and other studies: I have personally reviewed pertinent films in PACS         Code Status: Level 1 - Full Code    VTE Pharmacologic Prophylaxis: As per PMD  VTE Mechanical Prophylaxis: As per PMD    Disclaimer: Portions of the record may have been created with voice recognition software. Occasional wrong word or "sound a like" substitutions may have occurred due to the inherent limitations of voice recognition software. Careful consideration should be taken to recognize, using context, where substitutions have occurred.     Claudia Morales,    Pulmonary/Critical Care Fellowship PGY-IV  [facility]

## 2023-07-29 LAB
ANION GAP SERPL CALCULATED.3IONS-SCNC: 7 MMOL/L
BASOPHILS # BLD AUTO: 0.02 THOUSANDS/ÂΜL (ref 0–0.1)
BASOPHILS NFR BLD AUTO: 0 % (ref 0–1)
BUN SERPL-MCNC: 34 MG/DL (ref 5–25)
CALCIUM SERPL-MCNC: 9.2 MG/DL (ref 8.3–10.1)
CHLORIDE SERPL-SCNC: 108 MMOL/L (ref 96–108)
CO2 SERPL-SCNC: 22 MMOL/L (ref 21–32)
CREAT SERPL-MCNC: 1.78 MG/DL (ref 0.6–1.3)
EOSINOPHIL # BLD AUTO: 0 THOUSAND/ÂΜL (ref 0–0.61)
EOSINOPHIL NFR BLD AUTO: 0 % (ref 0–6)
ERYTHROCYTE [DISTWIDTH] IN BLOOD BY AUTOMATED COUNT: 14.8 % (ref 11.6–15.1)
GFR SERPL CREATININE-BSD FRML MDRD: 27 ML/MIN/1.73SQ M
GLUCOSE SERPL-MCNC: 128 MG/DL (ref 65–140)
HCT VFR BLD AUTO: 36.7 % (ref 34.8–46.1)
HGB BLD-MCNC: 11.7 G/DL (ref 11.5–15.4)
IMM GRANULOCYTES # BLD AUTO: 0.11 THOUSAND/UL (ref 0–0.2)
IMM GRANULOCYTES NFR BLD AUTO: 1 % (ref 0–2)
LYMPHOCYTES # BLD AUTO: 1.92 THOUSANDS/ÂΜL (ref 0.6–4.47)
LYMPHOCYTES NFR BLD AUTO: 14 % (ref 14–44)
MCH RBC QN AUTO: 28.5 PG (ref 26.8–34.3)
MCHC RBC AUTO-ENTMCNC: 31.9 G/DL (ref 31.4–37.4)
MCV RBC AUTO: 89 FL (ref 82–98)
MONOCYTES # BLD AUTO: 0.94 THOUSAND/ÂΜL (ref 0.17–1.22)
MONOCYTES NFR BLD AUTO: 7 % (ref 4–12)
NEUTROPHILS # BLD AUTO: 10.44 THOUSANDS/ÂΜL (ref 1.85–7.62)
NEUTS SEG NFR BLD AUTO: 78 % (ref 43–75)
NRBC BLD AUTO-RTO: 0 /100 WBCS
PLATELET # BLD AUTO: 282 THOUSANDS/UL (ref 149–390)
PMV BLD AUTO: 12.2 FL (ref 8.9–12.7)
POTASSIUM SERPL-SCNC: 3.7 MMOL/L (ref 3.5–5.3)
RBC # BLD AUTO: 4.11 MILLION/UL (ref 3.81–5.12)
SODIUM SERPL-SCNC: 137 MMOL/L (ref 135–147)
WBC # BLD AUTO: 13.43 THOUSAND/UL (ref 4.31–10.16)

## 2023-07-29 PROCEDURE — 99223 1ST HOSP IP/OBS HIGH 75: CPT | Performed by: FAMILY MEDICINE

## 2023-07-29 PROCEDURE — 85025 COMPLETE CBC W/AUTO DIFF WBC: CPT

## 2023-07-29 PROCEDURE — 97162 PT EVAL MOD COMPLEX 30 MIN: CPT

## 2023-07-29 PROCEDURE — 80048 BASIC METABOLIC PNL TOTAL CA: CPT

## 2023-07-29 PROCEDURE — 94640 AIRWAY INHALATION TREATMENT: CPT

## 2023-07-29 PROCEDURE — NC001 PR NO CHARGE: Performed by: FAMILY MEDICINE

## 2023-07-29 PROCEDURE — 94668 MNPJ CHEST WALL SBSQ: CPT

## 2023-07-29 PROCEDURE — 94664 DEMO&/EVAL PT USE INHALER: CPT

## 2023-07-29 PROCEDURE — 94760 N-INVAS EAR/PLS OXIMETRY 1: CPT

## 2023-07-29 PROCEDURE — 99232 SBSQ HOSP IP/OBS MODERATE 35: CPT | Performed by: INTERNAL MEDICINE

## 2023-07-29 RX ORDER — NICOTINE 21 MG/24HR
21 PATCH, TRANSDERMAL 24 HOURS TRANSDERMAL ONCE
Status: DISCONTINUED | OUTPATIENT
Start: 2023-07-29 | End: 2023-07-30 | Stop reason: HOSPADM

## 2023-07-29 RX ORDER — ACETAMINOPHEN 325 MG/1
650 TABLET ORAL EVERY 6 HOURS PRN
Status: DISCONTINUED | OUTPATIENT
Start: 2023-07-29 | End: 2023-07-29

## 2023-07-29 RX ORDER — KETOROLAC TROMETHAMINE 30 MG/ML
15 INJECTION, SOLUTION INTRAMUSCULAR; INTRAVENOUS ONCE
Status: COMPLETED | OUTPATIENT
Start: 2023-07-29 | End: 2023-07-29

## 2023-07-29 RX ORDER — ACETAMINOPHEN 325 MG/1
650 TABLET ORAL EVERY 6 HOURS PRN
Status: DISCONTINUED | OUTPATIENT
Start: 2023-07-29 | End: 2023-07-30 | Stop reason: HOSPADM

## 2023-07-29 RX ADMIN — LEVALBUTEROL HYDROCHLORIDE 1.25 MG: 1.25 SOLUTION RESPIRATORY (INHALATION) at 19:40

## 2023-07-29 RX ADMIN — SODIUM CHLORIDE SOLN NEBU 3% 4 ML: 3 NEBU SOLN at 19:40

## 2023-07-29 RX ADMIN — BUDESONIDE AND FORMOTEROL FUMARATE DIHYDRATE 2 PUFF: 80; 4.5 AEROSOL RESPIRATORY (INHALATION) at 16:10

## 2023-07-29 RX ADMIN — HEPARIN SODIUM 5000 UNITS: 5000 INJECTION INTRAVENOUS; SUBCUTANEOUS at 16:09

## 2023-07-29 RX ADMIN — ESCITALOPRAM OXALATE 10 MG: 10 TABLET ORAL at 08:51

## 2023-07-29 RX ADMIN — FLUTICASONE PROPIONATE 2 SPRAY: 50 SPRAY, METERED NASAL at 08:44

## 2023-07-29 RX ADMIN — HEPARIN SODIUM 5000 UNITS: 5000 INJECTION INTRAVENOUS; SUBCUTANEOUS at 21:49

## 2023-07-29 RX ADMIN — ALBUTEROL SULFATE 2 PUFF: 90 AEROSOL, METERED RESPIRATORY (INHALATION) at 08:44

## 2023-07-29 RX ADMIN — IPRATROPIUM BROMIDE 0.5 MG: 0.5 SOLUTION RESPIRATORY (INHALATION) at 07:34

## 2023-07-29 RX ADMIN — AZELASTINE HYDROCHLORIDE 2 SPRAY: 137 SPRAY, METERED NASAL at 08:44

## 2023-07-29 RX ADMIN — HEPARIN SODIUM 5000 UNITS: 5000 INJECTION INTRAVENOUS; SUBCUTANEOUS at 05:02

## 2023-07-29 RX ADMIN — SODIUM CHLORIDE SOLN NEBU 3% 4 ML: 3 NEBU SOLN at 07:34

## 2023-07-29 RX ADMIN — Medication 1000 UNITS: at 08:52

## 2023-07-29 RX ADMIN — IPRATROPIUM BROMIDE 0.5 MG: 0.5 SOLUTION RESPIRATORY (INHALATION) at 19:40

## 2023-07-29 RX ADMIN — ACETAMINOPHEN 650 MG: 325 TABLET, FILM COATED ORAL at 23:16

## 2023-07-29 RX ADMIN — ASPIRIN 81 MG: 81 TABLET, COATED ORAL at 16:09

## 2023-07-29 RX ADMIN — NICOTINE 21 MG: 21 PATCH, EXTENDED RELEASE TRANSDERMAL at 21:56

## 2023-07-29 RX ADMIN — ONDANSETRON 4 MG: 4 TABLET, ORALLY DISINTEGRATING ORAL at 21:52

## 2023-07-29 RX ADMIN — IPRATROPIUM BROMIDE 0.5 MG: 0.5 SOLUTION RESPIRATORY (INHALATION) at 13:46

## 2023-07-29 RX ADMIN — LEVALBUTEROL HYDROCHLORIDE 1.25 MG: 1.25 SOLUTION RESPIRATORY (INHALATION) at 13:46

## 2023-07-29 RX ADMIN — BUDESONIDE AND FORMOTEROL FUMARATE DIHYDRATE 2 PUFF: 80; 4.5 AEROSOL RESPIRATORY (INHALATION) at 08:44

## 2023-07-29 RX ADMIN — LORATADINE 10 MG: 10 TABLET ORAL at 08:53

## 2023-07-29 RX ADMIN — AZELASTINE HYDROCHLORIDE 2 SPRAY: 137 SPRAY, METERED NASAL at 16:10

## 2023-07-29 RX ADMIN — LEVALBUTEROL HYDROCHLORIDE 1.25 MG: 1.25 SOLUTION RESPIRATORY (INHALATION) at 07:34

## 2023-07-29 RX ADMIN — SODIUM CHLORIDE SOLN NEBU 3% 4 ML: 3 NEBU SOLN at 13:46

## 2023-07-29 RX ADMIN — KETOROLAC TROMETHAMINE 15 MG: 30 INJECTION, SOLUTION INTRAMUSCULAR; INTRAVENOUS at 00:54

## 2023-07-29 RX ADMIN — PANTOPRAZOLE SODIUM 20 MG: 20 TABLET, DELAYED RELEASE ORAL at 08:52

## 2023-07-29 NOTE — PROGRESS NOTES
[facility]  Progress Note  Name: Beatriz Moreno  MRN: [id number]  Unit/Bed#: PPHP 826-01 I Date of Admission: 7/28/2023   Date of Service: 7/29/2023 I Hospital Day: 1    Assessment/Plan   * Severe persistent asthma with acute exacerbation  Assessment & Plan  Multiple ED and office visits due to acute exacerbation of severe persistent asthma. Patient treated with nebulizer treatments each ED visit and discharged to home with steroids and azithromycin. Patient completed 5 days of steroids and azithromycin. Compliant with maintenance symbicort but requiring Albuterol inhaler q6hrs   ED management - Received IV Decadron 6mg, IV Mag, Albuterol/Atrovent nebs in ED. RSV/Flu/COVID negative, negative trops, CBC, CMP. Overall unremarkable CXR but appears hyperinflated       ASSESSMENT:  Patient reports chest tightness, phlegm, shortness of breath. Not currently in respiratory distress, O2 sats stable on room air. Could also be contributed by undiagnosed COPD given tobacco use history, hyperinflation seen on CXR    PLAN:  · Continue maintenance Symbicort  · Albuterol and Atrovent nebulizer treatments scheduled   · S/p IV Decadron. Consider Orapred for increasing O2 Requirements, recursion of wheeze. · Pulmonology consult   · Increase pulmonary toileting, incentive spirometry and flutter valve  · Chest PT  · Consider bronchoscopy on Monday.   · F/U Sputum culture      Opacity noted on imaging study  Assessment & Plan  7/26 CT chest - worsening debris in the bronchus intermedius and right middle and right lower lobe bronchi, with recommended follow-up to exclude malignant endobronchial lesion  Risk factors include significant tobacco use history     Plan:  · Pulmonology consult - as under Severe persistent asthma    Stage 3b chronic kidney disease [facility])  Assessment & Plan  Lab Results   Component Value Date    EGFR 27 07/29/2023    EGFR 31 07/28/2023    EGFR 37 07/22/2023    CREATININE 1.78 (H) 07/29/2023    CREATININE 1.59 (H) 07/28/2023    CREATININE 1.35 (H) 07/22/2023     Baseline Cr 1.3-1.6  Avoid nephrotoxic agents  · Encouraged patient to increase hydration, will reevaluate in PM for potential IVF  · Discontinued HCTZ - will monitor Blood pressure    Anxiety  Assessment & Plan  On chart review, patient is supposed to be on Lexapro 10mg daily. Also reports she was previously on Xanax years ago, but nothing documented on PDMP  Patient requested to be back on Xanax as her anxiety has increased     Plan:  · Continue Lexapro 10mg daily  · Will hold on Xanax. If patient becomes symptomatic due to anxiety, can consider giving one time dose of Ativan     Essential hypertension  Assessment & Plan  Continue amlodipine 5mg daily  · Discontinued HCTZ 2/2 worsening renal function in PT with GFR< 60  · F/u blood pressures off HCTZ     Chronic allergic rhinitis  Assessment & Plan  Continue Claritin, Astelin nasal spray, Flonase      Cigarette nicotine dependence without complication  Assessment & Plan  Continue nicotine patches        PPX: Heparin   Diet: Regular  Code Status: Full   Dispo: Inpatient    Plan D/W Dr. May Wilkinson and [facility] Team    Subjective:   Seen and assessed at bedside this AM. Feels well, breathing is improved. Improving appetite, denies any current pain. Demonstrates effective use of IS. No immediate concerns, O2 makes her feel more comfortable overnight but maintains saturation of 95% without supplemental O2. Will trial off for now. Objective:     Vitals: Blood pressure 107/60, pulse 93, temperature 99.1 °F (37.3 °C), resp. rate 16, height 5' 2" (1.575 m), weight 57.6 kg (127 lb), SpO2 95 %. ,Body mass index is 23.23 kg/m². Intake/Output Summary (Last 24 hours) at 7/29/2023 1235  Last data filed at 7/29/2023 0800  Gross per 24 hour   Intake 240 ml   Output 600 ml   Net -360 ml       Physical Exam:   Physical Exam  Vitals reviewed.    Constitutional:       General: She is not in acute distress. Appearance: She is normal weight. HENT:      Head: Normocephalic and atraumatic. Eyes:      Conjunctiva/sclera: Conjunctivae normal.   Cardiovascular:      Rate and Rhythm: Normal rate and regular rhythm. Heart sounds: Normal heart sounds. No murmur heard. Pulmonary:      Effort: Pulmonary effort is normal. No respiratory distress. Breath sounds: No stridor. No wheezing. Abdominal:      General: Bowel sounds are normal. There is no distension. Palpations: Abdomen is soft. Tenderness: There is no abdominal tenderness. Musculoskeletal:         General: Normal range of motion. Cervical back: Normal range of motion. Skin:     General: Skin is warm and dry. Neurological:      General: No focal deficit present. Mental Status: She is alert. Mental status is at baseline. Psychiatric:         Mood and Affect: Mood normal.         Invasive Devices     Peripheral Intravenous Line  Duration           Peripheral IV 07/28/23 Left Antecubital 1 day                         Lab and other studies:  I have personally reviewed pertinent reports.      Admission on 07/28/2023   Component Date Value   • Ventricular Rate 07/28/2023 106    • Atrial Rate 07/28/2023 106    • MS Interval 07/28/2023 112    • QRSD Interval 07/28/2023 84    • QT Interval 07/28/2023 300    • QTC Interval 07/28/2023 398    • P Axis 07/28/2023 143    • QRS Axis 07/28/2023 -8    • T Wave Axis 07/28/2023 178    • WBC 07/28/2023 15.62 (H)    • RBC 07/28/2023 4.68    • Hemoglobin 07/28/2023 13.7    • Hematocrit 07/28/2023 40.9    • MCV 07/28/2023 87    • MCH 07/28/2023 29.3    • MCHC 07/28/2023 33.5    • RDW 07/28/2023 15.0    • MPV 07/28/2023 12.2    • Platelets 11/09/1786 333    • nRBC 07/28/2023 0    • Neutrophils Relative 07/28/2023 61    • Immat GRANS % 07/28/2023 1    • Lymphocytes Relative 07/28/2023 30    • Monocytes Relative 07/28/2023 7    • Eosinophils Relative 07/28/2023 1    • Basophils Relative 07/28/2023 0    • Neutrophils Absolute 07/28/2023 9.65 (H)    • Immature Grans Absolute 07/28/2023 0.14    • Lymphocytes Absolute 07/28/2023 4.61 (H)    • Monocytes Absolute 07/28/2023 1.06    • Eosinophils Absolute 07/28/2023 0.10    • Basophils Absolute 07/28/2023 0.06    • pH, Philippe 07/28/2023 7.445 (H)    • pCO2, Philippe 07/28/2023 32.9 (L)    • pO2, Philippe 07/28/2023 57.1 (H)    • HCO3, Philippe 07/28/2023 22.1 (L)    • Base Excess, Philippe 07/28/2023 -1.3    • O2 Content, Philippe 07/28/2023 15.8    • O2 HGB, VENOUS 07/28/2023 87.3 (H)    • hs TnI 0hr 07/28/2023 13    • Sodium 07/28/2023 136    • Potassium 07/28/2023 3.1 (L)    • Chloride 07/28/2023 105    • CO2 07/28/2023 22    • ANION GAP 07/28/2023 9    • BUN 07/28/2023 29 (H)    • Creatinine 07/28/2023 1.59 (H)    • Glucose 07/28/2023 108    • Calcium 07/28/2023 9.7    • Corrected Calcium 07/28/2023 10.3 (H)    • AST 07/28/2023 10    • ALT 07/28/2023 15    • Alkaline Phosphatase 07/28/2023 80    • Total Protein 07/28/2023 7.3    • Albumin 07/28/2023 3.3 (L)    • Total Bilirubin 07/28/2023 0.36    • eGFR 07/28/2023 31    • Lipase 07/28/2023 198    • SARS-CoV-2 07/28/2023 Negative    • INFLUENZA A PCR 07/28/2023 Negative    • INFLUENZA B PCR 07/28/2023 Negative    • RSV PCR 07/28/2023 Negative    • D-Dimer, Quant 07/28/2023 0.59 (H)    • hs TnI 2hr 07/28/2023 11    • Delta 2hr hsTnI 07/28/2023 -2    • hs TnI 4hr 07/28/2023 10    • Delta 4hr hsTnI 07/28/2023 -3    • Color, UA 07/28/2023 Yellow    • Clarity, UA 07/28/2023 Clear    • pH, UA 07/28/2023 5.0    • Leukocytes, UA 07/28/2023 Small (A)    • Nitrite, UA 07/28/2023 Negative    • Protein, UA 07/28/2023 Negative    • Glucose, UA 07/28/2023 Negative    • Ketones, UA 07/28/2023 Negative    • Urobilinogen, UA 07/28/2023 0.2    • Bilirubin, UA 07/28/2023 Negative    • Occult Blood, UA 07/28/2023 Negative    • Specific Gravity, UA 07/28/2023 1.010    • RBC, UA 07/28/2023 2-4 (A)    • WBC, UA 07/28/2023 2-4 (A)    • Epithelial Cells 07/28/2023 Occasional    • Bacteria, UA 07/28/2023 Occasional    • Hyaline Casts, UA 07/28/2023 0-3 (A)    • Procalcitonin 07/28/2023 0.08    • Sodium 07/29/2023 137    • Potassium 07/29/2023 3.7    • Chloride 07/29/2023 108    • CO2 07/29/2023 22    • ANION GAP 07/29/2023 7    • BUN 07/29/2023 34 (H)    • Creatinine 07/29/2023 1.78 (H)    • Glucose 07/29/2023 128    • Calcium 07/29/2023 9.2    • eGFR 07/29/2023 27    • WBC 07/29/2023 13.43 (H)    • RBC 07/29/2023 4.11    • Hemoglobin 07/29/2023 11.7    • Hematocrit 07/29/2023 36.7    • MCV 07/29/2023 89    • MCH 07/29/2023 28.5    • MCHC 07/29/2023 31.9    • RDW 07/29/2023 14.8    • MPV 07/29/2023 12.2    • Platelets 63/41/1890 282    • nRBC 07/29/2023 0    • Neutrophils Relative 07/29/2023 78 (H)    • Immat GRANS % 07/29/2023 1    • Lymphocytes Relative 07/29/2023 14    • Monocytes Relative 07/29/2023 7    • Eosinophils Relative 07/29/2023 0    • Basophils Relative 07/29/2023 0    • Neutrophils Absolute 07/29/2023 10.44 (H)    • Immature Grans Absolute 07/29/2023 0.11    • Lymphocytes Absolute 07/29/2023 1.92    • Monocytes Absolute 07/29/2023 0.94    • Eosinophils Absolute 07/29/2023 0.00    • Basophils Absolute 07/29/2023 0.02        Recent Results (from the past 24 hour(s))   Urine Macroscopic, POC    Collection Time: 07/28/23  2:14 PM   Result Value Ref Range    Color, UA Yellow     Clarity, UA Clear     pH, UA 5.0 4.5 - 8.0    Leukocytes, UA Small (A) Negative    Nitrite, UA Negative Negative    Protein, UA Negative Negative mg/dl    Glucose, UA Negative Negative mg/dl    Ketones, UA Negative Negative mg/dl    Urobilinogen, UA 0.2 0.2, 1.0 E.U./dl E.U./dl    Bilirubin, UA Negative Negative    Occult Blood, UA Negative Negative    Specific Gravity, UA 1.010 1.003 - 1.030   Urine Microscopic    Collection Time: 07/28/23  2:14 PM   Result Value Ref Range    RBC, UA 2-4 (A) None Seen, 1-2 /hpf    WBC, UA 2-4 (A) None Seen, 1-2 /hpf    Epithelial Cells Occasional None Seen, Occasional /hpf    Bacteria, UA Occasional None Seen, Occasional /hpf    Hyaline Casts, UA 0-3 (A) None Seen /lpf   HS Troponin I 2hr    Collection Time: 07/28/23  2:21 PM   Result Value Ref Range    hs TnI 2hr 11 "Refer to ACS Flowchart"- see link ng/L    Delta 2hr hsTnI -2 <20 ng/L   HS Troponin I 4hr    Collection Time: 07/28/23  4:43 PM   Result Value Ref Range    hs TnI 4hr 10 "Refer to ACS Flowchart"- see link ng/L    Delta 4hr hsTnI -3 <20 ng/L   Procalcitonin    Collection Time: 07/28/23  5:45 PM   Result Value Ref Range    Procalcitonin 0.08 <=0.25 ng/ml   Basic metabolic panel    Collection Time: 07/29/23  6:42 AM   Result Value Ref Range    Sodium 137 135 - 147 mmol/L    Potassium 3.7 3.5 - 5.3 mmol/L    Chloride 108 96 - 108 mmol/L    CO2 22 21 - 32 mmol/L    ANION GAP 7 mmol/L    BUN 34 (H) 5 - 25 mg/dL    Creatinine 1.78 (H) 0.60 - 1.30 mg/dL    Glucose 128 65 - 140 mg/dL    Calcium 9.2 8.3 - 10.1 mg/dL    eGFR 27 ml/min/1.73sq m   CBC and differential    Collection Time: 07/29/23  6:42 AM   Result Value Ref Range    WBC 13.43 (H) 4.31 - 10.16 Thousand/uL    RBC 4.11 3.81 - 5.12 Million/uL    Hemoglobin 11.7 11.5 - 15.4 g/dL    Hematocrit 36.7 34.8 - 46.1 %    MCV 89 82 - 98 fL    MCH 28.5 26.8 - 34.3 pg    MCHC 31.9 31.4 - 37.4 g/dL    RDW 14.8 11.6 - 15.1 %    MPV 12.2 8.9 - 12.7 fL    Platelets 309 314 - 301 Thousands/uL    nRBC 0 /100 WBCs    Neutrophils Relative 78 (H) 43 - 75 %    Immat GRANS % 1 0 - 2 %    Lymphocytes Relative 14 14 - 44 %    Monocytes Relative 7 4 - 12 %    Eosinophils Relative 0 0 - 6 %    Basophils Relative 0 0 - 1 %    Neutrophils Absolute 10.44 (H) 1.85 - 7.62 Thousands/µL    Immature Grans Absolute 0.11 0.00 - 0.20 Thousand/uL    Lymphocytes Absolute 1.92 0.60 - 4.47 Thousands/µL    Monocytes Absolute 0.94 0.17 - 1.22 Thousand/µL    Eosinophils Absolute 0.00 0.00 - 0.61 Thousand/µL    Basophils Absolute 0.02 0.00 - 0.10 Thousands/µL     Blood Culture:   Lab Results   Component Value Date    BLOODCX No Growth After 5 Days. 04/26/2023   ,   Urinalysis:   Lab Results   Component Value Date    COLORU Yellow 07/28/2023    CLARITYU Clear 07/28/2023    SPECGRAV 1.010 07/28/2023    PHUR 5.0 07/28/2023    LEUKOCYTESUR Small (A) 07/28/2023    NITRITE Negative 07/28/2023    GLUCOSEU Negative 07/28/2023    KETONESU Negative 07/28/2023    BILIRUBINUR Negative 07/28/2023    BLOODU Negative 07/28/2023   ,   Urine Culture: No results found for: "URINECX",   Wound Culure: No results found for: "WOUNDCULT"      Imaging:   XR chest 2 views   Final Result by Karen Turk MD (07/28 6190)      No acute cardiopulmonary disease.                   Workstation performed: [id number]           Current Facility-Administered Medications   Medication Dose Route Frequency   • acetaminophen (TYLENOL) tablet 650 mg  650 mg Oral Q6H PRN   • albuterol (PROVENTIL HFA,VENTOLIN HFA) inhaler 2 puff  2 puff Inhalation Q4H PRN   • albuterol inhalation solution 2.5 mg  2.5 mg Nebulization Q4H PRN   • amLODIPine (NORVASC) tablet 5 mg  5 mg Oral Daily   • azelastine (ASTELIN) 0.1 % nasal spray 2 spray  2 spray Each Nare BID   • benzonatate (TESSALON PERLES) capsule 100 mg  100 mg Oral TID PRN   • budesonide-formoterol (SYMBICORT) 80-4.5 MCG/ACT inhaler 2 puff  2 puff Inhalation BID   • cholecalciferol (VITAMIN D3) tablet 1,000 Units  1,000 Units Oral Daily   • escitalopram (LEXAPRO) tablet 10 mg  10 mg Oral Daily   • fluticasone (FLONASE) 50 mcg/act nasal spray 2 spray  2 spray Nasal Daily   • heparin (porcine) subcutaneous injection 5,000 Units  5,000 Units Subcutaneous Q8H [address]   • ipratropium (ATROVENT) 0.02 % inhalation solution 0.5 mg  0.5 mg Nebulization TID   • levalbuterol (XOPENEX) inhalation solution 1.25 mg  1.25 mg Nebulization TID   • loratadine (CLARITIN) tablet 10 mg  10 mg Oral Daily   • nicotine (NICODERM CQ) 21 mg/24 hr TD 24 hr patch 21 mg  21 mg Transdermal Once   • ondansetron (ZOFRAN-ODT) dispersible tablet 4 mg  4 mg Oral Q6H PRN   • pantoprazole (PROTONIX) EC tablet 20 mg  20 mg Oral Daily   • sodium chloride 3 % inhalation solution 4 mL  4 mL Nebulization TID     Suzan Kanner, MD  Family Medicine Resident PGY2

## 2023-07-29 NOTE — PHYSICAL THERAPY NOTE
Physical Therapy Evaluation    Patient's Name: Tal Blount    Admitting Diagnosis  Cough [R05.9]  Tobacco abuse [Z72.0]  Chest tightness [R07.89]  Dyspnea [R06.00]  Asthma [J45.909]  Asthma exacerbation [J45.901]  Opacity noted on imaging study [R93.89]  Opacity of lung on imaging study [R91.8]    Problem List  Patient Active Problem List   Diagnosis    Rupture of ulnar collateral ligament of right thumb    Primary osteoarthritis of first carpometacarpal joint of right hand    Mild persistent asthma with exacerbation    Other chronic sinusitis    Cigarette nicotine dependence without complication    Chronic allergic rhinitis    Trigger finger, right index finger    Panic attack    Essential hypertension    Breast cyst, right    Classic migraine with aura    GERD (gastroesophageal reflux disease)    Renal cyst    Trigger finger of right thumb    Abnormal EKG    Prediabetes    Intermittent palpitations    Chronic left hip pain    Trigger finger, right little finger    Intersection syndrome    Polyarthritis with positive rheumatoid factor (HCC)    Irritable bowel syndrome with diarrhea    Seasonal allergic conjunctivitis    Anxiety    Age related osteoporosis    Vitamin D deficiency    Chronic pain of both shoulders    External hemorrhoid    Stage 3b chronic kidney disease (HCC)    Tendinitis of right rotator cuff    Trochanteric bursitis of left hip    Watery eyes    Financial difficulties    Ambulatory dysfunction    Vision abnormalities    Retina disorder    Goiter    Thyroid nodule    Severe persistent asthma with acute exacerbation    Tobacco abuse    Opacity noted on imaging study       Past Medical History  Past Medical History:   Diagnosis Date    Asthma     Asthmatic bronchitis     Last Assessed: 10/7/2014     Chronic diarrhea     Last Assessed: 8/20/2015     Fibromyalgia     Focal nodular hyperplasia of liver     Last Assessed: 6/11/2015    Herpes zoster     Last Assessed: 3/18/2016    Hypertension IBS (irritable bowel syndrome)     Intermittent palpitations     Irritable bowel syndrome     Lumbar radiculopathy     Osteoarthritis     Vertigo        Past Surgical History  Past Surgical History:   Procedure Laterality Date    BREAST BIOPSY      SMALL INTESTINE SURGERY          07/29/23 1430   PT Last Visit   PT Visit Date 07/29/23   Note Type   Note type Evaluation   Pain Assessment   Pain Assessment Tool 0-10   Pain Score No Pain   Restrictions/Precautions   Weight Bearing Precautions Per Order No   Home Living   Type of Home House   Home Layout Two level  (1 [address]; only bathroom is up a flight of stairs)   Bathroom Shower/Tub Tub/shower unit   Bathroom Toilet Standard   Home Equipment Walker   Prior Function   Level of [place] Independent with ADLs; Independent with functional mobility  (I ambulation w/o AD)   Lives With Daughter  (available to assist prn)   Receives Help From [facility] in the last 6 months 0   General   Family/Caregiver Present Yes  (dtr)   Cognition   Arousal/Participation Alert   Orientation Level Oriented X4   Comments pleasant + cooperative   Subjective   Subjective Pt agreeable to mobilize. RLE Assessment   RLE Assessment WFL   LLE Assessment   LLE Assessment WFL   Coordination   Movements are Fluid and Coordinated 1   Bed Mobility   Supine to Sit 6  Modified independent   Sit to Supine Unable to assess   Additional Comments Pt greeted in supine.    Transfers   Sit to Stand 5  Supervision   Stand to Sit 5  Supervision   Additional Comments no AD   Ambulation/Elevation   Gait Assistance 5  Supervision   Distance 300'   Balance   Static Sitting Normal   Dynamic Sitting Good   Static Standing Fair +   Dynamic Standing Fair   Ambulatory Fair   Endurance Deficit   Endurance Deficit Yes   Endurance Deficit Description overall no SOB w/ SpO2 at 98% RA, suspect would have decreased endurance w/ increased distances   Activity Tolerance   Activity Tolerance Patient tolerated treatment well Medical Staff Made Aware RYLEE Vega   Nurse Made Aware yes - cleared for PT   Assessment   Assessment Pt is 69 y.o. female seen for a PT evaluation s/p admit to [facility] on 7/28/2023. Pt presenting w/ acute exacerbation of severe persistent asthma. Please see above for other active problem list / PMH. PT now consulted to assess functional mobility and needs for safe d/c planning. Prior to admission, pt was I w/ ambulation w/o AD, lives w/ dtr in a house w/ stairs. Currently pt is [place] for bed skills; S for functional transfers; S for ambulation w/o AD. Pt presents near functional baseline. Encouraged pt to mobilize at least 3x/day while in-house to prevent functional decline. No further skilled acute PT needs. Will d/c from caseload. Goals   Patient Goals go home   Plan   PT Frequency   (d/c PT)   Recommendation   PT Discharge Recommendation No rehabilitation needs   Equipment Recommended (S)    (BSC)   AM-PAC Basic Mobility Inpatient   Turning in Flat Bed Without Bedrails 4   Lying on Back to Sitting on Edge of Flat Bed Without Bedrails 4   Moving Bed to Chair 4   Standing Up From Chair Using Arms 4   Walk in Room 4   Climb 3-5 Stairs With Railing 3   Basic Mobility Inpatient Raw Score 23   Basic Mobility Standardized Score 50.88   Highest Level Of Mobility   JH-HLM Goal 7: Walk 25 feet or more   JH-HLM Achieved 8: Walk 250 feet ot more   End of Consult   Patient Position at End of Consult Bedside chair; All needs within reach  (on waffle cushion, dtr at bedside)       Catracho Cormier, PT, DPT

## 2023-07-29 NOTE — PLAN OF CARE
Problem: PAIN - ADULT  Goal: Verbalizes/displays adequate comfort level or baseline comfort level  Description: Interventions:  - Encourage patient to monitor pain and request assistance  - Assess pain using appropriate pain scale  - Administer analgesics based on type and severity of pain and evaluate response  - Implement non-pharmacological measures as appropriate and evaluate response  - Consider cultural and social influences on pain and pain management  - Notify physician/advanced practitioner if interventions unsuccessful or patient reports new pain  Outcome: Progressing     Problem: INFECTION - ADULT  Goal: Absence or prevention of progression during hospitalization  Description: INTERVENTIONS:  - Assess and monitor for signs and symptoms of infection  - Monitor lab/diagnostic results  - Monitor all insertion sites, i.e. indwelling lines, tubes, and drains  - Monitor endotracheal if appropriate and nasal secretions for changes in amount and color  - [place] appropriate cooling/warming therapies per order  - Administer medications as ordered  - Instruct and encourage patient and family to use good hand hygiene technique  - Identify and instruct in appropriate isolation precautions for identified infection/condition  Outcome: Progressing  Goal: Absence of fever/infection during neutropenic period  Description: INTERVENTIONS:  - Monitor WBC    Outcome: Progressing     Problem: SAFETY ADULT  Goal: Patient will remain free of falls  Description: INTERVENTIONS:  - Educate patient/family on patient safety including physical limitations  - Instruct patient to call for assistance with activity   - Consult OT/PT to assist with strengthening/mobility   - Keep Call bell within reach  - Keep bed low and locked with side rails adjusted as appropriate  - Keep care items and personal belongings within reach  - Initiate and maintain comfort rounds  - Make Fall Risk Sign visible to staff  - Apply yellow socks and bracelet for high fall risk patients  - Consider moving patient to room near nurses station  Outcome: Progressing  Goal: Maintain or return to baseline ADL function  Description: INTERVENTIONS:  -  Assess patient's ability to carry out ADLs; assess patient's baseline for ADL function and identify physical deficits which impact ability to perform ADLs (bathing, care of mouth/teeth, toileting, grooming, dressing, etc.)  - Assess/evaluate cause of self-care deficits   - Assess range of motion  - Assess patient's mobility; develop plan if impaired  - Assess patient's need for assistive devices and provide as appropriate  - Encourage maximum independence but intervene and supervise when necessary  - Involve family in performance of ADLs  - Assess for home care needs following discharge   - Consider OT consult to assist with ADL evaluation and planning for discharge  - Provide patient education as appropriate  Outcome: Progressing  Goal: Maintains/Returns to pre admission functional level  Description: INTERVENTIONS:  - Perform BMAT or MOVE assessment daily.   - Set and communicate daily mobility goal to care team and patient/family/caregiver. - Collaborate with rehabilitation services on mobility goals if consulted  - Perform Range of Motion 3 times a day. - Reposition patient every 2 hours.   - Dangle patient 3 times a day  - Stand patient 3 times a day  - Ambulate patient 3 times a day  - Out of bed to chair 3 times a day   - Out of bed for meals 3 times a day  - Out of bed for toileting  - Record patient progress and toleration of activity level   Outcome: Progressing     Problem: DISCHARGE PLANNING  Goal: Discharge to home or other facility with appropriate resources  Description: INTERVENTIONS:  - Identify barriers to discharge w/patient and caregiver  - Arrange for needed discharge resources and transportation as appropriate  - Identify discharge learning needs (meds, wound care, etc.)  - Arrange for interpretive services to assist at discharge as needed  - Refer to Case Management Department for coordinating discharge planning if the patient needs post-hospital services based on physician/advanced practitioner order or complex needs related to functional status, cognitive ability, or social support system  Outcome: Progressing     Problem: Knowledge Deficit  Goal: Patient/family/caregiver demonstrates understanding of disease process, treatment plan, medications, and discharge instructions  Description: Complete learning assessment and assess knowledge base.   Interventions:  - Provide teaching at level of understanding  - Provide teaching via preferred learning methods  Outcome: Progressing

## 2023-07-30 VITALS
OXYGEN SATURATION: 95 % | RESPIRATION RATE: 18 BRPM | SYSTOLIC BLOOD PRESSURE: 107 MMHG | HEIGHT: 62 IN | WEIGHT: 127 LBS | DIASTOLIC BLOOD PRESSURE: 74 MMHG | BODY MASS INDEX: 23.37 KG/M2 | HEART RATE: 87 BPM | TEMPERATURE: 100 F

## 2023-07-30 DIAGNOSIS — J45.20 MILD INTERMITTENT ASTHMA, UNSPECIFIED WHETHER COMPLICATED: Primary | ICD-10-CM

## 2023-07-30 LAB
ANION GAP SERPL CALCULATED.3IONS-SCNC: 4 MMOL/L
BASOPHILS # BLD AUTO: 0.04 THOUSANDS/ÂΜL (ref 0–0.1)
BASOPHILS NFR BLD AUTO: 0 % (ref 0–1)
BUN SERPL-MCNC: 29 MG/DL (ref 5–25)
CALCIUM SERPL-MCNC: 8.8 MG/DL (ref 8.3–10.1)
CHLORIDE SERPL-SCNC: 112 MMOL/L (ref 96–108)
CO2 SERPL-SCNC: 24 MMOL/L (ref 21–32)
CREAT SERPL-MCNC: 1.54 MG/DL (ref 0.6–1.3)
EOSINOPHIL # BLD AUTO: 0.21 THOUSAND/ÂΜL (ref 0–0.61)
EOSINOPHIL NFR BLD AUTO: 2 % (ref 0–6)
ERYTHROCYTE [DISTWIDTH] IN BLOOD BY AUTOMATED COUNT: 15 % (ref 11.6–15.1)
GFR SERPL CREATININE-BSD FRML MDRD: 32 ML/MIN/1.73SQ M
GLUCOSE SERPL-MCNC: 106 MG/DL (ref 65–140)
HCT VFR BLD AUTO: 35.6 % (ref 34.8–46.1)
HGB BLD-MCNC: 11.3 G/DL (ref 11.5–15.4)
IMM GRANULOCYTES # BLD AUTO: 0.11 THOUSAND/UL (ref 0–0.2)
IMM GRANULOCYTES NFR BLD AUTO: 1 % (ref 0–2)
LYMPHOCYTES # BLD AUTO: 3.72 THOUSANDS/ÂΜL (ref 0.6–4.47)
LYMPHOCYTES NFR BLD AUTO: 28 % (ref 14–44)
MCH RBC QN AUTO: 28.6 PG (ref 26.8–34.3)
MCHC RBC AUTO-ENTMCNC: 31.7 G/DL (ref 31.4–37.4)
MCV RBC AUTO: 90 FL (ref 82–98)
MONOCYTES # BLD AUTO: 1.03 THOUSAND/ÂΜL (ref 0.17–1.22)
MONOCYTES NFR BLD AUTO: 8 % (ref 4–12)
NEUTROPHILS # BLD AUTO: 8.44 THOUSANDS/ÂΜL (ref 1.85–7.62)
NEUTS SEG NFR BLD AUTO: 61 % (ref 43–75)
NRBC BLD AUTO-RTO: 0 /100 WBCS
PLATELET # BLD AUTO: 279 THOUSANDS/UL (ref 149–390)
PMV BLD AUTO: 11.5 FL (ref 8.9–12.7)
POTASSIUM SERPL-SCNC: 3.9 MMOL/L (ref 3.5–5.3)
RBC # BLD AUTO: 3.95 MILLION/UL (ref 3.81–5.12)
SODIUM SERPL-SCNC: 140 MMOL/L (ref 135–147)
WBC # BLD AUTO: 13.55 THOUSAND/UL (ref 4.31–10.16)

## 2023-07-30 PROCEDURE — 94668 MNPJ CHEST WALL SBSQ: CPT

## 2023-07-30 PROCEDURE — 85025 COMPLETE CBC W/AUTO DIFF WBC: CPT

## 2023-07-30 PROCEDURE — 99232 SBSQ HOSP IP/OBS MODERATE 35: CPT | Performed by: INTERNAL MEDICINE

## 2023-07-30 PROCEDURE — 94664 DEMO&/EVAL PT USE INHALER: CPT

## 2023-07-30 PROCEDURE — 99238 HOSP IP/OBS DSCHRG MGMT 30/<: CPT | Performed by: FAMILY MEDICINE

## 2023-07-30 PROCEDURE — 80048 BASIC METABOLIC PNL TOTAL CA: CPT

## 2023-07-30 PROCEDURE — 94640 AIRWAY INHALATION TREATMENT: CPT

## 2023-07-30 PROCEDURE — 94760 N-INVAS EAR/PLS OXIMETRY 1: CPT

## 2023-07-30 RX ORDER — SODIUM CHLORIDE FOR INHALATION 3 %
4 VIAL, NEBULIZER (ML) INHALATION 2 TIMES DAILY
Qty: 80 ML | Refills: 0 | Status: SHIPPED | OUTPATIENT
Start: 2023-07-30 | End: 2023-08-09

## 2023-07-30 RX ADMIN — LORATADINE 10 MG: 10 TABLET ORAL at 08:53

## 2023-07-30 RX ADMIN — LEVALBUTEROL HYDROCHLORIDE 1.25 MG: 1.25 SOLUTION RESPIRATORY (INHALATION) at 07:07

## 2023-07-30 RX ADMIN — BUDESONIDE AND FORMOTEROL FUMARATE DIHYDRATE 2 PUFF: 80; 4.5 AEROSOL RESPIRATORY (INHALATION) at 08:55

## 2023-07-30 RX ADMIN — FLUTICASONE PROPIONATE 2 SPRAY: 50 SPRAY, METERED NASAL at 08:55

## 2023-07-30 RX ADMIN — ACETAMINOPHEN 650 MG: 325 TABLET, FILM COATED ORAL at 08:53

## 2023-07-30 RX ADMIN — Medication 1000 UNITS: at 08:53

## 2023-07-30 RX ADMIN — IPRATROPIUM BROMIDE 0.5 MG: 0.5 SOLUTION RESPIRATORY (INHALATION) at 07:07

## 2023-07-30 RX ADMIN — ASPIRIN 81 MG: 81 TABLET, COATED ORAL at 08:53

## 2023-07-30 RX ADMIN — AZELASTINE HYDROCHLORIDE 2 SPRAY: 137 SPRAY, METERED NASAL at 08:55

## 2023-07-30 RX ADMIN — ESCITALOPRAM OXALATE 10 MG: 10 TABLET ORAL at 08:53

## 2023-07-30 RX ADMIN — HEPARIN SODIUM 5000 UNITS: 5000 INJECTION INTRAVENOUS; SUBCUTANEOUS at 05:29

## 2023-07-30 RX ADMIN — PANTOPRAZOLE SODIUM 20 MG: 20 TABLET, DELAYED RELEASE ORAL at 08:53

## 2023-07-30 NOTE — ASSESSMENT & PLAN NOTE
On chart review, patient is supposed to be on Lexapro 10mg daily.  Also reports she was previously on Xanax years ago, but nothing documented on PDMP  Patient requested to be back on Xanax as her anxiety has increased     Plan:  · Continue Lexapro 10mg daily on discharge

## 2023-07-30 NOTE — PLAN OF CARE
Problem: PAIN - ADULT  Goal: Verbalizes/displays adequate comfort level or baseline comfort level  Description: Interventions:  - Encourage patient to monitor pain and request assistance  - Assess pain using appropriate pain scale  - Administer analgesics based on type and severity of pain and evaluate response  - Implement non-pharmacological measures as appropriate and evaluate response  - Consider cultural and social influences on pain and pain management  - Notify physician/advanced practitioner if interventions unsuccessful or patient reports new pain  Outcome: Progressing     Problem: INFECTION - ADULT  Goal: Absence or prevention of progression during hospitalization  Description: INTERVENTIONS:  - Assess and monitor for signs and symptoms of infection  - Monitor lab/diagnostic results  - Monitor all insertion sites, i.e. indwelling lines, tubes, and drains  - Monitor endotracheal if appropriate and nasal secretions for changes in amount and color  - [place] appropriate cooling/warming therapies per order  - Administer medications as ordered  - Instruct and encourage patient and family to use good hand hygiene technique  - Identify and instruct in appropriate isolation precautions for identified infection/condition  Outcome: Progressing  Goal: Absence of fever/infection during neutropenic period  Description: INTERVENTIONS:  - Monitor WBC    Outcome: Progressing     Problem: SAFETY ADULT  Goal: Patient will remain free of falls  Description: INTERVENTIONS:  - Educate patient/family on patient safety including physical limitations  - Instruct patient to call for assistance with activity   - Consult OT/PT to assist with strengthening/mobility   - Keep Call bell within reach  - Keep bed low and locked with side rails adjusted as appropriate  - Keep care items and personal belongings within reach  - Initiate and maintain comfort rounds  - Consider moving patient to room near nurses station  Outcome: Progressing  Goal: Maintain or return to baseline ADL function  Description: INTERVENTIONS:  -  Assess patient's ability to carry out ADLs; assess patient's baseline for ADL function and identify physical deficits which impact ability to perform ADLs (bathing, care of mouth/teeth, toileting, grooming, dressing, etc.)  - Assess/evaluate cause of self-care deficits   - Assess range of motion  - Assess patient's mobility; develop plan if impaired  - Assess patient's need for assistive devices and provide as appropriate  - Encourage maximum independence but intervene and supervise when necessary  - Involve family in performance of ADLs  - Assess for home care needs following discharge   - Consider OT consult to assist with ADL evaluation and planning for discharge  - Provide patient education as appropriate  Outcome: Progressing  Goal: Maintains/Returns to pre admission functional level  Description: INTERVENTIONS:  - Perform BMAT or MOVE assessment daily.   - Set and communicate daily mobility goal to care team and patient/family/caregiver.    - Collaborate with rehabilitation services on mobility goals if consulted  - Ambulate patient 3 times a day  - Out of bed for toileting  - Record patient progress and toleration of activity level   Outcome: Progressing     Problem: DISCHARGE PLANNING  Goal: Discharge to home or other facility with appropriate resources  Description: INTERVENTIONS:  - Identify barriers to discharge w/patient and caregiver  - Arrange for needed discharge resources and transportation as appropriate  - Identify discharge learning needs (meds, wound care, etc.)  - Arrange for interpretive services to assist at discharge as needed  - Refer to Case Management Department for coordinating discharge planning if the patient needs post-hospital services based on physician/advanced practitioner order or complex needs related to functional status, cognitive ability, or social support system  Outcome: Progressing     Problem: Knowledge Deficit  Goal: Patient/family/caregiver demonstrates understanding of disease process, treatment plan, medications, and discharge instructions  Description: Complete learning assessment and assess knowledge base.   Interventions:  - Provide teaching at level of understanding  - Provide teaching via preferred learning methods  Outcome: Progressing

## 2023-07-30 NOTE — DISCHARGE SUMMARY
DISCHARGE SUMMARY - Family Medicine Residency, Anita Cheney 1945, 69 y.o. female. MRN: [id number]    Unit/Bed#: [facility] 826-01 Encounter: [id number]  Primary Care Provider: Judit Leslie MD      Admission Date: 7/28/2023 1142  Discharge Date: 07/30/23  Length of Stay: 2 days  Diagnosis:   Principal Problem:    Severe persistent asthma with acute exacerbation  Active Problems:    Opacity noted on imaging study    Cigarette nicotine dependence without complication    Chronic allergic rhinitis    Essential hypertension    Anxiety    Stage 3b chronic kidney disease [facility])      Plans finalization pending attending physician attestation. ASSESSMENTS & PLANS:     * Severe persistent asthma with acute exacerbation  Assessment & Plan  Multiple ED and office visits due to acute exacerbation of severe persistent asthma. Patient treated with nebulizer treatments each ED visit and discharged to home with steroids and azithromycin. Patient completed 5 days of steroids and azithromycin. Compliant with maintenance symbicort but requiring Albuterol inhaler q6hrs   ED management - Received IV Decadron 6mg, IV Mag, Albuterol/Atrovent nebs in ED. RSV/Flu/COVID negative, negative trops, CBC, CMP. Overall unremarkable CXR but appears hyperinflated       ASSESSMENT:  Significantly improved compared to admission, stable for discharge    PLAN:  · Continue maintenance Symbicort BID and albuterol PRN on discharge  · Hold on outpatient PFTs  · Pulmonology consulted - Given rapid improving for her clinical condition, will hold off bronchoscope and repeat CT in 4-6 weeks. If repeat CT shows persistent mucus plugging/debri, will perform bronchoscopy to exclude endobronchial lesion.   · Follow-up with PCP and Pulmonology already scheduled       Opacity noted on imaging study  Assessment & Plan  7/26 CT chest - worsening debris in the bronchus intermedius and right middle and right lower lobe bronchi, with recommended follow-up to exclude malignant endobronchial lesion  Risk factors include significant tobacco use history     Plan:  · Pulmonology consult - as under Severe persistent asthma    Stage 3b chronic kidney disease [facility])  Assessment & Plan  Lab Results   Component Value Date    EGFR 32 07/30/2023    EGFR 27 07/29/2023    EGFR 31 07/28/2023    CREATININE 1.54 (H) 07/30/2023    CREATININE 1.78 (H) 07/29/2023    CREATININE 1.59 (H) 07/28/2023     Baseline Cr 1.3-1.6  Avoid nephrotoxic agents  · Discontinued HCTZ - will monitor Blood pressure    Anxiety  Assessment & Plan  On chart review, patient is supposed to be on Lexapro 10mg daily.  Also reports she was previously on Xanax years ago, but nothing documented on PDMP  Patient requested to be back on Xanax as her anxiety has increased     Plan:  · Continue Lexapro 10mg daily on discharge    Essential hypertension  Assessment & Plan  Continue amlodipine 5mg daily  · Discontinued HCTZ 2/2 worsening renal function in PT with GFR< 60  · F/u blood pressures off HCTZ     Chronic allergic rhinitis  Assessment & Plan  Continue Claritin, Astelin nasal spray, Flonase      Cigarette nicotine dependence without complication  Assessment & Plan  Continue nicotine patches       Patient Active Problem List   Diagnosis   • Rupture of ulnar collateral ligament of right thumb   • Primary osteoarthritis of first carpometacarpal joint of right hand   • Mild persistent asthma with exacerbation   • Other chronic sinusitis   • Cigarette nicotine dependence without complication   • Chronic allergic rhinitis   • Trigger finger, right index finger   • Panic attack   • Essential hypertension   • Breast cyst, right   • Classic migraine with aura   • GERD (gastroesophageal reflux disease)   • Renal cyst   • Trigger finger of right thumb   • Abnormal EKG   • Prediabetes   • Intermittent palpitations   • Chronic left hip pain   • Trigger finger, right little finger   • Intersection syndrome   • Polyarthritis with positive rheumatoid factor (HCC)   • Irritable bowel syndrome with diarrhea   • Seasonal allergic conjunctivitis   • Anxiety   • Age related osteoporosis   • Vitamin D deficiency   • Chronic pain of both shoulders   • External hemorrhoid   • Stage 3b chronic kidney disease (HCC)   • Tendinitis of right rotator cuff   • Trochanteric bursitis of left hip   • Watery eyes   • Financial difficulties   • Ambulatory dysfunction   • Vision abnormalities   • Retina disorder   • Goiter   • Thyroid nodule   • Severe persistent asthma with acute exacerbation   • Tobacco abuse   • Opacity noted on imaging study         HPI (per admission H&P note on 7/28/23)     HPI:   "27-year-old female presenting for acute exacerbation of severe persistent asthma. Other PMH includes nicotine use, hypertension, CKD, thyroid nodule, allergic rhinitis. Patient has been seen in the ED and PCPs office multiple times in the past week. Seen in the ED on 7/22 for wheezing, increased albuterol use, chest tightness, all symptoms consistent with prior asthma exacerbations. Patient received nebulizer treatments, IV Solu-Medrol, with symptom relief and was discharged with steroids and azithromycin. Later followed up at PCPs office where they switched her daily Symbicort to LABA/LAMA combo with suspicion that patient may have COPD. Instructed to obtain PFTs and continue albuterol inhaler as needed. Patient was again seen on 7/26 in the ED because her symptoms did not improve. She was given nebulizer treatments again, IV Solu-Medrol dose, IV mag. CT chest without contrast showed worsening debris in the bronchus intermedius and right middle and right lower lobe bronchi, with recommended follow-up to exclude malignant endobronchial lesion. Also seen to have stable anterior mediastinal nodule, lymph node, or thymoma.   Patient's symptoms improved after nebulizer treatment and patient was discharged to home with follow-up with PCP and pulmonology referral.     Patient presented to the ED again today because her symptoms have not improved. She didn't receive the new inhaler yet, but she's been taking the Symbicort daily. She just finished the steroids 2 days ago, and finished the azithromycin yesterday. Continues to have congestion, chest tightness and shortness of breath, but no wheezing or respiratory distress. No recent sick contacts, no fevers. Maintaining adequate PO intake. Last cigarette 2 days ago."      HOSPITAL COURSE:     Hospital Course:   69 y.o. female admitted on 7/28/2023 for acute exacerbation of severe persistent asthma. Pt had negative Flu/RSV/COVID tests, as well as normal troponins, CBC, and CMP. She was given IV Decadron 6mg, IV magnesium, albuterol, and Atrovent nebulizer in the ED. Chest X-ray was also normal.    Pulmonology was consulted, as CT chest from 7/26/23 indicated debris in the bronchus intermedius, RML, and RLL bronchi suspicious for mucous plugging vs malignancy, with emphysematous changes. Sputum culture and repeat procalcitonin were ordered, with considerations for bronchoscopy. Procal returned negative so no additional antibiotics required. Pt was also placed on aggressive pulmonary protocol with 3% hypertonic saline inhalation, incentive spirometer, Flutter valve, and CPT. Pt also found to be hypokalemic to 3.1 and repleted. On 7/29/23, pt condition improved. Pt had improving appetite and oxygen saturation without supplementary oxygen. With rapidly improving condition, bronchoscopy was held, and pulmonology recommended repeat chest CT in 4 weeks to evaluated RML and RLL collapse, with consideration of bronchoscopy if deemed necessary at that point. On 07/30/23, HD# 2, pt remains stable. Pt felt well and was agreeable to discharge with follow up CT chest within 4 weeks. Will continue Symbicort BID with albuterol PRN.        COMPLICATIONS:     Complications: NONE       PROCEDURES: Procedures Performed:   No orders of the defined types were placed in this encounter. SIGNIFICANT FINDINGS / ABNORMAL RESULTS:     Significant Findings/Abnormal Results with this admission:  · Cr: 1.78 > 1.54  · WBC: 13.43 > 13.55  · Neutrophils %: 78 > 61    XR chest 2 views    Result Date: 7/28/2023  Narrative: CHEST INDICATION:   Chest pain and shortness of breath. COMPARISON: Chest radiograph 7/22/2023. EXAM PERFORMED/VIEWS:  XR CHEST PA & LATERAL FINDINGS: Cardiomediastinal silhouette appears unremarkable. The lungs are clear. No pneumothorax or pleural effusion. Osseous structures appear within normal limits for patient age. Upper abdominal surgical clips. Impression: No acute cardiopulmonary disease. Workstation performed: [id number]     CT chest wo contrast    Result Date: 7/26/2023  Narrative: CT CHEST WITHOUT IV CONTRAST INDICATION:   Cough, persistent Dyspnea, chronic, central airway disease suspected SOB with increased sputum, Left upper lobe wheezing. History of asthma. COMPARISON: Chest radiograph 7/22/2023 and chest CT 4/26/2023. TECHNIQUE: Chest CT without intravenous contrast.  Axial, sagittal, coronal 2D reformats and coronal MIPS from source data. Radiation dose length product (DLP):  189.99 mGy-cm . Radiation dose exposure minimized using iterative reconstruction and automated exposure control. FINDINGS: LUNGS: No acute disease. Resolution of groundglass opacity in the anterior segment right upper lobe. Moderate emphysema. Stable 3 mm right apical nodule, likely benign. Benign calcified granulomas. AIRWAYS: Increasing debris in the bronchus intermedius and right middle and right lower lobe bronchi. PLEURA:  Unremarkable. HEART/GREAT VESSELS: Normal heart size. Mild coronary artery calcification indicating atherosclerotic heart disease. Stable small loculated pericardial effusion. MEDIASTINUM AND TITO: Stable 8 mm anterior mediastinal nodule. Clips at the GE junction.  CHEST WALL AND LOWER NECK: Multiple bilateral subcentimeter nodules in the breast, described on a mammogram in 2017. UPPER ABDOMEN: Bilateral renal cysts, some hyperdense. Hepatic cysts. Colonic diverticuli. OSSEOUS STRUCTURES: Mild degenerative disease in the spine. Impression: Worsening debris in the bronchus intermedius and right middle and right lower lobe bronchi. Follow-up is needed to exclude a malignant endobronchial lesion. No acute pulmonary disease. Stable small loculated pericardial effusion. Stable anterior mediastinal nodule, lymph node or thymoma. Recommend follow-up with a chest CT with no contrast in 1 year. This study was marked in Epic for immediate notification and follow-up. Workstation performed: [id number]     US thyroid    Result Date: 7/26/2023  Narrative: THYROID ULTRASOUND INDICATION:    E04.1: Nontoxic single thyroid nodule. Follow-up to goiter seen on CT angiography of the chest. COMPARISON: 4/26/2023 CT TECHNIQUE:   Ultrasound of the thyroid was performed with a high frequency linear transducer in transverse and sagittal planes including volumetric imaging sweeps as well as traditional still imaging technique. FINDINGS: Top normal thyroid size. Diffuse heterogeneous echogenicity. Right lobe: 5.1 x 2.1 x 2.8 cm. Volume 14.6 mL Left lobe:  5.5 x 3.1 x 2.5 cm. Volume 21.1 mL Isthmus: 0.4  cm. Nodule #1. Image 10. Right mid to lower pole measuring 3.4 x 1.5 x 2.0 cm. (length X depth X width) COMPOSITION: 2 points, solid or almost completely solid. ECHOGENICITY:  1 point, hyperechoic or isoechoic. SHAPE:  0 points, wider-than-tall. MARGIN: 0 points, smooth. ECHOGENIC FOCI:  3 points, punctate echogenic foci. TI-RADS Classification: TR 4 (4-6 points), FNA if > 1.5 cm. Follow if > 1cm. Nodule #2. Image 42. Left upper pole measuring 1.0 x 1.3 x 1.5 cm. COMPOSITION: 2 points, solid or almost completely solid. ECHOGENICITY:  1 point, hyperechoic or isoechoic. SHAPE:  0 points, wider-than-tall. MARGIN: 0 points, smooth. ECHOGENIC FOCI:  0 points, none or large comet-tail artifacts. TI-RADS Classification: TR 3 (3 points), FNA if >2.5 cm. Follow if >1.5 cm. Other nodules measured are spongiform in morphology and are considered benign. Regional soft tissues are within normal limits. The study was marked in EPIC for significant notification. Impression: Multinodular thyroid. Details above. A single nodule, nodule #1 described above, meets TI-RADS criteria for fine-needle aspiration. Reference: ACR Thyroid Imaging, Reporting and Data System (TI-RADS): White Paper of the [employer]. J AM Guillermo Radiol 1435;01:515-360. (additional recommendations based on American Thyroid Association 2015 guidelines.) Workstation performed: [id number]     XR chest 2 views    Result Date: 7/23/2023  Narrative: CHEST INDICATION:   cough, SOB, wheezing. COMPARISON: CXR and chest CT 4/26/2023. EXAM PERFORMED/VIEWS:  XR CHEST PA & LATERAL. DUAL ENERGY SUBTRACTION. FINDINGS: Normal heart size. 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 Lungs clear. No effusion or pneumothorax. Upper abdomen normal. Clips at GE junction. Bones normal for age. Impression: No acute cardiopulmonary disease. 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  Workstation performed: [id number]         VITALS ON DISCHARGE DATE:     Vitals  Blood Pressure: 107/74 (07/30/23 0648)  Temperature: 100 °F (37.8 °C) (07/30/23 0648)  Temp Source: Oral (07/29/23 0213)  Pulse: 87 (07/30/23 0648)  Respirations: 18 (07/30/23 0648)  SpO2: 95 % (07/30/23 0709)  Height: 5' 2" (157.5 cm) (07/29/23 2862)  Weight - Scale: 57.6 kg (127 lb) (07/29/23 0213)    Temp:  [98.9 °F (37.2 °C)-100 °F (37.8 °C)] 100 °F (37.8 °C)  HR:  [] 87  Resp:  [18] 18  BP: (107-117)/(73-76) 107/74    Weight (last 2 days)     Date/Time Weight    07/29/23 0213 57.6 (127)            Intake/Output Summary (Last 24 hours) at 7/30/2023 1209  Last data filed at 7/30/2023 0800  Gross per 24 hour   Intake 1120 ml   Output --   Net 1120 ml       Invasive Devices     None                   PHYSICAL EXAM ON DAY OF DISCHARGE:     Physical Exam  Vitals reviewed. Constitutional:       General: She is not in acute distress. Appearance: She is well-developed. She is not ill-appearing. HENT:      Head: Normocephalic and atraumatic. Eyes:      Extraocular Movements: Extraocular movements intact. Conjunctiva/sclera: Conjunctivae normal.   Cardiovascular:      Rate and Rhythm: Normal rate and regular rhythm. Heart sounds: Normal heart sounds. No murmur heard. Pulmonary:      Effort: Pulmonary effort is normal. No respiratory distress. Breath sounds: No stridor. No wheezing, rhonchi or rales. Chest:      Chest wall: No tenderness. Abdominal:      General: Abdomen is flat. Bowel sounds are normal. There is no distension. Palpations: Abdomen is soft. Tenderness: There is no abdominal tenderness. Musculoskeletal:         General: No swelling. Cervical back: Neck supple. Skin:     General: Skin is warm and dry. Capillary Refill: Capillary refill takes less than 2 seconds. Neurological:      Mental Status: She is alert and oriented to person, place, and time. Psychiatric:         Mood and Affect: Mood normal.             CONDITION AT DISCHARGE:   On day of discharge patient is seen and evaluated at bedside. Patient is stable and without concern. Patient denies any pain or SOB. Patient able to tolerate PO food without N/V/D and had bowel movement and baseline urine output. Patient able to ambulate independently without assistance. Patient is aware of current health status and understand plan of treatment and outpatient follow-up. The patient understood and agreed with the plan. All pertinent lab results, imaging studies, procedures, and/or any incidental findings have been disclosed to the patient.  All pertinent questions are answered to patient's satisfaction. On day of discharge, the patient was hemodynamically stable and appropriate for discharge home. Condition at Discharge: fair       DISCHARGE MEDICATIONS:     Discharge Medications:  See after visit summary (AVS) for detailed reconciled discharge medications, which was provided to patient and family. Summary of medication changes made with this admission:    · START:  1. None    · STOP:  1. Prednisone  2. HCTZ  3. Umeclidinium-vilanterol    · CHANGE:  1. None    · RESUME:  1. All other home medications       FOLLOW-UP APPOINTMENTS / INSTRUCTION :     Important Physician Related Follow Up:   · PCP on 8/4  · Pulmonology on 8/23    Appointment confirmed:  Future Appointments   Date Time Provider [address]  [address]   7/31/2023  4:15 PM [address] [facility]   8/4/2023  8:30 AM Gab Bradley MD [address]  [place] [address]   8/23/2023  9:00 AM Ruth Eagle MD [address] [place] [address]       Discharge instructions/Information to patient and family:   See after visit summary (AVS) for information provided to patient and family. Provisions for Follow-Up Care:  See after visit summary for information related to follow-up care and any pertinent home health orders. DISPOSITION:     Disposition: Home    Discharge Statement   I spent 30 minutes discharging the patient. This time was spent on the day of discharge. I had direct contact with the patient on the day of discharge. Additional documentation is required if more than 30 minutes were spent on discharge. Planned Readmission: No    The attending physician, Madelin Mathis MD, agrees with the assessment and plan. Zach Johnson MD  PGY-3, Family Medicine  07/30/23  12:09 PM    Dear reader, please be aware that portions of my note contain dictated text. I have done my best to proof-read this note prior to signing.  However, there may be occasional unnoticed errors pertaining to "sound-alike" words and/or grammar during my dictation process. If there is any words or information that is unclear or appears erroneous, please kindly let me know and I will clarify and/or addend my notes accordingly. Thank you for your understanding.

## 2023-07-30 NOTE — PROGRESS NOTES
Progress Note - Pulmonary   Josiah Nadia 69 y.o. female MRN: [id number]  Unit/Bed#: [facility] 826-01 Encounter: [id number]    Assessment/Plan:    Acute hypoxic respiratory failure, likely due to mucous plugging. improved  - on admission requires 2L of oxygen support now is on room air. Does not require oxygen at home. Her acute episode might be secondary to bronchilitis vs asthma exacerbation  - Ct chest on 7/27 showed debris in the RML and RLL suspecting mucous plugging vs endobronchial lesion. Also with evidence of emphysema  - start with 3% IH TID, continue Symbicort 80-4.5mcg BID, incentive spirometry and flutter valve. CPT ordered. - negative PCT. Hold off abx for now  - Given rapid improving for her clinical condition, will hold off bronchoscope and repeat CT in 4-6 weeks. If there is persisted RML and RLL collapse, will then perform bronchoscope to exclude endobronchial lesion. Patient is cleared to discharge from pulmonary stand point. Will discharge with Symbicort BID and albuterol PRN as her home meds. She will follow up with our pulm office as outpatient. Patient also requests nebulizer treatment at home. I will prescribe hypertonic saline 4ml BID for 10 days and nebulizer machine. If she encounter any difficulties she can call our clinic for assistance. Tobacco use disorder  Smokes 0.25 pk for at least 40 years. Recently quit. On nicotine patch    Asthma, mild persisted  Currently on Symbicort BID and albuterol PRN. On exam not wheezing. Will hold off MTP for now  Maintain SpO2 > 88%    Chief Complaint:    I am feeling better, started to cough    Subjective:    Exam at bedside in the AM. Patient is awake, not in stress, on room air, and can speak full sentence without disruption. Patient states that she is almost back to her baseline and is determined to quit smoking. She still has cough and feels her sputum is coming out.      Objective:    Vitals: Blood pressure 107/74, pulse 87, temperature 100 °F (37.8 °C), resp. rate 18, height 5' 2" (1.575 m), weight 57.6 kg (127 lb), SpO2 95 %. on room air,Body mass index is 23.23 kg/m². Intake/Output Summary (Last 24 hours) at 7/30/2023 0908  Last data filed at 7/29/2023 1401  Gross per 24 hour   Intake 1500 ml   Output --   Net 1500 ml       Invasive Devices     Peripheral Intravenous Line  Duration           Peripheral IV 07/28/23 Left Antecubital 1 day                Physical Exam:     Physical Exam  Constitutional:       General: She is not in acute distress. Appearance: Normal appearance. She is not ill-appearing. Cardiovascular:      Rate and Rhythm: Normal rate and regular rhythm. Pulses: Normal pulses. Heart sounds: No murmur heard. Pulmonary:      Effort: Pulmonary effort is normal. No respiratory distress. Breath sounds: Normal breath sounds. No stridor. No wheezing. Abdominal:      General: Abdomen is flat. Palpations: Abdomen is soft. Musculoskeletal:      Right lower leg: No edema. Left lower leg: No edema. Comments: Chronic shoulder and low back pain   Skin:     Findings: No lesion. Neurological:      Mental Status: She is alert. Labs: I have personally reviewed pertinent lab results.         Imaging and other studies: I have personally reviewed pertinent films in PACS

## 2023-07-30 NOTE — ASSESSMENT & PLAN NOTE
Multiple ED and office visits due to acute exacerbation of severe persistent asthma. Patient treated with nebulizer treatments each ED visit and discharged to home with steroids and azithromycin. Patient completed 5 days of steroids and azithromycin. Compliant with maintenance symbicort but requiring Albuterol inhaler q6hrs   ED management - Received IV Decadron 6mg, IV Mag, Albuterol/Atrovent nebs in ED. RSV/Flu/COVID negative, negative trops, CBC, CMP. Overall unremarkable CXR but appears hyperinflated       ASSESSMENT:  Significantly improved compared to admission, stable for discharge    PLAN:  · Continue maintenance Symbicort BID and albuterol PRN on discharge  · Hold on outpatient PFTs  · Pulmonology consulted - Given rapid improving for her clinical condition, will hold off bronchoscope and repeat CT in 4-6 weeks. If repeat CT shows persistent mucus plugging/debri, will perform bronchoscopy to exclude endobronchial lesion.   · Follow-up with PCP and Pulmonology already scheduled

## 2023-07-30 NOTE — UTILIZATION REVIEW
Initial Clinical Review    Admission: Date/Time/Statement:   Admission Orders (From admission, onward)     Ordered        07/28/23 1445  INPATIENT ADMISSION  Once                      Orders Placed This Encounter   Procedures   • INPATIENT ADMISSION     Standing Status:   Standing     Number of Occurrences:   1     Order Specific Question:   Level of Care     Answer:   Med Surg [16]     Order Specific Question:   Estimated length of stay     Answer:   More than 2 Midnights     Order Specific Question:   Certification     Answer:   I certify that inpatient services are medically necessary for this patient for a duration of greater than two midnights. See H&P and MD Progress Notes for additional information about the patient's course of treatment. ED Arrival Information     Expected   -    Arrival   7/28/2023 11:39    Acuity   Urgent            Means of arrival   Walk-In    Escorted by   Family Member    Service   Family Medicine    Admission type   Emergency            Arrival complaint   asthma           Chief Complaint   Patient presents with   • Asthma     Has been feeling more SOB since Saturday, hx of asthma. Took inhaler this morning with no relief        Initial Presentation: 69 y.o. female with PMH of asthma,  nicotine use, HTN, CKD, thyroid nodule and allergic rhinitis presents to the ED from home for exacerbation of asthma. Patient has been seen in the ED and PCPs office multiple times in the past week. Seen in the ED on 7/22 for wheezing, increased albuterol use, chest tightness, all symptoms consistent with prior asthma exacerbations. Patient received nebulizer treatments, IV Solu-Medrol, with symptom relief and was discharged with steroids and azithromycin. Later followed up at PCP office where they switched her daily Symbicort to LABA/LAMA combo with suspicion that patient may have COPD. Instructed to obtain PFTs and continue albuterol inhaler as needed.   Patient was again seen on 7/26 in the ED because her symptoms did not improve. She was given nebulizer treatments again, IV Solu-Medrol dose, IV mag. CT chest without contrast showed worsening debris in the bronchus intermedius and right middle and right lower lobe bronchi, with recommended follow-up to exclude malignant endobronchial lesion. Also seen to have stable anterior mediastinal nodule, lymph node, or thymoma. Patient's symptoms improved after nebulizer treatment and patient was discharged to home with follow-up with PCP and pulmonology referral.  Patient presented to the ED again today because her symptoms have not improved. She didn't receive the new inhaler yet, but she's been taking the Symbicort daily. She just finished the steroids 2 days ago, and finished the azithromycin yesterday. Continues to have congestion, chest tightness and shortness of breath. PE: AOx3, no wheezing following nebs. 7/28 Inpatient admission for evaluation and treatment of severe persistent asthma with acute exacerbation: Received Decadron today, re-evaluate later tonight, can likely either transition to PO prednisone taper tomorrow vs. Another dose of IV Solu-Medrol. Scheduled nebulizer treatments, consult Pulmonology. 7/28 Pulmonology consult:  27-year-old female with past medical history significant for active tobacco abuse, history of asthma who presents with shortness of breath. This is the patient's third presentation for shortness of breath within the past 7 days. The patient notes significant shortness of breath, the daughter reports that she lies primarily on her left side. The patient continues to smoke about 1/3 to 1/4 pack/day. She has been treated with prednisone with no significant benefit. The patient takes Symbicort 80/4.5 twice daily and has uses regularly but notes it has not been working as well recently. She denies any significant recent exacerbations. She had a admission for pneumonia earlier in the month.   On review of the patient's imaging, there appears to be progression of mucous plugging in the right base. This would correlate with her lying on her left side. Would recommend aggressive pulmonary toileting for the patient including incentive spirometry, flutter valve, chest percussive therapy as well as nebulizer therapy. May check procalcitonin if negative hold off on antibiotics. If the patient does not experience a significant improvement of breathing, would plan for bronchoscopy on Monday. May check sputum culture in the interim but patient does report a dry cough. For her asthma, may consider continuation of steroids empirically. PE: Wheezing present. Decreased air movement in RML and RLL of lung. 7/29 Medicine:  Breathing is improved. O2 makes her feel more comfortable overnight, but maintains O2 sat of 95% without supplemental O2. Will trial off now now. Continue Symbicort, albuterol and Atrovent scheduled nebulizer treatments. Chest PT, IS and flutter valve, follow sputum culture. Consider bronch on Monday. Pulmonology: Acute hypoxic respiratory failure, likely due to mucous plugging. An admission requires 2L of oxygen support. Does not require oxygen at home. Her acute episode might be secondary to bronchilitis vs asthma exacerbation. CT chest on 7/27 showed debris in the RML and RLL suspecting mucous plugging vs endobronchial lesion. Also with evidence of emphysema. Start with 3% IH TID, Symbicort 80-4.5mcg BID, incentive spirometry and flutter valve. CPT ordered. Negative PCT. Hold off abx for now. Given rapid improving for her clinical condition, might hold off bronchoscope and repeat CT in 4 weeks. If there is persisted RML and RLL collapse, will then perform bronchoscope to exclude endobronchial lesion. PE: Alert. Normal breath sounds. 7/30  Day 3: Has surpassed a 2nd midnight with active treatments and services, which include chest PT, scheduled nebulizer treatments TID.          ED Triage Vitals [07/28/23 1141]   Temperature Pulse Respirations Blood Pressure SpO2   98.5 °F (36.9 °C) (!) 109 20 170/99 96 %      Temp Source Heart Rate Source Patient Position - Orthostatic VS BP Location FiO2 (%)   Oral Monitor Lying Right arm --      Pain Score       No Pain          Wt Readings from Last 1 Encounters:   07/29/23 57.6 kg (127 lb)     Additional Vital Signs:     Date/Time Temp Pulse Resp BP MAP (mmHg) SpO2 Calculated FIO2 (%) - Nasal Cannula Nasal Cannula O2 Flow Rate (L/min) O2 Device   07/30/23 0709 -- -- -- -- -- 95 % -- -- --   07/30/23 06:48:58 100 °F (37.8 °C) 87 18 107/74 85 94 % -- -- --   07/29/23 22:51:17 99.6 °F (37.6 °C) 97 18 114/76 89 94 % -- -- --   07/29/23 1940 -- -- -- -- -- 92 % -- -- --   07/29/23 1938 -- -- -- -- -- 93 % -- -- None (Room air)   07/29/23 1926 -- -- -- -- -- -- -- -- None (Room air)   07/29/23 15:49:38 98.9 °F (37.2 °C) 100 18 117/73 88 93 % -- -- --   07/29/23 1348 -- -- -- -- -- 95 % -- -- None (Room air)   07/29/23 0900 -- -- -- -- -- -- 28 2 L/min Nasal cannula   07/29/23 0735 -- -- -- -- -- 95 % 28 2 L/min Nasal cannula   07/29/23 07:31:41 99.1 °F (37.3 °C) 93 16 107/60 76 91 % -- -- --   07/29/23 0213 99 °F (37.2 °C) 111 Abnormal  18 131/74 -- -- -- -- --   07/28/23 22:42:27 99 °F (37.2 °C) 111 Abnormal  18 131/74 93 94 % -- -- --   07/28/23 2125 -- -- -- -- -- 93 % -- -- None (Room air)   07/28/23 19:01:54 98.9 °F (37.2 °C) 110 Abnormal  20 103/64 77 93 % -- -- --   07/28/23 1830 -- 121 Abnormal  18 105/60 78 94 % -- -- None (Room air)   07/28/23 1715 -- -- -- -- -- 94 % -- -- --   07/28/23 1700 -- 118 Abnormal  20 157/81 112 93 % -- -- None (Room air)   07/28/23 1645 -- 104 20 118/65 86 95 % -- -- None (Room air)   07/28/23 1632 -- -- -- 118/65 -- -- -- -- --   07/28/23 1600 -- 106 Abnormal  18 121/63 86 94 % -- -- None (Room air)   07/28/23 1356 -- 104 20 106/62 79 94 % -- -- None (Room air)   07/28/23 1206 -- -- -- -- -- 96 % -- -- None (Room air)       RT Airway Clearance    Row Name 07/30/23 0710 07/30/23 0700 07/29/23 1938 07/29/23 1926 07/29/23 1349   Chest Physiotherapy Tx   $ Chest Physiotherapy (CPT)  Yes  -SV -- Yes  -JK -- Yes  -SV   $ Demo/Eval of FREDY Barker, IPPB, CPT -- Yes  -SV -- -- --   CPT Delivery Source Acapella  -SV -- Acapella  -JK -- Acapella  -SV   CPT Duration 8  -SV -- 5  -JK -- 10  -SV   CPT Chest Site Full range  -SV -- Full range  -JK -- Full range  -SV   Breath Sounds Pre-Treatment Bilateral Rhonchi  -SV -- Diminished  -JK -- Diminished  -SV   Breath Sounds Post-Treatment Bilateral Rhonchi  -SV -- Diminished  -JK -- Diminished  -SV   Cough Congested;Productive;Strong  -SV -- Productive  -JK Dry  -NE Productive; Congested  -SV   Position Semi Moreland's  -SV -- Semi Moreland's  -JK -- Semi Moreland's  -SV   CPT Treatment Tolerance Tolerated well  -SV -- Tolerated well  -JK -- Tolerated well  -SV   Row Name 07/29/23 0900 07/29/23 0738 07/29/23 0700 07/28/23 2330 07/28/23 2020   Incentive Spirometry Tx   IS level of assistance -- -- -- Assisted by nursing  -JW --   Frequency -- -- -- q1hr W/A  -JW --   Respiratory Effort -- -- -- Normal  -JW --   Treatment Tolerance -- -- -- Tolerated well  - --   Incentive Spirometry Goal (mL) -- -- -- 1000 mL  - --   Incentive Spirometry Achieved (mL) -- -- -- 750 mL  -JW --   Chest Physiotherapy Tx   $ Chest Physiotherapy (CPT)  -- Yes  -SV -- -- --   $ Demo/Eval of FREDY Barker, IPPB, CPT -- -- Yes  -SV -- --   CPT Delivery Source -- Acapella  -SV -- -- --   CPT Duration -- 10  -SV -- -- --   CPT Chest Site -- Full range  -SV -- -- --   Breath Sounds Pre-Treatment Bilateral -- Diminished  -SV -- -- --   Breath Sounds Post-Treatment Bilateral -- Diminished  -SV -- -- --   Cough Dry  -CF Dry;Productive  -SV -- -- Dry  -JW   Position -- Semi Moreland's  -SV -- -- --   CPT Treatment Tolerance -- Tolerated well  -SV -- -- --         Pertinent Labs/Diagnostic Test Results:       XR chest 2 views   Final Result by Giuliano Marcos Roselyn Marinelli MD (07/28 1620)      No acute cardiopulmonary disease.                 7/28 EKG:    Unusual P axis, possible ectopic atrial tachycardia  Nonspecific T wave abnormality  Abnormal ECG  When compared with ECG of 26-JUL-2023 16:20,  Ectopic atrial rhythm has replaced Sinus rhythm      Results from last 7 days   Lab Units 07/28/23  1202   SARS-COV-2  Negative     Results from last 7 days   Lab Units 07/30/23  0532 07/29/23  0642 07/28/23  1202   WBC Thousand/uL 13.55* 13.43* 15.62*   HEMOGLOBIN g/dL 11.3* 11.7 13.7   HEMATOCRIT % 35.6 36.7 40.9   PLATELETS Thousands/uL 279 282 333   NEUTROS ABS Thousands/µL 8.44* 10.44* 9.65*         Results from last 7 days   Lab Units 07/30/23  0532 07/29/23  0642 07/28/23  1202   SODIUM mmol/L 140 137 136   POTASSIUM mmol/L 3.9 3.7 3.1*   CHLORIDE mmol/L 112* 108 105   CO2 mmol/L 24 22 22   ANION GAP mmol/L 4 7 9   BUN mg/dL 29* 34* 29*   CREATININE mg/dL 1.54* 1.78* 1.59*   EGFR ml/min/1.73sq m 32 27 31   CALCIUM mg/dL 8.8 9.2 9.7     Results from last 7 days   Lab Units 07/28/23  1202   AST U/L 10   ALT U/L 15   ALK PHOS U/L 80   TOTAL PROTEIN g/dL 7.3   ALBUMIN g/dL 3.3*   TOTAL BILIRUBIN mg/dL 0.36         Results from last 7 days   Lab Units 07/30/23  0532 07/29/23  0642 07/28/23  1202   GLUCOSE RANDOM mg/dL 106 128 108           Results from last 7 days   Lab Units 07/28/23  1202   PH DANIA  7.445*   PCO2 DANIA mm Hg 32.9*   PO2 DANIA mm Hg 57.1*   HCO3 DANIA mmol/L 22.1*   BASE EXC DANIA mmol/L -1.3   O2 CONTENT DANIA ml/dL 15.8   O2 HGB, VENOUS % 87.3*             Results from last 7 days   Lab Units 07/28/23  1643 07/28/23  1421 07/28/23  1202   HS TNI 0HR ng/L  --   --  13   HS TNI 2HR ng/L  --  11  --    HSTNI D2 ng/L  --  -2  --    HS TNI 4HR ng/L 10  --   --    HSTNI D4 ng/L -3  --   --      Results from last 7 days   Lab Units 07/28/23  1213   D-DIMER QUANTITATIVE ug/ml FEU 0.59*             Results from last 7 days   Lab Units 07/28/23  1745   PROCALCITONIN ng/ml 0.08 Results from last 7 days   Lab Units 07/28/23  1202   LIPASE u/L 198             Results from last 7 days   Lab Units 07/28/23  1414   CLARITY UA  Clear   COLOR UA  Yellow   SPEC GRAV UA  1.010   PH UA  5.0   GLUCOSE UA mg/dl Negative   KETONES UA mg/dl Negative   BLOOD UA  Negative   PROTEIN UA mg/dl Negative   NITRITE UA  Negative   BILIRUBIN UA  Negative   UROBILINOGEN UA E.U./dl 0.2   LEUKOCYTES UA  Small*   WBC UA /hpf 2-4*   RBC UA /hpf 2-4*   BACTERIA UA /hpf Occasional   EPITHELIAL CELLS WET PREP /hpf Occasional     Results from last 7 days   Lab Units 07/28/23  1202   INFLUENZA A PCR  Negative   INFLUENZA B PCR  Negative   RSV PCR  Negative         ED Treatment:   Medication Administration from 07/28/2023 1139 to 07/28/2023 1847       Date/Time Order Dose Route Action     07/28/2023 1201 EDT albuterol inhalation solution 10 mg 10 mg Nebulization Given     07/28/2023 1201 EDT ipratropium (ATROVENT) 0.02 % inhalation solution 1 mg 1 mg Nebulization Given     07/28/2023 1201 EDT sodium chloride 0.9 % inhalation solution 3 mL 3 mL Nebulization Given     07/28/2023 1453 EDT magnesium sulfate 2 g/50 mL IVPB (premix) 2 g 0 g Intravenous Stopped     07/28/2023 1214 EDT magnesium sulfate 2 g/50 mL IVPB (premix) 2 g 2 g Intravenous New Bag     07/28/2023 1214 EDT dexamethasone (PF) (DECADRON) injection 6 mg 6 mg Intravenous Given     07/28/2023 1235 EDT fentanyl citrate (PF) 100 MCG/2ML 25 mcg 25 mcg Intravenous Given     07/28/2023 1238 EDT acetaminophen (TYLENOL) tablet 650 mg 325 mg Oral Not Given     07/28/2023 1236 EDT nicotine (NICODERM CQ) 21 mg/24 hr TD 24 hr patch 21 mg 21 mg Transdermal Medication Applied     07/28/2023 1631 EDT albuterol inhalation solution 5 mg 5 mg Nebulization Given     07/28/2023 1638 EDT ipratropium (ATROVENT) 0.02 % inhalation solution 0.5 mg 0.5 mg Nebulization Given     07/28/2023 1632 EDT amLODIPine (NORVASC) tablet 5 mg 5 mg Oral Given     07/28/2023 1632 EDT cholecalciferol (VITAMIN D3) tablet 1,000 Units 1,000 Units Oral Given     07/28/2023 1824 EDT escitalopram (LEXAPRO) tablet 10 mg 10 mg Oral Given     07/28/2023 1632 EDT loratadine (CLARITIN) tablet 10 mg 10 mg Oral Given     07/28/2023 1632 EDT fluticasone (FLONASE) 50 mcg/act nasal spray 2 spray 2 spray Nasal Given     07/28/2023 1826 EDT hydrochlorothiazide (HYDRODIURIL) tablet 12.5 mg 12.5 mg Oral Not Given     07/28/2023 1632 EDT pantoprazole (PROTONIX) EC tablet 20 mg 20 mg Oral Given     07/28/2023 1632 EDT heparin (porcine) subcutaneous injection 5,000 Units 5,000 Units Subcutaneous Given        Past Medical History:   Diagnosis Date   • Asthma    • Asthmatic bronchitis     Last Assessed: 10/7/2014    • Chronic diarrhea     Last Assessed: 8/20/2015    • Fibromyalgia    • Focal nodular hyperplasia of liver     Last Assessed: 6/11/2015   • Herpes zoster     Last Assessed: 3/18/2016   • Hypertension    • IBS (irritable bowel syndrome)    • Intermittent palpitations    • Irritable bowel syndrome    • Lumbar radiculopathy    • Osteoarthritis    • Vertigo      Present on Admission:  • Stage 3b chronic kidney disease (HCC)  • Anxiety  • Cigarette nicotine dependence without complication  • Chronic allergic rhinitis  • Essential hypertension      Admitting Diagnosis: Cough [R05.9]  Tobacco abuse [Z72.0]  Chest tightness [R07.89]  Dyspnea [R06.00]  Asthma [J45.909]  Asthma exacerbation [J45.901]  Opacity noted on imaging study [R93.89]  Opacity of lung on imaging study [R91.8]  Age/Sex: 69 y.o. female       Admission Orders: SCD.          Scheduled Medications:    amLODIPine, 5 mg, Oral, Daily  aspirin, 81 mg, Oral, Daily  azelastine, 2 spray, Each Nare, BID  budesonide-formoterol, 2 puff, Inhalation, BID  cholecalciferol, 1,000 Units, Oral, Daily  escitalopram, 10 mg, Oral, Daily  fluticasone, 2 spray, Nasal, Daily  heparin (porcine), 5,000 Units, Subcutaneous, Q8H ELIOT  ipratropium, 0.5 mg, Nebulization, TID  levalbuterol, 1.25 mg, Nebulization, TID  loratadine, 10 mg, Oral, Daily  nicotine, 21 mg, Transdermal, Once  pantoprazole, 20 mg, Oral, Daily  sodium chloride, 4 mL, Nebulization, TID      Continuous IV Infusions: None. PRN Meds:      acetaminophen, 650 mg, Oral, Q6H PRN  albuterol, 2 puff, Inhalation, Q4H PRN  albuterol, 2.5 mg, Nebulization, Q4H PRN x 1 7/28 @ 0485, x 1 7/29 @ 0844  benzonatate, 100 mg, Oral, TID PRN  ondansetron, 4 mg, Oral, Q6H PRN        IP CONSULT TO PULMONOLOGY         Network Utilization Review Department  ATTENTION: Please call with any questions or concerns to [phone] and carefully listen to the prompts so that you are directed to the right person. All voicemails are confidential.  [place] all requests for admission clinical reviews, approved or denied determinations and any other requests to dedicated fax number below belonging to the campus where the patient is receiving treatment.  List of dedicated fax numbers for the Facilities:  [place] DENIALS (Administrative/Medical Necessity) [phone] [address] (Maternity/NICU/Pediatrics) [phone]   [address] [place] [address] Drive [phone]   [facility] [address] [facility] [phone]   [facility] [address] [phone]   [address] [id number] [place] [address] [phone]

## 2023-07-30 NOTE — ASSESSMENT & PLAN NOTE
Lab Results   Component Value Date    EGFR 32 07/30/2023    EGFR 27 07/29/2023    EGFR 31 07/28/2023    CREATININE 1.54 (H) 07/30/2023    CREATININE 1.78 (H) 07/29/2023    CREATININE 1.59 (H) 07/28/2023     Baseline Cr 1.3-1.6  Avoid nephrotoxic agents  · Discontinued HCTZ - will monitor Blood pressure

## 2023-07-31 ENCOUNTER — HOSPITAL ENCOUNTER (OUTPATIENT)
Dept: PULMONOLOGY | Facility: HOSPITAL | Age: 78
Discharge: HOME | End: 2023-07-31

## 2023-07-31 RX ORDER — ALBUTEROL SULFATE 2.5 MG/3ML
2.5 SOLUTION RESPIRATORY (INHALATION) ONCE
Status: DISCONTINUED | OUTPATIENT
Start: 2023-07-31 | End: 2023-08-04 | Stop reason: HOSPADM

## 2023-07-31 NOTE — UTILIZATION REVIEW
NOTIFICATION OF INPATIENT ADMISSION   AUTHORIZATION REQUEST   SERVICING FACILITY:   [id number]  Address: [address] [facility], [address] [place] [address] Place [address]  Tax ID: [id number]  NPI: [id number] ATTENDING PROVIDER:  Attending Name and NPI#: Vianey Ye Md [[id number]]  Address: [address] [place] [address]  Phone: [phone]   ADMISSION INFORMATION:  Place of Service: [address] [place] [address] Code: 21  Inpatient Admission Date/Time: 7/28/23  2:45 PM  Discharge Date/Time: 7/30/2023  3:03 PM  Admitting Diagnosis Code/Description:  Cough [R05.9]  Tobacco abuse [Z72.0]  Chest tightness [R07.89]  Dyspnea [R06.00]  Asthma [J45.909]  Asthma exacerbation [J45.901]  Opacity noted on imaging study [R93.89]  Opacity of lung on imaging study [R91.8]     UTILIZATION REVIEW CONTACT:  Camila Fishman Utilization   Network Utilization Review Department  Phone: [phone]  Fax: [phone]  Email: [email or web address]  Contact for approvals/pending authorizations, clinical reviews, and discharge. PHYSICIAN ADVISORY SERVICES:  Medical Necessity Denial & [place] Review  Phone: [phone]  Fax: [phone]  Email: [email or web address]

## 2023-07-31 NOTE — UTILIZATION REVIEW
NOTIFICATION OF ADMISSION DISCHARGE   This is a Notification of Discharge from [facility]. Please be advised that this patient has been discharge from our facility. Below you will find the admission and discharge date and time including the patient’s disposition. UTILIZATION REVIEW CONTACT:  Richy Parsons  Utilization   Network Utilization Review Department  Phone: [phone] x carefully listen to the prompts. All voicemails are confidential.  Email: [email or web address]     ADMISSION INFORMATION  PRESENTATION DATE: 7/28/2023 11:42 AM  OBERVATION ADMISSION DATE:   INPATIENT ADMISSION DATE: 7/28/23  2:45 PM   DISCHARGE DATE: 7/30/2023  3:03 PM   DISPOSITION:Home/Self Care    IMPORTANT INFORMATION:  Send all requests for admission clinical reviews, approved or denied determinations and any other requests to dedicated fax number below belonging to the campus where the patient is receiving treatment.  List of dedicated fax numbers:  [place] DENIALS (Administrative/Medical Necessity) [phone] [address] [facility] (Maternity/NICU/Pediatrics) [phone]   [facility] [phone]   [facility] [phone] [address] [facility] [phone]   [address] [place] [address] [phone]   [facility] [phone]   [address] [place] [address] [address] [facility] [phone]   [address] [place] [address] [phone]   [address] [facility] [phone] [address] [facility] [address] [facility] [phone]

## 2023-08-01 ENCOUNTER — TRANSITIONAL CARE MANAGEMENT (OUTPATIENT)
Dept: FAMILY MEDICINE CLINIC | Facility: CLINIC | Age: 78
End: 2023-08-01

## 2023-08-03 PROBLEM — J45.50 SEVERE PERSISTENT ASTHMA WITHOUT COMPLICATION: Status: ACTIVE | Noted: 2023-08-03

## 2023-08-03 NOTE — PROGRESS NOTES
Assessment & Plan     1. Severe persistent asthma without complication  Assessment & Plan:  69 y.o. female presents for hospital follow up. Hospitalized recently for asthma exacerbation on 7/28. Was given IV steroids during hospitalization but did not require abx. 2nd hospitalization in 2023 for asthma exacerbation, multiple ED visits without admission for asthma exacerbation. Spirometry: ordered by never done   Reports good compliance with inhalers   patient smokes aprox one pack a week; states she stopped smoking last wed. 7/26 using nicotine patches for cessation. Congratulated patient. Appt with pulmonology on 8/23/23    Imaging:  CT on 7/26/23: worsening debris in the bronchus intermedius and right middle and right lower lobe bronchi, with recommended follow-up to exclude malignant endobronchial lesion    Plan:   - Continue Symbicort   - recommended continuing daily antihistamine and Flonase. Recommend nasal saline wash to help with mucous and sinuses. -Stressed the importance of obtaining PFTs to rule out COPD diagnosis. Will hold off at this time given acute phase of symptoms. Will complete once more stable. -Recommended to continue albuterol inhaler q6h prn   - Encouraged patient to keep upcoming appt with pulm  - For imaging findings, repeat Chest CT ordered on discharge from hospital. Encouraged patient to complete around agust 26th (aprox 4 weeks after initial imaging). Pending results may need bronchoscopy if persistent mucus plugging/ debris. Will defer to pulmonology for further management. - Will follow up in 3 months              Subjective     Transitional Care Management Review:   Kobe Barahona is a 69 y.o. female here for TCM follow up.      During the TCM phone call patient stated:  TCM Call     Date and time call was made  8/1/2023  3:31 PM    Hospital care reviewed  Records reviewed    Patient was hospitialized at  [facility]    Date of Admission  07/28/23    Date of discharge  07/30/23    Diagnosis  Severe asthma with acute exacerbation    Disposition  Home      TCM Call     None        Ms. Jonah Bermeo presents to clinic today to follow up from recent hospital admission for asthma exacerbation. She states she is feeling about the same as she did from discharge from hospital. Patient states she has been using her albuterol inhaler around the clock every 6 hours. After she uses her albuterol nebulizer she feels a little better for about a couple hours then she feels short of breath again. She states she has been taking her Symbicort inhaler as prescribed. She states she is taking her Flonase and clartin every day. Has upcoming appointment with pulmonology she states she will be going to that appointment. Patient states she is using patches now for nicotine cessation and stopped smoking last Wednesday. Patient states she is coughing but is unable to bring anything up with cough also states having white rhinorrhea. She denies fevers or chills. Review of Systems   Constitutional: Negative for chills and fever. HENT: Positive for congestion and rhinorrhea. Negative for sore throat. Eyes: Negative for redness and visual disturbance. Respiratory: Positive for cough and shortness of breath. Cardiovascular: Negative for chest pain and palpitations. Gastrointestinal: Negative for abdominal pain, constipation and nausea. Genitourinary: Negative for difficulty urinating and dysuria. Musculoskeletal: Negative for arthralgias and myalgias. Skin: Negative for rash. Allergic/Immunologic: Negative for environmental allergies and food allergies. Neurological: Negative for dizziness and headaches.        Objective     /84 (BP Location: Left arm, Patient Position: Sitting, Cuff Size: Standard)   Pulse 83   Temp 98.2 °F (36.8 °C) (Temporal)   Ht 5' 2" (1.575 m)   Wt 58.1 kg (128 lb)   SpO2 98%   BMI 23.41 kg/m²      Physical Exam  Constitutional:       General: She is not in acute distress. Appearance: Normal appearance. HENT:      Head: Normocephalic and atraumatic. Nose: Congestion and rhinorrhea present. Eyes:      Extraocular Movements: Extraocular movements intact. Cardiovascular:      Rate and Rhythm: Normal rate and regular rhythm. Pulses: Normal pulses. Heart sounds: Normal heart sounds. Pulmonary:      Effort: Pulmonary effort is normal. No respiratory distress. Breath sounds: Normal breath sounds. Abdominal:      General: Bowel sounds are normal.      Palpations: Abdomen is soft. Tenderness: There is no abdominal tenderness. Musculoskeletal:         General: Normal range of motion. Cervical back: Normal range of motion. Right lower leg: No edema. Left lower leg: No edema. Skin:     General: Skin is warm. Neurological:      General: No focal deficit present. Mental Status: She is alert and oriented to person, place, and time.        Medications have been reviewed by provider in current encounter    Judit Leslie MD

## 2023-08-03 NOTE — ASSESSMENT & PLAN NOTE
69 y.o. female presents for hospital follow up. Hospitalized recently for asthma exacerbation on 7/28. Was given IV steroids during hospitalization but did not require abx. 2nd hospitalization in 2023 for asthma exacerbation, multiple ED visits without admission for asthma exacerbation. Spirometry: ordered by never done   Reports good compliance with inhalers   patient smokes aprox one pack a week; states she stopped smoking last wed. 7/26 using nicotine patches for cessation. Congratulated patient. Appt with pulmonology on 8/23/23    Imaging:  CT on 7/26/23: worsening debris in the bronchus intermedius and right middle and right lower lobe bronchi, with recommended follow-up to exclude malignant endobronchial lesion    Plan:   - Continue Symbicort   - recommended continuing daily antihistamine and Flonase. Recommend nasal saline wash to help with mucous and sinuses. -Stressed the importance of obtaining PFTs to rule out COPD diagnosis. Will hold off at this time given acute phase of symptoms. Will complete once more stable. -Recommended to continue albuterol inhaler q6h prn   - Encouraged patient to keep upcoming appt with pulm  - For imaging findings, repeat Chest CT ordered on discharge from hospital. Encouraged patient to complete around agust 26th (aprox 4 weeks after initial imaging). Pending results may need bronchoscopy if persistent mucus plugging/ debris. Will defer to pulmonology for further management.    - Will follow up in 3 months

## 2023-08-04 ENCOUNTER — OFFICE VISIT (OUTPATIENT)
Dept: FAMILY MEDICINE CLINIC | Facility: CLINIC | Age: 78
End: 2023-08-04

## 2023-08-04 VITALS
HEART RATE: 83 BPM | DIASTOLIC BLOOD PRESSURE: 84 MMHG | SYSTOLIC BLOOD PRESSURE: 155 MMHG | HEIGHT: 62 IN | BODY MASS INDEX: 23.55 KG/M2 | WEIGHT: 128 LBS | TEMPERATURE: 98.2 F | OXYGEN SATURATION: 98 %

## 2023-08-04 DIAGNOSIS — J45.50 SEVERE PERSISTENT ASTHMA WITHOUT COMPLICATION: Primary | ICD-10-CM

## 2023-08-04 PROCEDURE — 3079F DIAST BP 80-89 MM HG: CPT | Performed by: FAMILY MEDICINE

## 2023-08-04 PROCEDURE — 99496 TRANSJ CARE MGMT HIGH F2F 7D: CPT | Performed by: FAMILY MEDICINE

## 2023-08-04 PROCEDURE — 3077F SYST BP >= 140 MM HG: CPT | Performed by: FAMILY MEDICINE

## 2023-08-23 ENCOUNTER — OFFICE VISIT (OUTPATIENT)
Dept: PULMONOLOGY | Facility: CLINIC | Age: 78
End: 2023-08-23
Payer: MEDICARE

## 2023-08-23 VITALS
HEIGHT: 62 IN | BODY MASS INDEX: 24.29 KG/M2 | OXYGEN SATURATION: 97 % | TEMPERATURE: 97.3 F | DIASTOLIC BLOOD PRESSURE: 90 MMHG | WEIGHT: 132 LBS | HEART RATE: 92 BPM | SYSTOLIC BLOOD PRESSURE: 162 MMHG

## 2023-08-23 DIAGNOSIS — J45.901 ASTHMA EXACERBATION: ICD-10-CM

## 2023-08-23 DIAGNOSIS — H10.10 SEASONAL ALLERGIC CONJUNCTIVITIS: ICD-10-CM

## 2023-08-23 DIAGNOSIS — I10 ESSENTIAL HYPERTENSION: ICD-10-CM

## 2023-08-23 DIAGNOSIS — F17.210 CIGARETTE NICOTINE DEPENDENCE WITHOUT COMPLICATION: ICD-10-CM

## 2023-08-23 DIAGNOSIS — J45.20 MILD INTERMITTENT ASTHMA, UNSPECIFIED WHETHER COMPLICATED: Primary | ICD-10-CM

## 2023-08-23 DIAGNOSIS — R91.8 OPACITY OF LUNG ON IMAGING STUDY: ICD-10-CM

## 2023-08-23 PROCEDURE — 99214 OFFICE O/P EST MOD 30 MIN: CPT | Performed by: INTERNAL MEDICINE

## 2023-08-23 RX ORDER — NICOTINE 21 MG/24HR
1 PATCH, TRANSDERMAL 24 HOURS TRANSDERMAL EVERY 24 HOURS
Qty: 28 PATCH | Refills: 1 | Status: SHIPPED | OUTPATIENT
Start: 2023-08-23

## 2023-08-23 RX ORDER — BUDESONIDE AND FORMOTEROL FUMARATE DIHYDRATE 80; 4.5 UG/1; UG/1
2 AEROSOL RESPIRATORY (INHALATION) 2 TIMES DAILY
Qty: 10.2 G | Refills: 5 | Status: SHIPPED | OUTPATIENT
Start: 2023-08-23 | End: 2023-09-22

## 2023-08-23 RX ORDER — ALBUTEROL SULFATE 2.5 MG/3ML
2.5 SOLUTION RESPIRATORY (INHALATION) EVERY 4 HOURS PRN
Qty: 3 ML | Refills: 5 | Status: SHIPPED | OUTPATIENT
Start: 2023-08-23

## 2023-08-23 NOTE — PROGRESS NOTES
Pulmonary Follow Up Note   Soraida Jc 69 y.o. female MRN: [id number]  8/23/2023      Assessment:  Soraida Jc is a 69 y.o. female with tobacco use, Asthma (diagnosed at age 37), GERD who presents for hospital follow up appointment    Problem List:  Mild intermittent asthma  Asthma exacerbation, now resolved   Opacity of lung on imaging study  Essential hypertension  Cigarette nicotine dependence without complication  Seasonal allergic conjunctivitis/rhinitis     Plan:  1. Mild intermittent asthma, unspecified whether complicated  Recent hospitalization for concern of asthma exacerbation as well as debris noted in bronchi on CT (see below). No prior PFT  -Continue Symbicort twice daily  -Albuterol nebulizer and MDI as needed  -PFTs ordered  - Repeat CT chest as below    2. Asthma exacerbation, now resolved   - See above    3. Opacity of lung on imaging study  Recent CT imaging showing worsening debris's in the bronchus intermedius and right middle and right lower lobe bronchi, as well as stable anterior mediastinal node, lymph node, or thymoma  - Repeat CT chest without contrast ordered for evaluation of possible debris's in airway ordered  - Will need to consider repeat CT scan in 1 year to reevaluate stable anterior mediastinal node    4. Cigarette nicotine dependence without complication  - Quit smoking 4 to 5 weeks ago after hospitalization, cravings are present  - Continue nicotine patch  - We will prescribe nicotine gum      5. Seasonal allergic conjunctivitis/rhinitis   -Follows with ENT  -continue azelastine and fluticasone  -Claritin as needed    6. Essential hypertension  - Recommend patient resume her amlodipine 5 mg daily and follow-up with PCP for continued monitoring    Return in about 2 months (around 10/23/2023) for Recheck. History of Present Illness   HPI:  Soraida Jc is a 69 y.o. female with tobacco use, Asthma (diagnosed at age 37), GERD who presents for hospital follow up appointment. She had a recent admission in July 2023 for acute onset dyspnea. She initially presented to the emergency department and diagnosed with an acute asthma exacerbation, she was treated with IV methylprednisolone and discharged home with a 3-day course of prednisone. She then returned to the emergency department approximately 4 days later for similar symptoms. CT chest at that time showed worsening debris in the bronchus intermedius, right middle lobe, and right lower lobe bronchi. She was scheduled for bronchoscopy on 7/31, however, she had rapid improvement with conservative management consisting of 3% sodium chloride nebs 3 times daily, Symbicort, flutter valve, and chest PT. Bronchoscopy was held and patient was scheduled to have a repeat CT scan in 4 to 6 weeks from discharge. After discharge, patient reports feeling better. At first she continued to have productive cough however this has improved. She had completed her 3% sodium chloride nebulizations. She is otherwise denied recent shortness of breath. Cough has improved. She continues to use Symbicort twice a day but has not needed albuterol MDI or nebulizer. Patient does report that she quit smoking approximately 4 to 5 weeks ago after hospitalization. She has been a longtime smoker of greater than 50 years. She reports smoking 1 pack of cigarettes in 3 to 4 days. She is currently using nicotine patches with little improvement in her cravings. Review of Systems   Constitutional: Negative. HENT: Negative. Eyes: Negative. Respiratory: Positive for cough. Negative for shortness of breath and wheezing. Cardiovascular: Negative. Gastrointestinal: Negative. Endocrine: Negative. Musculoskeletal: Negative. Allergic/Immunologic: Negative. Neurological: Negative. Psychiatric/Behavioral: Negative.         Historical Information   Past Medical History:   Diagnosis Date   • Asthma    • Asthmatic bronchitis     Last Assessed: 10/7/2014    • Chronic diarrhea     Last Assessed: 8/20/2015    • Fibromyalgia    • Focal nodular hyperplasia of liver     Last Assessed: 6/11/2015   • Herpes zoster     Last Assessed: 3/18/2016   • Hypertension    • IBS (irritable bowel syndrome)    • Intermittent palpitations    • Irritable bowel syndrome    • Lumbar radiculopathy    • Osteoarthritis    • Vertigo      Past Surgical History:   Procedure Laterality Date   • BREAST BIOPSY     • SMALL INTESTINE SURGERY       Family History   Problem Relation Age of Onset   • Heart attack Mother    • Other Mother         Aspiration of Vomit    • Asthma Father    • Breast cancer Sister    • Arthritis Family    • Other Family         Musculoskeletal Disease    • Osteoporosis Family      Social History     Tobacco Use   Smoking Status Every Day   • Packs/day: 0.50   • Types: Cigarettes   Smokeless Tobacco Never   quit 4-5 weeks ago, long time smoker >50 1pack 3-4 days.  Patches     Meds/Allergies     Current Outpatient Medications:   •  acetaminophen (TYLENOL) 500 mg tablet, Take 1 tablet (500 mg total) by mouth every 6 (six) hours as needed for mild pain, Disp: 60 tablet, Rfl: 0  •  albuterol (2.5 mg/3 mL) 0.083 % nebulizer solution, Take 3 mL (2.5 mg total) by nebulization every 4 (four) hours as needed for wheezing or shortness of breath, Disp: 3 mL, Rfl: 0  •  albuterol (PROVENTIL HFA,VENTOLIN HFA) 90 mcg/act inhaler, Inhale 2 puffs every 6 (six) hours as needed for wheezing or shortness of breath, Disp: 18 g, Rfl: 3  •  amLODIPine (NORVASC) 5 mg tablet, Take 1 tablet (5 mg total) by mouth daily, Disp: 90 tablet, Rfl: 0  •  azelastine (ASTELIN) 0.1 % nasal spray, 2 sprays into each nostril 2 (two) times a day Use in each nostril as directed, Disp: 30 mL, Rfl: 2  •  benzonatate (TESSALON PERLES) 100 mg capsule, Take 1 capsule (100 mg total) by mouth 3 (three) times a day as needed for cough (Patient not taking: Reported on 5/3/2023), Disp: 20 capsule, Rfl: 0  •  Blood Pressure Monitoring (BLOOD PRESSURE CUFF) MISC, by Does not apply route 2 (two) times a day (Patient not taking: Reported on 7/24/2023), Disp: 1 each, Rfl: 0  •  Cholecalciferol (Vitamin D) 50 MCG (2000 UT) CAPS, Take 1 capsule (2,000 Units total) by mouth daily, Disp: 30 capsule, Rfl: 5  •  escitalopram (LEXAPRO) 10 mg tablet, Take 1 tablet (10 mg total) by mouth daily (Patient not taking: Reported on 5/3/2023), Disp: 90 tablet, Rfl: 3  •  fexofenadine (ALLEGRA) 180 MG tablet, Take 180 mg by mouth daily, Disp: , Rfl:   •  fluticasone (FLONASE) 50 mcg/act nasal spray, 2 sprays into each nostril daily, Disp: 16 g, Rfl: 3  •  folic acid (FOLVITE) 1 mg tablet, take 1 tablet by mouth daily (Patient taking differently: as needed), Disp: 30 tablet, Rfl: 5  •  hydrocortisone (ANUSOL-HC) 2.5 % rectal cream, Apply topically 2 (two) times a day, Disp: 28 g, Rfl: 0  •  loratadine (CLARITIN) 10 mg tablet, Take 1 tablet (10 mg total) by mouth daily (Patient not taking: Reported on 7/24/2023), Disp: 30 tablet, Rfl: 3  •  nicotine (NICODERM CQ) 14 mg/24hr TD 24 hr patch, Place 1 patch on the skin over 24 hours every 24 hours, Disp: 28 patch, Rfl: 0  •  olopatadine (PATANOL) 0.1 % ophthalmic solution, Administer 1 drop to both eyes 2 (two) times a day (Patient not taking: Reported on 5/3/2023), Disp: 5 mL, Rfl: 2  •  ondansetron (Zofran ODT) 4 mg disintegrating tablet, Take 1 tablet (4 mg total) by mouth every 6 (six) hours as needed for nausea or vomiting, Disp: 20 tablet, Rfl: 3  •  pantoprazole (PROTONIX) 20 mg tablet, Take 1 tablet (20 mg total) by mouth daily (Patient not taking: Reported on 7/24/2023), Disp: 90 tablet, Rfl: 1  •  Symbicort 80-4.5 MCG/ACT inhaler, inhale 2 puffs by mouth twice a day Rinse mouth after use, Disp: 10.2 g, Rfl: 0  Allergies   Allergen Reactions   • Ambrosia Artemisiifolia (Ragweed) Skin Test Facial Swelling   • Codeine Other (See Comments)     Heart races   • Epinephrine      Pt reports "it makes my heart race, they told me I cant take it."   • Ibuprofen Other (See Comments)     Heart racing   • Morphine Other (See Comments)     Heart racing   • Pollen Extract Sneezing   • Tramadol Other (See Comments)     Heart racing   • Eggs Or Egg-Derived Products - Food Allergy Diarrhea and GI Intolerance       Vitals: Blood pressure 162/90, pulse 92, temperature (!) 97.3 °F (36.3 °C), height 5' 2" (1.575 m), weight 59.9 kg (132 lb), SpO2 97 %. Body mass index is 24.14 kg/m². Physical Exam  Physical Exam  Vitals reviewed. Constitutional:       General: She is not in acute distress. Appearance: Normal appearance. HENT:      Head: Normocephalic and atraumatic. Mouth/Throat:      Mouth: Mucous membranes are moist.   Eyes:      Extraocular Movements: Extraocular movements intact. Conjunctiva/sclera: Conjunctivae normal.      Pupils: Pupils are equal, round, and reactive to light. Cardiovascular:      Rate and Rhythm: Normal rate and regular rhythm. Heart sounds: No murmur heard. No friction rub. No gallop. Pulmonary:      Effort: Pulmonary effort is normal. No respiratory distress. Breath sounds: Normal breath sounds. No wheezing, rhonchi or rales. Abdominal:      General: Abdomen is flat. Bowel sounds are normal.      Palpations: Abdomen is soft. Musculoskeletal:         General: Normal range of motion. Cervical back: Neck supple. Lymphadenopathy:      Cervical: No cervical adenopathy. Skin:     General: Skin is warm and dry. Findings: No rash. Neurological:      General: No focal deficit present. Mental Status: She is alert and oriented to person, place, and time. Mental status is at baseline. Psychiatric:         Mood and Affect: Mood normal.         Behavior: Behavior normal.         Labs: I have personally reviewed pertinent lab results.   Lab Results   Component Value Date    WBC 13.55 (H) 07/30/2023    HGB 11.3 (L) 07/30/2023 HCT 35.6 07/30/2023    MCV 90 07/30/2023     07/30/2023     Lab Results   Component Value Date    GLUCOSE 92 05/19/2015    CALCIUM 8.8 07/30/2023     05/19/2015    K 3.9 07/30/2023    CO2 24 07/30/2023     (H) 07/30/2023    BUN 29 (H) 07/30/2023    CREATININE 1.54 (H) 07/30/2023     No results found for: "IGE"  Lab Results   Component Value Date    ALT 15 07/28/2023    AST 10 07/28/2023    ALKPHOS 80 07/28/2023    BILITOT 0.20 05/19/2015       Imaging and other studies: I have personally reviewed pertinent reports. CT chest wo co 7/26/2023  IMPRESSION:  Worsening debris in the bronchus intermedius and right middle and right lower lobe bronchi. Follow-up is needed to exclude a malignant endobronchial lesion. No acute pulmonary disease. Stable small loculated pericardial effusion. Stable anterior mediastinal nodule, lymph node or thymoma. Recommend follow-up with a chest CT with no contrast in 1 year. CXR 7/28/2023  Lungs are clear, no PTX or pleural effusion     Pulmonary function testing:   PFT have been order, pending completion    EKG, Pathology, and Other Studies: I have personally reviewed pertinent reports. Disclaimer: Portions of the record may have been created with voice recognition software. Occasional wrong word or "sound a like" substitutions may have occurred due to the inherent limitations of voice recognition software. Careful consideration should be taken to recognize, using context, where substitutions have occurred.     Yobany Roman DO   Pulmonary & Critical Care Medicine Fellow PGY-IV  [facility]

## 2023-09-06 ENCOUNTER — VBI (OUTPATIENT)
Dept: ADMINISTRATIVE | Facility: OTHER | Age: 78
End: 2023-09-06

## 2023-09-06 DIAGNOSIS — R91.8 OPACITY OF LUNG ON IMAGING STUDY: Primary | ICD-10-CM

## 2023-09-11 ENCOUNTER — HOSPITAL ENCOUNTER (OUTPATIENT)
Dept: PULMONOLOGY | Facility: HOSPITAL | Age: 78
Discharge: HOME | End: 2023-09-11
Payer: MEDICARE

## 2023-09-11 ENCOUNTER — HOSPITAL ENCOUNTER (OUTPATIENT)
Dept: RADIOLOGY | Facility: HOSPITAL | Age: 78
Discharge: HOME | End: 2023-09-11
Payer: MEDICARE

## 2023-09-11 DIAGNOSIS — J45.50 SEVERE PERSISTENT ASTHMA, UNSPECIFIED WHETHER COMPLICATED: ICD-10-CM

## 2023-09-11 DIAGNOSIS — R91.8 OPACITY OF LUNG ON IMAGING STUDY: ICD-10-CM

## 2023-09-11 PROCEDURE — 71250 CT THORAX DX C-: CPT

## 2023-09-11 PROCEDURE — 94726 PLETHYSMOGRAPHY LUNG VOLUMES: CPT | Performed by: INTERNAL MEDICINE

## 2023-09-11 PROCEDURE — 94760 N-INVAS EAR/PLS OXIMETRY 1: CPT

## 2023-09-11 PROCEDURE — 94060 EVALUATION OF WHEEZING: CPT | Performed by: INTERNAL MEDICINE

## 2023-09-11 PROCEDURE — G1004 CDSM NDSC: HCPCS

## 2023-09-11 PROCEDURE — 94729 DIFFUSING CAPACITY: CPT | Performed by: INTERNAL MEDICINE

## 2023-09-11 PROCEDURE — 94060 EVALUATION OF WHEEZING: CPT

## 2023-09-11 PROCEDURE — 94726 PLETHYSMOGRAPHY LUNG VOLUMES: CPT

## 2023-09-11 PROCEDURE — 94729 DIFFUSING CAPACITY: CPT

## 2023-09-11 RX ORDER — ALBUTEROL SULFATE 2.5 MG/3ML
2.5 SOLUTION RESPIRATORY (INHALATION) ONCE
Status: COMPLETED | OUTPATIENT
Start: 2023-09-11 | End: 2023-09-11

## 2023-09-11 RX ADMIN — ALBUTEROL SULFATE 2.5 MG: 2.5 SOLUTION RESPIRATORY (INHALATION) at 17:04

## 2023-09-14 NOTE — PROGRESS NOTES
Name: Nisha Reed      : [id number]      MRN: [id number]  Encounter Provider: Sylvain Vazquez MD  Encounter Date: 9/15/2023   Encounter department: [address]     1. Mild persistent asthma without complication  Assessment & Plan:  - Stable, reports good compliance with inhalers. - Recently stopped cigarette use two months ago. Using nicotine gum and patches. - Current medications: Symbicort  2 puffs bid and albuterol inhaler prn.  - 2 hospitalizations requiring systemic steroids in .  - PFT's: fev1/fvc ratio 56% predicted, Fev1 79% predicted, fvc 108%. No response to bronchodilators. DLCO 72%. Mild obstructive airflow. Mild decrease in diffusion. Imaging:  CT on 23: worsening debris in the bronchus intermedius and right middle and right lower lobe bronchi, with recommended follow-up to exclude malignant endobronchial lesion    Plan:   - Continue current inhalers. Inhalers refilled today. - recommended continuing daily antihistamine and Flonase. Recommend nasal saline wash to help with mucous and sinuses. -Patient to continue following with pulmonology. -Repeat chest CT completed, final results pending will follow up. - Discussed upcoming flu season and importance of getting flu shot once they become available (in next couple of weeks). Patient states she is not interested in flu shot. Will discuss again at annual visit. Orders:  -     albuterol (PROVENTIL HFA,VENTOLIN HFA) 90 mcg/act inhaler; Inhale 2 puffs every 6 (six) hours as needed for wheezing or shortness of breath  -     albuterol (2.5 mg/3 mL) 0.083 % nebulizer solution; Take 3 mL (2.5 mg total) by nebulization every 4 (four) hours as needed for wheezing or shortness of breath    2. Asthma exacerbation  -     budesonide-formoterol (Symbicort) 80-4.5 MCG/ACT inhaler; Inhale 2 puffs 2 (two) times a day Rinse mouth after use    3.  Chronic allergic rhinitis  - fluticasone (FLONASE) 50 mcg/act nasal spray; 2 sprays into each nostril daily  -     azelastine (ASTELIN) 0.1 % nasal spray; 2 sprays into each nostril 2 (two) times a day Use in each nostril as directed    4. Cigarette nicotine dependence without complication  -     nicotine (NICODERM CQ) 14 mg/24hr TD 24 hr patch; Place 1 patch on the skin over 24 hours every 24 hours  -     nicotine polacrilex (NICORETTE) 2 mg gum; Chew 1 each (2 mg total) as needed for smoking cessation         Subjective     Ms. Richard Milligan presents to clinic today for follow up of asthma and refill of inhalers. Patient states she has been feeling "great". She states her breathing has been better than it has in a while. Patient notes she is using Flonase and allegra daily. She has not required albuterol at all in at least a month she states. Patient notes a dry cough but notes its infrequent. No wheezing, chest pain, chest tightness, dyspnea. She states she stopped smoking after hospitalization in July and has been using nicotine patches and gum. She states she is not having heartburn symptoms. Review of Systems   Constitutional: Negative for chills and fever. HENT: Negative for rhinorrhea and sore throat. Eyes: Negative for redness and visual disturbance. Respiratory: Negative for cough and shortness of breath. Cardiovascular: Negative for chest pain and palpitations. Gastrointestinal: Negative for abdominal pain, constipation and nausea. Genitourinary: Negative for difficulty urinating and dysuria. Musculoskeletal: Negative for arthralgias and myalgias. Skin: Negative for rash. Allergic/Immunologic: Negative for environmental allergies and food allergies. Neurological: Negative for dizziness and headaches.        Past Medical History:   Diagnosis Date   • Asthma    • Asthmatic bronchitis     Last Assessed: 10/7/2014    • Chronic diarrhea     Last Assessed: 8/20/2015    • Fibromyalgia    • Focal nodular hyperplasia of liver     Last Assessed: 6/11/2015   • Herpes zoster     Last Assessed: 3/18/2016   • Hypertension    • IBS (irritable bowel syndrome)    • Intermittent palpitations    • Irritable bowel syndrome    • Lumbar radiculopathy    • Osteoarthritis    • Vertigo      Past Surgical History:   Procedure Laterality Date   • BREAST BIOPSY     • SMALL INTESTINE SURGERY       Family History   Problem Relation Age of Onset   • Heart attack Mother    • Other Mother         Aspiration of Vomit    • Asthma Father    • Breast cancer Sister    • Arthritis Family    • Other Family         Musculoskeletal Disease    • Osteoporosis Family      Social History     Socioeconomic History   • Marital status:      Spouse name: None   • Number of children: None   • Years of education: None   • Highest education level: None   Occupational History   • Occupation: Unemployed    Tobacco Use   • Smoking status: Former     Packs/day: 0.50     Types: Cigarettes   • Smokeless tobacco: Never   • Tobacco comments:     Stopped smoking July 2023   Vaping Use   • Vaping Use: Never used   Substance and Sexual Activity   • Alcohol use: Not Currently   • Drug use: No   • Sexual activity: Not Currently     Partners: Male   Other Topics Concern   • None   Social History Narrative    Mental Disability     Exercising Regularly     Lives with adult children      Social Determinants of Health     Financial Resource Strain: High Risk (7/24/2023)    Overall Financial Resource Strain (CARDIA)    • Difficulty of Paying Living Expenses: Very hard   Food Insecurity: Food Insecurity Present (7/24/2023)    Hunger Vital Sign    • Worried About Running Out of Food in the Last Year: Often true    • Ran Out of Food in the Last Year: Often true   Transportation Needs: No Transportation Needs (7/24/2023)    PRAPARE - Transportation    • Lack of Transportation (Medical): No    • Lack of Transportation (Non-Medical):  No   Physical Activity: Sufficiently Active (8/2/2022) Exercise Vital Sign    • Days of Exercise per Week: 5 days    • Minutes of Exercise per Session: 60 min   Stress: Stress Concern Present (8/2/2022)    [facility]    • Feeling of Stress :  To some extent   Social Connections: Not on file   Intimate Partner Violence: Not At Risk (8/2/2022)    Humiliation, Afraid, Rape, and Kick questionnaire    • Fear of Current or Ex-Partner: No    • Emotionally Abused: No    • Physically Abused: No    • Sexually Abused: No   Housing Stability: Low Risk  (6/20/2022)    Housing Stability Vital Sign    • Unable to Pay for Housing in the Last Year: No    • Number of [address] in the Last Year: 1    • Unstable Housing in the Last Year: No     Current Outpatient Medications on File Prior to Visit   Medication Sig   • acetaminophen (TYLENOL) 500 mg tablet Take 1 tablet (500 mg total) by mouth every 6 (six) hours as needed for mild pain   • amLODIPine (NORVASC) 5 mg tablet Take 1 tablet (5 mg total) by mouth daily   • Cholecalciferol (Vitamin D) 50 MCG (2000 UT) CAPS Take 1 capsule (2,000 Units total) by mouth daily   • fexofenadine (ALLEGRA) 180 MG tablet Take 180 mg by mouth daily   • folic acid (FOLVITE) 1 mg tablet take 1 tablet by mouth daily   • hydrocortisone (ANUSOL-HC) 2.5 % rectal cream Apply topically 2 (two) times a day   • ondansetron (Zofran ODT) 4 mg disintegrating tablet Take 1 tablet (4 mg total) by mouth every 6 (six) hours as needed for nausea or vomiting   • benzonatate (TESSALON PERLES) 100 mg capsule Take 1 capsule (100 mg total) by mouth 3 (three) times a day as needed for cough (Patient not taking: Reported on 5/3/2023)   • Blood Pressure Monitoring (BLOOD PRESSURE CUFF) MISC by Does not apply route 2 (two) times a day (Patient not taking: Reported on 7/24/2023)   • escitalopram (LEXAPRO) 10 mg tablet Take 1 tablet (10 mg total) by mouth daily (Patient not taking: Reported on 5/3/2023)   • loratadine (CLARITIN) 10 mg tablet Take 1 tablet (10 mg total) by mouth daily (Patient not taking: Reported on 7/24/2023)   • olopatadine (PATANOL) 0.1 % ophthalmic solution Administer 1 drop to both eyes 2 (two) times a day (Patient not taking: Reported on 5/3/2023)   • pantoprazole (PROTONIX) 20 mg tablet Take 1 tablet (20 mg total) by mouth daily (Patient not taking: Reported on 7/24/2023)     Allergies   Allergen Reactions   • Ambrosia Artemisiifolia (Ragweed) Skin Test Facial Swelling   • Codeine Other (See Comments)     Heart races   • Epinephrine      Pt reports "it makes my heart race, they told me I cant take it."   • Ibuprofen Other (See Comments)     Heart racing   • Morphine Other (See Comments)     Heart racing   • Pollen Extract Sneezing   • Tramadol Other (See Comments)     Heart racing   • Eggs Or Egg-Derived Products - Food Allergy Diarrhea and GI Intolerance     Immunization History   Administered Date(s) Administered   • Pneumococcal Polysaccharide PPV23 01/01/2003, 10/01/2008   • Tdap 06/08/2018       Objective     /88 (BP Location: Left arm, Patient Position: Sitting, Cuff Size: Standard)   Pulse 83   Temp 98.1 °F (36.7 °C) (Temporal)   Wt 60.1 kg (132 lb 6.4 oz)   SpO2 98%   BMI 24.22 kg/m²     Physical Exam  Constitutional:       General: She is not in acute distress. Appearance: Normal appearance. HENT:      Head: Normocephalic and atraumatic. Cardiovascular:      Rate and Rhythm: Normal rate and regular rhythm. Pulses: Normal pulses. Heart sounds: No murmur heard. Pulmonary:      Effort: Pulmonary effort is normal. No respiratory distress. Breath sounds: Normal breath sounds. Abdominal:      General: Bowel sounds are normal.      Palpations: Abdomen is soft. Tenderness: There is no abdominal tenderness. Musculoskeletal:         General: Normal range of motion. Cervical back: Normal range of motion. Right lower leg: No edema.       Left lower leg: No edema.   Skin:     General: Skin is warm. Neurological:      General: No focal deficit present. Mental Status: She is alert and oriented to person, place, and time.        Pantera Soriano MD

## 2023-09-15 ENCOUNTER — OFFICE VISIT (OUTPATIENT)
Dept: FAMILY MEDICINE CLINIC | Facility: CLINIC | Age: 78
End: 2023-09-15

## 2023-09-15 ENCOUNTER — TELEPHONE (OUTPATIENT)
Dept: FAMILY MEDICINE CLINIC | Facility: CLINIC | Age: 78
End: 2023-09-15

## 2023-09-15 VITALS
BODY MASS INDEX: 24.22 KG/M2 | WEIGHT: 132.4 LBS | SYSTOLIC BLOOD PRESSURE: 153 MMHG | HEART RATE: 83 BPM | OXYGEN SATURATION: 98 % | DIASTOLIC BLOOD PRESSURE: 88 MMHG | TEMPERATURE: 98.1 F

## 2023-09-15 DIAGNOSIS — J30.9 CHRONIC ALLERGIC RHINITIS: ICD-10-CM

## 2023-09-15 DIAGNOSIS — F17.210 CIGARETTE NICOTINE DEPENDENCE WITHOUT COMPLICATION: ICD-10-CM

## 2023-09-15 DIAGNOSIS — J45.30 MILD PERSISTENT ASTHMA WITHOUT COMPLICATION: ICD-10-CM

## 2023-09-15 DIAGNOSIS — J45.901 ASTHMA EXACERBATION: ICD-10-CM

## 2023-09-15 PROCEDURE — 99213 OFFICE O/P EST LOW 20 MIN: CPT | Performed by: FAMILY MEDICINE

## 2023-09-15 PROCEDURE — 3079F DIAST BP 80-89 MM HG: CPT | Performed by: FAMILY MEDICINE

## 2023-09-15 PROCEDURE — 3077F SYST BP >= 140 MM HG: CPT | Performed by: FAMILY MEDICINE

## 2023-09-15 RX ORDER — AZELASTINE 1 MG/ML
2 SPRAY, METERED NASAL 2 TIMES DAILY
Qty: 30 ML | Refills: 2 | Status: SHIPPED | OUTPATIENT
Start: 2023-09-15

## 2023-09-15 RX ORDER — BUDESONIDE AND FORMOTEROL FUMARATE DIHYDRATE 80; 4.5 UG/1; UG/1
2 AEROSOL RESPIRATORY (INHALATION) 2 TIMES DAILY
Qty: 10.2 G | Refills: 5 | Status: SHIPPED | OUTPATIENT
Start: 2023-09-15 | End: 2023-10-15

## 2023-09-15 RX ORDER — FLUTICASONE PROPIONATE 50 MCG
2 SPRAY, SUSPENSION (ML) NASAL DAILY
Qty: 16 G | Refills: 3 | Status: SHIPPED | OUTPATIENT
Start: 2023-09-15

## 2023-09-15 RX ORDER — NICOTINE 21 MG/24HR
1 PATCH, TRANSDERMAL 24 HOURS TRANSDERMAL EVERY 24 HOURS
Qty: 28 PATCH | Refills: 1 | Status: SHIPPED | OUTPATIENT
Start: 2023-09-15

## 2023-09-15 RX ORDER — ALBUTEROL SULFATE 90 UG/1
2 AEROSOL, METERED RESPIRATORY (INHALATION) EVERY 6 HOURS PRN
Qty: 18 G | Refills: 3 | Status: SHIPPED | OUTPATIENT
Start: 2023-09-15

## 2023-09-15 RX ORDER — ALBUTEROL SULFATE 2.5 MG/3ML
2.5 SOLUTION RESPIRATORY (INHALATION) EVERY 4 HOURS PRN
Qty: 3 ML | Refills: 5 | Status: SHIPPED | OUTPATIENT
Start: 2023-09-15 | End: 2023-09-18

## 2023-09-15 NOTE — TELEPHONE ENCOUNTER
Pharmacist left message on clinical line stating they need the correct quantity to dispense the albuterol 2.5 nebulizer solution because the 3ml quantity is just one vial. Can this prescription been fix. Thank you.

## 2023-09-16 PROBLEM — J45.51 SEVERE PERSISTENT ASTHMA WITH ACUTE EXACERBATION: Status: RESOLVED | Noted: 2023-07-24 | Resolved: 2023-09-16

## 2023-09-16 PROBLEM — J45.50 SEVERE PERSISTENT ASTHMA WITHOUT COMPLICATION: Status: RESOLVED | Noted: 2023-08-03 | Resolved: 2023-09-16

## 2023-09-16 PROBLEM — H10.10 SEASONAL ALLERGIC CONJUNCTIVITIS: Status: RESOLVED | Noted: 2020-06-03 | Resolved: 2023-09-16

## 2023-09-16 PROBLEM — M25.552 CHRONIC LEFT HIP PAIN: Status: RESOLVED | Noted: 2018-08-27 | Resolved: 2023-09-16

## 2023-09-16 PROBLEM — G89.29 CHRONIC LEFT HIP PAIN: Status: RESOLVED | Noted: 2018-08-27 | Resolved: 2023-09-16

## 2023-09-16 PROBLEM — J45.31 MILD PERSISTENT ASTHMA WITH EXACERBATION: Status: RESOLVED | Noted: 2018-05-16 | Resolved: 2023-09-16

## 2023-09-16 PROBLEM — J45.30 MILD PERSISTENT ASTHMA WITHOUT COMPLICATION: Status: ACTIVE | Noted: 2023-09-16

## 2023-09-16 NOTE — ASSESSMENT & PLAN NOTE
- Stable, reports good compliance with inhalers. - Recently stopped cigarette use two months ago. Using nicotine gum and patches. - Current medications: Symbicort  2 puffs bid and albuterol inhaler prn.  - 2 hospitalizations requiring systemic steroids in 2023.  - PFT's: fev1/fvc ratio 56% predicted, Fev1 79% predicted, fvc 108%. No response to bronchodilators. DLCO 72%. Mild obstructive airflow. Mild decrease in diffusion. Imaging:  CT on 7/26/23: worsening debris in the bronchus intermedius and right middle and right lower lobe bronchi, with recommended follow-up to exclude malignant endobronchial lesion    Plan:   - Continue current inhalers. Inhalers refilled today. - recommended continuing daily antihistamine and Flonase. Recommend nasal saline wash to help with mucous and sinuses. -Patient to continue following with pulmonology. -Repeat chest CT completed, final results pending will follow up. - Discussed upcoming flu season and importance of getting flu shot once they become available (in next couple of weeks). Patient states she is not interested in flu shot. Will discuss again at annual visit.

## 2023-09-18 RX ORDER — ALBUTEROL SULFATE 2.5 MG/3ML
2.5 SOLUTION RESPIRATORY (INHALATION) EVERY 4 HOURS PRN
Qty: 90 ML | Refills: 5 | Status: SHIPPED | OUTPATIENT
Start: 2023-09-18

## 2023-09-21 DIAGNOSIS — M25.552 LEFT HIP PAIN: Primary | ICD-10-CM

## 2023-09-25 DIAGNOSIS — I10 ESSENTIAL HYPERTENSION: ICD-10-CM

## 2023-09-26 RX ORDER — AMLODIPINE BESYLATE 5 MG/1
5 TABLET ORAL DAILY
Qty: 90 TABLET | Refills: 1 | Status: SHIPPED | OUTPATIENT
Start: 2023-09-26

## 2023-09-27 ENCOUNTER — OFFICE VISIT (OUTPATIENT)
Dept: OBGYN CLINIC | Facility: HOSPITAL | Age: 78
End: 2023-09-27
Payer: MEDICARE

## 2023-09-27 ENCOUNTER — HOSPITAL ENCOUNTER (OUTPATIENT)
Dept: RADIOLOGY | Facility: HOSPITAL | Age: 78
Discharge: HOME | End: 2023-09-27
Attending: ORTHOPAEDIC SURGERY
Payer: MEDICARE

## 2023-09-27 VITALS
SYSTOLIC BLOOD PRESSURE: 142 MMHG | WEIGHT: 132.2 LBS | BODY MASS INDEX: 24.33 KG/M2 | DIASTOLIC BLOOD PRESSURE: 75 MMHG | HEIGHT: 62 IN | HEART RATE: 66 BPM

## 2023-09-27 DIAGNOSIS — M25.552 LEFT HIP PAIN: ICD-10-CM

## 2023-09-27 DIAGNOSIS — M75.81 TENDINITIS OF RIGHT ROTATOR CUFF: ICD-10-CM

## 2023-09-27 DIAGNOSIS — M70.62 TROCHANTERIC BURSITIS OF LEFT HIP: Primary | ICD-10-CM

## 2023-09-27 PROCEDURE — 72100 X-RAY EXAM L-S SPINE 2/3 VWS: CPT

## 2023-09-27 PROCEDURE — 73502 X-RAY EXAM HIP UNI 2-3 VIEWS: CPT

## 2023-09-27 PROCEDURE — 99214 OFFICE O/P EST MOD 30 MIN: CPT | Performed by: ORTHOPAEDIC SURGERY

## 2023-09-27 PROCEDURE — 20610 DRAIN/INJ JOINT/BURSA W/O US: CPT | Performed by: ORTHOPAEDIC SURGERY

## 2023-09-27 RX ORDER — DEXAMETHASONE SODIUM PHOSPHATE 10 MG/ML
40 INJECTION, SOLUTION INTRAMUSCULAR; INTRAVENOUS
Status: COMPLETED | OUTPATIENT
Start: 2023-09-27 | End: 2023-09-27

## 2023-09-27 RX ORDER — BUPIVACAINE HYDROCHLORIDE 5 MG/ML
2 INJECTION, SOLUTION EPIDURAL; INTRACAUDAL
Status: COMPLETED | OUTPATIENT
Start: 2023-09-27 | End: 2023-09-27

## 2023-09-27 RX ORDER — BETAMETHASONE SODIUM PHOSPHATE AND BETAMETHASONE ACETATE 3; 3 MG/ML; MG/ML
9 INJECTION, SUSPENSION INTRA-ARTICULAR; INTRALESIONAL; INTRAMUSCULAR; SOFT TISSUE
Status: COMPLETED | OUTPATIENT
Start: 2023-09-27 | End: 2023-09-27

## 2023-09-27 RX ADMIN — DEXAMETHASONE SODIUM PHOSPHATE 40 MG: 10 INJECTION, SOLUTION INTRAMUSCULAR; INTRAVENOUS at 08:30

## 2023-09-27 RX ADMIN — BUPIVACAINE HYDROCHLORIDE 2 ML: 5 INJECTION, SOLUTION EPIDURAL; INTRACAUDAL at 08:30

## 2023-09-27 RX ADMIN — BETAMETHASONE SODIUM PHOSPHATE AND BETAMETHASONE ACETATE 9 MG: 3; 3 INJECTION, SUSPENSION INTRA-ARTICULAR; INTRALESIONAL; INTRAMUSCULAR; SOFT TISSUE at 08:30

## 2023-09-27 NOTE — PROGRESS NOTES
Orthopaedics Office Visit - F/U Patient Visit    ASSESSMENT/PLAN:    Assessment:     1. Trochanteric bursitis of left hip        2. Tendinitis of right rotator cuff              Plan:   Trochanteric bursitis left hip  -Continue exercises and activity as tolerated  -Encouraged to stretch her hip and IT band.  -Continue with aquatic therapy as previously discussed  -Steroid injection offered and provided to her today without complication  -Follow-up 2 months time    Rotator cuff tendinitis right shoulder  -Continue activities of daily living as tolerated  -Continue range of motion  -Oral analgesics as needed  -Work on upper extremity strengthening and motion with aquatic therapy  -There are injection offered and provided without complication into the subacromial space  -2 months        To Do Next Visit:  Reevaluate current issue    _____________________________________________________  CHIEF COMPLAINT:  Chief Complaint   Patient presents with   • Left Hip - Follow-up   • Right Shoulder - Follow-up         SUBJECTIVE:  Norma Foster is a pleasant 69 y.o. female who presents for follow-up evaluation of her left hip as well as right shoulder. She does have a history of trochanteric bursitis of the left hip. She did receive a steroid injection here on March 2, 2022. She states that this did provide her with symptomatic relief temporarily. She notes that over the past several months she has had increased pain to the lateral aspect of her hip that does radiate distally along the thigh and lateral knee. She does have complaints of back pain that does radiate distally into the posterior calf as well. She notes that typically she does not have groin pain however with opening up of her hip such as getting out of her car she will have groin pain. Denies any recent trauma that has exacerbated her painful symptoms.   Typically her pain is better while at rest.  However, sleep is disrupted significantly due to the lateral hip pain.    She does also have complaints of right shoulder pain that has been ongoing for several years. She did receive a steroid injection into the subacromial space on February 24, 2022. She states that this did provide her with symptomatic relief she notes that overhead reaching activities will exacerbate her painful symptoms. She describes it as a moderate and sometimes severe sharp and aching pain. She notes that her range of motion is also limited in all planes. Pain is better while at rest.  Today she denies any distal paresthesias.     PAST MEDICAL HISTORY:  Past Medical History:   Diagnosis Date   • Asthma    • Asthmatic bronchitis     Last Assessed: 10/7/2014    • Chronic diarrhea     Last Assessed: 8/20/2015    • Fibromyalgia    • Focal nodular hyperplasia of liver     Last Assessed: 6/11/2015   • Herpes zoster     Last Assessed: 3/18/2016   • Hypertension    • IBS (irritable bowel syndrome)    • Intermittent palpitations    • Irritable bowel syndrome    • Lumbar radiculopathy    • Osteoarthritis    • Vertigo        PAST SURGICAL HISTORY:  Past Surgical History:   Procedure Laterality Date   • BREAST BIOPSY     • SMALL INTESTINE SURGERY         FAMILY HISTORY:  Family History   Problem Relation Age of Onset   • Heart attack Mother    • Other Mother         Aspiration of Vomit    • Asthma Father    • Breast cancer Sister    • Arthritis Family    • Other Family         Musculoskeletal Disease    • Osteoporosis Family        SOCIAL HISTORY:  Social History     Tobacco Use   • Smoking status: Former     Packs/day: 0.50     Types: Cigarettes   • Smokeless tobacco: Never   • Tobacco comments:     Stopped smoking July 2023   Vaping Use   • Vaping Use: Never used   Substance Use Topics   • Alcohol use: Not Currently   • Drug use: No       MEDICATIONS:    Current Outpatient Medications:   •  acetaminophen (TYLENOL) 500 mg tablet, Take 1 tablet (500 mg total) by mouth every 6 (six) hours as needed for mild pain, Disp: 60 tablet, Rfl: 0  •  albuterol (2.5 mg/3 mL) 0.083 % nebulizer solution, Take 3 mL (2.5 mg total) by nebulization every 4 (four) hours as needed for wheezing or shortness of breath, Disp: 90 mL, Rfl: 5  •  albuterol (PROVENTIL HFA,VENTOLIN HFA) 90 mcg/act inhaler, Inhale 2 puffs every 6 (six) hours as needed for wheezing or shortness of breath, Disp: 18 g, Rfl: 3  •  amLODIPine (NORVASC) 5 mg tablet, Take 1 tablet (5 mg total) by mouth daily, Disp: 90 tablet, Rfl: 1  •  azelastine (ASTELIN) 0.1 % nasal spray, 2 sprays into each nostril 2 (two) times a day Use in each nostril as directed, Disp: 30 mL, Rfl: 2  •  budesonide-formoterol (Symbicort) 80-4.5 MCG/ACT inhaler, Inhale 2 puffs 2 (two) times a day Rinse mouth after use, Disp: 10.2 g, Rfl: 5  •  Cholecalciferol (Vitamin D) 50 MCG (2000 UT) CAPS, Take 1 capsule (2,000 Units total) by mouth daily, Disp: 30 capsule, Rfl: 5  •  fexofenadine (ALLEGRA) 180 MG tablet, Take 180 mg by mouth daily, Disp: , Rfl:   •  fluticasone (FLONASE) 50 mcg/act nasal spray, 2 sprays into each nostril daily, Disp: 16 g, Rfl: 3  •  folic acid (FOLVITE) 1 mg tablet, take 1 tablet by mouth daily, Disp: 30 tablet, Rfl: 5  •  hydrocortisone (ANUSOL-HC) 2.5 % rectal cream, Apply topically 2 (two) times a day, Disp: 28 g, Rfl: 0  •  nicotine (NICODERM CQ) 14 mg/24hr TD 24 hr patch, Place 1 patch on the skin over 24 hours every 24 hours, Disp: 28 patch, Rfl: 1  •  nicotine polacrilex (NICORETTE) 2 mg gum, Chew 1 each (2 mg total) as needed for smoking cessation, Disp: 100 each, Rfl: 1  •  ondansetron (Zofran ODT) 4 mg disintegrating tablet, Take 1 tablet (4 mg total) by mouth every 6 (six) hours as needed for nausea or vomiting, Disp: 20 tablet, Rfl: 3  •  benzonatate (TESSALON PERLES) 100 mg capsule, Take 1 capsule (100 mg total) by mouth 3 (three) times a day as needed for cough (Patient not taking: Reported on 5/3/2023), Disp: 20 capsule, Rfl: 0  •  Blood Pressure Monitoring (BLOOD PRESSURE CUFF) MISC, by Does not apply route 2 (two) times a day (Patient not taking: Reported on 7/24/2023), Disp: 1 each, Rfl: 0  •  escitalopram (LEXAPRO) 10 mg tablet, Take 1 tablet (10 mg total) by mouth daily (Patient not taking: Reported on 5/3/2023), Disp: 90 tablet, Rfl: 3  •  loratadine (CLARITIN) 10 mg tablet, Take 1 tablet (10 mg total) by mouth daily (Patient not taking: Reported on 7/24/2023), Disp: 30 tablet, Rfl: 3  •  olopatadine (PATANOL) 0.1 % ophthalmic solution, Administer 1 drop to both eyes 2 (two) times a day (Patient not taking: Reported on 5/3/2023), Disp: 5 mL, Rfl: 2  •  pantoprazole (PROTONIX) 20 mg tablet, Take 1 tablet (20 mg total) by mouth daily (Patient not taking: Reported on 7/24/2023), Disp: 90 tablet, Rfl: 1    ALLERGIES:  Allergies   Allergen Reactions   • Ambrosia Artemisiifolia (Ragweed) Skin Test Facial Swelling   • Codeine Other (See Comments)     Heart races   • Epinephrine      Pt reports "it makes my heart race, they told me I cant take it."   • Ibuprofen Other (See Comments)     Heart racing   • Morphine Other (See Comments)     Heart racing   • Pollen Extract Sneezing   • Tramadol Other (See Comments)     Heart racing   • Eggs Or Egg-Derived Products - Food Allergy Diarrhea and GI Intolerance       REVIEW OF SYSTEMS:  MSK: Within normal limits  Neuro: Within normal limits  Pertinent items are otherwise noted in HPI. A comprehensive review of systems was otherwise negative.     LABS:  HgA1c:   Lab Results   Component Value Date    HGBA1C 6.0 (H) 10/15/2021     BMP:   Lab Results   Component Value Date    GLUCOSE 92 05/19/2015    CALCIUM 8.8 07/30/2023     05/19/2015    K 3.9 07/30/2023    CO2 24 07/30/2023     (H) 07/30/2023    BUN 29 (H) 07/30/2023    CREATININE 1.54 (H) 07/30/2023     CBC: No components found for: "CBC"    _____________________________________________________  PHYSICAL EXAMINATION:  Vital signs: /75   Pulse 66   Ht 5' 2" (1.575 m) Wt 60 kg (132 lb 3.2 oz)   BMI 24.18 kg/m²   General: No acute distress, awake and alert  Psychiatric: Mood and affect appear appropriate  HEENT: Trachea Midline, No torticollis, no apparent facial trauma  Cardiovascular: No audible murmurs; Extremities appear perfused  Pulmonary: No audible wheezing or stridor  Skin: No open lesions; see further details (if any) below    MUSCULOSKELETAL EXAMINATION:  Extremities:     Right shoulder: Active range of motion   140 degrees degrees forward flexion  130 degrees degrees abduction  50 degrees degrees external rotation   L1 internal rotation    Passive range of motion   100 degrees of forward flexion     There is tenderness present over the anterior lateral shoulder. There is 4/5 strength with external rotation testing at the side. Empty can testing is positive  Belly press test is positive  Smith test is positive  [place]'s test is negative  Speed's test is Positive    The patient is neurovascularly intact distally in the extremity. Left hip:  Range of motion 110 degrees hip flexion  50 degrees external rotation  20 degrees internal rotation  40 degrees abduction  Tenderness to palpation over greater trochanteric bursa  Able negative to perform straight leg raise  Negative log roll  Negative Stinchfield  Negative ROHAN, FADIR  Sensation intact to L2-S1 dermatomes   Extremity warm and well perfused       _____________________________________________________  STUDIES REVIEWED:  I personally reviewed the images and interpretation is as follows:    Xrays of the left hip obtained today demonstrates mild arthritic change of the hip joint. No acute fracture    X-rays of the right shoulder from February 2022 demonstrates minimal arthritic change. PROCEDURES PERFORMED:  Large joint arthrocentesis: L greater trochanteric bursa  Universal Protocol:  Consent: Verbal consent obtained.   Risks and benefits: risks, benefits and alternatives were discussed  Consent given by: patient  Timeout called at: 9/27/2023 8:56 AM.  Patient understanding: patient states understanding of the procedure being performed  Site marked: the operative site was marked  Radiology Images displayed and confirmed. If images not available, report reviewed: imaging studies available  Patient identity confirmed: verbally with patient    Supporting Documentation  Indications: pain   Procedure Details  Location: hip - L greater trochanteric bursa  Preparation: Patient was prepped and draped in the usual sterile fashion  Needle gauge: Spinal needle. Approach: lateral  Medications administered: 2 mL bupivacaine (PF) 0.5 %; 9 mg betamethasone acetate-betamethasone sodium phosphate 6 (3-3) mg/mL    Patient tolerance: patient tolerated the procedure well with no immediate complications  Dressing:  Sterile dressing applied    Large joint arthrocentesis: R subacromial bursa  Universal Protocol:  Consent: Verbal consent obtained. Risks and benefits: risks, benefits and alternatives were discussed  Consent given by: patient  Timeout called at: 9/27/2023 8:57 AM.  Patient understanding: patient states understanding of the procedure being performed  Site marked: the operative site was marked  Radiology Images displayed and confirmed.  If images not available, report reviewed: imaging studies available  Patient identity confirmed: verbally with patient    Supporting Documentation  Indications: pain   Procedure Details  Location: shoulder - R subacromial bursa  Needle size: 22 G  Ultrasound guidance: no  Approach: posterolateral  Medications administered: 40 mg dexamethasone 100 mg/10 mL; 2 mL bupivacaine (PF) 0.5 %; 9 mg betamethasone acetate-betamethasone sodium phosphate 6 (3-3) mg/mL    Patient tolerance: patient tolerated the procedure well with no immediate complications  Dressing:  Sterile dressing applied          Scribe Attestation    I,:  Dania Boogie am acting as a scribe while in the presence of the attending physician.:       I,:  James Nash MD personally performed the services described in this documentation    as scribed in my presence.:

## 2023-10-19 DIAGNOSIS — J45.30 MILD PERSISTENT ASTHMA WITHOUT COMPLICATION: ICD-10-CM

## 2023-10-19 DIAGNOSIS — J45.901 ASTHMA EXACERBATION: ICD-10-CM

## 2023-10-19 DIAGNOSIS — F17.210 CIGARETTE NICOTINE DEPENDENCE WITHOUT COMPLICATION: ICD-10-CM

## 2023-10-19 RX ORDER — ALBUTEROL SULFATE 90 UG/1
2 AEROSOL, METERED RESPIRATORY (INHALATION) EVERY 6 HOURS PRN
Qty: 18 G | Refills: 3 | Status: SHIPPED | OUTPATIENT
Start: 2023-10-19 | End: 2023-10-20 | Stop reason: SDUPTHER

## 2023-10-19 RX ORDER — BUDESONIDE AND FORMOTEROL FUMARATE DIHYDRATE 80; 4.5 UG/1; UG/1
2 AEROSOL RESPIRATORY (INHALATION) 2 TIMES DAILY
Qty: 10.2 G | Refills: 5 | Status: SHIPPED | OUTPATIENT
Start: 2023-10-19 | End: 2023-10-20 | Stop reason: SDUPTHER

## 2023-10-19 RX ORDER — NICOTINE 21 MG/24HR
1 PATCH, TRANSDERMAL 24 HOURS TRANSDERMAL EVERY 24 HOURS
Qty: 28 PATCH | Refills: 1 | Status: SHIPPED | OUTPATIENT
Start: 2023-10-19

## 2023-10-19 NOTE — TELEPHONE ENCOUNTER
Patient called on 10/18 @4:19pm. Is requesting refills on the following:    Nicotine (NICODERM CQ) 14 mg/24hr  Nicotine polacrilex (NICORETTE) 2 mg  Albuterol (PROVENTIL HFA, VENTOLIN HFA)  Budesonide-formoterol (Symbicort) 80-4.5 MCG ACT inhaler    Can you please call them into :    [facility] [address] in [facility]

## 2023-10-20 DIAGNOSIS — J45.901 ASTHMA EXACERBATION: ICD-10-CM

## 2023-10-20 DIAGNOSIS — J45.30 MILD PERSISTENT ASTHMA WITHOUT COMPLICATION: ICD-10-CM

## 2023-10-20 DIAGNOSIS — H10.10 SEASONAL ALLERGIC CONJUNCTIVITIS: ICD-10-CM

## 2023-10-20 DIAGNOSIS — F17.210 CIGARETTE NICOTINE DEPENDENCE WITHOUT COMPLICATION: ICD-10-CM

## 2023-10-20 RX ORDER — ALBUTEROL SULFATE 90 UG/1
2 AEROSOL, METERED RESPIRATORY (INHALATION) EVERY 6 HOURS PRN
Qty: 18 G | Refills: 3 | Status: SHIPPED | OUTPATIENT
Start: 2023-10-20 | End: 2023-11-01 | Stop reason: SDUPTHER

## 2023-10-20 RX ORDER — BUDESONIDE AND FORMOTEROL FUMARATE DIHYDRATE 80; 4.5 UG/1; UG/1
2 AEROSOL RESPIRATORY (INHALATION) 2 TIMES DAILY
Qty: 10.2 G | Refills: 5 | Status: SHIPPED | OUTPATIENT
Start: 2023-10-20 | End: 2023-11-01 | Stop reason: SDUPTHER

## 2023-10-20 RX ORDER — OLOPATADINE HYDROCHLORIDE 1 MG/ML
1 SOLUTION/ DROPS OPHTHALMIC 2 TIMES DAILY
Qty: 5 ML | Refills: 2 | Status: SHIPPED | OUTPATIENT
Start: 2023-10-20

## 2023-11-01 ENCOUNTER — OFFICE VISIT (OUTPATIENT)
Dept: PULMONOLOGY | Facility: CLINIC | Age: 78
End: 2023-11-01
Payer: MEDICARE

## 2023-11-01 VITALS
SYSTOLIC BLOOD PRESSURE: 138 MMHG | RESPIRATION RATE: 18 BRPM | OXYGEN SATURATION: 97 % | WEIGHT: 128 LBS | HEIGHT: 62 IN | DIASTOLIC BLOOD PRESSURE: 70 MMHG | HEART RATE: 95 BPM | BODY MASS INDEX: 23.55 KG/M2 | TEMPERATURE: 97 F

## 2023-11-01 DIAGNOSIS — J43.9 PULMONARY EMPHYSEMA, UNSPECIFIED EMPHYSEMA TYPE (HCC): ICD-10-CM

## 2023-11-01 DIAGNOSIS — F17.210 CIGARETTE NICOTINE DEPENDENCE WITHOUT COMPLICATION: ICD-10-CM

## 2023-11-01 DIAGNOSIS — J45.901 ASTHMA EXACERBATION: ICD-10-CM

## 2023-11-01 DIAGNOSIS — J30.9 CHRONIC ALLERGIC RHINITIS: ICD-10-CM

## 2023-11-01 DIAGNOSIS — J45.30 MILD PERSISTENT ASTHMA WITHOUT COMPLICATION: Primary | ICD-10-CM

## 2023-11-01 DIAGNOSIS — J01.90 ACUTE SINUSITIS, RECURRENCE NOT SPECIFIED, UNSPECIFIED LOCATION: ICD-10-CM

## 2023-11-01 PROBLEM — Z12.2 SCREENING FOR LUNG CANCER: Status: ACTIVE | Noted: 2022-03-30

## 2023-11-01 PROCEDURE — 99214 OFFICE O/P EST MOD 30 MIN: CPT | Performed by: STUDENT IN AN ORGANIZED HEALTH CARE EDUCATION/TRAINING PROGRAM

## 2023-11-01 RX ORDER — ALBUTEROL SULFATE 90 UG/1
2 AEROSOL, METERED RESPIRATORY (INHALATION) EVERY 6 HOURS PRN
Qty: 18 G | Refills: 5 | Status: SHIPPED | OUTPATIENT
Start: 2023-11-01

## 2023-11-01 RX ORDER — BUDESONIDE AND FORMOTEROL FUMARATE DIHYDRATE 80; 4.5 UG/1; UG/1
2 AEROSOL RESPIRATORY (INHALATION) 2 TIMES DAILY
Qty: 10.2 G | Refills: 5 | Status: SHIPPED | OUTPATIENT
Start: 2023-11-01 | End: 2023-12-01

## 2023-11-01 RX ORDER — ALBUTEROL SULFATE 2.5 MG/3ML
2.5 SOLUTION RESPIRATORY (INHALATION) EVERY 4 HOURS PRN
Qty: 90 ML | Refills: 5 | Status: SHIPPED | OUTPATIENT
Start: 2023-11-01

## 2023-11-01 RX ORDER — AMOXICILLIN AND CLAVULANATE POTASSIUM 875; 125 MG/1; MG/1
1 TABLET, FILM COATED ORAL EVERY 12 HOURS SCHEDULED
Qty: 10 TABLET | Refills: 0 | Status: SHIPPED | OUTPATIENT
Start: 2023-11-01 | End: 2023-11-06

## 2023-11-01 NOTE — PROGRESS NOTES
Pulmonary Follow Up Note   Master Valencia 69 y.o. female MRN: [id number]  11/1/2023      Assessment/Plan/Plan:  Acute sinusitis  Chronic allergic rhinitis  - amoxicillin-clavulanate (AUGMENTIN) 875-125 mg per tablet; Take 1 tablet by mouth every 12 (twelve) hours for 5 days  - Continue Flonase and Claritin as previously prescribed     Mild persistent asthma without complication  COPD   Patient with history of asthma diagnosed in adulthood. PFTs showing mild obstruction without bronchodilator response. CT chest showing emphysematous changes. Symptoms stable   - Continue Symbicort BID   - Continue albuterol as needed     Cigarette nicotine dependence without complication  Pulmonary nodule   Stable 3mm pulmonary nodule on CT chest. Patient qualifies for ongoing lung cancer screening  - Repeat CT chest in one year     Return in about 3 months (around 2/1/2024). History of Present Illness   HPI:  Master Valencia is a 69 y.o. female with tobacco use, Asthma (diagnosed at age 37), GERD who presents for follow up appointment. Last seen by myself on 8/23/2023. Since her last appointment, patient has been feeling relatively well from a breathing standpoint. She continues to have dry cough, but her mucus production has improved. She continues on Symbicort bid and albuterol as needed. She has not needed the albuterol MDI or nebulizer since her discharge. She had repeat CT chest completed which shows stable 3mm right upper lobe nodule and resolved obstruction of the right lower lobe bronchi, and mild emphysema. She denied current shortness of breath. Patient does report worsening nasal congestion, itchy/watery eyes, sore throat, post nasal drip, and sensation of clogged ears. She is worried that she has an infection that will turn into pneumonia. She denied fevers, but states she is always cold. She further denied chest pain, pleurisy, shortness of breath as noted above.  She does struggle with chronic rhinitis in which she continues Claritin daily and Flonase daily. She was prescribed Azelastine. She states this medicine helps her congestion, but worsens her sore throat. She had previously followed with ENT, but has not seen them in a couple of years. She is not interested in seeing them again. Patient is a former smoker, quit approximately 3.5 months ago. Review of systems:  12 point review of systems was completed and was otherwise negative except as listed in HPI.     Historical Information   Past Medical History:   Diagnosis Date    Asthma     Asthmatic bronchitis     Last Assessed: 10/7/2014     Chronic diarrhea     Last Assessed: 8/20/2015     Fibromyalgia     Focal nodular hyperplasia of liver     Last Assessed: 6/11/2015    Herpes zoster     Last Assessed: 3/18/2016    Hypertension     IBS (irritable bowel syndrome)     Intermittent palpitations     Irritable bowel syndrome     Lumbar radiculopathy     Osteoarthritis     Vertigo      Past Surgical History:   Procedure Laterality Date    BREAST BIOPSY      SMALL INTESTINE SURGERY       Family History   Problem Relation Age of Onset    Heart attack Mother     Other Mother         Aspiration of Vomit     Asthma Father     Breast cancer Sister     Arthritis Family     Other Family         Musculoskeletal Disease     Osteoporosis Family      Social History     Tobacco Use   Smoking Status Former    Packs/day: 0.50    Types: Cigarettes   Smokeless Tobacco Never   Tobacco Comments    Stopped smoking July 2023       Meds/Allergies     Current Outpatient Medications:     acetaminophen (TYLENOL) 500 mg tablet, Take 1 tablet (500 mg total) by mouth every 6 (six) hours as needed for mild pain, Disp: 60 tablet, Rfl: 0    albuterol (2.5 mg/3 mL) 0.083 % nebulizer solution, Take 3 mL (2.5 mg total) by nebulization every 4 (four) hours as needed for wheezing or shortness of breath, Disp: 90 mL, Rfl: 5    albuterol (PROVENTIL HFA,VENTOLIN HFA) 90 mcg/act inhaler, Inhale 2 puffs every 6 (six) hours as needed for wheezing or shortness of breath, Disp: 18 g, Rfl: 3    amLODIPine (NORVASC) 5 mg tablet, Take 1 tablet (5 mg total) by mouth daily, Disp: 90 tablet, Rfl: 1    azelastine (ASTELIN) 0.1 % nasal spray, 2 sprays into each nostril 2 (two) times a day Use in each nostril as directed, Disp: 30 mL, Rfl: 2    Blood Pressure Monitoring (BLOOD PRESSURE CUFF) MISC, by Does not apply route 2 (two) times a day, Disp: 1 each, Rfl: 0    budesonide-formoterol (Symbicort) 80-4.5 MCG/ACT inhaler, Inhale 2 puffs 2 (two) times a day Rinse mouth after use, Disp: 10.2 g, Rfl: 5    Cholecalciferol (Vitamin D) 50 MCG (2000 UT) CAPS, Take 1 capsule (2,000 Units total) by mouth daily, Disp: 30 capsule, Rfl: 5    fexofenadine (ALLEGRA) 180 MG tablet, Take 180 mg by mouth daily, Disp: , Rfl:     fluticasone (FLONASE) 50 mcg/act nasal spray, 2 sprays into each nostril daily, Disp: 16 g, Rfl: 3    folic acid (FOLVITE) 1 mg tablet, take 1 tablet by mouth daily, Disp: 30 tablet, Rfl: 5    nicotine (NICODERM CQ) 14 mg/24hr TD 24 hr patch, Place 1 patch on the skin over 24 hours every 24 hours, Disp: 28 patch, Rfl: 1    nicotine polacrilex (NICORETTE) 2 mg gum, Chew 1 each (2 mg total) as needed for smoking cessation, Disp: 100 each, Rfl: 1    olopatadine (PATANOL) 0.1 % ophthalmic solution, Administer 1 drop to both eyes 2 (two) times a day, Disp: 5 mL, Rfl: 2    ondansetron (Zofran ODT) 4 mg disintegrating tablet, Take 1 tablet (4 mg total) by mouth every 6 (six) hours as needed for nausea or vomiting, Disp: 20 tablet, Rfl: 3    benzonatate (TESSALON PERLES) 100 mg capsule, Take 1 capsule (100 mg total) by mouth 3 (three) times a day as needed for cough, Disp: 20 capsule, Rfl: 0    escitalopram (LEXAPRO) 10 mg tablet, Take 1 tablet (10 mg total) by mouth daily (Patient not taking: Reported on 5/3/2023), Disp: 90 tablet, Rfl: 3    hydrocortisone (ANUSOL-HC) 2.5 % rectal cream, Apply topically 2 (two) times a day, Disp: 28 g, Rfl: 0    loratadine (CLARITIN) 10 mg tablet, Take 1 tablet (10 mg total) by mouth daily (Patient not taking: Reported on 7/24/2023), Disp: 30 tablet, Rfl: 3    pantoprazole (PROTONIX) 20 mg tablet, Take 1 tablet (20 mg total) by mouth daily (Patient not taking: Reported on 7/24/2023), Disp: 90 tablet, Rfl: 1  Allergies   Allergen Reactions    Ambrosia Artemisiifolia (Ragweed) Skin Test Facial Swelling    Codeine Other (See Comments)     Heart races    Epinephrine      Pt reports "it makes my heart race, they told me I cant take it."    Ibuprofen Other (See Comments)     Heart racing    Morphine Other (See Comments)     Heart racing    Pollen Extract Sneezing    Tramadol Other (See Comments)     Heart racing    Eggs Or Egg-Derived Products - Food Allergy Diarrhea and GI Intolerance       Vitals: Blood pressure 138/70, pulse 95, temperature (!) 97 °F (36.1 °C), temperature source Tympanic, resp. rate 18, height 5' 2" (1.575 m), weight 58.1 kg (128 lb), SpO2 97 %. Body mass index is 23.41 kg/m². Oxygen Therapy  SpO2: 97 %  Oxygen Therapy: None (Room air)      Physical Exam  General: No acute distress  HENT: Normocephalic, atraumatic. PERRL, EOMI, Moist mucous membranes. Oropharynx w/o erythema. Cerumen impaction bilateral ears, unable to assess TM  Neck: Supple  Lungs: Clear to auscultation bilaterally, no wheezes, rales, rhonchi. On room air  Heart: Regular rate and rhythm, S1-S2 present, no murmurs rubs or gallops  Extremities: Warm and well perfused, no cyanosis, clubbing, or edema  Pulses: 2+ and symmetric  Skin: Warm, dry, no rashes or lesions  Neurologic: No focal neurologic deficits     Labs: I have personally reviewed pertinent lab results.   Lab Results   Component Value Date    WBC 13.55 (H) 07/30/2023    HGB 11.3 (L) 07/30/2023    HCT 35.6 07/30/2023    MCV 90 07/30/2023     07/30/2023     Lab Results   Component Value Date    GLUCOSE 92 05/19/2015    CALCIUM 8.8 07/30/2023     2015    K 3.9 2023    CO2 24 2023     (H) 2023    BUN 29 (H) 2023    CREATININE 1.54 (H) 2023     No results found for: "IGE"  Lab Results   Component Value Date    ALT 15 2023    AST 10 2023    ALKPHOS 80 2023    BILITOT 0.20 2015     Imaging and other studies: I have personally reviewed pertinent films in PACS  IMPRESSION:  Stable 3 mm pulmonary nodule right upper lobe apex. Consider short-term follow-up in 12 months. Previously seen obstructive right lower lobe bronchi no longer noted. No discrete endobronchial lesion. Mild emphysema. Pulmonary function testin2023  FEV1/FVC Ratio: 56.74%  Forced Vital Capacity: 2.65 L    108% predicted  FEV1: 1.50 L     79% predicted  After administration of bronchodilator   FVC: 2.66 L, 108% predicted, +0 % change  FEV1: 1.58 L, 83% predicted, +5 % change  Lung volumes by body plethysmography:   Total Lung Capacity 98% predicted   Residual volume 95% predicted  RV/TLC Ratio: 45.53%, 98% predicted  DLCO corrected for patients hemoglobin level: 72%    EKG, Pathology, and Other Studies: I have personally reviewed pertinent reports. Disclaimer: Portions of the record may have been created with voice recognition software. Occasional wrong word or "sound a like" substitutions may have occurred due to the inherent limitations of voice recognition software. Careful consideration should be taken to recognize, using context, where substitutions have occurred.     Pearl Chvaarria DO   Pulmonary & Critical Care Medicine Fellow PGY-IV  [facility]

## 2023-11-01 NOTE — PROGRESS NOTES
78F with a PMH of GERD, Asthma?/COPD, HTN who presents for follow up. CT Chest    Stable 3 mm pulmonary nodule right upper lobe apex. Consider short-term follow-up in 12 months. Previously seen obstructive right lower lobe bronchi no longer noted. No discrete endobronchial lesion. Mild emphysema. Plan  Sinusitis - Persistent symptoms - Trial on Abx therapy  - Augmentin 5 days  - Referral to Primary for cerumen impaction    COPD/Asthma - Evidence of Th2 mediated disease, extensive smoking history.  Patient symptoms stable on current regimen   - Symbicort  - Albuterol PRN      Tobacco Use - 20-30 Pack years - Quit 2023 -Successfully stopped smoking several months   - Repeat CT 2024      Darlyn Hodge MD  Pulmonary and Critical Care

## 2023-11-17 ENCOUNTER — OFFICE VISIT (OUTPATIENT)
Dept: FAMILY MEDICINE CLINIC | Facility: CLINIC | Age: 78
End: 2023-11-17

## 2023-11-17 VITALS
HEIGHT: 62 IN | OXYGEN SATURATION: 98 % | TEMPERATURE: 98 F | BODY MASS INDEX: 25.21 KG/M2 | SYSTOLIC BLOOD PRESSURE: 153 MMHG | WEIGHT: 137 LBS | HEART RATE: 99 BPM | DIASTOLIC BLOOD PRESSURE: 91 MMHG

## 2023-11-17 DIAGNOSIS — K21.9 GASTROESOPHAGEAL REFLUX DISEASE WITHOUT ESOPHAGITIS: ICD-10-CM

## 2023-11-17 DIAGNOSIS — E55.9 VITAMIN D DEFICIENCY: ICD-10-CM

## 2023-11-17 DIAGNOSIS — Z23 ENCOUNTER FOR IMMUNIZATION: ICD-10-CM

## 2023-11-17 DIAGNOSIS — G89.29 CHRONIC LEFT-SIDED LOW BACK PAIN WITH LEFT-SIDED SCIATICA: ICD-10-CM

## 2023-11-17 DIAGNOSIS — H61.23 CERUMEN DEBRIS ON TYMPANIC MEMBRANE OF BOTH EARS: Primary | ICD-10-CM

## 2023-11-17 DIAGNOSIS — M54.42 CHRONIC LEFT-SIDED LOW BACK PAIN WITH LEFT-SIDED SCIATICA: ICD-10-CM

## 2023-11-17 DIAGNOSIS — J30.9 CHRONIC ALLERGIC RHINITIS: ICD-10-CM

## 2023-11-17 PROCEDURE — 3077F SYST BP >= 140 MM HG: CPT | Performed by: FAMILY MEDICINE

## 2023-11-17 PROCEDURE — 69209 REMOVE IMPACTED EAR WAX UNI: CPT | Performed by: FAMILY MEDICINE

## 2023-11-17 PROCEDURE — 90677 PCV20 VACCINE IM: CPT | Performed by: FAMILY MEDICINE

## 2023-11-17 PROCEDURE — G0009 ADMIN PNEUMOCOCCAL VACCINE: HCPCS | Performed by: FAMILY MEDICINE

## 2023-11-17 PROCEDURE — 3080F DIAST BP >= 90 MM HG: CPT | Performed by: FAMILY MEDICINE

## 2023-11-17 PROCEDURE — 99213 OFFICE O/P EST LOW 20 MIN: CPT | Performed by: FAMILY MEDICINE

## 2023-11-17 RX ORDER — PANTOPRAZOLE SODIUM 20 MG/1
20 TABLET, DELAYED RELEASE ORAL DAILY
Qty: 90 TABLET | Refills: 1 | Status: SHIPPED | OUTPATIENT
Start: 2023-11-17

## 2023-11-17 NOTE — ASSESSMENT & PLAN NOTE
Non painful fullness of bilateral ears left worse than right. Impacted cerumen on physical exam bilaterally, unable to see TM. Attempted to remove ear wax with irrigation. Was able to successfully remove ear wax from right ear. TM and canal visualized after ear wax removal. Canal atraumatic, TM atraumatic non erythematous non bulging. Was unable to remove cerumen from left ear due to patient intolerance. Will rx  Debrox to be used daily.  Will follow up in one week at annual physical.

## 2023-11-17 NOTE — ASSESSMENT & PLAN NOTE
Chronic for many years. Associated with left sided lower back pain radiating down left leg. Describes numbness and tingling down left leg, denies weakness. Denies red flag symptoms. Tylenol has not helped, cannot have ibuprofen given kidney function. Has never done PT in the past.   Lumbar xray 9/27/23: There is bilateral facet arthropathy at L5/S1. Suspect symptoms are related to degenerative changes of lumbar vertebrae   Will refer to comprehensive spine program for PT and potentially pain management as well if appropriate.

## 2023-11-17 NOTE — PROGRESS NOTES
Name: Vanita Nunez      : [id number]      MRN: [id number]  Encounter Provider: Colin Orantes MD  Encounter Date: 2023   Encounter department: [address]     1. Cerumen debris on tympanic membrane of both ears  Assessment & Plan:  Non painful fullness of bilateral ears left worse than right. Impacted cerumen on physical exam bilaterally, unable to see TM. Attempted to remove ear wax with irrigation. Was able to successfully remove ear wax from right ear. TM and canal visualized after ear wax removal. Canal atraumatic, TM atraumatic non erythematous non bulging. Was unable to remove cerumen from left ear due to patient intolerance. Will rx  Debrox to be used daily. Will follow up in one week at annual physical.     Orders:  -     carbamide peroxide (DEBROX) 6.5 % otic solution; Administer 5 drops into both ears 2 (two) times a day for 4 days    2. Chronic left-sided low back pain with left-sided sciatica  Assessment & Plan:  Chronic for many years. Associated with left sided lower back pain radiating down left leg. Describes numbness and tingling down left leg, denies weakness. Denies red flag symptoms. Tylenol has not helped, cannot have ibuprofen given kidney function. Has never done PT in the past.   Lumbar xray 23: There is bilateral facet arthropathy at L5/S1. Suspect symptoms are related to degenerative changes of lumbar vertebrae   Will refer to comprehensive spine program for PT and potentially pain management as well if appropriate. Orders:  -     Ambulatory Referral to Comprehensive Spine Program; Future    3. Chronic allergic rhinitis  Assessment & Plan:  - Continue Flonase daily, Claritin, and Astelin nasal spray. - Chronic symptoms, no improvement   - Will refer to ENT per patient's request for further management. Orders:  -     Ambulatory Referral to Otolaryngology; Future    4.  Gastroesophageal reflux disease without esophagitis  -     pantoprazole (PROTONIX) 20 mg tablet; Take 1 tablet (20 mg total) by mouth daily    5. Vitamin D deficiency  -     Vitamin D 25 hydroxy; Future    6. Encounter for immunization  -     Pneumococcal Conjugate Vaccine 20-valent (Pcv20)           Subjective     Ms. Stephan Frausto presents to clinic to discuss clogged ears and sinus issues. She states her ears feel clogged, left worse than right but do not cause her pain. She describes a feeling of fullness of ears. Denies drainage from ear, trauma to ear, painful to touch ear, decreased hearing, or tinnitus. Patient also presents for referral to ENT for chronic sinus issues. She has had multiple appointments with PCP (myself for this issue in the past) and declined referral to ENT previously but is now interested. Symptoms remain unchanged from previous. Patient also states she has had back pain for many years. She states her symptoms are getting worse. She describes pain on left side of back radiating down her left leg with tingling and numbness. She denies weakness of LE. Denies previous trauma or injury to back, unexpected weight loss, saddle anesthesia, urinary or bowel incontinence, night sweats, fevers, chills. She states she has never done PT for back previously. Patient states she has tired tylenol without any relief of symptoms. Nothing seems to make symptoms better. Review of Systems   Constitutional:  Negative for chills and fever. HENT:  Positive for congestion and rhinorrhea. Negative for ear discharge, ear pain, sinus pressure, sore throat and tinnitus. Eyes:  Negative for redness and visual disturbance. Respiratory:  Negative for cough and shortness of breath. Cardiovascular:  Negative for chest pain and palpitations. Gastrointestinal:  Negative for abdominal pain, constipation and nausea. Genitourinary:  Negative for difficulty urinating and dysuria.    Musculoskeletal:  Positive for arthralgias (chronic), back pain and myalgias (chronic). Skin:  Negative for rash. Allergic/Immunologic: Negative for environmental allergies and food allergies. Neurological:  Negative for dizziness and headaches. Past Medical History:   Diagnosis Date    Asthma     Asthmatic bronchitis     Last Assessed: 10/7/2014     Chronic diarrhea     Last Assessed: 8/20/2015     Fibromyalgia     Focal nodular hyperplasia of liver     Last Assessed: 6/11/2015    Herpes zoster     Last Assessed: 3/18/2016    Hypertension     IBS (irritable bowel syndrome)     Intermittent palpitations     Irritable bowel syndrome     Lumbar radiculopathy     Osteoarthritis     Vertigo      Past Surgical History:   Procedure Laterality Date    BREAST BIOPSY      SMALL INTESTINE SURGERY       Family History   Problem Relation Age of Onset    Heart attack Mother     Other Mother         Aspiration of Vomit     Asthma Father     Breast cancer Sister     Arthritis Family     Other Family         Musculoskeletal Disease     Osteoporosis Family      Social History     Socioeconomic History    Marital status:       Spouse name: None    Number of children: None    Years of education: None    Highest education level: None   Occupational History    Occupation: Unemployed    Tobacco Use    Smoking status: Former     Packs/day: 0.50     Types: Cigarettes    Smokeless tobacco: Never    Tobacco comments:     Stopped smoking July 2023   Vaping Use    Vaping Use: Never used   Substance and Sexual Activity    Alcohol use: Not Currently    Drug use: No    Sexual activity: Not Currently     Partners: Male   Other Topics Concern    None   Social History Narrative    Mental Disability     Exercising Regularly     Lives with adult children      Social Determinants of Health     Financial Resource Strain: Low Risk  (11/17/2023)    Overall Financial Resource Strain (CARDIA)     Difficulty of Paying Living Expenses: Not hard at all   Food Insecurity: No Food Insecurity (11/17/2023)    Hunger Vital Sign     Worried About Running Out of Food in the Last Year: Never true     Ran Out of Food in the Last Year: Never true   Transportation Needs: No Transportation Needs (11/17/2023)    PRAPARE - Transportation     Lack of Transportation (Medical): No     Lack of Transportation (Non-Medical):  No   Physical Activity: Inactive (11/17/2023)    Exercise Vital Sign     Days of Exercise per Week: 0 days     Minutes of Exercise per Session: 0 min   Stress: No Stress Concern Present (11/17/2023)    [facility]     Feeling of Stress : Not at all   Social Connections: Socially Isolated (11/17/2023)    Social Connection and Isolation Panel [NHANES]     Frequency of Communication with Friends and Family: More than three times a week     Frequency of Social Gatherings with Friends and Family: More than three times a week     Attends Buddhist Services: Never     Active Member of Clubs or Organizations: No     Attends Club or Organization Meetings: Never     Marital Status: Never    Intimate Partner Violence: Not At Risk (11/17/2023)    Humiliation, Afraid, Rape, and Kick questionnaire     Fear of Current or Ex-Partner: No     Emotionally Abused: No     Physically Abused: No     Sexually Abused: No   Housing Stability: Unknown (11/17/2023)    Housing Stability Vital Sign     Unable to Pay for Housing in the Last Year: No     Number of [address] in the Last Year: Not on file     Unstable Housing in the Last Year: No     Current Outpatient Medications on File Prior to Visit   Medication Sig    acetaminophen (TYLENOL) 500 mg tablet Take 1 tablet (500 mg total) by mouth every 6 (six) hours as needed for mild pain    albuterol (2.5 mg/3 mL) 0.083 % nebulizer solution Take 3 mL (2.5 mg total) by nebulization every 4 (four) hours as needed for wheezing or shortness of breath    albuterol (PROVENTIL HFA,VENTOLIN HFA) 90 mcg/act inhaler Inhale 2 puffs every 6 (six) hours as needed for wheezing or shortness of breath    amLODIPine (NORVASC) 5 mg tablet Take 1 tablet (5 mg total) by mouth daily    azelastine (ASTELIN) 0.1 % nasal spray 2 sprays into each nostril 2 (two) times a day Use in each nostril as directed    benzonatate (TESSALON PERLES) 100 mg capsule Take 1 capsule (100 mg total) by mouth 3 (three) times a day as needed for cough    Blood Pressure Monitoring (BLOOD PRESSURE CUFF) MISC by Does not apply route 2 (two) times a day    budesonide-formoterol (Symbicort) 80-4.5 MCG/ACT inhaler Inhale 2 puffs 2 (two) times a day Rinse mouth after use    Cholecalciferol (Vitamin D) 50 MCG (2000 UT) CAPS Take 1 capsule (2,000 Units total) by mouth daily    escitalopram (LEXAPRO) 10 mg tablet Take 1 tablet (10 mg total) by mouth daily    fexofenadine (ALLEGRA) 180 MG tablet Take 180 mg by mouth daily    fluticasone (FLONASE) 50 mcg/act nasal spray 2 sprays into each nostril daily    folic acid (FOLVITE) 1 mg tablet take 1 tablet by mouth daily    hydrocortisone (ANUSOL-HC) 2.5 % rectal cream Apply topically 2 (two) times a day    loratadine (CLARITIN) 10 mg tablet Take 1 tablet (10 mg total) by mouth daily    nicotine (NICODERM CQ) 14 mg/24hr TD 24 hr patch Place 1 patch on the skin over 24 hours every 24 hours    nicotine polacrilex (NICORETTE) 2 mg gum Chew 1 each (2 mg total) as needed for smoking cessation    olopatadine (PATANOL) 0.1 % ophthalmic solution Administer 1 drop to both eyes 2 (two) times a day    ondansetron (Zofran ODT) 4 mg disintegrating tablet Take 1 tablet (4 mg total) by mouth every 6 (six) hours as needed for nausea or vomiting    [DISCONTINUED] pantoprazole (PROTONIX) 20 mg tablet Take 1 tablet (20 mg total) by mouth daily     Allergies   Allergen Reactions    Ambrosia Artemisiifolia (Ragweed) Skin Test Facial Swelling    Codeine Other (See Comments)     Heart races    Epinephrine      Pt reports "it makes my heart race, they told me I cant take it."    Ibuprofen Other (See Comments)     Heart racing    Morphine Other (See Comments)     Heart racing    Pollen Extract Sneezing    Tramadol Other (See Comments)     Heart racing     Immunization History   Administered Date(s) Administered    Pneumococcal Conjugate Vaccine 20-valent (Pcv20), Polysace 11/17/2023    Pneumococcal Polysaccharide PPV23 01/01/2003, 10/01/2008    Tdap 06/08/2018       Objective     /91 (BP Location: Left arm, Patient Position: Sitting, Cuff Size: Standard)   Pulse 99   Temp 98 °F (36.7 °C) (Temporal)   Ht 5' 2" (1.575 m)   Wt 62.1 kg (137 lb)   SpO2 98%   BMI 25.06 kg/m²     Physical Exam  Constitutional:       General: She is not in acute distress. Appearance: Normal appearance. HENT:      Head: Normocephalic and atraumatic. Right Ear: Tympanic membrane normal. There is impacted cerumen. Left Ear: There is impacted cerumen. Nose:      Right Turbinates: Enlarged and swollen. Left Turbinates: Enlarged and swollen. Cardiovascular:      Rate and Rhythm: Normal rate and regular rhythm. Pulses: Normal pulses. Heart sounds: No murmur heard. Pulmonary:      Effort: Pulmonary effort is normal. No respiratory distress. Breath sounds: Normal breath sounds. Abdominal:      General: Bowel sounds are normal.      Palpations: Abdomen is soft. Tenderness: There is no abdominal tenderness. Musculoskeletal:         General: Normal range of motion. Cervical back: Normal range of motion. Right lower leg: No edema. Left lower leg: No edema. Skin:     General: Skin is warm. Neurological:      General: No focal deficit present. Mental Status: She is alert and oriented to person, place, and time.      Ear cerumen removal    Date/Time: 11/17/2023 5:52 PM    Performed by: Payam Browning MD  Authorized by: Payam Browning MD  Universal Protocol:  Consent: Verbal consent obtained. Consent given by: patient  Timeout called at: 11/17/2023 3:42 PM.  Patient identity confirmed: verbally with patient    Procedure details:     Local anesthetic:  None    Location:  L ear and R ear    Procedure type: irrigation only      Approach:  External  Post-procedure details:     Complication:  None    Hearing quality:  Normal    Patient tolerance of procedure: Tolerated with difficulty  Comments:      Was able to remove impacted cerumen of right ear canal atraumatically. TM visualize of Right ear, non erythematous non bulging. Left ear patient did not tolerate procedure. Stopped once patient was unable to tolerate.         Mulu Turner MD

## 2023-11-17 NOTE — ASSESSMENT & PLAN NOTE
- Continue Flonase daily, Claritin, and Astelin nasal spray. - Chronic symptoms, no improvement   - Will refer to ENT per patient's request for further management.

## 2023-11-20 ENCOUNTER — TELEPHONE (OUTPATIENT)
Dept: PHYSICAL THERAPY | Facility: OTHER | Age: 78
End: 2023-11-20

## 2023-11-21 ENCOUNTER — HOSPITAL ENCOUNTER (EMERGENCY)
Facility: HOSPITAL | Age: 78
Discharge: HOME | End: 2023-11-21
Attending: EMERGENCY MEDICINE
Payer: MEDICARE

## 2023-11-21 VITALS
HEART RATE: 85 BPM | SYSTOLIC BLOOD PRESSURE: 161 MMHG | OXYGEN SATURATION: 97 % | TEMPERATURE: 97 F | DIASTOLIC BLOOD PRESSURE: 108 MMHG | RESPIRATION RATE: 20 BRPM

## 2023-11-21 DIAGNOSIS — M25.552 LEFT HIP PAIN: Primary | ICD-10-CM

## 2023-11-21 PROCEDURE — 99284 EMERGENCY DEPT VISIT MOD MDM: CPT | Performed by: EMERGENCY MEDICINE

## 2023-11-21 PROCEDURE — 99283 EMERGENCY DEPT VISIT LOW MDM: CPT

## 2023-11-21 RX ORDER — SENNOSIDES 8.6 MG
650 CAPSULE ORAL EVERY 8 HOURS PRN
Qty: 30 TABLET | Refills: 0 | Status: SHIPPED | OUTPATIENT
Start: 2023-11-21

## 2023-11-21 RX ORDER — LIDOCAINE 50 MG/G
1 PATCH TOPICAL ONCE
Status: DISCONTINUED | OUTPATIENT
Start: 2023-11-21 | End: 2023-11-21 | Stop reason: HOSPADM

## 2023-11-21 RX ORDER — ACETAMINOPHEN 325 MG/1
975 TABLET ORAL ONCE
Status: COMPLETED | OUTPATIENT
Start: 2023-11-21 | End: 2023-11-21

## 2023-11-21 RX ORDER — LIDOCAINE 50 MG/G
1 PATCH TOPICAL DAILY
Qty: 15 PATCH | Refills: 0 | Status: SHIPPED | OUTPATIENT
Start: 2023-11-21

## 2023-11-21 RX ADMIN — Medication 2 G: at 11:09

## 2023-11-21 RX ADMIN — LIDOCAINE 5% 1 PATCH: 700 PATCH TOPICAL at 11:09

## 2023-11-21 RX ADMIN — ACETAMINOPHEN 975 MG: 325 TABLET, FILM COATED ORAL at 11:09

## 2023-11-21 NOTE — DISCHARGE INSTRUCTIONS
We recommend continuing to take tylenol 4 times per day, using the voltaren gel 4 times per day, and applying lidoderm patches every 12 hours to help with your symptoms. Ultimately, you will benefit from further management by the orthopedic team and we recommend following up as planned. Please return to the ED with any numbness, weakness, or other concerning symptoms.

## 2023-11-21 NOTE — ED PROVIDER NOTES
History  Chief Complaint   Patient presents with    Hip Pain     Pt c/o L hip pain that started this in the middle of the night. Pt denies any injury, trauma, or numbness/tingling. HPI    Prior to Admission Medications   Prescriptions Last Dose Informant Patient Reported? Taking?    Blood Pressure Monitoring (BLOOD PRESSURE CUFF) MISC  Self No No   Sig: by Does not apply route 2 (two) times a day   Cholecalciferol (Vitamin D) 50 MCG (2000 UT) CAPS  Self No No   Sig: Take 1 capsule (2,000 Units total) by mouth daily   acetaminophen (TYLENOL) 500 mg tablet  Self No No   Sig: Take 1 tablet (500 mg total) by mouth every 6 (six) hours as needed for mild pain   albuterol (2.5 mg/3 mL) 0.083 % nebulizer solution   No No   Sig: Take 3 mL (2.5 mg total) by nebulization every 4 (four) hours as needed for wheezing or shortness of breath   albuterol (PROVENTIL HFA,VENTOLIN HFA) 90 mcg/act inhaler   No No   Sig: Inhale 2 puffs every 6 (six) hours as needed for wheezing or shortness of breath   amLODIPine (NORVASC) 5 mg tablet  Self No No   Sig: Take 1 tablet (5 mg total) by mouth daily   azelastine (ASTELIN) 0.1 % nasal spray  Self No No   Si sprays into each nostril 2 (two) times a day Use in each nostril as directed   benzonatate (TESSALON PERLES) 100 mg capsule  Self No No   Sig: Take 1 capsule (100 mg total) by mouth 3 (three) times a day as needed for cough   budesonide-formoterol (Symbicort) 80-4.5 MCG/ACT inhaler   No No   Sig: Inhale 2 puffs 2 (two) times a day Rinse mouth after use   carbamide peroxide (DEBROX) 6.5 % otic solution   No No   Sig: Administer 5 drops into both ears 2 (two) times a day for 4 days   escitalopram (LEXAPRO) 10 mg tablet  Self No No   Sig: Take 1 tablet (10 mg total) by mouth daily   fexofenadine (ALLEGRA) 180 MG tablet  Self Yes No   Sig: Take 180 mg by mouth daily   fluticasone (FLONASE) 50 mcg/act nasal spray  Self No No   Si sprays into each nostril daily   folic acid (FOLVITE) 1 mg tablet  Self No No   Sig: take 1 tablet by mouth daily   hydrocortisone (ANUSOL-HC) 2.5 % rectal cream  Self No No   Sig: Apply topically 2 (two) times a day   loratadine (CLARITIN) 10 mg tablet  Self No No   Sig: Take 1 tablet (10 mg total) by mouth daily   nicotine (NICODERM CQ) 14 mg/24hr TD 24 hr patch  Self No No   Sig: Place 1 patch on the skin over 24 hours every 24 hours   nicotine polacrilex (NICORETTE) 2 mg gum  Self No No   Sig: Chew 1 each (2 mg total) as needed for smoking cessation   olopatadine (PATANOL) 0.1 % ophthalmic solution  Self No No   Sig: Administer 1 drop to both eyes 2 (two) times a day   ondansetron (Zofran ODT) 4 mg disintegrating tablet  Self No No   Sig: Take 1 tablet (4 mg total) by mouth every 6 (six) hours as needed for nausea or vomiting   pantoprazole (PROTONIX) 20 mg tablet   No No   Sig: Take 1 tablet (20 mg total) by mouth daily      Facility-Administered Medications: None       Past Medical History:   Diagnosis Date    Asthma     Asthmatic bronchitis     Last Assessed: 10/7/2014     Chronic diarrhea     Last Assessed: 8/20/2015     Fibromyalgia     Focal nodular hyperplasia of liver     Last Assessed: 6/11/2015    Herpes zoster     Last Assessed: 3/18/2016    Hypertension     IBS (irritable bowel syndrome)     Intermittent palpitations     Irritable bowel syndrome     Lumbar radiculopathy     Osteoarthritis     Vertigo        Past Surgical History:   Procedure Laterality Date    BREAST BIOPSY      SMALL INTESTINE SURGERY         Family History   Problem Relation Age of Onset    Heart attack Mother     Other Mother         Aspiration of Vomit     Asthma Father     Breast cancer Sister     Arthritis Family     Other Family         Musculoskeletal Disease     Osteoporosis Family      I have reviewed and agree with the history as documented.     E-Cigarette/Vaping    E-Cigarette Use Never User      E-Cigarette/Vaping Substances    Nicotine No     THC No     CBD No     Flavoring No     Other No     Unknown No      Social History     Tobacco Use    Smoking status: Former     Packs/day: 0.50     Types: Cigarettes    Smokeless tobacco: Never    Tobacco comments:     Stopped smoking July 2023   Vaping Use    Vaping Use: Never used   Substance Use Topics    Alcohol use: Not Currently    Drug use: No        Review of Systems    Physical Exam  ED Triage Vitals   Temperature Pulse Respirations Blood Pressure SpO2   11/21/23 1032 11/21/23 1032 11/21/23 1032 11/21/23 1033 11/21/23 1032   (!) 97 °F (36.1 °C) 85 20 (!) 161/108 97 %      Temp src Heart Rate Source Patient Position - Orthostatic VS BP Location FiO2 (%)   -- 11/21/23 1032 11/21/23 1032 11/21/23 1032 --    Monitor Sitting Left arm       Pain Score       11/21/23 1032       10 - Worst Possible Pain             Orthostatic Vital Signs  Vitals:    11/21/23 1032 11/21/23 1033   BP:  (!) 161/108   Pulse: 85    Patient Position - Orthostatic VS: Sitting        Physical Exam    ED Medications  Medications - No data to display    Diagnostic Studies  Results Reviewed       None                   No orders to display         Procedures  Procedures      ED Course                                       MDM      Disposition  Final diagnoses:   None     ED Disposition       None          Follow-up Information    None         Patient's Medications   Discharge Prescriptions    No medications on file     No discharge procedures on file. PDMP Review       None             ED Provider  Attending physically available and evaluated Maritza Gaviria. I managed the patient along with the ED Attending.     Electronically Signed by Pulmonary effort is normal. No respiratory distress. Breath sounds: Normal breath sounds. Abdominal:      Palpations: Abdomen is soft. Tenderness: There is no abdominal tenderness. Musculoskeletal:         General: No swelling. Cervical back: Neck supple. Comments: Diffuse tenderness down her L paraspinal musculature in lumbosacral region. Tenderness over left thigh muscles. Skin:     General: Skin is warm and dry. Capillary Refill: Capillary refill takes less than 2 seconds. Neurological:      Mental Status: She is alert. Sensory: No sensory deficit. Motor: No weakness. Psychiatric:         Mood and Affect: Mood normal.         ED Medications  Medications   acetaminophen (TYLENOL) tablet 975 mg (975 mg Oral Given 11/21/23 1109)       Diagnostic Studies  Results Reviewed       None                   No orders to display         Procedures  Procedures      ED Course                                       Medical Decision Making  61-year-old female present emergency department due to hip pain. Patient has history of similar. She did have steroid injections as possible but she may benefit from further injections. She also has not been taking any OTC medications to help with this. Ultimately, she has no emergent findings on exam for bony tenderness or deformity requiring x-ray imaging. She does not have any neurologic deficits requiring imaging of the spine. She was able to ambulate in the ED after symptomatic management. Recommend follow-up with orthopedics as planned. If    Risk  OTC drugs. Prescription drug management.           Disposition  Final diagnoses:   Left hip pain     Time reflects when diagnosis was documented in both MDM as applicable and the Disposition within this note       Time User Action Codes Description Comment    11/21/2023 11:53 AM Juanita Persaud Add [M25.552] Left hip pain           ED Disposition       ED Disposition   Discharge Condition   Stable    Date/Time   Tue Nov 21, 2023 11:53 AM    Comment   Greg Wharton discharge to home/self care.                    Follow-up Information       Follow up With Specialties Details Why Contact Info Additional Information    [address] [facility]   [address] [id number]  [facility], [address] [facility], [place], [address] [place] [address]  Use Entrance A             Discharge Medication List as of 11/21/2023 11:57 AM        START taking these medications    Details   acetaminophen (TYLENOL) 650 mg CR tablet Take 1 tablet (650 mg total) by mouth every 8 (eight) hours as needed for mild pain, Starting Tue 11/21/2023, Normal      Diclofenac Sodium (VOLTAREN) 1 % Apply 2 g topically 4 (four) times a day, Starting Tue 11/21/2023, Normal      lidocaine (Lidoderm) 5 % Apply 1 patch topically over 12 hours daily Remove & Discard patch within 12 hours or as directed by MD, Starting Tue 11/21/2023, Normal           CONTINUE these medications which have NOT CHANGED    Details   acetaminophen (TYLENOL) 500 mg tablet Take 1 tablet (500 mg total) by mouth every 6 (six) hours as needed for mild pain, Starting Mon 8/27/2018, Normal      albuterol (2.5 mg/3 mL) 0.083 % nebulizer solution Take 3 mL (2.5 mg total) by nebulization every 4 (four) hours as needed for wheezing or shortness of breath, Starting Wed 11/1/2023, Normal      albuterol (PROVENTIL HFA,VENTOLIN HFA) 90 mcg/act inhaler Inhale 2 puffs every 6 (six) hours as needed for wheezing or shortness of breath, Starting Wed 11/1/2023, Normal      amLODIPine (NORVASC) 5 mg tablet Take 1 tablet (5 mg total) by mouth daily, Starting Tue 9/26/2023, Normal      azelastine (ASTELIN) 0.1 % nasal spray 2 sprays into each nostril 2 (two) times a day Use in each nostril as directed, Starting Fri 9/15/2023, Normal      benzonatate (TESSALON PERLES) 100 mg capsule Take 1 capsule (100 mg total) by mouth 3 (three) times a day as needed for cough, Starting Thu 4/27/2023, Normal      Blood Pressure Monitoring (BLOOD PRESSURE CUFF) MISC by Does not apply route 2 (two) times a day, Starting Wed 6/3/2020, Print      budesonide-formoterol (Symbicort) 80-4.5 MCG/ACT inhaler Inhale 2 puffs 2 (two) times a day Rinse mouth after use, Starting Wed 11/1/2023, Until Fri 12/1/2023, Normal      carbamide peroxide (DEBROX) 6.5 % otic solution Administer 5 drops into both ears 2 (two) times a day for 4 days, Starting Fri 11/17/2023, Until Tue 11/21/2023, Normal      Cholecalciferol (Vitamin D) 50 MCG (2000 UT) CAPS Take 1 capsule (2,000 Units total) by mouth daily, Starting Mon 4/24/2023, Normal      escitalopram (LEXAPRO) 10 mg tablet Take 1 tablet (10 mg total) by mouth daily, Starting Wed 4/19/2023, Until Sat 4/13/2024, Normal      fexofenadine (ALLEGRA) 180 MG tablet Take 180 mg by mouth daily, Historical Med      fluticasone (FLONASE) 50 mcg/act nasal spray 2 sprays into each nostril daily, Starting Fri 7/68/1089, Normal      folic acid (FOLVITE) 1 mg tablet take 1 tablet by mouth daily, Normal      hydrocortisone (ANUSOL-HC) 2.5 % rectal cream Apply topically 2 (two) times a day, Starting Wed 11/9/2022, Normal      loratadine (CLARITIN) 10 mg tablet Take 1 tablet (10 mg total) by mouth daily, Starting Wed 9/28/2022, Until Fri 11/17/2023, Normal      nicotine (NICODERM CQ) 14 mg/24hr TD 24 hr patch Place 1 patch on the skin over 24 hours every 24 hours, Starting Thu 10/19/2023, Normal      nicotine polacrilex (NICORETTE) 2 mg gum Chew 1 each (2 mg total) as needed for smoking cessation, Starting Fri 10/20/2023, Normal      olopatadine (PATANOL) 0.1 % ophthalmic solution Administer 1 drop to both eyes 2 (two) times a day, Starting Fri 10/20/2023, Normal      ondansetron (Zofran ODT) 4 mg disintegrating tablet Take 1 tablet (4 mg total) by mouth every 6 (six) hours as needed for nausea or vomiting, Starting Mon 7/24/2023, Normal      pantoprazole (PROTONIX) 20 mg tablet Take 1 tablet (20 mg total) by mouth daily, Starting Fri 11/17/2023, Normal           No discharge procedures on file. PDMP Review       None             ED Provider  Attending physically available and evaluated Marlene Bird. I managed the patient along with the ED Attending.     Electronically Signed by           Juanita Persaud MD  11/27/23 [id number]

## 2023-11-22 ENCOUNTER — OFFICE VISIT (OUTPATIENT)
Dept: OBGYN CLINIC | Facility: CLINIC | Age: 78
End: 2023-11-22
Payer: MEDICARE

## 2023-11-22 VITALS
DIASTOLIC BLOOD PRESSURE: 84 MMHG | HEIGHT: 62 IN | HEART RATE: 98 BPM | WEIGHT: 137 LBS | SYSTOLIC BLOOD PRESSURE: 131 MMHG | BODY MASS INDEX: 25.21 KG/M2

## 2023-11-22 DIAGNOSIS — M47.26 OSTEOARTHRITIS OF SPINE WITH RADICULOPATHY, LUMBAR REGION: ICD-10-CM

## 2023-11-22 DIAGNOSIS — M70.62 TROCHANTERIC BURSITIS OF LEFT HIP: Primary | ICD-10-CM

## 2023-11-22 PROCEDURE — 99214 OFFICE O/P EST MOD 30 MIN: CPT | Performed by: STUDENT IN AN ORGANIZED HEALTH CARE EDUCATION/TRAINING PROGRAM

## 2023-11-22 RX ORDER — GABAPENTIN 100 MG/1
300 CAPSULE ORAL
Qty: 90 CAPSULE | Refills: 0 | Status: SHIPPED | OUTPATIENT
Start: 2023-11-22 | End: 2023-12-22

## 2023-11-22 NOTE — ASSESSMENT & PLAN NOTE
Patient has a history, physical examination and radiographic evaluation consistent with lumbar DJD with radiculopathy.    -I have made a referral to the spine and pain specialist for evaluation  -Prescription sent for gabapentin 300 mg at bedtime  -Physical therapy for core strengthening and range of motion  -Continue Tylenol up to 3000 mg daily  -She may follow-up as needed

## 2023-11-22 NOTE — PROGRESS NOTES
Date: 23  Maddy Mohr   MRN# [id number]  : 1945      Chief Complaint: Left hip pain    Assessment and Plan:    Trochanteric bursitis of left hip  Patient has a history, physical examination and radiographic evaluation consistent with greater trochanteric bursitis of the left hip    -Patient recently received a corticosteroid injection to the hip and it is too soon for another injection  -Was explained that, while the injection may provide some symptomatic relief, a more long-term and durable form of treatment would be physical therapy  -Physical therapy prescription provided  -May continue Tylenol up to 3000 mg daily  -Patient may follow-up as needed    Osteoarthritis of spine with radiculopathy, lumbar region  Patient has a history, physical examination and radiographic evaluation consistent with lumbar DJD with radiculopathy.    -I have made a referral to the spine and pain specialist for evaluation  -Prescription sent for gabapentin 300 mg at bedtime  -Physical therapy for core strengthening and range of motion  -Continue Tylenol up to 3000 mg daily  -She may follow-up as needed       Subjective:     Hip Pain  Patient complains of left hip pain. This is evaluated as a personal injury. The pain began several months ago. The pain is located diffuse, buttock, and lateral and is made worse with walking and standing. She describes the symptoms as sharp, shooting, and tingling. Symptoms improve with rest, avoiding painful activities. The symptoms are worse with activity, sitting for prolonged periods of time. The hip has not given out or felt unstable. Treatment to date has been ice, Tylenol, cortisone injection, without significant relief.     External Records Reviewed: office notes and radiology reports    Allergy:  Allergies   Allergen Reactions    Ambrosia Artemisiifolia (Ragweed) Skin Test Facial Swelling    Codeine Other (See Comments)     Heart races    Epinephrine      Pt reports "it makes my heart race, they told me I cant take it."    Ibuprofen Other (See Comments)     Heart racing    Morphine Other (See Comments)     Heart racing    Pollen Extract Sneezing    Tramadol Other (See Comments)     Heart racing     Medications:  all current active meds have been reviewed  Past Medical History:  Past Medical History:   Diagnosis Date    Asthma     Asthmatic bronchitis     Last Assessed: 10/7/2014     Chronic diarrhea     Last Assessed: 8/20/2015     Fibromyalgia     Focal nodular hyperplasia of liver     Last Assessed: 6/11/2015    Herpes zoster     Last Assessed: 3/18/2016    Hypertension     IBS (irritable bowel syndrome)     Intermittent palpitations     Irritable bowel syndrome     Lumbar radiculopathy     Osteoarthritis     Vertigo      Past Surgical History:  Past Surgical History:   Procedure Laterality Date    BREAST BIOPSY      SMALL INTESTINE SURGERY       Family History:  Family History   Problem Relation Age of Onset    Heart attack Mother     Other Mother         Aspiration of Vomit     Asthma Father     Breast cancer Sister     Arthritis Family     Other Family         Musculoskeletal Disease     Osteoporosis Family      Social History:  Social History     Substance and Sexual Activity   Alcohol Use Not Currently     Social History     Substance and Sexual Activity   Drug Use No     Social History     Tobacco Use   Smoking Status Former    Packs/day: 0.50    Types: Cigarettes   Smokeless Tobacco Never   Tobacco Comments    Stopped smoking July 2023           ROS:   Review of Systems   All other systems reviewed and are negative. Objective:   BP Readings from Last 1 Encounters:   11/22/23 131/84      Wt Readings from Last 1 Encounters:   11/22/23 62.1 kg (137 lb)      Pulse Readings from Last 1 Encounters:   11/22/23 98      BMI: Estimated body mass index is 25.06 kg/m² as calculated from the following:    Height as of this encounter: 5' 2" (1.575 m).     Weight as of this encounter: 62.1 kg (137 lb). Physical Exam  Constitutional:       General: She is not in acute distress. HENT:      Head: Normocephalic and atraumatic. Eyes:      Conjunctiva/sclera: Conjunctivae normal.   Cardiovascular:      Comments: Extremities well perfused   No LE edema    Pulmonary:      Effort: Pulmonary effort is normal.   Abdominal:      General: Abdomen is flat. Bowel sounds are normal.   Neurological:      Mental Status: She is alert. Mental status is at baseline. Gait and Station:   antalgic    Left Hip     Inspection: normal color, temperature, turgor and moisture    Range of Motion: Full with pain  Equivocal modified straight leg raise exam    negative  log roll   negative Trendelenburg sign  negative  Stinchfield  negative  ROHAN  negative  FADDIR    Motor: 5/5 Q/HS/TA/GS/EHL/FHL    Vascular:  Toes WWP with BCR    SILT DP/SP/Georgina/Saph/Tib      Images:    I personally reviewed relevant images in the PACS system and my interpretation is as follows:  X-rays of the left Hip reveal minimal degenerative changes   Radiographs of the lumbar spine were reviewed and do show DJD at the level of L5-S1 with foraminal stenosis      Susie Lemus MD  Adult Reconstruction Specialist   [address] [facility]

## 2023-11-22 NOTE — ASSESSMENT & PLAN NOTE
Patient has a history, physical examination and radiographic evaluation consistent with greater trochanteric bursitis of the left hip    -Patient recently received a corticosteroid injection to the hip and it is too soon for another injection  -Was explained that, while the injection may provide some symptomatic relief, a more long-term and durable form of treatment would be physical therapy  -Physical therapy prescription provided  -May continue Tylenol up to 3000 mg daily  -Patient may follow-up as needed

## 2023-11-22 NOTE — ED ATTENDING ATTESTATION
11/21/2023  IDayton MD, saw and evaluated the patient. I have discussed the patient with the resident/non-physician practitioner and agree with the resident's/non-physician practitioner's findings, Plan of Care, and MDM as documented in the resident's/non-physician practitioner's note, except where noted. All available labs and Radiology studies were reviewed. I was present for key portions of any procedure(s) performed by the resident/non-physician practitioner and I was immediately available to provide assistance. At this point I agree with the current assessment done in the Emergency Department. I have conducted an independent evaluation of this patient a history and physical is as follows:    ED Course     81-year-old female, history of chronic low back pain and recurrent trochanteric bursitis, presenting to the emergency department for evaluation of left low back pain, originating in the lumbar sacral area, radiating into the left lateral hip. Patient states that she has scheduled follow-up with orthopedics in 5 days. No bowel or bladder incontinence. No saddle anesthesia. No weakness in the lower extremities. Patient is able to ambulate without significant difficulty. On examination, patient is standing in the room able to ambulate without difficulty. Back is unremarkable in external appearance. Patient with some mild tenderness to palpation in the left SI area. Patient with tenderness to palpation over the left lateral hip. No masses. Full range of motion left hip. Distal motor and sensory intact. Distal pulses intact. Musculoskeletal pain with probable trochanteric bursitis. Recommend follow-up with Ortho as an outpatient. Symptomatic treatment with multimodal therapy.     Critical Care Time  Procedures

## 2023-11-24 ENCOUNTER — CONSULT (OUTPATIENT)
Dept: PAIN MEDICINE | Facility: CLINIC | Age: 78
End: 2023-11-24
Payer: MEDICARE

## 2023-11-24 VITALS
SYSTOLIC BLOOD PRESSURE: 149 MMHG | HEART RATE: 102 BPM | DIASTOLIC BLOOD PRESSURE: 97 MMHG | HEIGHT: 62 IN | BODY MASS INDEX: 25.21 KG/M2 | WEIGHT: 137 LBS

## 2023-11-24 DIAGNOSIS — M70.62 TROCHANTERIC BURSITIS OF LEFT HIP: ICD-10-CM

## 2023-11-24 DIAGNOSIS — M47.816 LUMBAR SPONDYLOSIS: ICD-10-CM

## 2023-11-24 DIAGNOSIS — M48.061 SPINAL STENOSIS OF LUMBAR REGION, UNSPECIFIED WHETHER NEUROGENIC CLAUDICATION PRESENT: Primary | ICD-10-CM

## 2023-11-24 DIAGNOSIS — M54.16 LUMBAR RADICULOPATHY: ICD-10-CM

## 2023-11-24 PROCEDURE — 99204 OFFICE O/P NEW MOD 45 MIN: CPT | Performed by: ANESTHESIOLOGY

## 2023-11-24 NOTE — PROGRESS NOTES
Assessment  1. Spinal stenosis of lumbar region, unspecified whether neurogenic claudication present    2. Lumbar radiculopathy    3. Lumbar spondylosis    4. Trochanteric bursitis of left hip        Plan  69-year-old female with a history of CKD, hypertension, emphysema, referred by Dr. Gabriel Richmond, presenting for initial consultation regarding a longstanding history of lumbosacral back pain that was radiating into the left hip, however over the past 2 days is now radiating into the posterior lateral aspect of left lower extremity to the ankle with associated numbness, paresthesias, and subjective weakness. She denies any trauma or inciting event. X-ray of the lumbar spine September 27, 2023 demonstrates facet arthropathy at L5-S1. X-ray of the left hip is unremarkable. MRI of the lumbar spine was from 2020 demonstrating multilevel spondylosis. Severe central with mild bilateral foraminal stenosis at L4-5. Disc protrusion at L5-S1 with abutment and possible impingement of the right S1 root. The patient was given a left trochanteric bursa injection by orthopedics which did provide some relief of her lateral hip pain. She was prescribed gabapentin, however has not picked this up from the pharmacy yet. She does take Tylenol as needed without much relief. She has not done any recent formal physical therapy or chiropractic treatment. The patient does have evidence of trochanteric bursitis on the left which she reports is improved after injection. Low back pain seems to be multifactorial including myofascial and facet mediated components. No evidence of sacroiliitis. Left lower extremity symptoms likely secondary to L5-S1 radiculopathy stemming from stenosis. 1.  I will order an MRI of the lumbar spine without contrast  2. I will order physical therapy. The patient request aquatic therapy which I think is reasonable  3. Encourage patient to start gabapentin as prescribed by orthopedics  4.   I will follow-up with the patient in 6 weeks        My impressions and treatment recommendations were discussed in detail with the patient who verbalized understanding and had no further questions. Discharge instructions were provided. I personally saw and examined the patient and I agree with the above discussed plan of care. No orders of the defined types were placed in this encounter. No orders of the defined types were placed in this encounter. History of Present Illness    Nelly Veras is a 69 y.o. female with a history of CKD, hypertension, emphysema, referred by Dr. Terrell Richard, presenting for initial consultation regarding a longstanding history of lumbosacral back pain that was radiating into the left hip, however over the past 2 days is now radiating into the posterior lateral aspect of left lower extremity to the ankle with associated numbness, paresthesias, and subjective weakness. She denies any trauma or inciting event. Denies any right lower extremity symptoms, bladder or bowel incontinence, or saddle anesthesia. X-ray of the lumbar spine September 27, 2023 demonstrates facet arthropathy at L5-S1. X-ray of the left hip is unremarkable. MRI of the lumbar spine was from 2020 demonstrating multilevel spondylosis. Severe central with mild bilateral foraminal stenosis at L4-5. Disc protrusion at L5-S1 with abutment and possible impingement of the right S1 root. The patient was given a left trochanteric bursa injection by orthopedics which did provide some relief of her lateral hip pain. She was prescribed gabapentin, however has not picked this up from the pharmacy yet. She does take Tylenol as needed without much relief. She has not done any recent formal physical therapy or chiropractic treatment. The patient rates her pain an 8 out of 10 and the pain is constant. The pain is worse in the morning and described as shooting.   The pain is increased with standing, walking, bending, and exercise. The pain is alleviated with sitting and lying down. Other than as stated above, the patient denies any interval changes in medications, medical condition, mental condition, symptoms, or allergies since the last office visit. I have personally reviewed and/or updated the patient's past medical history, past surgical history, family history, social history, current medications, allergies, and vital signs today.      Review of Systems    Patient Active Problem List   Diagnosis    Rupture of ulnar collateral ligament of right thumb    Primary osteoarthritis of first carpometacarpal joint of right hand    Other chronic sinusitis    Cigarette nicotine dependence without complication    Chronic allergic rhinitis    Trigger finger, right index finger    Panic attack    Essential hypertension    Breast cyst, right    Classic migraine with aura    GERD (gastroesophageal reflux disease)    Renal cyst    Trigger finger of right thumb    Abnormal EKG    Prediabetes    Intermittent palpitations    Trigger finger, right little finger    Intersection syndrome    Polyarthritis with positive rheumatoid factor (HCC)    Chronic left-sided low back pain with left-sided sciatica    Irritable bowel syndrome with diarrhea    Anxiety    Age related osteoporosis    Vitamin D deficiency    Chronic pain of both shoulders    External hemorrhoid    Stage 3b chronic kidney disease (HCC)    Tendinitis of right rotator cuff    Trochanteric bursitis of left hip    Watery eyes    Screening for lung cancer    Financial difficulties    Ambulatory dysfunction    Vision abnormalities    Retina disorder    Goiter    Thyroid nodule    Tobacco abuse    Opacity noted on imaging study    Mild persistent asthma without complication    Emphysema lung (HCC)    Acute sinusitis    Cerumen debris on tympanic membrane of both ears    Osteoarthritis of spine with radiculopathy, lumbar region       Past Medical History:   Diagnosis Date    Asthma Asthmatic bronchitis     Last Assessed: 10/7/2014     Chronic diarrhea     Last Assessed: 8/20/2015     Fibromyalgia     Focal nodular hyperplasia of liver     Last Assessed: 6/11/2015    Herpes zoster     Last Assessed: 3/18/2016    Hypertension     IBS (irritable bowel syndrome)     Intermittent palpitations     Irritable bowel syndrome     Lumbar radiculopathy     Osteoarthritis     Vertigo        Past Surgical History:   Procedure Laterality Date    BREAST BIOPSY      SMALL INTESTINE SURGERY         Family History   Problem Relation Age of Onset    Heart attack Mother     Other Mother         Aspiration of Vomit     Asthma Father     Breast cancer Sister     Arthritis Family     Other Family         Musculoskeletal Disease     Osteoporosis Family        Social History     Occupational History    Occupation: Unemployed    Tobacco Use    Smoking status: Former     Packs/day: 0.50     Types: Cigarettes    Smokeless tobacco: Never    Tobacco comments:     Stopped smoking July 2023   Vaping Use    Vaping Use: Never used   Substance and Sexual Activity    Alcohol use: Not Currently    Drug use: No    Sexual activity: Not Currently     Partners: Male       Current Outpatient Medications on File Prior to Visit   Medication Sig    acetaminophen (TYLENOL) 500 mg tablet Take 1 tablet (500 mg total) by mouth every 6 (six) hours as needed for mild pain    albuterol (2.5 mg/3 mL) 0.083 % nebulizer solution Take 3 mL (2.5 mg total) by nebulization every 4 (four) hours as needed for wheezing or shortness of breath    albuterol (PROVENTIL HFA,VENTOLIN HFA) 90 mcg/act inhaler Inhale 2 puffs every 6 (six) hours as needed for wheezing or shortness of breath    amLODIPine (NORVASC) 5 mg tablet Take 1 tablet (5 mg total) by mouth daily    azelastine (ASTELIN) 0.1 % nasal spray 2 sprays into each nostril 2 (two) times a day Use in each nostril as directed    benzonatate (TESSALON PERLES) 100 mg capsule Take 1 capsule (100 mg total) by mouth 3 (three) times a day as needed for cough    Blood Pressure Monitoring (BLOOD PRESSURE CUFF) MISC by Does not apply route 2 (two) times a day    budesonide-formoterol (Symbicort) 80-4.5 MCG/ACT inhaler Inhale 2 puffs 2 (two) times a day Rinse mouth after use    Cholecalciferol (Vitamin D) 50 MCG (2000 UT) CAPS Take 1 capsule (2,000 Units total) by mouth daily    Diclofenac Sodium (VOLTAREN) 1 % Apply 2 g topically 4 (four) times a day    fexofenadine (ALLEGRA) 180 MG tablet Take 180 mg by mouth daily    fluticasone (FLONASE) 50 mcg/act nasal spray 2 sprays into each nostril daily    folic acid (FOLVITE) 1 mg tablet take 1 tablet by mouth daily    gabapentin (Neurontin) 100 mg capsule Take 3 capsules (300 mg total) by mouth daily at bedtime    hydrocortisone (ANUSOL-HC) 2.5 % rectal cream Apply topically 2 (two) times a day    lidocaine (Lidoderm) 5 % Apply 1 patch topically over 12 hours daily Remove & Discard patch within 12 hours or as directed by MD    nicotine (NICODERM CQ) 14 mg/24hr TD 24 hr patch Place 1 patch on the skin over 24 hours every 24 hours    nicotine polacrilex (NICORETTE) 2 mg gum Chew 1 each (2 mg total) as needed for smoking cessation    olopatadine (PATANOL) 0.1 % ophthalmic solution Administer 1 drop to both eyes 2 (two) times a day    ondansetron (Zofran ODT) 4 mg disintegrating tablet Take 1 tablet (4 mg total) by mouth every 6 (six) hours as needed for nausea or vomiting    pantoprazole (PROTONIX) 20 mg tablet Take 1 tablet (20 mg total) by mouth daily    acetaminophen (TYLENOL) 650 mg CR tablet Take 1 tablet (650 mg total) by mouth every 8 (eight) hours as needed for mild pain (Patient not taking: Reported on 11/22/2023)    carbamide peroxide (DEBROX) 6.5 % otic solution Administer 5 drops into both ears 2 (two) times a day for 4 days    escitalopram (LEXAPRO) 10 mg tablet Take 1 tablet (10 mg total) by mouth daily (Patient not taking: Reported on 11/24/2023)    loratadine (CLARITIN) 10 mg tablet Take 1 tablet (10 mg total) by mouth daily     No current facility-administered medications on file prior to visit. Allergies   Allergen Reactions    Ambrosia Artemisiifolia (Ragweed) Skin Test Facial Swelling    Codeine Other (See Comments)     Heart races    Epinephrine      Pt reports "it makes my heart race, they told me I cant take it."    Ibuprofen Other (See Comments)     Heart racing    Morphine Other (See Comments)     Heart racing    Pollen Extract Sneezing    Tramadol Other (See Comments)     Heart racing       Physical Exam    /97 (BP Location: Left arm, Patient Position: Standing, Cuff Size: Standard) Comment (Patient Position): in to much pain to sit  Pulse 102   Ht 5' 2" (1.575 m)   Wt 62.1 kg (137 lb)   BMI 25.06 kg/m²     Constitutional: normal, well developed, well nourished, alert, in no distress and non-toxic and no overt pain behavior. Eyes: anicteric  HEENT: grossly intact  Neck: supple, symmetric, trachea midline and no masses   Pulmonary:even and unlabored  Cardiovascular:No edema or pitting edema present  Skin:Normal without rashes or lesions and well hydrated  Psychiatric:Mood and affect appropriate  Neurologic:Cranial Nerves II-XII grossly intact  Musculoskeletal:antalgic gait. Left lumbar paraspinals tender to palpation from L3-S1. Left SI joint minimally tender to palpation. Left trochanteric flare mildly tender to palpation. Bilateral patellar and Achilles reflexes were 2 out of 4 and symmetrical.  No clonus is noted bilaterally. Bilateral lower extremity strength 5 out of 5 in all muscle groups. Sensation intact to light touch in L3-S1 dermatomes bilaterally. Positive straight leg raise on the left and negative on the right. Negative Romeo's test bilaterally. Imaging    Study Result    Narrative & Impression   LUMBAR SPINE     INDICATION:   M25.552: Pain in left hip. COMPARISON: Lumbar spine x-ray dated February 3, 2020. VIEWS:  XR SPINE LUMBAR 2 OR 3 VIEWS INJURY        FINDINGS:     There are 5 non rib bearing lumbar vertebral bodies. There is no evidence of acute fracture or destructive osseous lesion. Alignment is unremarkable. There is bilateral facet arthropathy at L5/S1. The pedicles appear intact. There are atherosclerotic calcifications. Soft tissues are otherwise unremarkable. IMPRESSION:     No acute osseous abnormality. Degenerative changes as described. Workstation performed: [id number]     Study Result    Narrative & Impression   LEFT HIP     INDICATION:   M25.552: Pain in left hip. COMPARISON: Left hip x-ray dated March 2, 2022. VIEWS:  XR HIP/PELV 2-3 VWS LEFT  W PELVIS IF PERFORMED        FINDINGS:     There is no acute fracture or dislocation. No significant hip degenerative changes. No lytic or blastic osseous lesion. Soft tissues are unremarkable. The visualized lumbar spine is unremarkable. IMPRESSION:     No acute osseous abnormality.            Workstation performed: [id number]

## 2023-11-29 ENCOUNTER — OFFICE VISIT (OUTPATIENT)
Dept: OBGYN CLINIC | Facility: HOSPITAL | Age: 78
End: 2023-11-29
Payer: MEDICARE

## 2023-11-29 VITALS
WEIGHT: 134.3 LBS | DIASTOLIC BLOOD PRESSURE: 79 MMHG | HEART RATE: 105 BPM | HEIGHT: 62 IN | BODY MASS INDEX: 24.71 KG/M2 | SYSTOLIC BLOOD PRESSURE: 134 MMHG

## 2023-11-29 DIAGNOSIS — M70.62 TROCHANTERIC BURSITIS OF LEFT HIP: Primary | ICD-10-CM

## 2023-11-29 PROCEDURE — 99213 OFFICE O/P EST LOW 20 MIN: CPT | Performed by: ORTHOPAEDIC SURGERY

## 2023-11-29 PROCEDURE — 20610 DRAIN/INJ JOINT/BURSA W/O US: CPT | Performed by: ORTHOPAEDIC SURGERY

## 2023-11-29 RX ORDER — METHYLPREDNISOLONE ACETATE 40 MG/ML
2 INJECTION, SUSPENSION INTRA-ARTICULAR; INTRALESIONAL; INTRAMUSCULAR; SOFT TISSUE
Status: COMPLETED | OUTPATIENT
Start: 2023-11-29 | End: 2023-11-29

## 2023-11-29 RX ORDER — BUPIVACAINE HYDROCHLORIDE 2.5 MG/ML
2 INJECTION, SOLUTION INFILTRATION; PERINEURAL
Status: COMPLETED | OUTPATIENT
Start: 2023-11-29 | End: 2023-11-29

## 2023-11-29 RX ADMIN — METHYLPREDNISOLONE ACETATE 2 ML: 40 INJECTION, SUSPENSION INTRA-ARTICULAR; INTRALESIONAL; INTRAMUSCULAR; SOFT TISSUE at 09:30

## 2023-11-29 RX ADMIN — BUPIVACAINE HYDROCHLORIDE 2 ML: 2.5 INJECTION, SOLUTION INFILTRATION; PERINEURAL at 09:30

## 2023-11-29 NOTE — PROGRESS NOTES
Orthopaedics Office Visit - F/U Patient Visit    ASSESSMENT/PLAN:    Assessment:     1. Trochanteric bursitis of left hip  Large joint arthrocentesis: L greater trochanteric bursa            Plan:   Trochanteric bursitis left hip  -Continue exercises and activity as tolerated  -Encouraged to stretch her hip and IT band.  -Steroid injection offered and provided to her today without complication  -Follow-up 3 months time    Rotator cuff tendinitis right shoulder  Largely improved from previous status        To Do Next Visit:  Reevaluate current issue, possible repeat CSI to G Troch    _____________________________________________________  CHIEF COMPLAINT:  Chief Complaint   Patient presents with    Left Hip - Follow-up         SUBJECTIVE:  Ave Crane is a pleasant 69 y.o. female who presents for follow-up evaluation of her left hip for issue of greater trochanteric bursitis. She has had return of pain and swelling to L thigh in same area as before. Denies any new pain radiating down leg but has persistent usual radiating type of pain. She states that her R rotator cuff symptoms have improved significantly since injection and previous treatment. Requests new injectio nto L GT bursa.     PAST MEDICAL HISTORY:  Past Medical History:   Diagnosis Date    Asthma     Asthmatic bronchitis     Last Assessed: 10/7/2014     Chronic diarrhea     Last Assessed: 8/20/2015     Fibromyalgia     Focal nodular hyperplasia of liver     Last Assessed: 6/11/2015    Herpes zoster     Last Assessed: 3/18/2016    Hypertension     IBS (irritable bowel syndrome)     Intermittent palpitations     Irritable bowel syndrome     Lumbar radiculopathy     Osteoarthritis     Vertigo        PAST SURGICAL HISTORY:  Past Surgical History:   Procedure Laterality Date    BREAST BIOPSY      SMALL INTESTINE SURGERY         FAMILY HISTORY:  Family History   Problem Relation Age of Onset    Heart attack Mother     Other Mother         Aspiration of Vomit Asthma Father     Breast cancer Sister     Arthritis Family     Other Family         Musculoskeletal Disease     Osteoporosis Family        SOCIAL HISTORY:  Social History     Tobacco Use    Smoking status: Former     Packs/day: 0.50     Types: Cigarettes    Smokeless tobacco: Never    Tobacco comments:     Stopped smoking July 2023   Vaping Use    Vaping Use: Never used   Substance Use Topics    Alcohol use: Not Currently    Drug use: No       MEDICATIONS:    Current Outpatient Medications:     acetaminophen (TYLENOL) 500 mg tablet, Take 1 tablet (500 mg total) by mouth every 6 (six) hours as needed for mild pain, Disp: 60 tablet, Rfl: 0    albuterol (2.5 mg/3 mL) 0.083 % nebulizer solution, Take 3 mL (2.5 mg total) by nebulization every 4 (four) hours as needed for wheezing or shortness of breath, Disp: 90 mL, Rfl: 5    albuterol (PROVENTIL HFA,VENTOLIN HFA) 90 mcg/act inhaler, Inhale 2 puffs every 6 (six) hours as needed for wheezing or shortness of breath, Disp: 18 g, Rfl: 5    amLODIPine (NORVASC) 5 mg tablet, Take 1 tablet (5 mg total) by mouth daily, Disp: 90 tablet, Rfl: 1    azelastine (ASTELIN) 0.1 % nasal spray, 2 sprays into each nostril 2 (two) times a day Use in each nostril as directed, Disp: 30 mL, Rfl: 2    benzonatate (TESSALON PERLES) 100 mg capsule, Take 1 capsule (100 mg total) by mouth 3 (three) times a day as needed for cough, Disp: 20 capsule, Rfl: 0    Blood Pressure Monitoring (BLOOD PRESSURE CUFF) MISC, by Does not apply route 2 (two) times a day, Disp: 1 each, Rfl: 0    budesonide-formoterol (Symbicort) 80-4.5 MCG/ACT inhaler, Inhale 2 puffs 2 (two) times a day Rinse mouth after use, Disp: 10.2 g, Rfl: 5    Cholecalciferol (Vitamin D) 50 MCG (2000 UT) CAPS, Take 1 capsule (2,000 Units total) by mouth daily, Disp: 30 capsule, Rfl: 5    Diclofenac Sodium (VOLTAREN) 1 %, Apply 2 g topically 4 (four) times a day, Disp: 50 g, Rfl: 0    fexofenadine (ALLEGRA) 180 MG tablet, Take 180 mg by mouth daily, Disp: , Rfl:     fluticasone (FLONASE) 50 mcg/act nasal spray, 2 sprays into each nostril daily, Disp: 16 g, Rfl: 3    folic acid (FOLVITE) 1 mg tablet, take 1 tablet by mouth daily, Disp: 30 tablet, Rfl: 5    hydrocortisone (ANUSOL-HC) 2.5 % rectal cream, Apply topically 2 (two) times a day, Disp: 28 g, Rfl: 0    lidocaine (Lidoderm) 5 %, Apply 1 patch topically over 12 hours daily Remove & Discard patch within 12 hours or as directed by MD, Disp: 15 patch, Rfl: 0    nicotine (NICODERM CQ) 14 mg/24hr TD 24 hr patch, Place 1 patch on the skin over 24 hours every 24 hours, Disp: 28 patch, Rfl: 1    nicotine polacrilex (NICORETTE) 2 mg gum, Chew 1 each (2 mg total) as needed for smoking cessation, Disp: 100 each, Rfl: 1    olopatadine (PATANOL) 0.1 % ophthalmic solution, Administer 1 drop to both eyes 2 (two) times a day, Disp: 5 mL, Rfl: 2    ondansetron (Zofran ODT) 4 mg disintegrating tablet, Take 1 tablet (4 mg total) by mouth every 6 (six) hours as needed for nausea or vomiting, Disp: 20 tablet, Rfl: 3    pantoprazole (PROTONIX) 20 mg tablet, Take 1 tablet (20 mg total) by mouth daily, Disp: 90 tablet, Rfl: 1    acetaminophen (TYLENOL) 650 mg CR tablet, Take 1 tablet (650 mg total) by mouth every 8 (eight) hours as needed for mild pain (Patient not taking: Reported on 11/22/2023), Disp: 30 tablet, Rfl: 0    carbamide peroxide (DEBROX) 6.5 % otic solution, Administer 5 drops into both ears 2 (two) times a day for 4 days, Disp: 15 mL, Rfl: 0    escitalopram (LEXAPRO) 10 mg tablet, Take 1 tablet (10 mg total) by mouth daily (Patient not taking: Reported on 11/24/2023), Disp: 90 tablet, Rfl: 3    gabapentin (Neurontin) 100 mg capsule, Take 3 capsules (300 mg total) by mouth daily at bedtime (Patient not taking: Reported on 11/24/2023), Disp: 90 capsule, Rfl: 0    loratadine (CLARITIN) 10 mg tablet, Take 1 tablet (10 mg total) by mouth daily, Disp: 30 tablet, Rfl: 3    ALLERGIES:  Allergies   Allergen Reactions Ambrosia Artemisiifolia (Ragweed) Skin Test Facial Swelling    Codeine Other (See Comments)     Heart races    Epinephrine      Pt reports "it makes my heart race, they told me I cant take it."    Ibuprofen Other (See Comments)     Heart racing    Morphine Other (See Comments)     Heart racing    Pollen Extract Sneezing    Tramadol Other (See Comments)     Heart racing       REVIEW OF SYSTEMS:  MSK: Within normal limits  Neuro: Within normal limits  Pertinent items are otherwise noted in HPI. A comprehensive review of systems was otherwise negative. LABS:  HgA1c:   Lab Results   Component Value Date    HGBA1C 6.0 (H) 10/15/2021     BMP:   Lab Results   Component Value Date    GLUCOSE 92 05/19/2015    CALCIUM 8.8 07/30/2023     05/19/2015    K 3.9 07/30/2023    CO2 24 07/30/2023     (H) 07/30/2023    BUN 29 (H) 07/30/2023    CREATININE 1.54 (H) 07/30/2023     CBC: No components found for: "CBC"    _____________________________________________________  PHYSICAL EXAMINATION:  Vital signs: /79   Pulse 105   Ht 5' 2" (1.575 m)   Wt 60.9 kg (134 lb 4.8 oz)   BMI 24.56 kg/m²   General: No acute distress, awake and alert  Psychiatric: Mood and affect appear appropriate  HEENT: Trachea Midline, No torticollis, no apparent facial trauma  Cardiovascular: No audible murmurs;  Extremities appear perfused  Pulmonary: No audible wheezing or stridor  Skin: No open lesions; see further details (if any) below    MUSCULOSKELETAL EXAMINATION:  Extremities:     Left hip:  Range of motion 110 degrees hip flexion  50 degrees external rotation  20 degrees internal rotation  40 degrees abduction  Tenderness to palpation over greater trochanteric bursa  Able negative to perform straight leg raise  Negative log roll  Negative Stinchfield  Negative ROHAN, FADIR  Sensation intact to L2-S1 dermatomes   Extremity warm and well perfused       _____________________________________________________  STUDIES REVIEWED:  I personally reviewed the images and interpretation is as follows: No new images today    PROCEDURES PERFORMED:  Large joint arthrocentesis: L greater trochanteric bursa  Universal Protocol:  Consent: Verbal consent obtained.   Consent given by: patient  Patient identity confirmed: verbally with patient  Supporting Documentation  Indications: pain   Procedure Details  Location: hip - L greater trochanteric bursa  Needle size: 22 G  Ultrasound guidance: no  Approach: lateral  Medications administered: 2 mL bupivacaine 0.25 %; 2 mL methylPREDNISolone acetate 40 mg/mL    Patient tolerance: patient tolerated the procedure well with no immediate complications  Dressing:  Sterile dressing applied        Ebony Goodman MD

## 2023-12-01 ENCOUNTER — OFFICE VISIT (OUTPATIENT)
Dept: FAMILY MEDICINE CLINIC | Facility: CLINIC | Age: 78
End: 2023-12-01

## 2023-12-01 VITALS
BODY MASS INDEX: 23.01 KG/M2 | SYSTOLIC BLOOD PRESSURE: 134 MMHG | RESPIRATION RATE: 18 BRPM | OXYGEN SATURATION: 96 % | TEMPERATURE: 98.3 F | WEIGHT: 134.8 LBS | DIASTOLIC BLOOD PRESSURE: 87 MMHG | HEART RATE: 103 BPM | HEIGHT: 64 IN

## 2023-12-01 DIAGNOSIS — M54.42 CHRONIC LEFT-SIDED LOW BACK PAIN WITH LEFT-SIDED SCIATICA: ICD-10-CM

## 2023-12-01 DIAGNOSIS — M48.061 SPINAL STENOSIS OF LUMBAR REGION, UNSPECIFIED WHETHER NEUROGENIC CLAUDICATION PRESENT: ICD-10-CM

## 2023-12-01 DIAGNOSIS — G89.29 CHRONIC LEFT-SIDED LOW BACK PAIN WITH LEFT-SIDED SCIATICA: ICD-10-CM

## 2023-12-01 DIAGNOSIS — Z13.6 ENCOUNTER FOR SCREENING FOR CARDIOVASCULAR DISORDERS: ICD-10-CM

## 2023-12-01 DIAGNOSIS — R26.2 AMBULATORY DYSFUNCTION: ICD-10-CM

## 2023-12-01 DIAGNOSIS — M47.26 OSTEOARTHRITIS OF SPINE WITH RADICULOPATHY, LUMBAR REGION: ICD-10-CM

## 2023-12-01 DIAGNOSIS — M05.80 POLYARTHRITIS WITH POSITIVE RHEUMATOID FACTOR (HCC): ICD-10-CM

## 2023-12-01 DIAGNOSIS — Z00.00 MEDICARE ANNUAL WELLNESS VISIT, SUBSEQUENT: Primary | ICD-10-CM

## 2023-12-01 DIAGNOSIS — Z12.31 ENCOUNTER FOR SCREENING MAMMOGRAM FOR MALIGNANT NEOPLASM OF BREAST: ICD-10-CM

## 2023-12-01 PROCEDURE — 3079F DIAST BP 80-89 MM HG: CPT | Performed by: FAMILY MEDICINE

## 2023-12-01 PROCEDURE — 3075F SYST BP GE 130 - 139MM HG: CPT | Performed by: FAMILY MEDICINE

## 2023-12-01 PROCEDURE — G0439 PPPS, SUBSEQ VISIT: HCPCS | Performed by: FAMILY MEDICINE

## 2023-12-01 NOTE — PROGRESS NOTES
Assessment and Plan:   Ms. Sindhu Storm presents to clinic for [facility] visit:   Screening mammogram ordered today. CT lung screening previously ordered by pulmonology. Encouraged patient to complete this screening. Provided number for imaging center and instructed patient to call to schedule. Hx of osteoporosis. Due for follow up dexa scan, previously ordered by another patient. Encouraged patient to call imaging center to schedule. Declined flu shot today. Otherwise up to date on vaccinations    Request hospital bed, order sent. Health Maintenance   Topic Date Due    COVID-19 Vaccine (1) Never done    Osteoporosis Screening  Never done    Breast Cancer Screening: Mammogram  01/10/2019    OT PLAN OF CARE  03/10/2019    Influenza Vaccine (1) 06/30/2024 (Originally 9/1/2023)    Fall Risk  12/01/2024    Depression Screening  12/01/2024    Urinary Incontinence Screening  12/01/2024    Medicare Annual Wellness Visit (AWV)  12/01/2024    BMI: Adult  12/01/2024    Hepatitis C Screening  Completed    Pneumococcal Vaccine: 65+ Years  Completed    HIB Vaccine  Aged Out    IPV Vaccine  Aged Out    Hepatitis A Vaccine  Aged Out    Meningococcal ACWY Vaccine  Aged Out    HPV Vaccine  Aged Out    Colorectal Cancer Screening  Discontinued           Depression Screening and Follow-up Plan: Patient was screened for depression during today's encounter. They screened negative with a PHQ-2 score of 0. Preventive health issues were discussed with patient, and age appropriate screening tests were ordered as noted in patient's After Visit Summary. Personalized health advice and appropriate referrals for health education or preventive services given if needed, as noted in patient's After Visit Summary. History of Present Illness:     Patient presents for a Medicare Wellness Visit    Ms. Sindhu Storm presents to clinic for [facility] visit. She has no other concerns at this time.  She request order for hospital bed as she has multiple issues including chronic lower back pain with sciatica, polyarthritis with RA factor positive, and spinal stenosis and osteoarthritis of spine. Patient Care Team:  Emma Delacruz MD as PCP - General (Family Medicine)  Seng Hastings MD     Review of Systems:     Review of Systems   Constitutional:  Negative for chills and fever. HENT:  Positive for congestion (chronic) and rhinorrhea (chronic). Negative for sore throat. Eyes:  Negative for redness and visual disturbance. Respiratory:  Negative for cough and shortness of breath. Cardiovascular:  Negative for chest pain and palpitations. Gastrointestinal:  Negative for abdominal pain, constipation and nausea. Genitourinary:  Negative for difficulty urinating and dysuria. Musculoskeletal:  Positive for arthralgias (chronic), back pain (chronic) and myalgias. Skin:  Negative for rash. Allergic/Immunologic: Positive for environmental allergies. Negative for food allergies. Neurological:  Negative for dizziness and headaches.         Problem List:     Patient Active Problem List   Diagnosis    Rupture of ulnar collateral ligament of right thumb    Primary osteoarthritis of first carpometacarpal joint of right hand    Other chronic sinusitis    Cigarette nicotine dependence without complication    Chronic allergic rhinitis    Trigger finger, right index finger    Panic attack    Essential hypertension    Breast cyst, right    Classic migraine with aura    GERD (gastroesophageal reflux disease)    Renal cyst    Trigger finger of right thumb    Abnormal EKG    Prediabetes    Intermittent palpitations    Trigger finger, right little finger    Intersection syndrome    Polyarthritis with positive rheumatoid factor (HCC)    Chronic left-sided low back pain with left-sided sciatica    Irritable bowel syndrome with diarrhea    Anxiety    Age related osteoporosis    Vitamin D deficiency    Chronic pain of both shoulders    Medicare annual wellness visit, subsequent    External hemorrhoid    Stage 3b chronic kidney disease (HCC)    Tendinitis of right rotator cuff    Trochanteric bursitis of left hip    Watery eyes    Screening for lung cancer    Financial difficulties    Ambulatory dysfunction    Vision abnormalities    Retina disorder    Goiter    Thyroid nodule    Tobacco abuse    Opacity noted on imaging study    Mild persistent asthma without complication    Emphysema lung (HCC)    Acute sinusitis    Cerumen debris on tympanic membrane of both ears    Osteoarthritis of spine with radiculopathy, lumbar region    Spinal stenosis of lumbar region    Spinal stenosis of lumbar region, unspecified whether neurogenic claudication present      Past Medical and Surgical History:     Past Medical History:   Diagnosis Date    Asthma     Asthmatic bronchitis     Last Assessed: 10/7/2014     Chronic diarrhea     Last Assessed: 8/20/2015     Fibromyalgia     Focal nodular hyperplasia of liver     Last Assessed: 6/11/2015    Herpes zoster     Last Assessed: 3/18/2016    Hypertension     IBS (irritable bowel syndrome)     Intermittent palpitations     Irritable bowel syndrome     Lumbar radiculopathy     Osteoarthritis     Vertigo      Past Surgical History:   Procedure Laterality Date    BREAST BIOPSY      SMALL INTESTINE SURGERY        Family History:     Family History   Problem Relation Age of Onset    Heart attack Mother     Other Mother         Aspiration of Vomit     Asthma Father     Breast cancer Sister     Arthritis Family     Other Family         Musculoskeletal Disease     Osteoporosis Family       Social History:     Social History     Socioeconomic History    Marital status:       Spouse name: None    Number of children: None    Years of education: None    Highest education level: None   Occupational History    Occupation: Unemployed    Tobacco Use    Smoking status: Former     Packs/day: 0.50     Types: Cigarettes    Smokeless tobacco: Never Tobacco comments:     Stopped smoking July 2023   Vaping Use    Vaping Use: Never used   Substance and Sexual Activity    Alcohol use: Not Currently    Drug use: No    Sexual activity: Not Currently     Partners: Male   Other Topics Concern    None   Social History Narrative    Mental Disability     Exercising Regularly     Lives with adult children      Social Determinants of Health     Financial Resource Strain: Low Risk  (11/17/2023)    Overall Financial Resource Strain (CARDIA)     Difficulty of Paying Living Expenses: Not hard at all   Food Insecurity: No Food Insecurity (11/17/2023)    Hunger Vital Sign     Worried About Running Out of Food in the Last Year: Never true     Ran Out of Food in the Last Year: Never true   Transportation Needs: No Transportation Needs (11/17/2023)    PRAPARE - Transportation     Lack of Transportation (Medical): No     Lack of Transportation (Non-Medical):  No   Physical Activity: Inactive (11/17/2023)    Exercise Vital Sign     Days of Exercise per Week: 0 days     Minutes of Exercise per Session: 0 min   Stress: No Stress Concern Present (11/17/2023)    [facility]     Feeling of Stress : Not at all   Social Connections: Socially Isolated (11/17/2023)    Social Connection and Isolation Panel [NHANES]     Frequency of Communication with Friends and Family: More than three times a week     Frequency of Social Gatherings with Friends and Family: More than three times a week     Attends Voodoo Services: Never     Active Member of Clubs or Organizations: No     Attends Club or Organization Meetings: Never     Marital Status: Never    Intimate Partner Violence: Not At Risk (11/17/2023)    Humiliation, Afraid, Rape, and Kick questionnaire     Fear of Current or Ex-Partner: No     Emotionally Abused: No     Physically Abused: No     Sexually Abused: No   Housing Stability: Unknown (11/17/2023)    Housing Stability Vital Sign     Unable to Pay for Housing in the Last Year: No     Number of Places Lived in the Last Year: Not on file     Unstable Housing in the Last Year: No      Medications and Allergies:     Current Outpatient Medications   Medication Sig Dispense Refill    acetaminophen (TYLENOL) 500 mg tablet Take 1 tablet (500 mg total) by mouth every 6 (six) hours as needed for mild pain 60 tablet 0    albuterol (2.5 mg/3 mL) 0.083 % nebulizer solution Take 3 mL (2.5 mg total) by nebulization every 4 (four) hours as needed for wheezing or shortness of breath 90 mL 5    albuterol (PROVENTIL HFA,VENTOLIN HFA) 90 mcg/act inhaler Inhale 2 puffs every 6 (six) hours as needed for wheezing or shortness of breath 18 g 5    amLODIPine (NORVASC) 5 mg tablet Take 1 tablet (5 mg total) by mouth daily 90 tablet 1    azelastine (ASTELIN) 0.1 % nasal spray 2 sprays into each nostril 2 (two) times a day Use in each nostril as directed 30 mL 2    benzonatate (TESSALON PERLES) 100 mg capsule Take 1 capsule (100 mg total) by mouth 3 (three) times a day as needed for cough 20 capsule 0    budesonide-formoterol (Symbicort) 80-4.5 MCG/ACT inhaler Inhale 2 puffs 2 (two) times a day Rinse mouth after use 10.2 g 5    Cholecalciferol (Vitamin D) 50 MCG (2000 UT) CAPS Take 1 capsule (2,000 Units total) by mouth daily 30 capsule 5    escitalopram (LEXAPRO) 10 mg tablet Take 1 tablet (10 mg total) by mouth daily 90 tablet 3    fexofenadine (ALLEGRA) 180 MG tablet Take 180 mg by mouth daily      fluticasone (FLONASE) 50 mcg/act nasal spray 2 sprays into each nostril daily 16 g 3    folic acid (FOLVITE) 1 mg tablet take 1 tablet by mouth daily 30 tablet 5    hydrocortisone (ANUSOL-HC) 2.5 % rectal cream Apply topically 2 (two) times a day 28 g 0    nicotine (NICODERM CQ) 14 mg/24hr TD 24 hr patch Place 1 patch on the skin over 24 hours every 24 hours 28 patch 1    nicotine polacrilex (NICORETTE) 2 mg gum Chew 1 each (2 mg total) as needed for smoking cessation 100 each 1    olopatadine (PATANOL) 0.1 % ophthalmic solution Administer 1 drop to both eyes 2 (two) times a day 5 mL 2    ondansetron (Zofran ODT) 4 mg disintegrating tablet Take 1 tablet (4 mg total) by mouth every 6 (six) hours as needed for nausea or vomiting 20 tablet 3    pantoprazole (PROTONIX) 20 mg tablet Take 1 tablet (20 mg total) by mouth daily 90 tablet 1    acetaminophen (TYLENOL) 650 mg CR tablet Take 1 tablet (650 mg total) by mouth every 8 (eight) hours as needed for mild pain (Patient not taking: Reported on 11/22/2023) 30 tablet 0    Blood Pressure Monitoring (BLOOD PRESSURE CUFF) MISC by Does not apply route 2 (two) times a day (Patient not taking: Reported on 12/1/2023) 1 each 0    carbamide peroxide (DEBROX) 6.5 % otic solution Administer 5 drops into both ears 2 (two) times a day for 4 days 15 mL 0    Diclofenac Sodium (VOLTAREN) 1 % Apply 2 g topically 4 (four) times a day (Patient not taking: Reported on 12/1/2023) 50 g 0    gabapentin (Neurontin) 100 mg capsule Take 3 capsules (300 mg total) by mouth daily at bedtime (Patient not taking: Reported on 11/24/2023) 90 capsule 0    lidocaine (Lidoderm) 5 % Apply 1 patch topically over 12 hours daily Remove & Discard patch within 12 hours or as directed by MD (Patient not taking: Reported on 12/1/2023) 15 patch 0    loratadine (CLARITIN) 10 mg tablet Take 1 tablet (10 mg total) by mouth daily 30 tablet 3     No current facility-administered medications for this visit.      Allergies   Allergen Reactions    Ambrosia Artemisiifolia (Ragweed) Skin Test Facial Swelling    Codeine Other (See Comments)     Heart races    Epinephrine      Pt reports "it makes my heart race, they told me I cant take it."    Ibuprofen Other (See Comments)     Heart racing    Morphine Other (See Comments)     Heart racing    Pollen Extract Sneezing    Tramadol Other (See Comments)     Heart racing      Immunizations:     Immunization History   Administered Date(s) Administered    Pneumococcal Conjugate Vaccine 20-valent (Pcv20), Polysace 11/17/2023    Pneumococcal Polysaccharide PPV23 01/01/2003, 10/01/2008    Tdap 06/08/2018      Health Maintenance:         Topic Date Due    Breast Cancer Screening: Mammogram  01/10/2019    Hepatitis C Screening  Completed    Colorectal Cancer Screening  Discontinued         Topic Date Due    COVID-19 Vaccine (1) Never done      Medicare Screening Tests and Risk Assessments:     Keegan Mendoza is here for her Subsequent Wellness visit. Health Risk Assessment:   Patient rates overall health as poor. Patient feels that their physical health rating is much worse. Patient is very satisfied with their life. Eyesight was rated as same. Hearing was rated as same. Patient feels that their emotional and mental health rating is same. Patients states they are never, rarely angry. Patient states they are sometimes unusually tired/fatigued. Pain experienced in the last 7 days has been a lot. Patient's pain rating has been 10/10. Patient states that she has experienced no weight loss or gain in last 6 months. Patient states health is worse than last year due to chronic back pain with sciatica. Patient states her symptoms began to get worse acutely one week ago. Follows with pain management and orhto and comprehensive spine program.     Depression Screening:   PHQ-2 Score: 0      Fall Risk Screening: In the past year, patient has experienced: no history of falling in past year      Urinary Incontinence Screening:   Patient has not leaked urine accidently in the last six months. Home Safety:  Patient does not have trouble with stairs inside or outside of their home. Patient has working smoke alarms and has working carbon monoxide detector. Home safety hazards include: poor household lighting. Nutrition:   Current diet is Other (please comment) and Regular.  Limited bacteria    Medications:   Patient is currently taking over-the-counter supplements. OTC medications include: see medication list. Patient is able to manage medications. Activities of Daily Living (ADLs)/Instrumental Activities of Daily Living (IADLs):   Walk and transfer into and out of bed and chair?: Yes  Dress and groom yourself?: Yes    Bathe or shower yourself?: Yes    Feed yourself? Yes  Do your laundry/housekeeping?: Yes  Manage your money, pay your bills and track your expenses?: Yes  Make your own meals?: Yes    Do your own shopping?: Yes    Previous Hospitalizations:   Any hospitalizations or ED visits within the last 12 months?: Yes    How many hospitalizations have you had in the last year?: more than 4    Hospitalization Comments: Back, pneumonia, respiratory illness    Advance Care Planning:   Living will: Yes    Durable POA for healthcare: Yes    Advanced directive: Yes      Comments: Eric, daughter, to make decisions for her in the event that she is unable to make decisions for herself. PREVENTIVE SCREENINGS      Cardiovascular Screening:    General: Screening Current    Due for: Lipid Panel      Diabetes Screening:     General: Screening Current      Colorectal Cancer Screening:     General: Screening Not Indicated      Breast Cancer Screening:       Due for: Mammogram        Cervical Cancer Screening:    General: Screening Not Indicated      Osteoporosis Screening:    General: History Osteoporosis    Due for: DXA Axial      Abdominal Aortic Aneurysm (AAA) Screening:        General: Screening Not Indicated      Lung Cancer Screening:     General: Screening Not Indicated    Due for: Low Dose CT (LDCT)      Hepatitis C Screening:    General: Screening Current    Screening, Brief Intervention, and Referral to Treatment (SBIRT)    Screening  Typical number of drinks in a day: 0  Typical number of drinks in a week: 0  Interpretation: Low risk drinking behavior.     Single Item Drug Screening:  How often have you used an illegal drug (including marijuana) or a prescription medication for non-medical reasons in the past year? never    Single Item Drug Screen Score: 0  Interpretation: Negative screen for possible drug use disorder    Other Counseling Topics:   Car/seat belt/driving safety, sunscreen and calcium and vitamin D intake and regular weightbearing exercise. No results found. Physical Exam:     /87 (BP Location: Left arm, Patient Position: Sitting, Cuff Size: Standard)   Pulse 103   Temp 98.3 °F (36.8 °C) (Temporal)   Resp 18   Ht 5' 3.5" (1.613 m)   Wt 61.1 kg (134 lb 12.8 oz)   SpO2 96%   BMI 23.50 kg/m²     Physical Exam  Vitals reviewed. Constitutional:       General: She is not in acute distress. Appearance: Normal appearance. She is well-developed. HENT:      Head: Normocephalic and atraumatic. Nose: Congestion and rhinorrhea present. Eyes:      Extraocular Movements: Extraocular movements intact. Cardiovascular:      Rate and Rhythm: Normal rate and regular rhythm. Pulses: Normal pulses. Heart sounds: No murmur heard. Pulmonary:      Effort: Pulmonary effort is normal. No respiratory distress. Breath sounds: Normal breath sounds. No wheezing. Abdominal:      General: Bowel sounds are normal.      Palpations: Abdomen is soft. Tenderness: There is no abdominal tenderness. Musculoskeletal:      Cervical back: Normal range of motion. Right lower leg: No edema. Left lower leg: No edema. Skin:     General: Skin is warm and dry. Capillary Refill: Capillary refill takes less than 2 seconds. Neurological:      General: No focal deficit present. Mental Status: She is alert and oriented to person, place, and time.    Psychiatric:         Mood and Affect: Mood normal.          Jose Quinonez MD

## 2023-12-01 NOTE — PATIENT INSTRUCTIONS
Call imaging center to schedule DEXA scan for screening for osteoporosis  Call imaging center to schedule appointment for low dose CT Lungs to screen for lung cancer     Medicare Preventive Visit Patient Instructions  Thank you for completing your Welcome to Medicare Visit or Medicare Annual Wellness Visit today. Your next wellness visit will be due in one year (12/1/2024). The screening/preventive services that you may require over the next 5-10 years are detailed below. Some tests may not apply to you based off risk factors and/or age. Screening tests ordered at today's visit but not completed yet may show as past due. Also, please note that scanned in results may not display below. Preventive Screenings:  Service Recommendations Previous Testing/Comments   Colorectal Cancer Screening  * Colonoscopy    * Fecal Occult Blood Test (FOBT)/Fecal Immunochemical Test (FIT)  * Fecal DNA/Cologuard Test  * Flexible Sigmoidoscopy Age: 43-73 years old   Colonoscopy: every 10 years (may be performed more frequently if at higher risk)  OR  FOBT/FIT: every 1 year  OR  Cologuard: every 3 years  OR  Sigmoidoscopy: every 5 years  Screening may be recommended earlier than age 39 if at higher risk for colorectal cancer. Also, an individualized decision between you and your healthcare provider will decide whether screening between the ages of 77-80 would be appropriate. Colonoscopy: 03/25/2005  FOBT/FIT: Not on file  Cologuard: Not on file  Sigmoidoscopy: Not on file          Breast Cancer Screening Age: 36 years old  Frequency: every 1-2 years  Not required if history of left and right mastectomy Mammogram: 01/10/2017        Cervical Cancer Screening Between the ages of 21-29, pap smear recommended once every 3 years. Between the ages of 32-69, can perform pap smear with HPV co-testing every 5 years.    Recommendations may differ for women with a history of total hysterectomy, cervical cancer, or abnormal pap smears in past. Pap Smear: Not on file        Hepatitis C Screening Once for adults born between 1945 and 1965  More frequently in patients at high risk for Hepatitis C Hep C Antibody: Not on file        Diabetes Screening 1-2 times per year if you're at risk for diabetes or have pre-diabetes Fasting glucose: 103 mg/dL (2/24/2022)  A1C: 6.0 % (10/15/2021)      Cholesterol Screening Once every 5 years if you don't have a lipid disorder. May order more often based on risk factors. Lipid panel: 10/15/2021          Other Preventive Screenings Covered by Medicare:  Abdominal Aortic Aneurysm (AAA) Screening: covered once if your at risk. You're considered to be at risk if you have a family history of AAA. Lung Cancer Screening: covers low dose CT scan once per year if you meet all of the following conditions: (1) Age 48-67; (2) No signs or symptoms of lung cancer; (3) Current smoker or have quit smoking within the last 15 years; (4) You have a tobacco smoking history of at least 20 pack years (packs per day multiplied by number of years you smoked); (5) You get a written order from a healthcare provider. Glaucoma Screening: covered annually if you're considered high risk: (1) You have diabetes OR (2) Family history of glaucoma OR (3)  aged 48 and older OR (3)  American aged 72 and older  Osteoporosis Screening: covered every 2 years if you meet one of the following conditions: (1) You're estrogen deficient and at risk for osteoporosis based off medical history and other findings; (2) Have a vertebral abnormality; (3) On glucocorticoid therapy for more than 3 months; (4) Have primary hyperparathyroidism; (5) On osteoporosis medications and need to assess response to drug therapy. Last bone density test (DXA Scan): Not on file. HIV Screening: covered annually if you're between the age of 14-79. Also covered annually if you are younger than 13 and older than 72 with risk factors for HIV infection.  For pregnant patients, it is covered up to 3 times per pregnancy. Immunizations:  Immunization Recommendations   Influenza Vaccine Annual influenza vaccination during flu season is recommended for all persons aged >= 6 months who do not have contraindications   Pneumococcal Vaccine   * Pneumococcal conjugate vaccine = PCV13 (Prevnar 13), PCV15 (Vaxneuvance), PCV20 (Prevnar 20)  * Pneumococcal polysaccharide vaccine = PPSV23 (Pneumovax) Adults 41-23 yo with certain risk factors or if 69+ yo  If never received any pneumonia vaccine: recommend Prevnar 20 (PCV20)  Give PCV20 if previously received 1 dose of PCV13 or PPSV23   Hepatitis B Vaccine 3 dose series if at intermediate or high risk (ex: diabetes, end stage renal disease, liver disease)   Respiratory syncytial virus (RSV) Vaccine - COVERED BY MEDICARE PART D  * RSVPreF3 (Arexvy) CDC recommends that adults 61years of age and older may receive a single dose of RSV vaccine using shared clinical decision-making (SCDM)   Tetanus (Td) Vaccine - COST NOT COVERED BY MEDICARE PART B Following completion of primary series, a booster dose should be given every 10 years to maintain immunity against tetanus. Td may also be given as tetanus wound prophylaxis. Tdap Vaccine - COST NOT COVERED BY MEDICARE PART B Recommended at least once for all adults. For pregnant patients, recommended with each pregnancy. Shingles Vaccine (Shingrix) - COST NOT COVERED BY MEDICARE PART B  2 shot series recommended in those 19 years and older who have or will have weakened immune systems or those 50 years and older     Health Maintenance Due:      Topic Date Due    Breast Cancer Screening: Mammogram  01/10/2019    Hepatitis C Screening  Completed    Colorectal Cancer Screening  Discontinued     Immunizations Due:      Topic Date Due    COVID-19 Vaccine (1) Never done     Advance Directives   What are advance directives?   Advance directives are legal documents that state your wishes and plans for medical care. These plans are made ahead of time in case you lose your ability to make decisions for yourself. Advance directives can apply to any medical decision, such as the treatments you want, and if you want to donate organs. What are the types of advance directives? There are many types of advance directives, and each state has rules about how to use them. You may choose a combination of any of the following:  Living will: This is a written record of the treatment you want. You can also choose which treatments you do not want, which to limit, and which to stop at a certain time. This includes surgery, medicine, IV fluid, and tube feedings. Durable power of  for healthcare [facility]): This is a written record that states who you want to make healthcare choices for you when you are unable to make them for yourself. This person, called a proxy, is usually a family member or a friend. You may choose more than 1 proxy. Do not resuscitate (DNR) order:  A DNR order is used in case your heart stops beating or you stop breathing. It is a request not to have certain forms of treatment, such as CPR. A DNR order may be included in other types of advance directives. Medical directive: This covers the care that you want if you are in a coma, near death, or unable to make decisions for yourself. You can list the treatments you want for each condition. Treatment may include pain medicine, surgery, blood transfusions, dialysis, IV or tube feedings, and a ventilator (breathing machine). Values history: This document has questions about your views, beliefs, and how you feel and think about life. This information can help others choose the care that you would choose. Why are advance directives important? An advance directive helps you control your care. Although spoken wishes may be used, it is better to have your wishes written down. Spoken wishes can be misunderstood, or not followed.  Treatments may be given even if you do not want them. An advance directive may make it easier for your family to make difficult choices about your care. © Copyright [employer] 2018 Information is for End User's use only and may not be sold, redistributed or otherwise used for commercial purposes.  All illustrations and images included in CareNotes® are the copyrighted property of [employer] or [address] [place] [address]

## 2023-12-02 ENCOUNTER — HOSPITAL ENCOUNTER (OUTPATIENT)
Dept: RADIOLOGY | Facility: HOSPITAL | Age: 78
Discharge: HOME | End: 2023-12-02
Attending: ANESTHESIOLOGY
Payer: MEDICARE

## 2023-12-02 DIAGNOSIS — M48.061 SPINAL STENOSIS OF LUMBAR REGION, UNSPECIFIED WHETHER NEUROGENIC CLAUDICATION PRESENT: ICD-10-CM

## 2023-12-02 DIAGNOSIS — M54.16 LUMBAR RADICULOPATHY: ICD-10-CM

## 2023-12-02 PROCEDURE — 72148 MRI LUMBAR SPINE W/O DYE: CPT

## 2023-12-02 NOTE — ASSESSMENT & PLAN NOTE
Screening mammogram ordered today. CT lung screening previously ordered by pulmonology. Encouraged patient to complete this screening. Provided number for imaging center and instructed patient to call to schedule. Declined flu shot today. Otherwise up to date on vaccinations    Request hospital bed, order sent.

## 2023-12-22 DIAGNOSIS — J30.9 CHRONIC ALLERGIC RHINITIS: ICD-10-CM

## 2023-12-22 DIAGNOSIS — F17.210 CIGARETTE NICOTINE DEPENDENCE WITHOUT COMPLICATION: ICD-10-CM

## 2023-12-22 DIAGNOSIS — J45.30 MILD PERSISTENT ASTHMA WITHOUT COMPLICATION: ICD-10-CM

## 2023-12-22 DIAGNOSIS — K21.9 GASTROESOPHAGEAL REFLUX DISEASE WITHOUT ESOPHAGITIS: ICD-10-CM

## 2023-12-22 RX ORDER — FLUTICASONE PROPIONATE 50 MCG
2 SPRAY, SUSPENSION (ML) NASAL DAILY
Qty: 16 G | Refills: 3 | Status: SHIPPED | OUTPATIENT
Start: 2023-12-22

## 2023-12-22 RX ORDER — ONDANSETRON 4 MG/1
4 TABLET, ORALLY DISINTEGRATING ORAL EVERY 6 HOURS PRN
Qty: 20 TABLET | Refills: 3 | Status: SHIPPED | OUTPATIENT
Start: 2023-12-22

## 2023-12-22 RX ORDER — ALBUTEROL SULFATE 90 UG/1
2 AEROSOL, METERED RESPIRATORY (INHALATION) EVERY 6 HOURS PRN
Qty: 18 G | Refills: 5 | Status: SHIPPED | OUTPATIENT
Start: 2023-12-22

## 2023-12-22 RX ORDER — AZELASTINE 1 MG/ML
2 SPRAY, METERED NASAL 2 TIMES DAILY
Qty: 30 ML | Refills: 2 | Status: SHIPPED | OUTPATIENT
Start: 2023-12-22

## 2023-12-31 PROBLEM — J01.90 ACUTE SINUSITIS: Status: RESOLVED | Noted: 2023-11-01 | Resolved: 2023-12-31

## 2024-01-02 ENCOUNTER — TELEPHONE (OUTPATIENT)
Dept: NEPHROLOGY | Facility: CLINIC | Age: 79
End: 2024-01-02

## 2024-01-02 NOTE — TELEPHONE ENCOUNTER
Pt called to schedule overdue follow up with Dr Reyes. She was last seen in jan 2022. Pt was scheduled on 3/27/24 with Dr Reyes in the [facility].

## 2024-01-06 DIAGNOSIS — J30.9 CHRONIC ALLERGIC RHINITIS: ICD-10-CM

## 2024-01-08 RX ORDER — AZELASTINE HYDROCHLORIDE 137 UG/1
SPRAY, METERED NASAL
Qty: 30 ML | Refills: 1 | Status: SHIPPED | OUTPATIENT
Start: 2024-01-08

## 2024-01-10 NOTE — PROGRESS NOTES
Assessment:  1. Lumbar radiculopathy        Plan:  Patient will be scheduled for a left L4 TFESI to address the inflammatory component of the patient's pain.  Complete risks and benefits including bleeding, infection, tissue reaction, nerve injury and allergic reaction were discussed. The patient was agreeable and verbalized an understanding  Discussed a trial of pregabalin however patient defers at this time  Patient is planning to initiate in physical therapy  Will avoid NSAIDs secondary to CKD  Patient may continue Tylenol as needed and should not exceed more than 3000 mg in 24 hours  Follow-up after procedure or sooner if needed    History of Present Illness:    The patient is a 78 y.o. female with a history of CKD, hypertension and emphysema last seen on 11/24/2023 who presents for a follow up office visit in regards to chronic lumbosacral back pain that radiates into the anterior aspect of the left lower extremity to the ankl she denies right-sided symptoms, bowel or bladder incontinence or saddle anesthesia. Patient did trial of gabapentin however it was causing side effects therefore it was discontinued.  She does not find much relief with Tylenol and is unable to take NSAIDs secondary to CKD.  She has not done any recent formal physical therapy but does plan to initiate this in the near upcoming future.  She did have an MRI of the lumbar spine which shows multilevel arthritis with a left disc protrusion causing mild central and moderate left lateral recess stenosis.  There is also synovial cyst in the right lateral recess.    The patient rates her pain a 4 out of 10 on the numeric pain rating scale    I have personally reviewed and/or updated the patient's past medical history, past surgical history, family history, social history, current medications, allergies, and vital signs today.       Review of Systems:    Review of Systems      Past Medical History:   Diagnosis Date    Asthma     Asthmatic  bronchitis     Last Assessed: 10/7/2014     Chronic diarrhea     Last Assessed: 8/20/2015     Fibromyalgia     Focal nodular hyperplasia of liver     Last Assessed: 6/11/2015    Herpes zoster     Last Assessed: 3/18/2016    Hypertension     IBS (irritable bowel syndrome)     Intermittent palpitations     Irritable bowel syndrome     Lumbar radiculopathy     Osteoarthritis     Vertigo        Past Surgical History:   Procedure Laterality Date    BREAST BIOPSY      SMALL INTESTINE SURGERY         Family History   Problem Relation Age of Onset    Heart attack Mother     Other Mother         Aspiration of Vomit     Asthma Father     Breast cancer Sister     Arthritis Family     Other Family         Musculoskeletal Disease     Osteoporosis Family        Social History     Occupational History    Occupation: Unemployed    Tobacco Use    Smoking status: Former     Current packs/day: 0.50     Types: Cigarettes    Smokeless tobacco: Never    Tobacco comments:     Stopped smoking July 2023   Vaping Use    Vaping status: Never Used   Substance and Sexual Activity    Alcohol use: Not Currently    Drug use: No    Sexual activity: Not Currently     Partners: Male         Current Outpatient Medications:     acetaminophen (TYLENOL) 500 mg tablet, Take 1 tablet (500 mg total) by mouth every 6 (six) hours as needed for mild pain, Disp: 60 tablet, Rfl: 0    albuterol (2.5 mg/3 mL) 0.083 % nebulizer solution, Take 3 mL (2.5 mg total) by nebulization every 4 (four) hours as needed for wheezing or shortness of breath, Disp: 90 mL, Rfl: 5    albuterol (PROVENTIL HFA,VENTOLIN HFA) 90 mcg/act inhaler, Inhale 2 puffs every 6 (six) hours as needed for wheezing or shortness of breath, Disp: 18 g, Rfl: 5    amLODIPine (NORVASC) 5 mg tablet, Take 1 tablet (5 mg total) by mouth daily, Disp: 90 tablet, Rfl: 1    Azelastine HCl 137 MCG/SPRAY SOLN, INSTILL 2 SPRAYS INTO EACH NOSTRIL TWICE A DAY AS DIRECTED, Disp: 30 mL, Rfl: 1    benzonatate  (TESSALON PERLES) 100 mg capsule, Take 1 capsule (100 mg total) by mouth 3 (three) times a day as needed for cough, Disp: 20 capsule, Rfl: 0    Cholecalciferol (Vitamin D) 50 MCG (2000 UT) CAPS, Take 1 capsule (2,000 Units total) by mouth daily, Disp: 30 capsule, Rfl: 5    escitalopram (LEXAPRO) 10 mg tablet, Take 1 tablet (10 mg total) by mouth daily, Disp: 90 tablet, Rfl: 3    fexofenadine (ALLEGRA) 180 MG tablet, Take 180 mg by mouth daily, Disp: , Rfl:     fluticasone (FLONASE) 50 mcg/act nasal spray, 2 sprays into each nostril daily, Disp: 16 g, Rfl: 3    folic acid (FOLVITE) 1 mg tablet, take 1 tablet by mouth daily, Disp: 30 tablet, Rfl: 5    hydrocortisone (ANUSOL-HC) 2.5 % rectal cream, Apply topically 2 (two) times a day, Disp: 28 g, Rfl: 0    nicotine (NICODERM CQ) 14 mg/24hr TD 24 hr patch, Place 1 patch on the skin over 24 hours every 24 hours, Disp: 28 patch, Rfl: 1    nicotine polacrilex (NICORETTE) 2 mg gum, Chew 1 each (2 mg total) as needed for smoking cessation, Disp: 100 each, Rfl: 1    olopatadine (PATANOL) 0.1 % ophthalmic solution, Administer 1 drop to both eyes 2 (two) times a day, Disp: 5 mL, Rfl: 2    ondansetron (Zofran ODT) 4 mg disintegrating tablet, Take 1 tablet (4 mg total) by mouth every 6 (six) hours as needed for nausea or vomiting, Disp: 20 tablet, Rfl: 3    pantoprazole (PROTONIX) 20 mg tablet, Take 1 tablet (20 mg total) by mouth daily, Disp: 90 tablet, Rfl: 1    acetaminophen (TYLENOL) 650 mg CR tablet, Take 1 tablet (650 mg total) by mouth every 8 (eight) hours as needed for mild pain (Patient not taking: Reported on 11/22/2023), Disp: 30 tablet, Rfl: 0    Blood Pressure Monitoring (BLOOD PRESSURE CUFF) MISC, by Does not apply route 2 (two) times a day (Patient not taking: Reported on 12/1/2023), Disp: 1 each, Rfl: 0    budesonide-formoterol (Symbicort) 80-4.5 MCG/ACT inhaler, Inhale 2 puffs 2 (two) times a day Rinse mouth after use, Disp: 10.2 g, Rfl: 5    carbamide peroxide  "(DEBROX) 6.5 % otic solution, Administer 5 drops into both ears 2 (two) times a day for 4 days, Disp: 15 mL, Rfl: 0    Diclofenac Sodium (VOLTAREN) 1 %, Apply 2 g topically 4 (four) times a day (Patient not taking: Reported on 12/1/2023), Disp: 50 g, Rfl: 0    lidocaine (Lidoderm) 5 %, Apply 1 patch topically over 12 hours daily Remove & Discard patch within 12 hours or as directed by MD (Patient not taking: Reported on 12/1/2023), Disp: 15 patch, Rfl: 0    loratadine (CLARITIN) 10 mg tablet, Take 1 tablet (10 mg total) by mouth daily, Disp: 30 tablet, Rfl: 3    Allergies   Allergen Reactions    Ambrosia Artemisiifolia (Ragweed) Skin Test Facial Swelling    Codeine Other (See Comments)     Heart races    Epinephrine      Pt reports \"it makes my heart race, they told me I cant take it.\"    Ibuprofen Other (See Comments)     Heart racing    Morphine Other (See Comments)     Heart racing    Pollen Extract Sneezing    Tramadol Other (See Comments)     Heart racing       Physical Exam:    /87   Pulse 94   Ht 5' 2\" (1.575 m)   Wt 60.8 kg (134 lb)   BMI 24.51 kg/m²     Constitutional:normal, well developed, well nourished, alert, in no distress and non-toxic and no overt pain behavior.  Eyes:anicteric  HEENT:grossly intact  Neck:supple, symmetric, trachea midline and no masses   Pulmonary:even and unlabored  Cardiovascular:No edema or pitting edema present  Skin:Normal without rashes or lesions and well hydrated  Psychiatric:Mood and affect appropriate  Neurologic:Cranial Nerves II-XII grossly intact  Musculoskeletal:normal gait.  Positive seated straight leg raise on the left      Imaging  FL spine and pain procedure    (Results Pending)       MRI LUMBAR SPINE WITHOUT CONTRAST     INDICATION: M48.061: Spinal stenosis, lumbar region without neurogenic claudication  M54.16: Radiculopathy, lumbar region.     COMPARISON: February 3, 2020     TECHNIQUE:  Multiplanar, multisequence imaging of the lumbar spine was " performed. .        IMAGE QUALITY:  Diagnostic     FINDINGS:     VERTEBRAL BODIES:  There are 5 lumbar type vertebral bodies.  Normal alignment of the lumbar spine.  No spondylolysis or spondylolisthesis. No scoliosis.  No compression fracture.    Normal marrow signal is identified within the visualized bony   structures.  No discrete marrow lesion.     SACRUM:  Normal signal within the sacrum. No evidence of insufficiency or stress fracture.     DISTAL CORD AND CONUS:  Normal size and signal within the distal cord and conus.     PARASPINAL SOFT TISSUES:  Paraspinal soft tissues are unremarkable.     LOWER THORACIC DISC SPACES:  Normal disc height and signal.  No disc herniation, canal stenosis or foraminal narrowing.     LUMBAR DISC SPACES:     L1-L2: Moderate facet hypertrophy without central or foraminal narrowing.     L2-L3: Moderate facet hypertrophy. No significant central or foraminal narrowing.     L3-L4: Mild to moderate facet hypertrophy without central or foraminal narrowing.     L4-L5: Severe facet hypertrophy with ligamentous infolding and mild annular bulge with small left paramedian protrusion type disc herniation. Mild central and moderate left lateral recess stenosis noted. Synovial facet cyst identified on the right in the   lateral recess slightly superior to the disc level measures 7 x 3 x 7 mm results in right lateral recess stenosis contributing to right lateral recess stenosis. Prior described left-sided synovial facet cyst has resolved in the interval.     L5-S1: Small central protrusion type disc herniation without significant central or foraminal stenosis. Mild facet hypertrophy.     OTHER FINDINGS:  None.     IMPRESSION:     Spondylotic degenerative changes are seen particularly at L4-5. Persistent right-sided synovial facet cyst noted contributing to right lateral recess stenosis with resolution of prior described left-sided facet Anoheal facet cyst. New small left   paramedian protrusion  type disc herniation is superimposed on annular bulge contributing to mild central and moderate left lateral recess stenosis.     Degenerative changes are seen at remaining levels as described.  Orders Placed This Encounter   Procedures    FL spine and pain procedure

## 2024-01-11 ENCOUNTER — TELEPHONE (OUTPATIENT)
Dept: FAMILY MEDICINE CLINIC | Facility: CLINIC | Age: 79
End: 2024-01-11

## 2024-01-11 ENCOUNTER — OFFICE VISIT (OUTPATIENT)
Dept: PAIN MEDICINE | Facility: CLINIC | Age: 79
End: 2024-01-11
Payer: MEDICARE

## 2024-01-11 VITALS
HEART RATE: 94 BPM | SYSTOLIC BLOOD PRESSURE: 151 MMHG | BODY MASS INDEX: 24.66 KG/M2 | WEIGHT: 134 LBS | HEIGHT: 62 IN | DIASTOLIC BLOOD PRESSURE: 87 MMHG

## 2024-01-11 DIAGNOSIS — M54.16 LUMBAR RADICULOPATHY: Primary | ICD-10-CM

## 2024-01-11 PROCEDURE — 99214 OFFICE O/P EST MOD 30 MIN: CPT | Performed by: NURSE PRACTITIONER

## 2024-01-11 NOTE — PATIENT INSTRUCTIONS
Epidural Steroid Injection   AMBULATORY CARE:   What you need to know about an epidural steroid injection (NEERU):  An NEERU is a procedure to inject steroid medicine into the epidural space. The epidural space is between your spinal cord and vertebrae. Steroids reduce inflammation and fluid buildup in your spine that may be causing pain. You may be given pain medicine along with the steroids.        How to prepare for an NEERU:  Your provider will talk to you about how to prepare for your procedure. He or she will tell you what medicines to take or not take on the day of your procedure. You may need to stop taking blood thinners or other medicines several days before your procedure. You may need to adjust any diabetes medicine you take on the day of your procedure. Steroid medicine can increase your blood sugar level. Arrange for someone to drive you home when you are discharged.  What will happen during an NEERU:   You will be given medicine to numb the procedure area. You will be awake for the procedure, but you will not feel pain. You may also be given medicine to help you relax. Contrast liquid will be used to help your provider see the area better. Tell the provider if you have ever had an allergic reaction to contrast liquid.    Your provider may place the needle into your neck area, middle of your back, or tailbone area. He or she may inject the medicine next to the nerves that are causing your pain. He or she may instead inject the medicine into a larger area of the epidural space. This helps the medicine spread to more nerves. Your provider will use a fluoroscope to help guide the needle to the right place. A fluoroscope is a type of x-ray. After the procedure, a bandage will be placed over the injection site to prevent infection.    What will happen after an NEERU:  You will have a bandage over the injection site to prevent infection. Your provider will tell you when you can bathe and any activity guidelines. You  will be able to go home.  Risks of an NEERU:  You may have temporary or permanent nerve damage or paralysis. You may have bleeding or develop a serious infection, such as meningitis (swelling of the brain coverings). An abscess may also develop. An abscess is a pus-filled area under the skin. You may need surgery to fix the abscess. You may have a seizure, anxiety, or trouble sleeping. If you are a man, you may have temporary erectile dysfunction (not able to have an erection).   Call your local emergency number (911 in the [place]) if:   You have a seizure.    You have trouble moving your legs.    Seek care immediately if:   Blood soaks through your bandage.    You have a fever or chills, severe back pain, and the procedure area is sensitive to the touch.    You cannot control when you urinate or have a bowel movement.    Call your doctor if:   You have weakness or numbness in your legs.    Your wound is red, swollen, or draining pus.    You have nausea or are vomiting.    Your face or neck is red and you feel warm.    You have more pain than you had before the procedure.    You have swelling in your hands or feet.    You have questions or concerns about your condition or care.    Care for your wound as directed:  You may remove the bandage before you go to bed the day of your procedure. You may take a shower, but do not take a bath for at least 24 hours.   Self-care:   Do not drive,  use machines, or do strenuous activity for 24 hours after your procedure or as directed.     Continue other treatments  as directed. Steroid injections alone will not control your pain. The injections are meant to be used with other treatments, such as physical therapy.    Follow up with your doctor as directed:  Write down your questions so you remember to ask them during your visits.   © Copyright [employer] 2023 Information is for End User's use only and may not be sold, redistributed or otherwise used for commercial purposes.  The above  information is an  only. It is not intended as medical advice for individual conditions or treatments. Talk to your doctor, nurse or pharmacist before following any medical regimen to see if it is safe and effective for you.

## 2024-01-11 NOTE — TELEPHONE ENCOUNTER
Patient in need of a new DME order for a Bed but must be signed by an attending. Dr Saleem originated on 12/02/23.   Records to accompanied order ready to fax are at  waiting.

## 2024-01-12 NOTE — TELEPHONE ENCOUNTER
Attempted to order via [facility] based on chart review    Spinal stenosis alone is not a qualifying condition - any urinary incontinence or pressure ulcers known?

## 2024-01-15 ENCOUNTER — TELEPHONE (OUTPATIENT)
Dept: RADIOLOGY | Facility: CLINIC | Age: 79
End: 2024-01-15

## 2024-01-15 DIAGNOSIS — M54.16 LUMBAR RADICULOPATHY: Primary | ICD-10-CM

## 2024-01-15 NOTE — TELEPHONE ENCOUNTER
Good morning! I called and spoke with patient made her aware we needed to cx her procedure due to insurance (below message)    Violetta please add script for PT once placed in chart let me know and I can mail this out to her.    Thank you!

## 2024-01-15 NOTE — TELEPHONE ENCOUNTER
----- Message from EILEEN Ma sent at 1/12/2024  2:53 PM EST -----  Regarding: RE: [place]- REQUEST FOR SOONER APPT  Aware. We will inform the patient they need to complete PT first if that's the case. Thanks!!  ----- Message -----  From: Susan Paul  Sent: 1/12/2024  11:59 AM EST  To: Marie Mart; EILEEN Ma  Subject: RE: [place]- REQUEST FOR SOONER APPT           Ok thank you Marie! I didn't actually look at the note I just went based of patient.     Violetta I'm cc'ing you as an FYI   ----- Message -----  From: Marie Mart  Sent: 1/11/2024   4:04 PM EST  To: Susan Paul  Subject: RE: [place]- REQUEST FOR SOONER APPT           Susan, per Violetta's note patients pain level is a 4 and she hasn't started pt yet. I most likely wont even be able to get this approved at all. She will need to complete pt and her pain level has to be a 6 or greater. Please advise  ----- Message -----  From: Susan Paul  Sent: 1/11/2024   8:27 AM EST  To: [facility]  Subject: [place]- REQUEST FOR SOONER APPT               Good morning. Please obtain auth for patient- she would like to be called for a sooner appt if we have any cancellations.     Thank you  Have a great day!    Susan

## 2024-01-16 PROBLEM — H61.23 CERUMEN DEBRIS ON TYMPANIC MEMBRANE OF BOTH EARS: Status: RESOLVED | Noted: 2023-11-17 | Resolved: 2024-01-16

## 2024-01-24 ENCOUNTER — EVALUATION (OUTPATIENT)
Dept: PHYSICAL THERAPY | Facility: REHABILITATION | Age: 79
End: 2024-01-24
Payer: MEDICARE

## 2024-01-24 DIAGNOSIS — M54.16 LUMBAR RADICULOPATHY: Primary | ICD-10-CM

## 2024-01-24 PROCEDURE — 97110 THERAPEUTIC EXERCISES: CPT

## 2024-01-24 PROCEDURE — 97112 NEUROMUSCULAR REEDUCATION: CPT

## 2024-01-24 PROCEDURE — 97161 PT EVAL LOW COMPLEX 20 MIN: CPT

## 2024-01-24 NOTE — PROGRESS NOTES
PT Evaluation     Today's date: 2024  Patient name: Mireya Yi  : 1945  MRN: [id number]  Referring provider: Violetta Avendaño CRNP  Dx:   Encounter Diagnosis     ICD-10-CM    1. Lumbar radiculopathy  M54.16 Ambulatory referral to Physical Therapy          Start Time: 0815  Stop Time: 0910  Total time in clinic (min): 55 minutes    Assessment  Assessment details: Mireya Yi is a 78 y.o. female presenting with acute exacerbation of chronic LBP and resultant L sided radicular symptoms through posterior LLE into foot.  Pt did not respond to repeated movements in both flexion and extension.  Primary impairments include L sided lumbar and LLE pain with functional activities, L hip weakness, lumbar AROM dysfunction, paraspinal motor control dysfunction.  Educated pt on anatomy and physiology of diagnosis.  Will benefit from skilled PT interventions for community reintegration, ADL management/independence, return to work/hobbies.  Provided pt with written home exercise program to be completed daily.  Educated pt on centralization vs peripheralization.    Impairments: abnormal coordination, abnormal muscle firing, abnormal muscle tone, abnormal or restricted ROM, activity intolerance, impaired balance, impaired physical strength, lacks appropriate home exercise program, pain with function, poor posture  and poor body mechanics  Functional limitations: prolonged sitting, prolonged lying, ADLs, lifting, carrying, stooping  Symptom irritability: highUnderstanding of Dx/Px/POC: good   Prognosis: fair    Goals    Short Term Goals:  In 4 weeks, the patient will:  1. Improve L hip strength in all planes of motion to at least 3+/5 MMT  2. Improve lumbar flexion AROM to at least 75%  3. Supervision with HEP for self-care    Long Term Goals:  In 8 weeks, the patient will:  1. Sleep 4+ hours at a time without pain aggravation causing sleep dysfunction  2. FOTO to greater than predicted value  3. Independent with HEP for  "self-care      Plan  Plan details: Pt only able to schedule 1x per week at this time due to busy schedule  Patient would benefit from: skilled physical therapy  Planned modality interventions: manual electrical stimulation  Planned therapy interventions: abdominal trunk stabilization, activity modification, balance, balance/weight bearing training, behavior modification, body mechanics training, community reintegration, coordination, fine motor coordination training, flexibility, functional ROM exercises, gait training, graded activity, graded exercise, graded motor, home exercise program, work reintegration, therapeutic training, therapeutic exercise, therapeutic activities, stretching, strengthening, self care, postural training, patient education, neuromuscular re-education, motor coordination training, massage, manual therapy, joint mobilization and ADL training  Frequency: 1x week  Duration in weeks: 8  Plan of Care beginning date: 1/24/2024  Plan of Care expiration date: 3/20/2024  Treatment plan discussed with: patient      Subjective    Pain Location: L sided lumbar pain with LLE radicular symptoms to foot, L gluteal \"swelling\"  Pain Intensity: shooting pain  ALEKSANDR: had a L lumbar tingling sensation for a couple days then pain was exacerbated; insidious onset  DOI: started last year but resolved after two months; pain exacerbated last month and severe pain now  Aggravating Factors: sleep dysfunction - get up every hour, prolonged sitting, laying down, bed mobility  Alleviating Factors: moves carefully, topical analgesic, lidocaine patches, oral analgesics prn  Living Situation: lives with family member - assists with tying shoes and other ADLs  Constitutional S/S: denies paresthesias   Goals: \"I don't want this pain\"  PLOF: walk every day - states that she walks all day     MRI Impression:  Spondylotic degenerative changes are seen particularly at L4-5. Persistent right-sided synovial facet cyst noted " contributing to right lateral recess stenosis with resolution of prior described left-sided facet Anoheal facet cyst. New small left   paramedian protrusion type disc herniation is superimposed on annular bulge contributing to mild central and moderate left lateral recess stenosis.     Degenerative changes are seen at remaining levels as described.      Objective       Postural Findings:     Head Position x Protracted   Neutral   Retracted   Scapular Position  Protracted x  Neutral   Retracted   Thoracic Spine  x Inc Kyphosis  Neutral       Lumbar Spine   Inc Lordosis   Neutral x Dec Lordosis   Pelvis   Anterior Tilt  Neutral x  Posterior Tilt   Iliac Crest   L elevated x Neutral   R elevated   Feet   Pronated x Neutral   Supinated   Lateral Shift   Right   Left x None      Strength and ROM evaluated B from a regional biomechanical perspective and values relevant to this episode recorded in tables below.     ROM:   Joint / Motion  Right  Left    Lumbar Flexion  50%     Lumbar Extension  50%     Lumbar Sidebending  50% 50%   Lumbar rotation 50% 50%   Hip ER  WFL WFL   Hip IR  WFL WFL         Strength: MMT revealed the following findings.  Joint Motion Right Left   Hip Flexion 4/5 3-/5   Hip Abduction 4/5 3-/5   Hip Adduction 4/5 3-/5   Hip Extension 4/5 3-/5   Knee Extension 4/5 3+/5   Knee Flexion 4/5 3+/5   Ankle Plantarflexion 4/5 4/5   Ankle Dorsiflexion 4/5 4/5         Repeated Movements:                                                       DURING MVMT.                     RESPONSE AFTER  Standing Flexion worse No change   Standing Extension worse No change                                                        DURING MVMT.                     RESPONSE AFTER  Lying Flexion Not tested Not tested   Lying Extension worse No change         LE Myotomes:  Nerve root Test Action RIGHT  LEFT   L1-L2 Hip Flexion intact intact   L3 Knee Extension intact intact   L4  Ankle DF intact intact   L5 Great toe Extension intact  "intact   S1 Ankle PF, ankle eversion, hip extension, knee flexion intact intact      S2 Knee Flexion intact intact      Additional Assessments:  Pain with palpation noted: significant tenderness to palpation - only able to tolerate very light testing  Passive intervertebral motion: unable to fully assess due to anxiety/apprehension and pain        Special Tests:  Lumbar Specific and Neural Tension  Test / Measure  1/24/2024   Straight Leg raise +   Crossed straight leg raise +   Prone instability test                +       SI Joint Screen: unremarkable     Treatment Based Classification: Primary stability       Flowsheet Rows      Flowsheet Row Most Recent Value   PT/OT G-Codes    Current Score 61   Projected Score 63               Precautions: HTN, asthma, tobacco use    POC expires Unit limit Auth Expiration date PT/OT + Visit Limit?   BOMN BOMN Pening BOMN     Visit/Unit Tracking  AUTH Status:  Date 1/24        Pend Used 1         Remaining             Pertinent Findings:                                                                                            Test / Measure  1/24/2024   FOTO (Predicted 63) 61   L hip global MMT 3-/5   L/s flex AROM 50%         Manuals 1/24            L/s CPA, STM, distr MM 5'                                                   Neuro Re-Ed             PPT 10x3\"            LTRs 10x            Bridges 10x            Clamshells P! S/L            Pallof press                                       Ther Ex             HEP review / edu 10'            NuStep             Stand hip 3 way             STS             FSU             LSU             SLR                          Ther Activity                                       Gait Training                                       Modalities                                            "

## 2024-01-31 ENCOUNTER — OFFICE VISIT (OUTPATIENT)
Dept: PHYSICAL THERAPY | Facility: REHABILITATION | Age: 79
End: 2024-01-31
Payer: MEDICARE

## 2024-01-31 DIAGNOSIS — M54.16 LUMBAR RADICULOPATHY: Primary | ICD-10-CM

## 2024-01-31 PROBLEM — Z00.00 MEDICARE ANNUAL WELLNESS VISIT, SUBSEQUENT: Status: RESOLVED | Noted: 2021-03-23 | Resolved: 2024-01-31

## 2024-01-31 PROCEDURE — 97112 NEUROMUSCULAR REEDUCATION: CPT

## 2024-01-31 PROCEDURE — 97140 MANUAL THERAPY 1/> REGIONS: CPT

## 2024-01-31 PROCEDURE — 97110 THERAPEUTIC EXERCISES: CPT

## 2024-01-31 NOTE — PROGRESS NOTES
"Daily Note     Today's date: 2024  Patient name: Mireya Yi  : 1945  MRN: [id number]  Referring provider: Violetta Avendaño CRNP  Dx:   Encounter Diagnosis     ICD-10-CM    1. Lumbar radiculopathy  M54.16           Start Time: 0815  Stop Time: 0900  Total time in clinic (min): 45 minutes    Subjective: Pt reports L sided radicular symptoms prior to start of tx session.        Objective: See treatment diary below      Assessment: Tolerated treatment well. Patient would benefit from continued PT.  Re-educated pt on centralization vs peripheralization.  Centralization of symptoms with LTRs, peripheral pain with general activity though.  Discussed difference between stretching and pain sensation.  Manual therapy partially alleviated symptoms.  Repeated flexion partially centralizes symptoms as well, but too much activity aggravates pain.  1:1 with Gerson Mccord DPT entirety of tx.        Plan: Continue per plan of care.      Precautions: HTN, asthma, tobacco use    POC expires Unit limit Auth Expiration date PT/OT + Visit Limit?   3/20/24 BOMN 24 BOMN     Visit/Unit Tracking  AUTH Status:  Date        10 visits / 24 Used 1 2        Remaining  9 8          Pertinent Findings:                                                                                            Test / Measure  2024   FOTO (Predicted 63) 61   L hip global MMT 3-/5   L/s flex AROM 50%         Manuals            L/s CPA, STM, distr MM 5' MM 8'           L hip PROM / stretching  MM 4'                                     Neuro Re-Ed             PPT 10x3\" 15x3\"           LTRs 10x 15x           Bridges 10x            Clamshells P! S/L 2x10 otb HL           Pallof press             Supine sciatic n glides  10x5\" L           Hip add iso  15x5\"           PB rollout 3 way  Flex only 15x           Ther Ex             HEP review / edu 10' 5'           NuStep  6' L4           Stand hip 3 way             STS           "   FSU             LSU             SLR                          Ther Activity                                       Gait Training                                       Modalities

## 2024-02-07 ENCOUNTER — OFFICE VISIT (OUTPATIENT)
Dept: PHYSICAL THERAPY | Facility: REHABILITATION | Age: 79
End: 2024-02-07
Payer: MEDICARE

## 2024-02-07 DIAGNOSIS — M54.16 LUMBAR RADICULOPATHY: Primary | ICD-10-CM

## 2024-02-07 PROCEDURE — 97112 NEUROMUSCULAR REEDUCATION: CPT

## 2024-02-07 PROCEDURE — 97140 MANUAL THERAPY 1/> REGIONS: CPT

## 2024-02-07 PROCEDURE — 97110 THERAPEUTIC EXERCISES: CPT

## 2024-02-07 NOTE — PROGRESS NOTES
"Daily Note     Today's date: 2024  Patient name: Mireya Yi  : 1945  MRN: [id number]  Referring provider: Violetta Avendaño CRNP  Dx:   Encounter Diagnosis     ICD-10-CM    1. Lumbar radiculopathy  M54.16           Start Time: 075  Stop Time: 0830  Total time in clinic (min): 38 minutes    Subjective: Pt reports L sided radicular symptoms continue, especially with sleep positioning. \"I can't get in a comfortable position when I sleep. I want to get an injection in my back but they said I have to do PT first.\"       Objective: See treatment diary below      Assessment: Tolerated treatment well. Spent significant portion of session educated pt on symptom progression and difference between centralization/peripheralization. Pt's symptoms were at L posterior-lateral hip at start of session, and were centralized to low back post-tx. Educated pt that this is a positive response to treatment/exercises. Again educated pt throughout session on difference between stretching vs. Pain increase. L proximal hip weakness noted during session, strengthening with good tolerance. Monitor response at f/u. Patient would benefit from continued PT. 1:1 with Carter Ruiz DPT for entirety of tx.       Plan: Continue per plan of care.      Precautions: HTN, asthma, tobacco use    POC expires Unit limit Auth Expiration date PT/OT + Visit Limit?   3/20/24 BOMN 24 BOMN     Visit/Unit Tracking  AUTH Status:  Date  2/7      10 visits / 24 Used 1 2 3       Remaining  9 8 7         Pertinent Findings:                                                                                            Test / Measure  2024   FOTO (Predicted 63) 61   L hip global MMT 3-/5   L/s flex AROM 50%         Manuals  2/7          L/s CPA, STM, distr MM 5' MM 8' CM 8'          L hip PROM / stretching  MM 4' CM 4'                                    Neuro Re-Ed             PPT 10x3\" 15x3\"           LTRs 10x 15x 15x  10x " "seated          Bridges 10x  2x10 on pball           Jessica P! S/L 2x10 otb HL 2x10 gtb HL          Pallof press             Supine sciatic n glides  10x5\" L np          Hip add iso  15x5\" np          PB rollout 3 way  Flex only 15x Flex only 10x          Ther Ex             HEP review / edu 10' 5' 5'          NuStep  6' L4 8' L4          Stand hip 3 way             STS             FSU             LSU             SLR             DKTC on pball    5\" 2x10          Prone hip ext   2x10 alt LE          Ther Activity                                       Gait Training                                       Modalities                                            "

## 2024-02-08 ENCOUNTER — TELEPHONE (OUTPATIENT)
Dept: FAMILY MEDICINE CLINIC | Facility: CLINIC | Age: 79
End: 2024-02-08

## 2024-02-08 NOTE — TELEPHONE ENCOUNTER
Per chart review ( telephone encounter 1/11/24) the patient does not meet the criteria for a hospital bed, did anything change?

## 2024-02-08 NOTE — TELEPHONE ENCOUNTER
Patient presented at 10:30am today requesting the status of the bed order that was requested on her last visit.    Request was put in January 11th.

## 2024-02-14 ENCOUNTER — OFFICE VISIT (OUTPATIENT)
Dept: PHYSICAL THERAPY | Facility: REHABILITATION | Age: 79
End: 2024-02-14
Payer: MEDICARE

## 2024-02-14 DIAGNOSIS — M54.16 LUMBAR RADICULOPATHY: Primary | ICD-10-CM

## 2024-02-14 PROCEDURE — 97112 NEUROMUSCULAR REEDUCATION: CPT

## 2024-02-14 PROCEDURE — 97140 MANUAL THERAPY 1/> REGIONS: CPT

## 2024-02-14 NOTE — PROGRESS NOTES
"Daily Note     Today's date: 2024  Patient name: Mireya Yi  : 1945  MRN: [id number]  Referring provider: Violetta Avendaño CRNP  Dx:   Encounter Diagnosis     ICD-10-CM    1. Lumbar radiculopathy  M54.16                      Subjective: \"My leg is gonna go,\" everything feels tight, with shooting pain down the LLE. She has been using medicated patches and topical creams with little change. Mireya reports the sensation of knee buckling with the LLE pain. Patient denies injury or change in activity that would result in exacerbation of sx.     Mireya reports compliance to HEP.       Objective: See treatment diary below        Assessment: Tolerated treatment poor. EDU on symptoms, differentiating between muscle soreness and radicular symptoms, differentiating between stretching sensation vs radicular symptoms- patient verbalized fair understanding of this.  No change in status with lumbar mobility, or with nerve mobility tasks. No change in status with manual interventions. Early onset fatigue with light strengthening tasks. Continued PT would be beneficial to improve function.          Plan: Continue per plan of care.       Precautions: HTN, asthma, tobacco use    POC expires Unit limit Auth Expiration date PT/OT + Visit Limit?   3/20/24 BOMN 24 BOMN     Visit/Unit Tracking  AUTH Status:  Date      10 visits / 24 Used 1 2 3 4      Remaining  9 8 7 6        Pertinent Findings:                                                                                            Test / Measure  2024   FOTO (Predicted 63) 61   L hip global MMT 3-/5   L/s flex AROM 50%         Manuals          L/s CPA, STM, distr MM 5' MM 8' CM 8' MB 4'         L hip PROM / stretching  MM 4' CM 4' MB 4'                                   Neuro Re-Ed             PPT 10x3\" 15x3\"           LTRs 10x 15x 15x  10x seated 15x supine         Bridges 10x  2x10 on pball  x10         Clamshells P! S/L " "2x10 otb HL 2x10 gtb HL 2x10 gtb         Pallof press             Supine sciatic n glides  10x5\" L np EOT 3x5         Hip add iso  15x5\" np np         PB rollout 3 way  Flex only 15x Flex only 10x Flex only 15x         Ther Ex             HEP review / edu 10' 5' 5' 5'         NuStep  6' L4 8' L4 Deferred          Stand hip 3 way             STS             FSU             LSU             SLR             DKTC on pball    5\" 2x10 5\" 2x10         Prone hip ext   2x10 alt LE np         Ther Activity                                       Gait Training                                       Modalities                                              "

## 2024-02-21 ENCOUNTER — OFFICE VISIT (OUTPATIENT)
Dept: PHYSICAL THERAPY | Facility: REHABILITATION | Age: 79
End: 2024-02-21
Payer: MEDICARE

## 2024-02-21 DIAGNOSIS — M54.16 LUMBAR RADICULOPATHY: Primary | ICD-10-CM

## 2024-02-21 PROCEDURE — 97140 MANUAL THERAPY 1/> REGIONS: CPT

## 2024-02-21 PROCEDURE — 97110 THERAPEUTIC EXERCISES: CPT

## 2024-02-21 PROCEDURE — 97112 NEUROMUSCULAR REEDUCATION: CPT

## 2024-02-21 NOTE — PROGRESS NOTES
Daily Note     Today's date: 2024  Patient name: Mireya Yi  : 1945  MRN: [id number]  Referring provider: Violetta Avendaño CRNP  Dx:   Encounter Diagnosis     ICD-10-CM    1. Lumbar radiculopathy  M54.16           Start Time: 803  Stop Time: 08  Total time in clinic (min): 44 minutes    Subjective: Pt reports feeling as if her pain is getting worse. She notes pain keeps her up in the night with ms cramping in BLE despite medicated patches and drinking water. Patient denies injury or change in activity that would result in exacerbation of sx.     Mireya reports compliance to HEP despite noting the she feels movement exacerbates sx.       Objective: See treatment diary below        Assessment: Tolerated treatment poor. EDU on symptoms, differentiating between muscle soreness and radicular symptoms, differentiating between stretching sensation vs radicular symptoms- patient verbalized fair understanding of this.  No change in status with lumbar mobility, or with nerve mobility tasks. No change in status with manual interventions. Limited Twyla today due to intolerance as pt notes exacerbation of sx. Pt was educated on difference between stretches vs exercise to decrease further ms tension outside of clinic setting. Continued PT would be beneficial to improve function.          Plan: Continue per plan of care.       Precautions: HTN, asthma, tobacco use    POC expires Unit limit Auth Expiration date PT/OT + Visit Limit?   3/20/24 BOMN 24 BOMN     Visit/Unit Tracking  AUTH Status:  Date     10 visits / 24 Used 1 2 3 4 5     Remaining  9 8 7 6 5       Pertinent Findings:                                                                                            Test / Measure  2024   FOTO (Predicted 63) 61   L hip global MMT 3-/5   L/s flex AROM 50%         Manuals         L/s CPA, STM, distr MM 5' MM 8' CM 8' MB 4' SA STM 5'        L hip PROM /  "stretching  MM 4' CM 4' MB 4' SA 5'                                  Neuro Re-Ed             PPT 10x3\" 15x3\"   3\" 10x        LTRs 10x 15x 15x  10x seated 15x supine 10x supine        Bridges 10x  2x10 on pball  x10 10x        Clamshells P! S/L 2x10 otb HL 2x10 gtb HL 2x10 gtb Not tolerated        Pallof press             Supine sciatic n glides  10x5\" L np EOT 3x5 EOT 3x5        Hip add iso  15x5\" np np         PB rollout 3 way  Flex only 15x Flex only 10x Flex only 15x Flex only 10x        Ther Ex             HEP review / edu 10' 5' 5' 5' 8'        NuStep  6' L4 8' L4 Deferred  L3 10'        Stand hip 3 way             STS             FSU             LSU             SLR             DKTC on pball    5\" 2x10 5\" 2x10 np        Prone hip ext   2x10 alt LE np np        Ther Activity                                       Gait Training                                       Modalities                                              "

## 2024-02-25 DIAGNOSIS — J30.9 CHRONIC ALLERGIC RHINITIS: ICD-10-CM

## 2024-02-26 RX ORDER — AZELASTINE HYDROCHLORIDE 137 UG/1
SPRAY, METERED NASAL
Qty: 30 ML | Refills: 1 | Status: SHIPPED | OUTPATIENT
Start: 2024-02-26

## 2024-02-28 ENCOUNTER — EVALUATION (OUTPATIENT)
Dept: PHYSICAL THERAPY | Facility: REHABILITATION | Age: 79
End: 2024-02-28
Payer: MEDICARE

## 2024-02-28 ENCOUNTER — TELEPHONE (OUTPATIENT)
Age: 79
End: 2024-02-28

## 2024-02-28 DIAGNOSIS — M54.16 LUMBAR RADICULOPATHY: Primary | ICD-10-CM

## 2024-02-28 PROCEDURE — 97112 NEUROMUSCULAR REEDUCATION: CPT

## 2024-02-28 PROCEDURE — 97164 PT RE-EVAL EST PLAN CARE: CPT

## 2024-02-28 PROCEDURE — 97110 THERAPEUTIC EXERCISES: CPT

## 2024-02-28 PROCEDURE — 97140 MANUAL THERAPY 1/> REGIONS: CPT

## 2024-02-28 NOTE — PROGRESS NOTES
"PT Re-Evaluation     Today's date: 2024  Patient name: Mireya Yi  : 1945  MRN: [id number]  Referring provider: Violetta Avendaño CRNP  Dx:   Encounter Diagnosis     ICD-10-CM    1. Lumbar radiculopathy  M54.16           Start Time: 08  Stop Time: 09  Total time in clinic (min): 46 minutes    Subjective: Pt continues to have pain at L side lumbar region and radicular symptoms through LLE.  Pain intensity at 5/10 \"on a good day.\"  Pain at worse up to 9/10.  Pt notes that her current rehab status is at 50-60%.  Sleep dysfunction is most limiting factor - is constantly tossing and turning.  Pain is worse and severe in the evening/at night.  States that she continues to try and lead an active lifestyle but remains limited due to pain as she has to be careful.  Bending over and lifting heavy objects is pain provoking.      Objective: See treatment diary below    Strength and ROM evaluated B from a regional biomechanical perspective and values relevant to this episode recorded in tables below.     ROM:   Joint / Motion  Right  Left    Lumbar Flexion  60%     Lumbar Extension  50%     Lumbar Sidebending  50% 50%   Lumbar rotation 75% 75%   Hip ER  WFL WFL   Hip IR  WFL WFL         Strength: MMT revealed the following findings.  Joint Motion Right Left   Hip Flexion 4/5 3/5   Hip Abduction 4/5 3/5   Hip Adduction 4/5 3/5   Hip Extension 4/5 3/5   Knee Extension 4+/5 3+/5   Knee Flexion 4+/5 3+/5   Ankle Plantarflexion 4/5 4/5   Ankle Dorsiflexion 4/5 4/5         Repeated Movements:                                                       DURING MVMT.                     RESPONSE AFTER  Standing Flexion worse No change   Standing Extension worse No change                                                        DURING MVMT.                     RESPONSE AFTER  Lying Flexion Not tested Not tested   Lying Extension Partial centralization Remains partially centralized         Special Tests:  Lumbar Specific and Neural " Tension  Test / Measure  2/28/2024   Straight Leg raise +   Crossed straight leg raise +   Prone instability test                +       Treatment Based Classification: Primary extension, secondary stability    Assessment: Pt with slight improvement of symptoms since starting formal outpatient physical therapy, but she continues to have significant lumbar pain and resultant radicular symptoms.  Re-tested repeated motions this visit and patient responded well with repeated/prolonged extension in lying.  Re-educated pt about centralization vs peripheralization to assure her that the symptoms she is feeling during and following repeated extension is a good sign due to partial centralization.  Slight improvement of lumbar ROM and L hip strength but remains significantly limited.  Pt would benefit from continued outpatient PT to improve functionality and decrease pain.  Tolerated treatment well. Patient would benefit from continued PT.  1:1 with Gerson Mccord DPT entirety of tx.    Assessment details: Mireya Yi is a 78 y.o. female presenting with acute exacerbation of chronic LBP and resultant L sided radicular symptoms through posterior LLE into foot.  Pt did not respond to repeated movements in both flexion and extension.  Primary impairments include L sided lumbar and LLE pain with functional activities, L hip weakness, lumbar AROM dysfunction, paraspinal motor control dysfunction.  Educated pt on anatomy and physiology of diagnosis.  Will benefit from skilled PT interventions for community reintegration, ADL management/independence, return to work/hobbies.  Provided pt with written home exercise program to be completed daily.  Educated pt on centralization vs peripheralization.    Impairments: abnormal coordination, abnormal muscle firing, abnormal muscle tone, abnormal or restricted ROM, activity intolerance, impaired balance, impaired physical strength, lacks appropriate home exercise program, pain with  function, poor posture  and poor body mechanics  Functional limitations: prolonged sitting, prolonged lying, ADLs, lifting, carrying, stooping  Symptom irritability: highUnderstanding of Dx/Px/POC: good   Prognosis: fair    Goals    Short Term Goals:  In 4 weeks, the patient will:  1. Improve L hip strength in all planes of motion to at least 3+/5 MMT - ONGOING  2. Improve lumbar flexion AROM to at least 75% - ONGOING  3. Supervision with HEP for self-care - MET    Long Term Goals:  In 8 weeks, the patient will:  1. Sleep 4+ hours at a time without pain aggravation causing sleep dysfunction - ONGOING  2. FOTO to greater than predicted value - MET  3. Independent with HEP for self-care - ONGOING      Plan: Continue per plan of care.     Plan details: Pt only able to schedule 1x per week at this time due to busy schedule  Patient would benefit from: skilled physical therapy  Planned modality interventions: manual electrical stimulation  Planned therapy interventions: abdominal trunk stabilization, activity modification, balance, balance/weight bearing training, behavior modification, body mechanics training, community reintegration, coordination, fine motor coordination training, flexibility, functional ROM exercises, gait training, graded activity, graded exercise, graded motor, home exercise program, work reintegration, therapeutic training, therapeutic exercise, therapeutic activities, stretching, strengthening, self care, postural training, patient education, neuromuscular re-education, motor coordination training, massage, manual therapy, joint mobilization and ADL training  Frequency: 1x week  Duration in weeks: 8  Plan of Care beginning date: 1/24/2024  Plan of Care expiration date: 3/20/2024  Treatment plan discussed with: patient     Precautions: HTN, asthma, tobacco use    POC expires Unit limit Auth Expiration date PT/OT + Visit Limit?   3/20/24 BOMN Pending BOMN     Visit/Unit Tracking  AUTH Status:  Date  "1/24 1/31 2/7 2/14 2/21 2/28   Pending Used 1 2 3 4 5 1    Remaining  9 8 7 6 5       Pertinent Findings:                                                                                            Test / Measure  1/24/2024 2/28/2024   FOTO (Predicted 63) 61 69   L hip global MMT 3-/5 3/5   L/s flex AROM 50% 60%         Manuals 1/24 1/31 2/7 2/14 2/21 2/28       L/s CPA, STM, distr MM 5' MM 8' CM 8' MB 4' SA STM 5' MM 8'       L hip PROM / stretching  MM 4' CM 4' MB 4' SA 5'                                  Neuro Re-Ed             PPT 10x3\" 15x3\"   3\" 10x        LTRs 10x 15x 15x  10x seated 15x supine 10x supine 15x       Bridges 10x  2x10 on pball  x10 10x 2x10       Clamshells P! S/L 2x10 otb HL 2x10 gtb HL 2x10 gtb Not tolerated 2x10 gtb HL       Pallof press      2x10 B dbl ytb       Supine sciatic n glides  10x5\" L np EOT 3x5 EOT 3x5        Hip add iso  15x5\" np np         PB rollout 3 way  Flex only 15x Flex only 10x Flex only 15x Flex only 10x        MIKHAIL      8' during MT       PPU      2x5       Ther Ex             HEP review / edu 10' 5' 5' 5' 8' 5'       NuStep  6' L4 8' L4 Deferred  L3 10' RB 8' L1       Stand hip 3 way             STS             FSU             LSU             SLR             DKTC on pball    5\" 2x10 5\" 2x10 np        Prone hip ext   2x10 alt LE np np 10x B       Ther Activity                                       Gait Training                                       Modalities                                                "

## 2024-03-06 ENCOUNTER — OFFICE VISIT (OUTPATIENT)
Dept: PULMONOLOGY | Facility: CLINIC | Age: 79
End: 2024-03-06
Payer: MEDICARE

## 2024-03-06 ENCOUNTER — APPOINTMENT (OUTPATIENT)
Dept: PHYSICAL THERAPY | Facility: REHABILITATION | Age: 79
End: 2024-03-06
Payer: MEDICARE

## 2024-03-06 VITALS
SYSTOLIC BLOOD PRESSURE: 116 MMHG | HEART RATE: 91 BPM | OXYGEN SATURATION: 94 % | WEIGHT: 135 LBS | TEMPERATURE: 97.5 F | BODY MASS INDEX: 24.69 KG/M2 | DIASTOLIC BLOOD PRESSURE: 74 MMHG

## 2024-03-06 DIAGNOSIS — J45.901 ASTHMA EXACERBATION: ICD-10-CM

## 2024-03-06 DIAGNOSIS — J45.30 MILD PERSISTENT ASTHMA WITHOUT COMPLICATION: Primary | ICD-10-CM

## 2024-03-06 DIAGNOSIS — J30.9 CHRONIC ALLERGIC RHINITIS: ICD-10-CM

## 2024-03-06 DIAGNOSIS — J43.9 PULMONARY EMPHYSEMA, UNSPECIFIED EMPHYSEMA TYPE (HCC): ICD-10-CM

## 2024-03-06 PROBLEM — J96.01 ACUTE RESPIRATORY FAILURE WITH HYPOXIA (HCC): Status: ACTIVE | Noted: 2024-03-06

## 2024-03-06 PROCEDURE — 99214 OFFICE O/P EST MOD 30 MIN: CPT | Performed by: INTERNAL MEDICINE

## 2024-03-06 RX ORDER — ALBUTEROL SULFATE 90 UG/1
2 AEROSOL, METERED RESPIRATORY (INHALATION) EVERY 6 HOURS PRN
Qty: 18 G | Refills: 5 | Status: SHIPPED | OUTPATIENT
Start: 2024-03-06

## 2024-03-06 RX ORDER — ALBUTEROL SULFATE 2.5 MG/3ML
2.5 SOLUTION RESPIRATORY (INHALATION) EVERY 4 HOURS PRN
Qty: 90 ML | Refills: 5 | Status: SHIPPED | OUTPATIENT
Start: 2024-03-06

## 2024-03-06 RX ORDER — BUDESONIDE AND FORMOTEROL FUMARATE DIHYDRATE 80; 4.5 UG/1; UG/1
2 AEROSOL RESPIRATORY (INHALATION) 2 TIMES DAILY
Qty: 10.2 G | Refills: 5 | Status: SHIPPED | OUTPATIENT
Start: 2024-03-06 | End: 2024-04-05

## 2024-03-06 RX ORDER — PREDNISONE 20 MG/1
40 TABLET ORAL DAILY
Qty: 10 TABLET | Refills: 0 | Status: SHIPPED | OUTPATIENT
Start: 2024-03-06 | End: 2024-03-11

## 2024-03-06 NOTE — PROGRESS NOTES
Pulmonary Follow Up Note   Mireya Yi 78 y.o. female MRN: [id number]  3/6/2024      Assessment/Plan:  Asthma, with acute exacerbation  COPD  Patient with symptoms of likely viral infection causing asthma exacerbation with increased cough, associated shortness of breath, and wheezing.  No other signs/symptoms of infection.  No indication for antibiotics at present.  -Start prednisone 40 mg once daily for 5 days  - Continue Symbicort 2 puffs twice daily, rinse mouth with each use  - Continue as needed albuterol nebulizer and MDI    Chronic allergic rhinitis  Patient with history of chronic allergic rhinitis as well as recurrent sinusitis treated antibiotics.  Patient reports following with ENT in the past and is currently requesting referral for repeat evaluation.  -Continue Flonase and azelastine  -Continue Allegra  - Referral for ENT previously placed by PCP    Return in about 3 months (around 6/6/2024).    History of Present Illness   HPI: Mireya Yi is a 78 y.o. female with tobacco use, Asthma/COPD (diagnosed at age 41), GERD who presents for follow up appointment. Last seen by myself on 11/01/2023.     During her last appointment, patient was treated for bacterial sinusitis with Augmentin.  After completing antibiotic course, patient states that she was feeling well and back to her baseline.  However over the past week, patient developed sinus congestion, post nasal drip, worsened cough, wheezing.  She states that she does have shortness of breath associated with coughing fits.  Patient also reports nausea and diarrhea.  She denied fevers.  Patient continues on Symbicort 2 puffs daily.  She has been taking albuterol every 6 hours.  She continues Flonase in azelastine for chronic rhinitis.  Patient requests evaluation by ENT.    Review of systems:  12 point review of systems was completed and was otherwise negative except as listed in HPI.    Historical Information   Past Medical History:   Diagnosis Date     Asthma     Asthmatic bronchitis     Last Assessed: 10/7/2014     Chronic diarrhea     Last Assessed: 8/20/2015     Fibromyalgia     Focal nodular hyperplasia of liver     Last Assessed: 6/11/2015    Herpes zoster     Last Assessed: 3/18/2016    Hypertension     IBS (irritable bowel syndrome)     Intermittent palpitations     Irritable bowel syndrome     Lumbar radiculopathy     Osteoarthritis     Vertigo      Past Surgical History:   Procedure Laterality Date    BREAST BIOPSY      SMALL INTESTINE SURGERY       Family History   Problem Relation Age of Onset    Heart attack Mother     Other Mother         Aspiration of Vomit     Asthma Father     Breast cancer Sister     Arthritis Family     Other Family         Musculoskeletal Disease     Osteoporosis Family      Social History     Tobacco Use   Smoking Status Former    Current packs/day: 0.50    Types: Cigarettes   Smokeless Tobacco Never   Tobacco Comments    Stopped smoking July 2023       Meds/Allergies     Current Outpatient Medications:     acetaminophen (TYLENOL) 500 mg tablet, Take 1 tablet (500 mg total) by mouth every 6 (six) hours as needed for mild pain, Disp: 60 tablet, Rfl: 0    albuterol (2.5 mg/3 mL) 0.083 % nebulizer solution, Take 3 mL (2.5 mg total) by nebulization every 4 (four) hours as needed for wheezing or shortness of breath, Disp: 90 mL, Rfl: 5    albuterol (PROVENTIL HFA,VENTOLIN HFA) 90 mcg/act inhaler, Inhale 2 puffs every 6 (six) hours as needed for wheezing or shortness of breath, Disp: 18 g, Rfl: 5    amLODIPine (NORVASC) 5 mg tablet, Take 1 tablet (5 mg total) by mouth daily, Disp: 90 tablet, Rfl: 1    Azelastine HCl 137 MCG/SPRAY SOLN, INSTILL 2 SPRAYS INTO EACH NOSTRIL TWICE A DAY AS DIRECTED, Disp: 30 mL, Rfl: 1    benzonatate (TESSALON PERLES) 100 mg capsule, Take 1 capsule (100 mg total) by mouth 3 (three) times a day as needed for cough, Disp: 20 capsule, Rfl: 0    Cholecalciferol (Vitamin D) 50 MCG (2000 UT) CAPS, Take 1  capsule (2,000 Units total) by mouth daily, Disp: 30 capsule, Rfl: 5    escitalopram (LEXAPRO) 10 mg tablet, Take 1 tablet (10 mg total) by mouth daily, Disp: 90 tablet, Rfl: 3    fexofenadine (ALLEGRA) 180 MG tablet, Take 180 mg by mouth daily, Disp: , Rfl:     fluticasone (FLONASE) 50 mcg/act nasal spray, 2 sprays into each nostril daily, Disp: 16 g, Rfl: 3    folic acid (FOLVITE) 1 mg tablet, take 1 tablet by mouth daily, Disp: 30 tablet, Rfl: 5    hydrocortisone (ANUSOL-HC) 2.5 % rectal cream, Apply topically 2 (two) times a day, Disp: 28 g, Rfl: 0    loratadine (CLARITIN) 10 mg tablet, Take 1 tablet (10 mg total) by mouth daily, Disp: 30 tablet, Rfl: 3    nicotine (NICODERM CQ) 14 mg/24hr TD 24 hr patch, Place 1 patch on the skin over 24 hours every 24 hours, Disp: 28 patch, Rfl: 1    nicotine polacrilex (NICORETTE) 2 mg gum, Chew 1 each (2 mg total) as needed for smoking cessation, Disp: 100 each, Rfl: 1    olopatadine (PATANOL) 0.1 % ophthalmic solution, Administer 1 drop to both eyes 2 (two) times a day, Disp: 5 mL, Rfl: 2    ondansetron (Zofran ODT) 4 mg disintegrating tablet, Take 1 tablet (4 mg total) by mouth every 6 (six) hours as needed for nausea or vomiting, Disp: 20 tablet, Rfl: 3    pantoprazole (PROTONIX) 20 mg tablet, Take 1 tablet (20 mg total) by mouth daily, Disp: 90 tablet, Rfl: 1    acetaminophen (TYLENOL) 650 mg CR tablet, Take 1 tablet (650 mg total) by mouth every 8 (eight) hours as needed for mild pain (Patient not taking: Reported on 11/22/2023), Disp: 30 tablet, Rfl: 0    Blood Pressure Monitoring (BLOOD PRESSURE CUFF) MISC, by Does not apply route 2 (two) times a day (Patient not taking: Reported on 12/1/2023), Disp: 1 each, Rfl: 0    budesonide-formoterol (Symbicort) 80-4.5 MCG/ACT inhaler, Inhale 2 puffs 2 (two) times a day Rinse mouth after use, Disp: 10.2 g, Rfl: 5    carbamide peroxide (DEBROX) 6.5 % otic solution, Administer 5 drops into both ears 2 (two) times a day for 4 days,  "Disp: 15 mL, Rfl: 0    Diclofenac Sodium (VOLTAREN) 1 %, Apply 2 g topically 4 (four) times a day (Patient not taking: Reported on 12/1/2023), Disp: 50 g, Rfl: 0    lidocaine (Lidoderm) 5 %, Apply 1 patch topically over 12 hours daily Remove & Discard patch within 12 hours or as directed by MD (Patient not taking: Reported on 12/1/2023), Disp: 15 patch, Rfl: 0  Allergies   Allergen Reactions    Ambrosia Artemisiifolia (Ragweed) Skin Test Facial Swelling    Codeine Other (See Comments)     Heart races    Epinephrine      Pt reports \"it makes my heart race, they told me I cant take it.\"    Ibuprofen Other (See Comments)     Heart racing    Morphine Other (See Comments)     Heart racing    Pollen Extract Sneezing    Tramadol Other (See Comments)     Heart racing       Vitals: Blood pressure 116/74, pulse 91, temperature 97.5 °F (36.4 °C), temperature source Tympanic, weight 61.2 kg (135 lb), SpO2 94%. Body mass index is 24.69 kg/m². Oxygen Therapy  SpO2: 94 %  Oxygen Therapy: None (Room air)      Physical Exam  General: No acute distress  HENT: Normocephalic, atraumatic.  PERRL, EOMI, Moist mucous membranes.  Neck: Supple  Lungs: Bilateral expiratory wheeze in mid to upper lung fields. on room air  Heart: Regular rate and rhythm, S1-S2 present, no murmurs rubs or gallops  Extremities: Warm and well perfused, no cyanosis, clubbing, or edema  Skin: Warm, dry, no rashes or lesions  Neurologic: No focal neurologic deficits     Labs: I have personally reviewed pertinent lab results.  Lab Results   Component Value Date    WBC 13.55 (H) 07/30/2023    HGB 11.3 (L) 07/30/2023    HCT 35.6 07/30/2023    MCV 90 07/30/2023     07/30/2023     Lab Results   Component Value Date    GLUCOSE 92 05/19/2015    CALCIUM 8.8 07/30/2023     05/19/2015    K 3.9 07/30/2023    CO2 24 07/30/2023     (H) 07/30/2023    BUN 29 (H) 07/30/2023    CREATININE 1.54 (H) 07/30/2023     No results found for: \"IGE\"  Lab Results   Component " "Value Date    ALT 15 07/28/2023    AST 10 07/28/2023    ALKPHOS 80 07/28/2023    BILITOT 0.20 05/19/2015     Imaging and other studies: I have personally reviewed pertinent reports.   and I have personally reviewed pertinent films in PACS    Pulmonary function testing:   Results: 9/11/2023  FEV1/FVC Ratio: 56.74%  Forced Vital Capacity: 2.65 L    108% predicted  FEV1: 1.50 L     79% predicted     After administration of bronchodilator      FVC: 2.66 L, 108% predicted, +0 % change  FEV1: 1.58 L, 83% predicted, +5 % change     Lung volumes by body plethysmography:      Total Lung Capacity 98% predicted   Residual volume 95% predicted  RV/TLC Ratio: 45.53%, 98% predicted     DLCO corrected for patients hemoglobin level: 72%     Interpretation:  Mild obstructive airflow defect on spirometry  No significant improvement in airflow or forced vital capacity in response to the administration to bronchodilator per ATS standards.   Normal lung volumes  Normal Diffusion  Flow-volume loop consistent with obstruction    EKG, Pathology, and Other Studies: I have personally reviewed pertinent reports.        Disclaimer: Portions of the record may have been created with voice recognition software. Occasional wrong word or \"sound a like\" substitutions may have occurred due to the inherent limitations of voice recognition software. Careful consideration should be taken to recognize, using context, where substitutions have occurred.    Michael Grimes DO   Pulmonary & Critical Care Medicine Fellow PGY-IV  [facility]  "

## 2024-03-07 ENCOUNTER — OFFICE VISIT (OUTPATIENT)
Dept: PHYSICAL THERAPY | Facility: REHABILITATION | Age: 79
End: 2024-03-07
Payer: MEDICARE

## 2024-03-07 DIAGNOSIS — M54.16 LUMBAR RADICULOPATHY: Primary | ICD-10-CM

## 2024-03-07 PROCEDURE — 97110 THERAPEUTIC EXERCISES: CPT

## 2024-03-07 PROCEDURE — 97112 NEUROMUSCULAR REEDUCATION: CPT

## 2024-03-07 NOTE — PROGRESS NOTES
"Daily Note     Today's date: 3/7/2024  Patient name: Mireya Yi  : 1945  MRN: [id number]  Referring provider: Violetta Avendaño CRNP  Dx:   Encounter Diagnosis     ICD-10-CM    1. Lumbar radiculopathy  M54.16           Start Time: 1100  Stop Time: 1123  Total time in clinic (min): 23 minutes    Subjective: Pt states that she is unable to complete a full tx session today due to asthma.  Received oral steroids to address symptoms but states that she cannot tolerate much today.  Rescheduled yesterday's appt to today to get care for her asthma.        Objective: See treatment diary below      Assessment: Tolerated treatment well. Patient would benefit from continued PT.  Decline supine / lying exercises today following LTR's due to asthma symptoms.  Very limited activity tolerance this visit.  Continues to have LE radicular symptoms and heaviness feeling.  Advised pt to perform repeated extensions in standing at home to address radicular symptoms.  1:1 with Gerson Mccord DPT entirety of tx.        Plan: Continue per plan of care.      Precautions: HTN, asthma, tobacco use    POC expires Unit limit Auth Expiration date PT/OT + Visit Limit?   3/20/24 BOMN 3/13/24 BOMN     Visit/Unit Tracking  AUTH Status:  Date 1/24 1/31 2/7 2/14 2/21 2/28 3/7   4v / 3/13/24 Used 1 2 3 4 5 1 2    Remaining  9 8 7 6 5 3 2      Pertinent Findings:                                                                                            Test / Measure  2024   FOTO (Predicted 63) 61 69   L hip global MMT 3-/5 3/5   L/s flex AROM 50% 60%         Manuals  2/ 3/7   L/s CPA, STM, distr MM 5' MM 8' CM 8' MB 4' SA STM 5' MM 8'    L hip PROM / stretching  MM 4' CM 4' MB 4' SA 5'                         Neuro Re-Ed          PPT 10x3\" 15x3\"   3\" 10x     LTRs 10x 15x 15x  10x seated 15x supine 10x supine 15x 30x   Bridges 10x  2x10 on pball  x10 10x 2x10    Clamshells P! S/L 2x10 otb HL 2x10 gtb HL 2x10 " "gtb Not tolerated 2x10 gtb HL 30x otb seated   Pallof press      2x10 B dbl ytb    Supine sciatic n glides  10x5\" L np EOT 3x5 EOT 3x5     Hip add iso  15x5\" np np   15x5\" seated   PB rollout 3 way  Flex only 15x Flex only 10x Flex only 15x Flex only 10x     MIKHAIL      8' during MT    PPU      2x5    Ther Ex          HEP review / edu 10' 5' 5' 5' 8' 5'    NuStep  6' L4 8' L4 Deferred  L3 10' RB 8' L1 5' L6   Stand hip 3 way          STS          FSU          LSU          SLR          DKTC on pball    5\" 2x10 5\" 2x10 np     Prone hip ext   2x10 alt LE np np 10x B    LAQ       2x10 B   Seated marching       30x alt LE   Ther Activity                              Gait Training                              Modalities                                         "

## 2024-03-13 ENCOUNTER — TELEPHONE (OUTPATIENT)
Dept: NEPHROLOGY | Facility: CLINIC | Age: 79
End: 2024-03-13

## 2024-03-13 ENCOUNTER — OFFICE VISIT (OUTPATIENT)
Dept: PHYSICAL THERAPY | Facility: REHABILITATION | Age: 79
End: 2024-03-13
Payer: MEDICARE

## 2024-03-13 DIAGNOSIS — M54.16 LUMBAR RADICULOPATHY: Primary | ICD-10-CM

## 2024-03-13 PROCEDURE — 97164 PT RE-EVAL EST PLAN CARE: CPT

## 2024-03-13 PROCEDURE — 97110 THERAPEUTIC EXERCISES: CPT

## 2024-03-13 PROCEDURE — 97140 MANUAL THERAPY 1/> REGIONS: CPT

## 2024-03-13 NOTE — PROGRESS NOTES
"Discharge Summary     Today's date: 3/13/2024  Patient name: Mireya Yi  : 1945  MRN: [id number]  Referring provider: Violetta Avendaño CRNP  Dx:   Encounter Diagnosis     ICD-10-CM    1. Lumbar radiculopathy  M54.16           Start Time: 819  Stop Time: 08  Total time in clinic (min): 34 minutes    Subjective: Pt reports functionally speaking she \"can do everything.\"  Pain is most limiting factor.  Pain intensity at worst 7/10.  Continues to have pain in the evening and at night at L side lumbar through to L hip/thigh.  Has spine/pain appt follow-up tomorrow.  Aggravating factors:  prolonged standing and walking.  Walking tolerance is \"no more than half an hour and I'm pushing it.\"  This morning she had significant ankle pain and stiffness (hx of ankle fracture).        Objective: See treatment diary below    Strength and ROM evaluated B from a regional biomechanical perspective and values relevant to this episode recorded in tables below.     ROM:   Joint / Motion  Right  Left    Lumbar Flexion  60%     Lumbar Extension  50%     Lumbar Sidebending  50% 50%   Lumbar rotation 75% 75%   Hip ER  WFL WFL   Hip IR  WFL WFL         Strength: MMT revealed the following findings.  Joint Motion Right Left   Hip Flexion 4/5 3/5   Hip Abduction 4/5 3/5   Hip Adduction 4/5 3/5   Hip Extension 4/5 3/5   Knee Extension 4+/5 3+/5   Knee Flexion 4+/5 3+/5   Ankle Plantarflexion 4/5 4/5   Ankle Dorsiflexion 4/5 4/5          Special Tests:  Lumbar Specific and Neural Tension  Test / Measure     Straight Leg raise +   Crossed straight leg raise +   Prone instability test                +       Assessment: Pt continues to have pain with activity.  She demonstrates lumbar and radicular symptoms throughout the day which are worsened at night.  Mobility and functionality are mildly impacted, but she is able to perform all ADLs independently.  Self-reported FOTO score above predictive value.  Advised pt to continue to complete " written home exercise program.  Repeated extensions partially alleviate symptoms.  Discussed prognosis following potential injection tomorrow.  Tolerated treatment well. Patient demonstrated fatigue post treatment.  1:1 with Gerson Mccord DPT entirety of tx.    Assessment details: Mireya Yi is a 78 y.o. female presenting with acute exacerbation of chronic LBP and resultant L sided radicular symptoms through posterior LLE into foot.  Pt did not respond to repeated movements in both flexion and extension.  Primary impairments include L sided lumbar and LLE pain with functional activities, L hip weakness, lumbar AROM dysfunction, paraspinal motor control dysfunction.  Educated pt on anatomy and physiology of diagnosis.  Will benefit from skilled PT interventions for community reintegration, ADL management/independence, return to work/hobbies.  Provided pt with written home exercise program to be completed daily.  Educated pt on centralization vs peripheralization.    Impairments: abnormal coordination, abnormal muscle firing, abnormal muscle tone, abnormal or restricted ROM, activity intolerance, impaired balance, impaired physical strength, lacks appropriate home exercise program, pain with function, poor posture  and poor body mechanics  Functional limitations: prolonged sitting, prolonged lying, ADLs, lifting, carrying, stooping  Symptom irritability: highUnderstanding of Dx/Px/POC: good   Prognosis: fair    Goals    Short Term Goals:  In 4 weeks, the patient will:  1. Improve L hip strength in all planes of motion to at least 3+/5 MMT - ONGOING  2. Improve lumbar flexion AROM to at least 75% - ONGOING  3. Supervision with HEP for self-care - MET    Long Term Goals:  In 8 weeks, the patient will:  1. Sleep 4+ hours at a time without pain aggravation causing sleep dysfunction - ONGOING  2. FOTO to greater than predicted value - MET  3. Independent with HEP for self-care - ONGOING    Plan: Discharge from skilled  "outpatient physical therapy services at this time to continue written home exercise program independently.       Precautions: HTN, asthma, tobacco use    POC expires Unit limit Auth Expiration date PT/OT + Visit Limit?   3/20/24 BOMN 3/13/24 BOMN     Visit/Unit Tracking  AUTH Status:  Date 1/24 1/31 2/7 2/14 2/21 2/28 3/7 3/13   4v / 3/13/24 Used 1 2 3 4 5 1 2 3    Remaining  9 8 7 6 5 3 2 1      Pertinent Findings:                                                                                            Test / Measure  1/24/2024 2/28/2024 3/13/2024   FOTO (Predicted 63) 61 69 83   L hip global MMT 3-/5 3/5 3/5   L/s flex AROM 50% 60% 60%         Manuals 1/24 1/31 2/7 2/14 2/21 2/28 3/7 3/13   L/s CPA, STM, distr MM 5' MM 8' CM 8' MB 4' SA STM 5' MM 8'  MM 6'   L hip PROM / stretching  MM 4' CM 4' MB 4' SA 5'   MM 4'                         Neuro Re-Ed           PPT 10x3\" 15x3\"   3\" 10x      LTRs 10x 15x 15x  10x seated 15x supine 10x supine 15x 30x 15x   Bridges 10x  2x10 on pball  x10 10x 2x10  2x10   Clamshells P! S/L 2x10 otb HL 2x10 gtb HL 2x10 gtb Not tolerated 2x10 gtb HL 30x otb seated    Pallof press      2x10 B dbl ytb     Supine sciatic n glides  10x5\" L np EOT 3x5 EOT 3x5      Hip add iso  15x5\" np np   15x5\" seated    PB rollout 3 way  Flex only 15x Flex only 10x Flex only 15x Flex only 10x      MIKHAIL      8' during MT     PPU      2x5     Ther Ex           HEP review / edu 10' 5' 5' 5' 8' 5'     NuStep  6' L4 8' L4 Deferred  L3 10' RB 8' L1 5' L6 RB 5' L1   Stand hip 3 way           STS           FSU           LSU           SLR           DKTC on pball    5\" 2x10 5\" 2x10 np      Prone hip ext   2x10 alt LE np np 10x B     LAQ       2x10 B    Seated marching       30x alt LE    Ther Activity                                 Gait Training                                 Modalities                                              "

## 2024-03-14 ENCOUNTER — TELEPHONE (OUTPATIENT)
Dept: RADIOLOGY | Facility: CLINIC | Age: 79
End: 2024-03-14

## 2024-03-14 ENCOUNTER — OFFICE VISIT (OUTPATIENT)
Dept: PAIN MEDICINE | Facility: CLINIC | Age: 79
End: 2024-03-14
Payer: MEDICARE

## 2024-03-14 VITALS
SYSTOLIC BLOOD PRESSURE: 114 MMHG | OXYGEN SATURATION: 97 % | BODY MASS INDEX: 24.66 KG/M2 | DIASTOLIC BLOOD PRESSURE: 69 MMHG | WEIGHT: 134 LBS | HEART RATE: 94 BPM | HEIGHT: 62 IN

## 2024-03-14 DIAGNOSIS — M54.16 LUMBAR RADICULOPATHY: Primary | ICD-10-CM

## 2024-03-14 PROCEDURE — 99214 OFFICE O/P EST MOD 30 MIN: CPT | Performed by: NURSE PRACTITIONER

## 2024-03-14 NOTE — TELEPHONE ENCOUNTER
Patient rates her pain a 7 out of 10 on numeric scale.     She continues with her HEP 3-5 times a week for 30-60 mins at a time.    This is affecting her ADL's such as socializing, chores around the house, sleeping etc.

## 2024-03-14 NOTE — PROGRESS NOTES
Assessment:  1. Lumbar radiculopathy        Plan:  Patient will be scheduled for a left L4 TFESI to address the inflammatory component of the patient's pain.  Complete risks and benefits including bleeding, infection, tissue reaction, nerve injury and allergic reaction were discussed. The patient was agreeable and verbalized an understanding  Continue with home exercise program as taught by physical therapy  Will avoid NSAIDs secondary to CKD  Patient may continue Tylenol as needed  Follow-up after procedure or sooner if needed    History of Present Illness:    The patient is a 78 y.o. female with a history of CKD, hypertension and emphysema last seen on 01/11/2024  who presents for a follow up office visit in regards to chronic lumbosacral back pain that radiates into the anterolateral aspect of the left lower extremity to the jaison she denies right-sided symptoms, bowel or bladder incontinence or saddle anesthesia.  Patient has completed physical therapy for her low back and left leg pain from January 24, 2024 through March 13, 2024 without much improvement of her symptoms.  She does continue with her home exercise program which she completes for 15 to 20 minutes on a daily basis.  She is unable to take NSAIDs secondary to CKD and does not find much relief with Tylenol    The patient rates her pain a 5 out of 10 on the numeric pain rating scale, however at night pain will increase to an 8-9 out of 10 on the numeric pain rating scale.  Pain is constant and it is described as throbbing    I have personally reviewed and/or updated the patient's past medical history, past surgical history, family history, social history, current medications, allergies, and vital signs today.       Review of Systems:    Review of Systems   Respiratory:  Negative for shortness of breath.    Cardiovascular:  Negative for chest pain.   Gastrointestinal:  Negative for constipation, diarrhea, nausea and vomiting.   Musculoskeletal:  Positive  for back pain and gait problem. Negative for arthralgias, joint swelling and myalgias.   Skin:  Negative for rash.   Neurological:  Negative for dizziness, seizures and weakness.   All other systems reviewed and are negative.        Past Medical History:   Diagnosis Date    Asthma     Asthmatic bronchitis     Last Assessed: 10/7/2014     Chronic diarrhea     Last Assessed: 8/20/2015     Fibromyalgia     Focal nodular hyperplasia of liver     Last Assessed: 6/11/2015    Herpes zoster     Last Assessed: 3/18/2016    Hypertension     IBS (irritable bowel syndrome)     Intermittent palpitations     Irritable bowel syndrome     Lumbar radiculopathy     Osteoarthritis     Vertigo        Past Surgical History:   Procedure Laterality Date    BREAST BIOPSY      SMALL INTESTINE SURGERY         Family History   Problem Relation Age of Onset    Heart attack Mother     Other Mother         Aspiration of Vomit     Asthma Father     Breast cancer Sister     Arthritis Family     Other Family         Musculoskeletal Disease     Osteoporosis Family        Social History     Occupational History    Occupation: Unemployed    Tobacco Use    Smoking status: Former     Current packs/day: 0.50     Types: Cigarettes    Smokeless tobacco: Never    Tobacco comments:     Stopped smoking July 2023   Vaping Use    Vaping status: Never Used   Substance and Sexual Activity    Alcohol use: Not Currently    Drug use: No    Sexual activity: Not Currently     Partners: Male         Current Outpatient Medications:     acetaminophen (TYLENOL) 500 mg tablet, Take 1 tablet (500 mg total) by mouth every 6 (six) hours as needed for mild pain, Disp: 60 tablet, Rfl: 0    acetaminophen (TYLENOL) 650 mg CR tablet, Take 1 tablet (650 mg total) by mouth every 8 (eight) hours as needed for mild pain (Patient not taking: Reported on 11/22/2023), Disp: 30 tablet, Rfl: 0    albuterol (2.5 mg/3 mL) 0.083 % nebulizer solution, Take 3 mL (2.5 mg total) by nebulization  every 4 (four) hours as needed for wheezing or shortness of breath, Disp: 90 mL, Rfl: 5    albuterol (PROVENTIL HFA,VENTOLIN HFA) 90 mcg/act inhaler, Inhale 2 puffs every 6 (six) hours as needed for wheezing or shortness of breath, Disp: 18 g, Rfl: 5    amLODIPine (NORVASC) 5 mg tablet, Take 1 tablet (5 mg total) by mouth daily, Disp: 90 tablet, Rfl: 1    Azelastine HCl 137 MCG/SPRAY SOLN, INSTILL 2 SPRAYS INTO EACH NOSTRIL TWICE A DAY AS DIRECTED, Disp: 30 mL, Rfl: 1    benzonatate (TESSALON PERLES) 100 mg capsule, Take 1 capsule (100 mg total) by mouth 3 (three) times a day as needed for cough, Disp: 20 capsule, Rfl: 0    Blood Pressure Monitoring (BLOOD PRESSURE CUFF) MISC, by Does not apply route 2 (two) times a day (Patient not taking: Reported on 12/1/2023), Disp: 1 each, Rfl: 0    budesonide-formoterol (Symbicort) 80-4.5 MCG/ACT inhaler, Inhale 2 puffs 2 (two) times a day Rinse mouth after use, Disp: 10.2 g, Rfl: 5    carbamide peroxide (DEBROX) 6.5 % otic solution, Administer 5 drops into both ears 2 (two) times a day for 4 days, Disp: 15 mL, Rfl: 0    Cholecalciferol (Vitamin D) 50 MCG (2000 UT) CAPS, Take 1 capsule (2,000 Units total) by mouth daily, Disp: 30 capsule, Rfl: 5    Diclofenac Sodium (VOLTAREN) 1 %, Apply 2 g topically 4 (four) times a day (Patient not taking: Reported on 12/1/2023), Disp: 50 g, Rfl: 0    escitalopram (LEXAPRO) 10 mg tablet, Take 1 tablet (10 mg total) by mouth daily, Disp: 90 tablet, Rfl: 3    fexofenadine (ALLEGRA) 180 MG tablet, Take 180 mg by mouth daily, Disp: , Rfl:     fluticasone (FLONASE) 50 mcg/act nasal spray, 2 sprays into each nostril daily, Disp: 16 g, Rfl: 3    folic acid (FOLVITE) 1 mg tablet, take 1 tablet by mouth daily, Disp: 30 tablet, Rfl: 5    hydrocortisone (ANUSOL-HC) 2.5 % rectal cream, Apply topically 2 (two) times a day, Disp: 28 g, Rfl: 0    lidocaine (Lidoderm) 5 %, Apply 1 patch topically over 12 hours daily Remove & Discard patch within 12 hours  "or as directed by MD (Patient not taking: Reported on 12/1/2023), Disp: 15 patch, Rfl: 0    loratadine (CLARITIN) 10 mg tablet, Take 1 tablet (10 mg total) by mouth daily, Disp: 30 tablet, Rfl: 3    nicotine (NICODERM CQ) 14 mg/24hr TD 24 hr patch, Place 1 patch on the skin over 24 hours every 24 hours, Disp: 28 patch, Rfl: 1    nicotine polacrilex (NICORETTE) 2 mg gum, Chew 1 each (2 mg total) as needed for smoking cessation, Disp: 100 each, Rfl: 1    olopatadine (PATANOL) 0.1 % ophthalmic solution, Administer 1 drop to both eyes 2 (two) times a day, Disp: 5 mL, Rfl: 2    ondansetron (Zofran ODT) 4 mg disintegrating tablet, Take 1 tablet (4 mg total) by mouth every 6 (six) hours as needed for nausea or vomiting, Disp: 20 tablet, Rfl: 3    pantoprazole (PROTONIX) 20 mg tablet, Take 1 tablet (20 mg total) by mouth daily, Disp: 90 tablet, Rfl: 1    Allergies   Allergen Reactions    Ambrosia Artemisiifolia (Ragweed) Skin Test Facial Swelling    Codeine Other (See Comments)     Heart races    Epinephrine      Pt reports \"it makes my heart race, they told me I cant take it.\"    Ibuprofen Other (See Comments)     Heart racing    Morphine Other (See Comments)     Heart racing    Pollen Extract Sneezing    Tramadol Other (See Comments)     Heart racing       Physical Exam:    /69   Pulse 94   Ht 5' 2\" (1.575 m)   Wt 60.8 kg (134 lb)   SpO2 97%   BMI 24.51 kg/m²     Constitutional:normal, well developed, well nourished, alert, in no distress and non-toxic and no overt pain behavior.  Eyes:anicteric  HEENT:grossly intact  Neck:supple, symmetric, trachea midline and no masses   Pulmonary:even and unlabored  Cardiovascular:No edema or pitting edema present  Skin:Normal without rashes or lesions and well hydrated  Psychiatric:Mood and affect appropriate  Neurologic:Cranial Nerves II-XII grossly intact  Musculoskeletal:antalgic gait.  Positive seated straight leg raise on the left      Imaging  FL spine and pain " procedure    (Results Pending)         Orders Placed This Encounter   Procedures    FL spine and pain procedure

## 2024-03-15 DIAGNOSIS — I10 ESSENTIAL HYPERTENSION: Primary | ICD-10-CM

## 2024-03-15 DIAGNOSIS — N18.32 STAGE 3B CHRONIC KIDNEY DISEASE (HCC): ICD-10-CM

## 2024-03-18 ENCOUNTER — TELEPHONE (OUTPATIENT)
Dept: NEPHROLOGY | Facility: CLINIC | Age: 79
End: 2024-03-18

## 2024-03-18 NOTE — TELEPHONE ENCOUNTER
Left voicemail for the patient reminding to please complete labwork prior to 3/28 appointment with Dr. Reyes. Advised patient to call back with any questions or  Concerns.

## 2024-03-20 ENCOUNTER — LAB (OUTPATIENT)
Dept: LAB | Facility: CLINIC | Age: 79
End: 2024-03-20
Payer: MEDICARE

## 2024-03-20 DIAGNOSIS — I10 ESSENTIAL HYPERTENSION: ICD-10-CM

## 2024-03-20 DIAGNOSIS — Z13.6 ENCOUNTER FOR SCREENING FOR CARDIOVASCULAR DISORDERS: ICD-10-CM

## 2024-03-20 DIAGNOSIS — E55.9 VITAMIN D DEFICIENCY: ICD-10-CM

## 2024-03-20 DIAGNOSIS — N18.32 STAGE 3B CHRONIC KIDNEY DISEASE (HCC): ICD-10-CM

## 2024-03-20 LAB
25(OH)D3 SERPL-MCNC: 15.6 NG/ML (ref 30–100)
CHOLEST SERPL-MCNC: 152 MG/DL
CREAT UR-MCNC: 99.3 MG/DL
ERYTHROCYTE [DISTWIDTH] IN BLOOD BY AUTOMATED COUNT: 17.5 % (ref 11.6–15.1)
HCT VFR BLD AUTO: 37.7 % (ref 34.8–46.1)
HDLC SERPL-MCNC: 40 MG/DL
HGB BLD-MCNC: 11.5 G/DL (ref 11.5–15.4)
LDLC SERPL CALC-MCNC: 82 MG/DL (ref 0–100)
MCH RBC QN AUTO: 26.2 PG (ref 26.8–34.3)
MCHC RBC AUTO-ENTMCNC: 30.5 G/DL (ref 31.4–37.4)
MCV RBC AUTO: 86 FL (ref 82–98)
MICROALBUMIN UR-MCNC: 12.8 MG/L
MICROALBUMIN/CREAT 24H UR: 13 MG/G CREATININE (ref 0–30)
PHOSPHATE SERPL-MCNC: 2.8 MG/DL (ref 2.3–4.1)
PLATELET # BLD AUTO: 316 THOUSANDS/UL (ref 149–390)
PMV BLD AUTO: 11.7 FL (ref 8.9–12.7)
PTH-INTACT SERPL-MCNC: 76 PG/ML (ref 12–88)
RBC # BLD AUTO: 4.39 MILLION/UL (ref 3.81–5.12)
TRIGL SERPL-MCNC: 149 MG/DL
WBC # BLD AUTO: 12.3 THOUSAND/UL (ref 4.31–10.16)

## 2024-03-20 PROCEDURE — 82570 ASSAY OF URINE CREATININE: CPT

## 2024-03-20 PROCEDURE — 80061 LIPID PANEL: CPT

## 2024-03-20 PROCEDURE — 36415 COLL VENOUS BLD VENIPUNCTURE: CPT

## 2024-03-20 PROCEDURE — 85027 COMPLETE CBC AUTOMATED: CPT

## 2024-03-20 PROCEDURE — 84100 ASSAY OF PHOSPHORUS: CPT

## 2024-03-20 PROCEDURE — 82043 UR ALBUMIN QUANTITATIVE: CPT

## 2024-03-20 PROCEDURE — 82306 VITAMIN D 25 HYDROXY: CPT

## 2024-03-20 PROCEDURE — 83970 ASSAY OF PARATHORMONE: CPT

## 2024-03-21 ENCOUNTER — OFFICE VISIT (OUTPATIENT)
Dept: FAMILY MEDICINE CLINIC | Facility: CLINIC | Age: 79
End: 2024-03-21

## 2024-03-21 VITALS
BODY MASS INDEX: 24.84 KG/M2 | SYSTOLIC BLOOD PRESSURE: 145 MMHG | RESPIRATION RATE: 18 BRPM | WEIGHT: 135 LBS | TEMPERATURE: 98.6 F | DIASTOLIC BLOOD PRESSURE: 90 MMHG | HEART RATE: 99 BPM | HEIGHT: 62 IN | OXYGEN SATURATION: 95 %

## 2024-03-21 DIAGNOSIS — J45.30 MILD PERSISTENT ASTHMA WITHOUT COMPLICATION: ICD-10-CM

## 2024-03-21 DIAGNOSIS — H10.10 SEASONAL ALLERGIC CONJUNCTIVITIS: ICD-10-CM

## 2024-03-21 DIAGNOSIS — J01.00 ACUTE MAXILLARY SINUSITIS, RECURRENCE NOT SPECIFIED: Primary | ICD-10-CM

## 2024-03-21 DIAGNOSIS — J30.9 CHRONIC ALLERGIC RHINITIS: ICD-10-CM

## 2024-03-21 DIAGNOSIS — Z79.899 HIGH RISK MEDICATION USE: ICD-10-CM

## 2024-03-21 DIAGNOSIS — J06.9 VIRAL UPPER RESPIRATORY TRACT INFECTION: ICD-10-CM

## 2024-03-21 DIAGNOSIS — K21.9 GASTROESOPHAGEAL REFLUX DISEASE WITHOUT ESOPHAGITIS: ICD-10-CM

## 2024-03-21 DIAGNOSIS — J45.901 ASTHMA EXACERBATION: ICD-10-CM

## 2024-03-21 DIAGNOSIS — I10 ESSENTIAL HYPERTENSION: ICD-10-CM

## 2024-03-21 DIAGNOSIS — F41.9 ANXIETY: ICD-10-CM

## 2024-03-21 DIAGNOSIS — M05.79 RHEUMATOID ARTHRITIS INVOLVING MULTIPLE SITES WITH POSITIVE RHEUMATOID FACTOR (HCC): ICD-10-CM

## 2024-03-21 DIAGNOSIS — E55.9 VITAMIN D DEFICIENCY: ICD-10-CM

## 2024-03-21 PROCEDURE — 99213 OFFICE O/P EST LOW 20 MIN: CPT | Performed by: FAMILY MEDICINE

## 2024-03-21 PROCEDURE — G2211 COMPLEX E/M VISIT ADD ON: HCPCS | Performed by: FAMILY MEDICINE

## 2024-03-21 PROCEDURE — 3077F SYST BP >= 140 MM HG: CPT | Performed by: FAMILY MEDICINE

## 2024-03-21 PROCEDURE — 3080F DIAST BP >= 90 MM HG: CPT | Performed by: FAMILY MEDICINE

## 2024-03-21 RX ORDER — BUDESONIDE AND FORMOTEROL FUMARATE DIHYDRATE 80; 4.5 UG/1; UG/1
2 AEROSOL RESPIRATORY (INHALATION) 2 TIMES DAILY
Qty: 10.2 G | Refills: 5 | Status: SHIPPED | OUTPATIENT
Start: 2024-03-21 | End: 2024-04-20

## 2024-03-21 RX ORDER — MONTELUKAST SODIUM 10 MG/1
10 TABLET ORAL
Qty: 30 TABLET | Refills: 0 | Status: SHIPPED | OUTPATIENT
Start: 2024-03-21 | End: 2024-04-20

## 2024-03-21 RX ORDER — AMOXICILLIN AND CLAVULANATE POTASSIUM 875; 125 MG/1; MG/1
1 TABLET, FILM COATED ORAL EVERY 12 HOURS SCHEDULED
Qty: 10 TABLET | Refills: 0 | Status: SHIPPED | OUTPATIENT
Start: 2024-03-21 | End: 2024-03-26

## 2024-03-21 RX ORDER — FLUTICASONE PROPIONATE 50 MCG
2 SPRAY, SUSPENSION (ML) NASAL DAILY
Qty: 16 G | Refills: 3 | Status: SHIPPED | OUTPATIENT
Start: 2024-03-21

## 2024-03-21 RX ORDER — AMOXICILLIN 875 MG/1
875 TABLET, COATED ORAL 2 TIMES DAILY
Qty: 10 TABLET | Refills: 0 | Status: SHIPPED | OUTPATIENT
Start: 2024-03-21 | End: 2024-03-21 | Stop reason: CLARIF

## 2024-03-21 RX ORDER — AZELASTINE HYDROCHLORIDE 137 UG/1
2 SPRAY, METERED NASAL 2 TIMES DAILY
Qty: 30 ML | Refills: 1 | Status: SHIPPED | OUTPATIENT
Start: 2024-03-21

## 2024-03-21 RX ORDER — PANTOPRAZOLE SODIUM 20 MG/1
20 TABLET, DELAYED RELEASE ORAL DAILY
Qty: 90 TABLET | Refills: 1 | Status: SHIPPED | OUTPATIENT
Start: 2024-03-21

## 2024-03-21 RX ORDER — MULTIVIT-MIN/IRON/FOLIC ACID/K 18-600-40
2000 CAPSULE ORAL DAILY
Qty: 30 CAPSULE | Refills: 5 | Status: SHIPPED | OUTPATIENT
Start: 2024-03-21 | End: 2024-03-21 | Stop reason: ALTCHOICE

## 2024-03-21 RX ORDER — OLOPATADINE HYDROCHLORIDE 1 MG/ML
1 SOLUTION/ DROPS OPHTHALMIC 2 TIMES DAILY
Qty: 5 ML | Refills: 2 | Status: SHIPPED | OUTPATIENT
Start: 2024-03-21

## 2024-03-21 RX ORDER — AMLODIPINE BESYLATE 5 MG/1
5 TABLET ORAL DAILY
Qty: 90 TABLET | Refills: 1 | Status: SHIPPED | OUTPATIENT
Start: 2024-03-21

## 2024-03-21 RX ORDER — ESCITALOPRAM OXALATE 10 MG/1
10 TABLET ORAL DAILY
Qty: 90 TABLET | Refills: 3 | Status: SHIPPED | OUTPATIENT
Start: 2024-03-21 | End: 2025-03-16

## 2024-03-21 RX ORDER — PREDNISONE 10 MG/1
TABLET ORAL DAILY
Qty: 38 TABLET | Refills: 0 | Status: SHIPPED | OUTPATIENT
Start: 2024-03-21 | End: 2024-04-04

## 2024-03-21 RX ORDER — ONDANSETRON 4 MG/1
4 TABLET, ORALLY DISINTEGRATING ORAL EVERY 6 HOURS PRN
Qty: 20 TABLET | Refills: 3 | Status: SHIPPED | OUTPATIENT
Start: 2024-03-21

## 2024-03-21 RX ORDER — MELATONIN
1000 DAILY
Qty: 30 TABLET | Refills: 2 | Status: SHIPPED | OUTPATIENT
Start: 2024-03-21 | End: 2024-06-19

## 2024-03-21 RX ORDER — FOLIC ACID 1 MG/1
1000 TABLET ORAL DAILY
Qty: 30 TABLET | Refills: 5 | Status: SHIPPED | OUTPATIENT
Start: 2024-03-21

## 2024-03-21 NOTE — RESULT ENCOUNTER NOTE
As reviewed.  Stable with the exception of a low vitamin D level.  Results to be reviewed and addressed at follow-up appointment next week with Dr. Reyes

## 2024-03-21 NOTE — ASSESSMENT & PLAN NOTE
Vit D level low (3/20/24). Recommend Vit D 1000 units daily. Ordered lab work to be completed in 3 months,

## 2024-03-21 NOTE — PROGRESS NOTES
Name: Mireya Yi      : 1945      MRN: [id number]  Encounter Provider: Elham Saleem MD  Encounter Date: 3/21/2024   Encounter department: [facility]    Assessment & Plan     1. Acute maxillary sinusitis, recurrence not specified  Assessment & Plan:  2 week hx of rhinorrhea, congestion, productive cough, sinus tenderness and pressure. Using Flonase, azelastine nasal spray, and saline rinses as prescribed. Given duration of symptoms without improvement will rx Augmentin  bid for 5 days. Return to care precautions discussed. Will follow up in two weeks or sooner as needed.     Orders:  -     amoxicillin-clavulanate (AUGMENTIN) 875-125 mg per tablet; Take 1 tablet by mouth every 12 (twelve) hours for 5 days    2. Asthma exacerbation  Assessment & Plan:  Symptoms of URI for over 2 weeks leading to asthma exacerbation. Patient requiring albuterol inhaler 3-4 times a day, dyspnea on exertion, increased productive cough, ad wheezing. S/p 5 day course of prednisone started on  with brief improvement of respiratory symptoms. Will rx 2 week prednisone taper. ED precautions discussed. Patient to continue Symbicort bid and albuterol prn. Will follow up in two weeks or sooner as needed.     Orders:  -     predniSONE 10 mg tablet; Take 4 tablets (40 mg total) by mouth daily for 5 days, THEN 3 tablets (30 mg total) daily for 3 days, THEN 2 tablets (20 mg total) daily for 3 days, THEN 1 tablet (10 mg total) daily for 3 days.  -     budesonide-formoterol (Symbicort) 80-4.5 MCG/ACT inhaler; Inhale 2 puffs 2 (two) times a day Rinse mouth after use    3. Vitamin D deficiency  Assessment & Plan:  Vit D level low (3/20/24). Recommend Vit D 1000 units daily. Ordered lab work to be completed in 3 months,    Orders:  -     Vitamin D 25 hydroxy; Future; Expected date: 2024  -     cholecalciferol (VITAMIN D3) 1,000 units tablet; Take 1 tablet (1,000 Units total) by mouth  daily    4. Anxiety  -     escitalopram (LEXAPRO) 10 mg tablet; Take 1 tablet (10 mg total) by mouth daily    5. Chronic allergic rhinitis  -     fluticasone (FLONASE) 50 mcg/act nasal spray; 2 sprays into each nostril daily  -     Azelastine HCl 137 MCG/SPRAY SOLN; 2 sprays into each nostril 2 (two) times a day    6. Mild persistent asthma without complication  -     montelukast (SINGULAIR) 10 mg tablet; Take 1 tablet (10 mg total) by mouth daily at bedtime    7. Essential hypertension  -     amLODIPine (NORVASC) 5 mg tablet; Take 1 tablet (5 mg total) by mouth daily    8. Gastroesophageal reflux disease without esophagitis  -     pantoprazole (PROTONIX) 20 mg tablet; Take 1 tablet (20 mg total) by mouth daily  -     ondansetron (Zofran ODT) 4 mg disintegrating tablet; Take 1 tablet (4 mg total) by mouth every 6 (six) hours as needed for nausea or vomiting    9. Rheumatoid arthritis involving multiple sites with positive rheumatoid factor (HCC)  -     folic acid (FOLVITE) 1 mg tablet; Take 1 tablet (1,000 mcg total) by mouth daily    10. High risk medication use  -     folic acid (FOLVITE) 1 mg tablet; Take 1 tablet (1,000 mcg total) by mouth daily    11. Seasonal allergic conjunctivitis  -     olopatadine (PATANOL) 0.1 % ophthalmic solution; Administer 1 drop to both eyes 2 (two) times a day    12. Viral upper respiratory tract infection           Subjective     Ms. Yi presents to clinic for URI symptoms and dyspnea on exertion.     Patient states since the first week of March she has been experiencing sinus pressure, congestion, rhinorrhea, productive cough and dyspena on exertion. She denies dyspena at rest. Patient was requiring her albuterol inhaler every 6 hours. She was started on a 5 day course of prednisone on March 6th. She states while she was on prednisone her breathing improved and she felt less short of breath and required her albuterol inhaler less frequently. Shortly after completion of the  steroids she states she began experiencing shortness of breath on exertion again. She has been requiring her albuterol inhaler 3-4 times daily. Patient's URI symtpoms have been constant for two weeks. She states she has been using her Flonase, nasal saline rinses, and azelastine daily as well as allergy medication daily. Denies fevers or chills.      Patient states she stopped smoking about 3 months ago.         Review of Systems   Constitutional:  Negative for chills and fever.   HENT:  Positive for congestion, postnasal drip, rhinorrhea and sinus pressure. Negative for sore throat and tinnitus.    Eyes:  Negative for redness and visual disturbance.   Respiratory:  Positive for cough, shortness of breath and wheezing.    Cardiovascular:  Negative for chest pain and palpitations.   Gastrointestinal:  Negative for abdominal pain, constipation and nausea.   Genitourinary:  Negative for difficulty urinating and dysuria.   Musculoskeletal:  Positive for back pain (chronic). Negative for myalgias.   Skin:  Negative for rash.   Allergic/Immunologic: Positive for environmental allergies. Negative for food allergies.   Neurological:  Negative for dizziness and headaches.       Past Medical History:   Diagnosis Date    Asthma     Asthmatic bronchitis     Last Assessed: 10/7/2014     Chronic diarrhea     Last Assessed: 8/20/2015     Fibromyalgia     Focal nodular hyperplasia of liver     Last Assessed: 6/11/2015    Herpes zoster     Last Assessed: 3/18/2016    Hypertension     IBS (irritable bowel syndrome)     Intermittent palpitations     Irritable bowel syndrome     Lumbar radiculopathy     Osteoarthritis     Vertigo      Past Surgical History:   Procedure Laterality Date    BREAST BIOPSY      SMALL INTESTINE SURGERY       Family History   Problem Relation Age of Onset    Heart attack Mother     Other Mother         Aspiration of Vomit     Asthma Father     Breast cancer Sister     Arthritis Family     Other Family          Musculoskeletal Disease     Osteoporosis Family      Social History     Socioeconomic History    Marital status:      Spouse name: None    Number of children: None    Years of education: None    Highest education level: None   Occupational History    Occupation: Unemployed    Tobacco Use    Smoking status: Former     Current packs/day: 0.50     Types: Cigarettes    Smokeless tobacco: Never    Tobacco comments:     Stopped smoking July 2023   Vaping Use    Vaping status: Never Used   Substance and Sexual Activity    Alcohol use: Not Currently    Drug use: No    Sexual activity: Not Currently     Partners: Male   Other Topics Concern    None   Social History Narrative    Mental Disability     Exercising Regularly     Lives with adult children      Social Determinants of Health     Financial Resource Strain: Low Risk  (3/21/2024)    Overall Financial Resource Strain (CARDIA)     Difficulty of Paying Living Expenses: Not very hard   Food Insecurity: No Food Insecurity (3/21/2024)    Hunger Vital Sign     Worried About Running Out of Food in the Last Year: Never true     Ran Out of Food in the Last Year: Never true   Transportation Needs: No Transportation Needs (3/21/2024)    PRAPARE - Transportation     Lack of Transportation (Medical): No     Lack of Transportation (Non-Medical): No   Physical Activity: Inactive (11/17/2023)    Exercise Vital Sign     Days of Exercise per Week: 0 days     Minutes of Exercise per Session: 0 min   Stress: Stress Concern Present (3/21/2024)    [employer] - Occupational Stress Questionnaire     Feeling of Stress : To some extent   Social Connections: Low Risk (12/21/2023)    Received from [facility] ([facility])    Social Connections     How often do you feel isolated from others?: Hardly ever   Recent Concern: Social Connections - Socially Isolated (11/17/2023)    Social Connection and Isolation Panel [NHANES]     Frequency of Communication  with Friends and Family: More than three times a week     Frequency of Social Gatherings with Friends and Family: More than three times a week     Attends Sabianist Services: Never     Active Member of Clubs or Organizations: No     Attends Club or Organization Meetings: Never     Marital Status: Never    Intimate Partner Violence: Not At Risk (3/21/2024)    Humiliation, Afraid, Rape, and Kick questionnaire     Fear of Current or Ex-Partner: No     Emotionally Abused: No     Physically Abused: No     Sexually Abused: No   Housing Stability: Low Risk  (3/21/2024)    Housing Stability Vital Sign     Unable to Pay for Housing in the Last Year: No     Number of Places Lived in the Last Year: 1     Unstable Housing in the Last Year: No     Current Outpatient Medications on File Prior to Visit   Medication Sig    acetaminophen (TYLENOL) 500 mg tablet Take 1 tablet (500 mg total) by mouth every 6 (six) hours as needed for mild pain    albuterol (2.5 mg/3 mL) 0.083 % nebulizer solution Take 3 mL (2.5 mg total) by nebulization every 4 (four) hours as needed for wheezing or shortness of breath    albuterol (PROVENTIL HFA,VENTOLIN HFA) 90 mcg/act inhaler Inhale 2 puffs every 6 (six) hours as needed for wheezing or shortness of breath    benzonatate (TESSALON PERLES) 100 mg capsule Take 1 capsule (100 mg total) by mouth 3 (three) times a day as needed for cough    fexofenadine (ALLEGRA) 180 MG tablet Take 180 mg by mouth daily    hydrocortisone (ANUSOL-HC) 2.5 % rectal cream Apply topically 2 (two) times a day    nicotine (NICODERM CQ) 14 mg/24hr TD 24 hr patch Place 1 patch on the skin over 24 hours every 24 hours    nicotine polacrilex (NICORETTE) 2 mg gum Chew 1 each (2 mg total) as needed for smoking cessation    [DISCONTINUED] amLODIPine (NORVASC) 5 mg tablet Take 1 tablet (5 mg total) by mouth daily    [DISCONTINUED] Azelastine HCl 137 MCG/SPRAY SOLN INSTILL 2 SPRAYS INTO EACH NOSTRIL TWICE A DAY AS DIRECTED     "[DISCONTINUED] budesonide-formoterol (Symbicort) 80-4.5 MCG/ACT inhaler Inhale 2 puffs 2 (two) times a day Rinse mouth after use    [DISCONTINUED] Cholecalciferol (Vitamin D) 50 MCG (2000 UT) CAPS Take 1 capsule (2,000 Units total) by mouth daily    [DISCONTINUED] escitalopram (LEXAPRO) 10 mg tablet Take 1 tablet (10 mg total) by mouth daily    [DISCONTINUED] fluticasone (FLONASE) 50 mcg/act nasal spray 2 sprays into each nostril daily    [DISCONTINUED] folic acid (FOLVITE) 1 mg tablet take 1 tablet by mouth daily    [DISCONTINUED] olopatadine (PATANOL) 0.1 % ophthalmic solution Administer 1 drop to both eyes 2 (two) times a day    [DISCONTINUED] ondansetron (Zofran ODT) 4 mg disintegrating tablet Take 1 tablet (4 mg total) by mouth every 6 (six) hours as needed for nausea or vomiting    [DISCONTINUED] pantoprazole (PROTONIX) 20 mg tablet Take 1 tablet (20 mg total) by mouth daily    acetaminophen (TYLENOL) 650 mg CR tablet Take 1 tablet (650 mg total) by mouth every 8 (eight) hours as needed for mild pain (Patient not taking: Reported on 11/22/2023)    Blood Pressure Monitoring (BLOOD PRESSURE CUFF) MISC by Does not apply route 2 (two) times a day (Patient not taking: Reported on 12/1/2023)    Diclofenac Sodium (VOLTAREN) 1 % Apply 2 g topically 4 (four) times a day (Patient not taking: Reported on 12/1/2023)    lidocaine (Lidoderm) 5 % Apply 1 patch topically over 12 hours daily Remove & Discard patch within 12 hours or as directed by MD (Patient not taking: Reported on 12/1/2023)    loratadine (CLARITIN) 10 mg tablet Take 1 tablet (10 mg total) by mouth daily    [DISCONTINUED] carbamide peroxide (DEBROX) 6.5 % otic solution Administer 5 drops into both ears 2 (two) times a day for 4 days     Allergies   Allergen Reactions    Ambrosia Artemisiifolia (Ragweed) Skin Test Facial Swelling    Codeine Other (See Comments)     Heart races    Epinephrine      Pt reports \"it makes my heart race, they told me I cant take " "it.\"    Ibuprofen Other (See Comments)     Heart racing    Morphine Other (See Comments)     Heart racing    Pollen Extract Sneezing    Tramadol Other (See Comments)     Heart racing     Immunization History   Administered Date(s) Administered    Pneumococcal Conjugate Vaccine 20-valent (Pcv20), Polysace 11/17/2023    Pneumococcal Polysaccharide PPV23 01/01/2003, 10/01/2008    Tdap 06/08/2018       Objective     /90 (BP Location: Left arm, Patient Position: Sitting, Cuff Size: Standard)   Pulse 99   Temp 98.6 °F (37 °C) (Temporal)   Resp 18   Ht 5' 2\" (1.575 m)   Wt 61.2 kg (135 lb)   SpO2 95%   BMI 24.69 kg/m²     Physical Exam  Vitals reviewed.   Constitutional:       General: She is not in acute distress.     Appearance: Normal appearance.   HENT:      Head: Normocephalic and atraumatic.      Mouth/Throat:      Mouth: Mucous membranes are moist.      Pharynx: No oropharyngeal exudate or posterior oropharyngeal erythema.   Eyes:      General:         Right eye: Discharge (clear) present.         Left eye: Discharge (clear) present.     Extraocular Movements: Extraocular movements intact.   Cardiovascular:      Rate and Rhythm: Normal rate and regular rhythm.      Pulses: Normal pulses.      Heart sounds: Normal heart sounds.   Pulmonary:      Effort: Pulmonary effort is normal.      Breath sounds: Wheezing (diffuse wheezing bilaterally) present.   Abdominal:      General: Bowel sounds are normal.      Palpations: Abdomen is soft.      Tenderness: There is no abdominal tenderness.   Musculoskeletal:         General: Normal range of motion.      Cervical back: Normal range of motion.      Right lower leg: No edema.      Left lower leg: No edema.   Skin:     General: Skin is warm.   Neurological:      General: No focal deficit present.      Mental Status: She is alert and oriented to person, place, and time.       Elham Saleem MD    "

## 2024-03-21 NOTE — ASSESSMENT & PLAN NOTE
Symptoms of URI for over 2 weeks leading to asthma exacerbation. Patient requiring albuterol inhaler 3-4 times a day, dyspnea on exertion, increased productive cough, ad wheezing. S/p 5 day course of prednisone started on March 6th with brief improvement of respiratory symptoms. Will rx 2 week prednisone taper. ED precautions discussed. Patient to continue Symbicort bid and albuterol prn. Will follow up in two weeks or sooner as needed.

## 2024-03-21 NOTE — ASSESSMENT & PLAN NOTE
2 week hx of rhinorrhea, congestion, productive cough, sinus tenderness and pressure. Using Flonase, azelastine nasal spray, and saline rinses as prescribed. Given duration of symptoms without improvement will rx Augmentin  bid for 5 days. Return to care precautions discussed. Will follow up in two weeks or sooner as needed.

## 2024-03-22 ENCOUNTER — CONSULT (OUTPATIENT)
Dept: MULTI SPECIALTY CLINIC | Facility: CLINIC | Age: 79
End: 2024-03-22

## 2024-03-22 VITALS
HEART RATE: 108 BPM | DIASTOLIC BLOOD PRESSURE: 76 MMHG | TEMPERATURE: 98.4 F | WEIGHT: 136 LBS | SYSTOLIC BLOOD PRESSURE: 143 MMHG | BODY MASS INDEX: 24.87 KG/M2

## 2024-03-22 DIAGNOSIS — J32.9 RECURRENT SINUSITIS: Primary | ICD-10-CM

## 2024-03-22 DIAGNOSIS — J30.9 CHRONIC ALLERGIC RHINITIS: ICD-10-CM

## 2024-03-22 NOTE — PROGRESS NOTES
Consultation - Otolaryngology - Head and Neck Surgery  Facial Plastic and Reconstructive Surgery  Mireya Yi 78 y.o. female MRN: [id number]  Encounter: [id number]        Assessment/Plan:  1. Recurrent sinusitis  CT sinus wo contrast      2. Chronic allergic rhinitis  Ambulatory Referral to Otolaryngology    CT sinus wo contrast          Chronic sinusitis: We discussed the nature chronic sinusitis. She is currently taking augmentin and prednisone. We discussed that the use of appropriate medical therapy can substantially improve symptoms, but neither medications or surgery cure the disease and needs ongoing treatment.  We discussed the risks of antibiotics, including, but not limited to, incomplete therapy, C diff colitis, medication her reactions, medication allergy, and others.  We discussed the risks benefits and alternatives to oral prednisone therapy, including, but not limited to, GI distress in worsening of ulcers, elevation of blood sugar and diabetic complications, psychiatric complications, and avascular necrosis of hip.  We will obtain a CT scan after therapy and have the patient return in 2 months for re-evaluation.       History of Present Illness   Physician Requesting Consult: Elham Saleem MD   Reason for Consult / Principal Problem: recurrent sinusitis   HPI: Mireya Yi is a 78 y.o. year old female who presents with recurrent sinusitis. Most recent infection 2 days ago and was started on augemtin and prednisone. Needed course of abx every other month. Taking flonse, atrovent, and loratidine daily.   Hx of asthma and has frequent exacerbations from recurrent sinus infections. Was admitted 3 times over the past 3 years due to pneumonia and sepsis.   No recent sinus imagining. No previous nasal surgeries. No other ENT complaints.       Review of systems:  10 Point ROS was performed and negative except as above or otherwise noted in the medical record.    Historical Information   Past  Medical History:   Diagnosis Date    Asthma     Asthmatic bronchitis     Last Assessed: 10/7/2014     Chronic diarrhea     Last Assessed: 8/20/2015     Fibromyalgia     Focal nodular hyperplasia of liver     Last Assessed: 6/11/2015    Herpes zoster     Last Assessed: 3/18/2016    Hypertension     IBS (irritable bowel syndrome)     Intermittent palpitations     Irritable bowel syndrome     Lumbar radiculopathy     Osteoarthritis     Vertigo      Past Surgical History:   Procedure Laterality Date    BREAST BIOPSY      SMALL INTESTINE SURGERY       Social History   Social History     Substance and Sexual Activity   Alcohol Use Not Currently     Social History     Substance and Sexual Activity   Drug Use No     Social History     Tobacco Use   Smoking Status Former    Current packs/day: 0.50    Types: Cigarettes   Smokeless Tobacco Never   Tobacco Comments    Stopped smoking July 2023     Family History:   Family History   Problem Relation Age of Onset    Heart attack Mother     Other Mother         Aspiration of Vomit     Asthma Father     Breast cancer Sister     Arthritis Family     Other Family         Musculoskeletal Disease     Osteoporosis Family        Current Outpatient Medications on File Prior to Visit   Medication Sig    acetaminophen (TYLENOL) 500 mg tablet Take 1 tablet (500 mg total) by mouth every 6 (six) hours as needed for mild pain    acetaminophen (TYLENOL) 650 mg CR tablet Take 1 tablet (650 mg total) by mouth every 8 (eight) hours as needed for mild pain (Patient not taking: Reported on 11/22/2023)    albuterol (2.5 mg/3 mL) 0.083 % nebulizer solution Take 3 mL (2.5 mg total) by nebulization every 4 (four) hours as needed for wheezing or shortness of breath    albuterol (PROVENTIL HFA,VENTOLIN HFA) 90 mcg/act inhaler Inhale 2 puffs every 6 (six) hours as needed for wheezing or shortness of breath    amLODIPine (NORVASC) 5 mg tablet Take 1 tablet (5 mg total) by mouth daily     amoxicillin-clavulanate (AUGMENTIN) 875-125 mg per tablet Take 1 tablet by mouth every 12 (twelve) hours for 5 days    Azelastine HCl 137 MCG/SPRAY SOLN 2 sprays into each nostril 2 (two) times a day    benzonatate (TESSALON PERLES) 100 mg capsule Take 1 capsule (100 mg total) by mouth 3 (three) times a day as needed for cough    Blood Pressure Monitoring (BLOOD PRESSURE CUFF) MISC by Does not apply route 2 (two) times a day (Patient not taking: Reported on 12/1/2023)    budesonide-formoterol (Symbicort) 80-4.5 MCG/ACT inhaler Inhale 2 puffs 2 (two) times a day Rinse mouth after use    cholecalciferol (VITAMIN D3) 1,000 units tablet Take 1 tablet (1,000 Units total) by mouth daily    Diclofenac Sodium (VOLTAREN) 1 % Apply 2 g topically 4 (four) times a day (Patient not taking: Reported on 12/1/2023)    escitalopram (LEXAPRO) 10 mg tablet Take 1 tablet (10 mg total) by mouth daily    fexofenadine (ALLEGRA) 180 MG tablet Take 180 mg by mouth daily    fluticasone (FLONASE) 50 mcg/act nasal spray 2 sprays into each nostril daily    folic acid (FOLVITE) 1 mg tablet Take 1 tablet (1,000 mcg total) by mouth daily    hydrocortisone (ANUSOL-HC) 2.5 % rectal cream Apply topically 2 (two) times a day    lidocaine (Lidoderm) 5 % Apply 1 patch topically over 12 hours daily Remove & Discard patch within 12 hours or as directed by MD (Patient not taking: Reported on 12/1/2023)    loratadine (CLARITIN) 10 mg tablet Take 1 tablet (10 mg total) by mouth daily    montelukast (SINGULAIR) 10 mg tablet Take 1 tablet (10 mg total) by mouth daily at bedtime    nicotine (NICODERM CQ) 14 mg/24hr TD 24 hr patch Place 1 patch on the skin over 24 hours every 24 hours    nicotine polacrilex (NICORETTE) 2 mg gum Chew 1 each (2 mg total) as needed for smoking cessation    olopatadine (PATANOL) 0.1 % ophthalmic solution Administer 1 drop to both eyes 2 (two) times a day    ondansetron (Zofran ODT) 4 mg disintegrating tablet Take 1 tablet (4 mg  "total) by mouth every 6 (six) hours as needed for nausea or vomiting    pantoprazole (PROTONIX) 20 mg tablet Take 1 tablet (20 mg total) by mouth daily    predniSONE 10 mg tablet Take 4 tablets (40 mg total) by mouth daily for 5 days, THEN 3 tablets (30 mg total) daily for 3 days, THEN 2 tablets (20 mg total) daily for 3 days, THEN 1 tablet (10 mg total) daily for 3 days.     No current facility-administered medications on file prior to visit.       Allergies   Allergen Reactions    Ambrosia Artemisiifolia (Ragweed) Skin Test Facial Swelling    Codeine Other (See Comments)     Heart races    Epinephrine      Pt reports \"it makes my heart race, they told me I cant take it.\"    Ibuprofen Other (See Comments)     Heart racing    Morphine Other (See Comments)     Heart racing    Pollen Extract Sneezing    Tramadol Other (See Comments)     Heart racing       Vitals:    03/22/24 1430   BP: 143/76   Pulse: (!) 108   Temp: 98.4 °F (36.9 °C)       Physical Exam   Constitutional: Oriented to person, place, and time. Well-developed and well-nourished, no apparent distress, non-toxic appearance. Cooperative, able to hear and answer questions without difficulty.    Voice: Normal voice quality.  Head: Normocephalic, atraumatic.  No scars, masses or lesions.  Face: Symmetric, no edema, no sinus tenderness.  Eyes: Vision grossly intact, extra-ocular movement intact.  Ears: External ears normal. Tympanic membranes intact with intact normal landmarks.  No post-auricular erythema or tenderness.  Nose: Septum intact, nares clear.  Mucosa moist, turbinates well appearing.  No crusting, polyps or discharge evident.  Oral cavity: Dentition intact.  Mucosa moist, lips without lesions or masses.  Tongue mobile, floor of mouth soft and flat.  Hard palate intact.  No masses or lesions.   Oropharynx: Uvula is midline, soft palate intact without lesion or mass.  Oropharyngeal inlet without obstruction.  Tonsils unremarkable.  Posterior " pharyngeal wall clear. No masses or lesions.  Salivary glands:  Parotid glands and submandibular glands symmetric, no enlargement or tenderness.  Neck: Normal laryngeal elevation with swallow.  Trachea midline.  No masses or lesions. No palpable adenopathy.  Thyroid: Without tenderness or palpable nodules.  Pulmonary/Chest: Normal effort and rate. No respiratory distress. No stertor or stridor  Musculoskeletal: Normal range of motion.   Neurological: Cranial nerves 2-12 intact.  Skin: Skin is warm and dry.   Psychiatric: Normal mood and affect.        Imaging Studies: I have personally reviewed pertinent reports.      Lab Results: I have personally reviewed pertinent lab results.      Records reviewed: None

## 2024-03-27 ENCOUNTER — TELEPHONE (OUTPATIENT)
Age: 79
End: 2024-03-27

## 2024-03-27 ENCOUNTER — NURSE TRIAGE (OUTPATIENT)
Age: 79
End: 2024-03-27

## 2024-03-27 NOTE — TELEPHONE ENCOUNTER
Pt stated Pulm Provider: Dr. Grimes    Actionable item: Recommendations    What is the reason for the call/chief complaint?    Pt c/o persistent URI symptoms. Has productive cough-she is unsure of sputum color, mild VAZQUEZ and wheezing. Denies fever. Prescribed Prednisone burst 3/6 by pulm, then saw PCP 3/22 and prescribed augmentin and steroid taper. Currently taking both but not having any relief of symptoms. Please advise.     Priority:

## 2024-03-27 NOTE — TELEPHONE ENCOUNTER
S/w the patient and she stated she is still feeling sick. She will finish her Antibx this weekend but she is still on steroids for her cough. Reviewed that if she still feels sick the day before her procedure then she may call and reschedule the day before the procedure. She appreciated the call. Just FYI.

## 2024-03-27 NOTE — TELEPHONE ENCOUNTER
"Answer Assessment - Initial Assessment Questions  1. ONSET: \"When did the cough begin?\"       Earlier this month  2. SEVERITY: \"How bad is the cough today?\"       Unspecified  3. SPUTUM: \"Describe the color of your sputum\" (none, dry cough; clear, white, yellow, green)      Pt unsure  4. HEMOPTYSIS: \"Are you coughing up any blood?\" If so ask: \"How much?\" (flecks, streaks, tablespoons, etc.)      No hemoptysis  5. DIFFICULTY BREATHING: \"Are you having difficulty breathing?\" If Yes, ask: \"How bad is it?\" (e.g., mild, moderate, severe)     - MILD: No SOB at rest, mild SOB with walking, speaks normally in sentences, can lay down, no retractions, pulse < 100.     - MODERATE: SOB at rest, SOB with minimal exertion and prefers to sit, cannot lie down flat, speaks in phrases, mild retractions, audible wheezing, pulse 100-120.     - SEVERE: Very SOB at rest, speaks in single words, struggling to breathe, sitting hunched forward, retractions, pulse > 120       Mild VAZQUEZ  6. FEVER: \"Do you have a fever?\" If Yes, ask: \"What is your temperature, how was it measured, and when did it start?\"      Denies fever  7. CARDIAC HISTORY: \"Do you have any history of heart disease?\" (e.g., heart attack, congestive heart failure)       See chart  8. LUNG HISTORY: \"Do you have any history of lung disease?\"  (e.g., pulmonary embolus, asthma, emphysema)      Asthma, COPD  9. PE RISK FACTORS: \"Do you have a history of blood clots?\" (or: recent major surgery, recent prolonged travel, bedridden)      Unspecified  10. OTHER SYMPTOMS: \"Do you have any other symptoms?\" (e.g., runny nose, wheezing, chest pain)        Productive cough- unsure of sputum purulence, runny nose, wheezing    Protocols used: Cough-ADULT-OH    "

## 2024-03-27 NOTE — TELEPHONE ENCOUNTER
Caller: else    Doctor: dr wilkes     Reason for call: pt would like to speak with the nurse in regards to some medicines she is taking to make sure she can still have the procedure     Call back#: [phone]

## 2024-03-28 ENCOUNTER — HOSPITAL ENCOUNTER (OUTPATIENT)
Dept: RADIOLOGY | Facility: HOSPITAL | Age: 79
Discharge: HOME | End: 2024-03-28
Payer: MEDICARE

## 2024-03-28 ENCOUNTER — OFFICE VISIT (OUTPATIENT)
Dept: NEPHROLOGY | Facility: CLINIC | Age: 79
End: 2024-03-28
Payer: MEDICARE

## 2024-03-28 ENCOUNTER — APPOINTMENT (OUTPATIENT)
Dept: LAB | Facility: CLINIC | Age: 79
End: 2024-03-28
Payer: MEDICARE

## 2024-03-28 ENCOUNTER — OFFICE VISIT (OUTPATIENT)
Dept: PULMONOLOGY | Facility: CLINIC | Age: 79
End: 2024-03-28
Payer: MEDICARE

## 2024-03-28 VITALS
WEIGHT: 133 LBS | BODY MASS INDEX: 24.48 KG/M2 | DIASTOLIC BLOOD PRESSURE: 92 MMHG | HEIGHT: 62 IN | SYSTOLIC BLOOD PRESSURE: 160 MMHG | HEART RATE: 97 BPM

## 2024-03-28 VITALS
DIASTOLIC BLOOD PRESSURE: 88 MMHG | OXYGEN SATURATION: 99 % | TEMPERATURE: 96.4 F | SYSTOLIC BLOOD PRESSURE: 146 MMHG | HEART RATE: 93 BPM

## 2024-03-28 DIAGNOSIS — M05.80 POLYARTHRITIS WITH POSITIVE RHEUMATOID FACTOR (HCC): ICD-10-CM

## 2024-03-28 DIAGNOSIS — N18.32 STAGE 3B CHRONIC KIDNEY DISEASE (HCC): ICD-10-CM

## 2024-03-28 DIAGNOSIS — E55.9 HYPOVITAMINOSIS D: Primary | ICD-10-CM

## 2024-03-28 DIAGNOSIS — J40 TRACHEOBRONCHITIS: ICD-10-CM

## 2024-03-28 DIAGNOSIS — E83.9 CHRONIC KIDNEY DISEASE-MINERAL AND BONE DISORDER: ICD-10-CM

## 2024-03-28 DIAGNOSIS — J43.9 PULMONARY EMPHYSEMA, UNSPECIFIED EMPHYSEMA TYPE (HCC): ICD-10-CM

## 2024-03-28 DIAGNOSIS — M89.9 CHRONIC KIDNEY DISEASE-MINERAL AND BONE DISORDER: ICD-10-CM

## 2024-03-28 DIAGNOSIS — J40 TRACHEOBRONCHITIS: Primary | ICD-10-CM

## 2024-03-28 DIAGNOSIS — N18.30 BENIGN HYPERTENSION WITH CHRONIC KIDNEY DISEASE, STAGE III (HCC): ICD-10-CM

## 2024-03-28 DIAGNOSIS — N18.9 CHRONIC KIDNEY DISEASE-MINERAL AND BONE DISORDER: ICD-10-CM

## 2024-03-28 DIAGNOSIS — I12.9 BENIGN HYPERTENSION WITH CHRONIC KIDNEY DISEASE, STAGE III (HCC): ICD-10-CM

## 2024-03-28 LAB
BASOPHILS # BLD AUTO: 0.01 THOUSANDS/ÂΜL (ref 0–0.1)
BASOPHILS NFR BLD AUTO: 0 % (ref 0–1)
EOSINOPHIL # BLD AUTO: 0.01 THOUSAND/ÂΜL (ref 0–0.61)
EOSINOPHIL NFR BLD AUTO: 0 % (ref 0–6)
ERYTHROCYTE [DISTWIDTH] IN BLOOD BY AUTOMATED COUNT: 17.4 % (ref 11.6–15.1)
HCT VFR BLD AUTO: 39.2 % (ref 34.8–46.1)
HGB BLD-MCNC: 11.9 G/DL (ref 11.5–15.4)
IMM GRANULOCYTES # BLD AUTO: 0.08 THOUSAND/UL (ref 0–0.2)
IMM GRANULOCYTES NFR BLD AUTO: 1 % (ref 0–2)
LYMPHOCYTES # BLD AUTO: 0.97 THOUSANDS/ÂΜL (ref 0.6–4.47)
LYMPHOCYTES NFR BLD AUTO: 8 % (ref 14–44)
MCH RBC QN AUTO: 26.3 PG (ref 26.8–34.3)
MCHC RBC AUTO-ENTMCNC: 30.4 G/DL (ref 31.4–37.4)
MCV RBC AUTO: 87 FL (ref 82–98)
MONOCYTES # BLD AUTO: 0.28 THOUSAND/ÂΜL (ref 0.17–1.22)
MONOCYTES NFR BLD AUTO: 2 % (ref 4–12)
NEUTROPHILS # BLD AUTO: 11.51 THOUSANDS/ÂΜL (ref 1.85–7.62)
NEUTS SEG NFR BLD AUTO: 89 % (ref 43–75)
NRBC BLD AUTO-RTO: 0 /100 WBCS
PLATELET # BLD AUTO: 340 THOUSANDS/UL (ref 149–390)
PMV BLD AUTO: 12.4 FL (ref 8.9–12.7)
PROCALCITONIN SERPL-MCNC: 0.05 NG/ML
RBC # BLD AUTO: 4.52 MILLION/UL (ref 3.81–5.12)
WBC # BLD AUTO: 12.86 THOUSAND/UL (ref 4.31–10.16)

## 2024-03-28 PROCEDURE — 87636 SARSCOV2 & INF A&B AMP PRB: CPT

## 2024-03-28 PROCEDURE — 71046 X-RAY EXAM CHEST 2 VIEWS: CPT

## 2024-03-28 PROCEDURE — 99215 OFFICE O/P EST HI 40 MIN: CPT | Performed by: INTERNAL MEDICINE

## 2024-03-28 PROCEDURE — 85025 COMPLETE CBC W/AUTO DIFF WBC: CPT

## 2024-03-28 PROCEDURE — 99214 OFFICE O/P EST MOD 30 MIN: CPT | Performed by: STUDENT IN AN ORGANIZED HEALTH CARE EDUCATION/TRAINING PROGRAM

## 2024-03-28 PROCEDURE — 36415 COLL VENOUS BLD VENIPUNCTURE: CPT

## 2024-03-28 PROCEDURE — 84145 PROCALCITONIN (PCT): CPT

## 2024-03-28 RX ORDER — ERGOCALCIFEROL 1.25 MG/1
50000 CAPSULE ORAL WEEKLY
Qty: 12 CAPSULE | Refills: 0 | Status: SHIPPED | OUTPATIENT
Start: 2024-03-28

## 2024-03-28 RX ORDER — LEVOFLOXACIN 500 MG/1
500 TABLET, FILM COATED ORAL EVERY 24 HOURS
Qty: 10 TABLET | Refills: 0 | Status: SHIPPED | OUTPATIENT
Start: 2024-03-28 | End: 2024-04-07

## 2024-03-28 NOTE — PATIENT INSTRUCTIONS
Thank you for coming to your visit today. As we discussed you kidney function was stable last July but you need to repeat blood work.    Recommend low sodium (salt) food    Avoid nonsteroidal anti-inflammatory drugs such as Naprosyn, ibuprofen, Aleve, Advil, Celebrex, Meloxicam (Mobic) etc.  You can use Tylenol as needed if you do not have any liver condition to be concerned about    Start vit D 50,000 unit once a week for 12 week and then you can continue with your daily dose     Monitor your blood pressure     Next Visit in 5 months with results   If you need to see us earlier we can change the appointment for you      Joselyn Reyes Bahamonde, MD  Nephrology Attending

## 2024-03-28 NOTE — PROGRESS NOTES
Pulmonary Follow Up Note   Mireya Yi 78 y.o. female MRN: [id number]  3/28/2024      Assessment:    1. Tracheobronchitis  Check CXR PA and lateral  Stop augement and start levaquin x 10 days  F/up sinus culture from ENT  Will check CBC, procal  Check nasal COVID/Flu  F/up with Dr. Grimes in 6 weeks  -     XR chest pa & lateral; Future; Expected date: 03/28/2024  -     levofloxacin (LEVAQUIN) 500 mg tablet; Take 1 tablet (500 mg total) by mouth every 24 hours for 10 days  -     Procalcitonin; Future  -     CBC and differential; Future  -     COVID/FLU; Future          Plan:    Diagnoses and all orders for this visit:    Tracheobronchitis  -     XR chest pa & lateral; Future  -     levofloxacin (LEVAQUIN) 500 mg tablet; Take 1 tablet (500 mg total) by mouth every 24 hours for 10 days  -     Procalcitonin; Future  -     CBC and differential; Future  -     COVID/FLU; Future        Return in about 6 weeks (around 5/9/2024).    History of Present Illness   HPI:  Mireya Yi is a 78 y.o. female who presents for a sick visit. She was seen several weeks ago for bronchitis/URI. She has been taking OTC meds for congestion, cough. She feels as though she is getting worse. She is currently on a steroid taper and was given augmentin for 5 days without relief.     Review of Systems   Constitutional:  Negative for chills and fever.   HENT:  Positive for congestion. Negative for postnasal drip and rhinorrhea.    Eyes:  Negative for itching.   Respiratory:  Positive for cough and shortness of breath. Negative for wheezing and stridor.    Cardiovascular:  Negative for chest pain, palpitations and leg swelling.   Gastrointestinal:  Negative for abdominal distention, abdominal pain, nausea and vomiting.   Genitourinary:  Negative for dysuria and flank pain.   Musculoskeletal:  Negative for arthralgias and myalgias.   Skin:  Negative for color change.   Neurological:  Negative for dizziness, light-headedness and headaches.    Psychiatric/Behavioral: Negative.         Historical Information   Past Medical History:   Diagnosis Date    Asthma     Asthmatic bronchitis     Last Assessed: 10/7/2014     Chronic diarrhea     Last Assessed: 8/20/2015     Fibromyalgia     Focal nodular hyperplasia of liver     Last Assessed: 6/11/2015    Herpes zoster     Last Assessed: 3/18/2016    Hypertension     IBS (irritable bowel syndrome)     Intermittent palpitations     Irritable bowel syndrome     Lumbar radiculopathy     Osteoarthritis     Vertigo      Past Surgical History:   Procedure Laterality Date    BREAST BIOPSY      SMALL INTESTINE SURGERY       Family History   Problem Relation Age of Onset    Heart attack Mother     Other Mother         Aspiration of Vomit     Asthma Father     Breast cancer Sister     Arthritis Family     Other Family         Musculoskeletal Disease     Osteoporosis Family          Meds/Allergies     Current Outpatient Medications:     acetaminophen (TYLENOL) 500 mg tablet, Take 1 tablet (500 mg total) by mouth every 6 (six) hours as needed for mild pain, Disp: 60 tablet, Rfl: 0    albuterol (2.5 mg/3 mL) 0.083 % nebulizer solution, Take 3 mL (2.5 mg total) by nebulization every 4 (four) hours as needed for wheezing or shortness of breath, Disp: 90 mL, Rfl: 5    albuterol (PROVENTIL HFA,VENTOLIN HFA) 90 mcg/act inhaler, Inhale 2 puffs every 6 (six) hours as needed for wheezing or shortness of breath, Disp: 18 g, Rfl: 5    amLODIPine (NORVASC) 5 mg tablet, Take 1 tablet (5 mg total) by mouth daily, Disp: 90 tablet, Rfl: 1    Azelastine HCl 137 MCG/SPRAY SOLN, 2 sprays into each nostril 2 (two) times a day, Disp: 30 mL, Rfl: 1    benzonatate (TESSALON PERLES) 100 mg capsule, Take 1 capsule (100 mg total) by mouth 3 (three) times a day as needed for cough, Disp: 20 capsule, Rfl: 0    budesonide-formoterol (Symbicort) 80-4.5 MCG/ACT inhaler, Inhale 2 puffs 2 (two) times a day Rinse mouth after use, Disp: 10.2 g, Rfl: 5     cholecalciferol (VITAMIN D3) 1,000 units tablet, Take 1 tablet (1,000 Units total) by mouth daily, Disp: 30 tablet, Rfl: 2    escitalopram (LEXAPRO) 10 mg tablet, Take 1 tablet (10 mg total) by mouth daily, Disp: 90 tablet, Rfl: 3    fexofenadine (ALLEGRA) 180 MG tablet, Take 180 mg by mouth daily, Disp: , Rfl:     fluticasone (FLONASE) 50 mcg/act nasal spray, 2 sprays into each nostril daily, Disp: 16 g, Rfl: 3    folic acid (FOLVITE) 1 mg tablet, Take 1 tablet (1,000 mcg total) by mouth daily, Disp: 30 tablet, Rfl: 5    hydrocortisone (ANUSOL-HC) 2.5 % rectal cream, Apply topically 2 (two) times a day, Disp: 28 g, Rfl: 0    levofloxacin (LEVAQUIN) 500 mg tablet, Take 1 tablet (500 mg total) by mouth every 24 hours for 10 days, Disp: 10 tablet, Rfl: 0    montelukast (SINGULAIR) 10 mg tablet, Take 1 tablet (10 mg total) by mouth daily at bedtime, Disp: 30 tablet, Rfl: 0    nicotine (NICODERM CQ) 14 mg/24hr TD 24 hr patch, Place 1 patch on the skin over 24 hours every 24 hours, Disp: 28 patch, Rfl: 1    nicotine polacrilex (NICORETTE) 2 mg gum, Chew 1 each (2 mg total) as needed for smoking cessation, Disp: 100 each, Rfl: 1    olopatadine (PATANOL) 0.1 % ophthalmic solution, Administer 1 drop to both eyes 2 (two) times a day, Disp: 5 mL, Rfl: 2    ondansetron (Zofran ODT) 4 mg disintegrating tablet, Take 1 tablet (4 mg total) by mouth every 6 (six) hours as needed for nausea or vomiting, Disp: 20 tablet, Rfl: 3    pantoprazole (PROTONIX) 20 mg tablet, Take 1 tablet (20 mg total) by mouth daily, Disp: 90 tablet, Rfl: 1    predniSONE 10 mg tablet, Take 4 tablets (40 mg total) by mouth daily for 5 days, THEN 3 tablets (30 mg total) daily for 3 days, THEN 2 tablets (20 mg total) daily for 3 days, THEN 1 tablet (10 mg total) daily for 3 days., Disp: 38 tablet, Rfl: 0    acetaminophen (TYLENOL) 650 mg CR tablet, Take 1 tablet (650 mg total) by mouth every 8 (eight) hours as needed for mild pain (Patient not taking:  "Reported on 11/22/2023), Disp: 30 tablet, Rfl: 0    Blood Pressure Monitoring (BLOOD PRESSURE CUFF) MISC, by Does not apply route 2 (two) times a day (Patient not taking: Reported on 12/1/2023), Disp: 1 each, Rfl: 0    Diclofenac Sodium (VOLTAREN) 1 %, Apply 2 g topically 4 (four) times a day (Patient not taking: Reported on 12/1/2023), Disp: 50 g, Rfl: 0    lidocaine (Lidoderm) 5 %, Apply 1 patch topically over 12 hours daily Remove & Discard patch within 12 hours or as directed by MD (Patient not taking: Reported on 12/1/2023), Disp: 15 patch, Rfl: 0    loratadine (CLARITIN) 10 mg tablet, Take 1 tablet (10 mg total) by mouth daily, Disp: 30 tablet, Rfl: 3  Allergies   Allergen Reactions    Ambrosia Artemisiifolia (Ragweed) Skin Test Facial Swelling    Codeine Other (See Comments)     Heart races    Epinephrine      Pt reports \"it makes my heart race, they told me I cant take it.\"    Ibuprofen Other (See Comments)     Heart racing    Morphine Other (See Comments)     Heart racing    Pollen Extract Sneezing    Tramadol Other (See Comments)     Heart racing       Vitals: Blood pressure 146/88, pulse 93, temperature (!) 96.4 °F (35.8 °C), temperature source Tympanic, SpO2 99%. There is no height or weight on file to calculate BMI. Oxygen Therapy  SpO2: 99 %  Oxygen Therapy: None (Room air)      Physical Exam  Physical Exam    Labs: I have personally reviewed pertinent lab results.  Lab Results   Component Value Date    WBC 12.30 (H) 03/20/2024    HGB 11.5 03/20/2024    HCT 37.7 03/20/2024    MCV 86 03/20/2024     03/20/2024     Lab Results   Component Value Date    GLUCOSE 92 05/19/2015    CALCIUM 8.8 07/30/2023     05/19/2015    K 3.9 07/30/2023    CO2 24 07/30/2023     (H) 07/30/2023    BUN 29 (H) 07/30/2023    CREATININE 1.54 (H) 07/30/2023     No results found for: \"IGE\"  Lab Results   Component Value Date    ALT 15 07/28/2023    AST 10 07/28/2023    ALKPHOS 80 07/28/2023    BILITOT 0.20 " 2015         Imaging and other studies: I have personally reviewed pertinent reports.   and I have personally reviewed pertinent films in PACS  CT chest in 2023- stable 3 mm lung nodule, mild emphysema    Pulmonary function testin2023  Mild obstructive airflow defect, no response to bronchodilators, normal lung volumes and normal diffusion capacity    EKG, Pathology, and Other Studies: I have personally reviewed pertinent reports.   and I have personally reviewed pertinent films in PACS    Vera Mack MD  Pulmonary and Critical Care   [facility]

## 2024-03-28 NOTE — PROGRESS NOTES
NEPHROLOGY OUTPATIENT PROGRESS NOTE   Mireya Yi 78 y.o. female MRN: [id number]  DATE: 3/31/2024    Reason for visit:   Chief Complaint   Patient presents with    Follow-up    Chronic Kidney Disease        Patient Instructions   Thank you for coming to your visit today. As we discussed you kidney function was stable last July but you need to repeat blood work.    Recommend low sodium (salt) food    Avoid nonsteroidal anti-inflammatory drugs such as Naprosyn, ibuprofen, Aleve, Advil, Celebrex, Meloxicam (Mobic) etc.  You can use Tylenol as needed if you do not have any liver condition to be concerned about    Start vit D 50,000 unit once a week for 12 week and then you can continue with your daily dose     Monitor your blood pressure     Next Visit in 5 months with results   If you need to see us earlier we can change the appointment for you      Joselyn Reyes Bahamonde, MD  Nephrology Attending           Mireya was seen today for follow-up and chronic kidney disease.    Diagnoses and all orders for this visit:    Hypovitaminosis D  -     ergocalciferol (ERGOCALCIFEROL) 1.25 MG (98474 UT) capsule; Take 1 capsule (50,000 Units total) by mouth once a week    Stage 3b chronic kidney disease (HCC)  -     Basic metabolic panel; Future  -     Albumin / creatinine urine ratio; Future  -     Urinalysis with microscopic; Future    Benign hypertension with chronic kidney disease, stage III (HCC)    Chronic kidney disease-mineral and bone disorder    Polyarthritis with positive rheumatoid factor (HCC)    Pulmonary emphysema, unspecified emphysema type (HCC)        Assessment/Plan:  76 y.o. year old female with PMH of asthma, HTN, COVID 19 in 8/2021, OA. Patient had BRE in 07/2021 , creatinine elevated to 1.4mg/dL from normal baseline. Patient presents for lab of CKD.     PLAN:       Last BMP is from July 2023  #CKD G3b   Baseline creatinine:1.4m g/dL since 07/2020  Current creatinine:stable at 1.4mg/dL   Etiology: unknown ,  "likely secondary to nephroangiosclerosis in the settings of hypertension   UA: No hematuria, no proteinuria  UACR 13 mg/g  Plan:  Ultrasound pending  Patient needs to repeat labs        #Volume/hypertension:  Volume: Euvolemic on exam  Blood pressure:hypertensive 160/90, goal less than 140/90  Low sodium diet   Increase amlodipine to 10 mg  Advised to maintain a good BP control to prevent progression of CKD         #Acid base disorder  serum HCO3 24 , goal >21   At goal     #CKD-MBD  Calcium 8.8 mg/dL  At goal     #hypovitaminosis D   25-OH vitamin D 16.4  Recommend vitamin D 50,000 weekly for 12 weeks        #Anemia:  Current hemoglobin: 11.9 mg/dL at goal     #RA   avoid NSAIDs      # Emphysema   on room air        SUBJECTIVE / INTERVAL HISTORY:  78 y.o. female presents in follow up of CKD. Feels well, no SOB, no CP, no recent hospitalizations. No issues with medication       Mireya Alvarengaicuria denies any recent illness/hospitalizations/medication changes since last office visit.    Review of Systems   Constitutional:  Negative for activity change and appetite change.   HENT:  Negative for congestion and dental problem.    Eyes:  Negative for discharge.   Respiratory:  Negative for cough and choking.    Cardiovascular:  Negative for chest pain and leg swelling.   Gastrointestinal:  Negative for abdominal distention and abdominal pain.   Endocrine: Negative for cold intolerance.   Genitourinary:  Negative for dysuria.   Musculoskeletal:  Negative for arthralgias and back pain.   Skin:  Negative for color change and pallor.   Psychiatric/Behavioral:  Negative for agitation.        OBJECTIVE:  /92 (BP Location: Left arm, Patient Position: Sitting, Cuff Size: Standard)   Pulse 97   Ht 5' 2\" (1.575 m)   Wt 60.3 kg (133 lb)   BMI 24.33 kg/m²  Body mass index is 24.33 kg/m².    Physical exam:  Physical Exam  General:  no acute distress at this time  Skin:  No acute rash  Eyes:  No scleral icterus and " noninjected  ENT:  mucous membranes moist  Neck:  no carotid bruits  Chest:  Clear to auscultation percussion, good respiratory effort, no use of accessory respiratory muscles  CVS:  Regular rate and rhythm without rub   Abdomen:  soft and nontender   Extremities: no significant lower extremity edema  Neuro:  No gross focality  Psych:  Alert , cooperative     Medications:    Current Outpatient Medications:     acetaminophen (TYLENOL) 500 mg tablet, Take 1 tablet (500 mg total) by mouth every 6 (six) hours as needed for mild pain, Disp: 60 tablet, Rfl: 0    albuterol (2.5 mg/3 mL) 0.083 % nebulizer solution, Take 3 mL (2.5 mg total) by nebulization every 4 (four) hours as needed for wheezing or shortness of breath, Disp: 90 mL, Rfl: 5    albuterol (PROVENTIL HFA,VENTOLIN HFA) 90 mcg/act inhaler, Inhale 2 puffs every 6 (six) hours as needed for wheezing or shortness of breath, Disp: 18 g, Rfl: 5    amLODIPine (NORVASC) 5 mg tablet, Take 1 tablet (5 mg total) by mouth daily, Disp: 90 tablet, Rfl: 1    Azelastine HCl 137 MCG/SPRAY SOLN, 2 sprays into each nostril 2 (two) times a day, Disp: 30 mL, Rfl: 1    benzonatate (TESSALON PERLES) 100 mg capsule, Take 1 capsule (100 mg total) by mouth 3 (three) times a day as needed for cough, Disp: 20 capsule, Rfl: 0    budesonide-formoterol (Symbicort) 80-4.5 MCG/ACT inhaler, Inhale 2 puffs 2 (two) times a day Rinse mouth after use, Disp: 10.2 g, Rfl: 5    cholecalciferol (VITAMIN D3) 1,000 units tablet, Take 1 tablet (1,000 Units total) by mouth daily, Disp: 30 tablet, Rfl: 2    ergocalciferol (ERGOCALCIFEROL) 1.25 MG (82892 UT) capsule, Take 1 capsule (50,000 Units total) by mouth once a week, Disp: 12 capsule, Rfl: 0    escitalopram (LEXAPRO) 10 mg tablet, Take 1 tablet (10 mg total) by mouth daily, Disp: 90 tablet, Rfl: 3    fexofenadine (ALLEGRA) 180 MG tablet, Take 180 mg by mouth daily, Disp: , Rfl:     fluticasone (FLONASE) 50 mcg/act nasal spray, 2 sprays into each  nostril daily, Disp: 16 g, Rfl: 3    folic acid (FOLVITE) 1 mg tablet, Take 1 tablet (1,000 mcg total) by mouth daily, Disp: 30 tablet, Rfl: 5    hydrocortisone (ANUSOL-HC) 2.5 % rectal cream, Apply topically 2 (two) times a day, Disp: 28 g, Rfl: 0    levofloxacin (LEVAQUIN) 500 mg tablet, Take 1 tablet (500 mg total) by mouth every 24 hours for 10 days, Disp: 10 tablet, Rfl: 0    loratadine (CLARITIN) 10 mg tablet, Take 1 tablet (10 mg total) by mouth daily, Disp: 30 tablet, Rfl: 3    montelukast (SINGULAIR) 10 mg tablet, Take 1 tablet (10 mg total) by mouth daily at bedtime, Disp: 30 tablet, Rfl: 0    nicotine (NICODERM CQ) 14 mg/24hr TD 24 hr patch, Place 1 patch on the skin over 24 hours every 24 hours, Disp: 28 patch, Rfl: 1    nicotine polacrilex (NICORETTE) 2 mg gum, Chew 1 each (2 mg total) as needed for smoking cessation, Disp: 100 each, Rfl: 1    olopatadine (PATANOL) 0.1 % ophthalmic solution, Administer 1 drop to both eyes 2 (two) times a day, Disp: 5 mL, Rfl: 2    ondansetron (Zofran ODT) 4 mg disintegrating tablet, Take 1 tablet (4 mg total) by mouth every 6 (six) hours as needed for nausea or vomiting, Disp: 20 tablet, Rfl: 3    pantoprazole (PROTONIX) 20 mg tablet, Take 1 tablet (20 mg total) by mouth daily, Disp: 90 tablet, Rfl: 1    predniSONE 10 mg tablet, Take 4 tablets (40 mg total) by mouth daily for 5 days, THEN 3 tablets (30 mg total) daily for 3 days, THEN 2 tablets (20 mg total) daily for 3 days, THEN 1 tablet (10 mg total) daily for 3 days., Disp: 38 tablet, Rfl: 0    lidocaine (Lidoderm) 5 %, Apply 1 patch topically over 12 hours daily Remove & Discard patch within 12 hours or as directed by MD (Patient not taking: Reported on 12/1/2023), Disp: 15 patch, Rfl: 0    Allergies:  Allergies as of 03/28/2024 - Reviewed 03/28/2024   Allergen Reaction Noted    Ambrosia artemisiifolia (ragweed) skin test Facial Swelling     Codeine Other (See Comments)     Epinephrine  11/22/2017    Ibuprofen  "Other (See Comments)     Morphine Other (See Comments)     Pollen extract Sneezing     Tramadol Other (See Comments)        The following portions of the patient's history were reviewed and updated as appropriate: past family history, past surgical history and problem list.    Laboratory Results:  Lab Results   Component Value Date    SODIUM 140 07/30/2023    K 3.9 07/30/2023     (H) 07/30/2023    CO2 24 07/30/2023    BUN 29 (H) 07/30/2023    CREATININE 1.54 (H) 07/30/2023    GLUC 106 07/30/2023    CALCIUM 8.8 07/30/2023        Lab Results   Component Value Date    PTH 76.0 03/20/2024    CALCIUM 8.8 07/30/2023    PHOS 2.8 03/20/2024       Portions of the record may have been created with voice recognition software.  Occasional wrong word or \"sound a like\" substitutions may have occurred due to the inherent limitations of voice recognition software.  Read the chart carefully and recognize, using context, where substitutions have occurred.    "

## 2024-03-29 LAB
FLUAV RNA RESP QL NAA+PROBE: NEGATIVE
FLUBV RNA RESP QL NAA+PROBE: NEGATIVE
SARS-COV-2 RNA RESP QL NAA+PROBE: NEGATIVE

## 2024-03-31 PROBLEM — M89.9 CHRONIC KIDNEY DISEASE-MINERAL AND BONE DISORDER: Status: ACTIVE | Noted: 2024-03-31

## 2024-03-31 PROBLEM — E83.9 CHRONIC KIDNEY DISEASE-MINERAL AND BONE DISORDER: Status: ACTIVE | Noted: 2024-03-31

## 2024-03-31 PROBLEM — N18.9 CHRONIC KIDNEY DISEASE-MINERAL AND BONE DISORDER: Status: ACTIVE | Noted: 2024-03-31

## 2024-04-01 ENCOUNTER — HOSPITAL ENCOUNTER (OUTPATIENT)
Dept: RADIOLOGY | Facility: CLINIC | Age: 79
Discharge: HOME | End: 2024-04-01

## 2024-04-01 DIAGNOSIS — M54.16 LUMBAR RADICULOPATHY: ICD-10-CM

## 2024-04-03 ENCOUNTER — TELEPHONE (OUTPATIENT)
Dept: FAMILY MEDICINE CLINIC | Facility: CLINIC | Age: 79
End: 2024-04-03

## 2024-04-03 ENCOUNTER — RA CDI HCC (OUTPATIENT)
Dept: OTHER | Facility: HOSPITAL | Age: 79
End: 2024-04-03

## 2024-04-03 NOTE — TELEPHONE ENCOUNTER
Eleni care manager from [facility] team called about patient. She didn't specify on what it was about but wanted a call back at [phone]. Called back and left a message to return the call.

## 2024-04-12 ENCOUNTER — TELEPHONE (OUTPATIENT)
Dept: FAMILY MEDICINE CLINIC | Facility: CLINIC | Age: 79
End: 2024-04-12

## 2024-04-12 ENCOUNTER — HOSPITAL ENCOUNTER (OUTPATIENT)
Dept: NON INVASIVE DIAGNOSTICS | Facility: HOSPITAL | Age: 79
Discharge: HOME | End: 2024-04-12
Payer: MEDICARE

## 2024-04-12 ENCOUNTER — OFFICE VISIT (OUTPATIENT)
Dept: FAMILY MEDICINE CLINIC | Facility: CLINIC | Age: 79
End: 2024-04-12

## 2024-04-12 ENCOUNTER — HOSPITAL ENCOUNTER (OUTPATIENT)
Dept: RADIOLOGY | Facility: HOSPITAL | Age: 79
Discharge: HOME | End: 2024-04-12
Payer: MEDICARE

## 2024-04-12 VITALS
RESPIRATION RATE: 18 BRPM | DIASTOLIC BLOOD PRESSURE: 100 MMHG | HEIGHT: 62 IN | HEART RATE: 94 BPM | BODY MASS INDEX: 25.14 KG/M2 | TEMPERATURE: 98.4 F | OXYGEN SATURATION: 98 % | SYSTOLIC BLOOD PRESSURE: 159 MMHG | WEIGHT: 136.6 LBS

## 2024-04-12 DIAGNOSIS — M79.662 PAIN OF LEFT CALF: ICD-10-CM

## 2024-04-12 DIAGNOSIS — I10 ESSENTIAL HYPERTENSION: Primary | ICD-10-CM

## 2024-04-12 DIAGNOSIS — F17.210 CIGARETTE NICOTINE DEPENDENCE WITHOUT COMPLICATION: ICD-10-CM

## 2024-04-12 DIAGNOSIS — Z79.899 HIGH RISK MEDICATION USE: ICD-10-CM

## 2024-04-12 DIAGNOSIS — M25.472 LEFT ANKLE SWELLING: ICD-10-CM

## 2024-04-12 DIAGNOSIS — M05.79 RHEUMATOID ARTHRITIS INVOLVING MULTIPLE SITES WITH POSITIVE RHEUMATOID FACTOR (HCC): ICD-10-CM

## 2024-04-12 DIAGNOSIS — M79.89 LEFT LEG SWELLING: ICD-10-CM

## 2024-04-12 DIAGNOSIS — R06.02 SHORTNESS OF BREATH: ICD-10-CM

## 2024-04-12 DIAGNOSIS — J45.30 MILD PERSISTENT ASTHMA WITHOUT COMPLICATION: ICD-10-CM

## 2024-04-12 DIAGNOSIS — R06.09 DYSPNEA ON EXERTION: ICD-10-CM

## 2024-04-12 PROCEDURE — 73610 X-RAY EXAM OF ANKLE: CPT

## 2024-04-12 PROCEDURE — 93971 EXTREMITY STUDY: CPT

## 2024-04-12 RX ORDER — MONTELUKAST SODIUM 10 MG/1
10 TABLET ORAL
Qty: 90 TABLET | Refills: 1 | Status: SHIPPED | OUTPATIENT
Start: 2024-04-12

## 2024-04-12 RX ORDER — FOLIC ACID 1 MG/1
1000 TABLET ORAL DAILY
Qty: 90 TABLET | Refills: 2 | Status: SHIPPED | OUTPATIENT
Start: 2024-04-12

## 2024-04-12 RX ORDER — AMLODIPINE BESYLATE 5 MG/1
5 TABLET ORAL DAILY
Qty: 90 TABLET | Refills: 2 | Status: SHIPPED | OUTPATIENT
Start: 2024-04-12

## 2024-04-12 RX ORDER — NICOTINE 21 MG/24HR
1 PATCH, TRANSDERMAL 24 HOURS TRANSDERMAL EVERY 24 HOURS
Qty: 28 PATCH | Refills: 1 | Status: SHIPPED | OUTPATIENT
Start: 2024-04-12

## 2024-04-12 NOTE — ASSESSMENT & PLAN NOTE
Few day hx of pain and swelling of left ankle, medial and lateral posterior mallous tenderness to palpation. Denies injury to ankle. Swelling and pain may be related to left limb swelling ,vs sprain or strain, vs fracture vs rheumatologic cause. Will order xray of ankle. Will follow up in one week.

## 2024-04-12 NOTE — PATIENT INSTRUCTIONS
Go to any of the [facility] imaging centers to complete xray of left leg.   Call central scheduling at [phone] to schedule echo

## 2024-04-12 NOTE — ASSESSMENT & PLAN NOTE
Left lower limb painful calf, tom sign positive. Right limb without pain or swelling. No tachycardia on exam today. In setting of shortness of breath and unilateral pain and swelling, will order stat venous duplex to rule out DVT. Will order echo. Will follow up in one week.

## 2024-04-12 NOTE — PROGRESS NOTES
Name: Mireya Yi      : 1945      MRN: [id number]  Encounter Provider: Elham Saleem MD  Encounter Date: 2024   Encounter department: [facility]    Assessment & Plan     1. Essential hypertension  Assessment & Plan:  Was previously well controlled on amlodipine 5 mg daily. Ran out of medication 2 days ago, BP above goal of less than 150/90 today in office suspect due to lack of taking medication. Denies symptoms of  hypertensive emergency. Will rx amlodipine 5 mg follow up in one week.     Orders:  -     amLODIPine (NORVASC) 5 mg tablet; Take 1 tablet (5 mg total) by mouth daily    2. Left leg swelling  Assessment & Plan:  Left lower limb painful calf, tom sign positive. Right limb without pain or swelling. No tachycardia on exam today. In setting of shortness of breath and unilateral pain and swelling, will order stat venous duplex to rule out DVT. Will order echo. Will follow up in one week.     Orders:  -     VAS VENOUS DUPLEX -LOWER LIMB UNILATERAL; Future; Expected date: 2024  -     Echo complete w/ contrast if indicated; Future; Expected date: 2024    3. Left ankle swelling  Assessment & Plan:  Few day hx of pain and swelling of left ankle, medial and lateral posterior mallous tenderness to palpation. Denies injury to ankle. Swelling and pain may be related to left limb swelling ,vs sprain or strain, vs fracture vs rheumatologic cause. Will order xray of ankle. Will follow up in one week.      Orders:  -     XR ankle 3+ vw left; Future; Expected date: 2024    4. Pain of left calf  -     VAS VENOUS DUPLEX -LOWER LIMB UNILATERAL; Future; Expected date: 2024    5. Dyspnea on exertion  Assessment & Plan:  Reports dyspnea on exertion, improves with albuterol inhaler use. Lungs clear to ausculation bilaterally with good air movement. 98% on RA with no increased work of breathing. Continue current medications at this time. Echo  ordered rule out HF. Duplex ultrasound ordered rule out DVT. Will follow up in one week.     Orders:  -     Echo complete w/ contrast if indicated; Future; Expected date: 04/12/2024    6. Shortness of breath  Assessment & Plan:  Reports dyspnea on exertion, improves with albuterol inhaler use. Lungs clear to ausculation bilaterally with good air movement. 98% on RA with no increased work of breathing. Continue current medications at this time. Echo ordered rule out HF. Duplex ultrasound ordered rule out DVT. Will follow up in one week.       7. Cigarette nicotine dependence without complication  -     nicotine (NICODERM CQ) 14 mg/24hr TD 24 hr patch; Place 1 patch on the skin over 24 hours every 24 hours           Subjective     Ms. Yi presents to clinic to follow up for multiple chronic issues.    She states she ran out of her BP meds two days go and the pharmacy states she was unable to get a refill at that time. She denies symptomatic hypotension while on medication. Denies headache, change in vision, weakness of extremities, slurred speech, or confusion. Does not take BP at home or keep log. She states she does not want to take her BP at home.     She states that her shortness of breath has improved some since completing antibiotics and prednisone that were started March 28th for bronchitis. She reports that she still gets shortness of breath with exertion and wheezing and chest tightness with exertion. Patient states she is coughing a lot during night time and early morning and reports white sputum production. Denies hemoptysis. Reports taking inhalers as prescribed. Using albuterol inhaler 3-4 times a week. Denies increase in sputum production from baseline. Denies orthopnea.     Patient states about two days ago began having swelling in left leg and ankle associated with pain in calf. Denies redness of calf. Denies injury to leg or fall. Denies swelling or pain in right LE.         Review of Systems    Constitutional:  Negative for chills and fever.   HENT:  Negative for rhinorrhea and sore throat.    Eyes:  Negative for redness and visual disturbance.   Respiratory:  Positive for cough, shortness of breath and wheezing.    Cardiovascular:  Positive for chest pain. Negative for palpitations.   Gastrointestinal:  Negative for abdominal pain, constipation and nausea.   Genitourinary:  Negative for difficulty urinating and dysuria.   Musculoskeletal:  Positive for arthralgias and myalgias.   Skin:  Negative for rash.   Allergic/Immunologic: Positive for environmental allergies. Negative for food allergies.   Neurological:  Negative for dizziness and headaches.       Past Medical History:   Diagnosis Date    Asthma     Asthmatic bronchitis     Last Assessed: 10/7/2014     Chronic diarrhea     Last Assessed: 8/20/2015     Fibromyalgia     Focal nodular hyperplasia of liver     Last Assessed: 6/11/2015    Herpes zoster     Last Assessed: 3/18/2016    Hypertension     IBS (irritable bowel syndrome)     Intermittent palpitations     Irritable bowel syndrome     Lumbar radiculopathy     Osteoarthritis     Vertigo      Past Surgical History:   Procedure Laterality Date    BREAST BIOPSY      SMALL INTESTINE SURGERY       Family History   Problem Relation Age of Onset    Heart attack Mother     Other Mother         Aspiration of Vomit     Asthma Father     Breast cancer Sister     Arthritis Family     Other Family         Musculoskeletal Disease     Osteoporosis Family      Social History     Socioeconomic History    Marital status:      Spouse name: None    Number of children: None    Years of education: None    Highest education level: None   Occupational History    Occupation: Unemployed    Tobacco Use    Smoking status: Former     Current packs/day: 0.50     Types: Cigarettes    Smokeless tobacco: Never    Tobacco comments:     Stopped smoking July 2023   Vaping Use    Vaping status: Never Used   Substance and  Sexual Activity    Alcohol use: Not Currently    Drug use: No    Sexual activity: Not Currently     Partners: Male   Other Topics Concern    None   Social History Narrative    Mental Disability     Exercising Regularly     Lives with adult children      Social Determinants of Health     Financial Resource Strain: Low Risk  (3/21/2024)    Overall Financial Resource Strain (CARDIA)     Difficulty of Paying Living Expenses: Not very hard   Food Insecurity: No Food Insecurity (3/21/2024)    Hunger Vital Sign     Worried About Running Out of Food in the Last Year: Never true     Ran Out of Food in the Last Year: Never true   Transportation Needs: No Transportation Needs (3/21/2024)    PRAPARE - Transportation     Lack of Transportation (Medical): No     Lack of Transportation (Non-Medical): No   Physical Activity: Inactive (11/17/2023)    Exercise Vital Sign     Days of Exercise per Week: 0 days     Minutes of Exercise per Session: 0 min   Stress: Stress Concern Present (3/21/2024)    [employer] [employer] - Occupational Stress Questionnaire     Feeling of Stress : To some extent   Social Connections: Low Risk (12/21/2023)    Received from [facility]    Social Connections     How often do you feel isolated from others?: Hardly ever   Recent Concern: Social Connections - Socially Isolated (11/17/2023)    Social Connection and Isolation Panel [NHANES]     Frequency of Communication with Friends and Family: More than three times a week     Frequency of Social Gatherings with Friends and Family: More than three times a week     Attends Baptism Services: Never     Active Member of Clubs or Organizations: No     Attends Club or Organization Meetings: Never     Marital Status: Never    Intimate Partner Violence: Not At Risk (3/21/2024)    Humiliation, Afraid, Rape, and Kick questionnaire     Fear of Current or Ex-Partner: No     Emotionally Abused: No     Physically Abused: No      Sexually Abused: No   Housing Stability: Low Risk  (3/21/2024)    Housing Stability Vital Sign     Unable to Pay for Housing in the Last Year: No     Number of Places Lived in the Last Year: 1     Unstable Housing in the Last Year: No     Current Outpatient Medications on File Prior to Visit   Medication Sig    acetaminophen (TYLENOL) 500 mg tablet Take 1 tablet (500 mg total) by mouth every 6 (six) hours as needed for mild pain    albuterol (2.5 mg/3 mL) 0.083 % nebulizer solution Take 3 mL (2.5 mg total) by nebulization every 4 (four) hours as needed for wheezing or shortness of breath    albuterol (PROVENTIL HFA,VENTOLIN HFA) 90 mcg/act inhaler Inhale 2 puffs every 6 (six) hours as needed for wheezing or shortness of breath    Azelastine HCl 137 MCG/SPRAY SOLN 2 sprays into each nostril 2 (two) times a day    benzonatate (TESSALON PERLES) 100 mg capsule Take 1 capsule (100 mg total) by mouth 3 (three) times a day as needed for cough    budesonide-formoterol (Symbicort) 80-4.5 MCG/ACT inhaler Inhale 2 puffs 2 (two) times a day Rinse mouth after use    cholecalciferol (VITAMIN D3) 1,000 units tablet Take 1 tablet (1,000 Units total) by mouth daily    ergocalciferol (ERGOCALCIFEROL) 1.25 MG (45545 UT) capsule Take 1 capsule (50,000 Units total) by mouth once a week    escitalopram (LEXAPRO) 10 mg tablet Take 1 tablet (10 mg total) by mouth daily    fexofenadine (ALLEGRA) 180 MG tablet Take 180 mg by mouth daily    fluticasone (FLONASE) 50 mcg/act nasal spray 2 sprays into each nostril daily    hydrocortisone (ANUSOL-HC) 2.5 % rectal cream Apply topically 2 (two) times a day    olopatadine (PATANOL) 0.1 % ophthalmic solution Administer 1 drop to both eyes 2 (two) times a day    ondansetron (Zofran ODT) 4 mg disintegrating tablet Take 1 tablet (4 mg total) by mouth every 6 (six) hours as needed for nausea or vomiting    pantoprazole (PROTONIX) 20 mg tablet Take 1 tablet (20 mg total) by mouth daily    [DISCONTINUED]  "amLODIPine (NORVASC) 5 mg tablet Take 1 tablet (5 mg total) by mouth daily    [DISCONTINUED] folic acid (FOLVITE) 1 mg tablet Take 1 tablet (1,000 mcg total) by mouth daily    [DISCONTINUED] montelukast (SINGULAIR) 10 mg tablet Take 1 tablet (10 mg total) by mouth daily at bedtime    [DISCONTINUED] nicotine (NICODERM CQ) 14 mg/24hr TD 24 hr patch Place 1 patch on the skin over 24 hours every 24 hours    [DISCONTINUED] nicotine polacrilex (NICORETTE) 2 mg gum Chew 1 each (2 mg total) as needed for smoking cessation    lidocaine (Lidoderm) 5 % Apply 1 patch topically over 12 hours daily Remove & Discard patch within 12 hours or as directed by MD (Patient not taking: Reported on 12/1/2023)    [DISCONTINUED] loratadine (CLARITIN) 10 mg tablet Take 1 tablet (10 mg total) by mouth daily     Allergies   Allergen Reactions    Ambrosia Artemisiifolia (Ragweed) Skin Test Facial Swelling    Codeine Other (See Comments)     Heart races    Epinephrine      Pt reports \"it makes my heart race, they told me I cant take it.\"    Ibuprofen Other (See Comments)     Heart racing    Morphine Other (See Comments)     Heart racing    Pollen Extract Sneezing    Tramadol Other (See Comments)     Heart racing     Immunization History   Administered Date(s) Administered    Pneumococcal Conjugate Vaccine 20-valent (Pcv20), Polysace 11/17/2023    Pneumococcal Polysaccharide PPV23 01/01/2003, 10/01/2008    Tdap 06/08/2018       Objective     /100 (BP Location: Left arm, Patient Position: Sitting, Cuff Size: Standard)   Pulse 94   Temp 98.4 °F (36.9 °C) (Temporal)   Resp 18   Ht 5' 2\" (1.575 m)   Wt 62 kg (136 lb 9.6 oz)   SpO2 98%   BMI 24.98 kg/m²     Physical Exam  Constitutional:       General: She is not in acute distress.     Appearance: Normal appearance.   HENT:      Head: Normocephalic and atraumatic.   Cardiovascular:      Rate and Rhythm: Normal rate and regular rhythm.      Pulses: Normal pulses.      Heart sounds: No " murmur heard.  Pulmonary:      Effort: Pulmonary effort is normal. No respiratory distress.      Breath sounds: Normal breath sounds. No wheezing, rhonchi or rales.   Abdominal:      General: Bowel sounds are normal.      Palpations: Abdomen is soft.      Tenderness: There is no abdominal tenderness.   Musculoskeletal:         General: Normal range of motion.      Cervical back: Normal range of motion.      Right lower leg: No edema.      Left lower leg: Edema present.      Comments: Tenderness to palpation of left calf. Left calf pain with dorsiflexion. Pain to palpation of posterior medial malleolus and posterior lateral malleolus. Swelling of lateral malleolus. No bruising or discoloration.    Skin:     General: Skin is warm.   Neurological:      General: No focal deficit present.      Mental Status: She is alert and oriented to person, place, and time.       Elham Saleem MD

## 2024-04-12 NOTE — TELEPHONE ENCOUNTER
Patient was told by clinician to call if any problems with the medicine     Patient was not given  amLODIPine (NORVASC) 5 mg tablet   Pharmacy told patient she picked up 90 tablets in March but patient insists that she just finished the last one 2 days ago, so she only had 30 tablets.    Patient can be reached at [phone]

## 2024-04-12 NOTE — ASSESSMENT & PLAN NOTE
Reports dyspnea on exertion, improves with albuterol inhaler use. Lungs clear to ausculation bilaterally with good air movement. 98% on RA with no increased work of breathing. Continue current medications at this time. Echo ordered rule out HF. Duplex ultrasound ordered rule out DVT. Will follow up in one week.

## 2024-04-12 NOTE — ASSESSMENT & PLAN NOTE
Was previously well controlled on amlodipine 5 mg daily. Ran out of medication 2 days ago, BP above goal of less than 150/90 today in office suspect due to lack of taking medication. Denies symptoms of  hypertensive emergency. Will rx amlodipine 5 mg follow up in one week.    Topical Sulfur Applications Counseling: Topical Sulfur Counseling: Patient counseled that this medication may cause skin irritation or allergic reactions.  In the event of skin irritation, the patient was advised to reduce the amount of the drug applied or use it less frequently.   The patient verbalized understanding of the proper use and possible adverse effects of topical sulfur application.  All of the patient's questions and concerns were addressed.

## 2024-04-13 DIAGNOSIS — E55.9 VITAMIN D DEFICIENCY: ICD-10-CM

## 2024-04-15 RX ORDER — MULTIVIT-MIN/IRON/FOLIC ACID/K 18-600-40
1000 CAPSULE ORAL DAILY
Qty: 90 TABLET | Refills: 1 | Status: SHIPPED | OUTPATIENT
Start: 2024-04-15

## 2024-04-19 ENCOUNTER — TELEPHONE (OUTPATIENT)
Dept: FAMILY MEDICINE CLINIC | Facility: CLINIC | Age: 79
End: 2024-04-19

## 2024-04-19 ENCOUNTER — OFFICE VISIT (OUTPATIENT)
Dept: FAMILY MEDICINE CLINIC | Facility: CLINIC | Age: 79
End: 2024-04-19

## 2024-04-19 VITALS
DIASTOLIC BLOOD PRESSURE: 80 MMHG | HEART RATE: 85 BPM | OXYGEN SATURATION: 99 % | BODY MASS INDEX: 24.88 KG/M2 | SYSTOLIC BLOOD PRESSURE: 148 MMHG | TEMPERATURE: 98.2 F | RESPIRATION RATE: 18 BRPM | HEIGHT: 62 IN | WEIGHT: 135.2 LBS

## 2024-04-19 DIAGNOSIS — M25.472 LEFT ANKLE SWELLING: Primary | ICD-10-CM

## 2024-04-19 PROCEDURE — 3077F SYST BP >= 140 MM HG: CPT | Performed by: FAMILY MEDICINE

## 2024-04-19 PROCEDURE — 3079F DIAST BP 80-89 MM HG: CPT | Performed by: FAMILY MEDICINE

## 2024-04-19 PROCEDURE — 99213 OFFICE O/P EST LOW 20 MIN: CPT | Performed by: FAMILY MEDICINE

## 2024-04-19 PROCEDURE — G2211 COMPLEX E/M VISIT ADD ON: HCPCS | Performed by: FAMILY MEDICINE

## 2024-04-19 NOTE — PROGRESS NOTES
Name: Mireya Yi      : 1945      MRN: [id number]  Encounter Provider: Elham Saleem MD  Encounter Date: 2024   Encounter department: [facility]    Assessment & Plan     1. Left ankle swelling  Assessment & Plan:  3 week hx of left ankle pain and swelling not associated with recent injury. Xray of left ankle from 24 reviewed showing remote appearing avulsion fracture of left lateral malleolus which was also visualized on left ankle xray on in . Suspect ankle sprain or strain at this time. Low suspicion for inflammatory/ autoimmune cause of ankle swelling at this time. Recommend scheduled tylenol, topical diclofenac, compression with ace bandage, elevation. Air cast ordered. Will refer to podiatry and PT. Will follow up in  4 weeks or sooner as needed.    Orders:  -     Ambulatory Referral to Physical Therapy; Future  -     Ambulatory Referral to Podiatry; Future  -     Ankle Air Cast  -     Diclofenac Sodium (VOLTAREN) 1 %; Apply 2 g topically 4 (four) times a day as needed (left ankle pain)           Subjective     Ms. Yi presents to clinic for follow up of left ankle pain and swelling. Started three weeks ago. Denies injury to left foot or fall. Has tried ice and tylenol for symptoms without relief. Can bear weight on left limb but it is painful. States swelling comes and goes. Denies redness or warmth of joint. Denies pain, swelling, redness of any other joint. States she feels unstable to walk on her left foot and it feels like it may give out.  States she is aware she had a previous fracture to left foot many years ago cannot recall when.         Review of Systems   Constitutional:  Negative for chills and fever.   HENT:  Negative for rhinorrhea and sore throat.    Eyes:  Negative for redness and visual disturbance.   Respiratory:  Positive for cough. Negative for shortness of breath.    Cardiovascular:  Negative for chest pain and  palpitations.   Gastrointestinal:  Negative for abdominal pain, constipation and nausea.   Genitourinary:  Negative for difficulty urinating and dysuria.   Musculoskeletal:  Positive for arthralgias and joint swelling.   Skin:  Negative for rash.   Allergic/Immunologic: Positive for environmental allergies.   Neurological:  Negative for dizziness and headaches.       Past Medical History:   Diagnosis Date    Asthma     Asthmatic bronchitis     Last Assessed: 10/7/2014     Chronic diarrhea     Last Assessed: 8/20/2015     Fibromyalgia     Focal nodular hyperplasia of liver     Last Assessed: 6/11/2015    Herpes zoster     Last Assessed: 3/18/2016    Hypertension     IBS (irritable bowel syndrome)     Intermittent palpitations     Irritable bowel syndrome     Lumbar radiculopathy     Osteoarthritis     Vertigo      Past Surgical History:   Procedure Laterality Date    BREAST BIOPSY      SMALL INTESTINE SURGERY       Family History   Problem Relation Age of Onset    Heart attack Mother     Other Mother         Aspiration of Vomit     Asthma Father     Breast cancer Sister     Arthritis Family     Other Family         Musculoskeletal Disease     Osteoporosis Family      Social History     Socioeconomic History    Marital status:      Spouse name: None    Number of children: None    Years of education: None    Highest education level: None   Occupational History    Occupation: Unemployed    Tobacco Use    Smoking status: Former     Current packs/day: 0.50     Types: Cigarettes    Smokeless tobacco: Never    Tobacco comments:     Stopped smoking July 2023   Vaping Use    Vaping status: Never Used   Substance and Sexual Activity    Alcohol use: Not Currently    Drug use: No    Sexual activity: Not Currently     Partners: Male   Other Topics Concern    None   Social History Narrative    Mental Disability     Exercising Regularly     Lives with adult children      Social Determinants of Health     Financial Resource  Strain: Low Risk  (3/21/2024)    Overall Financial Resource Strain (CARDIA)     Difficulty of Paying Living Expenses: Not very hard   Food Insecurity: No Food Insecurity (3/21/2024)    Hunger Vital Sign     Worried About Running Out of Food in the Last Year: Never true     Ran Out of Food in the Last Year: Never true   Transportation Needs: No Transportation Needs (3/21/2024)    PRAPARE - Transportation     Lack of Transportation (Medical): No     Lack of Transportation (Non-Medical): No   Physical Activity: Inactive (11/17/2023)    Exercise Vital Sign     Days of Exercise per Week: 0 days     Minutes of Exercise per Session: 0 min   Stress: Stress Concern Present (3/21/2024)    [place] [employer] - Occupational Stress Questionnaire     Feeling of Stress : To some extent   Social Connections: Low Risk (12/21/2023)    Received from [facility]    Social Connections     How often do you feel isolated from others?: Hardly ever   Recent Concern: Social Connections - Socially Isolated (11/17/2023)    Social Connection and Isolation Panel [NHANES]     Frequency of Communication with Friends and Family: More than three times a week     Frequency of Social Gatherings with Friends and Family: More than three times a week     Attends Pentec[employer]al Services: Never     Active Member of Clubs or Organizations: No     Attends Club or Organization Meetings: Never     Marital Status: Never    Intimate Partner Violence: Not At Risk (3/21/2024)    Humiliation, Afraid, Rape, and Kick questionnaire     Fear of Current or Ex-Partner: No     Emotionally Abused: No     Physically Abused: No     Sexually Abused: No   Housing Stability: Low Risk  (3/21/2024)    Housing Stability Vital Sign     Unable to Pay for Housing in the Last Year: No     Number of Places Lived in the Last Year: 1     Unstable Housing in the Last Year: No     Current Outpatient Medications on File Prior to Visit   Medication  Sig    acetaminophen (TYLENOL) 500 mg tablet Take 1 tablet (500 mg total) by mouth every 6 (six) hours as needed for mild pain    albuterol (2.5 mg/3 mL) 0.083 % nebulizer solution Take 3 mL (2.5 mg total) by nebulization every 4 (four) hours as needed for wheezing or shortness of breath    albuterol (PROVENTIL HFA,VENTOLIN HFA) 90 mcg/act inhaler Inhale 2 puffs every 6 (six) hours as needed for wheezing or shortness of breath    amLODIPine (NORVASC) 5 mg tablet Take 1 tablet (5 mg total) by mouth daily    Azelastine HCl 137 MCG/SPRAY SOLN 2 sprays into each nostril 2 (two) times a day    benzonatate (TESSALON PERLES) 100 mg capsule Take 1 capsule (100 mg total) by mouth 3 (three) times a day as needed for cough    budesonide-formoterol (Symbicort) 80-4.5 MCG/ACT inhaler Inhale 2 puffs 2 (two) times a day Rinse mouth after use    D3-1000 25 MCG (1000 UT) tablet TAKE 1 TABLET BY MOUTH EVERY DAY    ergocalciferol (ERGOCALCIFEROL) 1.25 MG (32542 UT) capsule Take 1 capsule (50,000 Units total) by mouth once a week    escitalopram (LEXAPRO) 10 mg tablet Take 1 tablet (10 mg total) by mouth daily    fexofenadine (ALLEGRA) 180 MG tablet Take 180 mg by mouth daily    fluticasone (FLONASE) 50 mcg/act nasal spray 2 sprays into each nostril daily    folic acid (FOLVITE) 1 mg tablet TAKE 1 TABLET BY MOUTH EVERY DAY    hydrocortisone (ANUSOL-HC) 2.5 % rectal cream Apply topically 2 (two) times a day    montelukast (SINGULAIR) 10 mg tablet TAKE 1 TABLET BY MOUTH DAILY AT BEDTIME    nicotine (NICODERM CQ) 14 mg/24hr TD 24 hr patch Place 1 patch on the skin over 24 hours every 24 hours    olopatadine (PATANOL) 0.1 % ophthalmic solution Administer 1 drop to both eyes 2 (two) times a day    ondansetron (Zofran ODT) 4 mg disintegrating tablet Take 1 tablet (4 mg total) by mouth every 6 (six) hours as needed for nausea or vomiting    pantoprazole (PROTONIX) 20 mg tablet Take 1 tablet (20 mg total) by mouth daily    lidocaine (Lidoderm)  "5 % Apply 1 patch topically over 12 hours daily Remove & Discard patch within 12 hours or as directed by MD (Patient not taking: Reported on 12/1/2023)     Allergies   Allergen Reactions    Ambrosia Artemisiifolia (Ragweed) Skin Test Facial Swelling    Codeine Other (See Comments)     Heart races    Epinephrine      Pt reports \"it makes my heart race, they told me I cant take it.\"    Ibuprofen Other (See Comments)     Heart racing    Morphine Other (See Comments)     Heart racing    Pollen Extract Sneezing    Tramadol Other (See Comments)     Heart racing     Immunization History   Administered Date(s) Administered    Pneumococcal Conjugate Vaccine 20-valent (Pcv20), Polysace 11/17/2023    Pneumococcal Polysaccharide PPV23 01/01/2003, 10/01/2008    Tdap 06/08/2018       Objective     /80 (BP Location: Left arm, Patient Position: Sitting, Cuff Size: Standard)   Pulse 85   Temp 98.2 °F (36.8 °C) (Temporal)   Resp 18   Ht 5' 2\" (1.575 m)   Wt 61.3 kg (135 lb 3.2 oz)   SpO2 99%   BMI 24.73 kg/m²     Physical Exam  Constitutional:       General: She is not in acute distress.     Appearance: Normal appearance.   HENT:      Head: Normocephalic and atraumatic.   Cardiovascular:      Rate and Rhythm: Normal rate and regular rhythm.      Pulses: Normal pulses.      Heart sounds: No murmur heard.  Pulmonary:      Effort: Pulmonary effort is normal. No respiratory distress.      Breath sounds: Normal breath sounds.   Abdominal:      General: Bowel sounds are normal.      Palpations: Abdomen is soft.      Tenderness: There is no abdominal tenderness.   Musculoskeletal:      Cervical back: Normal range of motion.      Comments: Swelling of left lateral and medial malleolus. Tenderness to light palpation all throughout left foot. Pain with dorsiflexion, planter flexion, inversion, eversion. DP pulse intact.    Skin:     General: Skin is warm.   Neurological:      General: No focal deficit present.      Mental Status: " She is alert and oriented to person, place, and time.       Elham Saleem MD

## 2024-04-19 NOTE — TELEPHONE ENCOUNTER
Called patient's pharmacy per PCP request to check on the status of Singulair and Symbicort scripts. Singulair is ready to be picked up and the Symbicort was filled on 4/4 and the next refill will be ready on 5/1. Advised patient of this information, she confirmed

## 2024-04-20 NOTE — ASSESSMENT & PLAN NOTE
3 week hx of left ankle pain and swelling not associated with recent injury. Xray of left ankle from 4/12/24 reviewed showing remote appearing avulsion fracture of left lateral malleolus which was also visualized on left ankle xray on in 2022. Suspect ankle sprain or strain at this time. Low suspicion for inflammatory/ autoimmune cause of ankle swelling at this time. Recommend scheduled tylenol, topical diclofenac, compression with ace bandage, elevation. Air cast ordered. Will refer to podiatry and PT. Will follow up in  4 weeks or sooner as needed.

## 2024-04-22 ENCOUNTER — HOSPITAL ENCOUNTER (OUTPATIENT)
Dept: RADIOLOGY | Facility: HOSPITAL | Age: 79
Discharge: HOME | End: 2024-04-22
Payer: MEDICARE

## 2024-04-22 ENCOUNTER — OFFICE VISIT (OUTPATIENT)
Dept: OBGYN CLINIC | Facility: CLINIC | Age: 79
End: 2024-04-22
Payer: MEDICARE

## 2024-04-22 ENCOUNTER — TELEPHONE (OUTPATIENT)
Dept: FAMILY MEDICINE CLINIC | Facility: CLINIC | Age: 79
End: 2024-04-22

## 2024-04-22 VITALS
BODY MASS INDEX: 24.84 KG/M2 | HEART RATE: 115 BPM | SYSTOLIC BLOOD PRESSURE: 168 MMHG | DIASTOLIC BLOOD PRESSURE: 103 MMHG | WEIGHT: 135 LBS | HEIGHT: 62 IN

## 2024-04-22 DIAGNOSIS — M25.472 LEFT ANKLE SWELLING: ICD-10-CM

## 2024-04-22 DIAGNOSIS — R29.898 ANKLE WEAKNESS: ICD-10-CM

## 2024-04-22 DIAGNOSIS — M25.572 ACUTE LEFT ANKLE PAIN: ICD-10-CM

## 2024-04-22 DIAGNOSIS — J30.9 CHRONIC ALLERGIC RHINITIS: ICD-10-CM

## 2024-04-22 DIAGNOSIS — M25.672 ANKLE STIFFNESS, LEFT: ICD-10-CM

## 2024-04-22 DIAGNOSIS — S82.62XA CLOSED AVULSION FRACTURE OF LATERAL MALLEOLUS OF LEFT FIBULA, INITIAL ENCOUNTER: Primary | ICD-10-CM

## 2024-04-22 DIAGNOSIS — S93.492A SPRAIN OF ANTERIOR TALOFIBULAR LIGAMENT OF LEFT ANKLE, INITIAL ENCOUNTER: ICD-10-CM

## 2024-04-22 DIAGNOSIS — J32.9 RECURRENT SINUSITIS: ICD-10-CM

## 2024-04-22 PROCEDURE — 99203 OFFICE O/P NEW LOW 30 MIN: CPT | Performed by: PHYSICIAN ASSISTANT

## 2024-04-22 PROCEDURE — 70486 CT MAXILLOFACIAL W/O DYE: CPT

## 2024-04-22 PROCEDURE — 99213 OFFICE O/P EST LOW 20 MIN: CPT | Performed by: PHYSICIAN ASSISTANT

## 2024-04-22 NOTE — PROGRESS NOTES
Orthopaedic Surgery - Office Note  Mireya Yi (78 y.o. female)   : 1945   MRN: [id number]  Encounter Date: 2024    Chief Complaint   Patient presents with    Left Ankle - Pain         Assessment/Plan  Diagnoses and all orders for this visit:    Closed avulsion fracture of lateral malleolus of left fibula-nonacute  -     Durable Medical Equipment  -     Ambulatory Referral to Physical Therapy; Future  -     Ambulatory Referral to Podiatry; Future    Acute left ankle pain  -     Durable Medical Equipment  -     Ambulatory Referral to Physical Therapy; Future  -     Ambulatory Referral to Podiatry; Future    Left ankle swelling  -     Durable Medical Equipment  -     Ambulatory Referral to Physical Therapy; Future  -     Ambulatory Referral to Podiatry; Future    Ankle weakness  -     Durable Medical Equipment  -     Ambulatory Referral to Physical Therapy; Future  -     Ambulatory Referral to Podiatry; Future    Ankle stiffness, left  -     Durable Medical Equipment  -     Ambulatory Referral to Physical Therapy; Future  -     Ambulatory Referral to Podiatry; Future    Sprain of anterior talofibular ligament of left ankle, initial encounter    The diagnosis as well as treatment options were reviewed with the patient in the office today.  She was advised this condition would not require surgical intervention but would benefit from formal physical therapy to improve range of motion and strength in the left ankle.  DME stabilization is encouraged until she is able to regain her full range of motion and strength.  Ice and elevation 20 minutes on 1 hour off 3 times a day will help with symptomatic pain and swelling.  Patient may weight-bear as tolerated.  She was advised some of the paresthesias symptoms in her foot may be related to her chronic low back pain and radiculopathy and unrelated to her acute left ankle pain and injury.       Return for Recheck with podiatry in 1 month.        History of Present  "Illness  This is a new patient who developed left ankle pain in late March early April 2024 without known injury.  She has a history of prior ankle fracture in the remote past.  She had x-rays performed on 4/12/2024 which showed an old lateral malleolus fracture and some soft tissue swelling.  She also had a negative workup for DVT at that time with ultrasound.  She notes instability with ambulation.  She discussed the symptoms with her PCP who recommended an Aircast, PT, Voltaren gel.  She presents today for evaluation and treatment.  Patient reports most of the pain is in the lateral ankle in the region of the lateral malleolus and ATFL.  She reports that 1 point she has not had any known injury but then admits to twisting her ankle all the time because it feels weak.  She states she has a history of chronic low back pain with lumbar radiculopathy and occasional numbness and tingling radiating into the dorsum of the foot and small toe.    Review of Systems  Pertinent items are noted in HPI.  All other systems were reviewed and are negative.    Physical Exam  BP (!) 168/103 (BP Location: Left arm, Patient Position: Sitting, Cuff Size: Standard)   Pulse (!) 115   Ht 5' 2\" (1.575 m)   Wt 61.2 kg (135 lb)   BMI 24.69 kg/m²   Cons: Appears well.  No apparent distress.  Psych: Alert. Oriented x3.  Mood and affect normal.    On examination patient's left foot and ankle is without acute skin breakdown lesion or signs of infection.  She is tender over the lateral malleolus and ATFL.  She is nontender over the anterior ankle, deltoid, or CFL.  She has no tenderness in the high ankle or fibular head.  She is nontender to fifth metatarsal.  There is soft tissue edema over the ATFL laterally.  There is no ecchymosis.  She is nontender in the midfoot.  Her gait is heel-to-toe.  She has significant weakness to ankle dorsiflexion inversion and eversion.  Plantarflexion is maintained to 5 out of 5.  She is lacking range of " "motion to inversion and eversion.  Dorsiflexion and plantarflexion is well-maintained.  She has no instability to anterior drawer.  There is no calf tenderness and a negative Homans.  Dorsalis pedis and posterior tibial pulses are +2.  She has sensation intact to light touch throughout all dermatomes in the left lower extremity.  There are no gross neurological deficits.        Studies Reviewed    Narrative & Impression        LEFT ANKLE     INDICATION:   Effusion, left ankle.      COMPARISON: 3/9/2022.     VIEWS:  XR ANKLE 3+ VW LEFT   Images: 3     FINDINGS:     Remote appearing avulsion fracture at the tip of the lateral malleolus. There is no acute fracture or dislocation.     No significant degenerative changes.     No lytic or blastic osseous lesion.     There is moderate lateral malleolar soft tissue edema..     IMPRESSION:        Lateral malleolar soft tissue edema. Remote appearing avulsion fracture at the tip of the lateral malleolus     Electronically signed: 04/13/2024 01:32 PM Remberto Thompson MD  Independent review of x-ray by myself today in the office is in agreement with radiologist interpretation.  PCP visit visits from 4/19/2024 and 4/12/2024 were reviewed by myself in the office today.    Study Result  Narrative & Impression     [facility] REPORT  CLINICAL:  Indications:  Patient presents with left leg swelling for several weeks and shortness of  breath.  She is also experiencing frequent left leg \"dario horse\" type  cramping in both legs.  Operative History:  No prior heart or vascular surgery  Risk Factors:  The patient has history of HTN, asthma, fibromyalgia, IBS, osteoarthritis,  vertigo, intermittent palpitations, and previous smoking (quit <1 year ago).  Height:  62 inches.  Weight:  136 lbs.        CONCLUSION:     Impression:  RIGHT LOWER LIMB LIMITED:  Evaluation shows no evidence of thrombus in the common femoral vein.  Doppler evaluation shows a normal response to " augmentation maneuvers.     LEFT LOWER LIMB:  No evidence of acute or chronic deep vein thrombosis  No evidence of superficial thrombophlebitis noted.  Doppler evaluation shows a normal response to augmentation maneuvers.  Popliteal, posterior tibial, and anterior tibial arterial Doppler waveform's  are triphasic.     Technical findings were tiger texted to Dr. Elham Saleem and posted on EP[facility].     SIGNATURE:  Electronically Signed by: RAYMOND COLUNGA MD on 2024-04-13 03:53:19 PM  Procedures  No procedures today.    Medical, Surgical, Family, and Social History  The patient's medical history, family history, and social history, were reviewed and updated as appropriate.    Past Medical History:   Diagnosis Date    Asthma     Asthmatic bronchitis     Last Assessed: 10/7/2014     Chronic diarrhea     Last Assessed: 8/20/2015     Fibromyalgia     Focal nodular hyperplasia of liver     Last Assessed: 6/11/2015    Herpes zoster     Last Assessed: 3/18/2016    Hypertension     IBS (irritable bowel syndrome)     Intermittent palpitations     Irritable bowel syndrome     Lumbar radiculopathy     Osteoarthritis     Vertigo        Past Surgical History:   Procedure Laterality Date    BREAST BIOPSY      SMALL INTESTINE SURGERY         Family History   Problem Relation Age of Onset    Heart attack Mother     Other Mother         Aspiration of Vomit     Asthma Father     Breast cancer Sister     Arthritis Family     Other Family         Musculoskeletal Disease     Osteoporosis Family        Social History     Occupational History    Occupation: Unemployed    Tobacco Use    Smoking status: Former     Current packs/day: 0.50     Types: Cigarettes    Smokeless tobacco: Never    Tobacco comments:     Stopped smoking July 2023   Vaping Use    Vaping status: Never Used   Substance and Sexual Activity    Alcohol use: Not Currently    Drug use: No    Sexual activity: Not Currently     Partners: Male       Allergies   Allergen Reactions     "Ambrosia Artemisiifolia (Ragweed) Skin Test Facial Swelling    Codeine Other (See Comments)     Heart races    Epinephrine      Pt reports \"it makes my heart race, they told me I cant take it.\"    Ibuprofen Other (See Comments)     Heart racing    Morphine Other (See Comments)     Heart racing    Pollen Extract Sneezing    Tramadol Other (See Comments)     Heart racing         Current Outpatient Medications:     acetaminophen (TYLENOL) 500 mg tablet, Take 1 tablet (500 mg total) by mouth every 6 (six) hours as needed for mild pain, Disp: 60 tablet, Rfl: 0    albuterol (2.5 mg/3 mL) 0.083 % nebulizer solution, Take 3 mL (2.5 mg total) by nebulization every 4 (four) hours as needed for wheezing or shortness of breath, Disp: 90 mL, Rfl: 5    albuterol (PROVENTIL HFA,VENTOLIN HFA) 90 mcg/act inhaler, Inhale 2 puffs every 6 (six) hours as needed for wheezing or shortness of breath, Disp: 18 g, Rfl: 5    amLODIPine (NORVASC) 5 mg tablet, Take 1 tablet (5 mg total) by mouth daily, Disp: 90 tablet, Rfl: 2    Azelastine HCl 137 MCG/SPRAY SOLN, 2 sprays into each nostril 2 (two) times a day, Disp: 30 mL, Rfl: 1    benzonatate (TESSALON PERLES) 100 mg capsule, Take 1 capsule (100 mg total) by mouth 3 (three) times a day as needed for cough, Disp: 20 capsule, Rfl: 0    budesonide-formoterol (Symbicort) 80-4.5 MCG/ACT inhaler, Inhale 2 puffs 2 (two) times a day Rinse mouth after use, Disp: 10.2 g, Rfl: 5    D3-1000 25 MCG (1000 UT) tablet, TAKE 1 TABLET BY MOUTH EVERY DAY, Disp: 90 tablet, Rfl: 1    Diclofenac Sodium (VOLTAREN) 1 %, Apply 2 g topically 4 (four) times a day as needed (left ankle pain), Disp: 2 g, Rfl: 1    ergocalciferol (ERGOCALCIFEROL) 1.25 MG (01940 UT) capsule, Take 1 capsule (50,000 Units total) by mouth once a week, Disp: 12 capsule, Rfl: 0    escitalopram (LEXAPRO) 10 mg tablet, Take 1 tablet (10 mg total) by mouth daily, Disp: 90 tablet, Rfl: 3    fexofenadine (ALLEGRA) 180 MG tablet, Take 180 mg by mouth " daily, Disp: , Rfl:     fluticasone (FLONASE) 50 mcg/act nasal spray, 2 sprays into each nostril daily, Disp: 16 g, Rfl: 3    folic acid (FOLVITE) 1 mg tablet, TAKE 1 TABLET BY MOUTH EVERY DAY, Disp: 90 tablet, Rfl: 2    hydrocortisone (ANUSOL-HC) 2.5 % rectal cream, Apply topically 2 (two) times a day, Disp: 28 g, Rfl: 0    lidocaine (Lidoderm) 5 %, Apply 1 patch topically over 12 hours daily Remove & Discard patch within 12 hours or as directed by MD (Patient not taking: Reported on 12/1/2023), Disp: 15 patch, Rfl: 0    montelukast (SINGULAIR) 10 mg tablet, TAKE 1 TABLET BY MOUTH DAILY AT BEDTIME, Disp: 90 tablet, Rfl: 1    nicotine (NICODERM CQ) 14 mg/24hr TD 24 hr patch, Place 1 patch on the skin over 24 hours every 24 hours, Disp: 28 patch, Rfl: 1    olopatadine (PATANOL) 0.1 % ophthalmic solution, Administer 1 drop to both eyes 2 (two) times a day, Disp: 5 mL, Rfl: 2    ondansetron (Zofran ODT) 4 mg disintegrating tablet, Take 1 tablet (4 mg total) by mouth every 6 (six) hours as needed for nausea or vomiting, Disp: 20 tablet, Rfl: 3    pantoprazole (PROTONIX) 20 mg tablet, Take 1 tablet (20 mg total) by mouth daily, Disp: 90 tablet, Rfl: 1      Gabriel Kuo PA-C

## 2024-04-22 NOTE — TELEPHONE ENCOUNTER
----- Message from Elham Saleem MD sent at 4/22/2024 12:52 PM EDT -----  Regarding: Aircast DME  Good Afternoon Kady!    I placed a dme order for left ankle air cast on Friday for Ms. Yi. I looked through the DME companies to try and find an air cast but could not. Can you please assist me with this? Thank you!     -Elham

## 2024-04-22 NOTE — PROGRESS NOTES
PT Evaluation     Today's date: 2024  Patient name: Miryea Yi  : 1945  MRN: [id number]  Referring provider: Gabriel Kuo PA*  Dx:   Encounter Diagnosis     ICD-10-CM    1. Closed avulsion fracture of lateral malleolus of left fibula-nonacute  S82.62XA Ambulatory Referral to Physical Therapy      2. Acute left ankle pain  M25.572 Ambulatory Referral to Physical Therapy      3. Left ankle swelling  M25.472 Ambulatory Referral to Physical Therapy      4. Ankle weakness  R29.898 Ambulatory Referral to Physical Therapy      5. Ankle stiffness, left  M25.672 Ambulatory Referral to Physical Therapy          Start Time: 925  Stop Time: 957  Total time in clinic (min): 32 minutes    Assessment  Assessment details: Mireya Yi is a 78 y.o. year old female with a referred dx of Closed avulsion fracture of lateral malleolus of left fibula-nonacute, acute left ankle pain, left ankle swelling, ankle weakness, and Ankle stiffness, left. Pt presents with pain with functional activities such as standing following periods of prolonged sitting. Upon further clinical testing, pt demonstrates decreased SLS balance compared to RLE, global decreased LLE weakness, and painful DF AROM due to gastroc tightness. Pt would benefit from skilled OP PT to address these, and the below impairments in order to optimize outcomes and promote return to functional baseline.     Pt able to demonstrate HEP with good technique and no pain. Educated pt to stop any exercises causing pain increase, pt verbalizes understanding.       Impairments: abnormal or restricted ROM, activity intolerance, impaired balance, impaired physical strength, lacks appropriate home exercise program, pain with function and poor body mechanics    Goals    Short Term Goals:  In 4 weeks, the patient will:  1. Decrease worst pain by 2 points for improved QOL.  2. Improve HR strength by 1 grade for improved LE fx.  3. Supervision with HEP for self  "care.    Long Term Goals:  In 8 weeks, the patient will:  1. Improve gastroc strength to 5/5 per HR test for improved LE fx.   2. FOTO to greater than predicted value.  3. Independent with comprehensive HEP upon discharge.  4. Report less pain with getting up from chair following prolonged sitting for return to PLOF.   5. Improve global ankle MMT strength by 1/2 grade for improved LE fx.       Plan  Patient would benefit from: skilled physical therapy  Referral necessary: No  Planned modality interventions: cryotherapy  Planned therapy interventions: activity modification, ADL retraining, joint mobilization, manual therapy, neuromuscular re-education, patient education, postural training, strengthening, stretching, therapeutic activities, therapeutic exercise, home exercise program, graded exercise, functional ROM exercises, flexibility, body mechanics training and balance  Frequency: 1x week (per pt request, despite recommendation for 2x/week from PT)  Duration in weeks: 8  Plan of Care beginning date: 4/23/2024  Plan of Care expiration date: 6/18/2024  Treatment plan discussed with: patient      Subjective Evaluation    History of Present Illness  Mechanism of injury: Per ortho note on 4/22/24, \"developed left ankle pain in late March early April 2024 without known injury.  She has a history of prior ankle fracture in the remote past. She had x-rays performed on 4/12/2024 which showed an old lateral malleolus fracture and some soft tissue swelling.  She also had a negative workup for DVT at that time with ultrasound.  She notes instability with ambulation.\" Ortho recommended \"DME stabilization is encouraged until she is able to regain her full range of motion and strength.\"     Pt reports pain with brace from ortho. No known cause of recent pain increase, or ALEKSANDR. Pt ambulates without AD. Pt reports tightness in calf, and lateral ankle sharp pain with palpation. Reports tightness/stiffness when first standing up, " relief with mobility. Notes some chronic numbness/tingling along lateral ankle/foot to 4-5th toes. Reports no functional difficulty with standing/walking. Still reports swelling at lateral ankle, but is improving, per pt. Does havre chronic hx of LBP with associated radicular sx along LLE, per pt.   Patient Goals  Patient goals for therapy: decreased pain, independence with ADLs/IADLs, return to sport/leisure activities and increased strength    Pain  Current pain ratin  At best pain ratin  At worst pain rating: 10      Diagnostic Tests  X-ray: abnormal      Objective  OBJECTIVE:    Standing Posture & Lower Extremity Alignment:  Structure/Joint         Knee - Frontal   Genuvalgum x Neutral  Genuvarum   Knee - Sagittal  Genurecurvatum x Neutral     Rearfoot  Valgus x Neutral  Varus   Forefoot  Abducted x Neutral     Arch  Pes Planus x Neutral  Pes Cavus     Active Range of Motion: Goniometric measurements revealed the following findings (in degrees).  Joint Motion Right: 2024 Left: 2024   Ankle Dorsiflexion WFL WFL*   Ankle Plantarflexion WFL WFL   Ankle Inversion WFL WFL   Ankle Eversion WFL WFL     Strength: MMT revealed the following findings.  Joint Motion Right: 2024 Left: 2024   Ankle Plantarflexion 5/5 3/5 (per HR test)   Ankle Dorsiflexion 5/5 4/5   Ankle Inversion 5/5 4-/5   Ankle Eversion 5/5 4-/5   *=indicates pain with testing    Additional Assessments:  Palpation: TTP along lateral ankle and gastroc  Observation: minor swelling present at lateral ankle  Gait Pattern: WFL   Balance: decreased SLS on RLE    Lower quarter screen: diminished sensation to lateral ankle, lateral foot     Special Tests:  Test (Structure evaluated) Date: 2024  ( +/- )   Bump (Stress Fracture) neg   Anterior Drawer (ATF Lig.) neg   Posterior Drawer (PTF Lig.) neg   Talar Tilt (CF Lig.) neg   Squeeze (Syndesmosis) neg   Klieger (Deltoid Lig./Syndesmosis) neg   Walters (Achilles) neg   Leg Length  "Discrepancy neg   Homans Sign (DVT) Neg    Ackerman (Neuroma) neg   Biomechanical Foot Exam in STJ Neutral (Rear/Forefoot) neg   Tinel's (Tarsal tunnel) neg   Windlass test (Plantar Fasciitis) neg          POC expires Unit limit Auth Expiration date PT/OT + Visit Limit?   6/18/24 N/a  TBD BOMN     Visit/Unit Tracking  AUTH Status:  Date 4/23      TBD Used 1       Remaining  *         HEP access code: [email or web address]  Pertinent Findings:      Test / Measure  4/23/2024   FOTO (Predicted 49) 33   HR test 3/5 strength   DF AROM Painful end-range     Precautions: asthma, vertigo, HTN, OA, and Fibromyalgia     VISIT 1    Manuals 4/22    L ankle PROM                    Neuro Re-Ed     Biodex     Static balance     Ecc HR                         Ther Ex     NS     HR X10 B/L HEP    AROM Inv/lasha X10 ea HEP    LS towel gastroc stretch 30\"x3 HEP    Tband ankle 4-way     TR     Leg press HR      Prostretch          Ther Activity     FSU on foam     LSU on foam     Side step on foam     Tandem walk           Gait Training               Modalities                    "

## 2024-04-23 ENCOUNTER — EVALUATION (OUTPATIENT)
Dept: PHYSICAL THERAPY | Facility: REHABILITATION | Age: 79
End: 2024-04-23
Payer: MEDICARE

## 2024-04-23 DIAGNOSIS — M25.572 ACUTE LEFT ANKLE PAIN: ICD-10-CM

## 2024-04-23 DIAGNOSIS — S82.62XA CLOSED AVULSION FRACTURE OF LATERAL MALLEOLUS OF LEFT FIBULA, INITIAL ENCOUNTER: ICD-10-CM

## 2024-04-23 DIAGNOSIS — M25.472 LEFT ANKLE SWELLING: ICD-10-CM

## 2024-04-23 DIAGNOSIS — M25.672 ANKLE STIFFNESS, LEFT: ICD-10-CM

## 2024-04-23 DIAGNOSIS — R29.898 ANKLE WEAKNESS: ICD-10-CM

## 2024-04-23 PROCEDURE — 97110 THERAPEUTIC EXERCISES: CPT

## 2024-04-23 PROCEDURE — 97161 PT EVAL LOW COMPLEX 20 MIN: CPT

## 2024-04-29 ENCOUNTER — OFFICE VISIT (OUTPATIENT)
Dept: PAIN MEDICINE | Facility: CLINIC | Age: 79
End: 2024-04-29
Payer: MEDICARE

## 2024-04-29 VITALS
DIASTOLIC BLOOD PRESSURE: 81 MMHG | HEIGHT: 62 IN | WEIGHT: 132 LBS | SYSTOLIC BLOOD PRESSURE: 146 MMHG | BODY MASS INDEX: 24.29 KG/M2 | HEART RATE: 92 BPM

## 2024-04-29 DIAGNOSIS — M54.42 CHRONIC LEFT-SIDED LOW BACK PAIN WITH LEFT-SIDED SCIATICA: Primary | ICD-10-CM

## 2024-04-29 DIAGNOSIS — G89.29 CHRONIC LEFT-SIDED LOW BACK PAIN WITH LEFT-SIDED SCIATICA: Primary | ICD-10-CM

## 2024-04-29 PROCEDURE — 99213 OFFICE O/P EST LOW 20 MIN: CPT | Performed by: NURSE PRACTITIONER

## 2024-04-29 NOTE — PROGRESS NOTES
Assessment:  1. Chronic left-sided low back pain with left-sided sciatica        Plan:  Patient currently scheduled for a left L4 TFESI May 14, 2024.  She will keep this appointment  Discussed trialing gabapentin however patient declines at this time  Continue physical therapy per orthopedics  Will avoid NSAIDs secondary to CKD  Patient may continue Tylenol as needed  Follow-up after procedure or sooner if needed    History of Present Illness:    The patient is a 78 y.o. female with a history of CKD, hypertension and emphysema last seen on 3/14/2024 who presents for a follow up office visit in regards to chroniclumbosacral back pain that radiates into the anterolateral aspect of the left lower extremity to the foot with associated numbness in the foot.  She denies right-sided symptoms, bowel or bladder incontinence or saddle anesthesia.  Patient is scheduled for a left L4 TFESI May 14, 2024.  She is also following with orthopedics for left ankle pain.  She is unable to take NSAIDs secondary to CKD.  She finds some relief with Tylenol.  Patient did have a vascular study to check for DVT 2024 which was negative.    Patient rates her pain a 7 out of 10 on the numeric pain rating scale.  Pain is constant in the morning and it is described as throbbing and numbness.    I have personally reviewed and/or updated the patient's past medical history, past surgical history, family history, social history, current medications, allergies, and vital signs today.       Review of Systems:    Review of Systems   Respiratory:  Negative for shortness of breath.    Cardiovascular:  Negative for chest pain.   Gastrointestinal:  Negative for constipation, diarrhea, nausea and vomiting.   Musculoskeletal:  Positive for back pain and gait problem. Negative for arthralgias, joint swelling and myalgias.   Skin:  Negative for rash.   Neurological:  Negative for dizziness, seizures and weakness.   All other systems reviewed and are  negative.        Past Medical History:   Diagnosis Date    Asthma     Asthmatic bronchitis     Last Assessed: 10/7/2014     Chronic diarrhea     Last Assessed: 8/20/2015     Fibromyalgia     Focal nodular hyperplasia of liver     Last Assessed: 6/11/2015    Herpes zoster     Last Assessed: 3/18/2016    Hypertension     IBS (irritable bowel syndrome)     Intermittent palpitations     Irritable bowel syndrome     Lumbar radiculopathy     Osteoarthritis     Vertigo        Past Surgical History:   Procedure Laterality Date    BREAST BIOPSY      SMALL INTESTINE SURGERY         Family History   Problem Relation Age of Onset    Heart attack Mother     Other Mother         Aspiration of Vomit     Asthma Father     Breast cancer Sister     Arthritis Family     Other Family         Musculoskeletal Disease     Osteoporosis Family        Social History     Occupational History    Occupation: Unemployed    Tobacco Use    Smoking status: Former     Current packs/day: 0.50     Types: Cigarettes    Smokeless tobacco: Never    Tobacco comments:     Stopped smoking July 2023   Vaping Use    Vaping status: Never Used   Substance and Sexual Activity    Alcohol use: Not Currently    Drug use: No    Sexual activity: Not Currently     Partners: Male         Current Outpatient Medications:     acetaminophen (TYLENOL) 500 mg tablet, Take 1 tablet (500 mg total) by mouth every 6 (six) hours as needed for mild pain, Disp: 60 tablet, Rfl: 0    albuterol (2.5 mg/3 mL) 0.083 % nebulizer solution, Take 3 mL (2.5 mg total) by nebulization every 4 (four) hours as needed for wheezing or shortness of breath, Disp: 90 mL, Rfl: 5    albuterol (PROVENTIL HFA,VENTOLIN HFA) 90 mcg/act inhaler, Inhale 2 puffs every 6 (six) hours as needed for wheezing or shortness of breath, Disp: 18 g, Rfl: 5    amLODIPine (NORVASC) 5 mg tablet, Take 1 tablet (5 mg total) by mouth daily, Disp: 90 tablet, Rfl: 2    D3-1000 25 MCG (1000 UT) tablet, TAKE 1 TABLET BY MOUTH  EVERY DAY, Disp: 90 tablet, Rfl: 1    Diclofenac Sodium (VOLTAREN) 1 %, Apply 2 g topically 4 (four) times a day as needed (left ankle pain), Disp: 2 g, Rfl: 1    ergocalciferol (ERGOCALCIFEROL) 1.25 MG (68341 UT) capsule, Take 1 capsule (50,000 Units total) by mouth once a week, Disp: 12 capsule, Rfl: 0    escitalopram (LEXAPRO) 10 mg tablet, Take 1 tablet (10 mg total) by mouth daily, Disp: 90 tablet, Rfl: 3    fexofenadine (ALLEGRA) 180 MG tablet, Take 180 mg by mouth daily, Disp: , Rfl:     fluticasone (FLONASE) 50 mcg/act nasal spray, 2 sprays into each nostril daily, Disp: 16 g, Rfl: 3    folic acid (FOLVITE) 1 mg tablet, TAKE 1 TABLET BY MOUTH EVERY DAY, Disp: 90 tablet, Rfl: 2    hydrocortisone (ANUSOL-HC) 2.5 % rectal cream, Apply topically 2 (two) times a day, Disp: 28 g, Rfl: 0    montelukast (SINGULAIR) 10 mg tablet, TAKE 1 TABLET BY MOUTH DAILY AT BEDTIME, Disp: 90 tablet, Rfl: 1    nicotine (NICODERM CQ) 14 mg/24hr TD 24 hr patch, Place 1 patch on the skin over 24 hours every 24 hours, Disp: 28 patch, Rfl: 1    ondansetron (Zofran ODT) 4 mg disintegrating tablet, Take 1 tablet (4 mg total) by mouth every 6 (six) hours as needed for nausea or vomiting, Disp: 20 tablet, Rfl: 3    pantoprazole (PROTONIX) 20 mg tablet, Take 1 tablet (20 mg total) by mouth daily, Disp: 90 tablet, Rfl: 1    Azelastine HCl 137 MCG/SPRAY SOLN, 2 sprays into each nostril 2 (two) times a day, Disp: 30 mL, Rfl: 1    benzonatate (TESSALON PERLES) 100 mg capsule, Take 1 capsule (100 mg total) by mouth 3 (three) times a day as needed for cough, Disp: 20 capsule, Rfl: 0    budesonide-formoterol (Symbicort) 80-4.5 MCG/ACT inhaler, Inhale 2 puffs 2 (two) times a day Rinse mouth after use, Disp: 10.2 g, Rfl: 5    lidocaine (Lidoderm) 5 %, Apply 1 patch topically over 12 hours daily Remove & Discard patch within 12 hours or as directed by MD (Patient not taking: Reported on 12/1/2023), Disp: 15 patch, Rfl: 0    olopatadine (PATANOL) 0.1  "% ophthalmic solution, Administer 1 drop to both eyes 2 (two) times a day, Disp: 5 mL, Rfl: 2    Allergies   Allergen Reactions    Ambrosia Artemisiifolia (Ragweed) Skin Test Facial Swelling    Codeine Other (See Comments)     Heart races    Epinephrine      Pt reports \"it makes my heart race, they told me I cant take it.\"    Ibuprofen Other (See Comments)     Heart racing    Morphine Other (See Comments)     Heart racing    Pollen Extract Sneezing    Tramadol Other (See Comments)     Heart racing       Physical Exam:    /81   Pulse 92   Ht 5' 2\" (1.575 m)   Wt 59.9 kg (132 lb)   BMI 24.14 kg/m²     Constitutional:normal, well developed, well nourished, alert, in no distress and non-toxic and no overt pain behavior.  Eyes:anicteric  HEENT:grossly intact  Neck:supple, symmetric, trachea midline and no masses   Pulmonary:even and unlabored  Cardiovascular:No edema or pitting edema present  Skin:Normal without rashes or lesions and well hydrated  Psychiatric:Mood and affect appropriate  Neurologic:Cranial Nerves II-XII grossly intact  Musculoskeletal:antalgic.  Left ankle brace in place      Imaging  No orders to display     Narrative & Impression  MRI LUMBAR SPINE WITHOUT CONTRAST     INDICATION: M48.061: Spinal stenosis, lumbar region without neurogenic claudication  M54.16: Radiculopathy, lumbar region.     COMPARISON: February 3, 2020     TECHNIQUE:  Multiplanar, multisequence imaging of the lumbar spine was performed. .        IMAGE QUALITY:  Diagnostic     FINDINGS:     VERTEBRAL BODIES:  There are 5 lumbar type vertebral bodies.  Normal alignment of the lumbar spine.  No spondylolysis or spondylolisthesis. No scoliosis.  No compression fracture.    Normal marrow signal is identified within the visualized bony   structures.  No discrete marrow lesion.     SACRUM:  Normal signal within the sacrum. No evidence of insufficiency or stress fracture.     DISTAL CORD AND CONUS:  Normal size and signal within " the distal cord and conus.     PARASPINAL SOFT TISSUES:  Paraspinal soft tissues are unremarkable.     LOWER THORACIC DISC SPACES:  Normal disc height and signal.  No disc herniation, canal stenosis or foraminal narrowing.     LUMBAR DISC SPACES:     L1-L2: Moderate facet hypertrophy without central or foraminal narrowing.     L2-L3: Moderate facet hypertrophy. No significant central or foraminal narrowing.     L3-L4: Mild to moderate facet hypertrophy without central or foraminal narrowing.     L4-L5: Severe facet hypertrophy with ligamentous infolding and mild annular bulge with small left paramedian protrusion type disc herniation. Mild central and moderate left lateral recess stenosis noted. Synovial facet cyst identified on the right in the   lateral recess slightly superior to the disc level measures 7 x 3 x 7 mm results in right lateral recess stenosis contributing to right lateral recess stenosis. Prior described left-sided synovial facet cyst has resolved in the interval.     L5-S1: Small central protrusion type disc herniation without significant central or foraminal stenosis. Mild facet hypertrophy.     OTHER FINDINGS:  None.     IMPRESSION:     Spondylotic degenerative changes are seen particularly at L4-5. Persistent right-sided synovial facet cyst noted contributing to right lateral recess stenosis with resolution of prior described left-sided facet Anoheal facet cyst. New small left   paramedian protrusion type disc herniation is superimposed on annular bulge contributing to mild central and moderate left lateral recess stenosis.     Degenerative changes are seen at remaining levels as described.        Workstation performed: [id number]      No orders of the defined types were placed in this encounter.

## 2024-04-30 ENCOUNTER — OFFICE VISIT (OUTPATIENT)
Dept: PHYSICAL THERAPY | Facility: REHABILITATION | Age: 79
End: 2024-04-30
Payer: MEDICARE

## 2024-04-30 DIAGNOSIS — M25.672 ANKLE STIFFNESS, LEFT: ICD-10-CM

## 2024-04-30 DIAGNOSIS — M25.572 ACUTE LEFT ANKLE PAIN: ICD-10-CM

## 2024-04-30 DIAGNOSIS — M25.472 LEFT ANKLE SWELLING: ICD-10-CM

## 2024-04-30 DIAGNOSIS — S82.62XA CLOSED AVULSION FRACTURE OF LATERAL MALLEOLUS OF LEFT FIBULA, INITIAL ENCOUNTER: Primary | ICD-10-CM

## 2024-04-30 DIAGNOSIS — R29.898 ANKLE WEAKNESS: ICD-10-CM

## 2024-04-30 PROCEDURE — 97110 THERAPEUTIC EXERCISES: CPT

## 2024-04-30 PROCEDURE — 97530 THERAPEUTIC ACTIVITIES: CPT

## 2024-04-30 NOTE — PROGRESS NOTES
"Daily Note     Today's date: 2024  Patient name: Mireya Yi  : 1945  MRN: [id number]  Referring provider: Gabriel Kuo PA*  Dx:   Encounter Diagnosis     ICD-10-CM    1. Closed avulsion fracture of lateral malleolus of left fibula-nonacute  S82.62XA       2. Acute left ankle pain  M25.572       3. Left ankle swelling  M25.472       4. Ankle weakness  R29.898       5. Ankle stiffness, left  M25.672           Start Time: 1030  Stop Time: 1109  Total time in clinic (min): 39 minutes    Subjective: Pt reports HEP compliance, notes pain while wearing brace with increased swelling as a result.      Objective: See treatment diary below      Assessment: Tolerated treatment fair. Does well with exercises and demonstrates increased strength compared to IE. Notes subjective swelling post-tx, educated pt she many continue to ice for relief with this. Poor dynamic balance, pt notes this is because she has hx of vertigo but not symptomatic during today's session. Patient would benefit from continued PT. 1:1 with Carter Ruiz DPT from [id number], then under supervision of Hayes Flynn DPT for remainder of session.       Plan: Continue per plan of care.      POC expires Unit limit Auth Expiration date PT/OT + Visit Limit?   24 N/a  24 BOMN     Visit/Unit Tracking  AUTH Status:  Date      6 visits -24 Used 1 2      Remaining  5 4        HEP access code: [email or web address]  Pertinent Findings:      Test / Measure  2024   FOTO (Predicted 49) 33   HR test 3/5 strength   DF AROM Painful end-range     Precautions: asthma, vertigo, HTN, OA, and Fibromyalgia     VISIT 1 2   Manuals    L ankle PROM                    Neuro Re-Ed     Biodex     Static balance     Ecc HR                         Ther Ex     NS  10' L5   HR X10 B/L HEP 2x10 B/L   AROM Inv/lasha X10 ea HEP    LS towel gastroc stretch 30\"x3 HEP    Tband ankle 4-way  X20 otb    TR  3x10   Leg press HR   2x15 B/L 25# " "  Prostretch  30\"x3        Ther Activity     FSU on foam     LSU on foam     Side step on foam  2x2 laps   Tandem walk           Gait Training               Modalities                    "

## 2024-05-01 PROBLEM — J01.00 ACUTE MAXILLARY SINUSITIS: Status: RESOLVED | Noted: 2023-11-01 | Resolved: 2024-05-01

## 2024-05-02 ENCOUNTER — HOSPITAL ENCOUNTER (OUTPATIENT)
Dept: NON INVASIVE DIAGNOSTICS | Facility: HOSPITAL | Age: 79
Discharge: HOME | End: 2024-05-02
Payer: MEDICARE

## 2024-05-02 VITALS
BODY MASS INDEX: 24.29 KG/M2 | HEART RATE: 80 BPM | DIASTOLIC BLOOD PRESSURE: 81 MMHG | WEIGHT: 132 LBS | SYSTOLIC BLOOD PRESSURE: 146 MMHG | HEIGHT: 62 IN

## 2024-05-02 DIAGNOSIS — M79.89 LEFT LEG SWELLING: ICD-10-CM

## 2024-05-02 DIAGNOSIS — R06.09 DYSPNEA ON EXERTION: ICD-10-CM

## 2024-05-02 LAB
AORTIC ROOT: 2.9 CM
APICAL FOUR CHAMBER EJECTION FRACTION: 65 %
ASCENDING AORTA: 3.3 CM
BSA FOR ECHO PROCEDURE: 1.6 M2
E WAVE DECELERATION TIME: 177 MS
E/A RATIO: 0.74
FRACTIONAL SHORTENING: 34 (ref 28–44)
INTERVENTRICULAR SEPTUM IN DIASTOLE (PARASTERNAL SHORT AXIS VIEW): 1.5 CM
INTERVENTRICULAR SEPTUM: 1.5 CM (ref 0.6–1.1)
IVC: 24 MM
LAAS-AP2: 16.6 CM2
LAAS-AP4: 14.3 CM2
LEFT ATRIUM SIZE: 3.4 CM
LEFT ATRIUM VOLUME (MOD BIPLANE): 41 ML
LEFT ATRIUM VOLUME INDEX (MOD BIPLANE): 25.6 ML/M2
LEFT INTERNAL DIMENSION IN SYSTOLE: 2.3 CM (ref 2.1–4)
LEFT VENTRICULAR INTERNAL DIMENSION IN DIASTOLE: 3.5 CM (ref 3.5–6)
LEFT VENTRICULAR POSTERIOR WALL IN END DIASTOLE: 1.2 CM
LEFT VENTRICULAR STROKE VOLUME: 32 ML
LVSV (TEICH): 32 ML
MV E'TISSUE VEL-LAT: 6 CM/S
MV E'TISSUE VEL-SEP: 6 CM/S
MV PEAK A VEL: 1.17 M/S
MV PEAK E VEL: 86 CM/S
MV STENOSIS PRESSURE HALF TIME: 51 MS
MV VALVE AREA P 1/2 METHOD: 4.31
RA PRESSURE ESTIMATED: 8 MMHG
RIGHT ATRIUM AREA SYSTOLE A4C: 9.8 CM2
RIGHT VENTRICLE ID DIMENSION: 3 CM
RV PSP: 26 MMHG
SL CV LEFT ATRIUM LENGTH A2C: 5 CM
SL CV LV EF: 65
SL CV PED ECHO LEFT VENTRICLE DIASTOLIC VOLUME (MOD BIPLANE) 2D: 50 ML
SL CV PED ECHO LEFT VENTRICLE SYSTOLIC VOLUME (MOD BIPLANE) 2D: 19 ML
TR MAX PG: 18 MMHG
TR PEAK VELOCITY: 2.1 M/S
TRICUSPID ANNULAR PLANE SYSTOLIC EXCURSION: 1.9 CM
TRICUSPID VALVE PEAK REGURGITATION VELOCITY: 2.09 M/S

## 2024-05-02 PROCEDURE — 93306 TTE W/DOPPLER COMPLETE: CPT

## 2024-05-02 PROCEDURE — 93306 TTE W/DOPPLER COMPLETE: CPT | Performed by: INTERNAL MEDICINE

## 2024-05-07 ENCOUNTER — OFFICE VISIT (OUTPATIENT)
Dept: PHYSICAL THERAPY | Facility: REHABILITATION | Age: 79
End: 2024-05-07
Payer: MEDICARE

## 2024-05-07 DIAGNOSIS — M25.472 LEFT ANKLE SWELLING: ICD-10-CM

## 2024-05-07 DIAGNOSIS — M25.672 ANKLE STIFFNESS, LEFT: ICD-10-CM

## 2024-05-07 DIAGNOSIS — M25.572 ACUTE LEFT ANKLE PAIN: ICD-10-CM

## 2024-05-07 DIAGNOSIS — R29.898 ANKLE WEAKNESS: ICD-10-CM

## 2024-05-07 DIAGNOSIS — S82.62XA CLOSED AVULSION FRACTURE OF LATERAL MALLEOLUS OF LEFT FIBULA, INITIAL ENCOUNTER: Primary | ICD-10-CM

## 2024-05-07 PROCEDURE — 97112 NEUROMUSCULAR REEDUCATION: CPT

## 2024-05-07 PROCEDURE — 97140 MANUAL THERAPY 1/> REGIONS: CPT

## 2024-05-07 PROCEDURE — 97110 THERAPEUTIC EXERCISES: CPT

## 2024-05-07 NOTE — PROGRESS NOTES
"Daily Note     Today's date: 2024  Patient name: Mireya Yi  : 1945  MRN: [id number]  Referring provider: Gabriel Kuo PA*  Dx:   Encounter Diagnosis     ICD-10-CM    1. Closed avulsion fracture of lateral malleolus of left fibula-nonacute  S82.62XA       2. Acute left ankle pain  M25.572       3. Left ankle swelling  M25.472       4. Ankle weakness  R29.898       5. Ankle stiffness, left  M25.672           Start Time: 1215  Stop Time: 1255  Total time in clinic (min): 40 minutes    Subjective: Pt reports she continues to have pain wearing ankle brace, so has not been wearing it. Main complaint continues to be \"numbness\" along lateral ankle.      Objective: See treatment diary below      Assessment: Tolerated treatment fair. Progressed static balance today with introduction of biodex. Pt continues to tolerate exercises fairly, notes fatigue and pain increase post-tx. Patient would benefit from continued PT. 1:1 with Carter Ruiz DPT for entirety of tx.       Plan: Continue per plan of care.      POC expires Unit limit Auth Expiration date PT/OT + Visit Limit?   24 N/a  24 BOMN     Visit/Unit Tracking  AUTH Status:  Date     6 visits -24 Used 1 2 3     Remaining  5 4 3       HEP access code: [email or web address]  Pertinent Findings:      Test / Measure  2024    FOTO (Predicted 49) 33 MET, d/c ()   HR test 3/5 strength    DF AROM Painful end-range      Precautions: asthma, vertigo, HTN, OA, and Fibromyalgia     VISIT 1 2 3   Manuals    L ankle PROM   CM 8'                     Neuro Re-Ed      Biodex   RC, LOS x2 ea, lvl 8   Static balance      Ecc HR                              Ther Ex      NS  10' L5 10' L5   HR X10 B/L HEP 2x10 B/L 2x10    AROM Inv/lasha X10 ea HEP     LS towel gastroc stretch 30\"x3 HEP     Tband ankle 4-way  X20 otb  2x10 otb   TR  3x10 2x10   Leg press HR   2x15 B/L 25#    Prostretch  30\"x3 nv         Ther Activity      FSU on foam  "     LSU on foam      Side step on foam  2x2 laps X4 laps    Tandem walk             Gait Training                  Modalities

## 2024-05-13 ENCOUNTER — OFFICE VISIT (OUTPATIENT)
Dept: PHYSICAL THERAPY | Facility: REHABILITATION | Age: 79
End: 2024-05-13
Payer: MEDICARE

## 2024-05-13 DIAGNOSIS — M25.472 LEFT ANKLE SWELLING: ICD-10-CM

## 2024-05-13 DIAGNOSIS — M25.672 ANKLE STIFFNESS, LEFT: ICD-10-CM

## 2024-05-13 DIAGNOSIS — R29.898 ANKLE WEAKNESS: ICD-10-CM

## 2024-05-13 DIAGNOSIS — M25.572 ACUTE LEFT ANKLE PAIN: ICD-10-CM

## 2024-05-13 DIAGNOSIS — S82.62XA CLOSED AVULSION FRACTURE OF LATERAL MALLEOLUS OF LEFT FIBULA, INITIAL ENCOUNTER: Primary | ICD-10-CM

## 2024-05-13 PROCEDURE — 97110 THERAPEUTIC EXERCISES: CPT

## 2024-05-13 PROCEDURE — 97140 MANUAL THERAPY 1/> REGIONS: CPT

## 2024-05-13 PROCEDURE — 97530 THERAPEUTIC ACTIVITIES: CPT

## 2024-05-13 NOTE — PROGRESS NOTES
"Daily Note     Today's date: 2024  Patient name: Mireya Yi  : 1945  MRN: [id number]  Referring provider: Gabriel Kuo PA*  Dx:   Encounter Diagnosis     ICD-10-CM    1. Closed avulsion fracture of lateral malleolus of left fibula-nonacute  S82.62XA       2. Acute left ankle pain  M25.572       3. Left ankle swelling  M25.472       4. Ankle weakness  R29.898       5. Ankle stiffness, left  M25.672                      Subjective: Pt reports some improvement with ankle overall upon arrival      Objective: See treatment diary below      Assessment: Tolerated treatment well. Patient would benefit from continued PT.  Pt has some pain present with inversion + plantar flexion, pain free all other planes.  Pt. able to complete all exercises with no increase in pain during or after session.  Pt. 1:1 with PTA for entirety.      Plan: Continue per plan of care.      POC expires Unit limit Auth Expiration date PT/OT + Visit Limit?   24 N/a  24 BOMN     Visit/Unit Tracking  AUTH Status:  Date     6 visits -24 Used 1 2 3 4     Remaining  5 4 3 2       HEP access code: [email or web address]  Pertinent Findings:      Test / Measure  2024    FOTO (Predicted 49) 33 MET, d/c ()   HR test 3/5 strength    DF AROM Painful end-range      Precautions: asthma, vertigo, HTN, OA, and Fibromyalgia     VISIT 1 2 3 4   Manuals    L ankle PROM   CM 8' KP 8'                        Neuro Re-Ed       Biodex   RC, LOS x2 ea, lvl 8 RC, LOS 2x ea L8, 1' ea RC   Static balance       Ecc HR                                   Ther Ex       NS  10' L5 10' L5 10' L5   HR X10 B/L HEP 2x10 B/L 2x10  20x   AROM Inv/lasha X10 ea HEP      LS towel gastroc stretch 30\"x3 HEP      Tband ankle 4-way  X20 otb  2x10 otb 20x ea gtb   TR  3x10 2x10 20x   Leg press HR   2x15 B/L 25#     Prostretch  30\"x3 nv 4x20\"          Ther Activity       FSU on foam    2x10 6\" + foam   LSU on foam    2x10 6\" + foam "   Side step on foam  2x2 laps X4 laps  5 laps airex   Tandem walk     5 laps airex          Gait Training                     Modalities

## 2024-05-14 ENCOUNTER — APPOINTMENT (OUTPATIENT)
Dept: LAB | Facility: CLINIC | Age: 79
End: 2024-05-14
Payer: MEDICARE

## 2024-05-14 ENCOUNTER — HOSPITAL ENCOUNTER (OUTPATIENT)
Dept: RADIOLOGY | Facility: CLINIC | Age: 79
Discharge: HOME | End: 2024-05-14
Payer: MEDICARE

## 2024-05-14 ENCOUNTER — APPOINTMENT (OUTPATIENT)
Dept: PHYSICAL THERAPY | Facility: REHABILITATION | Age: 79
End: 2024-05-14
Payer: MEDICARE

## 2024-05-14 VITALS
HEART RATE: 100 BPM | RESPIRATION RATE: 18 BRPM | DIASTOLIC BLOOD PRESSURE: 89 MMHG | OXYGEN SATURATION: 97 % | SYSTOLIC BLOOD PRESSURE: 161 MMHG | TEMPERATURE: 98 F

## 2024-05-14 DIAGNOSIS — N18.32 STAGE 3B CHRONIC KIDNEY DISEASE (HCC): ICD-10-CM

## 2024-05-14 DIAGNOSIS — E55.9 VITAMIN D DEFICIENCY: ICD-10-CM

## 2024-05-14 PROBLEM — M54.16 LUMBAR RADICULOPATHY: Status: ACTIVE | Noted: 2024-05-14

## 2024-05-14 LAB
25(OH)D3 SERPL-MCNC: 25.7 NG/ML (ref 30–100)
ANION GAP SERPL CALCULATED.3IONS-SCNC: 10 MMOL/L (ref 4–13)
BACTERIA UR QL AUTO: ABNORMAL /HPF
BILIRUB UR QL STRIP: NEGATIVE
BUN SERPL-MCNC: 10 MG/DL (ref 5–25)
CALCIUM SERPL-MCNC: 8.6 MG/DL (ref 8.4–10.2)
CHLORIDE SERPL-SCNC: 110 MMOL/L (ref 96–108)
CLARITY UR: CLEAR
CO2 SERPL-SCNC: 23 MMOL/L (ref 21–32)
COLOR UR: COLORLESS
CREAT SERPL-MCNC: 1 MG/DL (ref 0.6–1.3)
CREAT UR-MCNC: 55.8 MG/DL
GFR SERPL CREATININE-BSD FRML MDRD: 54 ML/MIN/1.73SQ M
GLUCOSE P FAST SERPL-MCNC: 102 MG/DL (ref 65–99)
GLUCOSE UR STRIP-MCNC: NEGATIVE MG/DL
HGB UR QL STRIP.AUTO: NEGATIVE
HYALINE CASTS #/AREA URNS LPF: ABNORMAL /LPF
KETONES UR STRIP-MCNC: NEGATIVE MG/DL
LEUKOCYTE ESTERASE UR QL STRIP: NEGATIVE
MICROALBUMIN UR-MCNC: 22.4 MG/L
MICROALBUMIN/CREAT 24H UR: 40 MG/G CREATININE (ref 0–30)
NITRITE UR QL STRIP: NEGATIVE
NON-SQ EPI CELLS URNS QL MICRO: ABNORMAL /HPF
PH UR STRIP.AUTO: 6 [PH]
POTASSIUM SERPL-SCNC: 3.6 MMOL/L (ref 3.5–5.3)
PROT UR STRIP-MCNC: NEGATIVE MG/DL
RBC #/AREA URNS AUTO: ABNORMAL /HPF
SODIUM SERPL-SCNC: 143 MMOL/L (ref 135–147)
SP GR UR STRIP.AUTO: 1.01 (ref 1–1.03)
UROBILINOGEN UR STRIP-ACNC: <2 MG/DL
WBC #/AREA URNS AUTO: ABNORMAL /HPF

## 2024-05-14 PROCEDURE — 80048 BASIC METABOLIC PNL TOTAL CA: CPT

## 2024-05-14 PROCEDURE — 82570 ASSAY OF URINE CREATININE: CPT

## 2024-05-14 PROCEDURE — 82306 VITAMIN D 25 HYDROXY: CPT

## 2024-05-14 PROCEDURE — 36415 COLL VENOUS BLD VENIPUNCTURE: CPT

## 2024-05-14 PROCEDURE — 64483 NJX AA&/STRD TFRM EPI L/S 1: CPT | Performed by: ANESTHESIOLOGY

## 2024-05-14 PROCEDURE — 82043 UR ALBUMIN QUANTITATIVE: CPT

## 2024-05-14 PROCEDURE — 81001 URINALYSIS AUTO W/SCOPE: CPT

## 2024-05-14 RX ORDER — PAPAVERINE HCL 150 MG
10 CAPSULE, EXTENDED RELEASE ORAL ONCE
Status: COMPLETED | OUTPATIENT
Start: 2024-05-14 | End: 2024-05-14

## 2024-05-14 RX ADMIN — DEXAMETHASONE SODIUM PHOSPHATE 10 MG: 10 INJECTION, SOLUTION INTRAMUSCULAR; INTRAVENOUS at 11:27

## 2024-05-14 RX ADMIN — IOHEXOL 1 ML: 300 INJECTION, SOLUTION INTRAVENOUS at 11:27

## 2024-05-14 RX ADMIN — LIDOCAINE HYDROCHLORIDE 1 ML: 20 INJECTION, SOLUTION EPIDURAL; INFILTRATION; INTRACAUDAL; PERINEURAL at 11:27

## 2024-05-14 NOTE — DISCHARGE INSTRUCTIONS
Epidural Steroid Injection   WHAT YOU NEED TO KNOW:   An epidural steroid injection (NEERU) is a procedure to inject steroid medicine into the epidural space. The epidural space is between your spinal cord and vertebrae. Steroids reduce inflammation and fluid buildup in your spine that may be causing pain. You may be given pain medicine along with the steroids.          ACTIVITY  Do not drive or operate machinery today.  No strenuous activity today - bending, lifting, etc.  You may resume normal activites starting tomorrow - start slowly and as tolerated.  You may shower today, but no tub baths or hot tubs.  You may have numbness for several hours from the local anesthetic. Please use caution and common sense, especially with weight-bearing activities.    CARE OF THE INJECTION SITE  If you have soreness or pain, apply ice to the area today (20 minutes on/20 minutes off).  Starting tomorrow, you may use warm, moist heat or ice if needed.  You may have an increase or change in your discomfort for 36-48 hours after your treatment.  Apply ice and continue with any pain medication you have been prescribed.  Notify the [facility] if you have any of the following: redness, drainage, swelling, headache, stiff neck or fever above 100°F.    SPECIAL INSTRUCTIONS  Our office will contact you in approximately 7 days for a progress report.    MEDICATIONS  Continue to take all routine medications.  Our office may have instructed you to hold some medications.    As no general anesthesia was used in today's procedure, you should not experience any side effects related to anesthesia.     If you are diabetic, the steroids used in today's injection may temporarily increase your blood sugar levels after the first few days after your injection. Please keep a close eye on your sugars and alert the doctor who manages your diabetes if your sugars are significantly high from your baseline or you are symptomatic.     If you have a  problem specifically related to your procedure, please call our office at [phone].  Problems not related to your procedure should be directed to your primary care physician.

## 2024-05-14 NOTE — H&P
History of Present Illness: The patient is a 79 y.o. female who presents with complaints of low back and leg pain.    Past Medical History:   Diagnosis Date    Asthma     Asthmatic bronchitis     Last Assessed: 10/7/2014     Chronic diarrhea     Last Assessed: 8/20/2015     Fibromyalgia     Focal nodular hyperplasia of liver     Last Assessed: 6/11/2015    Herpes zoster     Last Assessed: 3/18/2016    Hypertension     IBS (irritable bowel syndrome)     Intermittent palpitations     Irritable bowel syndrome     Lumbar radiculopathy     Osteoarthritis     Vertigo        Past Surgical History:   Procedure Laterality Date    BREAST BIOPSY      SMALL INTESTINE SURGERY           Current Outpatient Medications:     acetaminophen (TYLENOL) 500 mg tablet, Take 1 tablet (500 mg total) by mouth every 6 (six) hours as needed for mild pain, Disp: 60 tablet, Rfl: 0    albuterol (2.5 mg/3 mL) 0.083 % nebulizer solution, Take 3 mL (2.5 mg total) by nebulization every 4 (four) hours as needed for wheezing or shortness of breath, Disp: 90 mL, Rfl: 5    albuterol (PROVENTIL HFA,VENTOLIN HFA) 90 mcg/act inhaler, Inhale 2 puffs every 6 (six) hours as needed for wheezing or shortness of breath, Disp: 18 g, Rfl: 5    amLODIPine (NORVASC) 5 mg tablet, Take 1 tablet (5 mg total) by mouth daily, Disp: 90 tablet, Rfl: 2    Azelastine HCl 137 MCG/SPRAY SOLN, 2 sprays into each nostril 2 (two) times a day, Disp: 30 mL, Rfl: 1    benzonatate (TESSALON PERLES) 100 mg capsule, Take 1 capsule (100 mg total) by mouth 3 (three) times a day as needed for cough, Disp: 20 capsule, Rfl: 0    budesonide-formoterol (Symbicort) 80-4.5 MCG/ACT inhaler, Inhale 2 puffs 2 (two) times a day Rinse mouth after use, Disp: 10.2 g, Rfl: 5    D3-1000 25 MCG (1000 UT) tablet, TAKE 1 TABLET BY MOUTH EVERY DAY, Disp: 90 tablet, Rfl: 1    Diclofenac Sodium (VOLTAREN) 1 %, Apply 2 g topically 4 (four) times a day as needed (left ankle pain), Disp: 2 g, Rfl: 1     "ergocalciferol (ERGOCALCIFEROL) 1.25 MG (48361 UT) capsule, Take 1 capsule (50,000 Units total) by mouth once a week, Disp: 12 capsule, Rfl: 0    escitalopram (LEXAPRO) 10 mg tablet, Take 1 tablet (10 mg total) by mouth daily, Disp: 90 tablet, Rfl: 3    fexofenadine (ALLEGRA) 180 MG tablet, Take 180 mg by mouth daily, Disp: , Rfl:     fluticasone (FLONASE) 50 mcg/act nasal spray, 2 sprays into each nostril daily, Disp: 16 g, Rfl: 3    folic acid (FOLVITE) 1 mg tablet, TAKE 1 TABLET BY MOUTH EVERY DAY, Disp: 90 tablet, Rfl: 2    hydrocortisone (ANUSOL-HC) 2.5 % rectal cream, Apply topically 2 (two) times a day, Disp: 28 g, Rfl: 0    lidocaine (Lidoderm) 5 %, Apply 1 patch topically over 12 hours daily Remove & Discard patch within 12 hours or as directed by MD (Patient not taking: Reported on 12/1/2023), Disp: 15 patch, Rfl: 0    montelukast (SINGULAIR) 10 mg tablet, TAKE 1 TABLET BY MOUTH DAILY AT BEDTIME, Disp: 90 tablet, Rfl: 1    nicotine (NICODERM CQ) 14 mg/24hr TD 24 hr patch, Place 1 patch on the skin over 24 hours every 24 hours, Disp: 28 patch, Rfl: 1    olopatadine (PATANOL) 0.1 % ophthalmic solution, Administer 1 drop to both eyes 2 (two) times a day, Disp: 5 mL, Rfl: 2    ondansetron (Zofran ODT) 4 mg disintegrating tablet, Take 1 tablet (4 mg total) by mouth every 6 (six) hours as needed for nausea or vomiting, Disp: 20 tablet, Rfl: 3    pantoprazole (PROTONIX) 20 mg tablet, Take 1 tablet (20 mg total) by mouth daily, Disp: 90 tablet, Rfl: 1    Allergies   Allergen Reactions    Ambrosia Artemisiifolia (Ragweed) Skin Test Facial Swelling    Codeine Other (See Comments)     Heart races    Epinephrine      Pt reports \"it makes my heart race, they told me I cant take it.\"    Ibuprofen Other (See Comments)     Heart racing    Morphine Other (See Comments)     Heart racing    Pollen Extract Sneezing    Tramadol Other (See Comments)     Heart racing       Physical Exam:   Vitals:    05/14/24 1114   BP: 168/92 "   Pulse: 99   Resp: 18   Temp: 98 °F (36.7 °C)   SpO2: 97%     General: Awake, Alert, Oriented x 3, Mood and affect appropriate  Respiratory: Respirations even and unlabored  Cardiovascular: Peripheral pulses intact; no edema  Musculoskeletal Exam: antalgic gait    ASA Score: 3    Patient/Chart Verification  Patient ID Verified: Verbal  ID Band Applied: No  Consents Confirmed: Procedural, To be obtained in the Pre-Procedure area  Interval H&P(within 24 hr) Complete (required for Outpatients and Surgery Admit only): To be obtained in the Pre-Procedure area  Allergies Reviewed: Yes  Anticoag/NSAID held?: NA  Currently on antibiotics?: No    Assessment: Lumbar radiculopathy:     Plan: left L4 TFESI

## 2024-05-15 ENCOUNTER — OFFICE VISIT (OUTPATIENT)
Dept: PULMONOLOGY | Facility: CLINIC | Age: 79
End: 2024-05-15
Payer: MEDICARE

## 2024-05-15 VITALS
HEART RATE: 86 BPM | WEIGHT: 133.1 LBS | BODY MASS INDEX: 24.49 KG/M2 | TEMPERATURE: 96.9 F | HEIGHT: 62 IN | OXYGEN SATURATION: 99 % | SYSTOLIC BLOOD PRESSURE: 120 MMHG | DIASTOLIC BLOOD PRESSURE: 80 MMHG

## 2024-05-15 DIAGNOSIS — F17.210 CIGARETTE NICOTINE DEPENDENCE WITHOUT COMPLICATION: ICD-10-CM

## 2024-05-15 DIAGNOSIS — J45.901 ASTHMA EXACERBATION: ICD-10-CM

## 2024-05-15 DIAGNOSIS — J32.8 OTHER CHRONIC SINUSITIS: Primary | Chronic | ICD-10-CM

## 2024-05-15 DIAGNOSIS — Z72.0 TOBACCO ABUSE: ICD-10-CM

## 2024-05-15 DIAGNOSIS — J45.30 MILD PERSISTENT ASTHMA WITHOUT COMPLICATION: ICD-10-CM

## 2024-05-15 DIAGNOSIS — J43.9 PULMONARY EMPHYSEMA, UNSPECIFIED EMPHYSEMA TYPE (HCC): ICD-10-CM

## 2024-05-15 PROCEDURE — 99214 OFFICE O/P EST MOD 30 MIN: CPT | Performed by: STUDENT IN AN ORGANIZED HEALTH CARE EDUCATION/TRAINING PROGRAM

## 2024-05-15 PROCEDURE — G2211 COMPLEX E/M VISIT ADD ON: HCPCS | Performed by: STUDENT IN AN ORGANIZED HEALTH CARE EDUCATION/TRAINING PROGRAM

## 2024-05-15 RX ORDER — ALBUTEROL SULFATE 90 UG/1
2 AEROSOL, METERED RESPIRATORY (INHALATION) EVERY 6 HOURS PRN
Qty: 18 G | Refills: 5 | Status: SHIPPED | OUTPATIENT
Start: 2024-05-15

## 2024-05-15 RX ORDER — NICOTINE 21 MG/24HR
1 PATCH, TRANSDERMAL 24 HOURS TRANSDERMAL EVERY 24 HOURS
Qty: 28 PATCH | Refills: 1 | Status: SHIPPED | OUTPATIENT
Start: 2024-05-15

## 2024-05-15 RX ORDER — BUDESONIDE AND FORMOTEROL FUMARATE DIHYDRATE 80; 4.5 UG/1; UG/1
2 AEROSOL RESPIRATORY (INHALATION) 2 TIMES DAILY
Qty: 10.2 G | Refills: 5 | Status: SHIPPED | OUTPATIENT
Start: 2024-05-15 | End: 2024-06-14

## 2024-05-15 NOTE — PROGRESS NOTES
Attending Statement    I was the supervising physician and personally saw/examined the patient on 5/15/2024.  I agree with the Resident/Fellow's note with the following additions/exceptions:    Imaging Studies were reviewed personally on the PAC system:   CT Chest     Stable 3 mm pulmonary nodule right upper lobe apex. Consider short-term follow-up in 12 months.     Previously seen obstructive right lower lobe bronchi no longer noted. No discrete endobronchial lesion.     Mild emphysema.       Physical Exam:  General: NAD  Neuro: AxO 3  Heart: RRR  Lungs: CTAB  Abdomen: Soft NT   Extremities: No edema    Assessment:    78F with a PMH of GERD, Asthma?/COPD, HTN and chronic sinusitis who presents for follow up.      Plan  COPD/Asthma - Evidence of Th2 mediated disease, extensive smoking history. Patient symptoms stable on current regimen. Patient with persistent sinus issues despite medical management, plans to follow up with ENT later this month  - Symbicort   - Singulair   - Albuterol PRN        Tobacco Use - 20-30 Pack years - Quit 2023 -  - Repeat CT 2024        Boni Nathan MD  Pulmonary and Critical Care

## 2024-05-15 NOTE — PROGRESS NOTES
Pulmonary Follow Up Note   Mireya Yi 79 y.o. female MRN: [id number]  5/15/2024      Assessment/Plan:  Mireya Yi is a 78 y.o. female with tobacco use, Asthma/COPD (diagnosed at age 41), chronic sinusitis, and GERD who presents for follow up appointment for COPD/asthma.     Chronic sinusitis  Patient has since established with ENT. CT sinus showing cyst in the left sphenoid sinus and right ethmoidal mucosal thickening  - continue to follow with ENT for management, f/u scheduled   - Continue Singular, Allegra, Fluticasone, and azelastine     COPD  Asthma  Patient with significant smoking history of 20-30 PY. Current symptoms are controlled. She has history of exacerbations associated with chronic sinusitis   - Continue Singular  - Continue Albuterol as needed   - Management of chronic sinusitis as above     Tobacco abuse, in remission   Patient with significant smoking history of 20-30 PY.   - Repeat CT chest due 7/2024, ordered     Follow up in 4-6 months    History of Present Illness   HPI:  Mireya Yi is a 78 y.o. female with tobacco use, Asthma/COPD (diagnosed at age 41), chronic sinusitis, and GERD who presents for follow up appointment. Last seen by myself on 3/6/2024.     In the interim, patient was seen as a sick visit by our office and was treated for tracheobronchitis with Levaquin. Chest x-ray was within normal limits. Labs showed mild leukocytosis, eos 10, Flu/RSV/COVID negative, procal 0.05. She has also been evaluated by ENT. CT sinuses showing small retention cyst in the left sphenoid sinus and minimal right ethmoidal mucosal thickening, clear paranasal sinuses. She was started on Singular, Allegra, Flonase daily, and Azelastine twice a day.     Patient reports improvement in her symptoms after antibiotic and burst of steroid, though she is starting to feel like her sinuses are draining again which is associated with increased cough. Her shortness of breath is relatively controlled, worsened by  the cough. mucus production, and cough. Shortness of breath with cough. She does have a follow up appointment with ENT on May 30th. She continues Symbicort twice daily and Albuterol as needed. She has been using albuterol infrequently. She is taking PPI for GERD. She continues to not smoke and utilizes nicotine patches.     Review of systems:  12 point review of systems was completed and was otherwise negative except as listed in HPI.    Historical Information   Past Medical History:   Diagnosis Date    Asthma     Asthmatic bronchitis     Last Assessed: 10/7/2014     Chronic diarrhea     Last Assessed: 8/20/2015     Fibromyalgia     Focal nodular hyperplasia of liver     Last Assessed: 6/11/2015    Herpes zoster     Last Assessed: 3/18/2016    Hypertension     IBS (irritable bowel syndrome)     Intermittent palpitations     Irritable bowel syndrome     Lumbar radiculopathy     Osteoarthritis     Vertigo      Past Surgical History:   Procedure Laterality Date    BREAST BIOPSY      SMALL INTESTINE SURGERY       Family History   Problem Relation Age of Onset    Heart attack Mother     Other Mother         Aspiration of Vomit     Asthma Father     Breast cancer Sister     Arthritis Family     Other Family         Musculoskeletal Disease     Osteoporosis Family      Social History     Tobacco Use   Smoking Status Former    Current packs/day: 0.50    Types: Cigarettes   Smokeless Tobacco Never   Tobacco Comments    Stopped smoking July 2023       Meds/Allergies     Current Outpatient Medications:     acetaminophen (TYLENOL) 500 mg tablet, Take 1 tablet (500 mg total) by mouth every 6 (six) hours as needed for mild pain, Disp: 60 tablet, Rfl: 0    albuterol (2.5 mg/3 mL) 0.083 % nebulizer solution, Take 3 mL (2.5 mg total) by nebulization every 4 (four) hours as needed for wheezing or shortness of breath, Disp: 90 mL, Rfl: 5    albuterol (PROVENTIL HFA,VENTOLIN HFA) 90 mcg/act inhaler, Inhale 2 puffs every 6 (six) hours as  needed for wheezing or shortness of breath, Disp: 18 g, Rfl: 5    amLODIPine (NORVASC) 5 mg tablet, Take 1 tablet (5 mg total) by mouth daily, Disp: 90 tablet, Rfl: 2    Azelastine HCl 137 MCG/SPRAY SOLN, 2 sprays into each nostril 2 (two) times a day, Disp: 30 mL, Rfl: 1    benzonatate (TESSALON PERLES) 100 mg capsule, Take 1 capsule (100 mg total) by mouth 3 (three) times a day as needed for cough, Disp: 20 capsule, Rfl: 0    D3-1000 25 MCG (1000 UT) tablet, TAKE 1 TABLET BY MOUTH EVERY DAY, Disp: 90 tablet, Rfl: 1    Diclofenac Sodium (VOLTAREN) 1 %, Apply 2 g topically 4 (four) times a day as needed (left ankle pain), Disp: 2 g, Rfl: 1    ergocalciferol (ERGOCALCIFEROL) 1.25 MG (10341 UT) capsule, Take 1 capsule (50,000 Units total) by mouth once a week, Disp: 12 capsule, Rfl: 0    escitalopram (LEXAPRO) 10 mg tablet, Take 1 tablet (10 mg total) by mouth daily, Disp: 90 tablet, Rfl: 3    fexofenadine (ALLEGRA) 180 MG tablet, Take 180 mg by mouth daily, Disp: , Rfl:     fluticasone (FLONASE) 50 mcg/act nasal spray, 2 sprays into each nostril daily, Disp: 16 g, Rfl: 3    folic acid (FOLVITE) 1 mg tablet, TAKE 1 TABLET BY MOUTH EVERY DAY, Disp: 90 tablet, Rfl: 2    hydrocortisone (ANUSOL-HC) 2.5 % rectal cream, Apply topically 2 (two) times a day, Disp: 28 g, Rfl: 0    montelukast (SINGULAIR) 10 mg tablet, TAKE 1 TABLET BY MOUTH DAILY AT BEDTIME, Disp: 90 tablet, Rfl: 1    nicotine (NICODERM CQ) 14 mg/24hr TD 24 hr patch, Place 1 patch on the skin over 24 hours every 24 hours, Disp: 28 patch, Rfl: 1    olopatadine (PATANOL) 0.1 % ophthalmic solution, Administer 1 drop to both eyes 2 (two) times a day, Disp: 5 mL, Rfl: 2    ondansetron (Zofran ODT) 4 mg disintegrating tablet, Take 1 tablet (4 mg total) by mouth every 6 (six) hours as needed for nausea or vomiting, Disp: 20 tablet, Rfl: 3    pantoprazole (PROTONIX) 20 mg tablet, Take 1 tablet (20 mg total) by mouth daily, Disp: 90 tablet, Rfl: 1     "budesonide-formoterol (Symbicort) 80-4.5 MCG/ACT inhaler, Inhale 2 puffs 2 (two) times a day Rinse mouth after use, Disp: 10.2 g, Rfl: 5    lidocaine (Lidoderm) 5 %, Apply 1 patch topically over 12 hours daily Remove & Discard patch within 12 hours or as directed by MD (Patient not taking: Reported on 12/1/2023), Disp: 15 patch, Rfl: 0  No current facility-administered medications for this visit.  Allergies   Allergen Reactions    Ambrosia Artemisiifolia (Ragweed) Skin Test Facial Swelling    Codeine Other (See Comments)     Heart races    Epinephrine      Pt reports \"it makes my heart race, they told me I cant take it.\"    Ibuprofen Other (See Comments)     Heart racing    Morphine Other (See Comments)     Heart racing    Pollen Extract Sneezing    Tramadol Other (See Comments)     Heart racing       Vitals: There were no vitals taken for this visit. There is no height or weight on file to calculate BMI.      Physical Exam  General: No acute distress  HENT: Normocephalic, atraumatic.  PERRL, EOMI, Moist mucous membranes.  Neck: Supple  Lungs: Clear to auscultation bilaterally, no wheezes, rales, rhonchi.  On room air  Heart: Regular rate and rhythm, S1-S2 present, no murmurs rubs or gallops  Extremities: Warm and well perfused, no cyanosis, clubbing, or edema  Skin: Warm, dry, no rashes or lesions  Neurologic: No focal neurologic deficits    Labs: I have personally reviewed pertinent lab results.  Lab Results   Component Value Date    WBC 12.86 (H) 03/28/2024    HGB 11.9 03/28/2024    HCT 39.2 03/28/2024    MCV 87 03/28/2024     03/28/2024     Lab Results   Component Value Date    GLUCOSE 92 05/19/2015    CALCIUM 8.6 05/14/2024     05/19/2015    K 3.6 05/14/2024    CO2 23 05/14/2024     (H) 05/14/2024    BUN 10 05/14/2024    CREATININE 1.00 05/14/2024     No results found for: \"IGE\"  Lab Results   Component Value Date    ALT 15 07/28/2023    AST 10 07/28/2023    ALKPHOS 80 07/28/2023    BILITOT " "0.20 05/19/2015       Imaging and other studies: I have personally reviewed pertinent reports.    Chest x-ray was within normal limits    CT sinuses showing small retention cyst in the left sphenoid sinus and minimal right ethmoidal mucosal thickening, clear paranasal sinuses.     Pulmonary function testing:   Results: 9/11/2023  FEV1/FVC Ratio: 56.74%  Forced Vital Capacity: 2.65 L    108% predicted  FEV1: 1.50 L     79% predicted     After administration of bronchodilator   FVC: 2.66 L, 108% predicted, +0 % change  FEV1: 1.58 L, 83% predicted, +5 % change     Lung volumes by body plethysmography:   Total Lung Capacity 98% predicted   Residual volume 95% predicted  RV/TLC Ratio: 45.53%, 98% predicted     DLCO corrected for patients hemoglobin level: 72%     My interpretation:  Mild obstruction on spirometry without a bronchodilator response. Normal lung volumes and diffusion capacity.         EKG, Pathology, and Other Studies: I have personally reviewed pertinent reports.        Disclaimer: Portions of the record may have been created with voice recognition software. Occasional wrong word or \"sound a like\" substitutions may have occurred due to the inherent limitations of voice recognition software. Careful consideration should be taken to recognize, using context, where substitutions have occurred.    Michael Grimes, DO   Pulmonary & Critical Care Medicine Fellow PGY-IV  [facility]  "

## 2024-05-17 ENCOUNTER — OFFICE VISIT (OUTPATIENT)
Dept: FAMILY MEDICINE CLINIC | Facility: CLINIC | Age: 79
End: 2024-05-17

## 2024-05-17 VITALS
HEART RATE: 98 BPM | WEIGHT: 134 LBS | OXYGEN SATURATION: 97 % | BODY MASS INDEX: 24.66 KG/M2 | SYSTOLIC BLOOD PRESSURE: 136 MMHG | DIASTOLIC BLOOD PRESSURE: 81 MMHG | TEMPERATURE: 98.4 F | HEIGHT: 62 IN | RESPIRATION RATE: 18 BRPM

## 2024-05-17 DIAGNOSIS — E55.9 VITAMIN D DEFICIENCY: ICD-10-CM

## 2024-05-17 DIAGNOSIS — Z13.820 OSTEOPOROSIS SCREENING: ICD-10-CM

## 2024-05-17 DIAGNOSIS — M25.472 LEFT ANKLE SWELLING: ICD-10-CM

## 2024-05-17 DIAGNOSIS — M85.872 OTHER SPECIFIED DISORDERS OF BONE DENSITY AND STRUCTURE, LEFT ANKLE AND FOOT: ICD-10-CM

## 2024-05-17 DIAGNOSIS — S82.62XD CLOSED FRACTURE OF PROXIMAL LATERAL MALLEOLUS OF LEFT FIBULA WITH ROUTINE HEALING, SUBSEQUENT ENCOUNTER: Primary | ICD-10-CM

## 2024-05-17 PROBLEM — S82.63XD: Status: ACTIVE | Noted: 2024-05-17

## 2024-05-17 PROBLEM — S93.402A LEFT ANKLE SPRAIN: Status: ACTIVE | Noted: 2024-04-12

## 2024-05-17 PROCEDURE — 3079F DIAST BP 80-89 MM HG: CPT | Performed by: FAMILY MEDICINE

## 2024-05-17 PROCEDURE — 3075F SYST BP GE 130 - 139MM HG: CPT | Performed by: FAMILY MEDICINE

## 2024-05-17 PROCEDURE — G2211 COMPLEX E/M VISIT ADD ON: HCPCS | Performed by: FAMILY MEDICINE

## 2024-05-17 PROCEDURE — 99213 OFFICE O/P EST LOW 20 MIN: CPT | Performed by: FAMILY MEDICINE

## 2024-05-17 NOTE — PROGRESS NOTES
Ambulatory Visit  Name: Mireya Yi      : 1945      MRN: [id number]  Encounter Provider: Elham Saleem MD  Encounter Date: 2024   Encounter department: [facility]    Assessment & Plan   1. Closed fracture of proximal lateral malleolus of left fibula with routine healing, subsequent encounter  2. Vitamin D deficiency  -     Cholecalciferol (D3-1000) 1,000 units tablet; Take 1 tablet (1,000 Units total) by mouth daily  3. Osteoporosis screening  -     DXA bone density spine hip and pelvis; Future; Expected date: 2024  4. Other specified disorders of bone density and structure, left ankle and foot  -     DXA bone density spine hip and pelvis; Future; Expected date: 2024  5. Left ankle swelling  Assessment & Plan:  Improving. Able to bear weight, pain with eversion. 5 week hx of swelling and tenderness of left ankle without inciting injury. Xray of left ankle from 24 reviewed showing remote appearing avulsion fracture of left lateral malleolus which was also visualized on left ankle xray on in . Suspect ankle sprain at this time. Low suspicion for inflammatory/ autoimmune cause of ankle swelling at this time.  Recommend scheduled tylenol, topical diclofenac, compression with ace bandage, elevation. Continue to follow with PT and podiatry.  Patient to follow up in 4 weeks if swelling and pain continue.          History of Present Illness     Ms. Yi presents to clinic for follow up of left ankle pain and swelling. She states her pain continues. She is able to bear more weight on foot. She state she has most pain with eversion of left ankle. Patient states she has been going to physical therapy. She states her ankle brace makes her ankle hurt more. Patient states she has been taking tylenol as needed and using ice as needed. She continues to follow with podiatry.       Review of Systems   Constitutional:  Negative for chills and  fever.   HENT:  Negative for sneezing and sore throat.    Eyes:  Negative for redness and visual disturbance.   Respiratory:  Negative for shortness of breath and wheezing.    Cardiovascular:  Negative for chest pain and palpitations.   Gastrointestinal:  Negative for abdominal pain, constipation and nausea.   Genitourinary:  Negative for difficulty urinating and dysuria.   Musculoskeletal:  Negative for arthralgias and myalgias.   Skin:  Negative for rash.   Allergic/Immunologic: Positive for environmental allergies. Negative for food allergies.   Neurological:  Negative for dizziness and headaches.     Past Medical History:   Diagnosis Date    Asthma     Asthmatic bronchitis     Last Assessed: 10/7/2014     Chronic diarrhea     Last Assessed: 8/20/2015     Fibromyalgia     Focal nodular hyperplasia of liver     Last Assessed: 6/11/2015    Herpes zoster     Last Assessed: 3/18/2016    Hypertension     IBS (irritable bowel syndrome)     Intermittent palpitations     Irritable bowel syndrome     Lumbar radiculopathy     Osteoarthritis     Vertigo      Past Surgical History:   Procedure Laterality Date    BREAST BIOPSY      SMALL INTESTINE SURGERY       Family History   Problem Relation Age of Onset    Heart attack Mother     Other Mother         Aspiration of Vomit     Asthma Father     Breast cancer Sister     Arthritis Family     Other Family         Musculoskeletal Disease     Osteoporosis Family      Social History     Tobacco Use    Smoking status: Former     Current packs/day: 0.50     Types: Cigarettes    Smokeless tobacco: Never    Tobacco comments:     Stopped smoking July 2023   Vaping Use    Vaping status: Never Used   Substance and Sexual Activity    Alcohol use: Not Currently    Drug use: No    Sexual activity: Not Currently     Partners: Male     Current Outpatient Medications on File Prior to Visit   Medication Sig    acetaminophen (TYLENOL) 500 mg tablet Take 1 tablet (500 mg total) by mouth every 6  (six) hours as needed for mild pain    albuterol (PROVENTIL HFA,VENTOLIN HFA) 90 mcg/act inhaler Inhale 2 puffs every 6 (six) hours as needed for wheezing or shortness of breath    amLODIPine (NORVASC) 5 mg tablet Take 1 tablet (5 mg total) by mouth daily    Azelastine HCl 137 MCG/SPRAY SOLN 2 sprays into each nostril 2 (two) times a day    budesonide-formoterol (Symbicort) 80-4.5 MCG/ACT inhaler Inhale 2 puffs 2 (two) times a day Rinse mouth after use    Diclofenac Sodium (VOLTAREN) 1 % Apply 2 g topically 4 (four) times a day as needed (left ankle pain)    escitalopram (LEXAPRO) 10 mg tablet Take 1 tablet (10 mg total) by mouth daily    fexofenadine (ALLEGRA) 180 MG tablet Take 180 mg by mouth daily    fluticasone (FLONASE) 50 mcg/act nasal spray 2 sprays into each nostril daily    folic acid (FOLVITE) 1 mg tablet TAKE 1 TABLET BY MOUTH EVERY DAY    hydrocortisone (ANUSOL-HC) 2.5 % rectal cream Apply topically 2 (two) times a day    montelukast (SINGULAIR) 10 mg tablet TAKE 1 TABLET BY MOUTH DAILY AT BEDTIME    nicotine (NICODERM CQ) 14 mg/24hr TD 24 hr patch Place 1 patch on the skin over 24 hours every 24 hours    ondansetron (Zofran ODT) 4 mg disintegrating tablet Take 1 tablet (4 mg total) by mouth every 6 (six) hours as needed for nausea or vomiting    pantoprazole (PROTONIX) 20 mg tablet Take 1 tablet (20 mg total) by mouth daily    [DISCONTINUED] D3-1000 25 MCG (1000 UT) tablet TAKE 1 TABLET BY MOUTH EVERY DAY    albuterol (2.5 mg/3 mL) 0.083 % nebulizer solution Take 3 mL (2.5 mg total) by nebulization every 4 (four) hours as needed for wheezing or shortness of breath (Patient not taking: Reported on 5/15/2024)    olopatadine (PATANOL) 0.1 % ophthalmic solution Administer 1 drop to both eyes 2 (two) times a day    [DISCONTINUED] benzonatate (TESSALON PERLES) 100 mg capsule Take 1 capsule (100 mg total) by mouth 3 (three) times a day as needed for cough (Patient not taking: Reported on 5/15/2024)     "[DISCONTINUED] ergocalciferol (ERGOCALCIFEROL) 1.25 MG (27600 UT) capsule Take 1 capsule (50,000 Units total) by mouth once a week (Patient not taking: Reported on 5/17/2024)    [DISCONTINUED] lidocaine (Lidoderm) 5 % Apply 1 patch topically over 12 hours daily Remove & Discard patch within 12 hours or as directed by MD (Patient not taking: Reported on 12/1/2023)     Allergies   Allergen Reactions    Ambrosia Artemisiifolia (Ragweed) Skin Test Facial Swelling    Codeine Other (See Comments)     Heart races    Epinephrine      Pt reports \"it makes my heart race, they told me I cant take it.\"    Ibuprofen Other (See Comments)     Heart racing    Morphine Other (See Comments)     Heart racing    Pollen Extract Sneezing    Tramadol Other (See Comments)     Heart racing     Immunization History   Administered Date(s) Administered    Pneumococcal Conjugate Vaccine 20-valent (Pcv20), Polysace 11/17/2023    Pneumococcal Polysaccharide PPV23 01/01/2003, 10/01/2008    Tdap 06/08/2018     Objective     /81   Pulse 98   Temp 98.4 °F (36.9 °C) (Temporal)   Resp 18   Ht 5' 2\" (1.575 m)   Wt 60.8 kg (134 lb)   SpO2 97%   BMI 24.51 kg/m²     Physical Exam  Vitals reviewed.   Constitutional:       General: She is not in acute distress.     Appearance: Normal appearance. She is not ill-appearing.   Cardiovascular:      Rate and Rhythm: Normal rate and regular rhythm.      Heart sounds: Normal heart sounds.   Pulmonary:      Effort: Pulmonary effort is normal.      Breath sounds: Normal breath sounds.   Abdominal:      General: Abdomen is flat. Bowel sounds are normal.      Palpations: Abdomen is soft.   Musculoskeletal:         General: Swelling (left ankle) and tenderness (left lateral and medial ankle) present. Normal range of motion.      Cervical back: Normal range of motion.      Right lower leg: No edema.   Skin:     General: Skin is warm.   Neurological:      Mental Status: She is alert and oriented to person, " place, and time. Mental status is at baseline.       Administrative Statements

## 2024-05-17 NOTE — ASSESSMENT & PLAN NOTE
Improving. Able to bear weight, pain with eversion. 5 week hx of swelling and tenderness of left ankle without inciting injury. Xray of left ankle from 4/12/24 reviewed showing remote appearing avulsion fracture of left lateral malleolus which was also visualized on left ankle xray on in 2022. Suspect ankle sprain at this time. Low suspicion for inflammatory/ autoimmune cause of ankle swelling at this time.  Recommend scheduled tylenol, topical diclofenac, compression with ace bandage, elevation. Continue to follow with PT and podiatry.  Patient to follow up in 4 weeks if swelling and pain continue.

## 2024-05-21 ENCOUNTER — OFFICE VISIT (OUTPATIENT)
Dept: PHYSICAL THERAPY | Facility: REHABILITATION | Age: 79
End: 2024-05-21
Payer: MEDICARE

## 2024-05-21 ENCOUNTER — TELEPHONE (OUTPATIENT)
Dept: PAIN MEDICINE | Facility: CLINIC | Age: 79
End: 2024-05-21

## 2024-05-21 DIAGNOSIS — R29.898 ANKLE WEAKNESS: ICD-10-CM

## 2024-05-21 DIAGNOSIS — M25.672 ANKLE STIFFNESS, LEFT: ICD-10-CM

## 2024-05-21 DIAGNOSIS — S82.62XA CLOSED AVULSION FRACTURE OF LATERAL MALLEOLUS OF LEFT FIBULA, INITIAL ENCOUNTER: Primary | ICD-10-CM

## 2024-05-21 DIAGNOSIS — M25.572 ACUTE LEFT ANKLE PAIN: ICD-10-CM

## 2024-05-21 DIAGNOSIS — M25.472 LEFT ANKLE SWELLING: ICD-10-CM

## 2024-05-21 PROCEDURE — 97110 THERAPEUTIC EXERCISES: CPT

## 2024-05-21 PROCEDURE — 97530 THERAPEUTIC ACTIVITIES: CPT

## 2024-05-21 NOTE — TELEPHONE ENCOUNTER
Patient Reports    0     %     improvement post injection    Pain Level   5  /10  Will wait till next call

## 2024-05-21 NOTE — PROGRESS NOTES
"Daily Note     Today's date: 2024  Patient name: Mireya Yi  : 1945  MRN: [id number]  Referring provider: Gabriel Kuo PA*  Dx:   Encounter Diagnosis     ICD-10-CM    1. Closed avulsion fracture of lateral malleolus of left fibula-nonacute  S82.62XA       2. Acute left ankle pain  M25.572       3. Left ankle swelling  M25.472       4. Ankle weakness  R29.898       5. Ankle stiffness, left  M25.672           Start Time: 1200  Stop Time: 1245  Total time in clinic (min): 45 minutes    Subjective: Pt reports some swelling and pain with eversion and inversion.       Objective: See treatment diary below      Assessment: Tolerated treatment well. Patient would benefit from continued PT.  Pt has some pain present with inversion + plantar flexion, eversion and PROM plantar flexion. Pt. 1:1 with PTA for entirety.      Plan: Continue per plan of care.      POC expires Unit limit Auth Expiration date PT/OT + Visit Limit?   24 N/a  24 BOMN     Visit/Unit Tracking  AUTH Status:  Date    6 visits -24 Used 1 2 3 4 5    Remaining  5 4 3 2 1      HEP access code: [email or web address]  Pertinent Findings:      Test / Measure  2024    FOTO (Predicted 49) 33 MET, d/c ()   HR test 3/5 strength    DF AROM Painful end-range      Precautions: asthma, vertigo, HTN, OA, and Fibromyalgia     VISIT 1 2 3 4 5   Manuals    L ankle PROM   CM 8' KP 8'                            Neuro Re-Ed        Biodex   RC, LOS x2 ea, lvl 8 RC, LOS 2x ea L8, 1' ea RC    Static balance        Ecc HR                                        Ther Ex        NS  10' L5 10' L5 10' L5 10' L5   HR X10 B/L HEP 2x10 B/L 2x10  20x 20x   AROM Inv/lasha X10 ea HEP       LS towel gastroc stretch 30\"x3 HEP       Tband ankle 4-way  X20 otb  2x10 otb 20x ea gtb 20x ea gtb   TR  3x10 2x10 20x 20x   Leg press HR   2x15 B/L 25#      Prostretch  30\"x3 nv 4x20\" 4x20\"           Ther Activity        FSU on " "foam    2x10 6\" + foam 2x10 6\" + foam   LSU on foam    2x10 6\" + foam 2x10 6\" + foam   Side step on foam  2x2 laps X4 laps  5 laps airex 5 laps airex   Tandem walk     5 laps airex 5 laps airex           Gait Training                        Modalities                               "

## 2024-05-22 NOTE — TELEPHONE ENCOUNTER
05/17/21 2:23 PM     See documentation in the VB [employer] SmartForm       Learangel luis Dinh
[FreeTextEntry1] : Ms. Vita Toro presented to the office today for follow-up cardiac evaluation.  I last evaluated the patient in the office on 11/22/2023.  The patient is a 74-year-old female with a history of hypertension, a dyslipidemia, pre-diabetes, mild mitral regurgitation, mild carotid atherosclerosis, a lumbar disc herniation, vertigo, hypothyroidism, a renal cyst, a left thyroid nodule, hematuria, bilateral increased intraocular pressure, and seasonal allergies.  The patient has been doing well from a cardiac symptomatic standpoint since her previous visit here on 11/22/2023. Specifically, she does not describe having experienced chest discomfort or dyspnea on exertion in association with her activities. She has not noted orthopnea, paroxysmal nocturnal dyspnea, or lower extremity edema. She has not experienced any episodes of palpitations, presyncope or syncope.  At the time of a previous visit here on 1/16/18, I added HCTZ 12.5 mg daily, in an effort to more optimally control the patient"s blood pressure. She experienced an apparent syncopal episode on 8/3/18, and at the time of a follow-up visit here on 8/29/18, I instructed her to reduce the dosage of HCTZ to 12.5 mg every other day. She has not experienced recurrence of syncope since that episode.  At the time of a follow-up visit here on 12/2/20 the patient was exhibiting an elevated blood pressure reading.  I referred her for a 24-hour ambulatory blood pressure monitoring study, which was performed on 1/20/21, revealing the average reading to be 121/63.  Review of systems is significant for the patient having developed lower back discomfort in April of 2019. She underwent an MRI evaluation on 5/8/19, which she states revealed evidence for a lumbar disc herniation. She consulted with an orthopedist on 5/16/19, and was treated with tapering courses of steroids and epidural injections. She had a routine follow-up mammography and breast sonogram performed on 6/12/20, and reports having been told of having "a suspicious finding" on the left, for which she eventually underwent a lumpectomy procedure on 7/30/20 (benign findings).  She underwent colonoscopy on 5/4/2021, which she states was revealing for diverticulosis.  She has been following with an orthopedist regarding osteoarthritis of her knees, for which she underwent a series of Synvisc injections on 7/17/2023, 7/24/2023, and 8/1/2023.  A fasting lipid profile performed on 11/22/2023 (on Crestor 5 mg daily) revealed cholesterol 177, triglycerides 217, HDL 51, and calculated LDL 89.  The liver chemistries were normal.  The BUN and creatinine were 15 and 0.8, respectively.  The potassium level was 4.7.  The glucose level was 105 and the hemoglobin A1c level was 6.3%.  The hemoglobin and hematocrit were 14.2 and 43.7, respectively.  The TSH level was 0.83.  A  24-hour ambulatory blood pressure monitoring study performed from 1/20/21 - 1/21/21 revealed the average reading to be 121/63, with 3% of systolic readings being in the elevated range and 0% of diastolic readings being in the elevated range.  Echocardiography most recently performed on 4/20/2023 revealed normal cardiac chamber sizes with normal left ventricular wall thickness and wall motion.  Left ventricular systolic function was normal, with an estimated ejection fraction of 60%.  Impaired diastolic relaxation of the left ventricle was demonstrated.  Mild aortic regurgitation, mild mitral regurgitation, mild tricuspid regurgitation were demonstrated, without evidence of pulmonary hypertension.  Carotid artery Doppler testing most recently performed on 4/20/2023 revealed mild plaque formation involving the bulbar regions bilaterally. No significant stenoses were demonstrated.  As an incidental finding, a 1.5 cm x 1.3 cm left thyroid nodule was detected.  Previous History:  On 8/3/18, the patient was walking in a store, when she suddenly felt herself falling. She had an apparent brief syncopal episode, noting that she does not specifically recall hitting the floor, however, she did find herself on the floor, having injured her lip. She stayed up immediately, and was asymptomatic. She did not suffer any other significant injuries in association with this episode. She does not recall having experienced any similar previous episodes, nor has she experienced any recurrent episodes.  As far as risk factors for coronary artery disease are concerned, the patient has a history of hypertension and a dyslipidemia, for which she is being treated with medical therapy. She denies a history of diabetes. She denies a history of cigarette smoking. She describes having an immediate family history of premature coronary artery disease, stating that her father suffered "heart attack" at the age of 55.  Past medical/surgical history according to the patient is significant for hypothyroidism, seasonal allergies, vertigo, a benign left thyroid nodule, a liver hemangioma, arthroscopic right knee surgery, bilateral cataract surgeries, and resection of a benign left breast cyst. The patient denies ever having been pregnant.

## 2024-05-28 ENCOUNTER — EVALUATION (OUTPATIENT)
Dept: PHYSICAL THERAPY | Facility: REHABILITATION | Age: 79
End: 2024-05-28
Payer: MEDICARE

## 2024-05-28 DIAGNOSIS — M25.672 ANKLE STIFFNESS, LEFT: ICD-10-CM

## 2024-05-28 DIAGNOSIS — R29.898 ANKLE WEAKNESS: ICD-10-CM

## 2024-05-28 DIAGNOSIS — S82.62XA CLOSED AVULSION FRACTURE OF LATERAL MALLEOLUS OF LEFT FIBULA, INITIAL ENCOUNTER: Primary | ICD-10-CM

## 2024-05-28 DIAGNOSIS — M25.472 LEFT ANKLE SWELLING: ICD-10-CM

## 2024-05-28 DIAGNOSIS — M25.572 ACUTE LEFT ANKLE PAIN: ICD-10-CM

## 2024-05-28 PROCEDURE — 97164 PT RE-EVAL EST PLAN CARE: CPT

## 2024-05-28 PROCEDURE — 97530 THERAPEUTIC ACTIVITIES: CPT

## 2024-05-28 PROCEDURE — 97110 THERAPEUTIC EXERCISES: CPT

## 2024-05-28 NOTE — TELEPHONE ENCOUNTER
Patient reports 0 improvement 2 wk post inj , patient states it is getting worse, she is having more and more pain specifically nerve pain. She would like to know why is this happening?   Pain level 7/10

## 2024-05-28 NOTE — PROGRESS NOTES
"PT Re-Evaluation     Today's date: 2024  Patient name: Mireya Yi  : 1945  MRN: [id number]  Referring provider: Gabriel Kuo PA*  Dx:   Encounter Diagnosis     ICD-10-CM    1. Closed avulsion fracture of lateral malleolus of left fibula-nonacute  S82.62XA       2. Acute left ankle pain  M25.572       3. Left ankle swelling  M25.472       4. Ankle weakness  R29.898       5. Ankle stiffness, left  M25.672             Start Time: 1045  Stop Time: 1126  Total time in clinic (min): 41 minutes    Subjective: Pt reports minimal improvement with PT over past several weeks. \"All the pain is coming from my back down to my ankle.\" Pt no longer wearing soft ankle brace from ortho, will occasionally wrap with ace bandage. Pt did have recent injection in low back from pain management, \"every since the shot my pain is getting worse.\" Pt notes numbness/aches throughout LLE down to ankle.     Pain  Current: 6  Best: 6  Worst: 9       Objective: See treatment diary below    Active Range of Motion: Goniometric measurements revealed the following findings (in degrees).  Joint Motion Right:  Left:    Ankle Dorsiflexion WFL WFL*   Ankle Plantarflexion WFL WFL   Ankle Inversion WFL WFL   Ankle Eversion WFL WFL     Strength: MMT revealed the following findings.  Joint Motion Right:  Left:    Ankle Plantarflexion 5/5 3/5 (per HR test)   Ankle Dorsiflexion 5/5 5/5   Ankle Inversion 5/5 4/5   Ankle Eversion 5/5 4/5   *=indicates pain with testing    Additional Assessments:  Palpation: TTP along lateral ankle and gastroc  Observation: no visible swelling present at lateral ankle  Gait Pattern: WFL       Assessment: Mireya Yi is a 78 y.o. year old female with a referred dx of Closed avulsion fracture of lateral malleolus of left fibula-nonacute, acute left ankle pain, left ankle swelling, ankle weakness, and Ankle stiffness, left. Pt has demonstrated improvements in L ankle strength compared to strength testing at " evaluation. Pt continues to be limited by L ankle strength compared to R, and overall pain levels. Pt would continue to benefit from skilled OP PT to address these, and the below impairments in order to optimize outcomes and promote return to functional baseline. Pt scheduled to f/u with spine & pain physician's office for c/o LLE and low back complaints.     Impairments: abnormal or restricted ROM, activity intolerance, impaired balance, impaired physical strength, lacks appropriate home exercise program, pain with function and poor body mechanics    Goals  Short Term Goals:  In 4 weeks, the patient will:  1. Decrease worst pain by 2 points for improved QOL. - not met  2. Improve HR strength by 1 grade for improved LE fx. - not met  3. Supervision with HEP for self care. - met    Long Term Goals:  In 8 weeks, the patient will:  1. Improve gastroc strength to 5/5 per HR test for improved LE fx.  - not met  2. FOTO to greater than predicted value. - met  3. Independent with comprehensive HEP upon discharge. - not met  4. Report less pain with getting up from chair following prolonged sitting for return to PLOF.  - not MET   5. Improve global ankle MMT strength by 1/2 grade for improved LE fx.  - partially met      Plan: Continue per plan of care.   Pt to f/u with spine and pain, will determine if future PT is needed regarding low back/LLE pain complaints.   Patient would benefit from: skilled physical therapy  Referral necessary: No  Planned modality interventions: cryotherapy  Planned therapy interventions: activity modification, ADL retraining, joint mobilization, manual therapy, neuromuscular re-education, patient education, postural training, strengthening, stretching, therapeutic activities, therapeutic exercise, home exercise program, graded exercise, functional ROM exercises, flexibility, body mechanics training and balance  Frequency: 1x week (per pt request, despite recommendation for 2x/week from  "PT)  Duration in weeks: 6  Plan of Care beginning date: 5/28/24  Plan of Care expiration date: 7/9/24  Treatment plan discussed with: patient     POC expires Unit limit Auth Expiration date PT/OT + Visit Limit?   7/9/24 N/a  5/21/24 BOMN     Visit/Unit Tracking  AUTH Status:  Date 4/23 4/30 5/7 5/13 5/21 5/28   6 visits 4/23-5/21/24 Used 1 2 3 4 5 6    Remaining  5 4 3 2 1 0*      HEP access code: [email or web address]  Pertinent Findings:      Test / Measure  4/23/2024    FOTO (Predicted 49) 33 MET, d/c (5/7)   HR test 3/5 strength    DF AROM Painful end-range      Precautions: asthma, vertigo, HTN, OA, and Fibromyalgia     VISIT 1 2 3 4 5 6   Manuals 4/22 4/30 5/7 5/13 5/21 5/28   L ankle PROM   CM 8' KP 8'                                Neuro Re-Ed         Biodex   RC, LOS x2 ea, lvl 8 RC, LOS 2x ea L8, 1' ea RC     Static balance         Ecc HR                                             Ther Ex         NS  10' L5 10' L5 10' L5 10' L5 10' L5   HR X10 B/L HEP 2x10 B/L 2x10  20x 20x 3x10 off foam     AROM Inv/lasha X10 ea HEP        LS towel gastroc stretch 30\"x3 HEP        Tband ankle 4-way  X20 otb  2x10 otb 20x ea gtb 20x ea gtb 20x ea gtb   TR  3x10 2x10 20x 20x X20 off foam    Leg press HR   2x15 B/L 25#       Prostretch  30\"x3 nv 4x20\" 4x20\" nv            Ther Activity         FSU on foam    2x10 6\" + foam 2x10 6\" + foam nv   LSU on foam    2x10 6\" + foam 2x10 6\" + foam nv   Side step on foam  2x2 laps X4 laps  5 laps airex 5 laps airex 5 laps airex   Tandem walk     5 laps airex 5 laps airex 5 laps airex            Gait Training                           Modalities                                  "

## 2024-05-30 ENCOUNTER — OFFICE VISIT (OUTPATIENT)
Dept: MULTI SPECIALTY CLINIC | Facility: CLINIC | Age: 79
End: 2024-05-30

## 2024-05-30 VITALS
BODY MASS INDEX: 24.48 KG/M2 | WEIGHT: 133 LBS | DIASTOLIC BLOOD PRESSURE: 81 MMHG | RESPIRATION RATE: 18 BRPM | HEIGHT: 62 IN | TEMPERATURE: 98 F | SYSTOLIC BLOOD PRESSURE: 134 MMHG | HEART RATE: 84 BPM | OXYGEN SATURATION: 97 %

## 2024-05-30 DIAGNOSIS — J32.9 RECURRENT SINUSITIS: ICD-10-CM

## 2024-05-30 DIAGNOSIS — J30.9 CHRONIC ALLERGIC RHINITIS: Primary | ICD-10-CM

## 2024-05-30 NOTE — PROGRESS NOTES
Otolaryngology-- Head and Neck Surgery Follow up visit      Follow up:  Presents today for follow up. Here to review CT sinus. Recurrent sinusitis. Needs course of antibiotics every other month. Taking flonase, atrovent and loratadine daily. Hx of asthma and has frequent exacerbations from recurrent sinus infections. Was admitted 3 times over the past 3 years due to pneumonia and sepsis.   No recent sinus imagining. No previous nasal surgeries. No other ENT complaints.          Review of any relevant imaging:  CT sinus 4/22/4: MPRESSION:     Small retention cyst in the left sphenoid sinus and minimal right ethmoidal mucosal thickening; otherwise, clear paranasal sinuses.    Interval Review of systems: Pertinent review of systems documented in the HPI.    Interval Social History:  Social History     Socioeconomic History    Marital status:      Spouse name: Not on file    Number of children: Not on file    Years of education: Not on file    Highest education level: Not on file   Occupational History    Occupation: Unemployed    Tobacco Use    Smoking status: Former     Current packs/day: 0.50     Types: Cigarettes    Smokeless tobacco: Never    Tobacco comments:     Stopped smoking July 2023   Vaping Use    Vaping status: Never Used   Substance and Sexual Activity    Alcohol use: Not Currently    Drug use: No    Sexual activity: Not Currently     Partners: Male   Other Topics Concern    Not on file   Social History Narrative    Mental Disability     Exercising Regularly     Lives with adult children      Social Determinants of Health     Financial Resource Strain: Low Risk  (5/17/2024)    Overall Financial Resource Strain (CARDIA)     Difficulty of Paying Living Expenses: Not very hard   Food Insecurity: No Food Insecurity (5/17/2024)    Hunger Vital Sign     Worried About Running Out of Food in the Last Year: Never true     Ran Out of Food in the Last Year: Never true   Transportation Needs: No Transportation  "Needs (5/17/2024)    PRAPARE - Transportation     Lack of Transportation (Medical): No     Lack of Transportation (Non-Medical): No   Physical Activity: Inactive (11/17/2023)    Exercise Vital Sign     Days of Exercise per Week: 0 days     Minutes of Exercise per Session: 0 min   Stress: Stress Concern Present (3/21/2024)    [facility] [place] [facility] Occupational Health - Occupational Stress Questionnaire     Feeling of Stress : To some extent   Social Connections: Low Risk (12/21/2023)    [facility]    Social Connections     How often do you feel isolated from others?: Hardly ever   Recent Concern: Social Connections - Socially Isolated (11/17/2023)    Social Connection and Isolation Panel [NHANES]     Frequency of Communication with Friends and Family: More than three times a week     Frequency of Social Gatherings with Friends and Family: More than three times a week     Attends [employer] Services: Never     Active Member of Clubs or Organizations: No     Attends Club or Organization Meetings: Never     Marital Status: Never    Intimate Partner Violence: Not At Risk (3/21/2024)    Humiliation, Afraid, Rape, and Kick questionnaire     Fear of Current or Ex-Partner: No     Emotionally Abused: No     Physically Abused: No     Sexually Abused: No   Housing Stability: Low Risk  (5/17/2024)    Housing Stability Vital Sign     Unable to Pay for Housing in the Last Year: No     Number of Times Moved in the Last Year: 1     Homeless in the Last Year: No       Interval Physical Examination:  /81 (BP Location: Left arm, Patient Position: Sitting, Cuff Size: Standard)   Pulse 84   Temp 98 °F (36.7 °C) (Temporal)   Resp 18   Ht 5' 2\" (1.575 m)   Wt 60.3 kg (133 lb)   SpO2 97%   BMI 24.33 kg/m²     Bilateral turbinate hypertrophy  DNS    Procedure:      Assessment:  1. Chronic allergic rhinitis        2. Recurrent sinusitis            Plan:  On " exam, bilateral turbinate hypertrophy, DNS     Discussed options such as bilateral turbinate reduction vs neuromark. Will reach out to surgery scheduler to see if this would be covered in the OR.      Scribe Attestation      I,:  Lakshmi Scott PA-C am acting as a scribe while in the presence of the attending physician.:       I,:  Aarti Villagomez MD personally performed the services described in this documentation    as scribed in my presence.:

## 2024-06-05 ENCOUNTER — OFFICE VISIT (OUTPATIENT)
Dept: PULMONOLOGY | Facility: CLINIC | Age: 79
End: 2024-06-05
Payer: MEDICARE

## 2024-06-05 ENCOUNTER — NURSE TRIAGE (OUTPATIENT)
Age: 79
End: 2024-06-05

## 2024-06-05 VITALS
TEMPERATURE: 98.3 F | DIASTOLIC BLOOD PRESSURE: 78 MMHG | SYSTOLIC BLOOD PRESSURE: 130 MMHG | BODY MASS INDEX: 24.48 KG/M2 | WEIGHT: 133 LBS | OXYGEN SATURATION: 97 % | HEIGHT: 62 IN | HEART RATE: 101 BPM

## 2024-06-05 DIAGNOSIS — R91.1 LUNG NODULE: ICD-10-CM

## 2024-06-05 DIAGNOSIS — J30.9 CHRONIC ALLERGIC RHINITIS: Primary | ICD-10-CM

## 2024-06-05 DIAGNOSIS — J43.9 PULMONARY EMPHYSEMA, UNSPECIFIED EMPHYSEMA TYPE (HCC): ICD-10-CM

## 2024-06-05 PROCEDURE — 99214 OFFICE O/P EST MOD 30 MIN: CPT | Performed by: INTERNAL MEDICINE

## 2024-06-05 RX ORDER — OXYMETAZOLINE HYDROCHLORIDE 0.05 G/100ML
2 SPRAY NASAL 2 TIMES DAILY
Qty: 6 ML | Refills: 1 | Status: SHIPPED | OUTPATIENT
Start: 2024-06-05

## 2024-06-05 RX ORDER — IPRATROPIUM BROMIDE 21 UG/1
2 SPRAY, METERED NASAL EVERY 12 HOURS
Qty: 30 ML | Refills: 2 | Status: SHIPPED | OUTPATIENT
Start: 2024-06-05

## 2024-06-05 NOTE — PROGRESS NOTES
Pulmonary Follow Up Note   Mireya Yi 79 y.o. female MRN: [id number]  6/5/2024      Assessment/Plan:    Diagnoses and all orders for this visit:    Chronic allergic rhinitis  Patient reporting worsening symptoms over the past 2 weeks, especially in the past 3 to 4 days.  Increased congestion, rhinorrhea, and postnasal drip.  States that her current medications have been helping.  At this time, will start Afrin, continue salt water rinses, and start ipratropium.  Patient has follow-up with ENT for possible sinus surgery.  Encouraged to reach out to ENT.    -     ipratropium (ATROVENT) 0.03 % nasal spray; 2 sprays into each nostril every 12 (twelve) hours  -     XR chest pa & lateral; Future  -     oxymetazoline (AFRIN) 0.05 % nasal spray; 2 sprays by Each Nare route 2 (two) times a day    Pulmonary emphysema, unspecified emphysema type (HCC)  Mild obstructive disease seen on PFTs.  Continue with current Symbicort and albuterol.    Lung nodule  Patient has a history of a 3 mm right upper lobe nodule.  Follow-up with noncontrast CT in July 2024.      Return in about 3 months (around 9/5/2024) for Next scheduled follow up.    History of Present Illness   HPI:  Mireya Yi is a 79 y.o. female with a PMHx of tobacco abuse in remission, asthma/COPD (diagnosed at age 41), chronic sinusitis, pulmonary nodule, and GERD who presents as a same-day visit for worsening cough, shortness of breath, and rhinorrhea.    Patient reports that her cough and PND started about 2 weeks ago. It is dry in nature. Nothing helps it. Currently working with ENT for scheduling her nasal sinus surgery. States that she is not using her albuterol inhaler. Reports post-tussive nausea. Denies sick contacts.    20-30-pack-year history.  Personal history of COPD/emphysema.  No family history.  No pets.  No occupational exposures.  No down pillows or comforters at home.    Labs personally reviewed.    Imaging personally reviewed.    Review of Systems    Constitutional:  Negative for chills.   HENT:  Positive for congestion, postnasal drip and rhinorrhea.    Respiratory:  Positive for cough and shortness of breath.    Gastrointestinal:  Negative for abdominal distention and abdominal pain.   Genitourinary:  Negative for dysuria.   Musculoskeletal:  Negative for back pain.   Skin:  Negative for rash.   Neurological:  Negative for dizziness and headaches.   Psychiatric/Behavioral:  Negative for agitation and confusion.        Historical Information   Past Medical History:   Diagnosis Date    Asthma     Asthmatic bronchitis     Last Assessed: 10/7/2014     Chronic diarrhea     Last Assessed: 8/20/2015     Fibromyalgia     Focal nodular hyperplasia of liver     Last Assessed: 6/11/2015    Herpes zoster     Last Assessed: 3/18/2016    Hypertension     IBS (irritable bowel syndrome)     Intermittent palpitations     Irritable bowel syndrome     Lumbar radiculopathy     Osteoarthritis     Vertigo      Past Surgical History:   Procedure Laterality Date    BREAST BIOPSY      SMALL INTESTINE SURGERY       Family History   Problem Relation Age of Onset    Heart attack Mother     Other Mother         Aspiration of Vomit     Asthma Father     Breast cancer Sister     Arthritis Family     Other Family         Musculoskeletal Disease     Osteoporosis Family      Social History     Tobacco Use   Smoking Status Former    Current packs/day: 0.50    Types: Cigarettes   Smokeless Tobacco Never   Tobacco Comments    Stopped smoking July 2023       Meds/Allergies     Current Outpatient Medications:     acetaminophen (TYLENOL) 500 mg tablet, Take 1 tablet (500 mg total) by mouth every 6 (six) hours as needed for mild pain, Disp: 60 tablet, Rfl: 0    albuterol (PROVENTIL HFA,VENTOLIN HFA) 90 mcg/act inhaler, Inhale 2 puffs every 6 (six) hours as needed for wheezing or shortness of breath, Disp: 18 g, Rfl: 5    amLODIPine (NORVASC) 5 mg tablet, Take 1 tablet (5 mg total) by mouth daily,  Disp: 90 tablet, Rfl: 2    Azelastine HCl 137 MCG/SPRAY SOLN, 2 sprays into each nostril 2 (two) times a day, Disp: 30 mL, Rfl: 1    budesonide-formoterol (Symbicort) 80-4.5 MCG/ACT inhaler, Inhale 2 puffs 2 (two) times a day Rinse mouth after use, Disp: 10.2 g, Rfl: 5    Cholecalciferol (D3-1000) 1,000 units tablet, Take 1 tablet (1,000 Units total) by mouth daily, Disp: 90 tablet, Rfl: 1    Diclofenac Sodium (VOLTAREN) 1 %, Apply 2 g topically 4 (four) times a day as needed (left ankle pain), Disp: 2 g, Rfl: 1    escitalopram (LEXAPRO) 10 mg tablet, Take 1 tablet (10 mg total) by mouth daily, Disp: 90 tablet, Rfl: 3    fexofenadine (ALLEGRA) 180 MG tablet, Take 180 mg by mouth daily, Disp: , Rfl:     fluticasone (FLONASE) 50 mcg/act nasal spray, 2 sprays into each nostril daily, Disp: 16 g, Rfl: 3    folic acid (FOLVITE) 1 mg tablet, TAKE 1 TABLET BY MOUTH EVERY DAY, Disp: 90 tablet, Rfl: 2    hydrocortisone (ANUSOL-HC) 2.5 % rectal cream, Apply topically 2 (two) times a day, Disp: 28 g, Rfl: 0    montelukast (SINGULAIR) 10 mg tablet, TAKE 1 TABLET BY MOUTH DAILY AT BEDTIME, Disp: 90 tablet, Rfl: 1    nicotine (NICODERM CQ) 14 mg/24hr TD 24 hr patch, Place 1 patch on the skin over 24 hours every 24 hours, Disp: 28 patch, Rfl: 1    olopatadine (PATANOL) 0.1 % ophthalmic solution, Administer 1 drop to both eyes 2 (two) times a day, Disp: 5 mL, Rfl: 2    ondansetron (Zofran ODT) 4 mg disintegrating tablet, Take 1 tablet (4 mg total) by mouth every 6 (six) hours as needed for nausea or vomiting, Disp: 20 tablet, Rfl: 3    pantoprazole (PROTONIX) 20 mg tablet, Take 1 tablet (20 mg total) by mouth daily, Disp: 90 tablet, Rfl: 1    albuterol (2.5 mg/3 mL) 0.083 % nebulizer solution, Take 3 mL (2.5 mg total) by nebulization every 4 (four) hours as needed for wheezing or shortness of breath (Patient not taking: Reported on 5/15/2024), Disp: 90 mL, Rfl: 5  Allergies   Allergen Reactions    Ambrosia Artemisiifolia (Ragweed)  "Skin Test Facial Swelling    Codeine Other (See Comments)     Heart races    Epinephrine      Pt reports \"it makes my heart race, they told me I cant take it.\"    Ibuprofen Other (See Comments)     Heart racing    Morphine Other (See Comments)     Heart racing    Pollen Extract Sneezing    Tramadol Other (See Comments)     Heart racing       Vitals: Blood pressure 130/78, pulse 101, temperature 98.3 °F (36.8 °C), temperature source Tympanic, height 5' 2\" (1.575 m), weight 60.3 kg (133 lb), SpO2 97%. Body mass index is 24.33 kg/m². Oxygen Therapy  SpO2: 97 %  Oxygen Therapy: None (Room air)      Physical Exam  Physical Exam  Vitals reviewed.   Constitutional:       General: She is not in acute distress.  HENT:      Head: Normocephalic and atraumatic.      Right Ear: External ear normal.      Left Ear: External ear normal.      Nose: Congestion present.      Mouth/Throat:      Pharynx: Oropharyngeal exudate present.   Eyes:      Conjunctiva/sclera: Conjunctivae normal.      Pupils: Pupils are equal, round, and reactive to light.   Cardiovascular:      Rate and Rhythm: Normal rate and regular rhythm.      Pulses: Normal pulses.      Heart sounds: Normal heart sounds.   Pulmonary:      Effort: Pulmonary effort is normal.      Breath sounds: Rhonchi present.   Abdominal:      General: Abdomen is flat. Bowel sounds are normal.   Musculoskeletal:      Right lower leg: No edema.      Left lower leg: No edema.   Skin:     General: Skin is warm and dry.      Capillary Refill: Capillary refill takes less than 2 seconds.   Neurological:      General: No focal deficit present.      Mental Status: She is alert and oriented to person, place, and time.   Psychiatric:         Mood and Affect: Mood normal.         Behavior: Behavior normal.         Labs: I have personally reviewed pertinent lab results.  Lab Results   Component Value Date    WBC 12.86 (H) 03/28/2024    HGB 11.9 03/28/2024    HCT 39.2 03/28/2024    MCV 87 03/28/2024 " "    03/28/2024     Lab Results   Component Value Date    GLUCOSE 92 05/19/2015    CALCIUM 8.6 05/14/2024     05/19/2015    K 3.6 05/14/2024    CO2 23 05/14/2024     (H) 05/14/2024    BUN 10 05/14/2024    CREATININE 1.00 05/14/2024     No results found for: \"IGE\"  Lab Results   Component Value Date    ALT 15 07/28/2023    AST 10 07/28/2023    ALKPHOS 80 07/28/2023    BILITOT 0.20 05/19/2015     Preventive   Influenza vaccine: Out of season  COVID-19 vaccination: Out of season  Pneumonia vaccine: 11/2023  Lung Cancer screening: Due in July 2024    Imaging and other studies: I have personally reviewed pertinent reports.    No results found.      Pulmonary function testing:     Date test performed: 9/11/2023     Date of test interpretation: 9/12/2023     Requesting Physician: Nany Carlson     Reason for Testing: Severe persistent asthma, unspecified     Respiratory technician Comments: Good patient effort & cooperation. The results of this test meet the ATS standards for acceptability and repeatability. Nebulizer given with 2.5 mg Albuterol (0.083%). SpO2 on RA 96%     Reference set for interpretation: [id number]     Procedure: The patient was taken to pulmonary function testing laboratory.  The patient demonstrated good effort and cooperation.  The results of this test meet ATS standards for acceptability and repeatability.       Post bronchodilator testing performed after the administration of 2.5mg albuterol in 3cc normal saline administered via nebulizer per bronchodilator protocol.     DLCO was corrected for patient's Hb     Results:     FEV1/FVC Ratio: 56.74%  Forced Vital Capacity: 2.65 L    108% predicted  FEV1: 1.50 L79% predicted        After administration of bronchodilator      FVC: 2.66 L, 108% predicted, +0 % change  FEV1: 1.58 L, 83% predicted, +5 % change     Lung volumes by body plethysmography:      Total Lung Capacity 98% predicted   Residual volume 95% predicted  RV/TLC Ratio: " "45.53%, 98% predicted     DLCO corrected for patients hemoglobin level: 72%     Interpretation:     Mild obstructive airflow defect on spirometry     No significant improvement in airflow or forced vital capacity in response to the administration to bronchodilator per ATS standards.      Normal lung volumes     Normal Diffusion     Flow-volume loop consistent with obstruction    EKG, Pathology, and Other Studies: I have personally reviewed pertinent reports.      Disclaimer: Portions of the record may have been created with voice recognition software. Occasional wrong word or \"sound a like\" substitutions may have occurred due to the inherent limitations of voice recognition software. Careful consideration should be taken to recognize, using context, where substitutions have occurred.    Blue Gauthier DO, MS  Pulmonary & Critical Care Medicine Fellow PGY-IV  [facility]    "

## 2024-06-05 NOTE — TELEPHONE ENCOUNTER
Regarding: wheeze,congestion  ----- Message from Yael AVILES sent at 6/5/2024  2:33 PM EDT -----  Patient called asking to schedule a follow up visit, she was last seen on 5/15 and is due back in 4-6 months. Patient is having very bad nasal congestion that is making her face hurt, feels it is going down into her chest and beginning to wheeze. Please advise

## 2024-06-05 NOTE — TELEPHONE ENCOUNTER
"Patient call:  Pt stated provider: Dr. Grimes    Actionable item: Appointment scheduled    What is the reason for the call/chief complaint?    Reports progressively worsening dry cough, post nasal drip, maxillary sinus pain, and chest heaviness all as of several days ago.  Denies fever or other symptoms.   Minimal efficacy with inhaler for respiratory symptoms.       Dispo: Appointment scheduled for today. Continue regime at the moment and Dr. Gauthier will make recommendations at the office visit.  Informed to call [facility] with worsening symptoms.  Agrees with plan.   All questions answered.     Reason for Disposition   MILD difficulty breathing (e.g., minimal/no SOB at rest, SOB with walking, pulse <100) and still present when not coughing    Answer Assessment - Initial Assessment Questions  1. ONSET: \"When did the cough begin?\"       Few days ago   2. SEVERITY: \"How bad is the cough today?\"       Worsening  3. SPUTUM: \"Describe the color of your sputum\" (none, dry cough; clear, white, yellow, green)      Clear but feeling Post nasal drip  4. HEMOPTYSIS: \"Are you coughing up any blood?\" If so ask: \"How much?\" (flecks, streaks, tablespoons, etc.)      denies  5. DIFFICULTY BREATHING: \"Are you having difficulty breathing?\" If Yes, ask: \"How bad is it?\" (e.g., mild, moderate, severe)     - MILD: No SOB at rest, mild SOB with walking, speaks normally in sentences, can lay down, no retractions, pulse < 100.     - MODERATE: SOB at rest, SOB with minimal exertion and prefers to sit, cannot lie down flat, speaks in phrases, mild retractions, audible wheezing, pulse 100-120.     - SEVERE: Very SOB at rest, speaks in single words, struggling to breathe, sitting hunched forward, retractions, pulse > 120       \"Heavy\" at rest  6. FEVER: \"Do you have a fever?\" If Yes, ask: \"What is your temperature, how was it measured, and when did it start?\"      denies  7. CARDIAC HISTORY: \"Do you have any history of heart disease?\" (e.g., " "heart attack, congestive heart failure)       denies  8. LUNG HISTORY: \"Do you have any history of lung disease?\"  (e.g., pulmonary embolus, asthma, emphysema)      asthma  9. PE RISK FACTORS: \"Do you have a history of blood clots?\" (or: recent major surgery, recent prolonged travel, bedridden)      -  10. OTHER SYMPTOMS: \"Do you have any other symptoms?\" (e.g., runny nose, wheezing, chest pain)        Cough, heavy breathing, PND, facial pain    Protocols used: Cough-ADULT-[place]    "

## 2024-06-07 ENCOUNTER — OFFICE VISIT (OUTPATIENT)
Dept: FAMILY MEDICINE CLINIC | Facility: CLINIC | Age: 79
End: 2024-06-07

## 2024-06-07 VITALS
DIASTOLIC BLOOD PRESSURE: 86 MMHG | HEART RATE: 103 BPM | HEIGHT: 62 IN | SYSTOLIC BLOOD PRESSURE: 145 MMHG | RESPIRATION RATE: 18 BRPM | OXYGEN SATURATION: 97 % | TEMPERATURE: 98.7 F | BODY MASS INDEX: 24.51 KG/M2 | WEIGHT: 133.2 LBS

## 2024-06-07 DIAGNOSIS — K21.9 GASTROESOPHAGEAL REFLUX DISEASE WITHOUT ESOPHAGITIS: ICD-10-CM

## 2024-06-07 DIAGNOSIS — R05.1 ACUTE COUGH: ICD-10-CM

## 2024-06-07 DIAGNOSIS — R10.11 RUQ PAIN: Primary | ICD-10-CM

## 2024-06-07 PROCEDURE — G2211 COMPLEX E/M VISIT ADD ON: HCPCS | Performed by: FAMILY MEDICINE

## 2024-06-07 PROCEDURE — 3077F SYST BP >= 140 MM HG: CPT | Performed by: FAMILY MEDICINE

## 2024-06-07 PROCEDURE — 99213 OFFICE O/P EST LOW 20 MIN: CPT | Performed by: FAMILY MEDICINE

## 2024-06-07 PROCEDURE — 3079F DIAST BP 80-89 MM HG: CPT | Performed by: FAMILY MEDICINE

## 2024-06-07 RX ORDER — ONDANSETRON 4 MG/1
4 TABLET, ORALLY DISINTEGRATING ORAL EVERY 6 HOURS PRN
Qty: 20 TABLET | Refills: 3 | Status: SHIPPED | OUTPATIENT
Start: 2024-06-07

## 2024-06-07 NOTE — ASSESSMENT & PLAN NOTE
Few day hx of RUQ pressure intermittently with associated nausea and alternating diarrhea and constipation. Hx of IBS. RUQ pain to palpation of abdomen without rebound tenderness. Low suspicion for diverticulitis or appendicitis given afebrile and hx of symptoms. Do not suspect MSK source of pain given hx and physical exam findings. May be 2/2 viral gastritis vs IBS vs gallbladder dysfunction. Will order RUQ ultrasound. Will ordered CMP and CMP for further evaluation. ED precautions reviewed. Will follow up in 1 week or sooner as needed.

## 2024-06-07 NOTE — ASSESSMENT & PLAN NOTE
1 week worsening cough in setting of pt with hx of asthma and chronic sinusitis recently stopped smoking. Not in acute asthma exacerbation at this time. Denies GERD symptoms. Afebrile breathing comfortably on RA, lungs clear to ausculation. Likely 2/2 post nasal drip and chronic congestion and sinus issues. Recommend continued intranasal steroid, azelastine, nasal saline rinses. Albuterol inhaler prn. Return to care precautions reviewed. Follows with ENT for sinus issues. Will follow up in  one week.

## 2024-06-07 NOTE — PROGRESS NOTES
Ambulatory Visit  Name: Mireya Yi      : 1945      MRN: [id number]  Encounter Provider: Elham Saleem MD  Encounter Date: 2024   Encounter department: [facility]    Assessment & Plan   1. RUQ pain  Assessment & Plan:  Few day hx of RUQ pressure intermittently with associated nausea and alternating diarrhea and constipation. Hx of IBS. RUQ pain to palpation of abdomen without rebound tenderness. Low suspicion for diverticulitis or appendicitis given afebrile and hx of symptoms. Do not suspect MSK source of pain given hx and physical exam findings. May be 2/2 viral gastritis vs IBS vs gallbladder dysfunction. Will order RUQ ultrasound. Will ordered CMP and CMP for further evaluation. ED precautions reviewed. Will follow up in 1 week or sooner as needed.  Orders:  -     US right upper quadrant with liver dopplers; Future; Expected date: 2024  -     CBC and differential; Future  -     Comprehensive metabolic panel; Future  -     Lipase; Future  2. Acute cough  Assessment & Plan:  1 week worsening cough in setting of pt with hx of asthma and chronic sinusitis recently stopped smoking. Not in acute asthma exacerbation at this time. Denies GERD symptoms. Afebrile breathing comfortably on RA, lungs clear to ausculation. Likely 2/2 post nasal drip and chronic congestion and sinus issues. Recommend continued intranasal steroid, azelastine, nasal saline rinses. Albuterol inhaler prn. Return to care precautions reviewed. Follows with ENT for sinus issues. Will follow up in  one week.   3. Gastroesophageal reflux disease without esophagitis  -     ondansetron (Zofran ODT) 4 mg disintegrating tablet; Take 1 tablet (4 mg total) by mouth every 6 (six) hours as needed for nausea or vomiting       History of Present Illness     Ms. Yi presents to clinic due to concern for cough. She states her cough has been worsening for about the past week. Non productive  "cough. Denies fevers, chills, SOB, chest pain, hemoptysis. Has not been requiring rescue inhaler at all. States the congestion causes her nausea.     Also reports pain in her stomach that began a few days ago. She states pain is intermittent and described as pressure located on her right upper abdomen. States alternating between diarrhea and constipation. Denies changes in activity.  Denies dysuria, hematuria, urinary urgency or frequency, recent sick contacts, hematochezia, melena. Endorses nausea w/o emesis. States zofran helps with nausea.           Review of Systems   Constitutional:  Negative for chills and fever.   HENT:  Positive for congestion (chronic) and rhinorrhea (chronic). Negative for sore throat.    Eyes:  Negative for redness and visual disturbance.   Respiratory:  Positive for cough. Negative for shortness of breath.    Cardiovascular:  Negative for chest pain and palpitations.   Gastrointestinal:  Positive for abdominal pain, constipation, diarrhea and nausea. Negative for blood in stool and vomiting.   Genitourinary:  Negative for difficulty urinating, dysuria, frequency, hematuria and urgency.   Musculoskeletal:  Positive for myalgias (chronic). Negative for arthralgias (chronic).   Skin:  Negative for rash.   Allergic/Immunologic: Positive for environmental allergies.   Neurological:  Negative for dizziness and headaches.       Objective     /86 (BP Location: Left arm, Patient Position: Sitting, Cuff Size: Standard)   Pulse 103   Temp 98.7 °F (37.1 °C) (Temporal)   Resp 18   Ht 5' 2\" (1.575 m)   Wt 60.4 kg (133 lb 3.2 oz)   SpO2 97%   BMI 24.36 kg/m²     Physical Exam  Vitals reviewed.   Constitutional:       General: She is not in acute distress.     Appearance: Normal appearance.   HENT:      Head: Normocephalic and atraumatic.   Cardiovascular:      Rate and Rhythm: Normal rate and regular rhythm.      Pulses: Normal pulses.      Heart sounds: Normal heart sounds. No murmur " heard.  Pulmonary:      Effort: Pulmonary effort is normal. No respiratory distress.      Breath sounds: Normal breath sounds.   Abdominal:      General: Bowel sounds are normal. There is no distension.      Palpations: Abdomen is soft. There is no mass.      Tenderness: There is abdominal tenderness (RUQ). There is no guarding or rebound.      Hernia: No hernia is present.   Musculoskeletal:         General: Normal range of motion.      Cervical back: Normal range of motion.      Right lower leg: No edema.      Left lower leg: No edema.   Skin:     General: Skin is warm.   Neurological:      Mental Status: She is alert and oriented to person, place, and time.       Administrative Statements

## 2024-06-10 ENCOUNTER — APPOINTMENT (OUTPATIENT)
Dept: LAB | Facility: CLINIC | Age: 79
End: 2024-06-10
Payer: MEDICARE

## 2024-06-10 ENCOUNTER — HOSPITAL ENCOUNTER (OUTPATIENT)
Dept: RADIOLOGY | Facility: HOSPITAL | Age: 79
Discharge: HOME | End: 2024-06-10
Payer: MEDICARE

## 2024-06-10 DIAGNOSIS — J30.9 CHRONIC ALLERGIC RHINITIS: ICD-10-CM

## 2024-06-10 DIAGNOSIS — R10.11 RUQ PAIN: ICD-10-CM

## 2024-06-10 LAB
ALBUMIN SERPL BCP-MCNC: 3.4 G/DL (ref 3.5–5)
ALP SERPL-CCNC: 74 U/L (ref 34–104)
ALT SERPL W P-5'-P-CCNC: 8 U/L (ref 7–52)
ANION GAP SERPL CALCULATED.3IONS-SCNC: 8 MMOL/L (ref 4–13)
AST SERPL W P-5'-P-CCNC: 10 U/L (ref 13–39)
BASOPHILS # BLD AUTO: 0.06 THOUSANDS/ÂΜL (ref 0–0.1)
BASOPHILS NFR BLD AUTO: 1 % (ref 0–1)
BILIRUB SERPL-MCNC: 0.22 MG/DL (ref 0.2–1)
BUN SERPL-MCNC: 12 MG/DL (ref 5–25)
CALCIUM ALBUM COR SERPL-MCNC: 9.1 MG/DL (ref 8.3–10.1)
CALCIUM SERPL-MCNC: 8.6 MG/DL (ref 8.4–10.2)
CHLORIDE SERPL-SCNC: 112 MMOL/L (ref 96–108)
CO2 SERPL-SCNC: 22 MMOL/L (ref 21–32)
CREAT SERPL-MCNC: 1.17 MG/DL (ref 0.6–1.3)
EOSINOPHIL # BLD AUTO: 0.7 THOUSAND/ÂΜL (ref 0–0.61)
EOSINOPHIL NFR BLD AUTO: 8 % (ref 0–6)
ERYTHROCYTE [DISTWIDTH] IN BLOOD BY AUTOMATED COUNT: 17.1 % (ref 11.6–15.1)
GFR SERPL CREATININE-BSD FRML MDRD: 44 ML/MIN/1.73SQ M
GLUCOSE P FAST SERPL-MCNC: 87 MG/DL (ref 65–99)
HCT VFR BLD AUTO: 37 % (ref 34.8–46.1)
HGB BLD-MCNC: 11.2 G/DL (ref 11.5–15.4)
IMM GRANULOCYTES # BLD AUTO: 0.02 THOUSAND/UL (ref 0–0.2)
IMM GRANULOCYTES NFR BLD AUTO: 0 % (ref 0–2)
LIPASE SERPL-CCNC: 41 U/L (ref 11–82)
LYMPHOCYTES # BLD AUTO: 2.36 THOUSANDS/ÂΜL (ref 0.6–4.47)
LYMPHOCYTES NFR BLD AUTO: 25 % (ref 14–44)
MCH RBC QN AUTO: 26 PG (ref 26.8–34.3)
MCHC RBC AUTO-ENTMCNC: 30.3 G/DL (ref 31.4–37.4)
MCV RBC AUTO: 86 FL (ref 82–98)
MONOCYTES # BLD AUTO: 0.7 THOUSAND/ÂΜL (ref 0.17–1.22)
MONOCYTES NFR BLD AUTO: 8 % (ref 4–12)
NEUTROPHILS # BLD AUTO: 5.5 THOUSANDS/ÂΜL (ref 1.85–7.62)
NEUTS SEG NFR BLD AUTO: 58 % (ref 43–75)
NRBC BLD AUTO-RTO: 0 /100 WBCS
PLATELET # BLD AUTO: 335 THOUSANDS/UL (ref 149–390)
PMV BLD AUTO: 11.6 FL (ref 8.9–12.7)
POTASSIUM SERPL-SCNC: 3.5 MMOL/L (ref 3.5–5.3)
PROT SERPL-MCNC: 6.1 G/DL (ref 6.4–8.4)
RBC # BLD AUTO: 4.31 MILLION/UL (ref 3.81–5.12)
SODIUM SERPL-SCNC: 142 MMOL/L (ref 135–147)
WBC # BLD AUTO: 9.34 THOUSAND/UL (ref 4.31–10.16)

## 2024-06-10 PROCEDURE — 83690 ASSAY OF LIPASE: CPT

## 2024-06-10 PROCEDURE — 71046 X-RAY EXAM CHEST 2 VIEWS: CPT

## 2024-06-10 PROCEDURE — 36415 COLL VENOUS BLD VENIPUNCTURE: CPT

## 2024-06-10 PROCEDURE — 85025 COMPLETE CBC W/AUTO DIFF WBC: CPT

## 2024-06-10 PROCEDURE — 80053 COMPREHEN METABOLIC PANEL: CPT

## 2024-06-10 NOTE — PROGRESS NOTES
Assessment:  1. Lumbar radiculopathy    2. Spinal stenosis of lumbar region with neurogenic claudication        Plan:  Patient will be scheduled for an L4-5 LESI to address the inflammatory component of the patient's pain.  Complete risks and benefits including bleeding, infection, tissue reaction, nerve injury and allergic reaction were discussed. The patient was agreeable and verbalized an understanding  Discussed gabapentin trial however patient declines at this time.  Will avoid NSAIDs secondary to CKD  Patient may continue Tylenol as needed  Continue with home exercise program  Follow-up after procedure or sooner if needed    History of Present Illness:    The patient is a 79 y.o. female with a history of CKD, hypertension and emphysema last seen on 04/29/2024  who presents for a follow up office visit in regards to chronic low back pain that radiates into the anterolateral aspect of the left lower extremity with associated pins-and-needles to the foot.  She denies right-sided symptoms, bowel or bladder incontinence or saddle anesthesia.  Patient is status post left L4 TFESI May 14, 2024 which provided slight relief for the first week but then pain returned to baseline.  She is unable to take NSAIDs secondary to CKD.    Patient rates her pain a 7 out of 10 on the numeric pain rating scale.  Pain is intermittent at night and is described as shooting an pins-and-needles    I have personally reviewed and/or updated the patient's past medical history, past surgical history, family history, social history, current medications, allergies, and vital signs today.       Review of Systems:    Review of Systems   Respiratory:  Negative for shortness of breath.    Cardiovascular:  Negative for chest pain.   Gastrointestinal:  Negative for constipation, diarrhea, nausea and vomiting.   Musculoskeletal:  Positive for back pain. Negative for arthralgias, gait problem, joint swelling and myalgias.   Skin:  Negative for rash.    Neurological:  Negative for dizziness, seizures and weakness.   All other systems reviewed and are negative.        Past Medical History:   Diagnosis Date    Asthma     Asthmatic bronchitis     Last Assessed: 10/7/2014     Chronic diarrhea     Last Assessed: 8/20/2015     Fibromyalgia     Focal nodular hyperplasia of liver     Last Assessed: 6/11/2015    Herpes zoster     Last Assessed: 3/18/2016    Hypertension     IBS (irritable bowel syndrome)     Intermittent palpitations     Irritable bowel syndrome     Lumbar radiculopathy     Osteoarthritis     Vertigo        Past Surgical History:   Procedure Laterality Date    BREAST BIOPSY      SMALL INTESTINE SURGERY         Family History   Problem Relation Age of Onset    Heart attack Mother     Other Mother         Aspiration of Vomit     Asthma Father     Breast cancer Sister     Arthritis Family     Other Family         Musculoskeletal Disease     Osteoporosis Family        Social History     Occupational History    Occupation: Unemployed    Tobacco Use    Smoking status: Former     Current packs/day: 0.50     Types: Cigarettes    Smokeless tobacco: Never    Tobacco comments:     Stopped smoking July 2023   Vaping Use    Vaping status: Never Used   Substance and Sexual Activity    Alcohol use: Not Currently    Drug use: No    Sexual activity: Not Currently     Partners: Male         Current Outpatient Medications:     albuterol (PROVENTIL HFA,VENTOLIN HFA) 90 mcg/act inhaler, Inhale 2 puffs every 6 (six) hours as needed for wheezing or shortness of breath, Disp: 18 g, Rfl: 5    amLODIPine (NORVASC) 5 mg tablet, Take 1 tablet (5 mg total) by mouth daily, Disp: 90 tablet, Rfl: 2    Azelastine HCl 137 MCG/SPRAY SOLN, 2 sprays into each nostril 2 (two) times a day, Disp: 30 mL, Rfl: 1    budesonide-formoterol (Symbicort) 80-4.5 MCG/ACT inhaler, Inhale 2 puffs 2 (two) times a day Rinse mouth after use, Disp: 10.2 g, Rfl: 5    Cholecalciferol (D3-1000) 1,000 units  tablet, Take 1 tablet (1,000 Units total) by mouth daily, Disp: 90 tablet, Rfl: 1    Diclofenac Sodium (VOLTAREN) 1 %, Apply 2 g topically 4 (four) times a day as needed (left ankle pain), Disp: 2 g, Rfl: 1    escitalopram (LEXAPRO) 10 mg tablet, Take 1 tablet (10 mg total) by mouth daily, Disp: 90 tablet, Rfl: 3    fexofenadine (ALLEGRA) 180 MG tablet, Take 180 mg by mouth daily, Disp: , Rfl:     hydrocortisone (ANUSOL-HC) 2.5 % rectal cream, Apply topically 2 (two) times a day, Disp: 28 g, Rfl: 0    ipratropium (ATROVENT) 0.03 % nasal spray, 2 sprays into each nostril every 12 (twelve) hours, Disp: 30 mL, Rfl: 2    montelukast (SINGULAIR) 10 mg tablet, TAKE 1 TABLET BY MOUTH DAILY AT BEDTIME, Disp: 90 tablet, Rfl: 1    nicotine (NICODERM CQ) 14 mg/24hr TD 24 hr patch, Place 1 patch on the skin over 24 hours every 24 hours, Disp: 28 patch, Rfl: 1    olopatadine (PATANOL) 0.1 % ophthalmic solution, Administer 1 drop to both eyes 2 (two) times a day, Disp: 5 mL, Rfl: 2    ondansetron (Zofran ODT) 4 mg disintegrating tablet, Take 1 tablet (4 mg total) by mouth every 6 (six) hours as needed for nausea or vomiting, Disp: 20 tablet, Rfl: 3    oxymetazoline (AFRIN) 0.05 % nasal spray, 2 sprays by Each Nare route 2 (two) times a day, Disp: 6 mL, Rfl: 1    pantoprazole (PROTONIX) 20 mg tablet, Take 1 tablet (20 mg total) by mouth daily, Disp: 90 tablet, Rfl: 1    acetaminophen (TYLENOL) 500 mg tablet, Take 1 tablet (500 mg total) by mouth every 6 (six) hours as needed for mild pain, Disp: 60 tablet, Rfl: 0    albuterol (2.5 mg/3 mL) 0.083 % nebulizer solution, Take 3 mL (2.5 mg total) by nebulization every 4 (four) hours as needed for wheezing or shortness of breath (Patient not taking: Reported on 6/7/2024), Disp: 90 mL, Rfl: 5    fluticasone (FLONASE) 50 mcg/act nasal spray, 2 sprays into each nostril daily, Disp: 16 g, Rfl: 3    folic acid (FOLVITE) 1 mg tablet, TAKE 1 TABLET BY MOUTH EVERY DAY, Disp: 90 tablet, Rfl:  "2    Allergies   Allergen Reactions    Ambrosia Artemisiifolia (Ragweed) Skin Test Facial Swelling    Codeine Other (See Comments)     Heart races    Epinephrine      Pt reports \"it makes my heart race, they told me I cant take it.\"    Ibuprofen Other (See Comments)     Heart racing    Morphine Other (See Comments)     Heart racing    Pollen Extract Sneezing    Tramadol Other (See Comments)     Heart racing       Physical Exam:    /89   Pulse 95   Ht 5' 2\" (1.575 m)   Wt 59.4 kg (131 lb)   BMI 23.96 kg/m²     Constitutional:normal, well developed, well nourished, alert, in no distress and non-toxic and no overt pain behavior.  Eyes:anicteric  HEENT:grossly intact  Neck:supple, symmetric, trachea midline and no masses   Pulmonary:even and unlabored  Cardiovascular:No edema or pitting edema present  Skin:Normal without rashes or lesions and well hydrated  Psychiatric:Mood and affect appropriate  Neurologic:Cranial Nerves II-XII grossly intact  Musculoskeletal: Slightly intelligent gait but steady without assistive devices.  Positive seated straight leg raise on the left      Imaging  FL spine and pain procedure    (Results Pending)         Orders Placed This Encounter   Procedures    FL spine and pain procedure       "

## 2024-06-11 ENCOUNTER — OFFICE VISIT (OUTPATIENT)
Dept: PAIN MEDICINE | Facility: CLINIC | Age: 79
End: 2024-06-11
Payer: MEDICARE

## 2024-06-11 ENCOUNTER — HOSPITAL ENCOUNTER (OUTPATIENT)
Dept: RADIOLOGY | Facility: HOSPITAL | Age: 79
Discharge: HOME | End: 2024-06-11
Payer: MEDICARE

## 2024-06-11 VITALS
HEIGHT: 62 IN | BODY MASS INDEX: 24.11 KG/M2 | HEART RATE: 95 BPM | WEIGHT: 131 LBS | SYSTOLIC BLOOD PRESSURE: 128 MMHG | DIASTOLIC BLOOD PRESSURE: 89 MMHG

## 2024-06-11 DIAGNOSIS — M48.062 SPINAL STENOSIS OF LUMBAR REGION WITH NEUROGENIC CLAUDICATION: ICD-10-CM

## 2024-06-11 DIAGNOSIS — R79.89 ELEVATED SERUM CREATININE: ICD-10-CM

## 2024-06-11 DIAGNOSIS — M54.16 LUMBAR RADICULOPATHY: Primary | ICD-10-CM

## 2024-06-11 PROCEDURE — 99214 OFFICE O/P EST MOD 30 MIN: CPT | Performed by: NURSE PRACTITIONER

## 2024-06-11 PROCEDURE — 76775 US EXAM ABDO BACK WALL LIM: CPT

## 2024-06-14 ENCOUNTER — OFFICE VISIT (OUTPATIENT)
Dept: FAMILY MEDICINE CLINIC | Facility: CLINIC | Age: 79
End: 2024-06-14

## 2024-06-14 VITALS
BODY MASS INDEX: 24.25 KG/M2 | DIASTOLIC BLOOD PRESSURE: 83 MMHG | HEART RATE: 98 BPM | OXYGEN SATURATION: 97 % | RESPIRATION RATE: 18 BRPM | HEIGHT: 62 IN | WEIGHT: 131.8 LBS | TEMPERATURE: 98.2 F | SYSTOLIC BLOOD PRESSURE: 143 MMHG

## 2024-06-14 DIAGNOSIS — K58.2 IRRITABLE BOWEL SYNDROME WITH BOTH CONSTIPATION AND DIARRHEA: ICD-10-CM

## 2024-06-14 DIAGNOSIS — R10.11 RUQ PAIN: Primary | ICD-10-CM

## 2024-06-14 PROCEDURE — 3077F SYST BP >= 140 MM HG: CPT | Performed by: FAMILY MEDICINE

## 2024-06-14 PROCEDURE — 99213 OFFICE O/P EST LOW 20 MIN: CPT | Performed by: FAMILY MEDICINE

## 2024-06-14 PROCEDURE — G2211 COMPLEX E/M VISIT ADD ON: HCPCS | Performed by: FAMILY MEDICINE

## 2024-06-14 PROCEDURE — 3079F DIAST BP 80-89 MM HG: CPT | Performed by: FAMILY MEDICINE

## 2024-06-14 RX ORDER — DICYCLOMINE HYDROCHLORIDE 10 MG/1
10 CAPSULE ORAL
Qty: 120 CAPSULE | Refills: 0 | Status: SHIPPED | OUTPATIENT
Start: 2024-06-14 | End: 2024-07-14

## 2024-06-14 NOTE — ASSESSMENT & PLAN NOTE
Improved. Reviewed lab work from 6/10/24 including lipase, cmp, and cbc. Results are wnl. Symptoms improved. Reports cramping with BM's likely in setting of known IBS dx. Will rx bentyl prn for cramping. Discussed keeping food and symptom diary. Will follow up in one month or sooner as needed. Reviewed ED precautions.

## 2024-06-14 NOTE — PROGRESS NOTES
Ambulatory Visit  Name: Mireya Yi      : 1945      MRN: [id number]  Encounter Provider: Elham Saleem MD  Encounter Date: 2024   Encounter department: [facility]    Assessment & Plan   1. RUQ pain  Assessment & Plan:  Improved. Reviewed lab work from 6/10/24 including lipase, cmp, and cbc. Results are wnl. Symptoms improved. Reports cramping with BM's likely in setting of known IBS dx. Will rx bentyl prn for cramping. Discussed keeping food and symptom diary. Will follow up in one month or sooner as needed. Reviewed ED precautions.  2. Irritable bowel syndrome with both constipation and diarrhea  -     dicyclomine (BENTYL) 10 mg capsule; Take 1 capsule (10 mg total) by mouth 4 (four) times a day (before meals and at bedtime)       History of Present Illness     Ms. Yi presents to clinic to follow up on RUQ abdominal pain. She states this pain has improved since last visit. Patient reports she's still has some cramping in her abdomen when having bowel movements. Patient reports she has alternating diarrhea and constipation for many years. Denies blood in stools or melena.         Review of Systems   Constitutional:  Negative for chills and fever.   HENT:  Negative for rhinorrhea and sore throat.    Eyes:  Negative for redness.   Respiratory:  Negative for cough and shortness of breath.    Cardiovascular:  Negative for chest pain and palpitations.   Gastrointestinal:  Positive for constipation (chronic), diarrhea (chronic) and nausea (chornic). Negative for abdominal pain.   Genitourinary:  Negative for difficulty urinating and dysuria.   Musculoskeletal:  Positive for arthralgias (chronic) and myalgias (chronic).   Skin:  Negative for rash.   Allergic/Immunologic: Positive for environmental allergies.   Neurological:  Negative for dizziness and headaches.       Objective     /83 (BP Location: Left arm, Patient Position: Sitting, Cuff Size:  "Standard)   Pulse 98   Temp 98.2 °F (36.8 °C) (Temporal)   Resp 18   Ht 5' 2\" (1.575 m)   Wt 59.8 kg (131 lb 12.8 oz)   SpO2 97%   BMI 24.11 kg/m²     Physical Exam  Vitals reviewed.   Constitutional:       General: She is not in acute distress.     Appearance: Normal appearance.   HENT:      Head: Normocephalic and atraumatic.   Cardiovascular:      Rate and Rhythm: Normal rate and regular rhythm.      Pulses: Normal pulses.      Heart sounds: Normal heart sounds. No murmur heard.  Pulmonary:      Effort: Pulmonary effort is normal. No respiratory distress.      Breath sounds: Normal breath sounds.   Abdominal:      General: Bowel sounds are normal. There is no distension.      Palpations: Abdomen is soft.      Tenderness: There is no abdominal tenderness. There is no guarding.   Musculoskeletal:         General: Normal range of motion.      Cervical back: Normal range of motion.      Right lower leg: No edema.      Left lower leg: No edema.   Skin:     General: Skin is warm.   Neurological:      Mental Status: She is alert and oriented to person, place, and time.       Administrative Statements     "

## 2024-06-17 ENCOUNTER — TELEPHONE (OUTPATIENT)
Dept: RADIOLOGY | Facility: CLINIC | Age: 79
End: 2024-06-17

## 2024-06-19 ENCOUNTER — TELEPHONE (OUTPATIENT)
Dept: INTERNAL MEDICINE CLINIC | Facility: CLINIC | Age: 79
End: 2024-06-19

## 2024-06-19 NOTE — TELEPHONE ENCOUNTER
Patients daughter called in because she said that she was told during her visit with ENT 5/30 that the surgery scheduler would be reaching out. Patient and daughter is concerned cause it almost been 3 weeks and they have not heard anything.

## 2024-06-20 ENCOUNTER — TELEPHONE (OUTPATIENT)
Dept: NEPHROLOGY | Facility: CLINIC | Age: 79
End: 2024-06-20

## 2024-06-20 NOTE — TELEPHONE ENCOUNTER
----- Message from Vanessa Vasquez PA-C sent at 6/19/2024  4:15 PM EDT -----  Renal ultrasound did show evidence of chronic kidney disease but nothing further needs to be done or changed at this time.  Follow up next office visit as scheduled.  Thanks

## 2024-06-28 DIAGNOSIS — K58.2 IRRITABLE BOWEL SYNDROME WITH BOTH CONSTIPATION AND DIARRHEA: ICD-10-CM

## 2024-06-28 RX ORDER — DICYCLOMINE HYDROCHLORIDE 10 MG/1
10 CAPSULE ORAL
Qty: 120 CAPSULE | Refills: 2 | Status: SHIPPED | OUTPATIENT
Start: 2024-06-28 | End: 2024-09-26

## 2024-07-07 PROBLEM — R05.1 ACUTE COUGH: Status: RESOLVED | Noted: 2024-06-07 | Resolved: 2024-07-07

## 2024-07-10 ENCOUNTER — RA CDI HCC (OUTPATIENT)
Dept: OTHER | Facility: HOSPITAL | Age: 79
End: 2024-07-10

## 2024-07-16 ENCOUNTER — TELEPHONE (OUTPATIENT)
Dept: PULMONOLOGY | Facility: CLINIC | Age: 79
End: 2024-07-16

## 2024-07-16 NOTE — TELEPHONE ENCOUNTER
Called patient to schedule appointment for the 4-6M FU from the recall list and left a voicemail message.

## 2024-07-18 ENCOUNTER — OFFICE VISIT (OUTPATIENT)
Dept: FAMILY MEDICINE CLINIC | Facility: CLINIC | Age: 79
End: 2024-07-18

## 2024-07-18 VITALS
RESPIRATION RATE: 18 BRPM | WEIGHT: 130.8 LBS | OXYGEN SATURATION: 96 % | TEMPERATURE: 97.2 F | BODY MASS INDEX: 23.92 KG/M2 | SYSTOLIC BLOOD PRESSURE: 121 MMHG | HEART RATE: 98 BPM | DIASTOLIC BLOOD PRESSURE: 80 MMHG

## 2024-07-18 DIAGNOSIS — R10.11 RUQ PAIN: Primary | ICD-10-CM

## 2024-07-18 PROCEDURE — 99213 OFFICE O/P EST LOW 20 MIN: CPT | Performed by: FAMILY MEDICINE

## 2024-07-18 PROCEDURE — 3074F SYST BP LT 130 MM HG: CPT | Performed by: FAMILY MEDICINE

## 2024-07-18 PROCEDURE — 3079F DIAST BP 80-89 MM HG: CPT | Performed by: FAMILY MEDICINE

## 2024-07-18 PROCEDURE — G2211 COMPLEX E/M VISIT ADD ON: HCPCS | Performed by: FAMILY MEDICINE

## 2024-07-18 NOTE — ASSESSMENT & PLAN NOTE
Resolved.   Reviewed lab work from 6/10/24 including lipase, cmp, and cbc. Results are wnl. Symptoms improved. Reports cramping with BM's likely in setting of known IBS dx. Trialed bentyl which patient states did not help. Discussed keeping food and symptom diary, however patient did not bring to appointment.  Reviewed ED precautions. As symptoms have now resolved follow up as needed.

## 2024-07-18 NOTE — PROGRESS NOTES
Ambulatory Visit  Name: Mireya Yi      : 1945      MRN: [id number]  Encounter Provider: Elham Saleem MD  Encounter Date: 2024   Encounter department: [facility]    Assessment & Plan   1. RUQ pain  Assessment & Plan:  Resolved.   Reviewed lab work from 6/10/24 including lipase, cmp, and cbc. Results are wnl. Symptoms improved. Reports cramping with BM's likely in setting of known IBS dx. Trialed bentyl which patient states did not help. Discussed keeping food and symptom diary, however patient did not bring to appointment.  Reviewed ED precautions. As symptoms have now resolved follow up as needed.          History of Present Illness     Presents to clinic to follow up on abdominal pain. She states her abdominal pain has improved and is no longer bothering her. Patient states she was not taking prn bentyl. Denies constipation, diarrhea. States she does have occasional nausea which she feels is related to post nasal drip and mucus secondary to her sinus issues. She states she takes Zofran occasionally for this nausea with relief of symptoms. Denies emesis.       Review of Systems   Constitutional:  Negative for chills and fever.   HENT:  Negative for ear discharge and trouble swallowing.    Eyes:  Negative for redness and visual disturbance.   Respiratory:  Negative for cough and shortness of breath.    Cardiovascular:  Negative for chest pain and palpitations.   Gastrointestinal:  Positive for nausea. Negative for abdominal pain, constipation, diarrhea and vomiting.   Genitourinary:  Negative for difficulty urinating and dysuria.   Musculoskeletal:  Positive for back pain (chronic) and myalgias (chronic).   Skin:  Negative for rash.   Allergic/Immunologic: Positive for environmental allergies.   Neurological:  Negative for dizziness and headaches.     Past Medical History:   Diagnosis Date    Asthma     Asthmatic bronchitis     Last Assessed: 10/7/2014      Chronic diarrhea     Last Assessed: 8/20/2015     Fibromyalgia     Focal nodular hyperplasia of liver     Last Assessed: 6/11/2015    Herpes zoster     Last Assessed: 3/18/2016    Hypertension     IBS (irritable bowel syndrome)     Intermittent palpitations     Irritable bowel syndrome     Lumbar radiculopathy     Osteoarthritis     Vertigo      Past Surgical History:   Procedure Laterality Date    BREAST BIOPSY      SMALL INTESTINE SURGERY       Family History   Problem Relation Age of Onset    Heart attack Mother     Other Mother         Aspiration of Vomit     Asthma Father     Breast cancer Sister     Arthritis Family     Other Family         Musculoskeletal Disease     Osteoporosis Family      Social History     Tobacco Use    Smoking status: Former     Current packs/day: 0.50     Types: Cigarettes    Smokeless tobacco: Never    Tobacco comments:     Stopped smoking July 2023   Vaping Use    Vaping status: Never Used   Substance and Sexual Activity    Alcohol use: Not Currently    Drug use: No    Sexual activity: Not Currently     Partners: Male     Current Outpatient Medications on File Prior to Visit   Medication Sig    acetaminophen (TYLENOL) 500 mg tablet Take 1 tablet (500 mg total) by mouth every 6 (six) hours as needed for mild pain    albuterol (PROVENTIL HFA,VENTOLIN HFA) 90 mcg/act inhaler Inhale 2 puffs every 6 (six) hours as needed for wheezing or shortness of breath    amLODIPine (NORVASC) 5 mg tablet Take 1 tablet (5 mg total) by mouth daily    Azelastine HCl 137 MCG/SPRAY SOLN 2 sprays into each nostril 2 (two) times a day    Cholecalciferol (D3-1000) 1,000 units tablet Take 1 tablet (1,000 Units total) by mouth daily    escitalopram (LEXAPRO) 10 mg tablet Take 1 tablet (10 mg total) by mouth daily    fexofenadine (ALLEGRA) 180 MG tablet Take 180 mg by mouth daily    fluticasone (FLONASE) 50 mcg/act nasal spray 2 sprays into each nostril daily    folic acid (FOLVITE) 1 mg tablet TAKE 1 TABLET  "BY MOUTH EVERY DAY    hydrocortisone (ANUSOL-HC) 2.5 % rectal cream Apply topically 2 (two) times a day    ipratropium (ATROVENT) 0.03 % nasal spray 2 sprays into each nostril every 12 (twelve) hours    montelukast (SINGULAIR) 10 mg tablet TAKE 1 TABLET BY MOUTH DAILY AT BEDTIME    nicotine (NICODERM CQ) 14 mg/24hr TD 24 hr patch Place 1 patch on the skin over 24 hours every 24 hours    ondansetron (Zofran ODT) 4 mg disintegrating tablet Take 1 tablet (4 mg total) by mouth every 6 (six) hours as needed for nausea or vomiting    oxymetazoline (AFRIN) 0.05 % nasal spray 2 sprays by Each Nare route 2 (two) times a day    pantoprazole (PROTONIX) 20 mg tablet Take 1 tablet (20 mg total) by mouth daily    albuterol (2.5 mg/3 mL) 0.083 % nebulizer solution Take 3 mL (2.5 mg total) by nebulization every 4 (four) hours as needed for wheezing or shortness of breath (Patient not taking: Reported on 6/7/2024)    budesonide-formoterol (Symbicort) 80-4.5 MCG/ACT inhaler Inhale 2 puffs 2 (two) times a day Rinse mouth after use    Diclofenac Sodium (VOLTAREN) 1 % Apply 2 g topically 4 (four) times a day as needed (left ankle pain) (Patient not taking: Reported on 7/18/2024)    olopatadine (PATANOL) 0.1 % ophthalmic solution Administer 1 drop to both eyes 2 (two) times a day (Patient not taking: Reported on 7/18/2024)    [DISCONTINUED] dicyclomine (BENTYL) 10 mg capsule Take 1 capsule (10 mg total) by mouth 4 (four) times a day (before meals and at bedtime) (Patient not taking: Reported on 7/18/2024)     Allergies   Allergen Reactions    Ambrosia Artemisiifolia (Ragweed) Skin Test Facial Swelling    Codeine Other (See Comments)     Heart races    Epinephrine      Pt reports \"it makes my heart race, they told me I cant take it.\"    Ibuprofen Other (See Comments)     Heart racing    Morphine Other (See Comments)     Heart racing    Pollen Extract Sneezing    Tramadol Other (See Comments)     Heart racing     Immunization History "   Administered Date(s) Administered    Pneumococcal Conjugate Vaccine 20-valent (Pcv20), Polysace 11/17/2023    Pneumococcal Polysaccharide PPV23 01/01/2003, 10/01/2008    Tdap 06/08/2018     Objective     /80   Pulse 98   Temp (!) 97.2 °F (36.2 °C)   Resp 18   Wt 59.3 kg (130 lb 12.8 oz)   SpO2 96%   BMI 23.92 kg/m²     Physical Exam  Vitals reviewed.   Constitutional:       General: She is not in acute distress.     Appearance: Normal appearance.   HENT:      Head: Normocephalic and atraumatic.      Right Ear: Tympanic membrane, ear canal and external ear normal.      Left Ear: Tympanic membrane, ear canal and external ear normal.   Cardiovascular:      Rate and Rhythm: Normal rate and regular rhythm.      Pulses: Normal pulses.      Heart sounds: Normal heart sounds. No murmur heard.  Pulmonary:      Effort: Pulmonary effort is normal. No respiratory distress.      Breath sounds: Normal breath sounds.   Abdominal:      General: Bowel sounds are normal. There is no distension.      Palpations: Abdomen is soft.   Musculoskeletal:         General: Normal range of motion.      Cervical back: Normal range of motion.      Right lower leg: No edema.      Left lower leg: No edema.   Skin:     General: Skin is warm.   Neurological:      Mental Status: She is alert and oriented to person, place, and time.

## 2024-07-31 ENCOUNTER — TELEPHONE (OUTPATIENT)
Dept: PULMONOLOGY | Facility: CLINIC | Age: 79
End: 2024-07-31

## 2024-07-31 NOTE — TELEPHONE ENCOUNTER
Called patient to schedule appointment for the 3M FU from the Recall List and left a voicemail message.

## 2024-08-05 ENCOUNTER — TELEPHONE (OUTPATIENT)
Dept: FAMILY MEDICINE CLINIC | Facility: CLINIC | Age: 79
End: 2024-08-05

## 2024-08-06 ENCOUNTER — OFFICE VISIT (OUTPATIENT)
Dept: NEPHROLOGY | Facility: CLINIC | Age: 79
End: 2024-08-06
Payer: MEDICARE

## 2024-08-06 VITALS
HEIGHT: 62 IN | WEIGHT: 128 LBS | SYSTOLIC BLOOD PRESSURE: 122 MMHG | HEART RATE: 80 BPM | DIASTOLIC BLOOD PRESSURE: 74 MMHG | BODY MASS INDEX: 23.55 KG/M2

## 2024-08-06 DIAGNOSIS — E83.9 CHRONIC KIDNEY DISEASE-MINERAL BONE DISORDER (CKD-MBD) WITH STAGE 3A CHRONIC KIDNEY DISEASE (HCC): ICD-10-CM

## 2024-08-06 DIAGNOSIS — N18.31 STAGE 3A CHRONIC KIDNEY DISEASE (HCC): Primary | ICD-10-CM

## 2024-08-06 DIAGNOSIS — N18.31 CHRONIC KIDNEY DISEASE-MINERAL BONE DISORDER (CKD-MBD) WITH STAGE 3A CHRONIC KIDNEY DISEASE (HCC): ICD-10-CM

## 2024-08-06 DIAGNOSIS — N18.30 BENIGN HYPERTENSION WITH CHRONIC KIDNEY DISEASE, STAGE III (HCC): ICD-10-CM

## 2024-08-06 DIAGNOSIS — D64.9 ANEMIA, UNSPECIFIED TYPE: ICD-10-CM

## 2024-08-06 DIAGNOSIS — I12.9 BENIGN HYPERTENSION WITH CHRONIC KIDNEY DISEASE, STAGE III (HCC): ICD-10-CM

## 2024-08-06 DIAGNOSIS — M05.80 POLYARTHRITIS WITH POSITIVE RHEUMATOID FACTOR (HCC): ICD-10-CM

## 2024-08-06 DIAGNOSIS — M89.9 CHRONIC KIDNEY DISEASE-MINERAL BONE DISORDER (CKD-MBD) WITH STAGE 3A CHRONIC KIDNEY DISEASE (HCC): ICD-10-CM

## 2024-08-06 DIAGNOSIS — J43.9 PULMONARY EMPHYSEMA, UNSPECIFIED EMPHYSEMA TYPE (HCC): ICD-10-CM

## 2024-08-06 DIAGNOSIS — E55.9 HYPOVITAMINOSIS D: ICD-10-CM

## 2024-08-06 PROCEDURE — 99214 OFFICE O/P EST MOD 30 MIN: CPT | Performed by: STUDENT IN AN ORGANIZED HEALTH CARE EDUCATION/TRAINING PROGRAM

## 2024-08-06 NOTE — PATIENT INSTRUCTIONS
Thank you for coming to your visit today. As we discussed you kidney function is stable at your baseline, your creatinine is 1.1mg/dL Your electrolytes are normal. Please follow the recommendations below       Recommend low sodium (salt) food    Avoid nonsteroidal anti-inflammatory drugs such as Naprosyn, ibuprofen, Aleve, Advil, Celebrex, Meloxicam (Mobic) etc.  You can use Tylenol as needed if you do not have any liver condition to be concerned about    Try to exercise at least 30 minutes 3 days a week to begin with with an ultimate goal of 5 days a week for at least 30 minutes    Next Visit in 12 months with results   If you need to see us earlier we can change the appointment for you      Joselyn Reyes Bahamonde, MD  Nephrology Attending

## 2024-08-06 NOTE — PROGRESS NOTES
NEPHROLOGY OUTPATIENT PROGRESS NOTE   Mireya Yi 79 y.o. female MRN: [id number]  DATE: 8/6/2024    Reason for visit: No chief complaint on file.       There are no Patient Instructions on file for this visit.      There are no diagnoses linked to this encounter.    Assessment/Plan:  76 y.o. year old female with PMH of asthma, HTN, COVID 19 in 8/2021, OA. Patient had BRE in 07/2021 , creatinine elevated to 1.4mg/dL from normal baseline. Patient presents for lab of CKD.     PLAN:        #CKD G3a   Baseline creatinine:1.4m g/dL since 07/2020  Current creatinine 1.1 mg/dL, stable  Etiology: unknown , likely secondary to nephroangiosclerosis in the settings of hypertension   UA: No hematuria, no proteinuria  UACR 40 mg/g  Imaging: Echogenic kidneys, bilateral kidney cysts, no hydronephrosis  Plan:  Follow-up BMP, UACR        #Volume/hypertension:  Volume: Euvolemic  Blood pressure: Normotensive 122/74, goal less than 140/90  Low sodium diet   Amlodipine 5   Advised to maintain a good BP control to prevent progression of CKD         #Acid base disorder  serum HCO3 22 , goal >21   At goal     #CKD-MBD  Calcium 8.6 mg/dL  At goal     #hypovitaminosis D   25-OH vitamin D 25.7  Continue vitamin D        #Anemia:  Current hemoglobin: 11.2mg/dL   at goal      #RA   avoid NSAIDs        # Emphysema   on room air  Stable                SUBJECTIVE / INTERVAL HISTORY:  79 y.o. female presents in follow up of CKD. Feels well, no SOB, no CP, no recent hospitalizations. No issues with medication   .     Mireya Yi denies any recent illness/hospitalizations/medication changes since last office visit.    Review of Systems   Constitutional:  Negative for activity change and appetite change.   HENT:  Negative for congestion and dental problem.    Eyes:  Negative for discharge.   Respiratory:  Negative for cough and shortness of breath.    Cardiovascular:  Negative for chest pain and leg swelling.   Gastrointestinal:  Negative for  abdominal distention and abdominal pain.   Endocrine: Negative for cold intolerance.   Genitourinary:  Negative for dysuria.   Musculoskeletal:  Negative for arthralgias and back pain.   Skin:  Negative for color change and pallor.   Neurological:  Negative for dizziness.   Psychiatric/Behavioral:  Negative for agitation.        OBJECTIVE:  There were no vitals taken for this visit. There is no height or weight on file to calculate BMI.    Physical exam:  Physical Exam  General:  no acute distress at this time  Skin:  No acute rash  Eyes:  No scleral icterus and noninjected  ENT:  mucous membranes moist  Neck:  no carotid bruits  Chest:  Clear to auscultation percussion, good respiratory effort, no use of accessory respiratory muscles  CVS:  Regular rate and rhythm without rub   Abdomen:  soft and nontender   Extremities: no significant lower extremity edema  Neuro:  No gross focality  Psych:  Alert , cooperative     Medications:    Current Outpatient Medications:     acetaminophen (TYLENOL) 500 mg tablet, Take 1 tablet (500 mg total) by mouth every 6 (six) hours as needed for mild pain, Disp: 60 tablet, Rfl: 0    albuterol (2.5 mg/3 mL) 0.083 % nebulizer solution, Take 3 mL (2.5 mg total) by nebulization every 4 (four) hours as needed for wheezing or shortness of breath (Patient not taking: Reported on 6/7/2024), Disp: 90 mL, Rfl: 5    albuterol (PROVENTIL HFA,VENTOLIN HFA) 90 mcg/act inhaler, Inhale 2 puffs every 6 (six) hours as needed for wheezing or shortness of breath, Disp: 18 g, Rfl: 5    amLODIPine (NORVASC) 5 mg tablet, Take 1 tablet (5 mg total) by mouth daily, Disp: 90 tablet, Rfl: 2    Azelastine HCl 137 MCG/SPRAY SOLN, 2 sprays into each nostril 2 (two) times a day, Disp: 30 mL, Rfl: 1    budesonide-formoterol (Symbicort) 80-4.5 MCG/ACT inhaler, Inhale 2 puffs 2 (two) times a day Rinse mouth after use, Disp: 10.2 g, Rfl: 5    Cholecalciferol (D3-1000) 1,000 units tablet, Take 1 tablet (1,000 Units  total) by mouth daily, Disp: 90 tablet, Rfl: 1    Diclofenac Sodium (VOLTAREN) 1 %, Apply 2 g topically 4 (four) times a day as needed (left ankle pain) (Patient not taking: Reported on 7/18/2024), Disp: 2 g, Rfl: 1    escitalopram (LEXAPRO) 10 mg tablet, Take 1 tablet (10 mg total) by mouth daily, Disp: 90 tablet, Rfl: 3    fexofenadine (ALLEGRA) 180 MG tablet, Take 180 mg by mouth daily, Disp: , Rfl:     fluticasone (FLONASE) 50 mcg/act nasal spray, 2 sprays into each nostril daily, Disp: 16 g, Rfl: 3    folic acid (FOLVITE) 1 mg tablet, TAKE 1 TABLET BY MOUTH EVERY DAY, Disp: 90 tablet, Rfl: 2    hydrocortisone (ANUSOL-HC) 2.5 % rectal cream, Apply topically 2 (two) times a day, Disp: 28 g, Rfl: 0    ipratropium (ATROVENT) 0.03 % nasal spray, 2 sprays into each nostril every 12 (twelve) hours, Disp: 30 mL, Rfl: 2    montelukast (SINGULAIR) 10 mg tablet, TAKE 1 TABLET BY MOUTH DAILY AT BEDTIME, Disp: 90 tablet, Rfl: 1    nicotine (NICODERM CQ) 14 mg/24hr TD 24 hr patch, Place 1 patch on the skin over 24 hours every 24 hours, Disp: 28 patch, Rfl: 1    olopatadine (PATANOL) 0.1 % ophthalmic solution, Administer 1 drop to both eyes 2 (two) times a day (Patient not taking: Reported on 7/18/2024), Disp: 5 mL, Rfl: 2    ondansetron (Zofran ODT) 4 mg disintegrating tablet, Take 1 tablet (4 mg total) by mouth every 6 (six) hours as needed for nausea or vomiting, Disp: 20 tablet, Rfl: 3    oxymetazoline (AFRIN) 0.05 % nasal spray, 2 sprays by Each Nare route 2 (two) times a day, Disp: 6 mL, Rfl: 1    pantoprazole (PROTONIX) 20 mg tablet, Take 1 tablet (20 mg total) by mouth daily, Disp: 90 tablet, Rfl: 1    Allergies:  Allergies as of 08/06/2024 - Reviewed 07/18/2024   Allergen Reaction Noted    Ambrosia artemisiifolia (ragweed) skin test Facial Swelling     Codeine Other (See Comments)     Epinephrine  11/22/2017    Ibuprofen Other (See Comments)     Morphine Other (See Comments)     Pollen extract Sneezing     Tramadol  "Other (See Comments)        The following portions of the patient's history were reviewed and updated as appropriate: past family history, past surgical history and problem list.    Laboratory Results:  Lab Results   Component Value Date    SODIUM 142 06/10/2024    K 3.5 06/10/2024     (H) 06/10/2024    CO2 22 06/10/2024    BUN 12 06/10/2024    CREATININE 1.17 06/10/2024    GLUC 106 07/30/2023    CALCIUM 8.6 06/10/2024        Lab Results   Component Value Date    PTH 76.0 03/20/2024    CALCIUM 8.6 06/10/2024    PHOS 2.8 03/20/2024       Portions of the record may have been created with voice recognition software.  Occasional wrong word or \"sound a like\" substitutions may have occurred due to the inherent limitations of voice recognition software.  Read the chart carefully and recognize, using context, where substitutions have occurred.    "

## 2024-08-07 PROBLEM — N18.30 BENIGN HYPERTENSION WITH CHRONIC KIDNEY DISEASE, STAGE III (HCC): Status: ACTIVE | Noted: 2024-08-07

## 2024-08-07 PROBLEM — N18.31 CHRONIC KIDNEY DISEASE-MINERAL BONE DISORDER (CKD-MBD) WITH STAGE 3A CHRONIC KIDNEY DISEASE (HCC): Status: ACTIVE | Noted: 2024-03-31

## 2024-08-07 PROBLEM — N18.31 STAGE 3A CHRONIC KIDNEY DISEASE (HCC): Status: ACTIVE | Noted: 2024-08-07

## 2024-08-07 PROBLEM — D64.9 ANEMIA: Status: ACTIVE | Noted: 2024-08-07

## 2024-08-07 PROBLEM — I12.9 BENIGN HYPERTENSION WITH CHRONIC KIDNEY DISEASE, STAGE III (HCC): Status: ACTIVE | Noted: 2024-08-07

## 2024-08-12 ENCOUNTER — HOSPITAL ENCOUNTER (OUTPATIENT)
Dept: RADIOLOGY | Facility: CLINIC | Age: 79
Discharge: HOME | End: 2024-08-12
Admitting: NURSE PRACTITIONER
Payer: MEDICARE

## 2024-08-12 VITALS
HEART RATE: 91 BPM | TEMPERATURE: 97.3 F | OXYGEN SATURATION: 98 % | SYSTOLIC BLOOD PRESSURE: 148 MMHG | DIASTOLIC BLOOD PRESSURE: 82 MMHG | RESPIRATION RATE: 16 BRPM

## 2024-08-12 DIAGNOSIS — M54.16 LUMBAR RADICULOPATHY: ICD-10-CM

## 2024-08-12 PROCEDURE — 62323 NJX INTERLAMINAR LMBR/SAC: CPT | Performed by: ANESTHESIOLOGY

## 2024-08-12 RX ORDER — METHYLPREDNISOLONE ACETATE 80 MG/ML
80 INJECTION, SUSPENSION INTRA-ARTICULAR; INTRALESIONAL; INTRAMUSCULAR; PARENTERAL; SOFT TISSUE ONCE
Status: COMPLETED | OUTPATIENT
Start: 2024-08-12 | End: 2024-08-12

## 2024-08-12 RX ADMIN — METHYLPREDNISOLONE ACETATE 80 MG: 80 INJECTION, SUSPENSION INTRA-ARTICULAR; INTRALESIONAL; INTRAMUSCULAR; SOFT TISSUE at 14:58

## 2024-08-12 RX ADMIN — IOHEXOL 1 ML: 300 INJECTION, SOLUTION INTRAVENOUS at 14:57

## 2024-08-12 NOTE — H&P
History of Present Illness: The patient is a 79 y.o. female who presents with complaints of low back and leg pain.    Past Medical History:   Diagnosis Date    Asthma     Asthmatic bronchitis     Last Assessed: 10/7/2014     Chronic diarrhea     Last Assessed: 8/20/2015     Fibromyalgia     Focal nodular hyperplasia of liver     Last Assessed: 6/11/2015    Herpes zoster     Last Assessed: 3/18/2016    Hypertension     IBS (irritable bowel syndrome)     Intermittent palpitations     Irritable bowel syndrome     Lumbar radiculopathy     Osteoarthritis     Vertigo        Past Surgical History:   Procedure Laterality Date    BREAST BIOPSY      SMALL INTESTINE SURGERY           Current Outpatient Medications:     acetaminophen (TYLENOL) 500 mg tablet, Take 1 tablet (500 mg total) by mouth every 6 (six) hours as needed for mild pain, Disp: 60 tablet, Rfl: 0    albuterol (2.5 mg/3 mL) 0.083 % nebulizer solution, Take 3 mL (2.5 mg total) by nebulization every 4 (four) hours as needed for wheezing or shortness of breath (Patient not taking: Reported on 6/7/2024), Disp: 90 mL, Rfl: 5    albuterol (PROVENTIL HFA,VENTOLIN HFA) 90 mcg/act inhaler, Inhale 2 puffs every 6 (six) hours as needed for wheezing or shortness of breath, Disp: 18 g, Rfl: 5    amLODIPine (NORVASC) 5 mg tablet, Take 1 tablet (5 mg total) by mouth daily, Disp: 90 tablet, Rfl: 2    Azelastine HCl 137 MCG/SPRAY SOLN, 2 sprays into each nostril 2 (two) times a day, Disp: 30 mL, Rfl: 1    budesonide-formoterol (Symbicort) 80-4.5 MCG/ACT inhaler, Inhale 2 puffs 2 (two) times a day Rinse mouth after use, Disp: 10.2 g, Rfl: 5    Cholecalciferol (D3-1000) 1,000 units tablet, Take 1 tablet (1,000 Units total) by mouth daily, Disp: 90 tablet, Rfl: 1    fexofenadine (ALLEGRA) 180 MG tablet, Take 180 mg by mouth daily, Disp: , Rfl:     fluticasone (FLONASE) 50 mcg/act nasal spray, 2 sprays into each nostril daily, Disp: 16 g, Rfl: 3    folic acid (FOLVITE) 1 mg tablet,  "TAKE 1 TABLET BY MOUTH EVERY DAY, Disp: 90 tablet, Rfl: 2    hydrocortisone (ANUSOL-HC) 2.5 % rectal cream, Apply topically 2 (two) times a day, Disp: 28 g, Rfl: 0    ipratropium (ATROVENT) 0.03 % nasal spray, 2 sprays into each nostril every 12 (twelve) hours, Disp: 30 mL, Rfl: 2    montelukast (SINGULAIR) 10 mg tablet, TAKE 1 TABLET BY MOUTH DAILY AT BEDTIME, Disp: 90 tablet, Rfl: 1    nicotine (NICODERM CQ) 14 mg/24hr TD 24 hr patch, Place 1 patch on the skin over 24 hours every 24 hours, Disp: 28 patch, Rfl: 1    ondansetron (Zofran ODT) 4 mg disintegrating tablet, Take 1 tablet (4 mg total) by mouth every 6 (six) hours as needed for nausea or vomiting, Disp: 20 tablet, Rfl: 3    pantoprazole (PROTONIX) 20 mg tablet, Take 1 tablet (20 mg total) by mouth daily, Disp: 90 tablet, Rfl: 1    Allergies   Allergen Reactions    Ambrosia Artemisiifolia (Ragweed) Skin Test Facial Swelling    Codeine Other (See Comments)     Heart races    Epinephrine      Pt reports \"it makes my heart race, they told me I cant take it.\"    Ibuprofen Other (See Comments)     Heart racing    Morphine Other (See Comments)     Heart racing    Pollen Extract Sneezing    Tramadol Other (See Comments)     Heart racing       Physical Exam:   Vitals:    08/12/24 1436   BP: 147/85   Pulse: 87   Resp: 16   Temp: (!) 97.3 °F (36.3 °C)   SpO2: 100%     General: Awake, Alert, Oriented x 3, Mood and affect appropriate  Respiratory: Respirations even and unlabored  Cardiovascular: Peripheral pulses intact; no edema  Musculoskeletal Exam: normal gait    ASA Score: 2    Patient/Chart Verification  Patient ID Verified: Verbal  ID Band Applied: No  Consents Confirmed: Procedural, To be obtained in the Pre-Procedure area  Interval H&P(within 24 hr) Complete (required for Outpatients and Surgery Admit only): To be obtained in the Pre-Procedure area  Allergies Reviewed: Yes  Anticoag/NSAID held?: NA (Pt took asa 325 mg today at 1330 jw aware)  Currently on " antibiotics?: No  Pregnancy denied?: NA    Assessment:   1. Lumbar radiculopathy        Plan: Left L4-5 LESI

## 2024-08-12 NOTE — DISCHARGE INSTRUCTIONS
Epidural Steroid Injection   WHAT YOU NEED TO KNOW:   An epidural steroid injection (NEERU) is a procedure to inject steroid medicine into the epidural space. The epidural space is between your spinal cord and vertebrae. Steroids reduce inflammation and fluid buildup in your spine that may be causing pain. You may be given pain medicine along with the steroids.          ACTIVITY  Do not drive or operate machinery today.  No strenuous activity today - bending, lifting, etc.  You may resume normal activites starting tomorrow - start slowly and as tolerated.  You may shower today, but no tub baths or hot tubs.  You may have numbness for several hours from the local anesthetic. Please use caution and common sense, especially with weight-bearing activities.    CARE OF THE INJECTION SITE  If you have soreness or pain, apply ice to the area today (20 minutes on/20 minutes off).  Starting tomorrow, you may use warm, moist heat or ice if needed.  You may have an increase or change in your discomfort for 36-48 hours after your treatment.  Apply ice and continue with any pain medication you have been prescribed.  Notify the [facility] if you have any of the following: redness, drainage, swelling, headache, stiff neck or fever above 100°F.    SPECIAL INSTRUCTIONS  Our office will contact you in approximately 14 days for a progress report.    MEDICATIONS  Continue to take all routine medications.  Our office may have instructed you to hold some medications.  May resume ASA  today    As no general anesthesia was used in today's procedure, you should not experience any side effects related to anesthesia.     If you are diabetic, the steroids used in today's injection may temporarily increase your blood sugar levels after the first few days after your injection. Please keep a close eye on your sugars and alert the doctor who manages your diabetes if your sugars are significantly high from your baseline or you are  symptomatic.     If you have a problem specifically related to your procedure, please call our office at [phone].  Problems not related to your procedure should be directed to your primary care physician.

## 2024-08-14 ENCOUNTER — OFFICE VISIT (OUTPATIENT)
Dept: PULMONOLOGY | Facility: CLINIC | Age: 79
End: 2024-08-14
Payer: MEDICARE

## 2024-08-14 VITALS
BODY MASS INDEX: 23.55 KG/M2 | WEIGHT: 128 LBS | OXYGEN SATURATION: 97 % | SYSTOLIC BLOOD PRESSURE: 124 MMHG | HEART RATE: 102 BPM | DIASTOLIC BLOOD PRESSURE: 74 MMHG | HEIGHT: 62 IN

## 2024-08-14 DIAGNOSIS — F17.211 CIGARETTE NICOTINE DEPENDENCE IN REMISSION: ICD-10-CM

## 2024-08-14 DIAGNOSIS — R91.1 LUNG NODULE: ICD-10-CM

## 2024-08-14 DIAGNOSIS — J30.9 CHRONIC ALLERGIC RHINITIS: Primary | ICD-10-CM

## 2024-08-14 DIAGNOSIS — J43.9 PULMONARY EMPHYSEMA, UNSPECIFIED EMPHYSEMA TYPE (HCC): ICD-10-CM

## 2024-08-14 PROCEDURE — 99214 OFFICE O/P EST MOD 30 MIN: CPT | Performed by: INTERNAL MEDICINE

## 2024-08-14 PROCEDURE — G2211 COMPLEX E/M VISIT ADD ON: HCPCS | Performed by: INTERNAL MEDICINE

## 2024-08-14 RX ORDER — AZITHROMYCIN 500 MG/1
500 TABLET, FILM COATED ORAL DAILY
Qty: 5 TABLET | Refills: 0 | Status: SHIPPED | OUTPATIENT
Start: 2024-08-14 | End: 2024-08-19

## 2024-08-14 RX ORDER — BUDESONIDE AND FORMOTEROL FUMARATE DIHYDRATE 80; 4.5 UG/1; UG/1
2 AEROSOL RESPIRATORY (INHALATION) 2 TIMES DAILY
Qty: 10.2 G | Refills: 5 | Status: SHIPPED | OUTPATIENT
Start: 2024-08-14 | End: 2024-09-13

## 2024-08-14 NOTE — PATIENT INSTRUCTIONS
Please start azithromycin 500mg daily for 5 days for chronic sinus congestion.     I have ordered a CT scan for lung cancer screening. Please obtain at your earliest convenience after 9/11/2024

## 2024-08-14 NOTE — PROGRESS NOTES
Pulmonary Follow Up Note   Mireya Yi 79 y.o. female MRN: [id number]  8/14/2024      Assessment/Plan:    Chronic allergic rhinitis  Patient continues to have chronic sinusitis symptoms. She has been following up with ENT and unfortunately is unable to have surgery at this time due to insurance issues. She is following up with ENT next month to discuss next steps. In the meantime, patient is going to continue flonase and azelastine nasal spray. She is also on allegra and singulair daily. Due to symptoms of sinus pain and pressure that appears to be worsening and ear fullness, will send course of azithromycin 500mg for 5 days.   -     azithromycin (ZITHROMAX) 500 MG tablet; Take 1 tablet (500 mg total) by mouth daily for 5 days    Pulmonary emphysema, unspecified emphysema type (HCC)  Mild obstructive disease seen on PFTs.  Continue with current Symbicort and albuterol.  -     budesonide-formoterol (Symbicort) 80-4.5 MCG/ACT inhaler; Inhale 2 puffs 2 (two) times a day Rinse mouth after use    Lung nodule  Cigarette nicotine dependence in remission  Patient has a history of a 3 mm right upper lobe nodule.  She is due for follow up non-contrast CT scan after 9/11/2024.    I discussed with her that she is a candidate for lung cancer CT screening.     The following Shared Decision-Making points were covered:  Benefits of screening were discussed, including the rates of reduction in death from lung cancer and other causes.  Harms of screening were reviewed, including false positive tests, radiation exposure levels, risks of invasive procedures, risks of complications of screening, and risk of overdiagnosis.  I counseled on the importance of adherence to annual lung cancer LDCT screening, impact of co-morbidities, and ability or willingness to undergo diagnosis and treatment.  I counseled on the importance of maintaining abstinence as a former smoker or was counseled on the importance of smoking cessation if a current  smoker    Review of Eligibility Criteria: She meets all of the criteria for Lung Cancer Screening.   She is 79 y.o.   She has 20 pack year tobacco history and is a current smoker or has quit within the past 15 years  She presents no signs or symptoms of lung cancer    After discussion, the patient decided to elect lung cancer screening.        -     CT lung screening program; Future      Return in about 3 months (around 11/14/2024) for Recheck.    History of Present Illness   HPI:  Mireya Yi is a 79 y.o. female with past medical history of tobacco abuse in remission, asthma/COPD (diagnosed at age 41), chronic sinusitis, pulmonary nodule, and GERD who presents for two month follow-up visit.     Patient reports she continues to have a nagging cough, sinus congestion, bilateral sinus pain and ear fullness. She was seen by ENT and they wanted to do sinus surgery but her insurance did not approve the procedure. She is going to follow up with ENT next month to determine next steps. She reports her ears are clogged from fluid in the ear. She fee;s mucus coming down her chest causing chest tightness. She then ends up coughing a lot before it calms down.     She has been using flonase daily and azelastine twice daily. She is taking allegra in the morning and singular at night. She uses albuterol as needed. She also uses symbicort every day twice daily. She reports compliance with her medications.     She had epidural on Monday in her spine.    20-30-pack-year history. Personal history of COPD/emphysema. No family history. No pets. No occupational exposures. No down pillows or comforters at home. She reports she has quit smoking a year ago.     Review of Systems   Constitutional:  Negative for activity change, appetite change, fatigue and fever.   HENT:  Positive for congestion, postnasal drip, sinus pressure and sinus pain. Negative for sore throat and trouble swallowing.    Respiratory:  Positive for cough. Negative for  shortness of breath.    Cardiovascular:  Negative for chest pain and palpitations.       Historical Information   Past Medical History:   Diagnosis Date    Asthma     Asthmatic bronchitis     Last Assessed: 10/7/2014     Chronic diarrhea     Last Assessed: 8/20/2015     Fibromyalgia     Focal nodular hyperplasia of liver     Last Assessed: 6/11/2015    Herpes zoster     Last Assessed: 3/18/2016    Hypertension     IBS (irritable bowel syndrome)     Intermittent palpitations     Irritable bowel syndrome     Lumbar radiculopathy     Osteoarthritis     Vertigo      Past Surgical History:   Procedure Laterality Date    BREAST BIOPSY      SMALL INTESTINE SURGERY       Family History   Problem Relation Age of Onset    Heart attack Mother     Other Mother         Aspiration of Vomit     Asthma Father     Breast cancer Sister     Arthritis Family     Other Family         Musculoskeletal Disease     Osteoporosis Family          Meds/Allergies     Current Outpatient Medications:     acetaminophen (TYLENOL) 500 mg tablet, Take 1 tablet (500 mg total) by mouth every 6 (six) hours as needed for mild pain, Disp: 60 tablet, Rfl: 0    albuterol (2.5 mg/3 mL) 0.083 % nebulizer solution, Take 3 mL (2.5 mg total) by nebulization every 4 (four) hours as needed for wheezing or shortness of breath, Disp: 90 mL, Rfl: 5    albuterol (PROVENTIL HFA,VENTOLIN HFA) 90 mcg/act inhaler, Inhale 2 puffs every 6 (six) hours as needed for wheezing or shortness of breath, Disp: 18 g, Rfl: 5    amLODIPine (NORVASC) 5 mg tablet, Take 1 tablet (5 mg total) by mouth daily, Disp: 90 tablet, Rfl: 2    Azelastine HCl 137 MCG/SPRAY SOLN, 2 sprays into each nostril 2 (two) times a day, Disp: 30 mL, Rfl: 1    azithromycin (ZITHROMAX) 500 MG tablet, Take 1 tablet (500 mg total) by mouth daily for 5 days, Disp: 5 tablet, Rfl: 0    budesonide-formoterol (Symbicort) 80-4.5 MCG/ACT inhaler, Inhale 2 puffs 2 (two) times a day Rinse mouth after use, Disp: 10.2 g,  "Rfl: 5    Cholecalciferol (D3-1000) 1,000 units tablet, Take 1 tablet (1,000 Units total) by mouth daily, Disp: 90 tablet, Rfl: 1    fexofenadine (ALLEGRA) 180 MG tablet, Take 180 mg by mouth daily, Disp: , Rfl:     fluticasone (FLONASE) 50 mcg/act nasal spray, 2 sprays into each nostril daily, Disp: 16 g, Rfl: 3    folic acid (FOLVITE) 1 mg tablet, TAKE 1 TABLET BY MOUTH EVERY DAY, Disp: 90 tablet, Rfl: 2    hydrocortisone (ANUSOL-HC) 2.5 % rectal cream, Apply topically 2 (two) times a day, Disp: 28 g, Rfl: 0    ipratropium (ATROVENT) 0.03 % nasal spray, 2 sprays into each nostril every 12 (twelve) hours, Disp: 30 mL, Rfl: 2    montelukast (SINGULAIR) 10 mg tablet, TAKE 1 TABLET BY MOUTH DAILY AT BEDTIME, Disp: 90 tablet, Rfl: 1    nicotine (NICODERM CQ) 14 mg/24hr TD 24 hr patch, Place 1 patch on the skin over 24 hours every 24 hours, Disp: 28 patch, Rfl: 1    ondansetron (Zofran ODT) 4 mg disintegrating tablet, Take 1 tablet (4 mg total) by mouth every 6 (six) hours as needed for nausea or vomiting, Disp: 20 tablet, Rfl: 3    pantoprazole (PROTONIX) 20 mg tablet, Take 1 tablet (20 mg total) by mouth daily, Disp: 90 tablet, Rfl: 1  Allergies   Allergen Reactions    Ambrosia Artemisiifolia (Ragweed) Skin Test Facial Swelling    Codeine Other (See Comments)     Heart races    Epinephrine      Pt reports \"it makes my heart race, they told me I cant take it.\"    Ibuprofen Other (See Comments)     Heart racing    Morphine Other (See Comments)     Heart racing    Pollen Extract Sneezing    Tramadol Other (See Comments)     Heart racing       Vitals: Blood pressure 124/74, pulse 102, height 5' 2\" (1.575 m), weight 58.1 kg (128 lb), SpO2 97%. Body mass index is 23.41 kg/m². Oxygen Therapy  SpO2: 97 %      Physical Exam  Physical Exam  Constitutional:       General: She is not in acute distress.     Appearance: Normal appearance. She is normal weight.   HENT:      Head: Normocephalic and atraumatic.      Nose: Congestion " "present.      Mouth/Throat:      Mouth: Mucous membranes are moist.      Pharynx: No oropharyngeal exudate or posterior oropharyngeal erythema.   Eyes:      Extraocular Movements: Extraocular movements intact.      Pupils: Pupils are equal, round, and reactive to light.   Cardiovascular:      Rate and Rhythm: Normal rate and regular rhythm.      Pulses: Normal pulses.      Heart sounds: Normal heart sounds.   Pulmonary:      Effort: Pulmonary effort is normal.      Comments: Some mild inspiratory crackles, improved with coughing.  Skin:     General: Skin is warm and dry.   Neurological:      Mental Status: She is alert.   Psychiatric:         Mood and Affect: Mood normal.         Behavior: Behavior normal.         Labs:   Lab Results   Component Value Date    WBC 9.34 06/10/2024    HGB 11.2 (L) 06/10/2024    HCT 37.0 06/10/2024    MCV 86 06/10/2024     06/10/2024     Lab Results   Component Value Date    GLUCOSE 92 05/19/2015    CALCIUM 8.6 06/10/2024     05/19/2015    K 3.5 06/10/2024    CO2 22 06/10/2024     (H) 06/10/2024    BUN 12 06/10/2024    CREATININE 1.17 06/10/2024     No results found for: \"IGE\"  Lab Results   Component Value Date    ALT 8 06/10/2024    AST 10 (L) 06/10/2024    ALKPHOS 74 06/10/2024    BILITOT 0.20 05/19/2015       Preventive   Influenza vaccine: Out of season  COVID-19 vaccination: Out of season  Pneumonia vaccine: 11/2023  Lung Cancer screening: Due in July 2024       Imaging and other studies: I have personally reviewed pertinent films in PACS  CXR 6/10/2024:  No acute cardiopulmonary disease. No significant change from 3/28/2024    CT chest without contrast 9/11/2023: Stable 3 mm pulmonary nodule right upper lobe apex. Consider short-term follow-up in 12 months.     Previously seen obstructive right lower lobe bronchi no longer noted. No discrete endobronchial lesion.     Mild emphysema.     Stable additional findings as above.       Pulmonary function testing: "   PFT 9/2023:    Mild obstructive airflow defect on spirometry     No significant improvement in airflow or forced vital capacity in response to the administration to bronchodilator per ATS standards.      Normal lung volumes     Normal Diffusion     Flow-volume loop consistent with obstruction    EKG, Pathology, and Other Studies: I have personally reviewed pertinent reports.        Aby Welsh  Pulmonary & Critical Care Medicine Fellow PGY-4  [facility]

## 2024-08-26 ENCOUNTER — TELEPHONE (OUTPATIENT)
Dept: PAIN MEDICINE | Facility: CLINIC | Age: 79
End: 2024-08-26

## 2024-09-10 ENCOUNTER — TELEPHONE (OUTPATIENT)
Age: 79
End: 2024-09-10

## 2024-09-10 NOTE — TELEPHONE ENCOUNTER
Marlena Nurse from [facility] requesting office fax number to send over Home Health Orders.    Marlena informed Mireya is not out patient and give Dr. Saleem office number to call there office for additional information.

## 2024-09-11 ENCOUNTER — TELEPHONE (OUTPATIENT)
Dept: FAMILY MEDICINE CLINIC | Facility: CLINIC | Age: 79
End: 2024-09-11

## 2024-09-11 NOTE — TELEPHONE ENCOUNTER
Hi, my name is Tiffany my  from [facility] and we have one of you off patients named Mireya. Last name is spelled CRISTIANE. YOB: 1945. We have one of our cases CT direct low dose screening without contrast on file. We're tracking number [id number], again [id number] and we were just waiting for the clinicals to be faxed in so that we can get this CT low dose approved. If you guys can give us a call back at [phone], again, [phone] you. Thank you. Nikki.

## 2024-09-17 NOTE — TELEPHONE ENCOUNTER
Kaylynn called again for the clinicals informed that patient has requested cancellation, and the provider has cancelled the order as well they will note on there end

## 2024-09-26 ENCOUNTER — OFFICE VISIT (OUTPATIENT)
Dept: MULTI SPECIALTY CLINIC | Facility: CLINIC | Age: 79
End: 2024-09-26

## 2024-09-26 ENCOUNTER — OFFICE VISIT (OUTPATIENT)
Dept: FAMILY MEDICINE CLINIC | Facility: CLINIC | Age: 79
End: 2024-09-26

## 2024-09-26 VITALS
SYSTOLIC BLOOD PRESSURE: 121 MMHG | WEIGHT: 130 LBS | TEMPERATURE: 98.3 F | BODY MASS INDEX: 23.78 KG/M2 | HEART RATE: 98 BPM | DIASTOLIC BLOOD PRESSURE: 73 MMHG

## 2024-09-26 VITALS
BODY MASS INDEX: 23.78 KG/M2 | TEMPERATURE: 98 F | HEART RATE: 82 BPM | SYSTOLIC BLOOD PRESSURE: 144 MMHG | RESPIRATION RATE: 18 BRPM | DIASTOLIC BLOOD PRESSURE: 84 MMHG | WEIGHT: 130 LBS

## 2024-09-26 DIAGNOSIS — J32.8 OTHER CHRONIC SINUSITIS: Chronic | ICD-10-CM

## 2024-09-26 DIAGNOSIS — J30.9 CHRONIC ALLERGIC RHINITIS: ICD-10-CM

## 2024-09-26 DIAGNOSIS — R09.82 PND (POST-NASAL DRIP): ICD-10-CM

## 2024-09-26 DIAGNOSIS — J45.909 ASTHMA, UNSPECIFIED ASTHMA SEVERITY, UNSPECIFIED WHETHER COMPLICATED, UNSPECIFIED WHETHER PERSISTENT: ICD-10-CM

## 2024-09-26 DIAGNOSIS — J32.9 RECURRENT SINUSITIS: Primary | ICD-10-CM

## 2024-09-26 DIAGNOSIS — K21.9 GASTROESOPHAGEAL REFLUX DISEASE WITHOUT ESOPHAGITIS: ICD-10-CM

## 2024-09-26 DIAGNOSIS — R06.2 WHEEZING: Primary | ICD-10-CM

## 2024-09-26 DIAGNOSIS — R25.2 LEG CRAMPING: ICD-10-CM

## 2024-09-26 PROCEDURE — 3077F SYST BP >= 140 MM HG: CPT | Performed by: FAMILY MEDICINE

## 2024-09-26 PROCEDURE — 99213 OFFICE O/P EST LOW 20 MIN: CPT | Performed by: FAMILY MEDICINE

## 2024-09-26 PROCEDURE — 3079F DIAST BP 80-89 MM HG: CPT | Performed by: FAMILY MEDICINE

## 2024-09-26 PROCEDURE — G2211 COMPLEX E/M VISIT ADD ON: HCPCS | Performed by: FAMILY MEDICINE

## 2024-09-26 RX ORDER — AZITHROMYCIN 250 MG/1
TABLET, FILM COATED ORAL
Qty: 6 TABLET | Refills: 0 | Status: SHIPPED | OUTPATIENT
Start: 2024-09-26 | End: 2024-09-30

## 2024-09-26 RX ORDER — ONDANSETRON 4 MG/1
4 TABLET, ORALLY DISINTEGRATING ORAL EVERY 6 HOURS PRN
Qty: 20 TABLET | Refills: 3 | Status: SHIPPED | OUTPATIENT
Start: 2024-09-26

## 2024-09-26 NOTE — PROGRESS NOTES
Ambulatory Visit  Name: Mireya Yi      : 1945      MRN: [id number]  Encounter Provider: Elham Saleem MD  Encounter Date: 2024   Encounter department: [facility]    Assessment & Plan  Wheezing  Reports 1 week hx of increased wheezing and shortness of breath from baseline with increased use of albuterol inhaler in setting of URI symptoms. Evaluated by ENT today and prescribed Zithromax. On physical exam no increased work of breathing or conversational dyspnea, breathing comfortably on RA. Scant expiratory wheeze with good air movement throughout b/l lungs. At this time continue inhalers as prescribed as well as allergy medications, gerd medications, and medications for post nasal drip. No indication for systemic steroids at this time. Return to care precautions reviewed.           Gastroesophageal reflux disease without esophagitis    Orders:    ondansetron (Zofran ODT) 4 mg disintegrating tablet; Take 1 tablet (4 mg total) by mouth every 6 (six) hours as needed for nausea or vomiting    Leg cramping    Orders:    Basic metabolic panel; Future    Magnesium; Future    Other chronic sinusitis  Evaluated by ENT on 2024 and prescribed course of azithromycin. Presents with maxillary sinus tenderness and nasal congestion.     Plan:   Follow ENT recs  Continue home medicaitons             History of Present Illness     80 y/o female with past medical history of COPD presents with one week of increased upper respiratory symptoms from baseline. Patient is using her rescue inhaler more frequently than usual and reports increased cough, wheezing, chills, fatigue. She denies fever, sick contacts, chest pain. Patient was seen by her ENT earlier today (2024) and was prescribed azithromycin for chronic sinusitis. Denies increased sputum production.     Wheezing  Associated symptoms include coughing, fatigue and wheezing. Pertinent negatives include no chest  pain or sore throat.       Review of Systems   Constitutional:  Positive for chills, diaphoresis and fatigue.   HENT:  Positive for congestion, postnasal drip, sinus pressure and sinus pain. Negative for sore throat and trouble swallowing.    Eyes:  Positive for itching.   Respiratory:  Positive for cough, shortness of breath and wheezing.    Cardiovascular:  Negative for chest pain.   Gastrointestinal:  Positive for diarrhea and nausea. Negative for constipation.   Genitourinary:  Negative for difficulty urinating and dyspareunia.   Skin:  Negative for rash.   Allergic/Immunologic: Positive for environmental allergies.   Neurological:  Positive for headaches. Negative for syncope.         Objective     /84   Pulse 82   Temp 98 °F (36.7 °C)   Resp 18   Wt 59 kg (130 lb)   BMI 23.78 kg/m²     Physical Exam  Vitals reviewed.   Constitutional:       General: She is not in acute distress.     Appearance: Normal appearance.   HENT:      Head: Normocephalic and atraumatic.      Comments: Maxillary sinus tenderness     Right Ear: Tympanic membrane normal.      Left Ear: Tympanic membrane normal.      Nose: Congestion and rhinorrhea present.   Eyes:      General:         Right eye: No discharge.         Left eye: No discharge.   Cardiovascular:      Rate and Rhythm: Normal rate and regular rhythm.      Pulses: Normal pulses.      Heart sounds: Normal heart sounds. No murmur heard.  Pulmonary:      Effort: Pulmonary effort is normal. No respiratory distress.      Breath sounds: Wheezing (scant expiratory wheeze) present.      Comments: Mild end-expiratory wheezing. Good air movement throughout b/l lungs to bases   Abdominal:      General: Bowel sounds are normal. There is no distension.      Palpations: Abdomen is soft.   Musculoskeletal:         General: No swelling or tenderness.      Cervical back: No tenderness.      Right lower leg: No edema.      Left lower leg: No edema.   Lymphadenopathy:      Cervical: No  cervical adenopathy.   Skin:     General: Skin is warm.   Neurological:      Mental Status: She is alert and oriented to person, place, and time.

## 2024-09-26 NOTE — PROGRESS NOTES
Assessment/Plan:  Consider septoplasty/turbinoplasty.  Pt will need to have asthma under control before surgery.   Follow up in our office to set up surgery.   Diagnoses and all orders for this visit:    Recurrent sinusitis  -     azithromycin (ZITHROMAX) 250 mg tablet; Take 2 tablets today then 1 tablet daily x 4 days    Chronic allergic rhinitis    PND (post-nasal drip)    Asthma, unspecified asthma severity, unspecified whether complicated, unspecified whether persistent           There are no Patient Instructions on file for this visit.      Patient ID: Mireya Yi is a 79 y.o. female.      Subjective: Patient is here for follow up of chronic sinusitis. Turbinoplasty was discussed with patient at last visit. She states she has PND and asthma and feels she has an infection in her chest.         The following portions of the patient's history were reviewed and updated as appropriate: allergies, current medications, past family history, past medical history, past social history, past surgical history and problem list.    Review of Systems    Objective:    /73 (BP Location: Left arm, Patient Position: Sitting, Cuff Size: Standard)   Pulse 98   Temp 98.3 °F (36.8 °C) (Temporal)   Wt 59 kg (130 lb)   BMI 23.78 kg/m²     Physical Exam   Constitutional: Oriented to person, place, and time. Well-developed and well-nourished, no apparent distress, non-toxic appearance. Cooperative, able to hear and answer questions without difficulty.    Voice: Normal voice quality.  Head: Normocephalic, atraumatic.  No scars, masses or lesions.  Face: Symmetric, no edema, no sinus tenderness.  Right Ear: External ear normal.  Auditory canal clear.  No post-auricular erythema or tenderness. Tympanic membrane intact and normal.   Left Ear: External ear normal.  Auditory canal clear.  No post-auricular erythema or tenderness. Tympanic membrane intact and normal.  Nose: Septum deviated to the left, nares clear.  Mucosa moist,  turbinates hypertrophied.  No crusting, polyps or discharge evident.  Oral cavity: Dentition intact.  Mucosa moist, lips normal.  Tongue mobile, floor of mouth normal.  Hard palate unremarkable.  No masses or lesions.   Oropharynx: Uvula is midline, soft palate normal.  Unremarkable oropharyngeal inlet.  Tonsils unremarkable.  Posterior pharyngeal wall clear. No masses or lesions.  Skin: Skin is warm and dry.   Psychiatric: Normal mood and affect.

## 2024-09-27 NOTE — ASSESSMENT & PLAN NOTE
Orders:    ondansetron (Zofran ODT) 4 mg disintegrating tablet; Take 1 tablet (4 mg total) by mouth every 6 (six) hours as needed for nausea or vomiting

## 2024-09-27 NOTE — ASSESSMENT & PLAN NOTE
Evaluated by ENT on 09/26/2024 and prescribed course of azithromycin. Presents with maxillary sinus tenderness and nasal congestion.     Plan:   Follow ENT recs  Continue home medicaitons

## 2024-09-27 NOTE — ASSESSMENT & PLAN NOTE
Reports 1 week hx of increased wheezing and shortness of breath from baseline with increased use of albuterol inhaler in setting of URI symptoms. Evaluated by ENT today and prescribed Zithromax. On physical exam no increased work of breathing or conversational dyspnea, breathing comfortably on RA. Scant expiratory wheeze with good air movement throughout b/l lungs. At this time continue inhalers as prescribed as well as allergy medications, gerd medications, and medications for post nasal drip. No indication for systemic steroids at this time. Return to care precautions reviewed.

## 2024-09-28 ENCOUNTER — APPOINTMENT (OUTPATIENT)
Dept: LAB | Facility: CLINIC | Age: 79
End: 2024-09-28
Payer: MEDICARE

## 2024-09-28 DIAGNOSIS — R25.2 LEG CRAMPING: ICD-10-CM

## 2024-09-28 DIAGNOSIS — N18.31 STAGE 3A CHRONIC KIDNEY DISEASE (HCC): ICD-10-CM

## 2024-09-28 LAB
ANION GAP SERPL CALCULATED.3IONS-SCNC: 10 MMOL/L (ref 4–13)
BUN SERPL-MCNC: 18 MG/DL (ref 5–25)
CALCIUM SERPL-MCNC: 8.9 MG/DL (ref 8.4–10.2)
CHLORIDE SERPL-SCNC: 110 MMOL/L (ref 96–108)
CO2 SERPL-SCNC: 23 MMOL/L (ref 21–32)
CREAT SERPL-MCNC: 1.29 MG/DL (ref 0.6–1.3)
GFR SERPL CREATININE-BSD FRML MDRD: 39 ML/MIN/1.73SQ M
GLUCOSE P FAST SERPL-MCNC: 98 MG/DL (ref 65–99)
MAGNESIUM SERPL-MCNC: 2.1 MG/DL (ref 1.9–2.7)
POTASSIUM SERPL-SCNC: 4.4 MMOL/L (ref 3.5–5.3)
SODIUM SERPL-SCNC: 143 MMOL/L (ref 135–147)

## 2024-09-28 PROCEDURE — 36415 COLL VENOUS BLD VENIPUNCTURE: CPT

## 2024-09-28 PROCEDURE — 80048 BASIC METABOLIC PNL TOTAL CA: CPT

## 2024-09-28 PROCEDURE — 83735 ASSAY OF MAGNESIUM: CPT

## 2024-10-19 DIAGNOSIS — J30.9 CHRONIC ALLERGIC RHINITIS: ICD-10-CM

## 2024-10-20 NOTE — ASSESSMENT & PLAN NOTE
Reviewed recent lab work from 9/28 mag and electrolytes wnl. Mild anemia on most recent cbc from June 2024. Leg cramping of left leg that occurs when she moves her leg in certain positions. Discussed stretches for calf muscles. No swelling, erythema, or warmth of b/l LE. Discussed importance of staying adequately hydrated as this may be contributing to muscle cramps as well.

## 2024-10-20 NOTE — PROGRESS NOTES
Ambulatory Visit  Name: Mireya Yi      : 1945      MRN: [id number]  Encounter Provider: Elham Saleem MD  Encounter Date: 10/21/2024   Encounter department: [facility]    Assessment & Plan  Essential hypertension  Well controlled on current regiment of amlodipine 5 mg daily. Continue medication at current dosage. Encouraged patient to keep BP at home, states she cannot afford BP cuff. Diet and exercise recommendations reviewed. ED precautions reviewed.          Leg cramping  Reviewed recent lab work from  mag and electrolytes wnl. Mild anemia on most recent cbc from 2024. Leg cramping of left leg that occurs when she moves her leg in certain positions. Discussed stretches for calf muscles. No swelling, erythema, or warmth of b/l LE. Discussed importance of staying adequately hydrated as this may be contributing to muscle cramps as well.        Chronic allergic rhinitis    Orders:    Azelastine HCl 137 MCG/SPRAY SOLN; 2 sprays into each nostril 2 (two) times a day    Mild persistent asthma without complication  Discussed with patient importance of receiving flu shot given her hx of asthma. Discussed frequent hand washing and wearing a mask when out in public. Patient stats she will consider flu shot and call our office to schedule nurses appointment after she considers.   Orders:    albuterol (2.5 mg/3 mL) 0.083 % nebulizer solution; Take 3 mL (2.5 mg total) by nebulization every 4 (four) hours as needed for wheezing or shortness of breath    montelukast (SINGULAIR) 10 mg tablet; Take 1 tablet (10 mg total) by mouth daily at bedtime    Cigarette nicotine dependence without complication    Orders:    nicotine (NICODERM CQ) 14 mg/24hr TD 24 hr patch; Place 1 patch on the skin over 24 hours every 24 hours       History of Present Illness     Ms. Yi presents to clinic to follow up on HTN. She denies symptomatic hypotension or signs or symptoms of  hypertensive emergency. She states good compliance of her antihypertensive medications. Patient states she does not have BP cuff at home so she cannot track BP at home and cannot afford cuff at this time. Patient states she limits salt in diet and avoids fast food. She states she does not participate in any formal exercise but is always walking around the house and in the yard to complete various tasks.                Review of Systems   Constitutional:  Negative for chills and fever.   HENT:  Negative for dental problem and tinnitus.    Eyes:  Negative for redness and visual disturbance.   Respiratory:  Negative for cough, shortness of breath and wheezing.    Cardiovascular:  Negative for chest pain and palpitations.   Gastrointestinal:  Negative for abdominal pain and constipation.   Genitourinary:  Negative for difficulty urinating and dysuria.   Musculoskeletal:  Negative for arthralgias and myalgias.   Skin:  Negative for rash.   Allergic/Immunologic: Positive for environmental allergies. Negative for food allergies.   Neurological:  Negative for syncope and speech difficulty.           Objective     There were no vitals taken for this visit.    Physical Exam  Constitutional:       General: She is not in acute distress.     Appearance: Normal appearance.   HENT:      Head: Normocephalic and atraumatic.   Cardiovascular:      Rate and Rhythm: Normal rate and regular rhythm.      Pulses: Normal pulses.      Heart sounds: Normal heart sounds. No murmur heard.  Pulmonary:      Effort: Pulmonary effort is normal. No respiratory distress.      Breath sounds: Normal breath sounds.   Abdominal:      General: Bowel sounds are normal. There is no distension.      Palpations: Abdomen is soft.   Musculoskeletal:         General: Normal range of motion.      Cervical back: Normal range of motion.      Right lower leg: No edema.      Left lower leg: No edema.   Skin:     General: Skin is warm.   Neurological:      Mental  Status: She is alert and oriented to person, place, and time.

## 2024-10-20 NOTE — ASSESSMENT & PLAN NOTE
Well controlled on current regiment of amlodipine 5 mg daily. Continue medication at current dosage. Encouraged patient to keep BP at home, states she cannot afford BP cuff. Diet and exercise recommendations reviewed. ED precautions reviewed.

## 2024-10-21 ENCOUNTER — OFFICE VISIT (OUTPATIENT)
Dept: FAMILY MEDICINE CLINIC | Facility: CLINIC | Age: 79
End: 2024-10-21

## 2024-10-21 VITALS
HEIGHT: 62 IN | WEIGHT: 132 LBS | OXYGEN SATURATION: 98 % | RESPIRATION RATE: 18 BRPM | TEMPERATURE: 98 F | BODY MASS INDEX: 24.29 KG/M2 | HEART RATE: 82 BPM | SYSTOLIC BLOOD PRESSURE: 132 MMHG | DIASTOLIC BLOOD PRESSURE: 74 MMHG

## 2024-10-21 DIAGNOSIS — R25.2 LEG CRAMPING: ICD-10-CM

## 2024-10-21 DIAGNOSIS — D64.9 ANEMIA, UNSPECIFIED TYPE: ICD-10-CM

## 2024-10-21 DIAGNOSIS — J45.30 MILD PERSISTENT ASTHMA WITHOUT COMPLICATION: ICD-10-CM

## 2024-10-21 DIAGNOSIS — F17.210 CIGARETTE NICOTINE DEPENDENCE WITHOUT COMPLICATION: ICD-10-CM

## 2024-10-21 DIAGNOSIS — I10 ESSENTIAL HYPERTENSION: Primary | ICD-10-CM

## 2024-10-21 DIAGNOSIS — J30.9 CHRONIC ALLERGIC RHINITIS: ICD-10-CM

## 2024-10-21 PROCEDURE — 99213 OFFICE O/P EST LOW 20 MIN: CPT | Performed by: FAMILY MEDICINE

## 2024-10-21 PROCEDURE — 3078F DIAST BP <80 MM HG: CPT | Performed by: FAMILY MEDICINE

## 2024-10-21 PROCEDURE — 3075F SYST BP GE 130 - 139MM HG: CPT | Performed by: FAMILY MEDICINE

## 2024-10-21 PROCEDURE — G2211 COMPLEX E/M VISIT ADD ON: HCPCS | Performed by: FAMILY MEDICINE

## 2024-10-21 RX ORDER — AZELASTINE HYDROCHLORIDE 137 UG/1
SPRAY, METERED NASAL
Refills: 1 | OUTPATIENT
Start: 2024-10-21

## 2024-10-21 RX ORDER — ALBUTEROL SULFATE 0.83 MG/ML
2.5 SOLUTION RESPIRATORY (INHALATION) EVERY 4 HOURS PRN
Qty: 90 ML | Refills: 5 | Status: SHIPPED | OUTPATIENT
Start: 2024-10-21

## 2024-10-21 RX ORDER — MONTELUKAST SODIUM 10 MG/1
10 TABLET ORAL
Qty: 90 TABLET | Refills: 1 | Status: SHIPPED | OUTPATIENT
Start: 2024-10-21

## 2024-10-21 RX ORDER — NICOTINE 21 MG/24HR
1 PATCH, TRANSDERMAL 24 HOURS TRANSDERMAL EVERY 24 HOURS
Qty: 28 PATCH | Refills: 1 | Status: SHIPPED | OUTPATIENT
Start: 2024-10-21

## 2024-10-21 RX ORDER — AZELASTINE HYDROCHLORIDE 137 UG/1
2 SPRAY, METERED NASAL 2 TIMES DAILY
Qty: 30 ML | Refills: 1 | Status: SHIPPED | OUTPATIENT
Start: 2024-10-21

## 2024-10-21 NOTE — ASSESSMENT & PLAN NOTE
Discussed with patient importance of receiving flu shot given her hx of asthma. Discussed frequent hand washing and wearing a mask when out in public. Patient stats she will consider flu shot and call our office to schedule nurses appointment after she considers.   Orders:    albuterol (2.5 mg/3 mL) 0.083 % nebulizer solution; Take 3 mL (2.5 mg total) by nebulization every 4 (four) hours as needed for wheezing or shortness of breath    montelukast (SINGULAIR) 10 mg tablet; Take 1 tablet (10 mg total) by mouth daily at bedtime

## 2024-10-21 NOTE — ASSESSMENT & PLAN NOTE
Orders:    nicotine (NICODERM CQ) 14 mg/24hr TD 24 hr patch; Place 1 patch on the skin over 24 hours every 24 hours     80

## 2024-10-25 NOTE — PROGRESS NOTES
Assessment:  1. Lumbar radiculopathy    2. Spinal stenosis of lumbar region with neurogenic claudication    3. Lumbar spondylosis        Plan:  The patient has been experiencing moderate to severe axial spine pain that is causing functional deficit.  The pain has been present for at least 3 months and is not improving with conservative care.  Currently the patient is not experiencing any radicular features nor neurogenic claudication.  Non-facet pathology has been ruled out on clinical evaluation. We will schedule the patient for bilateral L3-5 medial branch blocks with intention of moving forward towards radiofrequency ablation if there is an appropriate diagnostic response. The initial blocks will be performed with 2% lidocaine and if an appropriate response is obtained upon review of the patient's pain diary, a confirmatory block will be scheduled.  Complete risks and benefits including bleeding, infection, tissue reaction, nerve injury and allergic reaction were discussed. The patient was agreeable and verbalized an understanding  Orthopedic surgery referral provided the patient today as patient requested to discuss potential surgical options for lumbar back pain and radicular symptoms.  Discussed a trial of pregabalin however patient declines  Will avoid NSAIDs secondary to CKD  Patient may continue Tylenol as needed  Continue with home exercise program as taught by physical therapy  Follow-up pending results of procedure or sooner if needed    History of Present Illness:    The patient is a 79 y.o. female with a history of CKD, hypertension and emphysema last seen on 06/11/2024  who presents for a follow up office visit in regards to chroniclumbosacral back pain that radiates into the lower extremities, left greater than right with associated paresthesias.  She denies bowel or bladder incontinence or saddle anesthesia.  Patient unfortunately has not had any significant long-term relief from left L4 TFESI in  May 2024 or left L4-5 LESI August 12, 2024.  She is unable to take NSAIDs secondary to CKD.  She has tried gabapentin in the past which caused side effects therefore was discontinued.  Otherwise she uses Tylenol as needed with minimal relief.  She has completed physical therapy and does continue with her home exercise program without much relief.    The patient rates her pain a 9 out of 10 on the numeric pain rating scale.  Pain is bothersome in the morning and at night and is described as throbbing    I have personally reviewed and/or updated the patient's past medical history, past surgical history, family history, social history, current medications, allergies, and vital signs today.       Review of Systems:    Review of Systems   Respiratory:  Negative for shortness of breath.    Cardiovascular:  Negative for chest pain.   Gastrointestinal:  Negative for constipation, diarrhea, nausea and vomiting.   Musculoskeletal:  Positive for back pain. Negative for arthralgias, joint swelling and myalgias.   Skin:  Negative for rash.   Neurological:  Positive for dizziness. Negative for seizures and weakness.   All other systems reviewed and are negative.        Past Medical History:   Diagnosis Date    Asthma     Asthmatic bronchitis     Last Assessed: 10/7/2014     Chronic diarrhea     Last Assessed: 8/20/2015     Fibromyalgia     Focal nodular hyperplasia of liver     Last Assessed: 6/11/2015    Herpes zoster     Last Assessed: 3/18/2016    Hypertension     IBS (irritable bowel syndrome)     Intermittent palpitations     Irritable bowel syndrome     Lumbar radiculopathy     Osteoarthritis     Vertigo        Past Surgical History:   Procedure Laterality Date    BREAST BIOPSY      SMALL INTESTINE SURGERY         Family History   Problem Relation Age of Onset    Heart attack Mother     Other Mother         Aspiration of Vomit     Asthma Father     Breast cancer Sister     Arthritis Family     Other Family          Musculoskeletal Disease     Osteoporosis Family        Social History     Occupational History    Occupation: Unemployed    Tobacco Use    Smoking status: Former     Current packs/day: 0.50     Types: Cigarettes    Smokeless tobacco: Never    Tobacco comments:     Stopped smoking July 2023   Vaping Use    Vaping status: Never Used   Substance and Sexual Activity    Alcohol use: Not Currently    Drug use: No    Sexual activity: Not Currently     Partners: Male         Current Outpatient Medications:     acetaminophen (TYLENOL) 500 mg tablet, Take 1 tablet (500 mg total) by mouth every 6 (six) hours as needed for mild pain, Disp: 60 tablet, Rfl: 0    albuterol (2.5 mg/3 mL) 0.083 % nebulizer solution, Take 3 mL (2.5 mg total) by nebulization every 4 (four) hours as needed for wheezing or shortness of breath, Disp: 90 mL, Rfl: 5    albuterol (PROVENTIL HFA,VENTOLIN HFA) 90 mcg/act inhaler, Inhale 2 puffs every 6 (six) hours as needed for wheezing or shortness of breath, Disp: 18 g, Rfl: 5    amLODIPine (NORVASC) 5 mg tablet, Take 1 tablet (5 mg total) by mouth daily, Disp: 90 tablet, Rfl: 2    Azelastine HCl 137 MCG/SPRAY SOLN, 2 sprays into each nostril 2 (two) times a day, Disp: 30 mL, Rfl: 1    Cholecalciferol (D3-1000) 1,000 units tablet, Take 1 tablet (1,000 Units total) by mouth daily, Disp: 90 tablet, Rfl: 1    fexofenadine (ALLEGRA) 180 MG tablet, Take 180 mg by mouth daily, Disp: , Rfl:     fluticasone (FLONASE) 50 mcg/act nasal spray, 2 sprays into each nostril daily, Disp: 16 g, Rfl: 3    folic acid (FOLVITE) 1 mg tablet, TAKE 1 TABLET BY MOUTH EVERY DAY, Disp: 90 tablet, Rfl: 2    hydrocortisone (ANUSOL-HC) 2.5 % rectal cream, Apply topically 2 (two) times a day, Disp: 28 g, Rfl: 0    ipratropium (ATROVENT) 0.03 % nasal spray, 2 sprays into each nostril every 12 (twelve) hours, Disp: 30 mL, Rfl: 2    montelukast (SINGULAIR) 10 mg tablet, Take 1 tablet (10 mg total) by mouth daily at bedtime, Disp: 90  "tablet, Rfl: 1    nicotine (NICODERM CQ) 14 mg/24hr TD 24 hr patch, Place 1 patch on the skin over 24 hours every 24 hours, Disp: 28 patch, Rfl: 1    ondansetron (Zofran ODT) 4 mg disintegrating tablet, Take 1 tablet (4 mg total) by mouth every 6 (six) hours as needed for nausea or vomiting, Disp: 20 tablet, Rfl: 3    pantoprazole (PROTONIX) 20 mg tablet, Take 1 tablet (20 mg total) by mouth daily, Disp: 90 tablet, Rfl: 1    budesonide-formoterol (Symbicort) 80-4.5 MCG/ACT inhaler, Inhale 2 puffs 2 (two) times a day Rinse mouth after use, Disp: 10.2 g, Rfl: 5    Allergies   Allergen Reactions    Ambrosia Artemisiifolia (Ragweed) Skin Test Facial Swelling    Codeine Other (See Comments)     Heart races    Epinephrine      Pt reports \"it makes my heart race, they told me I cant take it.\"    Ibuprofen Other (See Comments)     Heart racing    Morphine Other (See Comments)     Heart racing    Pollen Extract Sneezing    Tramadol Other (See Comments)     Heart racing       Physical Exam:    /85   Pulse 102   Ht 5' 2\" (1.575 m)   Wt 59.9 kg (132 lb)   BMI 24.14 kg/m²     Constitutional:normal, well developed, well nourished, alert, in no distress and non-toxic and no overt pain behavior.  Eyes:anicteric  HEENT:grossly intact  Neck:supple, symmetric, trachea midline and no masses   Pulmonary:even and unlabored  Cardiovascular:No edema or pitting edema present  Skin:Normal without rashes or lesions and well hydrated  Psychiatric:Mood and affect appropriate  Neurologic:Cranial Nerves II-XII grossly intact  Musculoskeletal:antalgic gait but steady without assistive devices.  Positive seated straight leg raise on the left and negative on right      Imaging  FL spine and pain procedure    (Results Pending)         Orders Placed This Encounter   Procedures    FL spine and pain procedure    Ambulatory referral to Orthopedic Surgery       "

## 2024-10-28 ENCOUNTER — OFFICE VISIT (OUTPATIENT)
Dept: PAIN MEDICINE | Facility: CLINIC | Age: 79
End: 2024-10-28
Payer: MEDICARE

## 2024-10-28 VITALS
HEART RATE: 102 BPM | SYSTOLIC BLOOD PRESSURE: 125 MMHG | HEIGHT: 62 IN | BODY MASS INDEX: 24.29 KG/M2 | DIASTOLIC BLOOD PRESSURE: 85 MMHG | WEIGHT: 132 LBS

## 2024-10-28 DIAGNOSIS — M47.816 LUMBAR SPONDYLOSIS: ICD-10-CM

## 2024-10-28 DIAGNOSIS — M54.16 LUMBAR RADICULOPATHY: Primary | ICD-10-CM

## 2024-10-28 DIAGNOSIS — M48.062 SPINAL STENOSIS OF LUMBAR REGION WITH NEUROGENIC CLAUDICATION: ICD-10-CM

## 2024-10-28 PROCEDURE — 99214 OFFICE O/P EST MOD 30 MIN: CPT | Performed by: NURSE PRACTITIONER

## 2024-11-06 ENCOUNTER — OFFICE VISIT (OUTPATIENT)
Dept: PULMONOLOGY | Facility: CLINIC | Age: 79
End: 2024-11-06
Payer: MEDICARE

## 2024-11-06 VITALS
HEART RATE: 75 BPM | OXYGEN SATURATION: 96 % | DIASTOLIC BLOOD PRESSURE: 60 MMHG | SYSTOLIC BLOOD PRESSURE: 102 MMHG | TEMPERATURE: 97 F

## 2024-11-06 DIAGNOSIS — F17.211 CIGARETTE NICOTINE DEPENDENCE IN REMISSION: ICD-10-CM

## 2024-11-06 DIAGNOSIS — J30.9 CHRONIC ALLERGIC RHINITIS: ICD-10-CM

## 2024-11-06 DIAGNOSIS — Z87.891 PERSONAL HISTORY OF TOBACCO USE, PRESENTING HAZARDS TO HEALTH: Primary | ICD-10-CM

## 2024-11-06 DIAGNOSIS — J32.8 OTHER CHRONIC SINUSITIS: ICD-10-CM

## 2024-11-06 DIAGNOSIS — R91.1 LUNG NODULE: ICD-10-CM

## 2024-11-06 DIAGNOSIS — J45.30 MILD PERSISTENT ASTHMA WITHOUT COMPLICATION: ICD-10-CM

## 2024-11-06 DIAGNOSIS — J43.9 PULMONARY EMPHYSEMA, UNSPECIFIED EMPHYSEMA TYPE (HCC): ICD-10-CM

## 2024-11-06 PROCEDURE — G2211 COMPLEX E/M VISIT ADD ON: HCPCS | Performed by: INTERNAL MEDICINE

## 2024-11-06 PROCEDURE — 99214 OFFICE O/P EST MOD 30 MIN: CPT | Performed by: INTERNAL MEDICINE

## 2024-11-06 RX ORDER — BUDESONIDE AND FORMOTEROL FUMARATE DIHYDRATE 80; 4.5 UG/1; UG/1
2 AEROSOL RESPIRATORY (INHALATION) 2 TIMES DAILY
Qty: 10.2 G | Refills: 5 | Status: SHIPPED | OUTPATIENT
Start: 2024-11-06 | End: 2024-12-06

## 2024-11-06 NOTE — PATIENT INSTRUCTIONS
Continue all of your medications as prescribed.   Continue to follow with ENT  Please schedule CT lung cancer screening at your earliest convenience. This should be covered by your insurance. If it is not, please call the office.   Obtain flu shot after your procedure next week.

## 2024-11-06 NOTE — PROGRESS NOTES
Pulmonary Follow Up Note   Mireya Yi 79 y.o. female MRN: [id number]  11/7/2024      Assessment/Plan:  Chronic allergic rhinitis  Patient follows with ENT. She is being considered for septoplasty/turbinoplasty. She needs lung disease under control prior to surgery which is under control at this time.   -Continue to follow with ENT for management, f/u scheduled   -Continue Singular, Allegra, Fluticasone, and azelastine     COPD  Asthma   Patient with significant smoking history of 20-30 pack-year. Mild obstructive disease seen on PFTs. Currently on Symbicort and albuterol PRN as well as singulair. Current symptoms are controlled. She has history of exacerbations associated with chronic sinusitis.   -Continue with current Symbicort and albuterol PRN  -Continue Singulair  -Management of chronic sinusitis as above    Lung nodule  Cigarette nicotine dependence in remission  Personal history of tobacco use, presenting hazards to health  Patient has a history of a 3 mm right upper lobe nodule. She is due for follow up non-contrast CT scan. I discussed with her that she is a candidate for lung cancer CT screening.   The following Shared Decision-Making points were covered:  Benefits of screening were discussed, including the rates of reduction in death from lung cancer and other causes.  Harms of screening were reviewed, including false positive tests, radiation exposure levels, risks of invasive procedures, risks of complications of screening, and risk of overdiagnosis.  I counseled on the importance of adherence to annual lung cancer LDCT screening, impact of co-morbidities, and ability or willingness to undergo diagnosis and treatment.  I counseled on the importance of maintaining abstinence as a former smoker or was counseled on the importance of smoking cessation if a current smoker  Review of Eligibility Criteria: She meets all of the criteria for Lung Cancer Screening.   She is 79 y.o.   She has 20 pack year  tobacco history and is a current smoker or has quit within the past 15 years  She presents no signs or symptoms of lung cancer  - After discussion, the patient decided to elect lung cancer screening.  - CT lung screening program; Future       Diagnoses and all orders for this visit:    Personal history of tobacco use, presenting hazards to health  -     CT lung screening program; Future    Chronic allergic rhinitis  -     budesonide-formoterol (Symbicort) 80-4.5 MCG/ACT inhaler; Inhale 2 puffs 2 (two) times a day Rinse mouth after use    Pulmonary emphysema, unspecified emphysema type (HCC)    Lung nodule    Cigarette nicotine dependence in remission    Other chronic sinusitis    Mild persistent asthma without complication        Return in about 3 months (around 2/6/2025).    History of Present Illness   HPI:  Mireya Yi is a 79 y.o. female with past medical history of tobacco abuse in remission, asthma/COPD (diagnosed at age 41), chronic sinusitis, pulmonary nodule, and GERD who presents for two month follow-up visit.      Patient has a history of chronic sinusitis associated with sinus congestion, bilateral sinus pain and ear fullness. She has been following with ENT. She is initially was going to get sinus surgery which was on hold due to insurance authorization but is now being considered again. She also has chronic cough, post-nasal drip and increased mucus production. She has been using flonase daily and azelastine twice daily. She is taking allegra in the morning and singular at night.     At her last visit, she was given a course of azithromycin for chronic sinusitis. She reports her symptoms improved. She did receive an additional course of azithromycin about a month later when she was seen by ENT. The medicine made her sick to her stomach so she did not complete the course.     Overall, her symptoms are more controlled. She continues to have post-nasal drip and excess mucus in her throat. She is being  considered for septoplasty/turbinoplasty with ENT. She needs to have asthma/COPD well controlled prior to surgery. She also is undergoing radiofrequency ablation in her back next week.     As far as her asthma/COPD, she has mild obstructive disease on PFTs. She takes singulair. She uses symbicort twice daily and albuterol PRN. She reports compliance with her medications. Her symptoms are brought on by chronic sinusitis.     She has a 20-30-pack-year smoking history, quit smoking 1 year ago. he was ordered a low dose screening CT scan at her last appointment, however she reports when she tried to schedule this they wanted her to pay out of pocket. Explained to her that the test is covered and will retry ordering it.      Review of Systems   HENT:  Positive for congestion, postnasal drip and sinus pressure.    Respiratory:  Positive for cough. Negative for shortness of breath.    Cardiovascular:  Negative for chest pain.   All other systems reviewed and are negative.      Historical Information   Past Medical History:   Diagnosis Date    Asthma     Asthmatic bronchitis     Last Assessed: 10/7/2014     Chronic diarrhea     Last Assessed: 8/20/2015     Fibromyalgia     Focal nodular hyperplasia of liver     Last Assessed: 6/11/2015    Herpes zoster     Last Assessed: 3/18/2016    Hypertension     IBS (irritable bowel syndrome)     Intermittent palpitations     Irritable bowel syndrome     Lumbar radiculopathy     Osteoarthritis     Vertigo      Past Surgical History:   Procedure Laterality Date    BREAST BIOPSY      SMALL INTESTINE SURGERY       Family History   Problem Relation Age of Onset    Heart attack Mother     Other Mother         Aspiration of Vomit     Asthma Father     Breast cancer Sister     Arthritis Family     Other Family         Musculoskeletal Disease     Osteoporosis Family          Meds/Allergies     Current Outpatient Medications:     acetaminophen (TYLENOL) 500 mg tablet, Take 1 tablet (500 mg  total) by mouth every 6 (six) hours as needed for mild pain, Disp: 60 tablet, Rfl: 0    albuterol (2.5 mg/3 mL) 0.083 % nebulizer solution, Take 3 mL (2.5 mg total) by nebulization every 4 (four) hours as needed for wheezing or shortness of breath, Disp: 90 mL, Rfl: 5    albuterol (PROVENTIL HFA,VENTOLIN HFA) 90 mcg/act inhaler, Inhale 2 puffs every 6 (six) hours as needed for wheezing or shortness of breath, Disp: 18 g, Rfl: 5    amLODIPine (NORVASC) 5 mg tablet, Take 1 tablet (5 mg total) by mouth daily, Disp: 90 tablet, Rfl: 2    Azelastine HCl 137 MCG/SPRAY SOLN, 2 sprays into each nostril 2 (two) times a day, Disp: 30 mL, Rfl: 1    budesonide-formoterol (Symbicort) 80-4.5 MCG/ACT inhaler, Inhale 2 puffs 2 (two) times a day Rinse mouth after use, Disp: 10.2 g, Rfl: 5    Cholecalciferol (D3-1000) 1,000 units tablet, Take 1 tablet (1,000 Units total) by mouth daily, Disp: 90 tablet, Rfl: 1    fexofenadine (ALLEGRA) 180 MG tablet, Take 180 mg by mouth daily, Disp: , Rfl:     fluticasone (FLONASE) 50 mcg/act nasal spray, 2 sprays into each nostril daily, Disp: 16 g, Rfl: 3    folic acid (FOLVITE) 1 mg tablet, TAKE 1 TABLET BY MOUTH EVERY DAY, Disp: 90 tablet, Rfl: 2    hydrocortisone (ANUSOL-HC) 2.5 % rectal cream, Apply topically 2 (two) times a day, Disp: 28 g, Rfl: 0    ipratropium (ATROVENT) 0.03 % nasal spray, 2 sprays into each nostril every 12 (twelve) hours, Disp: 30 mL, Rfl: 2    montelukast (SINGULAIR) 10 mg tablet, Take 1 tablet (10 mg total) by mouth daily at bedtime, Disp: 90 tablet, Rfl: 1    nicotine (NICODERM CQ) 14 mg/24hr TD 24 hr patch, Place 1 patch on the skin over 24 hours every 24 hours, Disp: 28 patch, Rfl: 1    ondansetron (Zofran ODT) 4 mg disintegrating tablet, Take 1 tablet (4 mg total) by mouth every 6 (six) hours as needed for nausea or vomiting, Disp: 20 tablet, Rfl: 3    pantoprazole (PROTONIX) 20 mg tablet, Take 1 tablet (20 mg total) by mouth daily, Disp: 90 tablet, Rfl:  "1  Allergies   Allergen Reactions    Ambrosia Artemisiifolia (Ragweed) Skin Test Facial Swelling    Codeine Other (See Comments)     Heart races    Epinephrine      Pt reports \"it makes my heart race, they told me I cant take it.\"    Ibuprofen Other (See Comments)     Heart racing    Morphine Other (See Comments)     Heart racing    Pollen Extract Sneezing    Tramadol Other (See Comments)     Heart racing       Vitals: Blood pressure 102/60, pulse 75, temperature (!) 97 °F (36.1 °C), temperature source Tympanic, SpO2 96%. There is no height or weight on file to calculate BMI. Oxygen Therapy  SpO2: 96 %  Oxygen Therapy: None (Room air)      Physical Exam  Physical Exam  Constitutional:       General: She is not in acute distress.     Appearance: Normal appearance. She is normal weight.   HENT:      Head: Normocephalic and atraumatic.      Nose: Congestion present.      Mouth/Throat:      Mouth: Mucous membranes are moist.      Pharynx: No oropharyngeal exudate or posterior oropharyngeal erythema.   Eyes:      Extraocular Movements: Extraocular movements intact.      Pupils: Pupils are equal, round, and reactive to light.   Cardiovascular:      Rate and Rhythm: Normal rate and regular rhythm.      Pulses: Normal pulses.      Heart sounds: Normal heart sounds.   Pulmonary:      Effort: Pulmonary effort is normal.      Breath sounds: Normal breath sounds.   Skin:     General: Skin is warm and dry.   Neurological:      Mental Status: She is alert.   Psychiatric:         Mood and Affect: Mood normal.         Behavior: Behavior normal.         Labs: I have personally reviewed pertinent lab results.  Lab Results   Component Value Date    WBC 9.34 06/10/2024    HGB 11.2 (L) 06/10/2024    HCT 37.0 06/10/2024    MCV 86 06/10/2024     06/10/2024     Lab Results   Component Value Date    GLUCOSE 92 05/19/2015    CALCIUM 8.9 09/28/2024     05/19/2015    K 4.4 09/28/2024    CO2 23 09/28/2024     (H) 09/28/2024 " "   BUN 18 09/28/2024    CREATININE 1.29 09/28/2024     No results found for: \"IGE\"  Lab Results   Component Value Date    ALT 8 06/10/2024    AST 10 (L) 06/10/2024    ALKPHOS 74 06/10/2024    BILITOT 0.20 05/19/2015           Imaging and other studies: Results Review Statement: I personally reviewed the following image studies in PACS and associated radiology reports: chest xray and CT chest. My interpretation of the radiology images/reports is:  .    CXR 6/10/2024:  No acute cardiopulmonary disease. No significant change from 3/28/2024     CT chest without contrast 9/11/2023: Stable 3 mm pulmonary nodule right upper lobe apex. Consider short-term follow-up in 12 months.     Previously seen obstructive right lower lobe bronchi no longer noted. No discrete endobronchial lesion.     Mild emphysema.     Stable additional findings as above.     Pulmonary function testing:   PFT 9/2023:    Mild obstructive airflow defect on spirometry  No significant improvement in airflow or forced vital capacity in response to the administration to bronchodilator per ATS standards.   Normal lung volumes  Normal Diffusion  Flow-volume loop consistent with obstruction     EKG, Pathology, and Other Studies: Results Review Statement: No pertinent imaging studies reviewed.      Aby Welsh  Pulmonary & Critical Care Medicine Fellow PGY-4  [facility]  "

## 2024-11-11 ENCOUNTER — HOSPITAL ENCOUNTER (OUTPATIENT)
Dept: RADIOLOGY | Facility: HOSPITAL | Age: 79
Discharge: HOME | End: 2024-11-11
Attending: ORTHOPAEDIC SURGERY
Payer: MEDICARE

## 2024-11-11 ENCOUNTER — OFFICE VISIT (OUTPATIENT)
Dept: OBGYN CLINIC | Facility: HOSPITAL | Age: 79
End: 2024-11-11
Payer: MEDICARE

## 2024-11-11 VITALS
DIASTOLIC BLOOD PRESSURE: 99 MMHG | HEIGHT: 62 IN | SYSTOLIC BLOOD PRESSURE: 162 MMHG | WEIGHT: 132.06 LBS | HEART RATE: 84 BPM | BODY MASS INDEX: 24.3 KG/M2

## 2024-11-11 DIAGNOSIS — M54.16 LUMBAR RADICULOPATHY: ICD-10-CM

## 2024-11-11 DIAGNOSIS — M48.062 SPINAL STENOSIS OF LUMBAR REGION WITH NEUROGENIC CLAUDICATION: ICD-10-CM

## 2024-11-11 DIAGNOSIS — R52 PAIN: Primary | ICD-10-CM

## 2024-11-11 DIAGNOSIS — R52 PAIN: ICD-10-CM

## 2024-11-11 PROCEDURE — 99204 OFFICE O/P NEW MOD 45 MIN: CPT | Performed by: ORTHOPAEDIC SURGERY

## 2024-11-11 PROCEDURE — 72110 X-RAY EXAM L-2 SPINE 4/>VWS: CPT

## 2024-11-11 NOTE — PROGRESS NOTES
Assessment & Plan/Medical Decision Makin y.o. female with Bilateral Radicular Leg Pain, Ambulatory Dysfunction, and Left Hip Pain and imaging findings most notable for L4-5 degenerative spondylolisthesis         The clinical, physical and imaging findings were reviewed with the patient.  Mireya  has a constellation of findings consistent with lumbar radiculopathy, sacroiliac joint dysfunction in the setting of lumbar degenerative disease.      Fortunately patient remains neurologically intact and functional.   We discussed the treatment options including physical therapy, at home exercises, activity modifications, chiropractic medicine, oral medications, interventional spine procedures.  We did discuss the role of surgical intervention however we agreed to try and exhaust all nonoperative treatment options.  Patient has upcoming medial branch block scheduled with spine and pain.  Did review the benefits of this procedure and recommend patient proceed.  Will have patient follow-up in approximately 6 weeks to reevaluate and assess if surgical intervention is warranted.     Subjective:      Chief Complaint: Back Pain    HPI:  Mireya iY is a 79 y.o. female presenting for initial visit with chief complaint of low back pain, bilateral radicular leg pain -referred by Violetta Avendaño.  Pain began about 3 years ago without any injury or fall. She has pain with bilateral side lying and with laying on her back. She also experiences pain with sitting any standing. She describes her left leg to be worse. Her symptoms are describes to go to her base of her foot in which she experiences numbness and tingling in her left foot. Her entire left leg cramps if she externally rotates her hip.   She has attempted physical therapy that did not provide any relief. She received an NEERU on 24 that did not provide any relief. She is scheduled for a repeat injection on 24. She is currently taking aspirin for pain which does  provide slight relief.   Denies any michael trauma. Denies fever or chills, no night sweats. Denies any bladder or bowel changes.      Conservative therapy includes the following:   Medications: OTC    Injections: Yes     Physical Therapy: Yes  Chiropractic Medicine: has not attempted  Accupunture/Massage Therapy: has not attempted   These therapeutic modalities were ineffective at providing sustained pain relief/functional improvement.     Objective:     Family History   Problem Relation Age of Onset    Heart attack Mother     Other Mother         Aspiration of Vomit     Asthma Father     Breast cancer Sister     Arthritis Family     Other Family         Musculoskeletal Disease     Osteoporosis Family        Past Medical History:   Diagnosis Date    Asthma     Asthmatic bronchitis     Last Assessed: 10/7/2014     Chronic diarrhea     Last Assessed: 8/20/2015     Fibromyalgia     Focal nodular hyperplasia of liver     Last Assessed: 6/11/2015    Herpes zoster     Last Assessed: 3/18/2016    Hypertension     IBS (irritable bowel syndrome)     Intermittent palpitations     Irritable bowel syndrome     Lumbar radiculopathy     Osteoarthritis     Vertigo        Current Outpatient Medications   Medication Sig Dispense Refill    acetaminophen (TYLENOL) 500 mg tablet Take 1 tablet (500 mg total) by mouth every 6 (six) hours as needed for mild pain 60 tablet 0    albuterol (2.5 mg/3 mL) 0.083 % nebulizer solution Take 3 mL (2.5 mg total) by nebulization every 4 (four) hours as needed for wheezing or shortness of breath 90 mL 5    albuterol (PROVENTIL HFA,VENTOLIN HFA) 90 mcg/act inhaler Inhale 2 puffs every 6 (six) hours as needed for wheezing or shortness of breath 18 g 5    amLODIPine (NORVASC) 5 mg tablet Take 1 tablet (5 mg total) by mouth daily 90 tablet 2    Azelastine HCl 137 MCG/SPRAY SOLN 2 sprays into each nostril 2 (two) times a day 30 mL 1    budesonide-formoterol (Symbicort) 80-4.5 MCG/ACT inhaler Inhale 2 puffs  2 (two) times a day Rinse mouth after use 10.2 g 5    Cholecalciferol (D3-1000) 1,000 units tablet Take 1 tablet (1,000 Units total) by mouth daily 90 tablet 1    fexofenadine (ALLEGRA) 180 MG tablet Take 180 mg by mouth daily      fluticasone (FLONASE) 50 mcg/act nasal spray 2 sprays into each nostril daily 16 g 3    folic acid (FOLVITE) 1 mg tablet TAKE 1 TABLET BY MOUTH EVERY DAY 90 tablet 2    hydrocortisone (ANUSOL-HC) 2.5 % rectal cream Apply topically 2 (two) times a day 28 g 0    ipratropium (ATROVENT) 0.03 % nasal spray 2 sprays into each nostril every 12 (twelve) hours 30 mL 2    montelukast (SINGULAIR) 10 mg tablet Take 1 tablet (10 mg total) by mouth daily at bedtime 90 tablet 1    nicotine (NICODERM CQ) 14 mg/24hr TD 24 hr patch Place 1 patch on the skin over 24 hours every 24 hours 28 patch 1    ondansetron (Zofran ODT) 4 mg disintegrating tablet Take 1 tablet (4 mg total) by mouth every 6 (six) hours as needed for nausea or vomiting 20 tablet 3    pantoprazole (PROTONIX) 20 mg tablet Take 1 tablet (20 mg total) by mouth daily 90 tablet 1     No current facility-administered medications for this visit.       Past Surgical History:   Procedure Laterality Date    BREAST BIOPSY      SMALL INTESTINE SURGERY         Social History     Socioeconomic History    Marital status:      Spouse name: Not on file    Number of children: Not on file    Years of education: Not on file    Highest education level: Not on file   Occupational History    Occupation: Unemployed    Tobacco Use    Smoking status: Former     Current packs/day: 0.50     Types: Cigarettes    Smokeless tobacco: Never    Tobacco comments:     Stopped smoking July 2023   Vaping Use    Vaping status: Never Used   Substance and Sexual Activity    Alcohol use: Not Currently    Drug use: No    Sexual activity: Not Currently     Partners: Male   Other Topics Concern    Not on file   Social History Narrative    Mental Disability     Exercising  Regularly     Lives with adult children      Social Determinants of Health     Financial Resource Strain: Low Risk  (5/17/2024)    Overall Financial Resource Strain (CARDIA)     Difficulty of Paying Living Expenses: Not very hard   Food Insecurity: No Food Insecurity (5/17/2024)    Nursing - Inadequate Food Risk Classification     Worried About Running Out of Food in the Last Year: Never true     Ran Out of Food in the Last Year: Never true     Ran Out of Food in the Last Year: Not on file   Transportation Needs: No Transportation Needs (5/17/2024)    PRAPARE - Transportation     Lack of Transportation (Medical): No     Lack of Transportation (Non-Medical): No   Physical Activity: Sufficiently Active (6/14/2024)    Exercise Vital Sign     Days of Exercise per Week: 4 days     Minutes of Exercise per Session: 40 min   Stress: Stress Concern Present (3/21/2024)    [place] [employer] - Occupational Stress Questionnaire     Feeling of Stress : To some extent   Social Connections: Socially Isolated (6/14/2024)    Social Connection and Isolation Panel [NHANES]     Frequency of Communication with Friends and Family: More than three times a week     Frequency of Social Gatherings with Friends and Family: More than three times a week     Attends [employer] Services: Never     Active Member of Clubs or Organizations: No     Attends Club or Organization Meetings: Never     Marital Status: Never    Intimate Partner Violence: Not At Risk (3/21/2024)    Humiliation, Afraid, Rape, and Kick questionnaire     Fear of Current or Ex-Partner: No     Emotionally Abused: No     Physically Abused: No     Sexually Abused: No   Housing Stability: Low Risk  (5/17/2024)    Housing Stability Vital Sign     Unable to Pay for Housing in the Last Year: No     Number of Times Moved in the Last Year: 1     Homeless in the Last Year: No       Allergies   Allergen Reactions    Ambrosia Artemisiifolia (Ragweed) Skin Test  "Facial Swelling    Codeine Other (See Comments)     Heart races    Epinephrine      Pt reports \"it makes my heart race, they told me I cant take it.\"    Ibuprofen Other (See Comments)     Heart racing    Morphine Other (See Comments)     Heart racing    Pollen Extract Sneezing    Tramadol Other (See Comments)     Heart racing       Review of Systems  General- denies fever/chills  HEENT- denies hearing loss or sore throat  Eyes- denies eye pain or visual disturbances, denies red eyes  Respiratory- denies cough or SOB  Cardio- denies chest pain or palpitations  GI- denies abdominal pain  Endocrine- denies urinary frequency  Urinary- denies pain with urination  Musculoskeletal- Negative except noted above  Skin- denies rashes or wounds  Neurological- denies dizziness or headache  Psychiatric- denies anxiety or difficulty concentrating    Physical Exam  /99   Pulse 84   Ht 5' 2\" (1.575 m)   Wt 59.9 kg (132 lb 0.9 oz)   BMI 24.15 kg/m²     General/Constitutional: No apparent distress: well-nourished and well developed.  Lymphatic: No appreciable lymphadenopathy  Respiratory: Non-labored breathing  Vascular: No edema, swelling or tenderness, except as noted in detailed exam.  Integumentary: No impressive skin lesions present, except as noted in detailed exam.  Psych: Normal mood and affect, oriented to person, place and time.  MSK: normal other than stated in HPI and exam  Gait & balance: no evidence of myelopathic gait, ambulates Independently     Lumbar spine range of motion:  -Forward flexion to 90  -Extension to neutral  There is mild tenderness with palpation along lumbar paraspinal musculature, no midline tenderness     Neurologic:    Lower Extremity Motor Function    Right  Left    Iliopsoas  5/5  5/5    Quadriceps 5/5 5/5   Tibialis anterior  5/5  5/5    EHL  5/5  5/5    Gastroc. muscle  5/5  5/5    Heel rise  5/5  5/5    Toe rise  5/5  5/5      Sensory: light touch is intact to bilateral upper and lower " "extremities     Reflexes:  intact     Other tests:  Straight Leg Raise: negative  Tonio SI: positive  ROHAN SI: positive  Greater troch: no tenderness   Internal/external hip ROM: intact, no pain   Flexion/extension knee ROM: intact, no pain   Vascular: WWP extremities    Diagnostic Tests   IMAGING: I have personally reviewed the images and these are my findings:  Lumbar Spine X-rays from 11/11/2024: multi level lumbar spondylosis with loss of disc height, osteophyte formation and facet hypertrophy, no appreciated lytic/blastic lesions, L4-5 spondylolisthesis which is more prominent on flexion x-rays    Lumbar Spine MRI from 12/2/2023: multi level lumbar disc degeneration with disc desiccation, loss of disc height, facet and ligamentum hypertrophy, right-sided L4-5 facet cyst, bilateral mild/moderate lateral recess stenosis    Electronic Medical Records were reviewed including pain management notes, radiology reports    Procedures, if performed today     None performed       Portions of the record may have been created with voice recognition software.  Occasional wrong word or \"sound a like\" substitutions may have occurred due to the inherent limitations of voice recognition software.  Read the chart carefully and recognize, using context, where substitutions have occurred.   "

## 2024-11-11 NOTE — LETTER
2024     Elham Saleem MD  [address]  [place] [address]    Patient: Mireya Yi   YOB: 1945   Date of Visit: 2024       Dear Dr. Saleem:    Thank you for referring Mireya Yi to me for evaluation. Below are my notes for this consultation.    If you have questions, please do not hesitate to call me. I look forward to following your patient along with you.         Sincerely,        Carter Cortez MD        CC: EILEEN Ma MD  2024 10:18 AM  Sign when Signing Visit  Assessment & Plan/Medical Decision Makin y.o. female with Bilateral Radicular Leg Pain, Ambulatory Dysfunction, and Left Hip Pain and imaging findings most notable for L4-5 degenerative spondylolisthesis         The clinical, physical and imaging findings were reviewed with the patient.  Mireya  has a constellation of findings consistent with lumbar radiculopathy, sacroiliac joint dysfunction in the setting of lumbar degenerative disease.      Fortunately patient remains neurologically intact and functional.   We discussed the treatment options including physical therapy, at home exercises, activity modifications, chiropractic medicine, oral medications, interventional spine procedures.  We did discuss the role of surgical intervention however we agreed to try and exhaust all nonoperative treatment options.  Patient has upcoming medial branch block scheduled with spine and pain.  Did review the benefits of this procedure and recommend patient proceed.  Will have patient follow-up in approximately 6 weeks to reevaluate and assess if surgical intervention is warranted.     Subjective:      Chief Complaint: Back Pain    HPI:  Mireya Yi is a 79 y.o. female presenting for initial visit with chief complaint of low back pain, bilateral radicular leg pain -referred by Violetta Avendaño.  Pain began about 3 years ago without any injury or fall. She has pain with  bilateral side lying and with laying on her back. She also experiences pain with sitting any standing. She describes her left leg to be worse. Her symptoms are describes to go to her base of her foot in which she experiences numbness and tingling in her left foot. Her entire left leg cramps if she externally rotates her hip.   She has attempted physical therapy that did not provide any relief. She received an NEERU on 8/12/24 that did not provide any relief. She is scheduled for a repeat injection on 11/14/24. She is currently taking aspirin for pain which does provide slight relief.   Denies any michael trauma. Denies fever or chills, no night sweats. Denies any bladder or bowel changes.      Conservative therapy includes the following:   Medications: OTC    Injections: Yes     Physical Therapy: Yes  Chiropractic Medicine: has not attempted  Accupunture/Massage Therapy: has not attempted   These therapeutic modalities were ineffective at providing sustained pain relief/functional improvement.     Objective:     Family History   Problem Relation Age of Onset   • Heart attack Mother    • Other Mother         Aspiration of Vomit    • Asthma Father    • Breast cancer Sister    • Arthritis Family    • Other Family         Musculoskeletal Disease    • Osteoporosis Family        Past Medical History:   Diagnosis Date   • Asthma    • Asthmatic bronchitis     Last Assessed: 10/7/2014    • Chronic diarrhea     Last Assessed: 8/20/2015    • Fibromyalgia    • Focal nodular hyperplasia of liver     Last Assessed: 6/11/2015   • Herpes zoster     Last Assessed: 3/18/2016   • Hypertension    • IBS (irritable bowel syndrome)    • Intermittent palpitations    • Irritable bowel syndrome    • Lumbar radiculopathy    • Osteoarthritis    • Vertigo        Current Outpatient Medications   Medication Sig Dispense Refill   • acetaminophen (TYLENOL) 500 mg tablet Take 1 tablet (500 mg total) by mouth every 6 (six) hours as needed for mild pain  60 tablet 0   • albuterol (2.5 mg/3 mL) 0.083 % nebulizer solution Take 3 mL (2.5 mg total) by nebulization every 4 (four) hours as needed for wheezing or shortness of breath 90 mL 5   • albuterol (PROVENTIL HFA,VENTOLIN HFA) 90 mcg/act inhaler Inhale 2 puffs every 6 (six) hours as needed for wheezing or shortness of breath 18 g 5   • amLODIPine (NORVASC) 5 mg tablet Take 1 tablet (5 mg total) by mouth daily 90 tablet 2   • Azelastine HCl 137 MCG/SPRAY SOLN 2 sprays into each nostril 2 (two) times a day 30 mL 1   • budesonide-formoterol (Symbicort) 80-4.5 MCG/ACT inhaler Inhale 2 puffs 2 (two) times a day Rinse mouth after use 10.2 g 5   • Cholecalciferol (D3-1000) 1,000 units tablet Take 1 tablet (1,000 Units total) by mouth daily 90 tablet 1   • fexofenadine (ALLEGRA) 180 MG tablet Take 180 mg by mouth daily     • fluticasone (FLONASE) 50 mcg/act nasal spray 2 sprays into each nostril daily 16 g 3   • folic acid (FOLVITE) 1 mg tablet TAKE 1 TABLET BY MOUTH EVERY DAY 90 tablet 2   • hydrocortisone (ANUSOL-HC) 2.5 % rectal cream Apply topically 2 (two) times a day 28 g 0   • ipratropium (ATROVENT) 0.03 % nasal spray 2 sprays into each nostril every 12 (twelve) hours 30 mL 2   • montelukast (SINGULAIR) 10 mg tablet Take 1 tablet (10 mg total) by mouth daily at bedtime 90 tablet 1   • nicotine (NICODERM CQ) 14 mg/24hr TD 24 hr patch Place 1 patch on the skin over 24 hours every 24 hours 28 patch 1   • ondansetron (Zofran ODT) 4 mg disintegrating tablet Take 1 tablet (4 mg total) by mouth every 6 (six) hours as needed for nausea or vomiting 20 tablet 3   • pantoprazole (PROTONIX) 20 mg tablet Take 1 tablet (20 mg total) by mouth daily 90 tablet 1     No current facility-administered medications for this visit.       Past Surgical History:   Procedure Laterality Date   • BREAST BIOPSY     • SMALL INTESTINE SURGERY         Social History     Socioeconomic History   • Marital status:      Spouse name: Not on file    • Number of children: Not on file   • Years of education: Not on file   • Highest education level: Not on file   Occupational History   • Occupation: Unemployed    Tobacco Use   • Smoking status: Former     Current packs/day: 0.50     Types: Cigarettes   • Smokeless tobacco: Never   • Tobacco comments:     Stopped smoking July 2023   Vaping Use   • Vaping status: Never Used   Substance and Sexual Activity   • Alcohol use: Not Currently   • Drug use: No   • Sexual activity: Not Currently     Partners: Male   Other Topics Concern   • Not on file   Social History Narrative    Mental Disability     Exercising Regularly     Lives with adult children      Social Determinants of Health     Financial Resource Strain: Low Risk  (5/17/2024)    Overall Financial Resource Strain (CARDIA)    • Difficulty of Paying Living Expenses: Not very hard   Food Insecurity: No Food Insecurity (5/17/2024)    Nursing - Inadequate Food Risk Classification    • Worried About Running Out of Food in the Last Year: Never true    • Ran Out of Food in the Last Year: Never true    • Ran Out of Food in the Last Year: Not on file   Transportation Needs: No Transportation Needs (5/17/2024)    PRAPARE - Transportation    • Lack of Transportation (Medical): No    • Lack of Transportation (Non-Medical): No   Physical Activity: Sufficiently Active (6/14/2024)    Exercise Vital Sign    • Days of Exercise per Week: 4 days    • Minutes of Exercise per Session: 40 min   Stress: Stress Concern Present (3/21/2024)    Citizen of [place] of Occupational Health - Occupational Stress Questionnaire    • Feeling of Stress : To some extent   Social Connections: Socially Isolated (6/14/2024)    Social Connection and Isolation Panel [NHANES]    • Frequency of Communication with Friends and Family: More than three times a week    • Frequency of Social Gatherings with Friends and Family: More than three times a week    • Attends Holiness Services: Never    • Active  "Member of Clubs or Organizations: No    • Attends Club or Organization Meetings: Never    • Marital Status: Never    Intimate Partner Violence: Not At Risk (3/21/2024)    Humiliation, Afraid, Rape, and Kick questionnaire    • Fear of Current or Ex-Partner: No    • Emotionally Abused: No    • Physically Abused: No    • Sexually Abused: No   Housing Stability: Low Risk  (5/17/2024)    Housing Stability Vital Sign    • Unable to Pay for Housing in the Last Year: No    • Number of Times Moved in the Last Year: 1    • Homeless in the Last Year: No       Allergies   Allergen Reactions   • Ambrosia Artemisiifolia (Ragweed) Skin Test Facial Swelling   • Codeine Other (See Comments)     Heart races   • Epinephrine      Pt reports \"it makes my heart race, they told me I cant take it.\"   • Ibuprofen Other (See Comments)     Heart racing   • Morphine Other (See Comments)     Heart racing   • Pollen Extract Sneezing   • Tramadol Other (See Comments)     Heart racing       Review of Systems  General- denies fever/chills  HEENT- denies hearing loss or sore throat  Eyes- denies eye pain or visual disturbances, denies red eyes  Respiratory- denies cough or SOB  Cardio- denies chest pain or palpitations  GI- denies abdominal pain  Endocrine- denies urinary frequency  Urinary- denies pain with urination  Musculoskeletal- Negative except noted above  Skin- denies rashes or wounds  Neurological- denies dizziness or headache  Psychiatric- denies anxiety or difficulty concentrating    Physical Exam  /99   Pulse 84   Ht 5' 2\" (1.575 m)   Wt 59.9 kg (132 lb 0.9 oz)   BMI 24.15 kg/m²     General/Constitutional: No apparent distress: well-nourished and well developed.  Lymphatic: No appreciable lymphadenopathy  Respiratory: Non-labored breathing  Vascular: No edema, swelling or tenderness, except as noted in detailed exam.  Integumentary: No impressive skin lesions present, except as noted in detailed exam.  Psych: Normal " "mood and affect, oriented to person, place and time.  MSK: normal other than stated in HPI and exam  Gait & balance: no evidence of myelopathic gait, ambulates Independently     Lumbar spine range of motion:  -Forward flexion to 90  -Extension to neutral  There is mild tenderness with palpation along lumbar paraspinal musculature, no midline tenderness     Neurologic:    Lower Extremity Motor Function    Right  Left    Iliopsoas  5/5  5/5    Quadriceps 5/5 5/5   Tibialis anterior  5/5  5/5    EHL  5/5  5/5    Gastroc. muscle  5/5  5/5    Heel rise  5/5  5/5    Toe rise  5/5  5/5      Sensory: light touch is intact to bilateral upper and lower extremities     Reflexes:  intact     Other tests:  Straight Leg Raise: negative  Tonio SI: positive  ROHAN SI: positive  Greater troch: no tenderness   Internal/external hip ROM: intact, no pain   Flexion/extension knee ROM: intact, no pain   Vascular: WWP extremities    Diagnostic Tests   IMAGING: I have personally reviewed the images and these are my findings:  Lumbar Spine X-rays from 11/11/2024: multi level lumbar spondylosis with loss of disc height, osteophyte formation and facet hypertrophy, no appreciated lytic/blastic lesions, L4-5 spondylolisthesis which is more prominent on flexion x-rays    Lumbar Spine MRI from 12/2/2023: multi level lumbar disc degeneration with disc desiccation, loss of disc height, facet and ligamentum hypertrophy, right-sided L4-5 facet cyst, bilateral mild/moderate lateral recess stenosis    Electronic Medical Records were reviewed including pain management notes, radiology reports    Procedures, if performed today     None performed       Portions of the record may have been created with voice recognition software.  Occasional wrong word or \"sound a like\" substitutions may have occurred due to the inherent limitations of voice recognition software.  Read the chart carefully and recognize, using context, where substitutions have occurred.   "

## 2024-11-13 ENCOUNTER — HOSPITAL ENCOUNTER (OUTPATIENT)
Dept: RADIOLOGY | Facility: CLINIC | Age: 79
Discharge: HOME | End: 2024-11-13
Admitting: ANESTHESIOLOGY
Payer: MEDICARE

## 2024-11-13 VITALS
SYSTOLIC BLOOD PRESSURE: 120 MMHG | DIASTOLIC BLOOD PRESSURE: 84 MMHG | TEMPERATURE: 97.7 F | RESPIRATION RATE: 18 BRPM | OXYGEN SATURATION: 97 % | HEART RATE: 107 BPM

## 2024-11-13 DIAGNOSIS — M47.816 LUMBAR SPONDYLOSIS: ICD-10-CM

## 2024-11-13 PROCEDURE — 64494 INJ PARAVERT F JNT L/S 2 LEV: CPT | Performed by: ANESTHESIOLOGY

## 2024-11-13 PROCEDURE — 64493 INJ PARAVERT F JNT L/S 1 LEV: CPT | Performed by: ANESTHESIOLOGY

## 2024-11-13 RX ORDER — LIDOCAINE HYDROCHLORIDE 10 MG/ML
4 INJECTION, SOLUTION EPIDURAL; INFILTRATION; INTRACAUDAL; PERINEURAL ONCE
Status: COMPLETED | OUTPATIENT
Start: 2024-11-13 | End: 2024-11-13

## 2024-11-13 RX ADMIN — LIDOCAINE HYDROCHLORIDE 4 ML: 10 INJECTION, SOLUTION EPIDURAL; INFILTRATION; INTRACAUDAL; PERINEURAL at 10:42

## 2024-11-13 RX ADMIN — LIDOCAINE HYDROCHLORIDE 3 ML: 20 INJECTION, SOLUTION EPIDURAL; INFILTRATION; INTRACAUDAL; PERINEURAL at 10:45

## 2024-11-13 NOTE — H&P
History of Present Illness: The patient is a 79 y.o. female who presents with complaints of low back pain.    Past Medical History:   Diagnosis Date    Asthma     Asthmatic bronchitis     Last Assessed: 10/7/2014     Chronic diarrhea     Last Assessed: 8/20/2015     Fibromyalgia     Focal nodular hyperplasia of liver     Last Assessed: 6/11/2015    Herpes zoster     Last Assessed: 3/18/2016    Hypertension     IBS (irritable bowel syndrome)     Intermittent palpitations     Irritable bowel syndrome     Lumbar radiculopathy     Osteoarthritis     Vertigo        Past Surgical History:   Procedure Laterality Date    BREAST BIOPSY      SMALL INTESTINE SURGERY           Current Outpatient Medications:     acetaminophen (TYLENOL) 500 mg tablet, Take 1 tablet (500 mg total) by mouth every 6 (six) hours as needed for mild pain, Disp: 60 tablet, Rfl: 0    albuterol (2.5 mg/3 mL) 0.083 % nebulizer solution, Take 3 mL (2.5 mg total) by nebulization every 4 (four) hours as needed for wheezing or shortness of breath, Disp: 90 mL, Rfl: 5    albuterol (PROVENTIL HFA,VENTOLIN HFA) 90 mcg/act inhaler, Inhale 2 puffs every 6 (six) hours as needed for wheezing or shortness of breath, Disp: 18 g, Rfl: 5    amLODIPine (NORVASC) 5 mg tablet, Take 1 tablet (5 mg total) by mouth daily, Disp: 90 tablet, Rfl: 2    Azelastine HCl 137 MCG/SPRAY SOLN, 2 sprays into each nostril 2 (two) times a day, Disp: 30 mL, Rfl: 1    budesonide-formoterol (Symbicort) 80-4.5 MCG/ACT inhaler, Inhale 2 puffs 2 (two) times a day Rinse mouth after use, Disp: 10.2 g, Rfl: 5    Cholecalciferol (D3-1000) 1,000 units tablet, Take 1 tablet (1,000 Units total) by mouth daily, Disp: 90 tablet, Rfl: 1    fexofenadine (ALLEGRA) 180 MG tablet, Take 180 mg by mouth daily, Disp: , Rfl:     fluticasone (FLONASE) 50 mcg/act nasal spray, 2 sprays into each nostril daily, Disp: 16 g, Rfl: 3    folic acid (FOLVITE) 1 mg tablet, TAKE 1 TABLET BY MOUTH EVERY DAY, Disp: 90 tablet,  "Rfl: 2    hydrocortisone (ANUSOL-HC) 2.5 % rectal cream, Apply topically 2 (two) times a day, Disp: 28 g, Rfl: 0    ipratropium (ATROVENT) 0.03 % nasal spray, 2 sprays into each nostril every 12 (twelve) hours, Disp: 30 mL, Rfl: 2    montelukast (SINGULAIR) 10 mg tablet, Take 1 tablet (10 mg total) by mouth daily at bedtime, Disp: 90 tablet, Rfl: 1    nicotine (NICODERM CQ) 14 mg/24hr TD 24 hr patch, Place 1 patch on the skin over 24 hours every 24 hours, Disp: 28 patch, Rfl: 1    ondansetron (Zofran ODT) 4 mg disintegrating tablet, Take 1 tablet (4 mg total) by mouth every 6 (six) hours as needed for nausea or vomiting, Disp: 20 tablet, Rfl: 3    pantoprazole (PROTONIX) 20 mg tablet, Take 1 tablet (20 mg total) by mouth daily, Disp: 90 tablet, Rfl: 1    Allergies   Allergen Reactions    Ambrosia Artemisiifolia (Ragweed) Skin Test Facial Swelling    Codeine Other (See Comments)     Heart races    Epinephrine      Pt reports \"it makes my heart race, they told me I cant take it.\"    Ibuprofen Other (See Comments)     Heart racing    Morphine Other (See Comments)     Heart racing    Pollen Extract Sneezing    Tramadol Other (See Comments)     Heart racing       Physical Exam:   Vitals:    11/13/24 1025   BP: 129/87   Pulse: (!) 107   Resp: 18   Temp: 97.7 °F (36.5 °C)   SpO2: 98%     General: Awake, Alert, Oriented x 3, Mood and affect appropriate  Respiratory: Respirations even and unlabored  Cardiovascular: Peripheral pulses intact; no edema  Musculoskeletal Exam: normal gait    ASA Score: 3    Patient/Chart Verification  Patient ID Verified: Verbal  ID Band Applied: No  Consents Confirmed: Procedural, To be obtained in the Pre-Procedure area  H&P( within 30 days) Verified: To be obtained in the Procedural area  Allergies Reviewed: Yes  Anticoag/NSAID held?: No  Currently on antibiotics?: No  Does Patient Have a Prosthetic Device/Implant: No (denies cardiac pacer / ICD)    Assessment:   1. Lumbar spondylosis  "       Plan: B/L L3-5 MBB

## 2024-11-13 NOTE — DISCHARGE INSTR - LAB

## 2024-11-20 ENCOUNTER — TELEPHONE (OUTPATIENT)
Dept: NEPHROLOGY | Facility: CLINIC | Age: 79
End: 2024-11-20

## 2024-11-20 NOTE — TELEPHONE ENCOUNTER
Called pt and scheduled follow up appt on 7/31/2025 at 4pm with Dr Reyes in the [facility]. Appt card mailed to pt.

## 2024-12-02 ENCOUNTER — OFFICE VISIT (OUTPATIENT)
Dept: FAMILY MEDICINE CLINIC | Facility: CLINIC | Age: 79
End: 2024-12-02

## 2024-12-02 VITALS
HEIGHT: 62 IN | OXYGEN SATURATION: 99 % | DIASTOLIC BLOOD PRESSURE: 83 MMHG | TEMPERATURE: 98.3 F | SYSTOLIC BLOOD PRESSURE: 144 MMHG | BODY MASS INDEX: 25.03 KG/M2 | WEIGHT: 136 LBS | HEART RATE: 86 BPM | RESPIRATION RATE: 20 BRPM

## 2024-12-02 DIAGNOSIS — Z12.31 ENCOUNTER FOR SCREENING MAMMOGRAM FOR MALIGNANT NEOPLASM OF BREAST: ICD-10-CM

## 2024-12-02 DIAGNOSIS — M05.79 RHEUMATOID ARTHRITIS INVOLVING MULTIPLE SITES WITH POSITIVE RHEUMATOID FACTOR (HCC): ICD-10-CM

## 2024-12-02 DIAGNOSIS — I10 ESSENTIAL HYPERTENSION: ICD-10-CM

## 2024-12-02 DIAGNOSIS — J30.9 CHRONIC ALLERGIC RHINITIS: ICD-10-CM

## 2024-12-02 DIAGNOSIS — Z12.11 ENCOUNTER FOR SCREENING COLONOSCOPY: ICD-10-CM

## 2024-12-02 DIAGNOSIS — E55.9 VITAMIN D DEFICIENCY: ICD-10-CM

## 2024-12-02 DIAGNOSIS — Z00.00 MEDICARE ANNUAL WELLNESS VISIT, SUBSEQUENT: Primary | ICD-10-CM

## 2024-12-02 DIAGNOSIS — K21.9 GASTROESOPHAGEAL REFLUX DISEASE WITHOUT ESOPHAGITIS: ICD-10-CM

## 2024-12-02 DIAGNOSIS — Z79.899 HIGH RISK MEDICATION USE: ICD-10-CM

## 2024-12-02 DIAGNOSIS — J45.30 MILD PERSISTENT ASTHMA WITHOUT COMPLICATION: ICD-10-CM

## 2024-12-02 PROCEDURE — 3077F SYST BP >= 140 MM HG: CPT | Performed by: FAMILY MEDICINE

## 2024-12-02 PROCEDURE — 3079F DIAST BP 80-89 MM HG: CPT | Performed by: FAMILY MEDICINE

## 2024-12-02 PROCEDURE — G0439 PPPS, SUBSEQ VISIT: HCPCS | Performed by: FAMILY MEDICINE

## 2024-12-02 RX ORDER — ONDANSETRON 4 MG/1
4 TABLET, ORALLY DISINTEGRATING ORAL EVERY 6 HOURS PRN
Qty: 20 TABLET | Refills: 3 | Status: SHIPPED | OUTPATIENT
Start: 2024-12-02

## 2024-12-02 RX ORDER — FOLIC ACID 1 MG/1
1000 TABLET ORAL DAILY
Qty: 90 TABLET | Refills: 2 | Status: SHIPPED | OUTPATIENT
Start: 2024-12-02

## 2024-12-02 RX ORDER — AMLODIPINE BESYLATE 5 MG/1
5 TABLET ORAL DAILY
Qty: 90 TABLET | Refills: 2 | Status: SHIPPED | OUTPATIENT
Start: 2024-12-02

## 2024-12-02 RX ORDER — MONTELUKAST SODIUM 10 MG/1
10 TABLET ORAL
Qty: 90 TABLET | Refills: 1 | Status: SHIPPED | OUTPATIENT
Start: 2024-12-02

## 2024-12-02 RX ORDER — PANTOPRAZOLE SODIUM 20 MG/1
20 TABLET, DELAYED RELEASE ORAL DAILY
Qty: 90 TABLET | Refills: 1 | Status: SHIPPED | OUTPATIENT
Start: 2024-12-02

## 2024-12-02 RX ORDER — FLUTICASONE PROPIONATE 50 MCG
2 SPRAY, SUSPENSION (ML) NASAL DAILY
Qty: 16 G | Refills: 3 | Status: SHIPPED | OUTPATIENT
Start: 2024-12-02

## 2024-12-02 RX ORDER — AZELASTINE HYDROCHLORIDE 137 UG/1
2 SPRAY, METERED NASAL 2 TIMES DAILY
Qty: 30 ML | Refills: 5 | Status: SHIPPED | OUTPATIENT
Start: 2024-12-02

## 2024-12-02 NOTE — PATIENT INSTRUCTIONS
Medicare Preventive Visit Patient Instructions  Thank you for completing your Welcome to Medicare Visit or Medicare Annual Wellness Visit today. Your next wellness visit will be due in one year (12/3/2025).  The screening/preventive services that you may require over the next 5-10 years are detailed below. Some tests may not apply to you based off risk factors and/or age. Screening tests ordered at today's visit but not completed yet may show as past due. Also, please note that scanned in results may not display below.  Preventive Screenings:  Service Recommendations Previous Testing/Comments   Colorectal Cancer Screening  * Colonoscopy    * Fecal Occult Blood Test (FOBT)/Fecal Immunochemical Test (FIT)  * Fecal DNA/Cologuard Test  * Flexible Sigmoidoscopy Age: 45-75 years old   Colonoscopy: every 10 years (may be performed more frequently if at higher risk)  OR  FOBT/FIT: every 1 year  OR  Cologuard: every 3 years  OR  Sigmoidoscopy: every 5 years  Screening may be recommended earlier than age 45 if at higher risk for colorectal cancer. Also, an individualized decision between you and your healthcare provider will decide whether screening between the ages of 76-85 would be appropriate. Colonoscopy: 03/25/2005  FOBT/FIT: Not on file  Cologuard: Not on file  Sigmoidoscopy: Not on file          Breast Cancer Screening Age: 40+ years old  Frequency: every 1-2 years  Not required if history of left and right mastectomy Mammogram: 01/10/2017        Cervical Cancer Screening Between the ages of 21-29, pap smear recommended once every 3 years.   Between the ages of 30-65, can perform pap smear with HPV co-testing every 5 years.   Recommendations may differ for women with a history of total hysterectomy, cervical cancer, or abnormal pap smears in past. Pap Smear: Not on file    Screening Not Indicated   Hepatitis C Screening Once for adults born between 1945 and 1965  More frequently in patients at high risk for Hepatitis C  Hep C Antibody: Not on file    Screening Current   Diabetes Screening 1-2 times per year if you're at risk for diabetes or have pre-diabetes Fasting glucose: 98 mg/dL (9/28/2024)  A1C: 6.0 % (10/15/2021)  Screening Current   Cholesterol Screening Once every 5 years if you don't have a lipid disorder. May order more often based on risk factors. Lipid panel: 03/20/2024    Screening Current     Other Preventive Screenings Covered by Medicare:  Abdominal Aortic Aneurysm (AAA) Screening: covered once if your at risk. You're considered to be at risk if you have a family history of AAA.  Lung Cancer Screening: covers low dose CT scan once per year if you meet all of the following conditions: (1) Age 55-77; (2) No signs or symptoms of lung cancer; (3) Current smoker or have quit smoking within the last 15 years; (4) You have a tobacco smoking history of at least 20 pack years (packs per day multiplied by number of years you smoked); (5) You get a written order from a healthcare provider.  Glaucoma Screening: covered annually if you're considered high risk: (1) You have diabetes OR (2) Family history of glaucoma OR (3)  aged 50 and older OR (4)  American aged 65 and older  Osteoporosis Screening: covered every 2 years if you meet one of the following conditions: (1) You're estrogen deficient and at risk for osteoporosis based off medical history and other findings; (2) Have a vertebral abnormality; (3) On glucocorticoid therapy for more than 3 months; (4) Have primary hyperparathyroidism; (5) On osteoporosis medications and need to assess response to drug therapy.   Last bone density test (DXA Scan): Not on file.  HIV Screening: covered annually if you're between the age of 15-65. Also covered annually if you are younger than 15 and older than 65 with risk factors for HIV infection. For pregnant patients, it is covered up to 3 times per pregnancy.    Immunizations:  Immunization Recommendations    Influenza Vaccine Annual influenza vaccination during flu season is recommended for all persons aged >= 6 months who do not have contraindications   Pneumococcal Vaccine   * Pneumococcal conjugate vaccine = PCV13 (Prevnar 13), PCV15 (Vaxneuvance), PCV20 (Prevnar 20)  * Pneumococcal polysaccharide vaccine = PPSV23 (Pneumovax) Adults 19-65 yo with certain risk factors or if 65+ yo  If never received any pneumonia vaccine: recommend Prevnar 20 (PCV20)  Give PCV20 if previously received 1 dose of PCV13 or PPSV23   Hepatitis B Vaccine 3 dose series if at intermediate or high risk (ex: diabetes, end stage renal disease, liver disease)   Respiratory syncytial virus (RSV) Vaccine - COVERED BY MEDICARE PART D  * RSVPreF3 (Arexvy) CDC recommends that adults 60 years of age and older may receive a single dose of RSV vaccine using shared clinical decision-making (SCDM)   Tetanus (Td) Vaccine - COST NOT COVERED BY MEDICARE PART B Following completion of primary series, a booster dose should be given every 10 years to maintain immunity against tetanus. Td may also be given as tetanus wound prophylaxis.   Tdap Vaccine - COST NOT COVERED BY MEDICARE PART B Recommended at least once for all adults. For pregnant patients, recommended with each pregnancy.   Shingles Vaccine (Shingrix) - COST NOT COVERED BY MEDICARE PART B  2 shot series recommended in those 19 years and older who have or will have weakened immune systems or those 50 years and older     Health Maintenance Due:      Topic Date Due   • Breast Cancer Screening: Mammogram  01/10/2019   • Hepatitis C Screening  Completed   • Colorectal Cancer Screening  Discontinued     Immunizations Due:      Topic Date Due   • Hepatitis A Vaccine (1 of 2 - Risk 2-dose series) Never done   • Influenza Vaccine (1) Never done   • COVID-19 Vaccine (1 - 2024-25 season) Never done     Advance Directives   What are advance directives?  Advance directives are legal documents that state your  wishes and plans for medical care. These plans are made ahead of time in case you lose your ability to make decisions for yourself. Advance directives can apply to any medical decision, such as the treatments you want, and if you want to donate organs.   What are the types of advance directives?  There are many types of advance directives, and each state has rules about how to use them. You may choose a combination of any of the following:  Living will:  This is a written record of the treatment you want. You can also choose which treatments you do not want, which to limit, and which to stop at a certain time. This includes surgery, medicine, IV fluid, and tube feedings.   Durable power of  for healthcare (DPAHC):  This is a written record that states who you want to make healthcare choices for you when you are unable to make them for yourself. This person, called a proxy, is usually a family member or a friend. You may choose more than 1 proxy.  Do not resuscitate (DNR) order:  A DNR order is used in case your heart stops beating or you stop breathing. It is a request not to have certain forms of treatment, such as CPR. A DNR order may be included in other types of advance directives.  Medical directive:  This covers the care that you want if you are in a coma, near death, or unable to make decisions for yourself. You can list the treatments you want for each condition. Treatment may include pain medicine, surgery, blood transfusions, dialysis, IV or tube feedings, and a ventilator (breathing machine).  Values history:  This document has questions about your views, beliefs, and how you feel and think about life. This information can help others choose the care that you would choose.  Why are advance directives important?  An advance directive helps you control your care. Although spoken wishes may be used, it is better to have your wishes written down. Spoken wishes can be misunderstood, or not followed.  Treatments may be given even if you do not want them. An advance directive may make it easier for your family to make difficult choices about your care.       © Copyright [employer] 2018 Information is for End User's use only and may not be sold, redistributed or otherwise used for commercial purposes. All illustrations and images included in CareNotes® are the copyrighted property of [employer], [employer] or [employer]      Medicare Preventive Visit Patient Instructions  Thank you for completing your Welcome to Medicare Visit or Medicare Annual Wellness Visit today. Your next wellness visit will be due in one year (12/3/2025).  The screening/preventive services that you may require over the next 5-10 years are detailed below. Some tests may not apply to you based off risk factors and/or age. Screening tests ordered at today's visit but not completed yet may show as past due. Also, please note that scanned in results may not display below.  Preventive Screenings:  Service Recommendations Previous Testing/Comments   Colorectal Cancer Screening  * Colonoscopy    * Fecal Occult Blood Test (FOBT)/Fecal Immunochemical Test (FIT)  * Fecal DNA/Cologuard Test  * Flexible Sigmoidoscopy Age: 45-75 years old   Colonoscopy: every 10 years (may be performed more frequently if at higher risk)  OR  FOBT/FIT: every 1 year  OR  Cologuard: every 3 years  OR  Sigmoidoscopy: every 5 years  Screening may be recommended earlier than age 45 if at higher risk for colorectal cancer. Also, an individualized decision between you and your healthcare provider will decide whether screening between the ages of 76-85 would be appropriate. Colonoscopy: 03/25/2005  FOBT/FIT: Not on file  Cologuard: Not on file  Sigmoidoscopy: Not on file          Breast Cancer Screening Age: 40+ years old  Frequency: every 1-2 years  Not required if history of left and right mastectomy Mammogram: 01/10/2017        Cervical Cancer Screening Between the ages of  21-29, pap smear recommended once every 3 years.   Between the ages of 30-65, can perform pap smear with HPV co-testing every 5 years.   Recommendations may differ for women with a history of total hysterectomy, cervical cancer, or abnormal pap smears in past. Pap Smear: Not on file    Screening Not Indicated   Hepatitis C Screening Once for adults born between 1945 and 1965  More frequently in patients at high risk for Hepatitis C Hep C Antibody: Not on file    Screening Current   Diabetes Screening 1-2 times per year if you're at risk for diabetes or have pre-diabetes Fasting glucose: 98 mg/dL (9/28/2024)  A1C: 6.0 % (10/15/2021)  Screening Current   Cholesterol Screening Once every 5 years if you don't have a lipid disorder. May order more often based on risk factors. Lipid panel: 03/20/2024    Screening Current     Other Preventive Screenings Covered by Medicare:  Abdominal Aortic Aneurysm (AAA) Screening: covered once if your at risk. You're considered to be at risk if you have a family history of AAA.  Lung Cancer Screening: covers low dose CT scan once per year if you meet all of the following conditions: (1) Age 55-77; (2) No signs or symptoms of lung cancer; (3) Current smoker or have quit smoking within the last 15 years; (4) You have a tobacco smoking history of at least 20 pack years (packs per day multiplied by number of years you smoked); (5) You get a written order from a healthcare provider.  Glaucoma Screening: covered annually if you're considered high risk: (1) You have diabetes OR (2) Family history of glaucoma OR (3)  aged 50 and older OR (4)  American aged 65 and older  Osteoporosis Screening: covered every 2 years if you meet one of the following conditions: (1) You're estrogen deficient and at risk for osteoporosis based off medical history and other findings; (2) Have a vertebral abnormality; (3) On glucocorticoid therapy for more than 3 months; (4) Have primary  hyperparathyroidism; (5) On osteoporosis medications and need to assess response to drug therapy.   Last bone density test (DXA Scan): Not on file.  HIV Screening: covered annually if you're between the age of 15-65. Also covered annually if you are younger than 15 and older than 65 with risk factors for HIV infection. For pregnant patients, it is covered up to 3 times per pregnancy.    Immunizations:  Immunization Recommendations   Influenza Vaccine Annual influenza vaccination during flu season is recommended for all persons aged >= 6 months who do not have contraindications   Pneumococcal Vaccine   * Pneumococcal conjugate vaccine = PCV13 (Prevnar 13), PCV15 (Vaxneuvance), PCV20 (Prevnar 20)  * Pneumococcal polysaccharide vaccine = PPSV23 (Pneumovax) Adults 19-65 yo with certain risk factors or if 65+ yo  If never received any pneumonia vaccine: recommend Prevnar 20 (PCV20)  Give PCV20 if previously received 1 dose of PCV13 or PPSV23   Hepatitis B Vaccine 3 dose series if at intermediate or high risk (ex: diabetes, end stage renal disease, liver disease)   Respiratory syncytial virus (RSV) Vaccine - COVERED BY MEDICARE PART D  * RSVPreF3 (Arexvy) CDC recommends that adults 60 years of age and older may receive a single dose of RSV vaccine using shared clinical decision-making (SCDM)   Tetanus (Td) Vaccine - COST NOT COVERED BY MEDICARE PART B Following completion of primary series, a booster dose should be given every 10 years to maintain immunity against tetanus. Td may also be given as tetanus wound prophylaxis.   Tdap Vaccine - COST NOT COVERED BY MEDICARE PART B Recommended at least once for all adults. For pregnant patients, recommended with each pregnancy.   Shingles Vaccine (Shingrix) - COST NOT COVERED BY MEDICARE PART B  2 shot series recommended in those 19 years and older who have or will have weakened immune systems or those 50 years and older     Health Maintenance Due:      Topic Date Due   • Breast  Cancer Screening: Mammogram  01/10/2019   • Hepatitis C Screening  Completed   • Colorectal Cancer Screening  Discontinued     Immunizations Due:      Topic Date Due   • Hepatitis A Vaccine (1 of 2 - Risk 2-dose series) Never done   • Influenza Vaccine (1) Never done   • COVID-19 Vaccine (1 - 2024-25 season) Never done     Advance Directives   What are advance directives?  Advance directives are legal documents that state your wishes and plans for medical care. These plans are made ahead of time in case you lose your ability to make decisions for yourself. Advance directives can apply to any medical decision, such as the treatments you want, and if you want to donate organs.   What are the types of advance directives?  There are many types of advance directives, and each state has rules about how to use them. You may choose a combination of any of the following:  Living will:  This is a written record of the treatment you want. You can also choose which treatments you do not want, which to limit, and which to stop at a certain time. This includes surgery, medicine, IV fluid, and tube feedings.   Durable power of  for healthcare (DPAHC):  This is a written record that states who you want to make healthcare choices for you when you are unable to make them for yourself. This person, called a proxy, is usually a family member or a friend. You may choose more than 1 proxy.  Do not resuscitate (DNR) order:  A DNR order is used in case your heart stops beating or you stop breathing. It is a request not to have certain forms of treatment, such as CPR. A DNR order may be included in other types of advance directives.  Medical directive:  This covers the care that you want if you are in a coma, near death, or unable to make decisions for yourself. You can list the treatments you want for each condition. Treatment may include pain medicine, surgery, blood transfusions, dialysis, IV or tube feedings, and a ventilator  (breathing machine).  Values history:  This document has questions about your views, beliefs, and how you feel and think about life. This information can help others choose the care that you would choose.  Why are advance directives important?  An advance directive helps you control your care. Although spoken wishes may be used, it is better to have your wishes written down. Spoken wishes can be misunderstood, or not followed. Treatments may be given even if you do not want them. An advance directive may make it easier for your family to make difficult choices about your care.       © Copyright [employer] 2018 Information is for End User's use only and may not be sold, redistributed or otherwise used for commercial purposes. All illustrations and images included in CareNotes® are the copyrighted property of [employer] or [employer]

## 2024-12-02 NOTE — PROGRESS NOTES
Name: Mireya Yi      : 1945      MRN: [id number]  Encounter Provider: Elham Saleem MD  Encounter Date: 2024   Encounter department: [facility]  Medicare annual wellness visit, subsequent              Preventive health issues were discussed with patient, and age appropriate screening tests were ordered as noted in patient's After Visit Summary. Personalized health advice and appropriate referrals for health education or preventive services given if needed, as noted in patient's After Visit Summary.    History of Present Illness   {?Quick Links Encounters * My Last Note * Last Note in Specialty * Snapshot * Since Last Visit * History :02409}  HPI   Patient Care Team:  Elham Saleem MD as PCP - General (Family Medicine)  Brian P George, MD Joselyn Reyes Bahamonde, MD (Nephrology)  Aby Welsh MD as Fellow (Pulmonary Disease)    Review of Systems  Medical History Reviewed by provider this encounter:       Annual Wellness Visit Questionnaire   Annual Wellness Visit  Social Drivers of Health     Financial Resource Strain: Low Risk  (2024)    Overall Financial Resource Strain (CARDIA)     Difficulty of Paying Living Expenses: Not very hard   Food Insecurity: No Food Insecurity (2024)    Nursing - Inadequate Food Risk Classification     Worried About Running Out of Food in the Last Year: Never true     Ran Out of Food in the Last Year: Never true   Transportation Needs: No Transportation Needs (2024)    PRAPARE - Transportation     Lack of Transportation (Medical): No     Lack of Transportation (Non-Medical): No   Housing Stability: Low Risk  (2024)    Housing Stability Vital Sign     Unable to Pay for Housing in the Last Year: No     Number of Times Moved in the Last Year: 1     Homeless in the Last Year: No   Utilities: Not At Risk (2024)    [facility] Utilities     Threatened with loss of utilities: No  "    No results found.    Objective {?Quick Links Trend Vitals * Enter New Vitals * Results Review * Timeline (Adult) * Labs * Imaging * Cardiology * Procedures * Lung Cancer Screening * Surgical eConsent :10294}  /83   Pulse 86   Temp 98.3 °F (36.8 °C) (Temporal)   Resp 20   Ht 5' 2\" (1.575 m)   Wt 61.7 kg (136 lb)   SpO2 99%   BMI 24.87 kg/m²     Physical Exam  {Administrative / Billing Section (Optional):80231}  "

## 2024-12-02 NOTE — PROGRESS NOTES
Name: Mireya Yi      : 1945      MRN: [id number]  Encounter Provider: Elham Saleem MD  Encounter Date: 2024   Encounter department: [facility] Assessment & Plan Medicare annual wellness visit, subsequent  Presents for MAW with no concerns at this time. Up to date on vaccinations except influenza, reports has upcoming back procedure so does not want the influenza vaccine at this time. Due for screening mammogram, ordered today. Due for colorectal screening, Colagard ordered today per patient preference. Due for lung cancer screening which was ordered by pulmonology previously, pt reports insurance is not covering this imaging and she cannot afford this imaging at this time. Due for dexa scan, ordered at previous visit, encouraged patient to complete.        Encounter for screening colonoscopy    Orders:    Cologuard       Preventive health issues were discussed with patient, and age appropriate screening tests were ordered as noted in patient's After Visit Summary. Personalized health advice and appropriate referrals for health education or preventive services given if needed, as noted in patient's After Visit Summary.    History of Present Illness     HPI   Patient Care Team:  Elham Saleem MD as PCP - General (Family Medicine)  Brian P George, MD Joselyn Reyes Bahamonde, MD (Nephrology)  Aby Welsh MD as Fellow (Pulmonary Disease)    Review of Systems   Constitutional:  Negative for chills and fever.   HENT:  Negative for rhinorrhea and sore throat.    Eyes:  Negative for visual disturbance.   Respiratory:  Negative for choking and shortness of breath.    Cardiovascular:  Negative for chest pain and palpitations.   Gastrointestinal:  Negative for abdominal pain and constipation.   Genitourinary:  Negative for difficulty urinating and dysuria.   Musculoskeletal:  Negative for gait problem and neck stiffness.   Skin:  Negative for  rash.   Allergic/Immunologic: Positive for environmental allergies. Negative for food allergies.   Neurological:  Negative for syncope and weakness.     Medical History Reviewed by provider this encounter:       Annual Wellness Visit Questionnaire   Mireya is here for her Subsequent Wellness visit.     Health Risk Assessment:   Patient rates overall health as good. Patient feels that their physical health rating is same. Patient is satisfied with their life. Eyesight was rated as slightly worse. Hearing was rated as same. Patient feels that their emotional and mental health rating is same. Patients states they are never, rarely angry. Patient states they are sometimes unusually tired/fatigued. Pain experienced in the last 7 days has been some. Patient's pain rating has been 8/10. Patient states that she has experienced no weight loss or gain in last 6 months.     Depression Screening:   PHQ-2 Score: 0      Fall Risk Screening:   In the past year, patient has experienced: no history of falling in past year      Urinary Incontinence Screening:   Patient has not leaked urine accidently in the last six months.     Home Safety:  Patient does not have trouble with stairs inside or outside of their home. Patient has working smoke alarms and has working carbon monoxide detector. Home safety hazards include: none.     Nutrition:   Current diet is Regular.     Medications:   Patient is currently taking over-the-counter supplements. OTC medications include: see medication list. Patient is able to manage medications.     Activities of Daily Living (ADLs)/Instrumental Activities of Daily Living (IADLs):   Walk and transfer into and out of bed and chair?: Yes  Dress and groom yourself?: Yes    Bathe or shower yourself?: Yes    Feed yourself? Yes  Do your laundry/housekeeping?: Yes  Manage your money, pay your bills and track your expenses?: Yes  Make your own meals?: Yes    Do your own shopping?: Yes    Previous Hospitalizations:    Any hospitalizations or ED visits within the last 12 months?: No    How many hospitalizations have you had in the last year?: 1-2    Advance Care Planning:   Living will: No      Comments: Brianna, patient's daughter (phone number in chart), would make decisions on patient's behalf in the event that she is unable to make medical decisions for herself.    PREVENTIVE SCREENINGS      Cardiovascular Screening:    General: Screening Current      Diabetes Screening:     General: Screening Current      Colorectal Cancer Screening:     General: Risks and Benefits Discussed    Due for: Cologuard      Breast Cancer Screening:     General: Risks and Benefits Discussed    Due for: Mammogram        Cervical Cancer Screening:    General: Screening Not Indicated      Osteoporosis Screening:    General: Screening Not Indicated and History Osteoporosis    Due for: DXA Axial      Abdominal Aortic Aneurysm (AAA) Screening:        General: Screening Not Indicated      Lung Cancer Screening:     General: Risks and Benefits Discussed    Due for: Low Dose CT (LDCT)      Hepatitis C Screening:    General: Screening Current    Social Drivers of Health     Financial Resource Strain: Low Risk  (5/17/2024)    Overall Financial Resource Strain (CARDIA)     Difficulty of Paying Living Expenses: Not very hard   Food Insecurity: No Food Insecurity (5/17/2024)    Nursing - Inadequate Food Risk Classification     Worried About Running Out of Food in the Last Year: Never true     Ran Out of Food in the Last Year: Never true   Transportation Needs: No Transportation Needs (5/17/2024)    PRAPARE - Transportation     Lack of Transportation (Medical): No     Lack of Transportation (Non-Medical): No   Housing Stability: Low Risk  (5/17/2024)    Housing Stability Vital Sign     Unable to Pay for Housing in the Last Year: No     Number of Times Moved in the Last Year: 1     Homeless in the Last Year: No   Utilities: Not At Risk (5/17/2024)    [facility] Utilities  "    Threatened with loss of utilities: No     No results found.    Objective   Resp 20   Ht 5' 2\" (1.575 m)   Wt 61.7 kg (136 lb)   SpO2 99%   BMI 24.87 kg/m²     Physical Exam  Vitals reviewed.   Constitutional:       General: She is not in acute distress.     Appearance: Normal appearance. She is not ill-appearing.   HENT:      Head: Normocephalic and atraumatic.   Cardiovascular:      Rate and Rhythm: Normal rate and regular rhythm.      Pulses: Normal pulses.      Heart sounds: Normal heart sounds.   Pulmonary:      Effort: Pulmonary effort is normal. No respiratory distress.      Breath sounds: Normal breath sounds.   Abdominal:      General: Bowel sounds are normal. There is no distension.      Palpations: Abdomen is soft.   Musculoskeletal:         General: Normal range of motion.      Cervical back: Normal range of motion.      Right lower leg: No edema.      Left lower leg: No edema.   Skin:     General: Skin is warm.   Neurological:      Mental Status: She is alert and oriented to person, place, and time.       "

## 2024-12-05 ENCOUNTER — HOSPITAL ENCOUNTER (OUTPATIENT)
Dept: RADIOLOGY | Age: 79
Discharge: HOME | End: 2024-12-05
Payer: MEDICARE

## 2024-12-05 DIAGNOSIS — Z12.31 ENCOUNTER FOR SCREENING MAMMOGRAM FOR MALIGNANT NEOPLASM OF BREAST: ICD-10-CM

## 2024-12-05 PROCEDURE — 77067 SCR MAMMO BI INCL CAD: CPT

## 2024-12-05 PROCEDURE — 77063 BREAST TOMOSYNTHESIS BI: CPT

## 2024-12-06 ENCOUNTER — TELEPHONE (OUTPATIENT)
Age: 79
End: 2024-12-06

## 2024-12-06 NOTE — TELEPHONE ENCOUNTER
Caller: wayne    Doctor: katrin    Reason for call: pt wants to know if she is going to get the second part of her injection. She states she sent in her pain diary.    Call back#: [phone]

## 2024-12-09 NOTE — TELEPHONE ENCOUNTER
The patient did not have a favorable result to lumbar medial branch blocks.  Please call the patient to schedule follow-up office visit for reevaluation

## 2024-12-17 ENCOUNTER — RESULTS FOLLOW-UP (OUTPATIENT)
Dept: PEDIATRICS CLINIC | Facility: CLINIC | Age: 79
End: 2024-12-17

## 2024-12-20 ENCOUNTER — NURSE TRIAGE (OUTPATIENT)
Age: 79
End: 2024-12-20

## 2024-12-20 DIAGNOSIS — J45.901 ASTHMA EXACERBATION: Primary | ICD-10-CM

## 2024-12-20 DIAGNOSIS — J01.90 ACUTE SINUSITIS, RECURRENCE NOT SPECIFIED, UNSPECIFIED LOCATION: ICD-10-CM

## 2024-12-20 NOTE — TELEPHONE ENCOUNTER
Pt calling back regarding message from earlier today. S/S are still the same.  Please advise on triage completed at 11:54.

## 2024-12-20 NOTE — TELEPHONE ENCOUNTER
Regarding: chest tightness/ SOB / cough  ----- Message from Ave CAMPOS sent at 12/20/2024 11:36 AM EST -----  Pt states cough/ SOB / and chest tightness

## 2024-12-20 NOTE — TELEPHONE ENCOUNTER
Pt stated Pulm Provider: Dr. Grimes    Actionable item: Requesting medication to pharmacy.     What is the reason for the call/chief complaint?    Started for a few days, coughing, phlegm unable to cough it out because she would throw up, she has been blowing her nose frequently and is now irritated. States having shortness of breath, wheezing, no chest pain. Denies fever. Throat is scratchy, and has coughing spells. States that her ears hurting.       She has been taking her Flonase and her rescue inhaler and that nothing has been helping.     She states when this happens she usually goes on Prednisone and ABX as she is quick to end up  in the hospital.       Has Robitussin DM but hasn't taken to confirm that she is okay to have that. Provided home remedies that she can take along with Vitamin C and Zinc, increasing fluid intake, warm fluids, honey, and rest.     Priority: Moderate    Reason for Disposition   Vitamin and herbal supplements for colds, questions about    Protocols used: Common Cold-Adult-[place]

## 2024-12-21 ENCOUNTER — NURSE TRIAGE (OUTPATIENT)
Dept: OTHER | Facility: OTHER | Age: 79
End: 2024-12-21

## 2024-12-21 DIAGNOSIS — J01.90 ACUTE SINUSITIS, RECURRENCE NOT SPECIFIED, UNSPECIFIED LOCATION: ICD-10-CM

## 2024-12-21 DIAGNOSIS — J45.901 ASTHMA EXACERBATION: ICD-10-CM

## 2024-12-21 RX ORDER — AZITHROMYCIN 250 MG/1
TABLET, FILM COATED ORAL
Qty: 6 TABLET | Refills: 0 | Status: SHIPPED | OUTPATIENT
Start: 2024-12-21 | End: 2024-12-21

## 2024-12-21 RX ORDER — AZITHROMYCIN 250 MG/1
TABLET, FILM COATED ORAL
Qty: 6 TABLET | Refills: 0 | Status: SHIPPED | OUTPATIENT
Start: 2024-12-21 | End: 2024-12-25

## 2024-12-21 RX ORDER — PREDNISONE 20 MG/1
40 TABLET ORAL DAILY
Qty: 10 TABLET | Refills: 0 | Status: SHIPPED | OUTPATIENT
Start: 2024-12-21 | End: 2024-12-21

## 2024-12-21 RX ORDER — PREDNISONE 20 MG/1
40 TABLET ORAL DAILY
Qty: 10 TABLET | Refills: 0 | Status: SHIPPED | OUTPATIENT
Start: 2024-12-21 | End: 2024-12-26

## 2024-12-21 NOTE — TELEPHONE ENCOUNTER
"Regarding: Medication refill question  ----- Message from Shania AVILES sent at 12/21/2024 10:47 AM EST -----  Pt stated, \" I just came from he pharmacy and they are saying they do not have any medication for me. I need my medication before I end up going to the hospital.\"    Azithromycin (ZITHROMAX) 250 mg tablet [[id number]]    Order Details  Dose, Route, Frequency: As Directed  Dispense Quantity: 6 tablet Refills: 0            predniSONE 20 mg tablet [[id number]]    Order Details  Dose: 40 mg Route: Oral Frequency: Daily  Dispense Quantity: 10 tablet Refills: 0        Sig: Take 2 tablets (40 mg total) by mouth daily for 5 d    "

## 2024-12-21 NOTE — TELEPHONE ENCOUNTER
"Reason for Disposition   [1] Prescription prescribed recently is not at pharmacy AND [2] triager has access to patient's EMR AND [3] prescription is recorded in the EMR    Answer Assessment - Initial Assessment Questions  1. NAME of MEDICINE: \"What medicine(s) are you calling about?\"        Azithromycin and prednisone    2. QUESTION: \"What is your question?\" (e.g., double dose of medicine, side effect)        Pharmacy did not receive prescriptions    3. PRESCRIBER: \"Who prescribed the medicine?\" Reason: if prescribed by specialist, call should be referred to that group.        Dr. Arzola    4. SYMPTOMS: \"Do you have any symptoms?\" If Yes, ask: \"What symptoms are you having?\"  \"How bad are the symptoms (e.g., mild, moderate, severe)        Cough with mucus, sob, wheezing- spoke with office yesterday regarding symptoms and was prescribed these two medications.    Protocols used: Medication Question Call-Adult-      RN resent medications electronically to [facility] pharmacy. Confirmed with pharmacy that they received both medications. Pt made aware.   "

## 2024-12-21 NOTE — TELEPHONE ENCOUNTER
Unfortunately the message was initially sent to the incorrect provider. Ordered prednisone 40mg daily for 5 days as well as azithromycin 500mg for 1 day followed by 250mg daily for 4 days. Suspect recurrent acute sinusitis and asthma exacerbation as patient has a history of this. Sent patient a message on [facility] and will arrange for further evaluation if symptoms do not improve within a couple of days or worsen.

## 2024-12-27 ENCOUNTER — OFFICE VISIT (OUTPATIENT)
Dept: OBGYN CLINIC | Facility: HOSPITAL | Age: 79
End: 2024-12-27
Payer: MEDICARE

## 2024-12-27 VITALS — HEIGHT: 62 IN | WEIGHT: 136.02 LBS | BODY MASS INDEX: 25.03 KG/M2

## 2024-12-27 DIAGNOSIS — M48.062 SPINAL STENOSIS OF LUMBAR REGION WITH NEUROGENIC CLAUDICATION: ICD-10-CM

## 2024-12-27 DIAGNOSIS — M54.16 LUMBAR RADICULOPATHY: Primary | ICD-10-CM

## 2024-12-27 DIAGNOSIS — J30.9 CHRONIC ALLERGIC RHINITIS: ICD-10-CM

## 2024-12-27 DIAGNOSIS — R52 PAIN: ICD-10-CM

## 2024-12-27 PROCEDURE — 99213 OFFICE O/P EST LOW 20 MIN: CPT | Performed by: ORTHOPAEDIC SURGERY

## 2024-12-27 NOTE — PROGRESS NOTES
Assessment & Plan/Medical Decision Makin y.o. female with Bilateral Radicular Leg Pain, Ambulatory Dysfunction, and Left Hip Pain and imaging findings most notable for L4-5 degenerative spondylolisthesis         The clinical, physical and imaging findings were reviewed with the patient.  Mireya  has a constellation of findings consistent with lumbar radiculopathy, sacroiliac joint dysfunction in the setting of lumbar degenerative disease.      Fortunately patient remains neurologically intact and functional.   We discussed the treatment options including physical therapy, at home exercises, activity modifications, chiropractic medicine, oral medications, interventional spine procedures.  She has tried and failed non-operative treatment options including a medial branch block. We did discuss the role of surgical intervention, however we will first order an updated MRI for surgical planning purposes.  Did review the benefits, expected recovery timeline, and expected outcome of this procedure and recommend patient proceed.  Will have patient follow-up after her MRI to review results and consent for surgery.      Subjective:      Chief Complaint: Back Pain    HPI:  Mireya Yi is a 79 y.o. female presenting for initial visit with chief complaint of low back pain, bilateral radicular leg pain -referred by Violetta Avendaño.  Pain began about 3 years ago without any injury or fall. She has pain with bilateral side lying and with laying on her back. She also experiences pain with sitting any standing. She describes her left leg to be worse. Her symptoms are describes to go to her base of her foot in which she experiences numbness and tingling in her left foot. Her entire left leg cramps if she externally rotates her hip.   She has attempted physical therapy that did not provide any relief. She received an NEERU on 24 that did not provide any relief. She is scheduled for a repeat injection on 24. She is  currently taking aspirin for pain which does provide slight relief.   Denies any michael trauma. Denies fever or chills, no night sweats. Denies any bladder or bowel changes.      Conservative therapy includes the following:   Medications: OTC    Injections: Yes     Physical Therapy: Yes  Chiropractic Medicine: has not attempted  Accupunture/Massage Therapy: has not attempted   These therapeutic modalities were ineffective at providing sustained pain relief/functional improvement.     Interval History 12/27/2024:  She udnerwent Fluoroscopically-guided Medial Branch Nerve/Dorsal Ramus Blocks of the bilateral L4-5 and L5-S1 facet joint(s) using 2% lidocaine on 11/13/2024. She states they were supposed to call her to schedule two more injections but no one ever reached out. She had immediate relief for a few hours until shooting pain that night.  She describes continued numbness of the left leg down to her ankle. Her leg also cramps with increased activity. She does not require assistive device for ambulation. She takes Aspirin for pain with mild relief. Her pain is worse some days than others.     Objective:     Family History   Problem Relation Age of Onset    Heart attack Mother     Asthma Father     Breast cancer Sister     Breast cancer Sister     No Known Problems Daughter     No Known Problems Daughter     Arthritis Family     Osteoporosis Family        Past Medical History:   Diagnosis Date    Asthma     Asthmatic bronchitis     Last Assessed: 10/7/2014     Chronic diarrhea     Last Assessed: 8/20/2015     Fibromyalgia     Focal nodular hyperplasia of liver     Last Assessed: 6/11/2015    Herpes zoster     Last Assessed: 3/18/2016    Hypertension     IBS (irritable bowel syndrome)     Intermittent palpitations     Irritable bowel syndrome     Lumbar radiculopathy     Osteoarthritis     Vertigo        Current Outpatient Medications   Medication Sig Dispense Refill    acetaminophen (TYLENOL) 500 mg tablet Take 1  tablet (500 mg total) by mouth every 6 (six) hours as needed for mild pain 60 tablet 0    albuterol (2.5 mg/3 mL) 0.083 % nebulizer solution Take 3 mL (2.5 mg total) by nebulization every 4 (four) hours as needed for wheezing or shortness of breath 90 mL 5    albuterol (PROVENTIL HFA,VENTOLIN HFA) 90 mcg/act inhaler Inhale 2 puffs every 6 (six) hours as needed for wheezing or shortness of breath 18 g 5    amLODIPine (NORVASC) 5 mg tablet Take 1 tablet (5 mg total) by mouth daily 90 tablet 2    Azelastine HCl 137 MCG/SPRAY SOLN 2 sprays into each nostril 2 (two) times a day 30 mL 5    budesonide-formoterol (Symbicort) 80-4.5 MCG/ACT inhaler Inhale 2 puffs 2 (two) times a day Rinse mouth after use 10.2 g 5    Cholecalciferol (D3-1000) 1,000 units tablet Take 1 tablet (1,000 Units total) by mouth daily 90 tablet 1    fexofenadine (ALLEGRA) 180 MG tablet Take 180 mg by mouth daily      fluticasone (FLONASE) 50 mcg/act nasal spray 2 sprays into each nostril daily 16 g 3    folic acid (FOLVITE) 1 mg tablet Take 1 tablet (1,000 mcg total) by mouth daily 90 tablet 2    ipratropium (ATROVENT) 0.03 % nasal spray 2 sprays into each nostril every 12 (twelve) hours 30 mL 2    montelukast (SINGULAIR) 10 mg tablet Take 1 tablet (10 mg total) by mouth daily at bedtime 90 tablet 1    nicotine (NICODERM CQ) 14 mg/24hr TD 24 hr patch Place 1 patch on the skin over 24 hours every 24 hours 28 patch 1    ondansetron (Zofran ODT) 4 mg disintegrating tablet Take 1 tablet (4 mg total) by mouth every 6 (six) hours as needed for nausea or vomiting 20 tablet 3    pantoprazole (PROTONIX) 20 mg tablet Take 1 tablet (20 mg total) by mouth daily 90 tablet 1     No current facility-administered medications for this visit.       Past Surgical History:   Procedure Laterality Date    BREAST BIOPSY      BREAST LUMPECTOMY      when pt was 18    SMALL INTESTINE SURGERY         Social History     Socioeconomic History    Marital status:      Spouse  name: Not on file    Number of children: Not on file    Years of education: Not on file    Highest education level: Not on file   Occupational History    Occupation: Unemployed    Tobacco Use    Smoking status: Former     Current packs/day: 0.50     Types: Cigarettes    Smokeless tobacco: Never    Tobacco comments:     Stopped smoking July 2023   Vaping Use    Vaping status: Never Used   Substance and Sexual Activity    Alcohol use: Not Currently    Drug use: No    Sexual activity: Not Currently     Partners: Male   Other Topics Concern    Not on file   Social History Narrative    Mental Disability     Exercising Regularly     Lives with adult children      Social Drivers of Health     Financial Resource Strain: Low Risk  (5/17/2024)    Overall Financial Resource Strain (CARDIA)     Difficulty of Paying Living Expenses: Not very hard   Food Insecurity: No Food Insecurity (5/17/2024)    Nursing - Inadequate Food Risk Classification     Worried About Running Out of Food in the Last Year: Never true     Ran Out of Food in the Last Year: Never true     Ran Out of Food in the Last Year: Not on file   Transportation Needs: No Transportation Needs (5/17/2024)    PRAPARE - Transportation     Lack of Transportation (Medical): No     Lack of Transportation (Non-Medical): No   Physical Activity: Sufficiently Active (6/14/2024)    Exercise Vital Sign     Days of Exercise per Week: 4 days     Minutes of Exercise per Session: 40 min   Stress: Stress Concern Present (3/21/2024)    [employer] [place] [employer] - Occupational Stress Questionnaire     Feeling of Stress : To some extent   Social Connections: Socially Isolated (6/14/2024)    Social Connection and Isolation Panel [NHANES]     Frequency of Communication with Friends and Family: More than three times a week     Frequency of Social Gatherings with Friends and Family: More than three times a week     Attends Jainism Services: Never     Active Member of Clubs  "or Organizations: No     Attends Club or Organization Meetings: Never     Marital Status: Never    Intimate Partner Violence: Not At Risk (3/21/2024)    Humiliation, Afraid, Rape, and Kick questionnaire     Fear of Current or Ex-Partner: No     Emotionally Abused: No     Physically Abused: No     Sexually Abused: No   Housing Stability: Low Risk  (5/17/2024)    Housing Stability Vital Sign     Unable to Pay for Housing in the Last Year: No     Number of Times Moved in the Last Year: 1     Homeless in the Last Year: No       Allergies   Allergen Reactions    Ambrosia Artemisiifolia (Ragweed) Skin Test Facial Swelling    Codeine Other (See Comments)     Heart races    Epinephrine      Pt reports \"it makes my heart race, they told me I cant take it.\"    Ibuprofen Other (See Comments)     Heart racing    Morphine Other (See Comments)     Heart racing    Pollen Extract Sneezing    Tramadol Other (See Comments)     Heart racing       Review of Systems  General- denies fever/chills  HEENT- denies hearing loss or sore throat  Eyes- denies eye pain or visual disturbances, denies red eyes  Respiratory- denies cough or SOB  Cardio- denies chest pain or palpitations  GI- denies abdominal pain  Endocrine- denies urinary frequency  Urinary- denies pain with urination  Musculoskeletal- Negative except noted above  Skin- denies rashes or wounds  Neurological- denies dizziness or headache  Psychiatric- denies anxiety or difficulty concentrating    Physical Exam  Ht 5' 2\" (1.575 m)   Wt 61.7 kg (136 lb 0.4 oz)   BMI 24.88 kg/m²     General/Constitutional: No apparent distress: well-nourished and well developed.  Lymphatic: No appreciable lymphadenopathy  Respiratory: Non-labored breathing  Vascular: No edema, swelling or tenderness, except as noted in detailed exam.  Integumentary: No impressive skin lesions present, except as noted in detailed exam.  Psych: Normal mood and affect, oriented to person, place and time.  MSK: " "normal other than stated in HPI and exam  Gait & balance: no evidence of myelopathic gait, ambulates Independently     Lumbar spine range of motion:  -Forward flexion to 90  -Extension to neutral  There is mild tenderness with palpation along lumbar paraspinal musculature, no midline tenderness     Neurologic:    Lower Extremity Motor Function    Right  Left    Iliopsoas  5/5  5/5    Quadriceps 5/5 5/5   Tibialis anterior  5/5  5/5    EHL  5/5  5/5    Gastroc. muscle  5/5  5/5    Heel rise  5/5  5/5    Toe rise  5/5  5/5      Sensory: light touch is intact to bilateral upper and lower extremities     Reflexes:  intact     Other tests:  Straight Leg Raise: negative  Tonio SI: positive  ROHAN SI: positive  Greater troch: no tenderness   Internal/external hip ROM: intact, no pain   Flexion/extension knee ROM: intact, no pain   Vascular: WWP extremities    Diagnostic Tests   IMAGING: I have personally reviewed the images and these are my findings:  Lumbar Spine X-rays from 11/11/2024: multi level lumbar spondylosis with loss of disc height, osteophyte formation and facet hypertrophy, no appreciated lytic/blastic lesions, L4-5 spondylolisthesis which is more prominent on flexion x-rays    Lumbar Spine MRI from 12/2/2023: multi level lumbar disc degeneration with disc desiccation, loss of disc height, facet and ligamentum hypertrophy, right-sided L4-5 facet cyst, bilateral mild/moderate lateral recess stenosis    Electronic Medical Records were reviewed including pain management notes, radiology reports    Procedures, if performed today     None performed             Portions of the record may have been created with voice recognition software.  Occasional wrong word or \"sound a like\" substitutions may have occurred due to the inherent limitations of voice recognition software.  Read the chart carefully and recognize, using context, where substitutions have occurred.   "

## 2024-12-30 RX ORDER — FLUTICASONE PROPIONATE 50 MCG
SPRAY, SUSPENSION (ML) NASAL
Qty: 48 ML | Refills: 2 | Status: SHIPPED | OUTPATIENT
Start: 2024-12-30

## 2025-01-09 ENCOUNTER — HOSPITAL ENCOUNTER (OUTPATIENT)
Dept: RADIOLOGY | Facility: HOSPITAL | Age: 80
Discharge: HOME | End: 2025-01-09
Attending: ORTHOPAEDIC SURGERY
Payer: MEDICARE

## 2025-01-09 DIAGNOSIS — R52 PAIN: ICD-10-CM

## 2025-01-09 DIAGNOSIS — M48.062 SPINAL STENOSIS OF LUMBAR REGION WITH NEUROGENIC CLAUDICATION: ICD-10-CM

## 2025-01-09 DIAGNOSIS — M54.16 LUMBAR RADICULOPATHY: ICD-10-CM

## 2025-01-09 PROCEDURE — 72148 MRI LUMBAR SPINE W/O DYE: CPT

## 2025-01-13 ENCOUNTER — TELEPHONE (OUTPATIENT)
Age: 80
End: 2025-01-13

## 2025-01-13 NOTE — TELEPHONE ENCOUNTER
Caller: patient    Doctor: valeria    Reason for call: Patient is having a lot of pain in her back and would like to know what to do, tried to schedule and earlier appointment but nothing wad available  Please advise     Call back#: [phone]

## 2025-01-14 ENCOUNTER — NURSE TRIAGE (OUTPATIENT)
Age: 80
End: 2025-01-14

## 2025-01-14 NOTE — TELEPHONE ENCOUNTER
"Patient call:  Pt stated provider: Dr. Welsh    Actionable item: Appointment + provider review    Chief complaint: Reports worsening wheezing, VAZQUEZ, congestion, insomnia, chills, scratchy throat nausea, and non-productive coughing over the past 5 days.  Feels like it is getting worse and would like to be seen sooner.    Also described a nerve pain which I encouraged her PCP for. Says she is seeing a specialist soon.    Dispo: No sooner appointments available unfortunately although already scheduled for tomorrow.   Routing for review and recommendation. Advised to utilize nebulizer instead of rescue inhaler (as prescribed).   Call [facility] with worsening symptoms.  Agrees with plan.   All questions answered.       Reason for Disposition   Patient wants to be seen    Answer Assessment - Initial Assessment Questions  1. ONSET: \"When did the cough begin?\"       5 days ago  2. SEVERITY: \"How bad is the cough today?\"       worsening  3. SPUTUM: \"Describe the color of your sputum\" (e.g., none, dry cough; clear, white, yellow, green)      Difficult to expectorate  4. HEMOPTYSIS: \"Are you coughing up any blood?\" If Yes, ask: \"How much?\" (e.g., flecks, streaks, tablespoons, etc.)      denies  5. DIFFICULTY BREATHING: \"Are you having difficulty breathing?\" If Yes, ask: \"How bad is it?\" (e.g., mild, moderate, severe)       Wheezing and VAZQUEZ  6. FEVER: \"Do you have a fever?\" If Yes, ask: \"What is your temperature, how was it measured, and when did it start?\"      chills  7. CARDIAC HISTORY: \"Do you have any history of heart disease?\" (e.g., heart attack, congestive heart failure)       Please see chart  8. LUNG HISTORY: \"Do you have any history of lung disease?\"  (e.g., pulmonary embolus, asthma, emphysema)      asthma  9. PE RISK FACTORS: \"Do you have a history of blood clots?\" (or: recent major surgery, recent prolonged travel, bedridden)      denies  10. OTHER SYMPTOMS: \"Do you have any other symptoms?\" (e.g., runny nose, " wheezing, chest pain)        Please see note above    Protocols used: Cough-Adult-OH

## 2025-01-14 NOTE — TELEPHONE ENCOUNTER
Regarding: Cough-Wheezing  ----- Message from Oralia MUELLER sent at 1/14/2025  9:33 AM EST -----  Patient called and stated her cough and wheezing is out of control. Please advise.

## 2025-01-15 ENCOUNTER — OFFICE VISIT (OUTPATIENT)
Dept: PULMONOLOGY | Facility: CLINIC | Age: 80
End: 2025-01-15
Payer: MEDICARE

## 2025-01-15 ENCOUNTER — OFFICE VISIT (OUTPATIENT)
Dept: OBGYN CLINIC | Facility: HOSPITAL | Age: 80
End: 2025-01-15
Payer: MEDICARE

## 2025-01-15 VITALS
HEART RATE: 96 BPM | TEMPERATURE: 97.4 F | DIASTOLIC BLOOD PRESSURE: 78 MMHG | SYSTOLIC BLOOD PRESSURE: 134 MMHG | OXYGEN SATURATION: 94 %

## 2025-01-15 VITALS — WEIGHT: 136.02 LBS | HEIGHT: 62 IN | BODY MASS INDEX: 25.03 KG/M2

## 2025-01-15 DIAGNOSIS — J30.9 CHRONIC ALLERGIC RHINITIS: ICD-10-CM

## 2025-01-15 DIAGNOSIS — Z13.820 OSTEOPOROSIS SCREENING: Primary | ICD-10-CM

## 2025-01-15 DIAGNOSIS — M81.0 AGE-RELATED OSTEOPOROSIS WITHOUT CURRENT PATHOLOGICAL FRACTURE: ICD-10-CM

## 2025-01-15 DIAGNOSIS — J06.9 VIRAL URI: Primary | ICD-10-CM

## 2025-01-15 DIAGNOSIS — J01.91 ACUTE RECURRENT SINUSITIS, UNSPECIFIED LOCATION: ICD-10-CM

## 2025-01-15 DIAGNOSIS — J45.41 MODERATE PERSISTENT ASTHMA WITH EXACERBATION: ICD-10-CM

## 2025-01-15 PROCEDURE — 99213 OFFICE O/P EST LOW 20 MIN: CPT | Performed by: ORTHOPAEDIC SURGERY

## 2025-01-15 PROCEDURE — 99214 OFFICE O/P EST MOD 30 MIN: CPT | Performed by: INTERNAL MEDICINE

## 2025-01-15 RX ORDER — DOXYCYCLINE 100 MG/1
100 CAPSULE ORAL EVERY 12 HOURS SCHEDULED
Qty: 20 CAPSULE | Refills: 0 | Status: SHIPPED | OUTPATIENT
Start: 2025-01-15 | End: 2025-01-25

## 2025-01-15 RX ORDER — PREDNISONE 20 MG/1
TABLET ORAL
Qty: 15 TABLET | Refills: 0 | Status: SHIPPED | OUTPATIENT
Start: 2025-01-15 | End: 2025-01-27

## 2025-01-15 NOTE — PROGRESS NOTES
Assessment & Plan/Medical Decision Makin y.o. female with Bilateral Radicular Leg Pain, Ambulatory Dysfunction, and Left Hip Pain and imaging findings most notable for L4-5 degenerative spondylolisthesis         The clinical, physical and imaging findings were reviewed with the patient.  Mireya  has a constellation of findings consistent with lumbar radiculopathy, sacroiliac joint dysfunction in the setting of lumbar degenerative disease.      Physical examination showing +TTP left > right SI joint region. Decreased lumbar ROM.  Fortunately patient remains neurologically intact and functional.   We discussed the treatment options including physical therapy, at home exercises, activity modifications, chiropractic medicine, oral medications, interventional spine procedures.      Reviewed lumbar MRI in detail with the patient. Did discuss role of surgical intervention in form of fusion to address L4-5 spondylolisthesis, stenosis and right sided facet cyst. Mireya continues with low back, gluteal region and left lower extremity pain that has been limiting her daily activities and functioning despite multiple avenues of conservative treatment including oral medications, physical therapy and interventional spine procedures. However, patient reports current ongoing breathing/pulmonary issues. She has upcoming pulmonology appointment and will discuss need for surgical clearance and pulmonary optimization prior to continued surgical discussion.     Patient will need to undergo DEXA scan to evaluate bone density prior to further surgical discussion. New DEXA scan order placed.  Patient may follow up with spine & pain management in the interim for repeat injection as needed.    Patient instructed to return to office/ER sooner if symptoms are not improving, getting worse, or new worrisome/neurologic symptoms arise. Patient can follow up after DEXA scan for re-evaluation.    Subjective:      Chief Complaint: Back  Pain    HPI:  Mireya Yi is a 79 y.o. female presenting for initial visit with chief complaint of low back pain, bilateral radicular leg pain -referred by Violetta Avendaño.  Pain began about 3 years ago without any injury or fall. She has pain with bilateral side lying and with laying on her back. She also experiences pain with sitting any standing. She describes her left leg to be worse. Her symptoms are describes to go to her base of her foot in which she experiences numbness and tingling in her left foot. Her entire left leg cramps if she externally rotates her hip.   She has attempted physical therapy that did not provide any relief. She received an NEERU on 8/12/24 that did not provide any relief. She is scheduled for a repeat injection on 11/14/24. She is currently taking aspirin for pain which does provide slight relief.   Denies any michael trauma. Denies fever or chills, no night sweats. Denies any bladder or bowel changes.      Conservative therapy includes the following:   Medications: OTC    Injections: Yes     Physical Therapy: Yes  Chiropractic Medicine: has not attempted  Accupunture/Massage Therapy: has not attempted   These therapeutic modalities were ineffective at providing sustained pain relief/functional improvement.     Interval History 12/27/2024:  She underwent Fluoroscopically-guided Medial Branch Nerve/Dorsal Ramus Blocks of the bilateral L4-5 and L5-S1 facet joint(s) using 2% lidocaine on 11/13/2024. She states they were supposed to call her to schedule two more injections but no one ever reached out. She had immediate relief for a few hours until shooting pain that night.  She describes continued numbness of the left leg down to her ankle. Her leg also cramps with increased activity. She does not require assistive device for ambulation. She takes Aspirin for pain with mild relief. Her pain is worse some days than others.     Update HPI on 1/15/2025:  Mireya is here for follow up, lumbar MRI  "review.Pain has been significantly worsening over past 6 days or so. She reports ongoing low back pain with left > right gluteal region pain with radiation into her posterolateral left lower extremity with numbness in her left ankle and foot. Describes \"shooting\" pains into her left lower extremity with ongoing muscle cramping. Pain worse with direct pressure, sitting and lying down. Ongoing \"pressure\" in her low back\". Difficulty sleeping and driving due to pain, has to sit in a position that alleviate pressure from her left gluteal region. Notes she will start to limp when ambulating due to gluteal, hip and leg pain, otherwise is able to ambulate without issue. Subjective left lower extremity weakness, noting she feels like her left leg is going to buckle on her at times. Notes left anterior groin pain with ambulation as well. Denies right lower extremity symptoms.    She reports ongoing breathing issues/asthma issues. Has been following up pulmonology.     Objective:     Family History   Problem Relation Age of Onset    Heart attack Mother     Asthma Father     Breast cancer Sister     Breast cancer Sister     No Known Problems Daughter     No Known Problems Daughter     Arthritis Family     Osteoporosis Family        Past Medical History:   Diagnosis Date    Asthma     Asthmatic bronchitis     Last Assessed: 10/7/2014     Chronic diarrhea     Last Assessed: 8/20/2015     Fibromyalgia     Focal nodular hyperplasia of liver     Last Assessed: 6/11/2015    Herpes zoster     Last Assessed: 3/18/2016    Hypertension     IBS (irritable bowel syndrome)     Intermittent palpitations     Irritable bowel syndrome     Lumbar radiculopathy     Osteoarthritis     Vertigo        Current Outpatient Medications   Medication Sig Dispense Refill    acetaminophen (TYLENOL) 500 mg tablet Take 1 tablet (500 mg total) by mouth every 6 (six) hours as needed for mild pain 60 tablet 0    albuterol (2.5 mg/3 mL) 0.083 % nebulizer " solution Take 3 mL (2.5 mg total) by nebulization every 4 (four) hours as needed for wheezing or shortness of breath 90 mL 5    albuterol (PROVENTIL HFA,VENTOLIN HFA) 90 mcg/act inhaler Inhale 2 puffs every 6 (six) hours as needed for wheezing or shortness of breath 18 g 5    amLODIPine (NORVASC) 5 mg tablet Take 1 tablet (5 mg total) by mouth daily 90 tablet 2    Azelastine HCl 137 MCG/SPRAY SOLN 2 sprays into each nostril 2 (two) times a day 30 mL 5    budesonide-formoterol (Symbicort) 80-4.5 MCG/ACT inhaler Inhale 2 puffs 2 (two) times a day Rinse mouth after use 10.2 g 5    Cholecalciferol (D3-1000) 1,000 units tablet Take 1 tablet (1,000 Units total) by mouth daily 90 tablet 1    fexofenadine (ALLEGRA) 180 MG tablet Take 180 mg by mouth daily      fluticasone (FLONASE) 50 mcg/act nasal spray SPRAY 2 SPRAYS INTO EACH NOSTRIL EVERY DAY 48 mL 2    folic acid (FOLVITE) 1 mg tablet Take 1 tablet (1,000 mcg total) by mouth daily 90 tablet 2    ipratropium (ATROVENT) 0.03 % nasal spray 2 sprays into each nostril every 12 (twelve) hours 30 mL 2    montelukast (SINGULAIR) 10 mg tablet Take 1 tablet (10 mg total) by mouth daily at bedtime 90 tablet 1    nicotine (NICODERM CQ) 14 mg/24hr TD 24 hr patch Place 1 patch on the skin over 24 hours every 24 hours 28 patch 1    ondansetron (Zofran ODT) 4 mg disintegrating tablet Take 1 tablet (4 mg total) by mouth every 6 (six) hours as needed for nausea or vomiting 20 tablet 3    pantoprazole (PROTONIX) 20 mg tablet Take 1 tablet (20 mg total) by mouth daily 90 tablet 1     No current facility-administered medications for this visit.       Past Surgical History:   Procedure Laterality Date    BREAST BIOPSY      BREAST LUMPECTOMY      when pt was 18    SMALL INTESTINE SURGERY         Social History     Socioeconomic History    Marital status:      Spouse name: Not on file    Number of children: Not on file    Years of education: Not on file    Highest education level: Not  on file   Occupational History    Occupation: Unemployed    Tobacco Use    Smoking status: Former     Current packs/day: 0.50     Types: Cigarettes    Smokeless tobacco: Never    Tobacco comments:     Stopped smoking July 2023   Vaping Use    Vaping status: Never Used   Substance and Sexual Activity    Alcohol use: Not Currently    Drug use: No    Sexual activity: Not Currently     Partners: Male   Other Topics Concern    Not on file   Social History Narrative    Mental Disability     Exercising Regularly     Lives with adult children      Social Drivers of Health     Financial Resource Strain: Low Risk  (5/17/2024)    Overall Financial Resource Strain (CARDIA)     Difficulty of Paying Living Expenses: Not very hard   Food Insecurity: No Food Insecurity (5/17/2024)    Nursing - Inadequate Food Risk Classification     Worried About Running Out of Food in the Last Year: Never true     Ran Out of Food in the Last Year: Never true     Ran Out of Food in the Last Year: Not on file   Transportation Needs: No Transportation Needs (5/17/2024)    PRAPARE - Transportation     Lack of Transportation (Medical): No     Lack of Transportation (Non-Medical): No   Physical Activity: Sufficiently Active (6/14/2024)    Exercise Vital Sign     Days of Exercise per Week: 4 days     Minutes of Exercise per Session: 40 min   Stress: Stress Concern Present (3/21/2024)    [employer] - Occupational Stress Questionnaire     Feeling of Stress : To some extent   Social Connections: Unknown (6/14/2024)    Social Connection and Isolation Panel [NHANES]     Frequency of Communication with Friends and Family: More than three times a week     Frequency of Social Gatherings with Friends and Family: More than three times a week     Attends Pentec[employer]al Services: Never     Active Member of Clubs or Organizations: No     Attends Club or Organization Meetings: Never     Marital Status: Not on file   Recent Concern: Social  "Connections - Socially Isolated (6/14/2024)    Social Connection and Isolation Panel [NHANES]     Frequency of Communication with Friends and Family: More than three times a week     Frequency of Social Gatherings with Friends and Family: More than three times a week     Attends Yarsani Services: Never     Active Member of Clubs or Organizations: No     Attends Club or Organization Meetings: Never     Marital Status: Never    Intimate Partner Violence: Not At Risk (3/21/2024)    Humiliation, Afraid, Rape, and Kick questionnaire     Fear of Current or Ex-Partner: No     Emotionally Abused: No     Physically Abused: No     Sexually Abused: No   Housing Stability: Low Risk  (5/17/2024)    Housing Stability Vital Sign     Unable to Pay for Housing in the Last Year: No     Number of Times Moved in the Last Year: 1     Homeless in the Last Year: No       Allergies   Allergen Reactions    Ambrosia Artemisiifolia (Ragweed) Skin Test Facial Swelling    Codeine Other (See Comments)     Heart races    Epinephrine      Pt reports \"it makes my heart race, they told me I cant take it.\"    Ibuprofen Other (See Comments)     Heart racing    Morphine Other (See Comments)     Heart racing    Pollen Extract Sneezing    Tramadol Other (See Comments)     Heart racing       Review of Systems  General- denies fever/chills  HEENT- denies hearing loss or sore throat  Eyes- denies eye pain or visual disturbances, denies red eyes  Respiratory- denies cough or SOB  Cardio- denies chest pain or palpitations  GI- denies abdominal pain  Endocrine- denies urinary frequency  Urinary- denies pain with urination  Musculoskeletal- Negative except noted above  Skin- denies rashes or wounds  Neurological- denies dizziness or headache  Psychiatric- denies anxiety or difficulty concentrating    Physical Exam  Ht 5' 2\" (1.575 m)   Wt 61.7 kg (136 lb 0.4 oz)   BMI 24.88 kg/m²     General/Constitutional: No apparent distress: well-nourished and " well developed.  Lymphatic: No appreciable lymphadenopathy  Respiratory: Non-labored breathing  Vascular: No edema, swelling or tenderness, except as noted in detailed exam.  Integumentary: No impressive skin lesions present, except as noted in detailed exam.  Psych: Normal mood and affect, oriented to person, place and time.  MSK: normal other than stated in HPI and exam  Gait & balance: no evidence of myelopathic gait, ambulates Independently     Lumbar spine range of motion:  -Forward flexion to 90  -Extension to neutral  There is mild tenderness with palpation along lumbar paraspinal musculature, no midline tenderness     Neurologic:  Lower Extremity Motor Function    Right  Left    Iliopsoas  5/5  5/5    Quadriceps 5/5 5/5   Tibialis anterior  5/5  5/5    EHL  5/5  5/5    Gastroc. muscle  5/5  5/5    Heel rise  5/5  5/5    Toe rise  5/5  5/5      Sensory: light touch is intact to bilateral upper and lower extremities     Reflexes:  intact     Other tests:  Straight Leg Raise: negative  Tonio SI: positive  ROHAN SI: positive  Greater troch: +TTP  Internal/external hip ROM: intact, no pain   Flexion/extension knee ROM: intact, no pain   Vascular: WWP extremities    Diagnostic Tests   IMAGING: I have personally reviewed the images and these are my findings:  Lumbar Spine X-rays from 11/11/2024: multi level lumbar spondylosis with loss of disc height, osteophyte formation and facet hypertrophy, no appreciated lytic/blastic lesions, L4-5 spondylolisthesis which is more prominent on flexion x-rays    Lumbar Spine MRI from 12/2/2023: multi level lumbar disc degeneration with disc desiccation, loss of disc height, facet and ligamentum hypertrophy, right-sided L4-5 facet cyst, bilateral mild/moderate lateral recess stenosis    Lumbar Spine MRI from 1/9/2025: multi level lumbar disc degeneration with disc desiccation, loss of disc height, facet and ligamentum hypertrophy,  bilateral mild/moderate lateral recess stenosis  "at L4-5 with mild/moderate central stenosis, right sided facet cyst L4-5, L4-5 degenerative spondylolisthesis appears to reduce slightly on MRI    Electronic Medical Records were reviewed including pain management notes, radiology reports    Procedures, if performed today     None performed         Portions of the record may have been created with voice recognition software.  Occasional wrong word or \"sound a like\" substitutions may have occurred due to the inherent limitations of voice recognition software.  Read the chart carefully and recognize, using context, where substitutions have occurred.   "

## 2025-01-15 NOTE — PATIENT INSTRUCTIONS
Please obtain COVID-19/Flu test from over the counter at [facility] or [facility]    I have sent prescription for doxycycline 100mg to take twice daily for 10 days    I have also sent prescription for prednisone 40mg for 3 days, followed by reduced by 10mg every 3 days

## 2025-01-15 NOTE — PROGRESS NOTES
Name: Mireya Yi      : 1945      MRN: [id number]  Encounter Provider: Aby Welsh MD  Encounter Date: 1/15/2025   Encounter department: [facility] PULMONARY [facility]  :  Assessment & Plan  Viral URI  Presenting with fever, chills, cough, congestion, nausea. Suspect secondary to viral infection such as covid-19/flu/RSV  Plan:  -Recommend patient obtain covid-19/flu test from [facility] or [facility] or call PCP for outpatient test  -Based on results, will start treatment with either tamiflu or paxlovid  -Support care  -Continue zofran as needed  -Continue nebulized regimen in place of albuterol       Acute recurrent sinusitis, unspecified location  Likely triggered by viral infection as above. History of recurrent acute bacterial sinusitis.  Plan:  -Start doxycycline 100mg BID for 10 days   -Start steroid taper with 40mg daily for 3 days, reduce by 10mg every 3 days to help decrease inflammation  Orders:    doxycycline hyclate (VIBRAMYCIN) 100 mg capsule; Take 1 capsule (100 mg total) by mouth every 12 (twelve) hours for 10 days    predniSONE 20 mg tablet; Take 2 tablets (40 mg total) by mouth daily for 3 days, THEN 1.5 tablets (30 mg total) daily for 3 days, THEN 1 tablet (20 mg total) daily for 3 days, THEN 0.5 tablets (10 mg total) daily for 3 days.    Moderate persistent asthma with exacerbation  Exacerbation triggered by acute infection as above. Patient with significant smoking history of 20-30 pack-year. Mild obstructive disease seen on PFTs. Currently on Symbicort and albuterol PRN as well as singulair.   Plan:  -Start steroid taper with 40mg daily for 3 days, reduce by 10mg every 3 days for asthma exacerbation  -Use nebulizer regimen in place of inhalers  -Resume Symbicort and albuterol PRN once acute infection resolves  -Continue Singulair  Orders:    predniSONE 20 mg tablet; Take 2 tablets (40 mg total) by mouth daily for 3 days, THEN 1.5 tablets (30 mg total) daily for 3 days, THEN 1  tablet (20 mg total) daily for 3 days, THEN 0.5 tablets (10 mg total) daily for 3 days.    Chronic allergic rhinitis  Patient follows with ENT. She is being considered for septoplasty/turbinoplasty. She needs lung disease under control prior to surgery.  -Continue to follow with ENT for management, f/u scheduled   -Continue Singular, Allegra, Fluticasone, and azelastine            History of Present Illness   Mireya Yi is a 79 y.o. female with past medical history of tobacco abuse in remission, asthma/COPD (diagnosed at age 41), chronic sinusitis, pulmonary nodule, and GERD who presents for acute visit for URI symptoms.     Patient reports symptoms developed on Friday. She reports fever, chills, cough, congestion, nausea. Her cough is non-productive. She feels mucus but cannot get it up. She also has worsening sinus congestion and post-nasal drip. She feels like sinus infection is developing. SheShe also has shortness of breath with exertion. She is fatigued. She has diffuse aches. Her appetite is poor and she keeps feeling like she is going to throw up from all of the mucus.     She reports no specific sick contacts though she feels like everywhere she goes people are saying they are sick.     She has history of chronic allergic rhinitis, recurrent sinusitis and asthma. She is on flonase, azelastine, allegra, Singulair, symbicort, and albuterol PRN.     She reports symptoms are relatively well controlled at baseline. She has been using nebulized regimen in place of inhaler.       Review of Systems   Constitutional:  Positive for appetite change, fatigue and fever.   HENT:  Positive for congestion, postnasal drip and sinus pressure. Negative for rhinorrhea and sinus pain.    Respiratory:  Positive for cough, shortness of breath and wheezing.    Cardiovascular:  Negative for chest pain and palpitations.   Gastrointestinal:  Positive for nausea. Negative for abdominal pain, diarrhea and vomiting.     Medical  History Reviewed by provider this encounter:  Tobacco  Allergies  Meds  Problems  Med Hx  Surg Hx  Fam Hx     .     Historical Information     Objective   /78 (BP Location: Left arm, Patient Position: Sitting, Cuff Size: Standard)   Pulse 96   Temp (!) 97.4 °F (36.3 °C) (Tympanic Core)   SpO2 94%      Physical Exam  Vitals reviewed.   Constitutional:       Appearance: She is ill-appearing.   HENT:      Head: Normocephalic.      Nose: Congestion present.   Eyes:      Extraocular Movements: Extraocular movements intact.      Pupils: Pupils are equal, round, and reactive to light.   Cardiovascular:      Rate and Rhythm: Normal rate and regular rhythm.      Pulses: Normal pulses.      Heart sounds: Normal heart sounds.   Pulmonary:      Effort: Pulmonary effort is normal.      Breath sounds: Wheezing and rhonchi present.   Musculoskeletal:         General: Normal range of motion.   Skin:     General: Skin is warm.      Capillary Refill: Capillary refill takes less than 2 seconds.   Neurological:      General: No focal deficit present.      Mental Status: She is oriented to person, place, and time.   Psychiatric:         Mood and Affect: Mood normal.         Behavior: Behavior normal.           Lab Results: I have reviewed pertinent labs.    Radiology Results Review : No pertinent imaging studies reviewed.  Other Study Results: Other Study Results Review : No additional pertinent studies reviewed.  PFT Results Reviewed: reviewed    Aby Welsh MD  Pulmonary & Critical Care Medicine Fellow PGY-4  [facility]

## 2025-01-16 ENCOUNTER — APPOINTMENT (EMERGENCY)
Dept: RADIOLOGY | Facility: HOSPITAL | Age: 80
End: 2025-01-16
Payer: MEDICARE

## 2025-01-16 ENCOUNTER — HOSPITAL ENCOUNTER (EMERGENCY)
Facility: HOSPITAL | Age: 80
Discharge: HOME | End: 2025-01-16
Attending: EMERGENCY MEDICINE
Payer: MEDICARE

## 2025-01-16 VITALS
HEART RATE: 114 BPM | TEMPERATURE: 97.5 F | RESPIRATION RATE: 18 BRPM | DIASTOLIC BLOOD PRESSURE: 92 MMHG | SYSTOLIC BLOOD PRESSURE: 165 MMHG | OXYGEN SATURATION: 96 %

## 2025-01-16 DIAGNOSIS — J45.901 ASTHMA EXACERBATION: ICD-10-CM

## 2025-01-16 DIAGNOSIS — U07.1 COVID: Primary | ICD-10-CM

## 2025-01-16 DIAGNOSIS — R09.81 NASAL CONGESTION: ICD-10-CM

## 2025-01-16 PROBLEM — R06.2 WHEEZING: Status: RESOLVED | Noted: 2024-09-26 | Resolved: 2025-01-16

## 2025-01-16 PROBLEM — J96.01 ACUTE RESPIRATORY FAILURE WITH HYPOXIA (HCC): Status: RESOLVED | Noted: 2024-03-06 | Resolved: 2025-01-16

## 2025-01-16 PROBLEM — R06.09 DYSPNEA ON EXERTION: Status: RESOLVED | Noted: 2024-04-12 | Resolved: 2025-01-16

## 2025-01-16 LAB
ANION GAP SERPL CALCULATED.3IONS-SCNC: 10 MMOL/L (ref 4–13)
BASOPHILS # BLD AUTO: 0.01 THOUSANDS/ΜL (ref 0–0.1)
BASOPHILS NFR BLD AUTO: 0 % (ref 0–1)
BUN SERPL-MCNC: 19 MG/DL (ref 5–25)
CALCIUM SERPL-MCNC: 9.1 MG/DL (ref 8.4–10.2)
CHLORIDE SERPL-SCNC: 110 MMOL/L (ref 96–108)
CO2 SERPL-SCNC: 19 MMOL/L (ref 21–32)
CREAT SERPL-MCNC: 1.12 MG/DL (ref 0.6–1.3)
EOSINOPHIL # BLD AUTO: 0 THOUSAND/ΜL (ref 0–0.61)
EOSINOPHIL NFR BLD AUTO: 0 % (ref 0–6)
ERYTHROCYTE [DISTWIDTH] IN BLOOD BY AUTOMATED COUNT: 17.2 % (ref 11.6–15.1)
FLUAV AG UPPER RESP QL IA.RAPID: NEGATIVE
FLUBV AG UPPER RESP QL IA.RAPID: NEGATIVE
GFR SERPL CREATININE-BSD FRML MDRD: 47 ML/MIN/1.73SQ M
GLUCOSE SERPL-MCNC: 148 MG/DL (ref 65–140)
HCT VFR BLD AUTO: 37.7 % (ref 34.8–46.1)
HGB BLD-MCNC: 12.1 G/DL (ref 11.5–15.4)
IMM GRANULOCYTES # BLD AUTO: 0.13 THOUSAND/UL (ref 0–0.2)
IMM GRANULOCYTES NFR BLD AUTO: 2 % (ref 0–2)
LYMPHOCYTES # BLD AUTO: 0.69 THOUSANDS/ΜL (ref 0.6–4.47)
LYMPHOCYTES NFR BLD AUTO: 8 % (ref 14–44)
MCH RBC QN AUTO: 27.2 PG (ref 26.8–34.3)
MCHC RBC AUTO-ENTMCNC: 32.1 G/DL (ref 31.4–37.4)
MCV RBC AUTO: 85 FL (ref 82–98)
MONOCYTES # BLD AUTO: 0.09 THOUSAND/ΜL (ref 0.17–1.22)
MONOCYTES NFR BLD AUTO: 1 % (ref 4–12)
NEUTROPHILS # BLD AUTO: 7.38 THOUSANDS/ΜL (ref 1.85–7.62)
NEUTS SEG NFR BLD AUTO: 89 % (ref 43–75)
NRBC BLD AUTO-RTO: 0 /100 WBCS
PLATELET # BLD AUTO: 296 THOUSANDS/UL (ref 149–390)
PMV BLD AUTO: 11.5 FL (ref 8.9–12.7)
POTASSIUM SERPL-SCNC: 4.2 MMOL/L (ref 3.5–5.3)
RBC # BLD AUTO: 4.45 MILLION/UL (ref 3.81–5.12)
SARS-COV+SARS-COV-2 AG RESP QL IA.RAPID: POSITIVE
SODIUM SERPL-SCNC: 139 MMOL/L (ref 135–147)
WBC # BLD AUTO: 8.3 THOUSAND/UL (ref 4.31–10.16)

## 2025-01-16 PROCEDURE — 80048 BASIC METABOLIC PNL TOTAL CA: CPT

## 2025-01-16 PROCEDURE — 85025 COMPLETE CBC W/AUTO DIFF WBC: CPT

## 2025-01-16 PROCEDURE — 36415 COLL VENOUS BLD VENIPUNCTURE: CPT

## 2025-01-16 PROCEDURE — 99284 EMERGENCY DEPT VISIT MOD MDM: CPT

## 2025-01-16 PROCEDURE — 71046 X-RAY EXAM CHEST 2 VIEWS: CPT

## 2025-01-16 PROCEDURE — 96374 THER/PROPH/DIAG INJ IV PUSH: CPT

## 2025-01-16 PROCEDURE — 87804 INFLUENZA ASSAY W/OPTIC: CPT

## 2025-01-16 PROCEDURE — 99284 EMERGENCY DEPT VISIT MOD MDM: CPT | Performed by: EMERGENCY MEDICINE

## 2025-01-16 PROCEDURE — 96361 HYDRATE IV INFUSION ADD-ON: CPT

## 2025-01-16 PROCEDURE — 87811 SARS-COV-2 COVID19 W/OPTIC: CPT

## 2025-01-16 RX ORDER — AZELASTINE 1 MG/ML
1 SPRAY, METERED NASAL 2 TIMES DAILY
Qty: 1 ML | Refills: 0 | Status: SHIPPED | OUTPATIENT
Start: 2025-01-16

## 2025-01-16 RX ORDER — ONDANSETRON 2 MG/ML
4 INJECTION INTRAMUSCULAR; INTRAVENOUS ONCE
Status: COMPLETED | OUTPATIENT
Start: 2025-01-16 | End: 2025-01-16

## 2025-01-16 RX ORDER — ALBUTEROL SULFATE 0.83 MG/ML
5 SOLUTION RESPIRATORY (INHALATION) ONCE
Status: COMPLETED | OUTPATIENT
Start: 2025-01-16 | End: 2025-01-16

## 2025-01-16 RX ADMIN — ALBUTEROL SULFATE 5 MG: 2.5 SOLUTION RESPIRATORY (INHALATION) at 20:25

## 2025-01-16 RX ADMIN — IPRATROPIUM BROMIDE 0.5 MG: 0.5 SOLUTION RESPIRATORY (INHALATION) at 20:25

## 2025-01-16 RX ADMIN — ONDANSETRON 4 MG: 2 INJECTION INTRAMUSCULAR; INTRAVENOUS at 21:19

## 2025-01-16 RX ADMIN — SODIUM CHLORIDE 1000 ML: 0.9 INJECTION, SOLUTION INTRAVENOUS at 20:24

## 2025-01-16 NOTE — ASSESSMENT & PLAN NOTE
Patient follows with ENT. She is being considered for septoplasty/turbinoplasty. She needs lung disease under control prior to surgery.  -Continue to follow with ENT for management, f/u scheduled   -Continue Singular, Allegra, Fluticasone, and azelastine

## 2025-01-16 NOTE — ASSESSMENT & PLAN NOTE
Exacerbation triggered by acute infection as above. Patient with significant smoking history of 20-30 pack-year. Mild obstructive disease seen on PFTs. Currently on Symbicort and albuterol PRN as well as singulair.   Plan:  -Start steroid taper with 40mg daily for 3 days, reduce by 10mg every 3 days for asthma exacerbation  -Use nebulizer regimen in place of inhalers  -Resume Symbicort and albuterol PRN once acute infection resolves  -Continue Singulair  Orders:    predniSONE 20 mg tablet; Take 2 tablets (40 mg total) by mouth daily for 3 days, THEN 1.5 tablets (30 mg total) daily for 3 days, THEN 1 tablet (20 mg total) daily for 3 days, THEN 0.5 tablets (10 mg total) daily for 3 days.

## 2025-01-17 ENCOUNTER — TELEPHONE (OUTPATIENT)
Age: 80
End: 2025-01-17

## 2025-01-17 NOTE — TELEPHONE ENCOUNTER
Pt calling to let Dr. ARTIS Welsh know that she is Covid +    Pt was told to call w/ result in case any medication changes need to be made.

## 2025-01-17 NOTE — DISCHARGE INSTRUCTIONS
You were seen in the Emergency Department today for COVID.    Please follow up with your primary care doctor.  Please return to the Emergency Department if you experience worsening of your current symptoms, chest pain, shortness of breath, recurrent vomiting, or any other concerning symptoms.

## 2025-01-17 NOTE — ED PROVIDER NOTES
Time reflects when diagnosis was documented in both MDM as applicable and the Disposition within this note       Time User Action Codes Description Comment    1/16/2025  9:26 PM Maranda Pisano Add [R09.81] Nasal congestion     1/16/2025  9:27 PM Maranda Pisano Add [U07.1] COVID     1/16/2025  9:27 PM Maranda Pisano Add [J45.901] Asthma exacerbation     1/16/2025  9:27 PM Maranda Pisano Modify [R09.81] Nasal congestion     1/16/2025  9:27 PM Maranda Pisano Modify [U07.1] COVID           ED Disposition       ED Disposition   Discharge    Condition   Stable    Date/Time   Thu Jan 16, 2025  9:29 PM    Comment   Mireya Yi discharge to home/self care.                   Assessment & Plan       Medical Decision Making  Patient presents for evaluation of fever, cough, congestion, shortness of breath.  Differential includes viral syndrome, pneumonia, asthma exacerbation.  Will order CBC, BMP, viral swab, and chest x-ray.  Will treat symptomatically with DuoNeb, Zofran, and fluids.  She is already taken her prednisone today that was prescribed by her PCP.    Chest x-ray is negative for focal consolidations.  Symptoms have improved following the DuoNeb.  Patient has been able to tolerate p.o.  Viral swab positive for COVID.  Discussed results with patient.  She was told to follow-up with her PCP.  She was given return precautions and discharged in stable condition.    Amount and/or Complexity of Data Reviewed  Labs: ordered. Decision-making details documented in ED Course.  Radiology: ordered and independent interpretation performed.    Risk  Prescription drug management.        ED Course as of 01/16/25 2233   Thu Jan 16, 2025 2109 SARS COV Rapid Antigen(!): Positive       Medications   sodium chloride 0.9 % bolus 1,000 mL (0 mL Intravenous Stopped 1/16/25 2139)   albuterol inhalation solution 5 mg (5 mg Nebulization Given 1/16/25 2025)   ipratropium (ATROVENT) 0.02 % inhalation solution 0.5 mg (0.5 mg Nebulization Given 1/16/25  2025)   ondansetron (ZOFRAN) injection 4 mg (4 mg Intravenous Given 1/16/25 2119)       ED Risk Strat Scores                          SBIRT 22yo+      Flowsheet Row Most Recent Value   Initial Alcohol Screen: US AUDIT-C     1. How often do you have a drink containing alcohol? 0 Filed at: 01/16/2025 2059   2. How many drinks containing alcohol do you have on a typical day you are drinking?  0 Filed at: 01/16/2025 2059   3b. FEMALE Any Age, or MALE 65+: How often do you have 4 or more drinks on one occassion? 0 Filed at: 01/16/2025 2059   Audit-C Score 0 Filed at: 01/16/2025 2059   PASTOR: How many times in the past year have you...    Used an illegal drug or used a prescription medication for non-medical reasons? Never Filed at: 01/16/2025 2059                            History of Present Illness       Chief Complaint   Patient presents with    Asthma     Pt. C/o of SOB. Hx of asthma, has been using her breathing treatments and inhalers with no relief. Started prednisone and antibiotics yesterday with no relief.        Past Medical History:   Diagnosis Date    Asthma     Asthmatic bronchitis     Last Assessed: 10/7/2014     Chronic diarrhea     Last Assessed: 8/20/2015     Fibromyalgia     Focal nodular hyperplasia of liver     Last Assessed: 6/11/2015    Herpes zoster     Last Assessed: 3/18/2016    Hypertension     IBS (irritable bowel syndrome)     Intermittent palpitations     Irritable bowel syndrome     Lumbar radiculopathy     Osteoarthritis     Vertigo       Past Surgical History:   Procedure Laterality Date    BREAST BIOPSY      BREAST LUMPECTOMY      when pt was 18    SMALL INTESTINE SURGERY        Family History   Problem Relation Age of Onset    Heart attack Mother     Asthma Father     Breast cancer Sister     Breast cancer Sister     No Known Problems Daughter     No Known Problems Daughter     Arthritis Family     Osteoporosis Family       Social History     Tobacco Use    Smoking status: Former      Current packs/day: 0.50     Types: Cigarettes    Smokeless tobacco: Never    Tobacco comments:     Stopped smoking July 2023   Vaping Use    Vaping status: Never Used   Substance Use Topics    Alcohol use: Not Currently    Drug use: No      E-Cigarette/Vaping    E-Cigarette Use Never User       E-Cigarette/Vaping Substances    Nicotine No     THC No     CBD No     Flavoring No     Other No     Unknown No       I have reviewed and agree with the history as documented.     HPI    Patient is a 79-year-old female with history of COPD who presents with shortness of breath.  Patient states that she has had cough and congestion over the past week that has improved.  She was seen by her PCP yesterday who prescribed her doxycycline and prednisone.  She took 1 dose of doxycycline and prednisone today. She has dyspnea on exertion.  She has been taking her albuterol treatments every 4 hours.  She has had decreased p.o. intake because she has been nauseous which she attributes to swallowing mucus all week.  PCP recommended she obtain a COVID flu test from [facility] but she was unable to find one.  No chest pain.  No fevers.      Review of Systems   Constitutional:  Negative for chills and fever.   HENT:  Positive for congestion. Negative for sore throat.    Respiratory:  Positive for cough and shortness of breath.    Cardiovascular:  Negative for chest pain and palpitations.   Gastrointestinal:  Positive for nausea. Negative for abdominal pain.   Genitourinary:  Negative for dysuria and hematuria.   Musculoskeletal:  Negative for back pain and neck pain.   Neurological:  Negative for syncope and headaches.   All other systems reviewed and are negative.          Objective       ED Triage Vitals [01/16/25 1926]   Temperature Pulse Blood Pressure Respirations SpO2 Patient Position - Orthostatic VS   97.5 °F (36.4 °C) (!) 118 (!) 193/114 18 97 % Sitting      Temp Source Heart Rate Source BP Location FiO2 (%) Pain Score    Oral Monitor  Left arm -- --      Vitals      Date and Time Temp Pulse SpO2 Resp BP Pain Score FACES Pain Rating User   01/16/25 2127 -- -- -- -- 165/92 -- -- KA   01/16/25 2125 -- 114 -- -- -- -- -- KA   01/16/25 2109 -- 125 96 % -- 198/99 -- -- KA   01/16/25 1927 -- -- 97 % -- -- -- --    01/16/25 1926 97.5 °F (36.4 °C) 118 97 % 18 193/114 -- -- BK            Physical Exam  Vitals and nursing note reviewed.   HENT:      Head: Normocephalic and atraumatic.      Nose: Congestion and rhinorrhea present.      Mouth/Throat:      Mouth: Mucous membranes are dry.   Eyes:      General:         Right eye: No discharge.         Left eye: No discharge.      Extraocular Movements: Extraocular movements intact.   Cardiovascular:      Rate and Rhythm: Regular rhythm. Tachycardia present.   Pulmonary:      Effort: Pulmonary effort is normal. No respiratory distress.      Breath sounds: Wheezing and rhonchi present.      Comments: Bilateral expiratory wheezes and rhonchi bilateral upper lobes  Abdominal:      Palpations: Abdomen is soft.      Tenderness: There is no abdominal tenderness.   Musculoskeletal:      Cervical back: Normal range of motion.      Right lower leg: No edema.      Left lower leg: No edema.   Skin:     General: Skin is warm and dry.      Capillary Refill: Capillary refill takes less than 2 seconds.   Neurological:      Mental Status: She is alert.   Psychiatric:         Mood and Affect: Mood normal.         Results Reviewed       Procedure Component Value Units Date/Time    FLU/COVID Rapid Antigen (30 min. TAT) - Preferred screening test in ED [[id number]]  (Abnormal) Collected: 01/16/25 2023    Lab Status: Final result Specimen: Nares from Nose Updated: 01/16/25 2101     SARS COV Rapid Antigen Positive     Influenza A Rapid Antigen Negative     Influenza B Rapid Antigen Negative    Narrative:      This test has been performed using the [employer] Nita 2 FLU+SARS Antigen test under the Emergency Use Authorization (EUA). This  test has been validated by the  and verified by the performing laboratory. The Nita uses lateral flow immunofluorescent sandwich assay to detect SARS-COV, Influenza A and Influenza B Antigen.     The [employer] Nita 2 SARS Antigen test does not differentiate between SARS-CoV and SARS-CoV-2.     Negative results are presumptive and may be confirmed with a molecular assay, if necessary, for patient management. Negative results do not rule out SARS-CoV-2 or influenza infection and should not be used as the sole basis for treatment or patient management decisions. A negative test result may occur if the level of antigen in a sample is below the limit of detection of this test.     Positive results are indicative of the presence of viral antigens, but do not rule out bacterial infection or co-infection with other viruses.     All test results should be used as an adjunct to clinical observations and other information available to the provider.    FOR PEDIATRIC PATIENTS - copy/paste COVID Guidelines URL to browser: [email or web address]    Basic metabolic panel [[id number]]  (Abnormal) Collected: 01/16/25 2023    Lab Status: Final result Specimen: Blood from Arm, Right Updated: 01/16/25 2056     Sodium 139 mmol/L      Potassium 4.2 mmol/L      Chloride 110 mmol/L      CO2 19 mmol/L      ANION GAP 10 mmol/L      BUN 19 mg/dL      Creatinine 1.12 mg/dL      Glucose 148 mg/dL      Calcium 9.1 mg/dL      eGFR 47 ml/min/1.73sq m     Narrative:      National Kidney Disease Foundation guidelines for Chronic Kidney Disease (CKD):     Stage 1 with normal or high GFR (GFR > 90 mL/min/1.73 square meters)    Stage 2 Mild CKD (GFR = 60-89 mL/min/1.73 square meters)    Stage 3A Moderate CKD (GFR = 45-59 mL/min/1.73 square meters)    Stage 3B Moderate CKD (GFR = 30-44 mL/min/1.73 square meters)    Stage 4 Severe CKD (GFR = 15-29 mL/min/1.73 square meters)    Stage 5 End Stage  CKD (GFR <15 mL/min/1.73 square meters)  Note: GFR calculation is accurate only with a steady state creatinine    CBC and differential [[id number]]  (Abnormal) Collected: 25    Lab Status: Final result Specimen: Blood from Arm, Right Updated: 25     WBC 8.30 Thousand/uL      RBC 4.45 Million/uL      Hemoglobin 12.1 g/dL      Hematocrit 37.7 %      MCV 85 fL      MCH 27.2 pg      MCHC 32.1 g/dL      RDW 17.2 %      MPV 11.5 fL      Platelets 296 Thousands/uL      nRBC 0 /100 WBCs      Segmented % 89 %      Immature Grans % 2 %      Lymphocytes % 8 %      Monocytes % 1 %      Eosinophils Relative 0 %      Basophils Relative 0 %      Absolute Neutrophils 7.38 Thousands/µL      Absolute Immature Grans 0.13 Thousand/uL      Absolute Lymphocytes 0.69 Thousands/µL      Absolute Monocytes 0.09 Thousand/µL      Eosinophils Absolute 0.00 Thousand/µL      Basophils Absolute 0.01 Thousands/µL             XR chest pa and lateral   ED Interpretation by Vel Ellsworth MD ([id number])   No lobar pneumonia          Procedures    ED Medication and Procedure Management   Prior to Admission Medications   Prescriptions Last Dose Informant Patient Reported? Taking?   Azelastine HCl 137 MCG/SPRAY SOLN  Self No No   Si sprays into each nostril 2 (two) times a day   Cholecalciferol (D3-1000) 1,000 units tablet  Self No No   Sig: Take 1 tablet (1,000 Units total) by mouth daily   acetaminophen (TYLENOL) 500 mg tablet  Self No No   Sig: Take 1 tablet (500 mg total) by mouth every 6 (six) hours as needed for mild pain   albuterol (2.5 mg/3 mL) 0.083 % nebulizer solution  Self No No   Sig: Take 3 mL (2.5 mg total) by nebulization every 4 (four) hours as needed for wheezing or shortness of breath   albuterol (PROVENTIL HFA,VENTOLIN HFA) 90 mcg/act inhaler  Self No No   Sig: Inhale 2 puffs every 6 (six) hours as needed for wheezing or shortness of breath   amLODIPine (NORVASC) 5 mg tablet  Self No No   Sig: Take 1 tablet  (5 mg total) by mouth daily   budesonide-formoterol (Symbicort) 80-4.5 MCG/ACT inhaler   No No   Sig: Inhale 2 puffs 2 (two) times a day Rinse mouth after use   doxycycline hyclate (VIBRAMYCIN) 100 mg capsule   No No   Sig: Take 1 capsule (100 mg total) by mouth every 12 (twelve) hours for 10 days   fexofenadine (ALLEGRA) 180 MG tablet  Self Yes No   Sig: Take 180 mg by mouth daily   fluticasone (FLONASE) 50 mcg/act nasal spray  Self No No   Sig: SPRAY 2 SPRAYS INTO EACH NOSTRIL EVERY DAY   folic acid (FOLVITE) 1 mg tablet  Self No No   Sig: Take 1 tablet (1,000 mcg total) by mouth daily   ipratropium (ATROVENT) 0.03 % nasal spray  Self No No   Si sprays into each nostril every 12 (twelve) hours   montelukast (SINGULAIR) 10 mg tablet  Self No No   Sig: Take 1 tablet (10 mg total) by mouth daily at bedtime   nicotine (NICODERM CQ) 14 mg/24hr TD 24 hr patch  Self No No   Sig: Place 1 patch on the skin over 24 hours every 24 hours   ondansetron (Zofran ODT) 4 mg disintegrating tablet  Self No No   Sig: Take 1 tablet (4 mg total) by mouth every 6 (six) hours as needed for nausea or vomiting   pantoprazole (PROTONIX) 20 mg tablet  Self No No   Sig: Take 1 tablet (20 mg total) by mouth daily   predniSONE 20 mg tablet   No No   Sig: Take 2 tablets (40 mg total) by mouth daily for 3 days, THEN 1.5 tablets (30 mg total) daily for 3 days, THEN 1 tablet (20 mg total) daily for 3 days, THEN 0.5 tablets (10 mg total) daily for 3 days.      Facility-Administered Medications: None     Discharge Medication List as of 2025  9:29 PM        START taking these medications    Details   !! azelastine (ASTELIN) 0.1 % nasal spray 1 spray into each nostril 2 (two) times a day Use in each nostril as directed, Starting Thu 2025, Normal       !! - Potential duplicate medications found. Please discuss with provider.        CONTINUE these medications which have NOT CHANGED    Details   acetaminophen (TYLENOL) 500 mg tablet Take 1  tablet (500 mg total) by mouth every 6 (six) hours as needed for mild pain, Starting Mon 8/27/2018, Normal      albuterol (2.5 mg/3 mL) 0.083 % nebulizer solution Take 3 mL (2.5 mg total) by nebulization every 4 (four) hours as needed for wheezing or shortness of breath, Starting Mon 10/21/2024, Normal      albuterol (PROVENTIL HFA,VENTOLIN HFA) 90 mcg/act inhaler Inhale 2 puffs every 6 (six) hours as needed for wheezing or shortness of breath, Starting Wed 5/15/2024, Normal      amLODIPine (NORVASC) 5 mg tablet Take 1 tablet (5 mg total) by mouth daily, Starting Mon 12/2/2024, Normal      !! Azelastine HCl 137 MCG/SPRAY SOLN 2 sprays into each nostril 2 (two) times a day, Starting Mon 12/2/2024, Normal      budesonide-formoterol (Symbicort) 80-4.5 MCG/ACT inhaler Inhale 2 puffs 2 (two) times a day Rinse mouth after use, Starting Wed 11/6/2024, Until Fri 12/6/2024, Normal      Cholecalciferol (D3-1000) 1,000 units tablet Take 1 tablet (1,000 Units total) by mouth daily, Starting Mon 12/2/2024, Normal      doxycycline hyclate (VIBRAMYCIN) 100 mg capsule Take 1 capsule (100 mg total) by mouth every 12 (twelve) hours for 10 days, Starting Wed 1/15/2025, Until Sat 1/25/2025, Normal      fexofenadine (ALLEGRA) 180 MG tablet Take 180 mg by mouth daily, Historical Med      fluticasone (FLONASE) 50 mcg/act nasal spray SPRAY 2 SPRAYS INTO EACH NOSTRIL EVERY DAY, Normal      folic acid (FOLVITE) 1 mg tablet Take 1 tablet (1,000 mcg total) by mouth daily, Starting Mon 12/2/2024, Normal      ipratropium (ATROVENT) 0.03 % nasal spray 2 sprays into each nostril every 12 (twelve) hours, Starting Wed 6/5/2024, Normal      montelukast (SINGULAIR) 10 mg tablet Take 1 tablet (10 mg total) by mouth daily at bedtime, Starting Mon 12/2/2024, Normal      nicotine (NICODERM CQ) 14 mg/24hr TD 24 hr patch Place 1 patch on the skin over 24 hours every 24 hours, Starting Mon 10/21/2024, Normal      ondansetron (Zofran ODT) 4 mg disintegrating  tablet Take 1 tablet (4 mg total) by mouth every 6 (six) hours as needed for nausea or vomiting, Starting Mon 12/2/2024, Normal      pantoprazole (PROTONIX) 20 mg tablet Take 1 tablet (20 mg total) by mouth daily, Starting Mon 12/2/2024, Normal      predniSONE 20 mg tablet Multiple Dosages:Starting Wed 1/15/2025, Until Fri 1/17/2025 at 2359, THEN Starting Sat 1/18/2025, Until Mon 1/20/2025 at 2359, THEN Starting Tue 1/21/2025, Until Thu 1/23/2025 at 2359, THEN Starting Fri 1/24/2025, Until Sun 1/26/2025 at 2359Take 2  tablets (40 mg total) by mouth daily for 3 days, THEN 1.5 tablets (30 mg total) daily for 3 days, THEN 1 tablet (20 mg total) daily for 3 days, THEN 0.5 tablets (10 mg total) daily for 3 days., Normal       !! - Potential duplicate medications found. Please discuss with provider.        No discharge procedures on file.  ED SEPSIS DOCUMENTATION   Time reflects when diagnosis was documented in both MDM as applicable and the Disposition within this note       Time User Action Codes Description Comment    1/16/2025  9:26 PM Maranda Pisano Add [R09.81] Nasal congestion     1/16/2025  9:27 PM Maranda Pisano Add [U07.1] COVID     1/16/2025  9:27 PM Maranda Pisano [J45.901] Asthma exacerbation     1/16/2025  9:27 PM Maranda Pisano Modify [R09.81] Nasal congestion     1/16/2025  9:27 PM Maranda Pisano Modify [U07.1] COVID                  Maranda Pisano MD  01/16/25 4170

## 2025-01-17 NOTE — ED ATTENDING ATTESTATION
1/16/2025  IVel MD, saw and evaluated the patient. I have discussed the patient with the resident/non-physician practitioner and agree with the resident's/non-physician practitioner's findings, Plan of Care, and MDM as documented in the resident's/non-physician practitioner's note, except where noted. All available labs and Radiology studies were reviewed.  I was present for key portions of any procedure(s) performed by the resident/non-physician practitioner and I was immediately available to provide assistance.       At this point I agree with the current assessment done in the Emergency Department.  I have conducted an independent evaluation of this patient a history and physical is as follows:    ED Course  ED Course as of 01/16/25 2024   Thu Jan 16, 2025 1945 Per resident h&p 80 YO F h/o COPD saw PCP yesterday who prescribed prednisone and antibiotics; PCP wants screening for covid; decreased po intake O: 's POx NL lungs rhonchi and wheezing bilateral I/P duoneb; corticosteroids already administered; IVF; screen electrolytes CXR r/o pneumonia viral swab     Emergency Department Note- Mireya Yi 79 y.o. female MRN: [id number]    Unit/Bed#: [facility] Encounter: [id number]    Mireya Yi is a 79 y.o. female who presents with   Chief Complaint   Patient presents with    Asthma     Pt. C/o of SOB. Hx of asthma, has been using her breathing treatments and inhalers with no relief. Started prednisone and antibiotics yesterday with no relief.          History of Present Illness   HPI:  Mireya Yi is a 79 y.o. female who presents for evaluation of:  Increased dyspnea.  Patient has a long history of asthma.  Patient saw her primary care physician yesterday who added oral prednisone and oral antibiotics to her regimen.  Her PCP wanted her to be screened for COVID and influenza as well.  Patient notes that her cough and dyspnea are making her nauseated.  She denies fevers and chills.  Her cough is  been nonproductive of sputum.  She denies any associated hemoptysis.    Review of Systems   Constitutional:  Negative for fatigue and fever.   HENT:  Negative for congestion and sore throat.    Respiratory:  Positive for cough, shortness of breath and wheezing.    Cardiovascular:  Negative for chest pain and palpitations.   Gastrointestinal:  Positive for nausea. Negative for abdominal pain and vomiting.   Genitourinary:  Negative for flank pain and frequency.   Neurological:  Negative for light-headedness and headaches.   Psychiatric/Behavioral:  Negative for dysphoric mood and hallucinations.    All other systems reviewed and are negative.      Historical Information   Past Medical History:   Diagnosis Date    Asthma     Asthmatic bronchitis     Last Assessed: 10/7/2014     Chronic diarrhea     Last Assessed: 8/20/2015     Fibromyalgia     Focal nodular hyperplasia of liver     Last Assessed: 6/11/2015    Herpes zoster     Last Assessed: 3/18/2016    Hypertension     IBS (irritable bowel syndrome)     Intermittent palpitations     Irritable bowel syndrome     Lumbar radiculopathy     Osteoarthritis     Vertigo      Past Surgical History:   Procedure Laterality Date    BREAST BIOPSY      BREAST LUMPECTOMY      when pt was 18    SMALL INTESTINE SURGERY       Social History   Social History     Substance and Sexual Activity   Alcohol Use Not Currently     Social History     Substance and Sexual Activity   Drug Use No     Social History     Tobacco Use   Smoking Status Former    Current packs/day: 0.50    Types: Cigarettes   Smokeless Tobacco Never   Tobacco Comments    Stopped smoking July 2023     Family History:   Family History   Problem Relation Age of Onset    Heart attack Mother     Asthma Father     Breast cancer Sister     Breast cancer Sister     No Known Problems Daughter     No Known Problems Daughter     Arthritis Family     Osteoporosis Family        Meds/Allergies   PTA meds:   Prior to Admission  Medications   Prescriptions Last Dose Informant Patient Reported? Taking?   Azelastine HCl 137 MCG/SPRAY SOLN  Self No No   Si sprays into each nostril 2 (two) times a day   Cholecalciferol (D3-1000) 1,000 units tablet  Self No No   Sig: Take 1 tablet (1,000 Units total) by mouth daily   acetaminophen (TYLENOL) 500 mg tablet  Self No No   Sig: Take 1 tablet (500 mg total) by mouth every 6 (six) hours as needed for mild pain   albuterol (2.5 mg/3 mL) 0.083 % nebulizer solution  Self No No   Sig: Take 3 mL (2.5 mg total) by nebulization every 4 (four) hours as needed for wheezing or shortness of breath   albuterol (PROVENTIL HFA,VENTOLIN HFA) 90 mcg/act inhaler  Self No No   Sig: Inhale 2 puffs every 6 (six) hours as needed for wheezing or shortness of breath   amLODIPine (NORVASC) 5 mg tablet  Self No No   Sig: Take 1 tablet (5 mg total) by mouth daily   budesonide-formoterol (Symbicort) 80-4.5 MCG/ACT inhaler   No No   Sig: Inhale 2 puffs 2 (two) times a day Rinse mouth after use   doxycycline hyclate (VIBRAMYCIN) 100 mg capsule   No No   Sig: Take 1 capsule (100 mg total) by mouth every 12 (twelve) hours for 10 days   fexofenadine (ALLEGRA) 180 MG tablet  Self Yes No   Sig: Take 180 mg by mouth daily   fluticasone (FLONASE) 50 mcg/act nasal spray  Self No No   Sig: SPRAY 2 SPRAYS INTO EACH NOSTRIL EVERY DAY   folic acid (FOLVITE) 1 mg tablet  Self No No   Sig: Take 1 tablet (1,000 mcg total) by mouth daily   ipratropium (ATROVENT) 0.03 % nasal spray  Self No No   Si sprays into each nostril every 12 (twelve) hours   montelukast (SINGULAIR) 10 mg tablet  Self No No   Sig: Take 1 tablet (10 mg total) by mouth daily at bedtime   nicotine (NICODERM CQ) 14 mg/24hr TD 24 hr patch  Self No No   Sig: Place 1 patch on the skin over 24 hours every 24 hours   ondansetron (Zofran ODT) 4 mg disintegrating tablet  Self No No   Sig: Take 1 tablet (4 mg total) by mouth every 6 (six) hours as needed for nausea or vomiting  "  pantoprazole (PROTONIX) 20 mg tablet  Self No No   Sig: Take 1 tablet (20 mg total) by mouth daily   predniSONE 20 mg tablet   No No   Sig: Take 2 tablets (40 mg total) by mouth daily for 3 days, THEN 1.5 tablets (30 mg total) daily for 3 days, THEN 1 tablet (20 mg total) daily for 3 days, THEN 0.5 tablets (10 mg total) daily for 3 days.      Facility-Administered Medications: None     Allergies   Allergen Reactions    Ambrosia Artemisiifolia (Ragweed) Skin Test Facial Swelling    Codeine Other (See Comments)     Heart races    Epinephrine      Pt reports \"it makes my heart race, they told me I cant take it.\"    Ibuprofen Other (See Comments)     Heart racing    Morphine Other (See Comments)     Heart racing    Pollen Extract Sneezing    Tramadol Other (See Comments)     Heart racing       Objective   First Vitals:   Blood Pressure: (!) 193/114 (01/16/25 1926)  Pulse: (!) 118 (01/16/25 1926)  Temperature: 97.5 °F (36.4 °C) (01/16/25 1926)  Temp Source: Oral (01/16/25 1926)  Respirations: 18 (01/16/25 1926)  SpO2: 97 % (01/16/25 1926)    Current Vitals:   Blood Pressure: (!) 193/114 (01/16/25 1926)  Pulse: (!) 118 (01/16/25 1926)  Temperature: 97.5 °F (36.4 °C) (01/16/25 1926)  Temp Source: Oral (01/16/25 1926)  Respirations: 18 (01/16/25 1926)  SpO2: 97 % (01/16/25 1927)    No intake or output data in the 24 hours ending 01/16/25 2024    Invasive Devices       None                   Physical Exam  Vitals and nursing note reviewed.   Constitutional:       General: She is not in acute distress.     Appearance: Normal appearance. She is well-developed.   HENT:      Head: Normocephalic and atraumatic.      Right Ear: External ear normal.      Left Ear: External ear normal.      Nose: Nose normal.      Mouth/Throat:      Pharynx: No oropharyngeal exudate.   Eyes:      Conjunctiva/sclera: Conjunctivae normal.      Pupils: Pupils are equal, round, and reactive to light.   Cardiovascular:      Rate and Rhythm: Normal rate " "and regular rhythm.   Pulmonary:      Effort: Pulmonary effort is normal. No respiratory distress.      Breath sounds: Wheezing (x more present on the right side) and rhonchi (x more in the right lung) present.   Abdominal:      General: Abdomen is flat. There is no distension.      Palpations: Abdomen is soft.   Musculoskeletal:         General: No deformity. Normal range of motion.      Cervical back: Normal range of motion and neck supple.   Skin:     General: Skin is warm and dry.      Capillary Refill: Capillary refill takes less than 2 seconds.      Coloration: Skin is jaundiced.   Neurological:      General: No focal deficit present.      Mental Status: She is alert and oriented to person, place, and time. Mental status is at baseline.      Coordination: Coordination normal.   Psychiatric:         Mood and Affect: Mood normal.         Behavior: Behavior normal.         Thought Content: Thought content normal.         Judgment: Judgment normal.           Medical Decision Makin.  Acute cough, dyspnea, and wheezing: Plan to treat with albuterol and ipratropium bromide hour-long nebulization therapy; patient is already on corticosteroids orally; chest x-ray to rule out pneumonia; serum electrolytes to rule out electrolyte disturbance; CBC rule out leukocytosis and anemia; complete metabolic profile rule out electrolyte disturbance, uremia, and disorders of glucose homeostasis.    No results found for this or any previous visit (from the past 36 hours).  XR chest pa and lateral    (Results Pending)         Portions of the record may have been created with voice recognition software. Occasional wrong word or \"sound a like\" substitutions may have occurred due to the inherent limitations of voice recognition software.  Read the chart carefully and recognize, using context, where substitutions have occurred.        Critical Care Time  Procedures      "

## 2025-01-18 ENCOUNTER — APPOINTMENT (EMERGENCY)
Dept: RADIOLOGY | Facility: HOSPITAL | Age: 80
End: 2025-01-18
Payer: MEDICARE

## 2025-01-18 ENCOUNTER — HOSPITAL ENCOUNTER (OUTPATIENT)
Facility: HOSPITAL | Age: 80
Setting detail: OBSERVATION
Discharge: HOME | End: 2025-01-19
Attending: EMERGENCY MEDICINE | Admitting: FAMILY MEDICINE
Payer: MEDICARE

## 2025-01-18 DIAGNOSIS — J45.901 ASTHMA EXACERBATION: Primary | ICD-10-CM

## 2025-01-18 DIAGNOSIS — U07.1 COVID: ICD-10-CM

## 2025-01-18 LAB
2HR DELTA HS TROPONIN: 2 NG/L
ALBUMIN SERPL BCG-MCNC: 4 G/DL (ref 3.5–5)
ALP SERPL-CCNC: 78 U/L (ref 34–104)
ALT SERPL W P-5'-P-CCNC: 12 U/L (ref 7–52)
ANION GAP SERPL CALCULATED.3IONS-SCNC: 12 MMOL/L (ref 4–13)
AST SERPL W P-5'-P-CCNC: 15 U/L (ref 13–39)
ATRIAL RATE: 134 BPM
BACTERIA UR QL AUTO: NORMAL /HPF
BASOPHILS # BLD AUTO: 0.03 THOUSANDS/ΜL (ref 0–0.1)
BASOPHILS NFR BLD AUTO: 0 % (ref 0–1)
BILIRUB SERPL-MCNC: 0.27 MG/DL (ref 0.2–1)
BILIRUB UR QL STRIP: NEGATIVE
BUN SERPL-MCNC: 18 MG/DL (ref 5–25)
CALCIUM SERPL-MCNC: 9.3 MG/DL (ref 8.4–10.2)
CARDIAC TROPONIN I PNL SERPL HS: 14 NG/L (ref ?–50)
CARDIAC TROPONIN I PNL SERPL HS: 16 NG/L (ref ?–50)
CHLORIDE SERPL-SCNC: 109 MMOL/L (ref 96–108)
CLARITY UR: CLEAR
CO2 SERPL-SCNC: 20 MMOL/L (ref 21–32)
COLOR UR: COLORLESS
CREAT SERPL-MCNC: 1.21 MG/DL (ref 0.6–1.3)
EOSINOPHIL # BLD AUTO: 0.02 THOUSAND/ΜL (ref 0–0.61)
EOSINOPHIL NFR BLD AUTO: 0 % (ref 0–6)
ERYTHROCYTE [DISTWIDTH] IN BLOOD BY AUTOMATED COUNT: 17.2 % (ref 11.6–15.1)
GFR SERPL CREATININE-BSD FRML MDRD: 42 ML/MIN/1.73SQ M
GLUCOSE SERPL-MCNC: 104 MG/DL (ref 65–140)
GLUCOSE UR STRIP-MCNC: NEGATIVE MG/DL
HCT VFR BLD AUTO: 40.2 % (ref 34.8–46.1)
HGB BLD-MCNC: 13 G/DL (ref 11.5–15.4)
HGB UR QL STRIP.AUTO: NEGATIVE
IMM GRANULOCYTES # BLD AUTO: 0.09 THOUSAND/UL (ref 0–0.2)
IMM GRANULOCYTES NFR BLD AUTO: 1 % (ref 0–2)
KETONES UR STRIP-MCNC: NEGATIVE MG/DL
LEUKOCYTE ESTERASE UR QL STRIP: NEGATIVE
LYMPHOCYTES # BLD AUTO: 3.01 THOUSANDS/ΜL (ref 0.6–4.47)
LYMPHOCYTES NFR BLD AUTO: 27 % (ref 14–44)
MCH RBC QN AUTO: 27 PG (ref 26.8–34.3)
MCHC RBC AUTO-ENTMCNC: 32.3 G/DL (ref 31.4–37.4)
MCV RBC AUTO: 84 FL (ref 82–98)
MONOCYTES # BLD AUTO: 0.86 THOUSAND/ΜL (ref 0.17–1.22)
MONOCYTES NFR BLD AUTO: 8 % (ref 4–12)
NEUTROPHILS # BLD AUTO: 7.28 THOUSANDS/ΜL (ref 1.85–7.62)
NEUTS SEG NFR BLD AUTO: 64 % (ref 43–75)
NITRITE UR QL STRIP: NEGATIVE
NON-SQ EPI CELLS URNS QL MICRO: NORMAL /HPF
NRBC BLD AUTO-RTO: 0 /100 WBCS
P AXIS: 73 DEGREES
PH UR STRIP.AUTO: 5.5 [PH]
PLATELET # BLD AUTO: 339 THOUSANDS/UL (ref 149–390)
PMV BLD AUTO: 10.9 FL (ref 8.9–12.7)
POTASSIUM SERPL-SCNC: 3.7 MMOL/L (ref 3.5–5.3)
PR INTERVAL: 116 MS
PROT SERPL-MCNC: 7.2 G/DL (ref 6.4–8.4)
PROT UR STRIP-MCNC: ABNORMAL MG/DL
QRS AXIS: 19 DEGREES
QRSD INTERVAL: 78 MS
QT INTERVAL: 294 MS
QTC INTERVAL: 439 MS
RBC # BLD AUTO: 4.81 MILLION/UL (ref 3.81–5.12)
RBC #/AREA URNS AUTO: NORMAL /HPF
SODIUM SERPL-SCNC: 141 MMOL/L (ref 135–147)
SP GR UR STRIP.AUTO: 1.03 (ref 1–1.03)
T WAVE AXIS: 64 DEGREES
UROBILINOGEN UR STRIP-ACNC: <2 MG/DL
VENTRICULAR RATE: 134 BPM
WBC # BLD AUTO: 11.29 THOUSAND/UL (ref 4.31–10.16)
WBC #/AREA URNS AUTO: NORMAL /HPF

## 2025-01-18 PROCEDURE — 85025 COMPLETE CBC W/AUTO DIFF WBC: CPT

## 2025-01-18 PROCEDURE — 71275 CT ANGIOGRAPHY CHEST: CPT

## 2025-01-18 PROCEDURE — 81001 URINALYSIS AUTO W/SCOPE: CPT

## 2025-01-18 PROCEDURE — 93005 ELECTROCARDIOGRAM TRACING: CPT

## 2025-01-18 PROCEDURE — 99285 EMERGENCY DEPT VISIT HI MDM: CPT

## 2025-01-18 PROCEDURE — 93010 ELECTROCARDIOGRAM REPORT: CPT | Performed by: INTERNAL MEDICINE

## 2025-01-18 PROCEDURE — 94664 DEMO&/EVAL PT USE INHALER: CPT

## 2025-01-18 PROCEDURE — 99223 1ST HOSP IP/OBS HIGH 75: CPT | Performed by: FAMILY MEDICINE

## 2025-01-18 PROCEDURE — 96366 THER/PROPH/DIAG IV INF ADDON: CPT

## 2025-01-18 PROCEDURE — 71046 X-RAY EXAM CHEST 2 VIEWS: CPT

## 2025-01-18 PROCEDURE — 94760 N-INVAS EAR/PLS OXIMETRY 1: CPT

## 2025-01-18 PROCEDURE — 94640 AIRWAY INHALATION TREATMENT: CPT

## 2025-01-18 PROCEDURE — 94644 CONT INHLJ TX 1ST HOUR: CPT

## 2025-01-18 PROCEDURE — 96365 THER/PROPH/DIAG IV INF INIT: CPT

## 2025-01-18 PROCEDURE — 99291 CRITICAL CARE FIRST HOUR: CPT | Performed by: EMERGENCY MEDICINE

## 2025-01-18 PROCEDURE — 80053 COMPREHEN METABOLIC PANEL: CPT

## 2025-01-18 PROCEDURE — 84484 ASSAY OF TROPONIN QUANT: CPT

## 2025-01-18 PROCEDURE — 96375 TX/PRO/DX INJ NEW DRUG ADDON: CPT

## 2025-01-18 PROCEDURE — 36415 COLL VENOUS BLD VENIPUNCTURE: CPT

## 2025-01-18 RX ORDER — PREDNISONE 10 MG/1
10 TABLET ORAL DAILY
Status: DISCONTINUED | OUTPATIENT
Start: 2025-01-24 | End: 2025-01-19 | Stop reason: HOSPADM

## 2025-01-18 RX ORDER — BUDESONIDE AND FORMOTEROL FUMARATE DIHYDRATE 80; 4.5 UG/1; UG/1
2 AEROSOL RESPIRATORY (INHALATION) 2 TIMES DAILY
Status: DISCONTINUED | OUTPATIENT
Start: 2025-01-18 | End: 2025-01-19 | Stop reason: HOSPADM

## 2025-01-18 RX ORDER — ALBUTEROL SULFATE 0.83 MG/ML
2.5 SOLUTION RESPIRATORY (INHALATION) EVERY 4 HOURS PRN
Status: DISCONTINUED | OUTPATIENT
Start: 2025-01-18 | End: 2025-01-18

## 2025-01-18 RX ORDER — PANTOPRAZOLE SODIUM 20 MG/1
20 TABLET, DELAYED RELEASE ORAL DAILY
Status: DISCONTINUED | OUTPATIENT
Start: 2025-01-18 | End: 2025-01-19 | Stop reason: HOSPADM

## 2025-01-18 RX ORDER — FOLIC ACID 1 MG/1
1000 TABLET ORAL DAILY
Status: DISCONTINUED | OUTPATIENT
Start: 2025-01-18 | End: 2025-01-19 | Stop reason: HOSPADM

## 2025-01-18 RX ORDER — HYDROXYZINE HYDROCHLORIDE 10 MG/1
10 TABLET, FILM COATED ORAL
Status: DISCONTINUED | OUTPATIENT
Start: 2025-01-18 | End: 2025-01-18

## 2025-01-18 RX ORDER — NICOTINE 21 MG/24HR
1 PATCH, TRANSDERMAL 24 HOURS TRANSDERMAL EVERY 24 HOURS
Status: DISCONTINUED | OUTPATIENT
Start: 2025-01-18 | End: 2025-01-19 | Stop reason: HOSPADM

## 2025-01-18 RX ORDER — ONDANSETRON 2 MG/ML
4 INJECTION INTRAMUSCULAR; INTRAVENOUS ONCE
Status: COMPLETED | OUTPATIENT
Start: 2025-01-18 | End: 2025-01-18

## 2025-01-18 RX ORDER — DOXYCYCLINE 100 MG/1
100 CAPSULE ORAL EVERY 12 HOURS SCHEDULED
Status: DISCONTINUED | OUTPATIENT
Start: 2025-01-18 | End: 2025-01-19 | Stop reason: HOSPADM

## 2025-01-18 RX ORDER — SODIUM CHLORIDE FOR INHALATION 0.9 %
12 VIAL, NEBULIZER (ML) INHALATION ONCE
Status: COMPLETED | OUTPATIENT
Start: 2025-01-18 | End: 2025-01-18

## 2025-01-18 RX ORDER — AMLODIPINE BESYLATE 5 MG/1
5 TABLET ORAL DAILY
Status: DISCONTINUED | OUTPATIENT
Start: 2025-01-18 | End: 2025-01-19 | Stop reason: HOSPADM

## 2025-01-18 RX ORDER — MAGNESIUM SULFATE HEPTAHYDRATE 40 MG/ML
2 INJECTION, SOLUTION INTRAVENOUS ONCE
Status: COMPLETED | OUTPATIENT
Start: 2025-01-18 | End: 2025-01-18

## 2025-01-18 RX ORDER — ALBUTEROL SULFATE 5 MG/ML
10 SOLUTION RESPIRATORY (INHALATION) ONCE
Status: COMPLETED | OUTPATIENT
Start: 2025-01-18 | End: 2025-01-18

## 2025-01-18 RX ORDER — ACETAMINOPHEN 325 MG/1
975 TABLET ORAL EVERY 8 HOURS PRN
Status: DISCONTINUED | OUTPATIENT
Start: 2025-01-18 | End: 2025-01-19 | Stop reason: HOSPADM

## 2025-01-18 RX ORDER — PREDNISONE 20 MG/1
20 TABLET ORAL DAILY
Status: DISCONTINUED | OUTPATIENT
Start: 2025-01-21 | End: 2025-01-19 | Stop reason: HOSPADM

## 2025-01-18 RX ORDER — MONTELUKAST SODIUM 10 MG/1
10 TABLET ORAL
Status: DISCONTINUED | OUTPATIENT
Start: 2025-01-18 | End: 2025-01-19 | Stop reason: HOSPADM

## 2025-01-18 RX ORDER — ENOXAPARIN SODIUM 100 MG/ML
40 INJECTION SUBCUTANEOUS DAILY
Status: DISCONTINUED | OUTPATIENT
Start: 2025-01-18 | End: 2025-01-19 | Stop reason: HOSPADM

## 2025-01-18 RX ORDER — HYDROXYZINE HYDROCHLORIDE 10 MG/1
10 TABLET, FILM COATED ORAL
Status: DISCONTINUED | OUTPATIENT
Start: 2025-01-18 | End: 2025-01-19 | Stop reason: HOSPADM

## 2025-01-18 RX ORDER — ALBUTEROL SULFATE 90 UG/1
2 INHALANT RESPIRATORY (INHALATION) EVERY 4 HOURS PRN
Status: DISCONTINUED | OUTPATIENT
Start: 2025-01-18 | End: 2025-01-19 | Stop reason: HOSPADM

## 2025-01-18 RX ORDER — LORATADINE 10 MG/1
10 TABLET ORAL DAILY
Status: DISCONTINUED | OUTPATIENT
Start: 2025-01-18 | End: 2025-01-19 | Stop reason: HOSPADM

## 2025-01-18 RX ADMIN — ENOXAPARIN SODIUM 40 MG: 40 INJECTION SUBCUTANEOUS at 17:16

## 2025-01-18 RX ADMIN — BUDESONIDE AND FORMOTEROL FUMARATE DIHYDRATE 2 PUFF: 80; 4.5 AEROSOL RESPIRATORY (INHALATION) at 21:04

## 2025-01-18 RX ADMIN — AMLODIPINE BESYLATE 5 MG: 5 TABLET ORAL at 21:11

## 2025-01-18 RX ADMIN — HYDROXYZINE HYDROCHLORIDE 10 MG: 10 TABLET, FILM COATED ORAL at 17:25

## 2025-01-18 RX ADMIN — NICOTINE 1 PATCH: 14 PATCH, EXTENDED RELEASE TRANSDERMAL at 17:15

## 2025-01-18 RX ADMIN — ONDANSETRON 4 MG: 2 INJECTION INTRAMUSCULAR; INTRAVENOUS at 12:50

## 2025-01-18 RX ADMIN — ALBUTEROL SULFATE 10 MG: 2.5 SOLUTION RESPIRATORY (INHALATION) at 12:36

## 2025-01-18 RX ADMIN — PREDNISONE 30 MG: 20 TABLET ORAL at 17:16

## 2025-01-18 RX ADMIN — MONTELUKAST 10 MG: 10 TABLET, FILM COATED ORAL at 21:11

## 2025-01-18 RX ADMIN — IOHEXOL 85 ML: 350 INJECTION, SOLUTION INTRAVENOUS at 13:34

## 2025-01-18 RX ADMIN — MAGNESIUM SULFATE HEPTAHYDRATE 2 G: 40 INJECTION, SOLUTION INTRAVENOUS at 12:56

## 2025-01-18 RX ADMIN — ISODIUM CHLORIDE 12 ML: 0.03 SOLUTION RESPIRATORY (INHALATION) at 12:36

## 2025-01-18 RX ADMIN — ALBUTEROL SULFATE 2.5 MG: 2.5 SOLUTION RESPIRATORY (INHALATION) at 23:14

## 2025-01-18 RX ADMIN — ALBUTEROL SULFATE 2.5 MG: 2.5 SOLUTION RESPIRATORY (INHALATION) at 19:38

## 2025-01-18 RX ADMIN — DOXYCYCLINE HYCLATE 100 MG: 100 CAPSULE ORAL at 21:11

## 2025-01-18 RX ADMIN — PANTOPRAZOLE SODIUM 20 MG: 20 TABLET, DELAYED RELEASE ORAL at 17:16

## 2025-01-18 RX ADMIN — IPRATROPIUM BROMIDE 1 MG: 0.5 SOLUTION RESPIRATORY (INHALATION) at 12:36

## 2025-01-18 RX ADMIN — ACETAMINOPHEN 975 MG: 325 TABLET, FILM COATED ORAL at 21:11

## 2025-01-18 NOTE — RESPIRATORY THERAPY NOTE
RT Protocol Note  Mireya Yi 79 y.o. female MRN: [id number]  Unit/Bed#: [facility] Encounter: [id number]    Assessment    Principal Problem:    Asthma exacerbation  Active Problems:    Chronic allergic rhinitis    Essential hypertension    GERD (gastroesophageal reflux disease)    Anxiety    Ambulatory dysfunction    Tobacco abuse    Stage 3a chronic kidney disease (HCC)    COVID-19 virus infection      Home Pulmonary Medications:  Albuterol       Past Medical History:   Diagnosis Date    Asthma     Asthmatic bronchitis     Last Assessed: 10/7/2014     Chronic diarrhea     Last Assessed: 8/20/2015     Fibromyalgia     Focal nodular hyperplasia of liver     Last Assessed: 6/11/2015    Herpes zoster     Last Assessed: 3/18/2016    Hypertension     IBS (irritable bowel syndrome)     Intermittent palpitations     Irritable bowel syndrome     Lumbar radiculopathy     Osteoarthritis     Vertigo      Social History     Socioeconomic History    Marital status:      Spouse name: Not on file    Number of children: Not on file    Years of education: Not on file    Highest education level: Not on file   Occupational History    Occupation: Unemployed    Tobacco Use    Smoking status: Former     Current packs/day: 0.50     Types: Cigarettes    Smokeless tobacco: Never    Tobacco comments:     Stopped smoking July 2023   Vaping Use    Vaping status: Never Used   Substance and Sexual Activity    Alcohol use: Not Currently    Drug use: No    Sexual activity: Not Currently     Partners: Male   Other Topics Concern    Not on file   Social History Narrative    Mental Disability     Exercising Regularly     Lives with adult children      Social Drivers of Health     Financial Resource Strain: Low Risk  (5/17/2024)    Overall Financial Resource Strain (CARDIA)     Difficulty of Paying Living Expenses: Not very hard   Food Insecurity: No Food Insecurity (5/17/2024)    Nursing - Inadequate Food Risk Classification     Worried About  Running Out of Food in the Last Year: Never true     Ran Out of Food in the Last Year: Never true     Ran Out of Food in the Last Year: Not on file   Transportation Needs: No Transportation Needs (5/17/2024)    PRAPARE - Transportation     Lack of Transportation (Medical): No     Lack of Transportation (Non-Medical): No   Physical Activity: Sufficiently Active (6/14/2024)    Exercise Vital Sign     Days of Exercise per Week: 4 days     Minutes of Exercise per Session: 40 min   Stress: Stress Concern Present (3/21/2024)    [place] [employer] - Occupational Stress Questionnaire     Feeling of Stress : To some extent   Social Connections: Unknown (6/14/2024)    Social Connection and Isolation Panel [NHANES]     Frequency of Communication with Friends and Family: More than three times a week     Frequency of Social Gatherings with Friends and Family: More than three times a week     Attends Confucianism Services: Never     Active Member of Clubs or Organizations: No     Attends Club or Organization Meetings: Never     Marital Status: Not on file   Recent Concern: Social Connections - Socially Isolated (6/14/2024)    Social Connection and Isolation Panel [NHANES]     Frequency of Communication with Friends and Family: More than three times a week     Frequency of Social Gatherings with Friends and Family: More than three times a week     Attends Confucianism Services: Never     Active Member of Clubs or Organizations: No     Attends Club or Organization Meetings: Never     Marital Status: Never    Intimate Partner Violence: Not At Risk (3/21/2024)    Humiliation, Afraid, Rape, and Kick questionnaire     Fear of Current or Ex-Partner: No     Emotionally Abused: No     Physically Abused: No     Sexually Abused: No   Housing Stability: Low Risk  (5/17/2024)    Housing Stability Vital Sign     Unable to Pay for Housing in the Last Year: No     Number of Times Moved in the Last Year: 1     Homeless in  the Last Year: No       Subjective         Objective    Physical Exam:   Assessment Type: Assess only  General Appearance: Alert, Awake  Respiratory Pattern: Normal  Chest Assessment: Chest expansion symmetrical  Bilateral Breath Sounds: Diminished    Vitals:  Blood pressure 152/94, pulse 101, temperature 98.6 °F (37 °C), temperature source Oral, resp. rate 17, SpO2 95%.          Imaging and other studies: Results Review Statement: No pertinent imaging studies reviewed.          Plan    Respiratory Plan: No distress/Pulmonary history        Resp Comments: (P) Pt in hospital for asthma exacerbation, pt evaluated for resp protocol for prn neb ordered. Pt uses prn neb at home, Pt bs diminished, Pt not in resp distress at this time. Will keep prn neb and order resp protocol for it.

## 2025-01-18 NOTE — ASSESSMENT & PLAN NOTE
COVID positive since 1/16/25 but symptoms began 1/9  Outside window of starting paxlovid    - see plan under asthma exacerbation  - monitor vitals.

## 2025-01-18 NOTE — H&P
H&P Exam - Mireya Yi 79 y.o. female MRN: [id number]  Unit/Bed#: [facility] Encounter: [id number]  Admission status: observation    DATE: 2025  TIME: 4:22 PM  Primary Care Physician: Elham Saleem MD  Admitting Provider: Brianna Murrieta MD    Patient is a 79 y.o. female being admitted for asthma exacerbation in setting of COVID under [facility] with:     * Asthma exacerbation  Assessment & Plan  Per outpatient pulm visit: steroid taper, singulair, symbicort, and albuterol PRN, d/c doxycycline once viral illness confirmed  Xray negative  CTA chest: increased debris in bronchus that could be secretions, chronic emphysema, stable pericardial effusion    - admit for observation and supportive care  - wean oxygen for sat> 92% while awake  - will continue with steroid taper,  respiratory treatments  - respiratory protocol  - [facility] [place] pulmonology inpatient who recommended completing 5 day course of doxycycline  (last )  - consider formal consult to pulm if respiratory status worsens    COVID-19 virus infection  Assessment & Plan  COVID positive since 25 but symptoms began   Outside window of starting paxlovid    - see plan under asthma exacerbation  - monitor vitals.     Essential hypertension  Assessment & Plan  Home: amlodipine 5mg  Patient reports taking it at night  Systolic (24hrs), Av , Min:149 , Max:184    Diastolic (24hrs), Av, Min:81, Max:107     - continue with home amlodipine    Chronic allergic rhinitis  Assessment & Plan  follows with outpatient ENT who is considering for septoplasty/turbinoplasty.   Home: singulair, allegra, fluticasone, and azelastine      -Continue to follow with ENT outpatient  -Continue [facility] formulary for now  - restart all home medications after acute illness resolves    Stage 3a chronic kidney disease (HCC)  Assessment & Plan  Lab Results   Component Value Date    EGFR 42 2025    EGFR 47 2025    EGFR 39 2024    CREATININE  1.21 01/18/2025    CREATININE 1.12 01/16/2025    CREATININE 1.29 09/28/2024   Creatinine at baseline 1-1.2    - avoid nephrotoxic medications  - encourage po hydration  - consider IVF if needed      Anxiety  Assessment & Plan  Not on any medication  Reports using xanax prn in the past  Open to other anxiolytics    - will trial atarax prn  - can consider one time dose of xanax if no improvement    Tobacco abuse  Assessment & Plan  Last use 1/16/2025. Requesting nicotine patch    - nicotine patch 14mg   - smoking cessation      GERD (gastroesophageal reflux disease)  Assessment & Plan  Home: protonix 20mg    - continue home protonix          Diet:        Diet Orders   (From admission, onward)                 Start     Ordered    01/18/25 1536  Diet Regular; Regular House  Diet effective now        References:    Adult Nutrition Support Algorithm    RD Therapeutic Diet Order Protocol   Question Answer Comment   Diet Type Regular    Regular Regular House    RD to adjust diet per protocol? Yes        01/18/25 1535                    Code Status: Level 1 - Full Code  POLST:    VTE Prophylaxis: VTE covered by:  enoxaparin, Subcutaneous     sequential compression device  Admission Status: OBSERVATION  Dispo:    History of Present Illness     HPI:  Mireya Yi is a 79 y.o. female who presents with shortness of breath and wheezing.  Had an outpatient pulmonology visit for acute asthma exacerbation in setting of viral and chronic sinusitis. They recommended to do home covid testing and the following per their note on 1/15/25.   - steroid taper 40mg daily for 3 days, reduce by 10mg every 3 days to help decrease inflammation    - nebulizer treatments   - singulair   - resume symbicort and albuterol PRN once acute infection resolves   - course of doxycycline with plan to d/c once viral confirmed.    She unfortunately presented to the ED the next day 1/16 for similar presentation and was found to be COVID positive. She improved  with albuterol, atrovent, zofran, and fluids, and was sent home.     She re-presented today 1/18 with her daughter for similar presentations.   VS afebrile, tachycardic/tachypneic, and hypertensive, on 3L NC.  WBC 11.29, CMP unremarkable, troponin unremarkable. EKG sinus tachycardia.  CXR was negative but CTA chest showed increased debris in bronchus, moderate emphysema, and small pericardial effusion similar to prior.     This morning, she reports taking doxycycline and last albuterol treatment prior to coming to ED   She states she takes her amlodipine at night.  Requesting something for anxiety and reports in the past taking xanax PRN.   Reports having upper abdominal pain since coughing.    ED Management: albuterol neb, atrovent, saline inhalation, IV magnesium, IV zofran.    Review of Systems   Constitutional:  Negative for fever.   HENT:  Positive for congestion and sinus pain.    Eyes:  Negative for visual disturbance.   Respiratory:  Positive for cough, shortness of breath and wheezing.    Cardiovascular:  Negative for chest pain.   Gastrointestinal:  Positive for abdominal pain (upper), constipation and nausea.   Genitourinary:  Negative for dysuria.   Musculoskeletal:  Negative for gait problem.   Neurological:  Negative for headaches.       Historical Information   Past Medical History:   Diagnosis Date    Asthma     Asthmatic bronchitis     Last Assessed: 10/7/2014     Chronic diarrhea     Last Assessed: 8/20/2015     Fibromyalgia     Focal nodular hyperplasia of liver     Last Assessed: 6/11/2015    Herpes zoster     Last Assessed: 3/18/2016    Hypertension     IBS (irritable bowel syndrome)     Intermittent palpitations     Irritable bowel syndrome     Lumbar radiculopathy     Osteoarthritis     Vertigo      Past Surgical History:   Procedure Laterality Date    BREAST BIOPSY      BREAST LUMPECTOMY      when pt was 18    SMALL INTESTINE SURGERY       Social History   Social History     Substance and  "Sexual Activity   Alcohol Use Not Currently     Social History     Substance and Sexual Activity   Drug Use No     Social History     Tobacco Use   Smoking Status Former    Current packs/day: 0.50    Types: Cigarettes   Smokeless Tobacco Never   Tobacco Comments    Stopped smoking July 2023     Family History:     Meds/Allergies   all medications and allergies reviewed  Allergies   Allergen Reactions    Ambrosia Artemisiifolia (Ragweed) Skin Test Facial Swelling    Codeine Other (See Comments)     Heart races    Epinephrine      Pt reports \"it makes my heart race, they told me I cant take it.\"    Ibuprofen Other (See Comments)     Heart racing    Morphine Other (See Comments)     Heart racing    Pollen Extract Sneezing    Tramadol Other (See Comments)     Heart racing       Objective   Vitals: Blood pressure 152/94, pulse 101, temperature 98.6 °F (37 °C), temperature source Oral, resp. rate 17, SpO2 95%.      Intake/Output Summary (Last 24 hours) at 1/18/2025 1622  Last data filed at 1/18/2025 1456  Gross per 24 hour   Intake 50 ml   Output --   Net 50 ml       Invasive Devices       Peripheral Intravenous Line  Duration             Peripheral IV 01/18/25 Right Antecubital <1 day                    Physical Exam  Vitals reviewed.   Constitutional:       General: She is not in acute distress.     Appearance: She is not ill-appearing, toxic-appearing or diaphoretic.   HENT:      Head: Normocephalic and atraumatic.      Right Ear: External ear normal.      Left Ear: External ear normal.      Nose:      Comments: NC inserted     Mouth/Throat:      Mouth: Mucous membranes are moist.      Pharynx: Oropharynx is clear.   Eyes:      General:         Right eye: No discharge.         Left eye: No discharge.      Conjunctiva/sclera: Conjunctivae normal.   Cardiovascular:      Rate and Rhythm: Regular rhythm. Tachycardia present.      Pulses: Normal pulses.      Heart sounds: Normal heart sounds. No murmur heard.  Pulmonary:    "   Effort: Pulmonary effort is normal. No respiratory distress.      Breath sounds: Wheezing and rhonchi present.      Comments: Upper lung fields  Abdominal:      General: Abdomen is flat. There is no distension.      Palpations: Abdomen is soft.      Tenderness: There is no abdominal tenderness. There is no right CVA tenderness, left CVA tenderness or guarding.   Musculoskeletal:      Right lower leg: No edema.      Left lower leg: No edema.   Skin:     General: Skin is warm and dry.      Capillary Refill: Capillary refill takes less than 2 seconds.   Neurological:      Mental Status: She is alert. Mental status is at baseline.   Psychiatric:         Mood and Affect: Mood normal.         Behavior: Behavior normal.         Lab Results:       Recent Results (from the past 24 hours)   ECG 12 lead    Collection Time: 01/18/25 12:35 PM   Result Value Ref Range    Ventricular Rate 134 BPM    Atrial Rate 134 BPM    ID Interval 116 ms    QRSD Interval 78 ms    QT Interval 294 ms    QTC Interval 439 ms    P Axis 73 degrees    QRS Axis 19 degrees    T Wave Axis 64 degrees   CBC and differential    Collection Time: 01/18/25 12:45 PM   Result Value Ref Range    WBC 11.29 (H) 4.31 - 10.16 Thousand/uL    RBC 4.81 3.81 - 5.12 Million/uL    Hemoglobin 13.0 11.5 - 15.4 g/dL    Hematocrit 40.2 34.8 - 46.1 %    MCV 84 82 - 98 fL    MCH 27.0 26.8 - 34.3 pg    MCHC 32.3 31.4 - 37.4 g/dL    RDW 17.2 (H) 11.6 - 15.1 %    MPV 10.9 8.9 - 12.7 fL    Platelets 339 149 - 390 Thousands/uL    nRBC 0 /100 WBCs    Segmented % 64 43 - 75 %    Immature Grans % 1 0 - 2 %    Lymphocytes % 27 14 - 44 %    Monocytes % 8 4 - 12 %    Eosinophils Relative 0 0 - 6 %    Basophils Relative 0 0 - 1 %    Absolute Neutrophils 7.28 1.85 - 7.62 Thousands/µL    Absolute Immature Grans 0.09 0.00 - 0.20 Thousand/uL    Absolute Lymphocytes 3.01 0.60 - 4.47 Thousands/µL    Absolute Monocytes 0.86 0.17 - 1.22 Thousand/µL    Eosinophils Absolute 0.02 0.00 - 0.61  "Thousand/µL    Basophils Absolute 0.03 0.00 - 0.10 Thousands/µL   Comprehensive metabolic panel    Collection Time: 01/18/25 12:45 PM   Result Value Ref Range    Sodium 141 135 - 147 mmol/L    Potassium 3.7 3.5 - 5.3 mmol/L    Chloride 109 (H) 96 - 108 mmol/L    CO2 20 (L) 21 - 32 mmol/L    ANION GAP 12 4 - 13 mmol/L    BUN 18 5 - 25 mg/dL    Creatinine 1.21 0.60 - 1.30 mg/dL    Glucose 104 65 - 140 mg/dL    Calcium 9.3 8.4 - 10.2 mg/dL    AST 15 13 - 39 U/L    ALT 12 7 - 52 U/L    Alkaline Phosphatase 78 34 - 104 U/L    Total Protein 7.2 6.4 - 8.4 g/dL    Albumin 4.0 3.5 - 5.0 g/dL    Total Bilirubin 0.27 0.20 - 1.00 mg/dL    eGFR 42 ml/min/1.73sq m   HS Troponin 0hr (reflex protocol)    Collection Time: 01/18/25 12:45 PM   Result Value Ref Range    hs TnI 0hr 14 \"Refer to ACS Flowchart\"- see link ng/L   HS Troponin I 2hr    Collection Time: 01/18/25  2:57 PM   Result Value Ref Range    hs TnI 2hr 16 \"Refer to ACS Flowchart\"- see link ng/L    Delta 2hr hsTnI 2 <20 ng/L     Blood Culture:   Lab Results   Component Value Date    BLOODCX No Growth After 5 Days. 04/26/2023   ,   Urinalysis:   Lab Results   Component Value Date    COLORU Colorless 05/14/2024    CLARITYU Clear 05/14/2024    SPECGRAV 1.011 05/14/2024    PHUR 6.0 05/14/2024    PHUR 5.0 07/28/2023    LEUKOCYTESUR Negative 05/14/2024    NITRITE Negative 05/14/2024    GLUCOSEU Negative 05/14/2024    KETONESU Negative 05/14/2024    BILIRUBINUR Negative 05/14/2024    BLOODU Negative 05/14/2024   ,   Urine Culture: No results found for: \"URINECX\",   Wound Culure: No results found for: \"WOUNDCULT\"    Imaging:   EKG, Pathology, and Other Studies:     Daniela Gutierrez MD  PGY-1  [facility]"

## 2025-01-18 NOTE — ASSESSMENT & PLAN NOTE
Not on any medication  Reports using xanax prn in the past  Open to other anxiolytics    - will trial atarax prn  - can consider one time dose of xanax if no improvement

## 2025-01-18 NOTE — ASSESSMENT & PLAN NOTE
Per outpatient pulm visit: steroid taper, singulair, symbicort, and albuterol PRN, d/c doxycycline once viral illness confirmed  Xray negative  CTA chest: increased debris in bronchus that could be secretions, chronic emphysema, stable pericardial effusion    - admit for observation and supportive care  - wean oxygen for sat> 92% while awake  - will continue with steroid taper,  respiratory treatments  - respiratory protocol  - [facility] [place] pulmonology inpatient who recommended completing 5 day course of doxycycline  (last 1/20)  - consider formal consult to pulm if respiratory status worsens

## 2025-01-18 NOTE — ED ATTENDING ATTESTATION
1/18/2025  I, Luisana Mead MD, saw and evaluated the patient. I have discussed the patient with the resident/non-physician practitioner and agree with the resident's/non-physician practitioner's findings, Plan of Care, and MDM as documented in the resident's/non-physician practitioner's note, except where noted. All available labs and Radiology studies were reviewed.  I was present for key portions of any procedure(s) performed by the resident/non-physician practitioner and I was immediately available to provide assistance.       At this point I agree with the current assessment done in the Emergency Department.  I have conducted an independent evaluation of this patient a history and physical is as follows:  This is a 74-year-old woman with history of asthma, emphysema, hypertension, recent diagnosis of COVID coming in with shortness of breath.  Patient has had progressive dyspnea over the last week.  She was seen by her primary care doctor 3 days ago and was prescribed doxycycline and prednisone.  She has been taking that every day.  The patient states that she is not having nausea and vomiting, worsening respiratory distress.  Patient does not use oxygen at baseline.  She has been using her inhalers with no relief.  She was seen in the ED yesterday and diagnosed with COVID.  Patient was hospitalized for her asthma last in July.  She is still smoking a few cigarettes per day.  On exam the patient is ill-appearing.  She is tripoding.  She is tachycardic with a heart rate in the 130s.  She is hypoxic with an oxygen saturation in the 80s.  On secondary survey, the patient's conjunctivae are pink.  Her mucous membranes appear dry.  There is no subcutaneous air in the neck.  Her heart is regular tachycardic without murmurs.  Her lungs are decreased with poor air movement throughout and wheezing throughout.  Her abdomen is soft and nontender.  Her extremities are intact.  She does not have lateralizing edema.   She is neurologically nonfocal. MEDICAL DECISION MAKING    Number and Complexity of Problems  Differential diagnosis: Hypoxic respiratory failure, likely asthma exacerbation, also concern for pulmonary embolus, super infection from viral pneumonia, dehydration, doubt cardiac    Medical Decision Making Data  External documents reviewed: Patient's ED visit reviewed  My EKG interpretation: Sinus tachycardia, narrow complex, no ischemia or ectopy  My CT interpretation: No PE  My X-ray interpretation: No pneumonia  My ultrasound interpretation:     CTA chest pe study   Final Result      No pulmonary embolus. Increased debris in the bronchus intermedius, possibly secretions. Suggest follow-up in 1 month to ensure resolution.      Small pericardial effusion, similar to prior exams.      Moderate emphysema.      Thyroid goiter.      The study was marked in EPIC for immediate notification.      Workstation performed: [id number]         XR chest 2 views   Final Result      No acute cardiopulmonary disease.            Workstation performed: [id number]             Labs Reviewed   CBC AND DIFFERENTIAL - Abnormal       Result Value Ref Range Status    WBC 11.29 (*) 4.31 - 10.16 Thousand/uL Final    RBC 4.81  3.81 - 5.12 Million/uL Final    Hemoglobin 13.0  11.5 - 15.4 g/dL Final    Hematocrit 40.2  34.8 - 46.1 % Final    MCV 84  82 - 98 fL Final    MCH 27.0  26.8 - 34.3 pg Final    MCHC 32.3  31.4 - 37.4 g/dL Final    RDW 17.2 (*) 11.6 - 15.1 % Final    MPV 10.9  8.9 - 12.7 fL Final    Platelets 339  149 - 390 Thousands/uL Final    nRBC 0  /100 WBCs Final    Segmented % 64  43 - 75 % Final    Immature Grans % 1  0 - 2 % Final    Lymphocytes % 27  14 - 44 % Final    Monocytes % 8  4 - 12 % Final    Eosinophils Relative 0  0 - 6 % Final    Basophils Relative 0  0 - 1 % Final    Absolute Neutrophils 7.28  1.85 - 7.62 Thousands/µL Final    Absolute Immature Grans 0.09  0.00 - 0.20 Thousand/uL Final    Absolute Lymphocytes 3.01  0.60 -  4.47 Thousands/µL Final    Absolute Monocytes 0.86  0.17 - 1.22 Thousand/µL Final    Eosinophils Absolute 0.02  0.00 - 0.61 Thousand/µL Final    Basophils Absolute 0.03  0.00 - 0.10 Thousands/µL Final   COMPREHENSIVE METABOLIC PANEL - Abnormal    Sodium 141  135 - 147 mmol/L Final    Potassium 3.7  3.5 - 5.3 mmol/L Final    Chloride 109 (*) 96 - 108 mmol/L Final    CO2 20 (*) 21 - 32 mmol/L Final    ANION GAP 12  4 - 13 mmol/L Final    BUN 18  5 - 25 mg/dL Final    Creatinine 1.21  0.60 - 1.30 mg/dL Final    Comment: Standardized to IDMS reference method    Glucose 104  65 - 140 mg/dL Final    Comment: If the patient is fasting, the ADA then defines impaired fasting glucose as > 100 mg/dL and diabetes as > or equal to 123 mg/dL.    Calcium 9.3  8.4 - 10.2 mg/dL Final    AST 15  13 - 39 U/L Final    ALT 12  7 - 52 U/L Final    Comment: Specimen collection should occur prior to Sulfasalazine administration due to the potential for falsely depressed results.     Alkaline Phosphatase 78  34 - 104 U/L Final    Total Protein 7.2  6.4 - 8.4 g/dL Final    Albumin 4.0  3.5 - 5.0 g/dL Final    Total Bilirubin 0.27  0.20 - 1.00 mg/dL Final    Comment: Use of this assay is not recommended for patients undergoing treatment with eltrombopag due to the potential for falsely elevated results.  N-acetyl-p-benzoquinone imine (metabolite of Acetaminophen) will generate erroneously low results in samples for patients that have taken an overdose of Acetaminophen.    eGFR 42  ml/min/1.73sq m Final    Narrative:     National Kidney Disease Foundation guidelines for Chronic Kidney Disease (CKD):     Stage 1 with normal or high GFR (GFR > 90 mL/min/1.73 square meters)    Stage 2 Mild CKD (GFR = 60-89 mL/min/1.73 square meters)    Stage 3A Moderate CKD (GFR = 45-59 mL/min/1.73 square meters)    Stage 3B Moderate CKD (GFR = 30-44 mL/min/1.73 square meters)    Stage 4 Severe CKD (GFR = 15-29 mL/min/1.73 square meters)    Stage 5 End Stage CKD  "(GFR <15 mL/min/1.73 square meters)  Note: GFR calculation is accurate only with a steady state creatinine   HS TROPONIN I 0HR - Normal    hs TnI 0hr 14  \"Refer to ACS Flowchart\"- see link ng/L Final    Comment:                                              Initial (time 0) result  If >=50 ng/L, Myocardial injury suggested ;  Type of myocardial injury and treatment strategy  to be determined.  If 5-49 ng/L, a delta result at 2 hours and or 4 hours will be needed to further evaluate.  If <4 ng/L, and chest pain has been >3 hours since onset, patient may qualify for discharge based on the HEART score in the ED.  If <5 ng/L and <3hours since onset of chest pain, a delta result at 2 hours will be needed to further evaluate.    HS Troponin 99th Percentile URL of a Health Population=12 ng/L with a 95% Confidence Interval of 8-18 ng/L.    Second Troponin (time 2 hours)  If calculated delta >= 20 ng/L,  Myocardial injury suggested ; Type of myocardial injury and treatment strategy to be determined.  If 5-49 ng/L and the calculated delta is 5-19 ng/L, consult medical service for evaluation.  Continue evaluation for ischemia on ecg and other possible etiology and repeat hs troponin at 4 hours.  If delta is <5 ng/L at 2 hours, consider discharge based on risk stratification via the HEART score (if in ED), or BYRON risk score in IP/Observation.    HS Troponin 99th Percentile URL of a Health Population=12 ng/L with a 95% Confidence Interval of 8-18 ng/L.   HS TROPONIN I 2HR - Normal    hs TnI 2hr 16  \"Refer to ACS Flowchart\"- see link ng/L Final    Comment:                                              Initial (time 0) result  If >=50 ng/L, Myocardial injury suggested ;  Type of myocardial injury and treatment strategy  to be determined.  If 5-49 ng/L, a delta result at 2 hours and or 4 hours will be needed to further evaluate.  If <4 ng/L, and chest pain has been >3 hours since onset, patient may qualify for discharge based on the " HEART score in the ED.  If <5 ng/L and <3hours since onset of chest pain, a delta result at 2 hours will be needed to further evaluate.    HS Troponin 99th Percentile URL of a Health Population=12 ng/L with a 95% Confidence Interval of 8-18 ng/L.    Second Troponin (time 2 hours)  If calculated delta >= 20 ng/L,  Myocardial injury suggested ; Type of myocardial injury and treatment strategy to be determined.  If 5-49 ng/L and the calculated delta is 5-19 ng/L, consult medical service for evaluation.  Continue evaluation for ischemia on ecg and other possible etiology and repeat hs troponin at 4 hours.  If delta is <5 ng/L at 2 hours, consider discharge based on risk stratification via the HEART score (if in ED), or BYRON risk score in IP/Observation.    HS Troponin 99th Percentile URL of a Health Population=12 ng/L with a 95% Confidence Interval of 8-18 ng/L.    Delta 2hr hsTnI 2  <20 ng/L Final       Labs reviewed by me are significant for:     Clinical decision rules/scores are significant for:     Discussed case with:   Considered admission for:     Treatment and Disposition  ED course: Patient seen and examined.  Patient is already on both steroids and bronchodilators at home.  Given magnesium here, placed on oxygen supplementation.  Patient with improving saturation to 94%.  Will give heart neb, anticipate admission to the hospital for hypoxic respiratory failure, asthma exacerbation  Shared decision making:   Code status:     ED Course         Critical Care Time  CriticalCare Time    Date/Time: 1/18/2025 2:21 PM    Performed by: Luisana Mead MD  Authorized by: Luisana Mead MD    Critical care provider statement:     Critical care time (minutes):  38    Critical care time was exclusive of:  Separately billable procedures and treating other patients and teaching time    Critical care was necessary to treat or prevent imminent or life-threatening deterioration of the following conditions:   Respiratory failure    Critical care was time spent personally by me on the following activities:  Blood draw for specimens, obtaining history from patient or surrogate, development of treatment plan with patient or surrogate, discussions with consultants, discussions with primary provider, evaluation of patient's response to treatment, examination of patient, review of old charts, re-evaluation of patient's condition, ordering and review of radiographic studies, ordering and review of laboratory studies and ordering and performing treatments and interventions

## 2025-01-18 NOTE — ASSESSMENT & PLAN NOTE
Lab Results   Component Value Date    EGFR 37 01/19/2025    EGFR 42 01/18/2025    EGFR 47 01/16/2025    CREATININE 1.35 (H) 01/19/2025    CREATININE 1.21 01/18/2025    CREATININE 1.12 01/16/2025   Creatinine at baseline 1-1.2    - avoid nephrotoxic medications  - encourage po hydration  - consider IVF if needed

## 2025-01-18 NOTE — ASSESSMENT & PLAN NOTE
follows with outpatient ENT who is considering for septoplasty/turbinoplasty.   Home: singulair, allegra, fluticasone, and azelastine      -Continue to follow with ENT outpatient  -Continue Singular, loratadine [facility] formulary for now  - restart all home medications after acute illness resolves

## 2025-01-18 NOTE — ED PROVIDER NOTES
"  ED Disposition       None          Assessment & Plan   {Hyperlinks  Risk Stratification - NIHSS - HEART SCORE - Fill out sepsis note and make sure you call [phone] if severe or septic shock:[id number]}    Medical Decision Making  74-year-old female with past medical history of asthma, fibromyalgia, hypertension, IBS, vertigo presents to the ED for evaluation of shortness of breath over the past week.  Patient was evaluated by her PCP 3 days ago and was prescribed doxycycline and prednisone.  Patient notes she has been compliant with her prednisone and her doxycycline and has been using her albuterol treatments every 4 hours without improvement.  Patient reports associated nausea.  Patient was evaluated in the ED yesterday and was diagnosed with COVID.  Patient denies fever, chills, vomiting, and diarrhea.  Patient's last hospitalization for asthma exacerbation was July 2024.  Patient still smokes 3 to 4 cigarettes/day.    On exam patient tachycardic in the 130s, patient appears in mild respiratory distress.  Decreased breath sounds on the right lower lobe.  No wheezing appreciated.  Abdomen soft nontender    Differentials include but not limited to PE, COVID, COPD exacerbation, asthma exacerbation, malignancy    Will evaluate with CBC, CMP, cta pe protocol, Trop  Will treat with heart neb and magnesium        Amount and/or Complexity of Data Reviewed  Labs: ordered.  Radiology: ordered.    Risk  Prescription drug management.        ED Course as of 01/18/25 1434   Sat Jan 18, 2025   1314 \"Hello, I have a heads up for an admission for this pt. PT with hx of asthma recently diagnosed with COVID in the ED yesterday is here for SOB, cough, and congestion the past week. Pt was started on prednisone and doxy for the three days ago by PCP. On presentation pt tachy in the 130s and O2 sat of 85%, VS now in the 90s and satting at 98% on 4L of O2. Lung sounds clear in all lung fields. labs pending and CTA PE pending. Pt started " "on CONNELL neb. Pt will need admission for new reuqirement of O2\"   1319 hs TnI 0hr: 14   1320 WBC(!): 11.29   1320 Creatinine: 1.21       Medications   magnesium sulfate 2 g/50 mL IVPB (premix) 2 g (2 g Intravenous New Bag 1/18/25 1256)   albuterol inhalation solution 10 mg (10 mg Nebulization Given 1/18/25 1236)   ipratropium (ATROVENT) 0.02 % inhalation solution 1 mg (1 mg Nebulization Given 1/18/25 1236)   sodium chloride 0.9 % inhalation solution 12 mL (12 mL Nebulization Given 1/18/25 1236)   ondansetron (ZOFRAN) injection 4 mg (4 mg Intravenous Given 1/18/25 1250)       ED Risk Strat Scores                                              History of Present Illness   {Hyperlinks  History (Med, Surg, Fam, Social) - Current Medications - Allergies  :[id number]}    Chief Complaint   Patient presents with   • Asthma     Patient coming in for asthma exacerbation since yesterday, patient did a breathing treatment at home at 10:00am.        Past Medical History:   Diagnosis Date   • Asthma    • Asthmatic bronchitis     Last Assessed: 10/7/2014    • Chronic diarrhea     Last Assessed: 8/20/2015    • Fibromyalgia    • Focal nodular hyperplasia of liver     Last Assessed: 6/11/2015   • Herpes zoster     Last Assessed: 3/18/2016   • Hypertension    • IBS (irritable bowel syndrome)    • Intermittent palpitations    • Irritable bowel syndrome    • Lumbar radiculopathy    • Osteoarthritis    • Vertigo       Past Surgical History:   Procedure Laterality Date   • BREAST BIOPSY     • BREAST LUMPECTOMY      when pt was 18   • SMALL INTESTINE SURGERY        Family History   Problem Relation Age of Onset   • Heart attack Mother    • Asthma Father    • Breast cancer Sister    • Breast cancer Sister    • No Known Problems Daughter    • No Known Problems Daughter    • Arthritis Family    • Osteoporosis Family       Social History     Tobacco Use   • Smoking status: Former     Current packs/day: 0.50     Types: Cigarettes   • Smokeless " tobacco: Never   • Tobacco comments:     Stopped smoking July 2023   Vaping Use   • Vaping status: Never Used   Substance Use Topics   • Alcohol use: Not Currently   • Drug use: No      E-Cigarette/Vaping   • E-Cigarette Use Never User       E-Cigarette/Vaping Substances   • Nicotine No    • THC No    • CBD No    • Flavoring No    • Other No    • Unknown No       I have reviewed and agree with the history as documented.       Asthma  Associated symptoms: no abdominal pain, no chest pain, no cough, no diarrhea, no ear pain, no fever, no headaches, no nausea, no rash, no shortness of breath, no sore throat and no vomiting        Review of Systems   Constitutional:  Negative for appetite change, chills, diaphoresis, fever and unexpected weight change.   HENT:  Negative for dental problem, ear pain, facial swelling, sore throat and trouble swallowing.    Eyes:  Negative for pain and visual disturbance.   Respiratory:  Negative for cough, chest tightness and shortness of breath.    Cardiovascular:  Negative for chest pain, palpitations and leg swelling.   Gastrointestinal:  Negative for abdominal distention, abdominal pain, constipation, diarrhea, nausea and vomiting.   Endocrine: Negative for polyuria.   Genitourinary:  Negative for difficulty urinating, dysuria and hematuria.   Musculoskeletal:  Negative for arthralgias and back pain.   Skin:  Negative for color change and rash.   Neurological:  Negative for dizziness, seizures, syncope, light-headedness and headaches.   Psychiatric/Behavioral:  Negative for confusion.    All other systems reviewed and are negative.          Objective   {Hyperlinks  Historical Vitals - Historical Labs - Chart Review/Microbiology - Last Echo - Code Status  :[id number]}    ED Triage Vitals   Temperature Pulse Blood Pressure Respirations SpO2 Patient Position - Orthostatic VS   01/18/25 1243 01/18/25 1242 01/18/25 1242 01/18/25 1242 01/18/25 1242 01/18/25 1242   98.6 °F (37 °C) (!) 112  (!) 184/107 (!) 30 98 % Sitting      Temp Source Heart Rate Source BP Location FiO2 (%) Pain Score    01/18/25 1242 01/18/25 1242 01/18/25 1242 -- 01/18/25 1242    Oral Monitor;Apical Left arm  No Pain      Vitals      Date and Time Temp Pulse SpO2 Resp BP Pain Score FACES Pain Rating User   01/18/25 1300 -- 94 98 % 19 160/81 -- -- ST   01/18/25 1243 98.6 °F (37 °C) -- -- -- -- -- -- ST   01/18/25 1242 -- 112 98 % 30 184/107 No Pain -- ST            Physical Exam    Results Reviewed       Procedure Component Value Units Date/Time    HS Troponin 0hr (reflex protocol) [[id number]]  (Normal) Collected: 01/18/25 1245    Lab Status: Final result Specimen: Blood from Arm, Right Updated: 01/18/25 1315     hs TnI 0hr 14 ng/L     HS Troponin I 2hr [[id number]]     Lab Status: No result Specimen: Blood     Comprehensive metabolic panel [[id number]]  (Abnormal) Collected: 01/18/25 1245    Lab Status: Final result Specimen: Blood from Arm, Right Updated: 01/18/25 1312     Sodium 141 mmol/L      Potassium 3.7 mmol/L      Chloride 109 mmol/L      CO2 20 mmol/L      ANION GAP 12 mmol/L      BUN 18 mg/dL      Creatinine 1.21 mg/dL      Glucose 104 mg/dL      Calcium 9.3 mg/dL      AST 15 U/L      ALT 12 U/L      Alkaline Phosphatase 78 U/L      Total Protein 7.2 g/dL      Albumin 4.0 g/dL      Total Bilirubin 0.27 mg/dL      eGFR 42 ml/min/1.73sq m     Narrative:      National Kid[employer] Foundation guidelines for Chronic Kidney Disease (CKD):   •  Stage 1 with normal or high GFR (GFR > 90 mL/min/1.73 square meters)  •  Stage 2 Mild CKD (GFR = 60-89 mL/min/1.73 square meters)  •  Stage 3A Moderate CKD (GFR = 45-59 mL/min/1.73 square meters)  •  Stage 3B Moderate CKD (GFR = 30-44 mL/min/1.73 square meters)  •  Stage 4 Severe CKD (GFR = 15-29 mL/min/1.73 square meters)  •  Stage 5 End Stage CKD (GFR <15 mL/min/1.73 square meters)  Note: GFR calculation is accurate only with a steady state creatinine    CBC and differential  [[id number]]  (Abnormal) Collected: 25 1245    Lab Status: Final result Specimen: Blood from Arm, Right Updated: 25 1251     WBC 11.29 Thousand/uL      RBC 4.81 Million/uL      Hemoglobin 13.0 g/dL      Hematocrit 40.2 %      MCV 84 fL      MCH 27.0 pg      MCHC 32.3 g/dL      RDW 17.2 %      MPV 10.9 fL      Platelets 339 Thousands/uL      nRBC 0 /100 WBCs      Segmented % 64 %      Immature Grans % 1 %      Lymphocytes % 27 %      Monocytes % 8 %      Eosinophils Relative 0 %      Basophils Relative 0 %      Absolute Neutrophils 7.28 Thousands/µL      Absolute Immature Grans 0.09 Thousand/uL      Absolute Lymphocytes 3.01 Thousands/µL      Absolute Monocytes 0.86 Thousand/µL      Eosinophils Absolute 0.02 Thousand/µL      Basophils Absolute 0.03 Thousands/µL     UA w Reflex to Microscopic w Reflex to Culture [[id number]]     Lab Status: No result Specimen: Urine             XR chest 2 views    (Results Pending)   CTA chest pe study    (Results Pending)       Procedures    ED Medication and Procedure Management   Prior to Admission Medications   Prescriptions Last Dose Informant Patient Reported? Taking?   Azelastine HCl 137 MCG/SPRAY SOLN  Self No No   Si sprays into each nostril 2 (two) times a day   Cholecalciferol (D3-1000) 1,000 units tablet  Self No No   Sig: Take 1 tablet (1,000 Units total) by mouth daily   acetaminophen (TYLENOL) 500 mg tablet  Self No No   Sig: Take 1 tablet (500 mg total) by mouth every 6 (six) hours as needed for mild pain   albuterol (2.5 mg/3 mL) 0.083 % nebulizer solution  Self No No   Sig: Take 3 mL (2.5 mg total) by nebulization every 4 (four) hours as needed for wheezing or shortness of breath   albuterol (PROVENTIL HFA,VENTOLIN HFA) 90 mcg/act inhaler  Self No No   Sig: Inhale 2 puffs every 6 (six) hours as needed for wheezing or shortness of breath   amLODIPine (NORVASC) 5 mg tablet  Self No No   Sig: Take 1 tablet (5 mg total) by mouth daily   azelastine (ASTELIN)  0.1 % nasal spray   No No   Si spray into each nostril 2 (two) times a day Use in each nostril as directed   budesonide-formoterol (Symbicort) 80-4.5 MCG/ACT inhaler   No No   Sig: Inhale 2 puffs 2 (two) times a day Rinse mouth after use   doxycycline hyclate (VIBRAMYCIN) 100 mg capsule   No No   Sig: Take 1 capsule (100 mg total) by mouth every 12 (twelve) hours for 10 days   fexofenadine (ALLEGRA) 180 MG tablet  Self Yes No   Sig: Take 180 mg by mouth daily   fluticasone (FLONASE) 50 mcg/act nasal spray  Self No No   Sig: SPRAY 2 SPRAYS INTO EACH NOSTRIL EVERY DAY   folic acid (FOLVITE) 1 mg tablet  Self No No   Sig: Take 1 tablet (1,000 mcg total) by mouth daily   ipratropium (ATROVENT) 0.03 % nasal spray  Self No No   Si sprays into each nostril every 12 (twelve) hours   montelukast (SINGULAIR) 10 mg tablet  Self No No   Sig: Take 1 tablet (10 mg total) by mouth daily at bedtime   nicotine (NICODERM CQ) 14 mg/24hr TD 24 hr patch  Self No No   Sig: Place 1 patch on the skin over 24 hours every 24 hours   ondansetron (Zofran ODT) 4 mg disintegrating tablet  Self No No   Sig: Take 1 tablet (4 mg total) by mouth every 6 (six) hours as needed for nausea or vomiting   pantoprazole (PROTONIX) 20 mg tablet  Self No No   Sig: Take 1 tablet (20 mg total) by mouth daily   predniSONE 20 mg tablet   No No   Sig: Take 2 tablets (40 mg total) by mouth daily for 3 days, THEN 1.5 tablets (30 mg total) daily for 3 days, THEN 1 tablet (20 mg total) daily for 3 days, THEN 0.5 tablets (10 mg total) daily for 3 days.      Facility-Administered Medications: None     Patient's Medications   Discharge Prescriptions    No medications on file     No discharge procedures on file.  ED SEPSIS DOCUMENTATION          Bilirubin, UA Negative     Occult Blood, UA Negative    HS Troponin I 2hr [[id number]]  (Normal) Collected: 01/18/25 1457    Lab Status: Final result Specimen: Blood from Arm, Right Updated: 01/18/25 1526     hs TnI 2hr 16 ng/L      Delta 2hr hsTnI 2 ng/L     HS Troponin 0hr (reflex protocol) [[id number]]  (Normal) Collected: 01/18/25 1245    Lab Status: Final result Specimen: Blood from Arm, Right Updated: 01/18/25 1315     hs TnI 0hr 14 ng/L     Comprehensive metabolic panel [[id number]]  (Abnormal) Collected: 01/18/25 1245    Lab Status: Final result Specimen: Blood from Arm, Right Updated: 01/18/25 1312     Sodium 141 mmol/L      Potassium 3.7 mmol/L      Chloride 109 mmol/L      CO2 20 mmol/L      ANION GAP 12 mmol/L      BUN 18 mg/dL      Creatinine 1.21 mg/dL      Glucose 104 mg/dL      Calcium 9.3 mg/dL      AST 15 U/L      ALT 12 U/L      Alkaline Phosphatase 78 U/L      Total Protein 7.2 g/dL      Albumin 4.0 g/dL      Total Bilirubin 0.27 mg/dL      eGFR 42 ml/min/1.73sq m     Narrative:      National Kidney Disease Foundation guidelines for Chronic Kidney Disease (CKD):     Stage 1 with normal or high GFR (GFR > 90 mL/min/1.73 square meters)    Stage 2 Mild CKD (GFR = 60-89 mL/min/1.73 square meters)    Stage 3A Moderate CKD (GFR = 45-59 mL/min/1.73 square meters)    Stage 3B Moderate CKD (GFR = 30-44 mL/min/1.73 square meters)    Stage 4 Severe CKD (GFR = 15-29 mL/min/1.73 square meters)    Stage 5 End Stage CKD (GFR <15 mL/min/1.73 square meters)  Note: GFR calculation is accurate only with a steady state creatinine    CBC and differential [[id number]]  (Abnormal) Collected: 01/18/25 1245    Lab Status: Final result Specimen: Blood from Arm, Right Updated: 01/18/25 1251     WBC 11.29 Thousand/uL      RBC 4.81 Million/uL      Hemoglobin 13.0 g/dL      Hematocrit 40.2 %      MCV 84 fL      MCH 27.0 pg      MCHC 32.3 g/dL      RDW 17.2 %      MPV 10.9 fL      Platelets 339 Thousands/uL      nRBC 0 /100 WBCs       Segmented % 64 %      Immature Grans % 1 %      Lymphocytes % 27 %      Monocytes % 8 %      Eosinophils Relative 0 %      Basophils Relative 0 %      Absolute Neutrophils 7.28 Thousands/µL      Absolute Immature Grans 0.09 Thousand/uL      Absolute Lymphocytes 3.01 Thousands/µL      Absolute Monocytes 0.86 Thousand/µL      Eosinophils Absolute 0.02 Thousand/µL      Basophils Absolute 0.03 Thousands/µL             CTA chest pe study   Final Interpretation by Suman Shirley MD ( [id number])      No pulmonary embolus. Increased debris in the bronchus intermedius, possibly secretions. Suggest follow-up in 1 month to ensure resolution.      Small pericardial effusion, similar to prior exams.      Moderate emphysema.      Thyroid goiter.      The study was marked in EPIC for immediate notification.      Workstation performed: [id number]         XR chest 2 views   Final Interpretation by Priyank Nelson MD ( [id number])      No acute cardiopulmonary disease.            Workstation performed: [id number]             Procedures    ED Medication and Procedure Management   Prior to Admission Medications   Prescriptions Last Dose Informant Patient Reported? Taking?   Cholecalciferol (D3-1000) 1,000 units tablet  Self No No   Sig: Take 1 tablet (1,000 Units total) by mouth daily   acetaminophen (TYLENOL) 500 mg tablet  Self No No   Sig: Take 1 tablet (500 mg total) by mouth every 6 (six) hours as needed for mild pain   albuterol (PROVENTIL HFA,VENTOLIN HFA) 90 mcg/act inhaler  Self No No   Sig: Inhale 2 puffs every 6 (six) hours as needed for wheezing or shortness of breath   amLODIPine (NORVASC) 5 mg tablet  Self No No   Sig: Take 1 tablet (5 mg total) by mouth daily   azelastine (ASTELIN) 0.1 % nasal spray   No No   Si spray into each nostril 2 (two) times a day Use in each nostril as directed   doxycycline hyclate (VIBRAMYCIN) 100 mg capsule   No No   Sig: Take 1 capsule (100 mg total) by mouth every 12  (twelve) hours for 10 days   fexofenadine (ALLEGRA) 180 MG tablet  Self Yes No   Sig: Take 180 mg by mouth daily   fluticasone (FLONASE) 50 mcg/act nasal spray  Self No No   Sig: SPRAY 2 SPRAYS INTO EACH NOSTRIL EVERY DAY   folic acid (FOLVITE) 1 mg tablet  Self No No   Sig: Take 1 tablet (1,000 mcg total) by mouth daily   montelukast (SINGULAIR) 10 mg tablet  Self No No   Sig: Take 1 tablet (10 mg total) by mouth daily at bedtime   nicotine (NICODERM CQ) 14 mg/24hr TD 24 hr patch  Self No No   Sig: Place 1 patch on the skin over 24 hours every 24 hours   ondansetron (Zofran ODT) 4 mg disintegrating tablet  Self No No   Sig: Take 1 tablet (4 mg total) by mouth every 6 (six) hours as needed for nausea or vomiting   pantoprazole (PROTONIX) 20 mg tablet  Self No No   Sig: Take 1 tablet (20 mg total) by mouth daily   predniSONE 20 mg tablet   No No   Sig: Take 2 tablets (40 mg total) by mouth daily for 3 days, THEN 1.5 tablets (30 mg total) daily for 3 days, THEN 1 tablet (20 mg total) daily for 3 days, THEN 0.5 tablets (10 mg total) daily for 3 days.      Facility-Administered Medications: None     Discharge Medication List as of 1/19/2025 10:44 AM        START taking these medications    Details   guaiFENesin (MUCINEX) 600 mg 12 hr tablet Take 2 tablets (1,200 mg total) by mouth every 12 (twelve) hours for 15 days, Starting Sun 1/19/2025, Until Mon 2/3/2025, Normal           CONTINUE these medications which have NOT CHANGED    Details   acetaminophen (TYLENOL) 500 mg tablet Take 1 tablet (500 mg total) by mouth every 6 (six) hours as needed for mild pain, Starting Mon 8/27/2018, Normal      albuterol (PROVENTIL HFA,VENTOLIN HFA) 90 mcg/act inhaler Inhale 2 puffs every 6 (six) hours as needed for wheezing or shortness of breath, Starting Wed 5/15/2024, Normal      amLODIPine (NORVASC) 5 mg tablet Take 1 tablet (5 mg total) by mouth daily, Starting Mon 12/2/2024, Normal      !! azelastine (ASTELIN) 0.1 % nasal spray 1  spray into each nostril 2 (two) times a day Use in each nostril as directed, Starting Thu 1/16/2025, Normal      Cholecalciferol (D3-1000) 1,000 units tablet Take 1 tablet (1,000 Units total) by mouth daily, Starting Mon 12/2/2024, Normal      doxycycline hyclate (VIBRAMYCIN) 100 mg capsule Take 1 capsule (100 mg total) by mouth every 12 (twelve) hours for 10 days, Starting Wed 1/15/2025, Until Sat 1/25/2025, Normal      fexofenadine (ALLEGRA) 180 MG tablet Take 180 mg by mouth daily, Historical Med      fluticasone (FLONASE) 50 mcg/act nasal spray SPRAY 2 SPRAYS INTO EACH NOSTRIL EVERY DAY, Normal      folic acid (FOLVITE) 1 mg tablet Take 1 tablet (1,000 mcg total) by mouth daily, Starting Mon 12/2/2024, Normal      montelukast (SINGULAIR) 10 mg tablet Take 1 tablet (10 mg total) by mouth daily at bedtime, Starting Mon 12/2/2024, Normal      nicotine (NICODERM CQ) 14 mg/24hr TD 24 hr patch Place 1 patch on the skin over 24 hours every 24 hours, Starting Mon 10/21/2024, Normal      ondansetron (Zofran ODT) 4 mg disintegrating tablet Take 1 tablet (4 mg total) by mouth every 6 (six) hours as needed for nausea or vomiting, Starting Mon 12/2/2024, Normal      pantoprazole (PROTONIX) 20 mg tablet Take 1 tablet (20 mg total) by mouth daily, Starting Mon 12/2/2024, Normal      predniSONE 20 mg tablet Multiple Dosages:Starting Wed 1/15/2025, Until Fri 1/17/2025 at 2359, THEN Starting Sat 1/18/2025, Until Mon 1/20/2025 at 2359, THEN Starting Tue 1/21/2025, Until Thu 1/23/2025 at 2359, THEN Starting Fri 1/24/2025, Until Sun 1/26/2025 at 2359Take 2  tablets (40 mg total) by mouth daily for 3 days, THEN 1.5 tablets (30 mg total) daily for 3 days, THEN 1 tablet (20 mg total) daily for 3 days, THEN 0.5 tablets (10 mg total) daily for 3 days., Normal      albuterol (2.5 mg/3 mL) 0.083 % nebulizer solution Take 3 mL (2.5 mg total) by nebulization every 4 (four) hours as needed for wheezing or shortness of breath, Starting Mon  10/21/2024, Normal      !! Azelastine HCl 137 MCG/SPRAY SOLN 2 sprays into each nostril 2 (two) times a day, Starting Mon 12/2/2024, Normal      budesonide-formoterol (Symbicort) 80-4.5 MCG/ACT inhaler Inhale 2 puffs 2 (two) times a day Rinse mouth after use, Starting Wed 11/6/2024, Until Fri 12/6/2024, Normal      ipratropium (ATROVENT) 0.03 % nasal spray 2 sprays into each nostril every 12 (twelve) hours, Starting Wed 6/5/2024, Normal       !! - Potential duplicate medications found. Please discuss with provider.        No discharge procedures on file.  ED SEPSIS DOCUMENTATION   Time reflects when diagnosis was documented in both MDM as applicable and the Disposition within this note       Time User Action Codes Description Comment    1/18/2025  2:45 PM Ady Yañez [J45.901] Asthma exacerbation     1/18/2025  2:45 PM Ady Yañez [U07.1] COVID                  Ady Yañez, DO  01/27/25 0737       Ady Yañez, DO  01/27/25 0739

## 2025-01-18 NOTE — ASSESSMENT & PLAN NOTE
Home: amlodipine 5mg  Patient reports taking it at night  Systolic (24hrs), Av , Min:102 , Max:184    Diastolic (24hrs), Av, Min:57, Max:107     - continue with home amlodipine

## 2025-01-19 VITALS
TEMPERATURE: 97.1 F | HEIGHT: 62 IN | DIASTOLIC BLOOD PRESSURE: 87 MMHG | BODY MASS INDEX: 23.55 KG/M2 | OXYGEN SATURATION: 95 % | HEART RATE: 101 BPM | SYSTOLIC BLOOD PRESSURE: 143 MMHG | WEIGHT: 128 LBS | RESPIRATION RATE: 19 BRPM

## 2025-01-19 LAB
ALBUMIN SERPL BCG-MCNC: 3.4 G/DL (ref 3.5–5)
ALP SERPL-CCNC: 73 U/L (ref 34–104)
ALT SERPL W P-5'-P-CCNC: 11 U/L (ref 7–52)
ANION GAP SERPL CALCULATED.3IONS-SCNC: 10 MMOL/L (ref 4–13)
AST SERPL W P-5'-P-CCNC: 12 U/L (ref 13–39)
BASOPHILS # BLD AUTO: 0.01 THOUSANDS/ΜL (ref 0–0.1)
BASOPHILS NFR BLD AUTO: 0 % (ref 0–1)
BILIRUB SERPL-MCNC: 0.21 MG/DL (ref 0.2–1)
BUN SERPL-MCNC: 26 MG/DL (ref 5–25)
CALCIUM ALBUM COR SERPL-MCNC: 9.3 MG/DL (ref 8.3–10.1)
CALCIUM SERPL-MCNC: 8.8 MG/DL (ref 8.4–10.2)
CHLORIDE SERPL-SCNC: 108 MMOL/L (ref 96–108)
CO2 SERPL-SCNC: 21 MMOL/L (ref 21–32)
CREAT SERPL-MCNC: 1.35 MG/DL (ref 0.6–1.3)
EOSINOPHIL # BLD AUTO: 0 THOUSAND/ΜL (ref 0–0.61)
EOSINOPHIL NFR BLD AUTO: 0 % (ref 0–6)
ERYTHROCYTE [DISTWIDTH] IN BLOOD BY AUTOMATED COUNT: 17.2 % (ref 11.6–15.1)
GFR SERPL CREATININE-BSD FRML MDRD: 37 ML/MIN/1.73SQ M
GLUCOSE SERPL-MCNC: 137 MG/DL (ref 65–140)
HCT VFR BLD AUTO: 35.4 % (ref 34.8–46.1)
HGB BLD-MCNC: 11.2 G/DL (ref 11.5–15.4)
IMM GRANULOCYTES # BLD AUTO: 0.06 THOUSAND/UL (ref 0–0.2)
IMM GRANULOCYTES NFR BLD AUTO: 1 % (ref 0–2)
LYMPHOCYTES # BLD AUTO: 1.15 THOUSANDS/ΜL (ref 0.6–4.47)
LYMPHOCYTES NFR BLD AUTO: 12 % (ref 14–44)
MAGNESIUM SERPL-MCNC: 2.6 MG/DL (ref 1.9–2.7)
MCH RBC QN AUTO: 27 PG (ref 26.8–34.3)
MCHC RBC AUTO-ENTMCNC: 31.6 G/DL (ref 31.4–37.4)
MCV RBC AUTO: 85 FL (ref 82–98)
MONOCYTES # BLD AUTO: 0.51 THOUSAND/ΜL (ref 0.17–1.22)
MONOCYTES NFR BLD AUTO: 6 % (ref 4–12)
NEUTROPHILS # BLD AUTO: 7.52 THOUSANDS/ΜL (ref 1.85–7.62)
NEUTS SEG NFR BLD AUTO: 81 % (ref 43–75)
NRBC BLD AUTO-RTO: 0 /100 WBCS
PHOSPHATE SERPL-MCNC: 4.4 MG/DL (ref 2.3–4.1)
PLATELET # BLD AUTO: 295 THOUSANDS/UL (ref 149–390)
PMV BLD AUTO: 11.8 FL (ref 8.9–12.7)
POTASSIUM SERPL-SCNC: 4 MMOL/L (ref 3.5–5.3)
PROT SERPL-MCNC: 6 G/DL (ref 6.4–8.4)
RBC # BLD AUTO: 4.15 MILLION/UL (ref 3.81–5.12)
SODIUM SERPL-SCNC: 139 MMOL/L (ref 135–147)
WBC # BLD AUTO: 9.25 THOUSAND/UL (ref 4.31–10.16)

## 2025-01-19 PROCEDURE — 85025 COMPLETE CBC W/AUTO DIFF WBC: CPT

## 2025-01-19 PROCEDURE — 83735 ASSAY OF MAGNESIUM: CPT

## 2025-01-19 PROCEDURE — 84100 ASSAY OF PHOSPHORUS: CPT

## 2025-01-19 PROCEDURE — 99238 HOSP IP/OBS DSCHRG MGMT 30/<: CPT | Performed by: FAMILY MEDICINE

## 2025-01-19 PROCEDURE — 80053 COMPREHEN METABOLIC PANEL: CPT

## 2025-01-19 RX ORDER — SODIUM CHLORIDE 9 MG/ML
75 INJECTION, SOLUTION INTRAVENOUS CONTINUOUS
Status: DISCONTINUED | OUTPATIENT
Start: 2025-01-19 | End: 2025-01-19 | Stop reason: HOSPADM

## 2025-01-19 RX ORDER — GUAIFENESIN 600 MG/1
1200 TABLET, EXTENDED RELEASE ORAL EVERY 12 HOURS SCHEDULED
Qty: 60 TABLET | Refills: 0 | Status: SHIPPED | OUTPATIENT
Start: 2025-01-19 | End: 2025-01-24

## 2025-01-19 RX ADMIN — FOLIC ACID 1000 MCG: 1 TABLET ORAL at 09:19

## 2025-01-19 RX ADMIN — AMLODIPINE BESYLATE 5 MG: 5 TABLET ORAL at 09:19

## 2025-01-19 RX ADMIN — ALBUTEROL SULFATE 2 PUFF: 90 AEROSOL, METERED RESPIRATORY (INHALATION) at 05:23

## 2025-01-19 RX ADMIN — PREDNISONE 30 MG: 20 TABLET ORAL at 09:19

## 2025-01-19 RX ADMIN — LORATADINE 10 MG: 10 TABLET ORAL at 09:19

## 2025-01-19 RX ADMIN — BUDESONIDE AND FORMOTEROL FUMARATE DIHYDRATE 2 PUFF: 80; 4.5 AEROSOL RESPIRATORY (INHALATION) at 09:19

## 2025-01-19 RX ADMIN — PANTOPRAZOLE SODIUM 20 MG: 20 TABLET, DELAYED RELEASE ORAL at 09:19

## 2025-01-19 RX ADMIN — DOXYCYCLINE HYCLATE 100 MG: 100 CAPSULE ORAL at 09:19

## 2025-01-19 RX ADMIN — ENOXAPARIN SODIUM 40 MG: 40 INJECTION SUBCUTANEOUS at 09:19

## 2025-01-19 RX ADMIN — Medication 1000 UNITS: at 09:19

## 2025-01-19 RX ADMIN — ACETAMINOPHEN 975 MG: 325 TABLET, FILM COATED ORAL at 05:23

## 2025-01-19 NOTE — PLAN OF CARE
Problem: PAIN - ADULT  Goal: Verbalizes/displays adequate comfort level or baseline comfort level  Description: Interventions:  - Encourage patient to monitor pain and request assistance  - Assess pain using appropriate pain scale  - Administer analgesics based on type and severity of pain and evaluate response  - Implement non-pharmacological measures as appropriate and evaluate response  - Consider cultural and social influences on pain and pain management  - Notify physician/advanced practitioner if interventions unsuccessful or patient reports new pain  Outcome: Progressing     Problem: INFECTION - ADULT  Goal: Absence or prevention of progression during hospitalization  Description: INTERVENTIONS:  - Assess and monitor for signs and symptoms of infection  - Monitor lab/diagnostic results  - Monitor all insertion sites, i.e. indwelling lines, tubes, and drains  - Monitor endotracheal if appropriate and nasal secretions for changes in amount and color  - Rhome appropriate cooling/warming therapies per order  - Administer medications as ordered  - Instruct and encourage patient and family to use good hand hygiene technique  - Identify and instruct in appropriate isolation precautions for identified infection/condition  Outcome: Progressing  Goal: Absence of fever/infection during neutropenic period  Description: INTERVENTIONS:  - Monitor WBC    Outcome: Progressing     Problem: SAFETY ADULT  Goal: Patient will remain free of falls  Description: INTERVENTIONS:  - Educate patient/family on patient safety including physical limitations  - Instruct patient to call for assistance with activity   - Consult OT/PT to assist with strengthening/mobility   - Keep Call bell within reach  - Keep bed low and locked with side rails adjusted as appropriate  - Keep care items and personal belongings within reach  - Initiate and maintain comfort rounds  - Make Fall Risk Sign visible to staff  - Offer Toileting every  Hours,  in advance of need  - Initiate/Maintain alarm  - Obtain necessary fall risk management equipment:   - Apply yellow socks and bracelet for high fall risk patients  - Consider moving patient to room near nurses station  Outcome: Progressing  Goal: Maintain or return to baseline ADL function  Description: INTERVENTIONS:  -  Assess patient's ability to carry out ADLs; assess patient's baseline for ADL function and identify physical deficits which impact ability to perform ADLs (bathing, care of mouth/teeth, toileting, grooming, dressing, etc.)  - Assess/evaluate cause of self-care deficits   - Assess range of motion  - Assess patient's mobility; develop plan if impaired  - Assess patient's need for assistive devices and provide as appropriate  - Encourage maximum independence but intervene and supervise when necessary  - Involve family in performance of ADLs  - Assess for home care needs following discharge   - Consider OT consult to assist with ADL evaluation and planning for discharge  - Provide patient education as appropriate  Outcome: Progressing  Goal: Maintains/Returns to pre admission functional level  Description: INTERVENTIONS:  - Perform AM-PAC 6 Click Basic Mobility/ Daily Activity assessment daily.  - Set and communicate daily mobility goal to care team and patient/family/caregiver.   - Collaborate with rehabilitation services on mobility goals if consulted  - Perform Range of Motion  times a day.  - Reposition patient every  hours.  - Dangle patient  times a day  - Stand patient  times a day  - Ambulate patient  times a day  - Out of bed to chair  times a day   - Out of bed for meals  times a day  - Out of bed for toileting  - Record patient progress and toleration of activity level   Outcome: Progressing     Problem: DISCHARGE PLANNING  Goal: Discharge to home or other facility with appropriate resources  Description: INTERVENTIONS:  - Identify barriers to discharge w/patient and caregiver  - Arrange for  needed discharge resources and transportation as appropriate  - Identify discharge learning needs (meds, wound care, etc.)  - Arrange for interpretive services to assist at discharge as needed  - Refer to Case Management Department for coordinating discharge planning if the patient needs post-hospital services based on physician/advanced practitioner order or complex needs related to functional status, cognitive ability, or social support system  Outcome: Progressing     Problem: Knowledge Deficit  Goal: Patient/family/caregiver demonstrates understanding of disease process, treatment plan, medications, and discharge instructions  Description: Complete learning assessment and assess knowledge base.  Interventions:  - Provide teaching at level of understanding  - Provide teaching via preferred learning methods  Outcome: Progressing

## 2025-01-19 NOTE — DISCHARGE INSTR - AVS FIRST PAGE
Please continue your steroid taper and antibiotics in the outpatient setting.  I will also send you home with a prescription for Mucinex.  I have sent it to the [facility] pharmacy in [place] for you to  to help with your congestion.  Continue to rest and I encourage you to take deep breaths multiple times per hour at home.  You can use Tylenol as needed for any fevers.  You can also use your home medications for asthma as indicated for difficulty breathing although your clinical status looks much improved this morning in the hospital.    Please follow-up with your family doctor within a week to check in with your symptoms and see how you are doing.

## 2025-01-19 NOTE — DISCHARGE SUMMARY
79-year-old female I nowDischarge Summary - Family Medicine   Name: Mireya Yi 79 y.o. female I MRN: [id number]  [facility] I Date of Admission: 2025   Date of Service: 2025 I Hospital Day: 0    Assessment & Plan  Asthma exacerbation  Per outpatient pulm visit: steroid taper, singulair, symbicort, and albuterol PRN, d/c doxycycline once viral illness confirmed  Xray negative  CTA chest: increased debris in bronchus that could be secretions, chronic emphysema, stable pericardial effusion    - admit for observation and supportive care  - wean oxygen for sat> 92% while awake  - will continue with steroid taper,  respiratory treatments  - respiratory protocol  - [facility] pulmonology inpatient who recommended completing 5 day course of doxycycline  (last )  - consider formal consult to pulm if respiratory status worsens  Chronic allergic rhinitis  follows with outpatient ENT who is considering for septoplasty/turbinoplasty.   Home: singulair, allegra, fluticasone, and azelastine      -Continue to follow with ENT outpatient  -Continue [facility] formulary for now  - restart all home medications after acute illness resolves  Essential hypertension  Home: amlodipine 5mg  Patient reports taking it at night  Systolic (24hrs), Av , Min:102 , Max:184    Diastolic (24hrs), Av, Min:57, Max:107     - continue with home amlodipine  GERD (gastroesophageal reflux disease)  Home: protonix 20mg    - continue home protonix  Anxiety  Not on any medication  Reports using xanax prn in the past  Open to other anxiolytics    - will trial atarax prn  - can consider one time dose of xanax if no improvement  Ambulatory dysfunction    Tobacco abuse  Last use 2025. Requesting nicotine patch    - nicotine patch 14mg   - smoking cessation    Stage 3a chronic kidney disease (HCC)  Lab Results   Component Value Date    EGFR 37 2025    EGFR 42 2025    EGFR 47 2025     CREATININE 1.35 (H) 01/19/2025    CREATININE 1.21 01/18/2025    CREATININE 1.12 01/16/2025   Creatinine at baseline 1-1.2    - avoid nephrotoxic medications  - encourage po hydration  - consider IVF if needed    COVID-19 virus infection  COVID positive since 1/16/25 but symptoms began 1/9  Outside window of starting paxlovid    - see plan under asthma exacerbation  - monitor vitals.     Admission Date: 1/18/2025 1227  Discharge Date: 01/19/25  Admitting Diagnosis: Asthma exacerbation [J45.901]  COVID [U07.1]  COVID-19 [U07.1]  Discharge Diagnosis:   Medical Problems       Resolved Problems  Date Reviewed: 1/15/2025   None         HPI:  Mireya Yi is a 79 y.o. female who presents with shortness of breath and wheezing.  Had an outpatient pulmonology visit for acute asthma exacerbation in setting of viral and chronic sinusitis. They recommended to do home covid testing and the following per their note on 1/15/25.              - steroid taper 40mg daily for 3 days, reduce by 10mg every 3 days to help decrease inflammation               - nebulizer treatments              - singulair              - resume symbicort and albuterol PRN once acute infection resolves              - course of doxycycline with plan to d/c once viral confirmed.     She unfortunately presented to the ED the next day 1/16 for similar presentation and was found to be COVID positive. She improved with albuterol, atrovent, zofran, and fluids, and was sent home.         Procedures Performed:   Orders Placed This Encounter   Procedures    Critical Care       Summary of Hospital Course: 79 old female presented to the hospital for COVID-19 related hypoxia.  She was admitted for oxygen overnight and was subsequently weaned off the following morning.  She was able to maintain saturations of 94% on room air consistently was able to ambulate without difficulty.  She was started on doxycycline and prednisone outpatient which was continued inpatient and  "will be continued until its completion in the outpatient setting after her discharge from the hospital today her morning labs at day of discharge were within normal limits she she did have a slight bump in her creatinine but it is well within her baseline not indicating any acute renal injury.  I counseled her to drink lots of oral fluids and rest.  Her pulmonary exam is significant for some mild wheezes and rhonchi but otherwise she has very impressive bilateral air movement.  She will also go home with some Mucinex for some mild congestion as well.  Patient is agreeable to be discharged home and is in agreement with the plan moving forward.    Significant Findings, Care, Treatment and Services Provided: none    Complications: none    Discharge Day Visit / Exam:   /87   Pulse 101   Temp (!) 97.1 °F (36.2 °C) (Axillary)   Resp 19   Ht 5' 2\" (1.575 m)   Wt 58.1 kg (128 lb)   SpO2 95%   BMI 23.41 kg/m²   Physical Exam  Constitutional:       General: She is not in acute distress.     Appearance: Normal appearance. She is normal weight. She is not ill-appearing or toxic-appearing.   HENT:      Head: Normocephalic and atraumatic.      Right Ear: External ear normal.      Left Ear: External ear normal.      Nose: Nose normal.      Mouth/Throat:      Pharynx: Oropharynx is clear.   Eyes:      Conjunctiva/sclera: Conjunctivae normal.   Cardiovascular:      Rate and Rhythm: Normal rate and regular rhythm.      Pulses: Normal pulses.      Heart sounds: Normal heart sounds.   Pulmonary:      Effort: Pulmonary effort is normal. No respiratory distress.      Breath sounds: No stridor. Wheezing and rhonchi present. No rales.   Chest:      Chest wall: No tenderness.   Abdominal:      General: Abdomen is flat. Bowel sounds are normal.      Palpations: Abdomen is soft.   Musculoskeletal:         General: Normal range of motion.      Cervical back: Normal range of motion and neck supple.   Skin:     General: Skin is warm " and dry.      Capillary Refill: Capillary refill takes less than 2 seconds.   Neurological:      Mental Status: She is alert and oriented to person, place, and time. Mental status is at baseline.   Psychiatric:         Mood and Affect: Mood normal.         Behavior: Behavior normal.         Thought Content: Thought content normal.         Judgment: Judgment normal.          Discussion with Family:  discussed plan with patient .     Condition at Discharge: stable       Discharge instructions/Information to patient and family:   See After Visit Summary (AVS) for information provided to patient and family.      Provisions for Follow-Up Care:  See after visit summary for information related to follow-up care and any pertinent home health orders.      PCP: Elham Saleem MD    Disposition: Home    Planned Readmission: No     Discharge Medications:  See after visit summary for reconciled discharge medications provided to patient and family.      Discharge Statement:  I have spent a total time of 30 minutes in caring for this patient on the day of the visit/encounter.     Rony Hicks DO  PGY-2 Family Medicine - [place]   01/19/25, 9:17 AM

## 2025-01-20 ENCOUNTER — TRANSITIONAL CARE MANAGEMENT (OUTPATIENT)
Dept: FAMILY MEDICINE CLINIC | Facility: CLINIC | Age: 80
End: 2025-01-20

## 2025-01-20 ENCOUNTER — TELEPHONE (OUTPATIENT)
Dept: FAMILY MEDICINE CLINIC | Facility: CLINIC | Age: 80
End: 2025-01-20

## 2025-01-20 NOTE — TELEPHONE ENCOUNTER
Patient is calling got released from hospital   01/19/25 and wants to schedule TCM    Patient can be reached at [phone]

## 2025-01-24 ENCOUNTER — OFFICE VISIT (OUTPATIENT)
Dept: FAMILY MEDICINE CLINIC | Facility: CLINIC | Age: 80
End: 2025-01-24

## 2025-01-24 VITALS
DIASTOLIC BLOOD PRESSURE: 83 MMHG | RESPIRATION RATE: 18 BRPM | SYSTOLIC BLOOD PRESSURE: 136 MMHG | WEIGHT: 129.4 LBS | OXYGEN SATURATION: 96 % | HEIGHT: 63 IN | TEMPERATURE: 98.1 F | BODY MASS INDEX: 22.93 KG/M2 | HEART RATE: 103 BPM

## 2025-01-24 DIAGNOSIS — F41.9 ANXIETY: ICD-10-CM

## 2025-01-24 DIAGNOSIS — J45.901 EXACERBATION OF PERSISTENT ASTHMA, UNSPECIFIED ASTHMA SEVERITY: Primary | ICD-10-CM

## 2025-01-24 DIAGNOSIS — J30.9 CHRONIC ALLERGIC RHINITIS: ICD-10-CM

## 2025-01-24 PROCEDURE — 99496 TRANSJ CARE MGMT HIGH F2F 7D: CPT | Performed by: FAMILY MEDICINE

## 2025-01-24 RX ORDER — HYDROXYZINE HYDROCHLORIDE 10 MG/1
10 TABLET, FILM COATED ORAL
Qty: 10 TABLET | Refills: 0 | Status: SHIPPED | OUTPATIENT
Start: 2025-01-24

## 2025-01-24 RX ORDER — BUDESONIDE AND FORMOTEROL FUMARATE DIHYDRATE 80; 4.5 UG/1; UG/1
2 AEROSOL RESPIRATORY (INHALATION) 2 TIMES DAILY
Qty: 10.2 G | Refills: 5 | Status: SHIPPED | OUTPATIENT
Start: 2025-01-24 | End: 2025-02-23

## 2025-01-24 RX ORDER — HYDROXYZINE HYDROCHLORIDE 25 MG/1
25 TABLET, FILM COATED ORAL 2 TIMES DAILY
Qty: 60 TABLET | Refills: 0 | Status: SHIPPED | OUTPATIENT
Start: 2025-01-24 | End: 2025-01-24

## 2025-01-24 RX ORDER — HYDROXYZINE HYDROCHLORIDE 10 MG/1
10 TABLET, FILM COATED ORAL
Qty: 30 TABLET | Refills: 0 | Status: SHIPPED | OUTPATIENT
Start: 2025-01-24 | End: 2025-01-24

## 2025-01-24 NOTE — ASSESSMENT & PLAN NOTE
-She endorses ongoing anxiety with difficulty sleeping at night  -Patient states she used to take Xanax as needed  -During hospitalization patient received Atarax 10 mg nightly as needed, she states this helped improve her symptoms    Plan  -Atarax 10 mg at bedtime as needed for 10 doses total prescribed at this time.  It is recommended to avoid this class of medication in this patients age group however this is to bridge patient to a further long-term option.  Patient states she tolerated this medication well in the hospital.  She was extensively advised on the side effects including drowsiness and was told to not operate machinery/drive after taking the medication  -Patient to follow-up in 2 weeks to discuss anxiety management further    Orders:    hydrOXYzine HCL (ATARAX) 10 mg tablet; Take 1 tablet (10 mg total) by mouth daily at bedtime as needed for anxiety for up to 10 doses

## 2025-01-24 NOTE — ASSESSMENT & PLAN NOTE
- Patient presented to ED after exacerbation of her asthma symptoms in the setting of Covid virus.  - Patient's Covid sx began 1/9 but she first tested positive 1/16 so she was outside window for paxlovid  - Patient was admitted for one night and improved with fluids, zofran, nebs and was started on 12 day steroid taper course. Patient was also discharged with 10 day doxycycline course  - She notes improvement in her symptoms since discharge    Plan  - Patient has f/u with Pulmology 2/26/25  - Complete Prednisone taper and Doxycyline courses, last day of Doxy today   - Continue current respiratory regimen of Symbicort 2 puffs BID, Singulair  10mg nightly, Allegra daily, albuterol prn, flonase daily, astelin daily      Orders:    budesonide-formoterol (Symbicort) 80-4.5 MCG/ACT inhaler; Inhale 2 puffs 2 (two) times a day Rinse mouth after use

## 2025-01-24 NOTE — PROGRESS NOTES
Transition of Care Visit  Name: Mireya Yi      : 1945      MRN: [id number]  Encounter Provider: Mary Alice Segovia MD  Encounter Date: 2025   [facility]    Assessment & Plan  Exacerbation of persistent asthma, unspecified asthma severity  - Patient presented to ED after exacerbation of her asthma symptoms in the setting of Covid virus.  - Patient's Covid sx began  but she first tested positive  so she was outside window for paxlovid  - Patient was admitted for one night and improved with fluids, zofran, nebs and was started on 12 day steroid taper course. Patient was also discharged with 10 day doxycycline course  - She notes improvement in her symptoms since discharge    Plan  - Patient has f/u with Pulmology 25  - Complete Prednisone taper and Doxycyline courses, last day of Doxy today   - Continue current respiratory regimen of Symbicort 2 puffs BID, Singulair  10mg nightly, Allegra daily, albuterol prn, flonase daily, astelin daily      Orders:    budesonide-formoterol (Symbicort) 80-4.5 MCG/ACT inhaler; Inhale 2 puffs 2 (two) times a day Rinse mouth after use    Anxiety  -She endorses ongoing anxiety with difficulty sleeping at night  -Patient states she used to take Xanax as needed  -During hospitalization patient received Atarax 10 mg nightly as needed, she states this helped improve her symptoms    Plan  -Atarax 10 mg at bedtime as needed for 10 doses total prescribed at this time.  It is recommended to avoid this class of medication in this patients age group however this is to bridge patient to a further long-term option.  Patient states she tolerated this medication well in the hospital.  She was extensively advised on the side effects including drowsiness and was told to not operate machinery/drive after taking the medication  -Patient to follow-up in 2 weeks to discuss anxiety management further    Orders:     hydrOXYzine HCL (ATARAX) 10 mg tablet; Take 1 tablet (10 mg total) by mouth daily at bedtime as needed for anxiety for up to 10 doses         History of Present Illness     Transitional Care Management Review:   Mireya Yi is a 79 y.o. female here for TCM follow up.     During the TCM phone call patient stated:  TCM Call       Date and time call was made  1/20/2025  4:45 PM    Hospital care reviewed  Records reviewed    Patient was hospitial[facility] at  [facility]    Date of Admission  01/18/25    Date of discharge  01/19/25    Diagnosis  Asthma exacerbation, GERD, chronic allergic rhinitis, anxiety, Covid 19    Disposition  Home    Were the patients medications reviewed and updated  Yes    Current Symptoms  Cough    Cough Severity  Moderate          TCM Call       Post hospital issues  Reduced activity    Should patient be enrolled in anticoag monitoring?  No    Scheduled for follow up?  Yes    Did you obtain your prescribed medications  Yes    Do you need help managing your prescriptions or medications  No    Is transportation to your appointment needed  No    I have advised the patient to call PCP with any new or worsening symptoms  Kady Anderson LPN    Living Arrangements  Alone    Are you recieving any outpatient services  No    Are you recieving home care services  No    Are you using any community resources  No    Current waiver services  No    Have you fallen in the last 12 months  No    Interperter language line needed  No    Counseling  Patient    Counseling topics  Importance of RX compliance; Activities of daily living; instructions for management          Patient is a 79 y.o. female with PMH of asthma, ckd, gerd, htn, anxiety who presents after recent hospital admission for asthma exacerbation. Patient presented to her outpatient Pulmonologist on 1/15/25 for asthma exacerbation at which time they recommended home covid testing. They recommended patient do a home covid test. Patient states her sx  "worsened where she felt like she couldn't breathe at all so she presented to the ED. At that time patient was found to be covid positive. She was admitted overnight for management of her exacerbation. Patient improved with albuterol, atrovent, zofran, and fluids, and was discharged to home. Patient was discharged with instruction to continue Prednisone taper, nebulizer treatments, singulair, mucinex and course of doxycycline. Patient states she has been compliant with her medications since discharge. She reports that whenever she gets sick her asthma symptoms flare up usually causing her to go to the hospital due to the severity of her exacerbations. Patient says she has been doing well since discharge and notices improvement in her breathing. She did not pickup the Mucinex due to cough. She states she still smokes about 4 cigarettes a day but has not smoked since discharge. She also states she would like some medication to help her sleep better at night due to her anxiety.       Review of Systems   Constitutional:  Negative for chills and fever.   HENT:  Positive for congestion.    Respiratory:  Positive for cough. Negative for chest tightness, shortness of breath and wheezing.    Cardiovascular:  Negative for chest pain.   Gastrointestinal:  Negative for nausea and vomiting.   Genitourinary:  Negative for difficulty urinating.   Skin:  Negative for rash.   Neurological:  Negative for dizziness and headaches.     Objective   /83 (BP Location: Left arm, Patient Position: Sitting, Cuff Size: Standard)   Pulse 103   Temp 98.1 °F (36.7 °C)   Resp 18   Ht 5' 3\" (1.6 m)   Wt 58.7 kg (129 lb 6.4 oz)   SpO2 96%   BMI 22.92 kg/m²     Physical Exam  Constitutional:       Appearance: Normal appearance.   HENT:      Head: Normocephalic and atraumatic.      Right Ear: External ear normal.      Left Ear: External ear normal.   Eyes:      Conjunctiva/sclera: Conjunctivae normal.   Cardiovascular:      Rate and " Rhythm: Normal rate and regular rhythm.   Pulmonary:      Effort: Pulmonary effort is normal. No respiratory distress.      Breath sounds: Normal breath sounds. No wheezing.   Abdominal:      General: Bowel sounds are normal. There is no distension.      Palpations: Abdomen is soft.      Tenderness: There is no abdominal tenderness.   Skin:     General: Skin is warm and dry.   Neurological:      General: No focal deficit present.      Mental Status: She is alert and oriented to person, place, and time.   Psychiatric:         Mood and Affect: Mood normal.         Behavior: Behavior normal.       Medications have been reviewed by provider in current encounter        Mary Alice Segovia MD   PGY-2, Family Medicine  [facility]

## 2025-02-04 ENCOUNTER — HOSPITAL ENCOUNTER (EMERGENCY)
Facility: HOSPITAL | Age: 80
Discharge: HOME | End: 2025-02-04
Attending: EMERGENCY MEDICINE | Admitting: EMERGENCY MEDICINE
Payer: MEDICARE

## 2025-02-04 ENCOUNTER — APPOINTMENT (EMERGENCY)
Dept: RADIOLOGY | Facility: HOSPITAL | Age: 80
End: 2025-02-04
Payer: MEDICARE

## 2025-02-04 VITALS
RESPIRATION RATE: 18 BRPM | HEART RATE: 98 BPM | SYSTOLIC BLOOD PRESSURE: 129 MMHG | TEMPERATURE: 97.6 F | DIASTOLIC BLOOD PRESSURE: 70 MMHG | OXYGEN SATURATION: 94 %

## 2025-02-04 DIAGNOSIS — J06.9 URI (UPPER RESPIRATORY INFECTION): ICD-10-CM

## 2025-02-04 DIAGNOSIS — J45.901 ASTHMA EXACERBATION: Primary | ICD-10-CM

## 2025-02-04 LAB
ANION GAP SERPL CALCULATED.3IONS-SCNC: 7 MMOL/L (ref 4–13)
ATRIAL RATE: 96 BPM
BASOPHILS # BLD AUTO: 0.03 THOUSANDS/ΜL (ref 0–0.1)
BASOPHILS NFR BLD AUTO: 0 % (ref 0–1)
BNP SERPL-MCNC: 58 PG/ML (ref 0–100)
BUN SERPL-MCNC: 12 MG/DL (ref 5–25)
CALCIUM SERPL-MCNC: 8.8 MG/DL (ref 8.4–10.2)
CARDIAC TROPONIN I PNL SERPL HS: 10 NG/L (ref ?–50)
CHLORIDE SERPL-SCNC: 107 MMOL/L (ref 96–108)
CO2 SERPL-SCNC: 27 MMOL/L (ref 21–32)
CREAT SERPL-MCNC: 1.15 MG/DL (ref 0.6–1.3)
EOSINOPHIL # BLD AUTO: 0.23 THOUSAND/ΜL (ref 0–0.61)
EOSINOPHIL NFR BLD AUTO: 2 % (ref 0–6)
ERYTHROCYTE [DISTWIDTH] IN BLOOD BY AUTOMATED COUNT: 18.1 % (ref 11.6–15.1)
FLUAV AG UPPER RESP QL IA.RAPID: NEGATIVE
FLUBV AG UPPER RESP QL IA.RAPID: NEGATIVE
GFR SERPL CREATININE-BSD FRML MDRD: 45 ML/MIN/1.73SQ M
GLUCOSE SERPL-MCNC: 84 MG/DL (ref 65–140)
HCT VFR BLD AUTO: 35.6 % (ref 34.8–46.1)
HGB BLD-MCNC: 11.3 G/DL (ref 11.5–15.4)
IMM GRANULOCYTES # BLD AUTO: 0.04 THOUSAND/UL (ref 0–0.2)
IMM GRANULOCYTES NFR BLD AUTO: 0 % (ref 0–2)
LYMPHOCYTES # BLD AUTO: 1.56 THOUSANDS/ΜL (ref 0.6–4.47)
LYMPHOCYTES NFR BLD AUTO: 16 % (ref 14–44)
MCH RBC QN AUTO: 27.6 PG (ref 26.8–34.3)
MCHC RBC AUTO-ENTMCNC: 31.7 G/DL (ref 31.4–37.4)
MCV RBC AUTO: 87 FL (ref 82–98)
MONOCYTES # BLD AUTO: 0.5 THOUSAND/ΜL (ref 0.17–1.22)
MONOCYTES NFR BLD AUTO: 5 % (ref 4–12)
NEUTROPHILS # BLD AUTO: 7.27 THOUSANDS/ΜL (ref 1.85–7.62)
NEUTS SEG NFR BLD AUTO: 77 % (ref 43–75)
NRBC BLD AUTO-RTO: 0 /100 WBCS
P AXIS: 58 DEGREES
PLATELET # BLD AUTO: 236 THOUSANDS/UL (ref 149–390)
PMV BLD AUTO: 11 FL (ref 8.9–12.7)
POTASSIUM SERPL-SCNC: 4.4 MMOL/L (ref 3.5–5.3)
PR INTERVAL: 114 MS
QRS AXIS: 0 DEGREES
QRSD INTERVAL: 78 MS
QT INTERVAL: 336 MS
QTC INTERVAL: 425 MS
RBC # BLD AUTO: 4.1 MILLION/UL (ref 3.81–5.12)
SARS-COV+SARS-COV-2 AG RESP QL IA.RAPID: NEGATIVE
SODIUM SERPL-SCNC: 141 MMOL/L (ref 135–147)
T WAVE AXIS: -8 DEGREES
VENTRICULAR RATE: 96 BPM
WBC # BLD AUTO: 9.63 THOUSAND/UL (ref 4.31–10.16)

## 2025-02-04 PROCEDURE — 84484 ASSAY OF TROPONIN QUANT: CPT

## 2025-02-04 PROCEDURE — 71046 X-RAY EXAM CHEST 2 VIEWS: CPT

## 2025-02-04 PROCEDURE — 94640 AIRWAY INHALATION TREATMENT: CPT

## 2025-02-04 PROCEDURE — 85025 COMPLETE CBC W/AUTO DIFF WBC: CPT

## 2025-02-04 PROCEDURE — 87804 INFLUENZA ASSAY W/OPTIC: CPT

## 2025-02-04 PROCEDURE — 80048 BASIC METABOLIC PNL TOTAL CA: CPT

## 2025-02-04 PROCEDURE — 36415 COLL VENOUS BLD VENIPUNCTURE: CPT

## 2025-02-04 PROCEDURE — 93005 ELECTROCARDIOGRAM TRACING: CPT

## 2025-02-04 PROCEDURE — 93010 ELECTROCARDIOGRAM REPORT: CPT | Performed by: INTERNAL MEDICINE

## 2025-02-04 PROCEDURE — 87811 SARS-COV-2 COVID19 W/OPTIC: CPT

## 2025-02-04 PROCEDURE — 99285 EMERGENCY DEPT VISIT HI MDM: CPT

## 2025-02-04 PROCEDURE — 83880 ASSAY OF NATRIURETIC PEPTIDE: CPT

## 2025-02-04 PROCEDURE — 99284 EMERGENCY DEPT VISIT MOD MDM: CPT | Performed by: EMERGENCY MEDICINE

## 2025-02-04 RX ORDER — DEXAMETHASONE 6 MG/1
10 TABLET ORAL 2 TIMES DAILY WITH MEALS
Qty: 90 TABLET | Refills: 0 | Status: SHIPPED | OUTPATIENT
Start: 2025-02-06 | End: 2025-02-06

## 2025-02-04 RX ORDER — ALBUTEROL SULFATE 0.83 MG/ML
5 SOLUTION RESPIRATORY (INHALATION) ONCE
Status: COMPLETED | OUTPATIENT
Start: 2025-02-04 | End: 2025-02-04

## 2025-02-04 RX ADMIN — IPRATROPIUM BROMIDE 0.5 MG: 0.5 SOLUTION RESPIRATORY (INHALATION) at 09:48

## 2025-02-04 RX ADMIN — ALBUTEROL SULFATE 5 MG: 2.5 SOLUTION RESPIRATORY (INHALATION) at 09:48

## 2025-02-04 RX ADMIN — IPRATROPIUM BROMIDE 0.5 MG: 0.5 SOLUTION RESPIRATORY (INHALATION) at 11:11

## 2025-02-04 RX ADMIN — ALBUTEROL SULFATE 5 MG: 2.5 SOLUTION RESPIRATORY (INHALATION) at 11:11

## 2025-02-04 RX ADMIN — DEXAMETHASONE 10 MG: 4 TABLET ORAL at 09:47

## 2025-02-04 NOTE — DISCHARGE INSTRUCTIONS
You were seen in the Emergency Department today for viral URI and asthma exacerbation.    Please follow up with your primary care doctor.  Please return to the Emergency Department if you experience worsening of your current symptoms, chest pain, shortness of breath, or any other concerning symptoms.

## 2025-02-04 NOTE — ED PROVIDER NOTES
Time reflects when diagnosis was documented in both MDM as applicable and the Disposition within this note       Time User Action Codes Description Comment    2/4/2025 11:46 AM Maranda Pisano Add [J45.901] Asthma exacerbation     2/4/2025 11:46 AM Maranda Pisano Add [J06.9] URI (upper respiratory infection)           ED Disposition       ED Disposition   Discharge    Condition   Stable    Date/Time   Tue Feb 4, 2025 11:57 AM    Comment   Mireya Yi discharge to home/self care.                   Assessment & Plan       Medical Decision Making  Differential: COPD exacerbation, asthma exacerbation, viral syndrome, pneumonia, CHF.    Plan: EKG, x-ray, DuoNeb, steroids, cardiac workup     No ischemic changes on EKG. Labs are stable. CXR negative for focal consolidations and pulmonary edema. Patient's symptoms have improved following DuoNebs.  Ambulatory pulse ox is 94%.  Symptoms likely in setting of asthma exacerbation from viral syndrome.  Steroids were sent to her pharmacy.  Patient was told to follow-up with PCP.  She was given return precautions and discharged in stable condition.    Amount and/or Complexity of Data Reviewed  Labs: ordered. Decision-making details documented in ED Course.  Radiology: ordered.    Risk  Prescription drug management.        ED Course as of 02/05/25 0716   Tue Feb 04, 2025   1032 hs TnI 0hr: 10  Baseline   1034 Pts symptoms improved. Mild expiratory wheezes. Will give another duoneb and re-evaluate       Medications   albuterol inhalation solution 5 mg (5 mg Nebulization Given 2/4/25 0948)   ipratropium (ATROVENT) 0.02 % inhalation solution 0.5 mg (0.5 mg Nebulization Given 2/4/25 0948)   dexamethasone (DECADRON) tablet 10 mg (10 mg Oral Given 2/4/25 0947)   albuterol inhalation solution 5 mg (5 mg Nebulization Given 2/4/25 1111)   ipratropium (ATROVENT) 0.02 % inhalation solution 0.5 mg (0.5 mg Nebulization Given 2/4/25 1111)       ED Risk Strat Scores                  Identification of  Seniors at Risk      Flowsheet Row Most Recent Value   (ISAR) Identification of Seniors at Risk    Before the illness or injury that brought you to the Emergency, did you need someone to help you on a regular basis? 0 Filed at: 02/04/2025 0753   In the last 24 hours, have you needed more help than usual? 0 Filed at: 02/04/2025 0753   Have you been hospitalized for one or more nights during the past 6 months? 1 Filed at: 02/04/2025 0753   In general, do you see well? 0 Filed at: 02/04/2025 0753   In general, do you have serious problems with your memory? 0 Filed at: 02/04/2025 0753   Do you take more than three different medications every day? 1 Filed at: 02/04/2025 0753   ISAR Score 2 Filed at: 02/04/2025 0753                SBIRT 20yo+      Flowsheet Row Most Recent Value   Initial Alcohol Screen: US AUDIT-C     1. How often do you have a drink containing alcohol? 0 Filed at: 02/04/2025 1000   2. How many drinks containing alcohol do you have on a typical day you are drinking?  0 Filed at: 02/04/2025 1000   3b. FEMALE Any Age, or MALE 65+: How often do you have 4 or more drinks on one occassion? 0 Filed at: 02/04/2025 1000   Audit-C Score 0 Filed at: 02/04/2025 1000   PASTOR: How many times in the past year have you...    Used an illegal drug or used a prescription medication for non-medical reasons? Never Filed at: 02/04/2025 1000                            History of Present Illness       Chief Complaint   Patient presents with    Asthma     Pt states had + covid test approximately 8 days ago, reports worsening congestion, cough, and SOB, hx asthma, no relief with inhalers        Past Medical History:   Diagnosis Date    Asthma     Asthmatic bronchitis     Last Assessed: 10/7/2014     Chronic diarrhea     Last Assessed: 8/20/2015     Fibromyalgia     Focal nodular hyperplasia of liver     Last Assessed: 6/11/2015    Herpes zoster     Last Assessed: 3/18/2016    Hypertension     IBS (irritable bowel syndrome)      Intermittent palpitations     Irritable bowel syndrome     Lumbar radiculopathy     Osteoarthritis     Vertigo       Past Surgical History:   Procedure Laterality Date    BREAST BIOPSY      BREAST LUMPECTOMY      when pt was 18    SMALL INTESTINE SURGERY        Family History   Problem Relation Age of Onset    Heart attack Mother     Asthma Father     Breast cancer Sister     Breast cancer Sister     No Known Problems Daughter     No Known Problems Daughter     Arthritis Family     Osteoporosis Family       Social History     Tobacco Use    Smoking status: Some Days     Current packs/day: 0.50     Types: Cigarettes    Smokeless tobacco: Never    Tobacco comments:     Stopped smoking July 2023   Vaping Use    Vaping status: Never Used   Substance Use Topics    Alcohol use: Not Currently    Drug use: No      E-Cigarette/Vaping    E-Cigarette Use Never User       E-Cigarette/Vaping Substances    Nicotine No     THC No     CBD No     Flavoring No     Other No     Unknown No       I have reviewed and agree with the history as documented.     HPI    Patient is a 79 year old female with history of asthma who presents with cough and wheezing. Symptoms began this morning. She tried albuterol at 4am this morning but did not improve. Daughter is sick with similar symptoms. Patient had COVID last week and was admitted to the hospital.  Upon discharge, she felt well.  She was given a steroid taper and doxycycline which she has completed.  Dyspnea is worse on exertion.  She is endorsing chest tightness but no chest pain. She does endorse some increased lower extremity swelling. No fevers.    Review of Systems   Constitutional:  Negative for chills and fever.   HENT:  Positive for congestion and rhinorrhea.    Respiratory:  Positive for cough and shortness of breath.    Cardiovascular:  Positive for leg swelling. Negative for palpitations.   Gastrointestinal:  Negative for abdominal pain and vomiting.   Genitourinary:  Negative  for dysuria and hematuria.   Musculoskeletal:  Negative for back pain and neck pain.   All other systems reviewed and are negative.          Objective       ED Triage Vitals [02/04/25 0751]   Temperature Pulse Blood Pressure Respirations SpO2 Patient Position - Orthostatic VS   97.6 °F (36.4 °C) 103 152/77 20 94 % Sitting      Temp Source Heart Rate Source BP Location FiO2 (%) Pain Score    Tympanic Monitor Right arm -- --      Vitals      Date and Time Temp Pulse SpO2 Resp BP Pain Score FACES Pain Rating User   02/04/25 1156 -- -- 94 % -- -- -- -- [place]   02/04/25 1155 -- 98 96 % 18 129/70 -- -- [place]   02/04/25 0751 97.6 °F (36.4 °C) 103 94 % 20 152/77 -- -- [place]            Physical Exam  Vitals and nursing note reviewed.   HENT:      Head: Normocephalic and atraumatic.      Nose: No congestion or rhinorrhea.      Mouth/Throat:      Mouth: Mucous membranes are moist.   Eyes:      General:         Right eye: No discharge.         Left eye: No discharge.      Extraocular Movements: Extraocular movements intact.   Cardiovascular:      Rate and Rhythm: Normal rate and regular rhythm.   Pulmonary:      Effort: Pulmonary effort is normal. No respiratory distress.      Breath sounds: Wheezing and rhonchi present.      Comments: Rhonchi through out lungs, worse in right lower.  Mild end expiratory wheeze lower lobes.  Abdominal:      Palpations: Abdomen is soft.      Tenderness: There is no abdominal tenderness. There is no guarding or rebound.   Musculoskeletal:      Cervical back: Normal range of motion.      Right lower leg: Edema present.      Left lower leg: Edema present.      Comments: Mild LE edema   Skin:     General: Skin is warm and dry.      Capillary Refill: Capillary refill takes less than 2 seconds.   Neurological:      Mental Status: She is alert.   Psychiatric:         Mood and Affect: Mood normal.         Results Reviewed       Procedure Component Value Units Date/Time    B-Type Natriuretic Peptide(BNP)  [[id number]]  (Normal) Collected: 02/04/25 0943    Lab Status: Final result Specimen: Blood from Arm, Left Updated: 02/04/25 1020     BNP 58 pg/mL     FLU/COVID Rapid Antigen (30 min. TAT) - Preferred screening test in ED [[id number]]  (Normal) Collected: 02/04/25 0944    Lab Status: Final result Specimen: Nares from Nose Updated: 02/04/25 1015     SARS COV Rapid Antigen Negative     Influenza A Rapid Antigen Negative     Influenza B Rapid Antigen Negative    Narrative:      This test has been performed using the [employer] Nita 2 FLU+SARS Antigen test under the Emergency Use Authorization (EUA). This test has been validated by the  and verified by the performing laboratory. The Nita uses lateral flow immunofluorescent sandwich assay to detect SARS-COV, Influenza A and Influenza B Antigen.     The [employer] Nita 2 SARS Antigen test does not differentiate between SARS-CoV and SARS-CoV-2.     Negative results are presumptive and may be confirmed with a molecular assay, if necessary, for patient management. Negative results do not rule out SARS-CoV-2 or influenza infection and should not be used as the sole basis for treatment or patient management decisions. A negative test result may occur if the level of antigen in a sample is below the limit of detection of this test.     Positive results are indicative of the presence of viral antigens, but do not rule out bacterial infection or co-infection with other viruses.     All test results should be used as an adjunct to clinical observations and other information available to the provider.    FOR PEDIATRIC PATIENTS - copy/paste COVID Guidelines URL to browser: [email or web address]    Basic metabolic panel [[id number]] Collected: 02/04/25 0943    Lab Status: Final result Specimen: Blood from Arm, Left Updated: 02/04/25 1014     Sodium 141 mmol/L      Potassium 4.4 mmol/L      Chloride 107 mmol/L      CO2 27 mmol/L       ANION GAP 7 mmol/L      BUN 12 mg/dL      Creatinine 1.15 mg/dL      Glucose 84 mg/dL      Calcium 8.8 mg/dL      eGFR 45 ml/min/1.73sq m     Narrative:      National Kidney Disease Foundation guidelines for Chronic Kidney Disease (CKD):     Stage 1 with normal or high GFR (GFR > 90 mL/min/1.73 square meters)    Stage 2 Mild CKD (GFR = 60-89 mL/min/1.73 square meters)    Stage 3A Moderate CKD (GFR = 45-59 mL/min/1.73 square meters)    Stage 3B Moderate CKD (GFR = 30-44 mL/min/1.73 square meters)    Stage 4 Severe CKD (GFR = 15-29 mL/min/1.73 square meters)    Stage 5 End Stage CKD (GFR <15 mL/min/1.73 square meters)  Note: GFR calculation is accurate only with a steady state creatinine    HS Troponin 0hr (reflex protocol) [[id number]]  (Normal) Collected: 02/04/25 0943    Lab Status: Final result Specimen: Blood from Arm, Left Updated: 02/04/25 1014     hs TnI 0hr 10 ng/L     CBC and differential [[id number]]  (Abnormal) Collected: 02/04/25 0943    Lab Status: Final result Specimen: Blood from Arm, Left Updated: 02/04/25 0956     WBC 9.63 Thousand/uL      RBC 4.10 Million/uL      Hemoglobin 11.3 g/dL      Hematocrit 35.6 %      MCV 87 fL      MCH 27.6 pg      MCHC 31.7 g/dL      RDW 18.1 %      MPV 11.0 fL      Platelets 236 Thousands/uL      nRBC 0 /100 WBCs      Segmented % 77 %      Immature Grans % 0 %      Lymphocytes % 16 %      Monocytes % 5 %      Eosinophils Relative 2 %      Basophils Relative 0 %      Absolute Neutrophils 7.27 Thousands/µL      Absolute Immature Grans 0.04 Thousand/uL      Absolute Lymphocytes 1.56 Thousands/µL      Absolute Monocytes 0.50 Thousand/µL      Eosinophils Absolute 0.23 Thousand/µL      Basophils Absolute 0.03 Thousands/µL             XR chest pa and lateral   Final Interpretation by Shannon Peralta MD (02/04 0945)      No acute cardiopulmonary disease.            Workstation performed: [id number]             Procedures    ED Medication and Procedure Management   Prior  to Admission Medications   Prescriptions Last Dose Informant Patient Reported? Taking?   Cholecalciferol (D3-1000) 1,000 units tablet  Self No No   Sig: Take 1 tablet (1,000 Units total) by mouth daily   acetaminophen (TYLENOL) 500 mg tablet  Self No No   Sig: Take 1 tablet (500 mg total) by mouth every 6 (six) hours as needed for mild pain   albuterol (PROVENTIL HFA,VENTOLIN HFA) 90 mcg/act inhaler  Self No No   Sig: Inhale 2 puffs every 6 (six) hours as needed for wheezing or shortness of breath   amLODIPine (NORVASC) 5 mg tablet  Self No No   Sig: Take 1 tablet (5 mg total) by mouth daily   azelastine (ASTELIN) 0.1 % nasal spray   No No   Si spray into each nostril 2 (two) times a day Use in each nostril as directed   budesonide-formoterol (Symbicort) 80-4.5 MCG/ACT inhaler   No No   Sig: Inhale 2 puffs 2 (two) times a day Rinse mouth after use   fexofenadine (ALLEGRA) 180 MG tablet  Self Yes No   Sig: Take 180 mg by mouth daily   fluticasone (FLONASE) 50 mcg/act nasal spray  Self No No   Sig: SPRAY 2 SPRAYS INTO EACH NOSTRIL EVERY DAY   folic acid (FOLVITE) 1 mg tablet  Self No No   Sig: Take 1 tablet (1,000 mcg total) by mouth daily   hydrOXYzine HCL (ATARAX) 10 mg tablet   No No   Sig: Take 1 tablet (10 mg total) by mouth daily at bedtime as needed for anxiety for up to 10 doses   montelukast (SINGULAIR) 10 mg tablet  Self No No   Sig: Take 1 tablet (10 mg total) by mouth daily at bedtime   nicotine (NICODERM CQ) 14 mg/24hr TD 24 hr patch  Self No No   Sig: Place 1 patch on the skin over 24 hours every 24 hours   ondansetron (Zofran ODT) 4 mg disintegrating tablet  Self No No   Sig: Take 1 tablet (4 mg total) by mouth every 6 (six) hours as needed for nausea or vomiting   pantoprazole (PROTONIX) 20 mg tablet  Self No No   Sig: Take 1 tablet (20 mg total) by mouth daily      Facility-Administered Medications: None     Discharge Medication List as of 2025 11:57 AM        START taking these medications     Details   dexamethasone (DECADRON) 6 mg tablet Take 1.5 tablets (9 mg total) by mouth 2 (two) times a day with meals Do not start before February 6, 2025., Starting Thu 2/6/2025, Normal           CONTINUE these medications which have NOT CHANGED    Details   acetaminophen (TYLENOL) 500 mg tablet Take 1 tablet (500 mg total) by mouth every 6 (six) hours as needed for mild pain, Starting Mon 8/27/2018, Normal      albuterol (PROVENTIL HFA,VENTOLIN HFA) 90 mcg/act inhaler Inhale 2 puffs every 6 (six) hours as needed for wheezing or shortness of breath, Starting Wed 5/15/2024, Normal      amLODIPine (NORVASC) 5 mg tablet Take 1 tablet (5 mg total) by mouth daily, Starting Mon 12/2/2024, Normal      azelastine (ASTELIN) 0.1 % nasal spray 1 spray into each nostril 2 (two) times a day Use in each nostril as directed, Starting Thu 1/16/2025, Normal      budesonide-formoterol (Symbicort) 80-4.5 MCG/ACT inhaler Inhale 2 puffs 2 (two) times a day Rinse mouth after use, Starting Fri 1/24/2025, Until Sun 2/23/2025, Normal      Cholecalciferol (D3-1000) 1,000 units tablet Take 1 tablet (1,000 Units total) by mouth daily, Starting Mon 12/2/2024, Normal      fexofenadine (ALLEGRA) 180 MG tablet Take 180 mg by mouth daily, Historical Med      fluticasone (FLONASE) 50 mcg/act nasal spray SPRAY 2 SPRAYS INTO EACH NOSTRIL EVERY DAY, Normal      folic acid (FOLVITE) 1 mg tablet Take 1 tablet (1,000 mcg total) by mouth daily, Starting Mon 12/2/2024, Normal      hydrOXYzine HCL (ATARAX) 10 mg tablet Take 1 tablet (10 mg total) by mouth daily at bedtime as needed for anxiety for up to 10 doses, Starting Fri 1/24/2025, Normal      montelukast (SINGULAIR) 10 mg tablet Take 1 tablet (10 mg total) by mouth daily at bedtime, Starting Mon 12/2/2024, Normal      nicotine (NICODERM CQ) 14 mg/24hr TD 24 hr patch Place 1 patch on the skin over 24 hours every 24 hours, Starting Mon 10/21/2024, Normal      ondansetron (Zofran ODT) 4 mg disintegrating  tablet Take 1 tablet (4 mg total) by mouth every 6 (six) hours as needed for nausea or vomiting, Starting Mon 12/2/2024, Normal      pantoprazole (PROTONIX) 20 mg tablet Take 1 tablet (20 mg total) by mouth daily, Starting Mon 12/2/2024, Normal           No discharge procedures on file.  ED SEPSIS DOCUMENTATION   Time reflects when diagnosis was documented in both MDM as applicable and the Disposition within this note       Time User Action Codes Description Comment    2/4/2025 11:46 AM Maranda Pisano [J45.901] Asthma exacerbation     2/4/2025 11:46 AM Maranda Pisano [J06.9] URI (upper respiratory infection)                  Maranda Pisano MD  02/05/25 0716

## 2025-02-04 NOTE — ED ATTENDING ATTESTATION
2/4/2025  I, Papi Weiss MD, saw and evaluated the patient. I have discussed the patient with the resident/non-physician practitioner and agree with the resident's/non-physician practitioner's findings, Plan of Care, and MDM as documented in the resident's/non-physician practitioner's note, except where noted. All available labs and Radiology studies were reviewed.  I was present for key portions of any procedure(s) performed by the resident/non-physician practitioner and I was immediately available to provide assistance.       At this point I agree with the current assessment done in the Emergency Department.  I have conducted an independent evaluation of this patient a history and physical is as follows:    79-year-old woman with history of asthma and CAD presenting with viral symptoms and wheezing.  Also notes some swelling in her legs over the past week.  Also notes a little bit of exertional dyspnea.  No chest pain.  On exam she is awake and alert in no acute distress.  Heart regular rate and rhythm, no murmurs rubs or gallops.  There are wheezes heard in all lung fields.  Normal work of breathing with no distress.  Skin warm and dry.  No extremity swelling or edema that I noted on exam.  EKG, labs and imaging done.  Patient given DuoNebs and felt better afterwards.  Had normal ambulatory pulse ox.  Will discharge with return precautions.  Patient given dose of dexamethasone here and we will write a prescription for a dose for 2 days from now.    ED Course         Critical Care Time  Procedures

## 2025-02-06 ENCOUNTER — OFFICE VISIT (OUTPATIENT)
Dept: PULMONOLOGY | Facility: CLINIC | Age: 80
End: 2025-02-06
Payer: MEDICARE

## 2025-02-06 VITALS
DIASTOLIC BLOOD PRESSURE: 78 MMHG | OXYGEN SATURATION: 95 % | SYSTOLIC BLOOD PRESSURE: 140 MMHG | HEIGHT: 63 IN | WEIGHT: 130 LBS | BODY MASS INDEX: 23.04 KG/M2 | HEART RATE: 101 BPM | TEMPERATURE: 97.6 F

## 2025-02-06 DIAGNOSIS — J43.9 PULMONARY EMPHYSEMA, UNSPECIFIED EMPHYSEMA TYPE (HCC): ICD-10-CM

## 2025-02-06 DIAGNOSIS — H66.90 ACUTE OTITIS MEDIA, UNSPECIFIED OTITIS MEDIA TYPE: ICD-10-CM

## 2025-02-06 DIAGNOSIS — F17.201 TOBACCO USE DISORDER, SEVERE, IN SUSTAINED REMISSION: ICD-10-CM

## 2025-02-06 DIAGNOSIS — J45.41 MODERATE PERSISTENT ASTHMA WITH EXACERBATION: Primary | ICD-10-CM

## 2025-02-06 DIAGNOSIS — R11.0 NAUSEA: ICD-10-CM

## 2025-02-06 PROCEDURE — G2211 COMPLEX E/M VISIT ADD ON: HCPCS | Performed by: INTERNAL MEDICINE

## 2025-02-06 PROCEDURE — 99214 OFFICE O/P EST MOD 30 MIN: CPT | Performed by: INTERNAL MEDICINE

## 2025-02-06 RX ORDER — ONDANSETRON 4 MG/1
4 TABLET, FILM COATED ORAL EVERY 8 HOURS PRN
Qty: 30 TABLET | Refills: 0 | Status: SHIPPED | OUTPATIENT
Start: 2025-02-06

## 2025-02-06 RX ORDER — IPRATROPIUM BROMIDE AND ALBUTEROL SULFATE 2.5; .5 MG/3ML; MG/3ML
3 SOLUTION RESPIRATORY (INHALATION) 4 TIMES DAILY
Qty: 360 ML | Refills: 2 | Status: SHIPPED | OUTPATIENT
Start: 2025-02-06

## 2025-02-06 RX ORDER — PREDNISONE 10 MG/1
TABLET ORAL
Qty: 20 TABLET | Refills: 0 | Status: SHIPPED | OUTPATIENT
Start: 2025-02-06 | End: 2025-02-14

## 2025-02-06 NOTE — ASSESSMENT & PLAN NOTE
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  Probable Th2 inflammation component with elevated eosinophils up to 700    -Continue Symbicort twice daily  -Continue albuterol or DuoNebs as needed

## 2025-02-06 NOTE — PROGRESS NOTES
Name: Mireya Yi      : 1945      MRN: [id number]  Encounter Provider: Jose Novak MD  Encounter Date: 2025   Encounter department: [facility] [place]  :  Assessment & Plan  Moderate persistent asthma with exacerbation  She is in exacerbation today with likely a lower respiratory tract infection.  5 days of symptoms.  Only using Symbicort, albuterol inhaler.  She was prescribed Decadron by the ED but the pharmacy did not have this available    -Cancel Decadron, start 8-day prednisone taper  -Treat otitis media with Augmentin  -I recommend using DuoNebs every 6 hours until she feels better.  She has a nebulizer machine at home.  -Follow-up in 1 month  Orders:    predniSONE 10 mg tablet; Take 4 tablets (40 mg total) by mouth daily for 2 days, THEN 3 tablets (30 mg total) daily for 2 days, THEN 2 tablets (20 mg total) daily for 2 days, THEN 1 tablet (10 mg total) daily for 2 days.    ipratropium-albuterol (DUO-NEB) 0.5-2.5 mg/3 mL nebulizer solution; Take 3 mL by nebulization 4 (four) times a day    Pulmonary emphysema, unspecified emphysema type (HCC)  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  Probable Th2 inflammation component with elevated eosinophils up to 700    -Continue Symbicort twice daily  -Continue albuterol or DuoNebs as needed       Acute otitis media, unspecified otitis media type  She also complains of ear pain in her left side with decreased hearing and pressure.  On examination there appears to be fluid behind the tympanic membrane.    Prescribe 7 days of Augmentin  Orders:    amoxicillin-clavulanate (AUGMENTIN) 875-125 mg per tablet; Take 1 tablet by mouth every 12 (twelve) hours for 7 days    Nausea  She has chronic GI issues and she is worried about further nausea with antibiotics.  I prescribed some Zofran and advised her to take PO probiotics with antibiotics.  Orders:    ondansetron (ZOFRAN) 4 mg tablet; Take 1 tablet (4 mg total) by mouth every 8 (eight) hours as  needed for nausea or vomiting    Tobacco use disorder, severe, in sustained remission  20-30-pack-year smoking history.  Since quit 2023 1/2024 CTA chest shows stable 3 mm right apical nodule.  Continue annual CT surveillance until age 81           History of Present Illness   Mireya Yi is a 79 y.o. female with a history of tobacco use in remission, mild COPD with type II inflammation, chronic allergic rhinitis, peripheral eosinophilia, who is presenting for acute visit for 5 days of congestion, cough, ear pain    Patient has a long history of COPD/asthma and has been seen by our pulmonary fellows over the past 2 years.  In January she was treated for viral URI and possible atypical pneumonia.  She completed a course of prednisone and doxycycline with return to her baseline.    5 days ago she started developing nasal congestion and sinus congestion.  She started feeling more clogged ears and pain in the left ear especially.  She also noted some decreased hearing through the left ear.  Over the past 3 days she has been developing more wheezing, mucus production from her lungs and cough.  Her cough is fairly severe, hacking and causes her to have shortness of breath.  She went to the ED 2/4 and was given 2 DuoNebs and felt better and discharged home with a prescription for Decadron.  Unfortunately the Decadron was not available at her pharmacy.    She denies any fevers from a thermometer but does have some subjective chills.    She is on flonase, azelastine, allegra, Singulair, symbicort, and albuterol PRN.     Review of Systems  Past Medical History   Past Medical History:   Diagnosis Date    Asthma     Asthmatic bronchitis     Last Assessed: 10/7/2014     Chronic diarrhea     Last Assessed: 8/20/2015     Fibromyalgia     Focal nodular hyperplasia of liver     Last Assessed: 6/11/2015    Herpes zoster     Last Assessed: 3/18/2016    Hypertension     IBS (irritable bowel syndrome)     Intermittent palpitations      Irritable bowel syndrome     Lumbar radiculopathy     Osteoarthritis     Vertigo      Past Surgical History:   Procedure Laterality Date    BREAST BIOPSY      BREAST LUMPECTOMY      when pt was 18    SMALL INTESTINE SURGERY       Family History   Problem Relation Age of Onset    Heart attack Mother     Asthma Father     Breast cancer Sister     Breast cancer Sister     No Known Problems Daughter     No Known Problems Daughter     Arthritis Family     Osteoporosis Family       reports that she has been smoking cigarettes. She has never used smokeless tobacco. She reports that she does not currently use alcohol. She reports that she does not use drugs.  Current Outpatient Medications on File Prior to Visit   Medication Sig Dispense Refill    acetaminophen (TYLENOL) 500 mg tablet Take 1 tablet (500 mg total) by mouth every 6 (six) hours as needed for mild pain 60 tablet 0    albuterol (PROVENTIL HFA,VENTOLIN HFA) 90 mcg/act inhaler Inhale 2 puffs every 6 (six) hours as needed for wheezing or shortness of breath 18 g 5    amLODIPine (NORVASC) 5 mg tablet Take 1 tablet (5 mg total) by mouth daily 90 tablet 2    azelastine (ASTELIN) 0.1 % nasal spray 1 spray into each nostril 2 (two) times a day Use in each nostril as directed 1 mL 0    budesonide-formoterol (Symbicort) 80-4.5 MCG/ACT inhaler Inhale 2 puffs 2 (two) times a day Rinse mouth after use 10.2 g 5    Cholecalciferol (D3-1000) 1,000 units tablet Take 1 tablet (1,000 Units total) by mouth daily 90 tablet 1    dexamethasone (DECADRON) 6 mg tablet Take 1.5 tablets (9 mg total) by mouth 2 (two) times a day with meals Do not start before February 6, 2025. 90 tablet 0    fexofenadine (ALLEGRA) 180 MG tablet Take 180 mg by mouth daily      fluticasone (FLONASE) 50 mcg/act nasal spray SPRAY 2 SPRAYS INTO EACH NOSTRIL EVERY DAY 48 mL 2    folic acid (FOLVITE) 1 mg tablet Take 1 tablet (1,000 mcg total) by mouth daily 90 tablet 2    hydrOXYzine HCL (ATARAX) 10 mg tablet  "Take 1 tablet (10 mg total) by mouth daily at bedtime as needed for anxiety for up to 10 doses 10 tablet 0    montelukast (SINGULAIR) 10 mg tablet Take 1 tablet (10 mg total) by mouth daily at bedtime 90 tablet 1    nicotine (NICODERM CQ) 14 mg/24hr TD 24 hr patch Place 1 patch on the skin over 24 hours every 24 hours 28 patch 1    ondansetron (Zofran ODT) 4 mg disintegrating tablet Take 1 tablet (4 mg total) by mouth every 6 (six) hours as needed for nausea or vomiting 20 tablet 3    pantoprazole (PROTONIX) 20 mg tablet Take 1 tablet (20 mg total) by mouth daily 90 tablet 1     No current facility-administered medications on file prior to visit.     Allergies   Allergen Reactions    Ambrosia Artemisiifolia (Ragweed) Skin Test Facial Swelling    Codeine Other (See Comments)     Heart races    Epinephrine      Pt reports \"it makes my heart race, they told me I cant take it.\"    Ibuprofen Other (See Comments)     Heart racing    Morphine Other (See Comments)     Heart racing    Pollen Extract Sneezing    Tramadol Other (See Comments)     Heart racing         Historical Information     Objective   There were no vitals taken for this visit.     Physical Exam  Vitals and nursing note reviewed.   Constitutional:       General: She is not in acute distress.     Appearance: She is well-developed. She is not ill-appearing, toxic-appearing or diaphoretic.   HENT:      Head: Normocephalic and atraumatic.      Right Ear: Ear canal normal.      Left Ear: Ear canal normal.      Ears:      Comments: Left ear fluid     Nose: Congestion and rhinorrhea present.      Mouth/Throat:      Mouth: Mucous membranes are moist.      Pharynx: Oropharynx is clear. No oropharyngeal exudate.   Eyes:      General: No scleral icterus.     Extraocular Movements: Extraocular movements intact.      Conjunctiva/sclera: Conjunctivae normal.   Pulmonary:      Effort: Pulmonary effort is normal. No respiratory distress.      Breath sounds: No stridor. " Wheezing and rhonchi present.   Abdominal:      Tenderness: There is no guarding.   Musculoskeletal:         General: No swelling.      Cervical back: Normal range of motion. No rigidity.   Skin:     General: Skin is warm and dry.      Coloration: Skin is not jaundiced.   Neurological:      Mental Status: She is alert. Mental status is at baseline.   Psychiatric:         Mood and Affect: Mood normal.       Lab Results: I have reviewed pertinent labs.    Radiology Results Review: I personally reviewed the following image studies in PACS and associated radiology reports: chest xray. My interpretation of the radiology images/reports is: 2/4/2025-no consolidation or infiltrates..  Other Study Results: Other Study Results Review : Other studies reviewed include: 5/2/2024-LVEF normal.  Moderate concentric hypertrophy.  Diastolic dysfunction grade 1.  RV size and function is normal.  Small pericardial effusion anterior to the heart.  No tamponade.  RV systolic pressure is normal  PFT Results Reviewed: reviewed  Mild obstructive airflow defect on spirometry.  No BD response.  Normal lung volumes.  Normal diffusion.

## 2025-02-06 NOTE — ASSESSMENT & PLAN NOTE
She is in exacerbation today with likely a lower respiratory tract infection.  5 days of symptoms.  Only using Symbicort, albuterol inhaler.  She was prescribed Decadron by the ED but the pharmacy did not have this available    -Cancel Decadron, start 8-day prednisone taper  -Treat otitis media with Augmentin  -I recommend using DuoNebs every 6 hours until she feels better.  She has a nebulizer machine at home.  -Follow-up in 1 month  Orders:    predniSONE 10 mg tablet; Take 4 tablets (40 mg total) by mouth daily for 2 days, THEN 3 tablets (30 mg total) daily for 2 days, THEN 2 tablets (20 mg total) daily for 2 days, THEN 1 tablet (10 mg total) daily for 2 days.    ipratropium-albuterol (DUO-NEB) 0.5-2.5 mg/3 mL nebulizer solution; Take 3 mL by nebulization 4 (four) times a day

## 2025-02-10 ENCOUNTER — OFFICE VISIT (OUTPATIENT)
Dept: FAMILY MEDICINE CLINIC | Facility: CLINIC | Age: 80
End: 2025-02-10

## 2025-02-10 VITALS
HEART RATE: 107 BPM | SYSTOLIC BLOOD PRESSURE: 131 MMHG | DIASTOLIC BLOOD PRESSURE: 78 MMHG | RESPIRATION RATE: 18 BRPM | BODY MASS INDEX: 23.03 KG/M2 | OXYGEN SATURATION: 98 % | TEMPERATURE: 98.2 F | WEIGHT: 130 LBS

## 2025-02-10 DIAGNOSIS — J45.40 MODERATE PERSISTENT ASTHMA, UNSPECIFIED WHETHER COMPLICATED: Primary | ICD-10-CM

## 2025-02-10 PROCEDURE — G2211 COMPLEX E/M VISIT ADD ON: HCPCS | Performed by: FAMILY MEDICINE

## 2025-02-10 PROCEDURE — 3075F SYST BP GE 130 - 139MM HG: CPT | Performed by: FAMILY MEDICINE

## 2025-02-10 PROCEDURE — 99213 OFFICE O/P EST LOW 20 MIN: CPT | Performed by: FAMILY MEDICINE

## 2025-02-10 PROCEDURE — 3078F DIAST BP <80 MM HG: CPT | Performed by: FAMILY MEDICINE

## 2025-02-10 NOTE — ASSESSMENT & PLAN NOTE
Follow up from ED on 2/4 at which point she was dx with asthma exacerbation and discharged on decadron taper. Patient then saw Pulmonology on 2/6 who rx'd prednisone taper for acute asthma exacerbation and Augmentin (for ear infection). Patient symptoms overall improving. Discussed return to care precautions. Recommend cont. Abx and prednisone as prescribed. Continue nasal saline rinses and Flonase. Continue Protonix for GERD. ED precautions reviewed. Patient not up to date on influenza vaccination, and again declines.   Orders:    Nebulizer Supplies    Nebulizer

## 2025-02-10 NOTE — PROGRESS NOTES
Name: Mireya Yi      : 1945      MRN: [id number]  Encounter Provider: Elham Saleem MD  Encounter Date: 2/10/2025   Encounter department: [facility] [place]  :  Assessment & Plan  Moderate persistent asthma, unspecified whether complicated  Follow up from ED on  at which point she was dx with asthma exacerbation and discharged on decadron taper. Patient then saw Pulmonology on  who rx'd prednisone taper for acute asthma exacerbation and Augmentin (for ear infection). Patient symptoms overall improving. Discussed return to care precautions. Recommend cont. Abx and prednisone as prescribed. Continue nasal saline rinses and Flonase. Continue Protonix for GERD. ED precautions reviewed. Patient not up to date on influenza vaccination, and again declines.   Orders:    Nebulizer Supplies    Nebulizer           History of Present Illness   Ms. Yi presents to the office for ED follow up for asthma exacerbation. Reports she has also been seen by pulmonology for ED follow up last week. She states she wanted to be seen today because she is experiencing shortness of breath on exertion and continued cough. Patient was  placed on abx and steroid taper by pulmonology on 25. She reports compliance with these medications. Reports compliance with her inhalers. States she has been using nebulizer treatment 3-4 times a day with improvement in symptoms. States she is not short of breath at rest. Reports shortness of breath on exertion such as walking up the stairs. Reports shortness of breath with exertion resolves upon resting for a couple of minutes. Denies chest pain, palpitations, fevers, chills. States that her cough is worse at night and is sometimes productive with a small amount of sputum. States she is compliant with GERD medication and intranasal steroids. Has not been using nasal saline rinses.       Review of Systems   Constitutional:  Negative for chills and  fever.   HENT:  Negative for rhinorrhea and sore throat.    Eyes:  Negative for redness and visual disturbance.   Respiratory:  Positive for cough, shortness of breath and wheezing.    Cardiovascular:  Negative for chest pain and palpitations.   Gastrointestinal:  Negative for abdominal pain, constipation and nausea.   Genitourinary:  Negative for difficulty urinating and dysuria.   Musculoskeletal:  Negative for gait problem and joint swelling.   Skin:  Negative for rash.   Allergic/Immunologic: Positive for environmental allergies. Negative for food allergies.   Neurological:  Negative for syncope and headaches.       Objective   /78 (BP Location: Left arm, Patient Position: Sitting, Cuff Size: Standard)   Pulse (!) 107   Temp 98.2 °F (36.8 °C) (Temporal)   Resp 18   Wt 59 kg (130 lb)   SpO2 98%   BMI 23.03 kg/m²      Physical Exam  Vitals reviewed.   Constitutional:       General: She is not in acute distress.     Appearance: Normal appearance. She is not ill-appearing.   HENT:      Head: Normocephalic and atraumatic.   Cardiovascular:      Rate and Rhythm: Tachycardia present.      Pulses: Normal pulses.      Heart sounds: Normal heart sounds. No murmur heard.  Pulmonary:      Effort: Pulmonary effort is normal. No respiratory distress.      Breath sounds: Wheezing (expiratory wheeze throughout) present.   Abdominal:      General: Bowel sounds are normal. There is no distension.      Palpations: Abdomen is soft.   Musculoskeletal:         General: Normal range of motion.      Cervical back: Normal range of motion.      Right lower leg: No edema.      Left lower leg: No edema.   Skin:     General: Skin is warm.   Neurological:      Mental Status: She is alert and oriented to person, place, and time.

## 2025-02-11 ENCOUNTER — HOSPITAL ENCOUNTER (OUTPATIENT)
Facility: HOSPITAL | Age: 80
Setting detail: OBSERVATION
Discharge: HOME | End: 2025-02-11
Attending: EMERGENCY MEDICINE
Payer: MEDICARE

## 2025-02-11 ENCOUNTER — APPOINTMENT (EMERGENCY)
Dept: RADIOLOGY | Facility: HOSPITAL | Age: 80
End: 2025-02-11
Payer: MEDICARE

## 2025-02-11 VITALS
DIASTOLIC BLOOD PRESSURE: 77 MMHG | TEMPERATURE: 98.2 F | SYSTOLIC BLOOD PRESSURE: 138 MMHG | OXYGEN SATURATION: 98 % | RESPIRATION RATE: 22 BRPM | HEART RATE: 112 BPM

## 2025-02-11 DIAGNOSIS — J45.901 ASTHMA EXACERBATION: ICD-10-CM

## 2025-02-11 DIAGNOSIS — R06.00 DYSPNEA: Primary | ICD-10-CM

## 2025-02-11 PROBLEM — Z72.0 TOBACCO ABUSE: Status: RESOLVED | Noted: 2023-07-24 | Resolved: 2025-02-11

## 2025-02-11 LAB
ANION GAP SERPL CALCULATED.3IONS-SCNC: 9 MMOL/L (ref 4–13)
ANION GAP SERPL CALCULATED.3IONS-SCNC: 9 MMOL/L (ref 4–13)
ANISOCYTOSIS BLD QL SMEAR: PRESENT
ATRIAL RATE: 109 BPM
BASOPHILS # BLD AUTO: 0.03 THOUSANDS/ÂΜL (ref 0–0.1)
BASOPHILS # BLD MANUAL: 0 THOUSAND/UL (ref 0–0.1)
BASOPHILS NFR BLD AUTO: 0 % (ref 0–1)
BASOPHILS NFR MAR MANUAL: 0 % (ref 0–1)
BNP SERPL-MCNC: 114 PG/ML (ref 0–100)
BUN SERPL-MCNC: 21 MG/DL (ref 5–25)
BUN SERPL-MCNC: 22 MG/DL (ref 5–25)
CALCIUM SERPL-MCNC: 8.5 MG/DL (ref 8.4–10.2)
CALCIUM SERPL-MCNC: 8.8 MG/DL (ref 8.4–10.2)
CARDIAC TROPONIN I PNL SERPL HS: 13 NG/L (ref ?–50)
CHLORIDE SERPL-SCNC: 111 MMOL/L (ref 96–108)
CHLORIDE SERPL-SCNC: 113 MMOL/L (ref 96–108)
CO2 SERPL-SCNC: 21 MMOL/L (ref 21–32)
CO2 SERPL-SCNC: 23 MMOL/L (ref 21–32)
CREAT SERPL-MCNC: 1.21 MG/DL (ref 0.6–1.3)
CREAT SERPL-MCNC: 1.24 MG/DL (ref 0.6–1.3)
EOSINOPHIL # BLD AUTO: 0 THOUSAND/ÂΜL (ref 0–0.61)
EOSINOPHIL # BLD MANUAL: 0 THOUSAND/UL (ref 0–0.4)
EOSINOPHIL NFR BLD AUTO: 0 % (ref 0–6)
EOSINOPHIL NFR BLD MANUAL: 0 % (ref 0–6)
ERYTHROCYTE [DISTWIDTH] IN BLOOD BY AUTOMATED COUNT: 17.5 % (ref 11.6–15.1)
ERYTHROCYTE [DISTWIDTH] IN BLOOD BY AUTOMATED COUNT: 17.7 % (ref 11.6–15.1)
GFR SERPL CREATININE-BSD FRML MDRD: 41 ML/MIN/1.73SQ M
GFR SERPL CREATININE-BSD FRML MDRD: 42 ML/MIN/1.73SQ M
GLUCOSE SERPL-MCNC: 163 MG/DL (ref 65–140)
GLUCOSE SERPL-MCNC: 214 MG/DL (ref 65–140)
HCT VFR BLD AUTO: 33 % (ref 34.8–46.1)
HCT VFR BLD AUTO: 33.4 % (ref 34.8–46.1)
HGB BLD-MCNC: 10.5 G/DL (ref 11.5–15.4)
HGB BLD-MCNC: 10.7 G/DL (ref 11.5–15.4)
IMM GRANULOCYTES # BLD AUTO: 0.19 THOUSAND/UL (ref 0–0.2)
IMM GRANULOCYTES NFR BLD AUTO: 1 % (ref 0–2)
LYMPHOCYTES # BLD AUTO: 0.69 THOUSAND/UL (ref 0.6–4.47)
LYMPHOCYTES # BLD AUTO: 1.17 THOUSANDS/ÂΜL (ref 0.6–4.47)
LYMPHOCYTES # BLD AUTO: 5 % (ref 14–44)
LYMPHOCYTES NFR BLD AUTO: 9 % (ref 14–44)
MAGNESIUM SERPL-MCNC: 2.5 MG/DL (ref 1.9–2.7)
MCH RBC QN AUTO: 27 PG (ref 26.8–34.3)
MCH RBC QN AUTO: 27.2 PG (ref 26.8–34.3)
MCHC RBC AUTO-ENTMCNC: 31.8 G/DL (ref 31.4–37.4)
MCHC RBC AUTO-ENTMCNC: 32 G/DL (ref 31.4–37.4)
MCV RBC AUTO: 85 FL (ref 82–98)
MCV RBC AUTO: 85 FL (ref 82–98)
MONOCYTES # BLD AUTO: 0 THOUSAND/UL (ref 0–1.22)
MONOCYTES # BLD AUTO: 0.41 THOUSAND/ÂΜL (ref 0.17–1.22)
MONOCYTES NFR BLD AUTO: 3 % (ref 4–12)
MONOCYTES NFR BLD: 0 % (ref 4–12)
NEUTROPHILS # BLD AUTO: 11.78 THOUSANDS/ÂΜL (ref 1.85–7.62)
NEUTROPHILS # BLD MANUAL: 13.04 THOUSAND/UL (ref 1.85–7.62)
NEUTS SEG NFR BLD AUTO: 87 % (ref 43–75)
NEUTS SEG NFR BLD AUTO: 95 % (ref 43–75)
NRBC BLD AUTO-RTO: 0 /100 WBCS
OVALOCYTES BLD QL SMEAR: PRESENT
P AXIS: 86 DEGREES
PHOSPHATE SERPL-MCNC: 2.7 MG/DL (ref 2.3–4.1)
PLATELET # BLD AUTO: 355 THOUSANDS/UL (ref 149–390)
PLATELET # BLD AUTO: 356 THOUSANDS/UL (ref 149–390)
PLATELET BLD QL SMEAR: ADEQUATE
PMV BLD AUTO: 10.7 FL (ref 8.9–12.7)
PMV BLD AUTO: 10.8 FL (ref 8.9–12.7)
POIKILOCYTOSIS BLD QL SMEAR: PRESENT
POLYCHROMASIA BLD QL SMEAR: PRESENT
POTASSIUM SERPL-SCNC: 3.9 MMOL/L (ref 3.5–5.3)
POTASSIUM SERPL-SCNC: 4 MMOL/L (ref 3.5–5.3)
PR INTERVAL: 116 MS
QRS AXIS: 1 DEGREES
QRSD INTERVAL: 70 MS
QT INTERVAL: 310 MS
QTC INTERVAL: 417 MS
RBC # BLD AUTO: 3.89 MILLION/UL (ref 3.81–5.12)
RBC # BLD AUTO: 3.93 MILLION/UL (ref 3.81–5.12)
RBC MORPH BLD: PRESENT
SODIUM SERPL-SCNC: 143 MMOL/L (ref 135–147)
SODIUM SERPL-SCNC: 143 MMOL/L (ref 135–147)
T WAVE AXIS: 65 DEGREES
VENTRICULAR RATE: 109 BPM
WBC # BLD AUTO: 13.58 THOUSAND/UL (ref 4.31–10.16)
WBC # BLD AUTO: 13.73 THOUSAND/UL (ref 4.31–10.16)

## 2025-02-11 PROCEDURE — 84484 ASSAY OF TROPONIN QUANT: CPT

## 2025-02-11 PROCEDURE — 93005 ELECTROCARDIOGRAM TRACING: CPT

## 2025-02-11 PROCEDURE — 85025 COMPLETE CBC W/AUTO DIFF WBC: CPT

## 2025-02-11 PROCEDURE — 85007 BL SMEAR W/DIFF WBC COUNT: CPT

## 2025-02-11 PROCEDURE — 96365 THER/PROPH/DIAG IV INF INIT: CPT

## 2025-02-11 PROCEDURE — 71046 X-RAY EXAM CHEST 2 VIEWS: CPT

## 2025-02-11 PROCEDURE — 99285 EMERGENCY DEPT VISIT HI MDM: CPT

## 2025-02-11 PROCEDURE — 94640 AIRWAY INHALATION TREATMENT: CPT

## 2025-02-11 PROCEDURE — 83880 ASSAY OF NATRIURETIC PEPTIDE: CPT

## 2025-02-11 PROCEDURE — 94760 N-INVAS EAR/PLS OXIMETRY 1: CPT

## 2025-02-11 PROCEDURE — 84100 ASSAY OF PHOSPHORUS: CPT

## 2025-02-11 PROCEDURE — 83735 ASSAY OF MAGNESIUM: CPT

## 2025-02-11 PROCEDURE — NC001 PR NO CHARGE

## 2025-02-11 PROCEDURE — 85027 COMPLETE CBC AUTOMATED: CPT

## 2025-02-11 PROCEDURE — 99291 CRITICAL CARE FIRST HOUR: CPT | Performed by: EMERGENCY MEDICINE

## 2025-02-11 PROCEDURE — 93010 ELECTROCARDIOGRAM REPORT: CPT | Performed by: INTERNAL MEDICINE

## 2025-02-11 PROCEDURE — 80048 BASIC METABOLIC PNL TOTAL CA: CPT

## 2025-02-11 PROCEDURE — 99245 OFF/OP CONSLTJ NEW/EST HI 55: CPT

## 2025-02-11 PROCEDURE — 36415 COLL VENOUS BLD VENIPUNCTURE: CPT

## 2025-02-11 PROCEDURE — 94664 DEMO&/EVAL PT USE INHALER: CPT

## 2025-02-11 RX ORDER — ACETAMINOPHEN 325 MG/1
650 TABLET ORAL EVERY 8 HOURS PRN
Status: DISCONTINUED | OUTPATIENT
Start: 2025-02-11 | End: 2025-02-11 | Stop reason: HOSPADM

## 2025-02-11 RX ORDER — METHYLPREDNISOLONE SOD SUCC 125 MG
1 VIAL (EA) INJECTION ONCE
Status: COMPLETED | OUTPATIENT
Start: 2025-02-11 | End: 2025-02-11

## 2025-02-11 RX ORDER — ENOXAPARIN SODIUM 100 MG/ML
40 INJECTION SUBCUTANEOUS DAILY
Status: DISCONTINUED | OUTPATIENT
Start: 2025-02-11 | End: 2025-02-11 | Stop reason: HOSPADM

## 2025-02-11 RX ORDER — MAGNESIUM SULFATE HEPTAHYDRATE 40 MG/ML
2 INJECTION, SOLUTION INTRAVENOUS ONCE
Status: COMPLETED | OUTPATIENT
Start: 2025-02-11 | End: 2025-02-11

## 2025-02-11 RX ORDER — FLUTICASONE PROPIONATE 50 MCG
1 SPRAY, SUSPENSION (ML) NASAL DAILY
Status: CANCELLED | OUTPATIENT
Start: 2025-02-11

## 2025-02-11 RX ORDER — ALBUTEROL SULFATE 0.83 MG/ML
10 SOLUTION RESPIRATORY (INHALATION) ONCE
Status: COMPLETED | OUTPATIENT
Start: 2025-02-11 | End: 2025-02-11

## 2025-02-11 RX ORDER — IPRATROPIUM BROMIDE AND ALBUTEROL SULFATE .5; 3 MG/3ML; MG/3ML
1 SOLUTION RESPIRATORY (INHALATION) ONCE
Status: COMPLETED | OUTPATIENT
Start: 2025-02-11 | End: 2025-02-11

## 2025-02-11 RX ORDER — LEVALBUTEROL INHALATION SOLUTION 1.25 MG/3ML
1.25 SOLUTION RESPIRATORY (INHALATION)
Status: DISCONTINUED | OUTPATIENT
Start: 2025-02-11 | End: 2025-02-11 | Stop reason: HOSPADM

## 2025-02-11 RX ORDER — HYDROXYZINE HYDROCHLORIDE 10 MG/1
10 TABLET, FILM COATED ORAL
Status: DISCONTINUED | OUTPATIENT
Start: 2025-02-11 | End: 2025-02-11 | Stop reason: HOSPADM

## 2025-02-11 RX ORDER — IPRATROPIUM BROMIDE AND ALBUTEROL SULFATE 2.5; .5 MG/3ML; MG/3ML
3 SOLUTION RESPIRATORY (INHALATION) 4 TIMES DAILY
Status: DISCONTINUED | OUTPATIENT
Start: 2025-02-11 | End: 2025-02-11

## 2025-02-11 RX ORDER — PREDNISONE 10 MG/1
10 TABLET ORAL DAILY
Status: DISCONTINUED | OUTPATIENT
Start: 2025-02-12 | End: 2025-02-11 | Stop reason: HOSPADM

## 2025-02-11 RX ORDER — BENZONATATE 100 MG/1
100 CAPSULE ORAL 3 TIMES DAILY PRN
Qty: 30 CAPSULE | Refills: 0 | Status: SHIPPED | OUTPATIENT
Start: 2025-02-11

## 2025-02-11 RX ORDER — PREDNISONE 20 MG/1
20 TABLET ORAL DAILY
Status: COMPLETED | OUTPATIENT
Start: 2025-02-11 | End: 2025-02-11

## 2025-02-11 RX ORDER — AMLODIPINE BESYLATE 5 MG/1
5 TABLET ORAL DAILY
Status: DISCONTINUED | OUTPATIENT
Start: 2025-02-11 | End: 2025-02-11 | Stop reason: HOSPADM

## 2025-02-11 RX ORDER — MONTELUKAST SODIUM 10 MG/1
10 TABLET ORAL
Status: DISCONTINUED | OUTPATIENT
Start: 2025-02-11 | End: 2025-02-11 | Stop reason: HOSPADM

## 2025-02-11 RX ORDER — PANTOPRAZOLE SODIUM 20 MG/1
20 TABLET, DELAYED RELEASE ORAL DAILY
Status: DISCONTINUED | OUTPATIENT
Start: 2025-02-11 | End: 2025-02-11 | Stop reason: HOSPADM

## 2025-02-11 RX ORDER — LORATADINE 10 MG/1
10 TABLET ORAL DAILY
Status: CANCELLED | OUTPATIENT
Start: 2025-02-11

## 2025-02-11 RX ORDER — FOLIC ACID 1 MG/1
1000 TABLET ORAL DAILY
Status: DISCONTINUED | OUTPATIENT
Start: 2025-02-11 | End: 2025-02-11 | Stop reason: HOSPADM

## 2025-02-11 RX ORDER — DROPERIDOL 2.5 MG/ML
1 INJECTION, SOLUTION INTRAMUSCULAR; INTRAVENOUS ONCE
Status: COMPLETED | OUTPATIENT
Start: 2025-02-11 | End: 2025-02-11

## 2025-02-11 RX ORDER — ALBUTEROL SULFATE 2.5 MG/3ML
1 SOLUTION RESPIRATORY (INHALATION) ONCE
Status: COMPLETED | OUTPATIENT
Start: 2025-02-11 | End: 2025-02-11

## 2025-02-11 RX ORDER — HEPARIN SODIUM 5000 [USP'U]/ML
5000 INJECTION, SOLUTION INTRAVENOUS; SUBCUTANEOUS EVERY 8 HOURS SCHEDULED
Status: DISCONTINUED | OUTPATIENT
Start: 2025-02-11 | End: 2025-02-11

## 2025-02-11 RX ORDER — ALBUTEROL SULFATE 90 UG/1
2 INHALANT RESPIRATORY (INHALATION) EVERY 4 HOURS PRN
Status: DISCONTINUED | OUTPATIENT
Start: 2025-02-11 | End: 2025-02-11 | Stop reason: HOSPADM

## 2025-02-11 RX ORDER — BUDESONIDE AND FORMOTEROL FUMARATE DIHYDRATE 80; 4.5 UG/1; UG/1
2 AEROSOL RESPIRATORY (INHALATION) 2 TIMES DAILY
Status: DISCONTINUED | OUTPATIENT
Start: 2025-02-11 | End: 2025-02-11 | Stop reason: HOSPADM

## 2025-02-11 RX ADMIN — MAGNESIUM SULFATE HEPTAHYDRATE 2 G: 40 INJECTION, SOLUTION INTRAVENOUS at 02:01

## 2025-02-11 RX ADMIN — ACETAMINOPHEN 650 MG: 325 TABLET, FILM COATED ORAL at 08:28

## 2025-02-11 RX ADMIN — FOLIC ACID 1000 MCG: 1 TABLET ORAL at 08:26

## 2025-02-11 RX ADMIN — IPRATROPIUM BROMIDE 0.5 MG: 0.5 SOLUTION RESPIRATORY (INHALATION) at 08:19

## 2025-02-11 RX ADMIN — LEVALBUTEROL HYDROCHLORIDE 1.25 MG: 1.25 SOLUTION RESPIRATORY (INHALATION) at 08:19

## 2025-02-11 RX ADMIN — AMOXICILLIN AND CLAVULANATE POTASSIUM 1 TABLET: 875; 125 TABLET, COATED ORAL at 08:26

## 2025-02-11 RX ADMIN — PREDNISONE 20 MG: 20 TABLET ORAL at 08:26

## 2025-02-11 RX ADMIN — AMLODIPINE BESYLATE 5 MG: 5 TABLET ORAL at 08:26

## 2025-02-11 RX ADMIN — Medication 1000 UNITS: at 08:26

## 2025-02-11 RX ADMIN — ENOXAPARIN SODIUM 40 MG: 40 INJECTION SUBCUTANEOUS at 08:26

## 2025-02-11 RX ADMIN — ALBUTEROL SULFATE 10 MG: 2.5 SOLUTION RESPIRATORY (INHALATION) at 02:02

## 2025-02-11 RX ADMIN — PANTOPRAZOLE SODIUM 20 MG: 20 TABLET, DELAYED RELEASE ORAL at 08:26

## 2025-02-11 NOTE — DISCHARGE SUMMARY
DISCHARGE SUMMARY - Family Medicine Residency, Rajiv Yi 1945, 79 y.o. female.  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    * Asthma exacerbation  Assessment & Plan  79 year old female with multiple previous ED visits for Shortness of Breath, asthma exacerbation presenting today for concern of same. In this episode, was first seen by emergency department on 2/04 for asthma exacerbation with symptomatic improvement via duoneb and decadron. Discharged with OP follow up and evaluated by Pulmonology on 2/06/25, who started patient on antibiotics for OM, Steroid Taper transitioned from Decadron to 8d of Prednisone (Currently day 6/8), and given duonebs to use at home with close follow up. Patient seen by PCP yesterday and noted to be overall improving as compared to prior, but patient continues to endorse feeling unwell. Continues to endorse severe exertional dyspnea as primary concern. Associated with chronic cough, nausea. Denies chest pain, fever, or bowel/bladder issue. Notably, exertional dyspnea has been primary presenting complaint for last few ED presentations, has needed oxygen for comfort but not for hypoxia. Worry this may be new baseline for patient. Nonetheless, will admit for continued respiratory treatment and supportive care.    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    Plan  Maintain adequate Hydration  Avoid Nephrotoxic medications  Monitor AM BMP    Anxiety  Assessment & Plan  Patient without any daily controller medication for anxiety.  Nightly Atarax 10mg Qhs PRN.    GERD (gastroesophageal reflux disease)  Assessment & Plan  Continue Home Protonix 20mg QD    Essential hypertension  Assessment & Plan  - 24H SBP Systolic (24hrs), Av , Min:138 , Max:138     Patient Blood Pressure well controlled with 5mg Amlodipine QD.  Continue Home Amlodipine 5mg    Chronic allergic rhinitis  Assessment & Plan  Patient follows with outpatient ENT. Home Medications include Symbicort, Flonase, Allegra  Continue Symbicort, Flonase  Sub Claritin 10mg for Allegra 180mg.     Tobacco abuse-resolved as of 2025  Assessment & Plan  Patient with history of tobacco abuse.   Smoking Cessation Counseling  Nicotine patch available if desired        Patient Active Problem List   Diagnosis    Rupture of ulnar collateral ligament of right thumb    Primary osteoarthritis of first carpometacarpal joint of right hand    Other chronic sinusitis    Cigarette nicotine dependence without complication    Chronic allergic rhinitis    Trigger finger, right index finger    Panic attack    Essential hypertension    Breast cyst, right    Classic migraine with aura    GERD (gastroesophageal reflux disease)    Renal cyst    Trigger finger of right thumb    Abnormal EKG    Prediabetes    Intermittent palpitations     "Trigger finger, right little finger    Intersection syndrome    Polyarthritis with positive rheumatoid factor (HCC)    Chronic left-sided low back pain with left-sided sciatica    Irritable bowel syndrome with diarrhea    Anxiety    Age related osteoporosis    Hypovitaminosis D    Chronic pain of both shoulders    External hemorrhoid    Asthma exacerbation    Leg cramping    Stage 3b chronic kidney disease (HCC)    Tendinitis of right rotator cuff    Trochanteric bursitis of left hip    Watery eyes    Screening for lung cancer    Financial difficulties    Ambulatory dysfunction    Vision abnormalities    Retina disorder    Goiter    Thyroid nodule    Opacity noted on imaging study    Mild persistent asthma without complication    Emphysema lung (HCC)    Osteoarthritis of spine with radiculopathy, lumbar region    Spinal stenosis of lumbar region    Spinal stenosis of lumbar region, unspecified whether neurogenic claudication present    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)    Left leg swelling    Left ankle sprain    Ankle weakness    Ankle stiffness, left    Closed avulsion fracture of lateral malleolus of left fibula, initial encounter    Acute left ankle pain    Lumbar radiculopathy    Closed fracture of proximal lateral malleolus of ankle with routine healing    RUQ pain    Stage 3a chronic kidney disease (HCC)    Benign hypertension with chronic kidney disease, stage III (HCC)    Anemia    Lumbar spondylosis    COVID-19 virus infection    Moderate persistent asthma, unspecified whether complicated         HPI (per admission H&P note on 2/11/25)     HPI:   \"Mireya Yi is a 79 y.o. female with a PMHx of moderate persistent asthma, pulmonary emphysema, previous tobacco use of 20-30 years presents with shortness of breath requiring oxygen supplementation and wheezing. Patient reports increased work of breathing began since she fell ill with Covid in " "January. During that time, patient had an outpatient pulmonology visit for acute asthma exacerbation in setting of viral and chronic sinusitis on 01/15/2025. Per the Pulmonology note, patient was put on a steroid taper and nebulizer treatments. Patient unfortunately presented to the ED the following day, 1/16 for SOB/wheezing and was found to be Covid positive. She improved with albuterol, atrovent, zofran, and fluids and was sent home. Patient re-presented on 1/18 with her daughter for similar symptoms. At the time, CXR was negative, patient was put on respiratory protocol and steroid taper was continued until discharge. Patient most recently presented to the ED on 02/04 for SOB. Patient saw outpatient pulmonology on 02/06 and was started on a 8-day prednisone taper and continued on DuoNebs. During this time, patient was also prescribed Augmentin for acute otitis media of her left ear. Patient reports she has been appropriately taking prescribed medications.      At baseline patient is on Symbicort, albuterol PRN and Singulair. Patient reports yesterday she took her scheduled asthma medications along with steroid taper and 2 albuterol nebs without much improvement in her SOB. Patient is accompanied by daughter who states pt becomes SOB with walking around. Upon examination, patient's pulmonary effort is normal and she is not visibly in distress.      ED Management:   Patient given albuterol nebs and magnesium sulfate 2g IV. Chest x-ray shows no evidence of pneumonia.\"      HOSPITAL COURSE:     Hospital Course:   79 y.o. female admitted on 2/11/2025 for concern for SOB. Vital signs remained stable throughout hospital course.  Patient did not require any additional oxygen supplementation.  Patient did have a cough that started this morning, however denies any dyspnea, orthopnea, sore throat. CXR showed no cardiopulmonary changes. Pt had recently saw pulmonology and PCP; she was continued on her prednisone taper and " duoneb.  Per patient, she has been taking her medications as prescribed.    On 02/11/25, HD# 0, pt remains stable and is medically cleared for discharge home.       COMPLICATIONS:     Complications: NONE       PROCEDURES:     Procedures Performed:   No orders of the defined types were placed in this encounter.        SIGNIFICANT FINDINGS / ABNORMAL RESULTS:     Significant Findings/Abnormal Results with this admission:  None    No results found.      VITALS ON DISCHARGE DATE:     Vitals  Blood Pressure: 138/77 (02/11/25 0830)  Temperature: 98.2 °F (36.8 °C) (02/11/25 0109)  Temp Source: Oral (02/11/25 0109)  Pulse: (!) 112 (02/11/25 0830)  Respirations: 22 (02/11/25 0830)  SpO2: 98 % (02/11/25 0830)    Temp:  [98.2 °F (36.8 °C)] 98.2 °F (36.8 °C)  HR:  [112-120] 112  BP: (134-138)/(76-78) 138/77  Resp:  [20-22] 22  SpO2:  [94 %-98 %] 98 %  O2 Device: Nasal cannula  Nasal Cannula O2 Flow Rate (L/min):  [2 L/min] 2 L/min    Weight (last 2 days) before discharge       None              Intake/Output Summary (Last 24 hours) at 2/11/2025 1141  Last data filed at 2/11/2025 0241  Gross per 24 hour   Intake 150 ml   Output --   Net 150 ml       Invasive Devices       None                     PHYSICAL EXAM ON DAY OF DISCHARGE:     Physical Exam:     General: Pt observed lying comfortably in bed, NAD. Not toxic/ill-appearing. No cachectic or diaphoresis. No obvious sign of trauma or bleeding.  Psych: AAOx4, able to converse appropriately.   Neuro: no gross neurological deficits  Head: atraumatic, normocephalic.  Eyes: open spontaneously, EOM intact, MAIRA, conjunctiva non-injected, no scleral icterus, no discharge.  Ear: normal external ear, no visible drainage at external auditory orifice  Nose: clear, no epistaxis, no rhinorrhea.  Throat: clear, no hoarse voice, no cough.  Neck: supple, normal ROM.  Heart: RRR, no murmur/distant heart sound appreciated.  Lungs: Mild bilateral expiratory wheezes, no crackles, nml respiratory  effort, no agonal/labored breathing, no accessory muscle use.  Abdomen: soft, nontender, nondistended, normal bowel sound  Extremities: +4 strengths b/l. Strong radial/pedal pulses. No weakness/paresthesia/edema.      CONDITION AT DISCHARGE:   On day of discharge patient is seen and evaluated at bedside. Patient is stable and without concern. Patient denies any pain or SOB. Patient able to tolerate PO food without N/V/D and had bowel movement and baseline urine output. Patient able to ambulate independently without assistance. Patient is aware of current health status and understand plan of treatment and outpatient follow-up. The patient understood and agreed with the plan. All pertinent lab results, imaging studies, procedures, and/or any incidental findings have been disclosed to the patient. All pertinent questions are answered to patient's satisfaction. On day of discharge, the patient was hemodynamically stable and appropriate for discharge home.    Condition at Discharge: good       DISCHARGE MEDICATIONS:     Discharge Medications:  See after visit summary (AVS) for detailed reconciled discharge medications, which was provided to patient and family. Summary of medication changes made with this admission:      RESUME:  All other home medications       FOLLOW-UP APPOINTMENTS / INSTRUCTION :     Important Physician Related Follow Up:   PCP  Pulmonology    Appointment confirmed:  Future Appointments   Date Time Provider Department Center   2/25/2025  7:00 AM EILEEN Ma [facility]   2/26/2025  9:00 AM [facility]   4/14/2025 11:00 AM BE DEXA SHA SLN 1 BE SLN Dexa BE [place]   4/21/2025  9:30 AM Carter Cortez MD ORTHO [facility]   5/12/2025  9:30 AM Elham Saleem MD ELIOT FP [facility]   6/2/2025  9:50 AM Elham Saleem MD ELIOT FP [facility]   7/31/2025  4:00 PM Joselyn Reyes Baham[place], MD NEPH [facility]   12/15/2025 11:00 AM BE MAMMO SLN 1 BE SLN  "Mammo BE [facility]       Discharge instructions/Information to patient and family:   See after visit summary (AVS) for information provided to patient and family.      Provisions for Follow-Up Care:  See after visit summary for information related to follow-up care and any pertinent home health orders.        DISPOSITION:     Disposition: Home    Discharge Statement   I spent 30  minutes discharging the patient. This time was spent on the day of discharge. I had direct contact with the patient on the day of discharge. Additional documentation is required if more than 30 minutes were spent on discharge.     Planned Readmission: No        Gaviota Novak DO  PGY-2, Family Medicine  02/11/25  11:41 AM    Dear reader, please be aware that portions of my note contain dictated text. I have done my best to proof-read this note prior to signing. However, there may be occasional unnoticed errors pertaining to \"sound-alike\" words and/or grammar during my dictation process. If there is any words or information that is unclear or appears erroneous, please kindly let me know and I will clarify and/or addend my notes accordingly. Thank you for your understanding.    "

## 2025-02-11 NOTE — ASSESSMENT & PLAN NOTE
Patient with history of tobacco abuse.   Smoking Cessation Counseling  Nicotine patch available if desired

## 2025-02-11 NOTE — RESPIRATORY THERAPY NOTE
02/11/25 0551   Respiratory Protocol   Protocol Initiated? Yes   Protocol Selection Respiratory   Language Barrier? Yes   Medical & Social History Reviewed? Yes   Diagnostic Studies Reviewed? Yes   Physical Assessment Performed? Yes   Respiratory Plan Home Bronchodilator Patient pathway   Respiratory Assessment   Assessment Type Assess only   General Appearance Awake   Respiratory Pattern Symmetrical   Chest Assessment Chest expansion symmetrical   Bilateral Breath Sounds Diminished;Expiratory wheezes   Resp Comments Pt present to the Ed with asthma exacerbation. Pt has a h/o copd and takes albuterol at home. Bs are diminidhed with experitory wheezing. Pt is on room air at this time with saturation of 99%.I will place pt on respirstory protocol for home broncohodilator pathway and order xopenex and atrovent tid..

## 2025-02-11 NOTE — H&P
H&P Exam - Mireya Yi 79 y.o. female MRN: [id number]  Unit/Bed#: ED 20 Encounter: [id number]  Admission status: Observation    DATE: 2025  TIME: 5:41 AM  Primary Care Physician: Elham Saleem MD  Admitting Provider: Tad Booker MD    Patient is a 79 y.o. 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    Plan  Maintain adequate Hydration  Avoid Nephrotoxic medications  Monitor AM BMP    Tobacco abuse  Assessment & Plan  Patient with history of tobacco abuse.   Smoking Cessation Counseling  Nicotine patch available if desired    Anxiety  Assessment & Plan  Patient without any daily controller medication for anxiety.  Nightly Atarax 10mg Qhs PRN.    GERD (gastroesophageal reflux disease)  Assessment & Plan  Continue Home Protonix 20mg QD    Essential hypertension  Assessment & Plan  - 24H SBP Systolic (24hrs), Av , Min:138 , Max:138     Patient Blood Pressure well controlled with 5mg Amlodipine QD.  Continue Home Amlodipine 5mg    Chronic allergic rhinitis  Assessment & Plan  Patient follows with outpatient ENT. Home Medications include Symbicort, Flonase, Allegra  Continue Symbicort, Flonase  Sub Claritin 10mg for Allegra 180mg.     * Asthma exacerbation  Assessment & Plan  79 year old female with multiple previous ED visits for Shortness of Breath, asthma exacerbation presenting today for concern of same. In this episode, was first seen by emergency department on  for asthma exacerbation with symptomatic improvement via duoneb and decadron. Discharged with OP follow up and evaluated by Pulmonology on 25,  who started patient on antibiotics for OM, Steroid Taper transitioned from Decadron to 8d of Prednisone (Currently day 6/8), and given duonebs to use at home with close follow up. Patient seen by PCP yesterday and noted to be overall improving as compared to prior, but patient continues to endorse feeling unwell. Continues to endorse severe exertional dyspnea as primary concern. Associated with chronic cough, nausea. Denies chest pain, fever, or bowel/bladder issue. Notably, exertional dyspnea has been primary presenting complaint for last few ED presentations, has needed oxygen for comfort but not for hypoxia. Worry this may be new baseline for patient. Nonetheless, will admit for continued respiratory treatment and supportive care.  Plan  Admit for Observation, Supportive Care  Supplemental O2, Maintain Saturation above 92% while Awake, 88% while asleep  Patient S/P 1x Solumedrol injection via EMS  Continue Prednisone Taper at current dosing  Respiratory protocol  Duo Nebs Q6 PRN  Consider IP Pulmonology consult        Diet:     Code Status: Level 1 - Full Code  POLST:    VTE Prophylaxis: Enoxaparin (Lovenox) sequential compression device  Admission Status: OBSERVATION  Dispo:    History of Present Illness     HPI:    Mireya Yi is a 79 y.o. female with a PMHx of moderate persistent asthma, pulmonary emphysema, previous tobacco use of 20-30 years presents with shortness of breath requiring oxygen supplementation and wheezing. Patient reports increased work of breathing began since she fell ill with Covid in January. During that time, patient had an outpatient pulmonology visit for acute asthma exacerbation in setting of viral and chronic sinusitis on 01/15/2025. Per the Pulmonology note, patient was put on a steroid taper and nebulizer treatments. Patient unfortunately presented to the ED the following day, 1/16 for SOB/wheezing and was found to be Covid positive. She improved with albuterol, atrovent, zofran,  and fluids and was sent home. Patient re-presented on 1/18 with her daughter for similar symptoms. At the time, CXR was negative, patient was put on respiratory protocol and steroid taper was continued until discharge. Patient most recently presented to the ED on 02/04 for SOB. Patient saw outpatient pulmonology on 02/06 and was started on a 8-day prednisone taper and continued on DuoNebs. During this time, patient was also prescribed Augmentin for acute otitis media of her left ear. Patient reports she has been appropriately taking prescribed medications.     At baseline patient is on Symbicort, albuterol PRN and Singulair. Patient reports yesterday she took her scheduled asthma medications along with steroid taper and 2 albuterol nebs without much improvement in her SOB. Patient is accompanied by daughter who states pt becomes SOB with walking around. Upon examination, patient's pulmonary effort is normal and she is not visibly in distress.     ED Management:   Patient given albuterol nebs and magnesium sulfate 2g IV. Chest x-ray shows no evidence of pneumonia.    Review of Systems   Constitutional:  Negative for chills and fever.   HENT:  Negative for ear pain and sore throat.    Eyes:  Negative for pain and visual disturbance.   Respiratory:  Positive for cough (at baseline) and shortness of breath.    Cardiovascular:  Negative for chest pain and palpitations.   Gastrointestinal:  Positive for nausea. Negative for abdominal pain and vomiting.   Genitourinary:  Negative for dysuria and hematuria.   Musculoskeletal:  Negative for arthralgias and back pain.   Skin:  Negative for color change and rash.   Neurological:  Negative for seizures and syncope.   All other systems reviewed and are negative.      Historical Information   Past Medical History:   Diagnosis Date    Asthma     Asthmatic bronchitis     Last Assessed: 10/7/2014     Chronic diarrhea     Last Assessed: 8/20/2015     Fibromyalgia     Focal nodular  "hyperplasia of liver     Last Assessed: 6/11/2015    Herpes zoster     Last Assessed: 3/18/2016    Hypertension     IBS (irritable bowel syndrome)     Intermittent palpitations     Irritable bowel syndrome     Lumbar radiculopathy     Osteoarthritis     Vertigo      Past Surgical History:   Procedure Laterality Date    BREAST BIOPSY      BREAST LUMPECTOMY      when pt was 18    SMALL INTESTINE SURGERY       Social History   Social History     Substance and Sexual Activity   Alcohol Use Not Currently     Social History     Substance and Sexual Activity   Drug Use No     Social History     Tobacco Use   Smoking Status Some Days    Types: Cigarettes   Smokeless Tobacco Never   Tobacco Comments    Stopped smoking July 2023     Family History:   Family History   Problem Relation Age of Onset    Heart attack Mother     Asthma Father     Breast cancer Sister     Breast cancer Sister     No Known Problems Daughter     No Known Problems Daughter     Arthritis Family     Osteoporosis Family        Meds/Allergies   all medications and allergies reviewed  Allergies   Allergen Reactions    Ambrosia Artemisiifolia (Ragweed) Skin Test Facial Swelling    Codeine Other (See Comments)     Heart races    Epinephrine      Pt reports \"it makes my heart race, they told me I cant take it.\"    Ibuprofen Other (See Comments)     Heart racing    Morphine Other (See Comments)     Heart racing    Pollen Extract Sneezing    Tramadol Other (See Comments)     Heart racing       Objective   Vitals: Blood pressure 138/76, pulse (!) 115, temperature 98.2 °F (36.8 °C), temperature source Oral, resp. rate 20, SpO2 98%.      Intake/Output Summary (Last 24 hours) at 2/11/2025 0541  Last data filed at 2/11/2025 0241  Gross per 24 hour   Intake 150 ml   Output --   Net 150 ml       Invasive Devices       Peripheral Intravenous Line  Duration             Peripheral IV 02/11/25 Left Antecubital <1 day                    Physical Exam  Constitutional:       " "General: She is not in acute distress.     Appearance: She is not ill-appearing.   HENT:      Head: Normocephalic and atraumatic.      Mouth/Throat:      Mouth: Mucous membranes are moist.      Pharynx: Oropharynx is clear.   Eyes:      Conjunctiva/sclera: Conjunctivae normal.   Cardiovascular:      Rate and Rhythm: Normal rate and regular rhythm.      Pulses: Normal pulses.      Heart sounds: Normal heart sounds. No murmur heard.  Pulmonary:      Effort: Pulmonary effort is normal. No respiratory distress.      Breath sounds: Wheezing present.   Abdominal:      General: Abdomen is flat. There is no distension.      Palpations: Abdomen is soft.   Musculoskeletal:         General: No swelling.   Skin:     General: Skin is warm and dry.   Neurological:      General: No focal deficit present.      Mental Status: She is alert. Mental status is at baseline.         Lab Results:   Results Review Statement: I reviewed radiology reports from this admission including: chest xray.    Recent Results (from the past 24 hours)   ECG 12 lead    Collection Time: 02/11/25  1:12 AM   Result Value Ref Range    Ventricular Rate 109 BPM    Atrial Rate 109 BPM    TN Interval 116 ms    QRSD Interval 70 ms    QT Interval 310 ms    QTC Interval 417 ms    P Axis 86 degrees    QRS Axis 1 degrees    T Wave Axis 65 degrees   Basic metabolic panel    Collection Time: 02/11/25  2:13 AM   Result Value Ref Range    Sodium 143 135 - 147 mmol/L    Potassium 4.0 3.5 - 5.3 mmol/L    Chloride 113 (H) 96 - 108 mmol/L    CO2 21 21 - 32 mmol/L    ANION GAP 9 4 - 13 mmol/L    BUN 22 5 - 25 mg/dL    Creatinine 1.21 0.60 - 1.30 mg/dL    Glucose 163 (H) 65 - 140 mg/dL    Calcium 8.5 8.4 - 10.2 mg/dL    eGFR 42 ml/min/1.73sq m   HS Troponin 0hr (reflex protocol)    Collection Time: 02/11/25  2:13 AM   Result Value Ref Range    hs TnI 0hr 13 \"Refer to ACS Flowchart\"- see link ng/L   CBC and differential    Collection Time: 02/11/25  2:50 AM   Result Value Ref " "Range    WBC 13.58 (H) 4.31 - 10.16 Thousand/uL    RBC 3.93 3.81 - 5.12 Million/uL    Hemoglobin 10.7 (L) 11.5 - 15.4 g/dL    Hematocrit 33.4 (L) 34.8 - 46.1 %    MCV 85 82 - 98 fL    MCH 27.2 26.8 - 34.3 pg    MCHC 32.0 31.4 - 37.4 g/dL    RDW 17.5 (H) 11.6 - 15.1 %    MPV 10.8 8.9 - 12.7 fL    Platelets 356 149 - 390 Thousands/uL    nRBC 0 /100 WBCs    Segmented % 87 (H) 43 - 75 %    Immature Grans % 1 0 - 2 %    Lymphocytes % 9 (L) 14 - 44 %    Monocytes % 3 (L) 4 - 12 %    Eosinophils Relative 0 0 - 6 %    Basophils Relative 0 0 - 1 %    Absolute Neutrophils 11.78 (H) 1.85 - 7.62 Thousands/µL    Absolute Immature Grans 0.19 0.00 - 0.20 Thousand/uL    Absolute Lymphocytes 1.17 0.60 - 4.47 Thousands/µL    Absolute Monocytes 0.41 0.17 - 1.22 Thousand/µL    Eosinophils Absolute 0.00 0.00 - 0.61 Thousand/µL    Basophils Absolute 0.03 0.00 - 0.10 Thousands/µL     Blood Culture:   Lab Results   Component Value Date    BLOODCX No Growth After 5 Days. 04/26/2023   ,   Urinalysis:   Lab Results   Component Value Date    COLORU Colorless 01/18/2025    CLARITYU Clear 01/18/2025    SPECGRAV 1.034 (H) 01/18/2025    PHUR 5.5 01/18/2025    PHUR 5.0 07/28/2023    LEUKOCYTESUR Negative 01/18/2025    NITRITE Negative 01/18/2025    GLUCOSEU Negative 01/18/2025    KETONESU Negative 01/18/2025    BILIRUBINUR Negative 01/18/2025    BLOODU Negative 01/18/2025   ,   Urine Culture: No results found for: \"URINECX\",   Wound Culure: No results found for: \"WOUNDCULT\"    Imaging:   EKG, Pathology, and Other Studies: Results Review Statement: I reviewed radiology reports from this admission including: EKG.      Cristal Cowart DO  PGY-1 [facility]   "

## 2025-02-11 NOTE — ED ATTENDING ATTESTATION
2/11/2025  I, Shai Neumann DO, saw and evaluated the patient. I have discussed the patient with the resident/non-physician practitioner and agree with the resident's/non-physician practitioner's findings, Plan of Care, and MDM as documented in the resident's/non-physician practitioner's note, except where noted. All available labs and Radiology studies were reviewed.  I was present for key portions of any procedure(s) performed by the resident/non-physician practitioner and I was immediately available to provide assistance.       At this point I agree with the current assessment done in the Emergency Department.  I have conducted an independent evaluation of this patient a history and physical is as follows:    Patient is a 79-year-old female history of moderate persistent asthma, pulmonary emphysema, previous tobacco use, 20 to 30-year pack year history, quit in 2023, presents for persistent wheezing.  Patient having wheezing and cough, seen in January, treated with prednisone and doxycycline by pulmonary with return to baseline, about a week and a half ago she had some nasal congestion, sinus congestion, feeling ears were more clogged.  Had increased wheezing.  Seen in the ED on February 4, given 2 DuoNebs, felt better, discharged on Decadron, which she did not get filled because it was not available at her pharmacy.  She was then seen for follow-up on February 6 that pulmonary, felt to have otitis media, placed on Augmentin, placed on prednisone 40 mg daily for 2 days then taper down, DuoNeb and continue on Symbicort and albuterol.  She says since then she has been having some slight improvement but still wheezy, got more wheezy tonight.  Denies chest pain, denies orthopnea, denies fevers, denies chills, denies syncope.  Her daughter who accompanies her indicates she is otherwise been acting okay.  She called EMS tonight who gave her 2 DuoNeb's, Solu-Medrol IV and droperidol IV for some nausea that she was  having.  She says her nausea has resolved.    General:  Patient is well-appearing  Head:  Atraumatic  Eyes:  Conjunctiva pink  ENT:  Mucous membranes are moist  Neck:  Supple  Cardiac:  S1-S2, without murmurs  Lungs: Mild diffuse wheezing, no rhonchi, no accessory muscle usage  Abdomen:  Soft, nontender, normal bowel sounds, no CVA tenderness, no tympany, no rigidity, no guarding  Extremities:  Normal range of motion, no pedal edema or calf asymmetry, radial pulses are equal and symmetric bilaterally  Neurologic:  Awake, fluent speech, normal comprehension, AAOx3  Skin:  Pink warm and dry  Psychiatric:  Alert, pleasant, cooperative      ED Course     ECG interpreted by me, sinus rhythm, rate of 109, no acute ST segment elevation or depression, no acute ischemic or infarctive changes.  Centrally unchanged from February 4, 2025 ECG except for that ECG had some nonspecific lateral T wave changes which are not present today.    XR chest 2 views   ED Interpretation   2 view chest x-ray interpreted me shows no infiltrate, no effusion, no acute cardiopulmonary disease, no acute change from February 4, 2025        Labs Reviewed   CBC AND DIFFERENTIAL - Abnormal       Result Value Ref Range Status    WBC 13.58 (*) 4.31 - 10.16 Thousand/uL Final    RBC 3.93  3.81 - 5.12 Million/uL Final    Hemoglobin 10.7 (*) 11.5 - 15.4 g/dL Final    Hematocrit 33.4 (*) 34.8 - 46.1 % Final    MCV 85  82 - 98 fL Final    MCH 27.2  26.8 - 34.3 pg Final    MCHC 32.0  31.4 - 37.4 g/dL Final    RDW 17.5 (*) 11.6 - 15.1 % Final    MPV 10.8  8.9 - 12.7 fL Final    Platelets 356  149 - 390 Thousands/uL Final    nRBC 0  /100 WBCs Final    Segmented % 87 (*) 43 - 75 % Final    Immature Grans % 1  0 - 2 % Final    Lymphocytes % 9 (*) 14 - 44 % Final    Monocytes % 3 (*) 4 - 12 % Final    Eosinophils Relative 0  0 - 6 % Final    Basophils Relative 0  0 - 1 % Final    Absolute Neutrophils 11.78 (*) 1.85 - 7.62 Thousands/µL Final    Absolute Immature  "Grans 0.19  0.00 - 0.20 Thousand/uL Final    Absolute Lymphocytes 1.17  0.60 - 4.47 Thousands/µL Final    Absolute Monocytes 0.41  0.17 - 1.22 Thousand/µL Final    Eosinophils Absolute 0.00  0.00 - 0.61 Thousand/µL Final    Basophils Absolute 0.03  0.00 - 0.10 Thousands/µL Final   BASIC METABOLIC PANEL - Abnormal    Sodium 143  135 - 147 mmol/L Final    Potassium 4.0  3.5 - 5.3 mmol/L Final    Chloride 113 (*) 96 - 108 mmol/L Final    CO2 21  21 - 32 mmol/L Final    ANION GAP 9  4 - 13 mmol/L Final    BUN 22  5 - 25 mg/dL Final    Creatinine 1.21  0.60 - 1.30 mg/dL Final    Comment: Standardized to IDMS reference method    Glucose 163 (*) 65 - 140 mg/dL Final    Comment: If the patient is fasting, the ADA then defines impaired fasting glucose as > 100 mg/dL and diabetes as > or equal to 123 mg/dL.    Calcium 8.5  8.4 - 10.2 mg/dL Final    eGFR 42  ml/min/1.73sq m Final    Narrative:     National Kidney Disease Foundation guidelines for Chronic Kidney Disease (CKD):     Stage 1 with normal or high GFR (GFR > 90 mL/min/1.73 square meters)    Stage 2 Mild CKD (GFR = 60-89 mL/min/1.73 square meters)    Stage 3A Moderate CKD (GFR = 45-59 mL/min/1.73 square meters)    Stage 3B Moderate CKD (GFR = 30-44 mL/min/1.73 square meters)    Stage 4 Severe CKD (GFR = 15-29 mL/min/1.73 square meters)    Stage 5 End Stage CKD (GFR <15 mL/min/1.73 square meters)  Note: GFR calculation is accurate only with a steady state creatinine   HS TROPONIN I 0HR - Normal    hs TnI 0hr 13  \"Refer to ACS Flowchart\"- see link ng/L Final    Comment:                                              Initial (time 0) result  If >=50 ng/L, Myocardial injury suggested ;  Type of myocardial injury and treatment strategy  to be determined.  If 5-49 ng/L, a delta result at 2 hours and or 4 hours will be needed to further evaluate.  If <4 ng/L, and chest pain has been >3 hours since onset, patient may qualify for discharge based on the HEART score in the ED.  If " <5 ng/L and <3hours since onset of chest pain, a delta result at 2 hours will be needed to further evaluate.    HS Troponin 99th Percentile URL of a Health Population=12 ng/L with a 95% Confidence Interval of 8-18 ng/L.    Second Troponin (time 2 hours)  If calculated delta >= 20 ng/L,  Myocardial injury suggested ; Type of myocardial injury and treatment strategy to be determined.  If 5-49 ng/L and the calculated delta is 5-19 ng/L, consult medical service for evaluation.  Continue evaluation for ischemia on ecg and other possible etiology and repeat hs troponin at 4 hours.  If delta is <5 ng/L at 2 hours, consider discharge based on risk stratification via the HEART score (if in ED), or BYRON risk score in IP/Observation.    HS Troponin 99th Percentile URL of a Health Population=12 ng/L with a 95% Confidence Interval of 8-18 ng/L.   HS TROPONIN I 2HR       Patient presents with significant wheezing even after EMS breathing treatment, given 1 hour albuterol, IV magnesium, on reassessment patient feeling a little better, still had slight wheezing.  Additional steroids not given due to steroids given by given by EMS.    Chest x-ray shows no evidence of pneumonia, laboratory studies showed no evidence of life-threatening metabolic abnormality, doubt ACS, 2-hour troponin pending.  Given the patient's need for significant breathing treatment including 1 hour albuterol as well as IV magnesium, without complete resolution of symptoms, believe patient is having worsening asthma exacerbation versus COPD, and would benefit from admission.  Case discussed with admitting medicine physician        The patient was evaluated for sepsis in the emergency department. After that assessment, at the time of admission, there was no evidence of severe sepsis or septic shock or indication that the patient should be included in the SEP-1 CMS quality measure.    DIAGNOSIS:  Acute asthma exacerbation,    MEDICAL DECISION MAKING  CODING    COLLECTION AND INTERPRETATION OF DATA  I reviewed prior external notes, including February 6, 2025 pulmonary office visit    I ordered each unique test  Tests reviewed personally by me:  ECG: See my ED course  Labs: See above  Imaging: I independently interpreted the chest x-ray as noted above.    Critical Care Time Statement: Upon my evaluation, this patient had a high probability of imminent or life-threatening deterioration due to severe asthma exacerbation requiring 1 hour albuterol nebulizer and IV magnesium, which required my direct attention, intervention, and personal management.  I spent a total of 33 minutes directly providing critical care services, including interpretation of complex medical databases, evaluating for the presence of life-threatening injuries or illnesses, complex medical decision making (to support/prevent further life-threatening deterioration)., interpretation of hemodynamic data, and IV magnesium and 1 hour breathing treatment . This time is exclusive of procedures, teaching, treating other patients, family meetings, and any prior time recorded by providers other than myself.        Critical Care Time  Procedures

## 2025-02-11 NOTE — ASSESSMENT & PLAN NOTE
Lab Results   Component Value Date    EGFR 42 02/11/2025    EGFR 45 02/04/2025    EGFR 37 01/19/2025    CREATININE 1.21 02/11/2025    CREATININE 1.15 02/04/2025    CREATININE 1.35 (H) 01/19/2025   Creatinine remains at baseline 1-1.2.    Plan  Maintain adequate Hydration  Avoid Nephrotoxic medications  Monitor AM BMP

## 2025-02-11 NOTE — ASSESSMENT & PLAN NOTE
- 24H SBP Systolic (24hrs), Av , Min:138 , Max:138     Patient Blood Pressure well controlled with 5mg Amlodipine QD.  Continue Home Amlodipine 5mg

## 2025-02-11 NOTE — ASSESSMENT & PLAN NOTE
79 year old female with multiple previous ED visits for Shortness of Breath, asthma exacerbation presenting today for concern of same. In this episode, was first seen by emergency department on 2/04 for asthma exacerbation with symptomatic improvement via duoneb and decadron. Discharged with OP follow up and evaluated by Pulmonology on 2/06/25, who started patient on antibiotics for OM, Steroid Taper transitioned from Decadron to 8d of Prednisone (Currently day 6/8), and given duonebs to use at home with close follow up. Patient seen by PCP yesterday and noted to be overall improving as compared to prior, but patient continues to endorse feeling unwell. Continues to endorse severe exertional dyspnea as primary concern. Associated with chronic cough, nausea. Denies chest pain, fever, or bowel/bladder issue. Notably, exertional dyspnea has been primary presenting complaint for last few ED presentations, has needed oxygen for comfort but not for hypoxia. Worry this may be new baseline for patient. Nonetheless, will admit for continued respiratory treatment and supportive care.    Plan  Admit for Observation, Supportive Care  Supplemental O2, Maintain Saturation above 92% while Awake, 88% while asleep  Patient S/P 1x Solumedrol injection via EMS  Continue Prednisone Taper at current dosing  Respiratory protocol; continue duoneb TID

## 2025-02-11 NOTE — ASSESSMENT & PLAN NOTE
Patient follows with outpatient ENT. Home Medications include Symbicort, Flonase, Allegra  Continue Symbicort, Flonase  Sub Claritin 10mg for Allegra 180mg.

## 2025-02-11 NOTE — ED PROVIDER NOTES
Time reflects when diagnosis was documented in both MDM as applicable and the Disposition within this note       Time User Action Codes Description Comment    2/11/2025  4:07 AM Barbara Perez Add [R06.00] Dyspnea     2/11/2025  4:07 AM Barbara Perez Add [J45.901] Asthma exacerbation           ED Disposition       ED Disposition   Admit    Condition   Stable    Date/Time   Tue Feb 11, 2025  4:07 AM    Comment   --             Assessment & Plan       Medical Decision Making  Amount and/or Complexity of Data Reviewed  Labs: ordered. Decision-making details documented in ED Course.  Radiology: ordered and independent interpretation performed.    Risk  Prescription drug management.  Decision regarding hospitalization.    Patient is a 79 y.o. female with PMH of asthma who presents to the ED with difficulty breathing.    Vital signs tachycardic otherwise stable. On exam diffuse wheezing, patient speaking in short sentences.    History and physical exam most consistent with asthma exacerbation. However, differential diagnosis included but not limited to electrolyte abnormality, anemia, pneumonia.     Plan: basic labs, CXR. Will give IV magnesium and additional albuterol nebulizers    Procedure Note: EKG  Interpreted by: Barbara Perez  Indications / Diagnosis: shortness of breath  ECG reviewed by me, the ED Provider: yes   The EKG demonstrates:  Rate: 109  Rhythm: sinus tachycardia  Intervals: regular  Axis: left  ST Changes: No acute ST Changes, no STD/CHRISTOPHER.  Compared to prior EKG, no acute change.       View ED course for further discussion on patient workup.     On review of previous records reviewed FM note from yesterday - patient evaluated for moderate persistent asthma - recommended to continue antibiotics and prednisone.    All labs reviewed and utilized in the medical decision making process  All radiology studies independently viewed by me and interpreted by the radiologist.  I reviewed all testing with the  "patient.     Upon re-evaluation patient with continued wheezing, had shortness of breath with ambulating to the bathroom and was placed on oxygen for comfort.    Disposition: I discussed the case with  resident.  We reviewed the HPI, pertinent PMH, ED course and workup. Agreed with plan and will admit the patient to the hospital for further evaluation and management of shortness of breath, wheezing, asthma exacerbation. Patient in stable condition at this time.       Portions of the record may have been created with voice recognition software. Occasional wrong word or \"sound a like\" substitutions may have occurred due to the inherent limitations of voice recognition software. Read the chart carefully and recognize, using context, where substitutions have occurred.      ED Course as of 02/11/25 0656   Tue Feb 11, 2025   0247 hs TnI 0hr: 13  Similar to prior, will check 2 hour delta troponin    0316 WBC(!): 13.58  Increased from prior, likely due to steroid use     0339 Reached out to  for admission        Medications   acetaminophen (TYLENOL) tablet 650 mg (has no administration in time range)   albuterol (PROVENTIL HFA,VENTOLIN HFA) inhaler 2 puff (has no administration in time range)   amLODIPine (NORVASC) tablet 5 mg (has no administration in time range)   amoxicillin-clavulanate (AUGMENTIN) 875-125 mg per tablet 1 tablet (has no administration in time range)   budesonide-formoterol (SYMBICORT) 80-4.5 MCG/ACT inhaler 2 puff (has no administration in time range)   Cholecalciferol (VITAMIN D3) tablet 1,000 Units (has no administration in time range)   folic acid (FOLVITE) tablet 1,000 mcg (has no administration in time range)   hydrOXYzine HCL (ATARAX) tablet 10 mg (has no administration in time range)   montelukast (SINGULAIR) tablet 10 mg (has no administration in time range)   pantoprazole (PROTONIX) EC tablet 20 mg (has no administration in time range)   predniSONE tablet 20 mg (has no administration in time " range)     Followed by   predniSONE tablet 10 mg (has no administration in time range)   enoxaparin (LOVENOX) subcutaneous injection 40 mg (has no administration in time range)   levalbuterol (XOPENEX) inhalation solution 1.25 mg (has no administration in time range)   ipratropium (ATROVENT) 0.02 % inhalation solution 0.5 mg (has no administration in time range)   albuterol (FOR EMS ONLY) (2.5 mg/3 mL) 0.083 % inhalation solution 2.5 mg (0 mg Does not apply Given to EMS 2/11/25 0117)   droperidol (FOR EMS ONLY) (INAPSINE) 2.5 mg/mL injection 2.5 mg (0 mg Does not apply Given to EMS 2/11/25 0117)   methylPREDNISolone sodium succinate (FOR EMS ONLY) (Solu-MEDROL) 125 MG injection 125 mg (0 mg Does not apply Given to EMS 2/11/25 0117)   ipratropium-albuterol (FOR EMS ONLY) (DUO-NEB) 0.5-2.5 mg/3 mL inhalation solution 3 mL (0 mL Does not apply Given to EMS 2/11/25 0117)   magnesium sulfate 2 g/50 mL IVPB (premix) 2 g (0 g Intravenous Stopped 2/11/25 0241)   albuterol inhalation solution 10 mg (10 mg Nebulization Given 2/11/25 0202)       ED Risk Strat Scores                                              History of Present Illness       Chief Complaint   Patient presents with    Shortness of Breath     Pt has COPD exacerbation for a few weeks. Saw pulmonology has been taking nebs at home no relief. Symptoms worse tonight. had 2 duo neb, solumedrol, droperidol EMS       Past Medical History:   Diagnosis Date    Asthma     Asthmatic bronchitis     Last Assessed: 10/7/2014     Chronic diarrhea     Last Assessed: 8/20/2015     Fibromyalgia     Focal nodular hyperplasia of liver     Last Assessed: 6/11/2015    Herpes zoster     Last Assessed: 3/18/2016    Hypertension     IBS (irritable bowel syndrome)     Intermittent palpitations     Irritable bowel syndrome     Lumbar radiculopathy     Osteoarthritis     Vertigo       Past Surgical History:   Procedure Laterality Date    BREAST BIOPSY      BREAST LUMPECTOMY      when pt  was 18    SMALL INTESTINE SURGERY        Family History   Problem Relation Age of Onset    Heart attack Mother     Asthma Father     Breast cancer Sister     Breast cancer Sister     No Known Problems Daughter     No Known Problems Daughter     Arthritis Family     Osteoporosis Family       Social History     Tobacco Use    Smoking status: Some Days     Types: Cigarettes    Smokeless tobacco: Never    Tobacco comments:     Stopped smoking July 2023   Vaping Use    Vaping status: Never Used   Substance Use Topics    Alcohol use: Not Currently    Drug use: No      E-Cigarette/Vaping    E-Cigarette Use Never User       E-Cigarette/Vaping Substances    Nicotine No     THC No     CBD No     Flavoring No     Other No     Unknown No       I have reviewed and agree with the history as documented.     HPI  Patient is a 79 y.o. female with history of asthma presenting to the emergency department for difficulty breathing. Patient states that she was diagnosed with covid several weeks ago and has been having worsening shortness of breath since then. States that tonight roughly an hour prior to arrival she developed worsening shortness of breath shortly after taking all of her night-time medications including breathing treatments. Patient then called 911 - took 2 duonebs prior to their arrival, had additional neb with EMS, was given steroids as well. She had some improvement but still complains of having shortness of breath. Denies having any chest pain but does complain of having worsening shortness of breath with exertion.       Review of Systems   Constitutional:  Negative for fever.   HENT:  Negative for congestion.    Eyes:  Negative for visual disturbance.   Respiratory:  Positive for cough and shortness of breath.    Cardiovascular:  Negative for chest pain.   Gastrointestinal:  Negative for abdominal pain, diarrhea and vomiting.   Endocrine: Negative for polyuria.   Genitourinary:  Negative for dysuria.    Musculoskeletal:  Negative for gait problem.   Skin:  Negative for rash.   Neurological:  Negative for dizziness.   All other systems reviewed and are negative.          Objective       ED Triage Vitals [02/11/25 0109]   Temperature Pulse Blood Pressure Respirations SpO2 Patient Position - Orthostatic VS   98.2 °F (36.8 °C) (!) 115 138/76 20 98 % Sitting      Temp Source Heart Rate Source BP Location FiO2 (%) Pain Score    Oral Monitor Right arm -- --      Vitals      Date and Time Temp Pulse SpO2 Resp BP Pain Score FACES Pain Rating User   02/11/25 0645 -- 120 94 % 20 134/78 -- -- RG   02/11/25 0109 98.2 °F (36.8 °C) 115 98 % 20 138/76 -- -- [facility]            Physical Exam  Vitals and nursing note reviewed.   Constitutional:       Appearance: Normal appearance.   HENT:      Head: Normocephalic and atraumatic.      Mouth/Throat:      Mouth: Mucous membranes are moist.   Eyes:      Conjunctiva/sclera: Conjunctivae normal.   Cardiovascular:      Rate and Rhythm: Regular rhythm. Tachycardia present.      Pulses: Normal pulses.      Heart sounds: Normal heart sounds.   Pulmonary:      Effort: Tachypnea and accessory muscle usage present. No respiratory distress.      Breath sounds: Wheezing present.   Abdominal:      Palpations: Abdomen is soft.      Tenderness: There is no abdominal tenderness.   Musculoskeletal:         General: No tenderness.      Cervical back: Neck supple.      Right lower leg: No tenderness. No edema.      Left lower leg: No tenderness. No edema.   Skin:     General: Skin is warm and dry.      Capillary Refill: Capillary refill takes less than 2 seconds.   Neurological:      General: No focal deficit present.      Mental Status: She is alert. Mental status is at baseline.   Psychiatric:         Mood and Affect: Mood normal.         Results Reviewed       Procedure Component Value Units Date/Time    Basic metabolic panel [[id number]]  (Abnormal) Collected: 02/11/25 0532    Lab Status: Final result  Specimen: Blood from Arm, Left Updated: 02/11/25 0611     Sodium 143 mmol/L      Potassium 3.9 mmol/L      Chloride 111 mmol/L      CO2 23 mmol/L      ANION GAP 9 mmol/L      BUN 21 mg/dL      Creatinine 1.24 mg/dL      Glucose 214 mg/dL      Calcium 8.8 mg/dL      eGFR 41 ml/min/1.73sq m     Narrative:      National Kid[employer] Disease Foundation guidelines for Chronic Kidney Disease (CKD):     Stage 1 with normal or high GFR (GFR > 90 mL/min/1.73 square meters)    Stage 2 Mild CKD (GFR = 60-89 mL/min/1.73 square meters)    Stage 3A Moderate CKD (GFR = 45-59 mL/min/1.73 square meters)    Stage 3B Moderate CKD (GFR = 30-44 mL/min/1.73 square meters)    Stage 4 Severe CKD (GFR = 15-29 mL/min/1.73 square meters)    Stage 5 End Stage CKD (GFR <15 mL/min/1.73 square meters)  Note: GFR calculation is accurate only with a steady state creatinine    Magnesium [[id number]]  (Normal) Collected: 02/11/25 0532    Lab Status: Final result Specimen: Blood from Arm, Left Updated: 02/11/25 0611     Magnesium 2.5 mg/dL     Phosphorus [[id number]]  (Normal) Collected: 02/11/25 0532    Lab Status: Final result Specimen: Blood from Arm, Left Updated: 02/11/25 0611     Phosphorus 2.7 mg/dL     CBC and differential [[id number]]  (Abnormal) Collected: 02/11/25 0532    Lab Status: Preliminary result Specimen: Blood from Arm, Left Updated: 02/11/25 0546     WBC 13.73 Thousand/uL      RBC 3.89 Million/uL      Hemoglobin 10.5 g/dL      Hematocrit 33.0 %      MCV 85 fL      MCH 27.0 pg      MCHC 31.8 g/dL      RDW 17.7 %      MPV 10.7 fL      Platelets 355 Thousands/uL     CBC and differential [[id number]]  (Abnormal) Collected: 02/11/25 0250    Lab Status: Final result Specimen: Blood from Arm, Left Updated: 02/11/25 0300     WBC 13.58 Thousand/uL      RBC 3.93 Million/uL      Hemoglobin 10.7 g/dL      Hematocrit 33.4 %      MCV 85 fL      MCH 27.2 pg      MCHC 32.0 g/dL      RDW 17.5 %      MPV 10.8 fL      Platelets 356 Thousands/uL      nRBC 0  /100 WBCs      Segmented % 87 %      Immature Grans % 1 %      Lymphocytes % 9 %      Monocytes % 3 %      Eosinophils Relative 0 %      Basophils Relative 0 %      Absolute Neutrophils 11.78 Thousands/µL      Absolute Immature Grans 0.19 Thousand/uL      Absolute Lymphocytes 1.17 Thousands/µL      Absolute Monocytes 0.41 Thousand/µL      Eosinophils Absolute 0.00 Thousand/µL      Basophils Absolute 0.03 Thousands/µL     HS Troponin 0hr (reflex protocol) [[id number]]  (Normal) Collected: 02/11/25 0213    Lab Status: Final result Specimen: Blood from Arm, Left Updated: 02/11/25 0242     hs TnI 0hr 13 ng/L     Basic metabolic panel [[id number]]  (Abnormal) Collected: 02/11/25 0213    Lab Status: Final result Specimen: Blood from Arm, Left Updated: 02/11/25 0241     Sodium 143 mmol/L      Potassium 4.0 mmol/L      Chloride 113 mmol/L      CO2 21 mmol/L      ANION GAP 9 mmol/L      BUN 22 mg/dL      Creatinine 1.21 mg/dL      Glucose 163 mg/dL      Calcium 8.5 mg/dL      eGFR 42 ml/min/1.73sq m     Narrative:      National Kidney Disease Foundation guidelines for Chronic Kidney Disease (CKD):     Stage 1 with normal or high GFR (GFR > 90 mL/min/1.73 square meters)    Stage 2 Mild CKD (GFR = 60-89 mL/min/1.73 square meters)    Stage 3A Moderate CKD (GFR = 45-59 mL/min/1.73 square meters)    Stage 3B Moderate CKD (GFR = 30-44 mL/min/1.73 square meters)    Stage 4 Severe CKD (GFR = 15-29 mL/min/1.73 square meters)    Stage 5 End Stage CKD (GFR <15 mL/min/1.73 square meters)  Note: GFR calculation is accurate only with a steady state creatinine            XR chest 2 views   ED Interpretation by Shai Neumann DO (02/11 0323)   2 view chest x-ray interpreted me shows no infiltrate, no effusion, no acute cardiopulmonary disease, no acute change from February 4, 2025          Procedures    ED Medication and Procedure Management   Prior to Admission Medications   Prescriptions Last Dose Informant Patient Reported? Taking?    Cholecalciferol (D3-1000) 1,000 units tablet  Self No No   Sig: Take 1 tablet (1,000 Units total) by mouth daily   acetaminophen (TYLENOL) 500 mg tablet  Self No No   Sig: Take 1 tablet (500 mg total) by mouth every 6 (six) hours as needed for mild pain   albuterol (PROVENTIL HFA,VENTOLIN HFA) 90 mcg/act inhaler  Self No No   Sig: Inhale 2 puffs every 6 (six) hours as needed for wheezing or shortness of breath   amLODIPine (NORVASC) 5 mg tablet  Self No No   Sig: Take 1 tablet (5 mg total) by mouth daily   amoxicillin-clavulanate (AUGMENTIN) 875-125 mg per tablet   No No   Sig: Take 1 tablet by mouth every 12 (twelve) hours for 7 days   azelastine (ASTELIN) 0.1 % nasal spray  Self No No   Si spray into each nostril 2 (two) times a day Use in each nostril as directed   budesonide-formoterol (Symbicort) 80-4.5 MCG/ACT inhaler  Self No No   Sig: Inhale 2 puffs 2 (two) times a day Rinse mouth after use   fexofenadine (ALLEGRA) 180 MG tablet  Self Yes No   Sig: Take 180 mg by mouth daily   fluticasone (FLONASE) 50 mcg/act nasal spray  Self No No   Sig: SPRAY 2 SPRAYS INTO EACH NOSTRIL EVERY DAY   folic acid (FOLVITE) 1 mg tablet  Self No No   Sig: Take 1 tablet (1,000 mcg total) by mouth daily   hydrOXYzine HCL (ATARAX) 10 mg tablet  Self No No   Sig: Take 1 tablet (10 mg total) by mouth daily at bedtime as needed for anxiety for up to 10 doses   ipratropium-albuterol (DUO-NEB) 0.5-2.5 mg/3 mL nebulizer solution   No No   Sig: Take 3 mL by nebulization 4 (four) times a day   montelukast (SINGULAIR) 10 mg tablet  Self No No   Sig: Take 1 tablet (10 mg total) by mouth daily at bedtime   ondansetron (ZOFRAN) 4 mg tablet   No No   Sig: Take 1 tablet (4 mg total) by mouth every 8 (eight) hours as needed for nausea or vomiting   ondansetron (Zofran ODT) 4 mg disintegrating tablet  Self No No   Sig: Take 1 tablet (4 mg total) by mouth every 6 (six) hours as needed for nausea or vomiting   pantoprazole (PROTONIX) 20 mg  tablet  Self No No   Sig: Take 1 tablet (20 mg total) by mouth daily   predniSONE 10 mg tablet   No No   Sig: Take 4 tablets (40 mg total) by mouth daily for 2 days, THEN 3 tablets (30 mg total) daily for 2 days, THEN 2 tablets (20 mg total) daily for 2 days, THEN 1 tablet (10 mg total) daily for 2 days.      Facility-Administered Medications: None     Patient's Medications   Discharge Prescriptions    No medications on file     No discharge procedures on file.  ED SEPSIS DOCUMENTATION   Time reflects when diagnosis was documented in both MDM as applicable and the Disposition within this note       Time User Action Codes Description Comment    2/11/2025  4:07 AM Barbara Perez [R06.00] Dyspnea     2/11/2025  4:07 AM Barbara Perez [J45.901] Asthma exacerbation                  Barbara Perez,   02/11/25 0656

## 2025-02-12 ENCOUNTER — TELEPHONE (OUTPATIENT)
Age: 80
End: 2025-02-12

## 2025-02-12 ENCOUNTER — TRANSITIONAL CARE MANAGEMENT (OUTPATIENT)
Dept: FAMILY MEDICINE CLINIC | Facility: CLINIC | Age: 80
End: 2025-02-12

## 2025-02-12 NOTE — TELEPHONE ENCOUNTER
Patient called in asking if we can set up a lyft for her appt tomorrow 2/13 at 11:30am. I confirmed that we have patients correct address on file. Please advise.

## 2025-02-13 ENCOUNTER — OFFICE VISIT (OUTPATIENT)
Dept: PULMONOLOGY | Facility: CLINIC | Age: 80
End: 2025-02-13
Payer: MEDICARE

## 2025-02-13 VITALS
HEIGHT: 63 IN | WEIGHT: 130 LBS | OXYGEN SATURATION: 98 % | HEART RATE: 94 BPM | SYSTOLIC BLOOD PRESSURE: 124 MMHG | BODY MASS INDEX: 23.04 KG/M2 | TEMPERATURE: 97.8 F | DIASTOLIC BLOOD PRESSURE: 80 MMHG

## 2025-02-13 DIAGNOSIS — J32.8 OTHER CHRONIC SINUSITIS: Chronic | ICD-10-CM

## 2025-02-13 DIAGNOSIS — J45.41 MODERATE PERSISTENT ASTHMA WITH EXACERBATION: ICD-10-CM

## 2025-02-13 DIAGNOSIS — Z92.89 HOSPITALIZATION WITHIN LAST 30 DAYS: ICD-10-CM

## 2025-02-13 DIAGNOSIS — T17.500A MUCUS PLUGGING OF BRONCHI: ICD-10-CM

## 2025-02-13 DIAGNOSIS — J98.8 NARROWING OF AIRWAY: ICD-10-CM

## 2025-02-13 DIAGNOSIS — J43.9 PULMONARY EMPHYSEMA, UNSPECIFIED EMPHYSEMA TYPE (HCC): ICD-10-CM

## 2025-02-13 DIAGNOSIS — R06.83 SNORING: Primary | ICD-10-CM

## 2025-02-13 DIAGNOSIS — H66.90 ACUTE OTITIS MEDIA, UNSPECIFIED OTITIS MEDIA TYPE: ICD-10-CM

## 2025-02-13 PROCEDURE — 99214 OFFICE O/P EST MOD 30 MIN: CPT | Performed by: INTERNAL MEDICINE

## 2025-02-13 RX ORDER — ALBUTEROL SULFATE 0.83 MG/ML
2.5 SOLUTION RESPIRATORY (INHALATION) EVERY 6 HOURS PRN
Qty: 60 ML | Refills: 0 | Status: SHIPPED | OUTPATIENT
Start: 2025-02-13 | End: 2025-05-14

## 2025-02-13 RX ORDER — ACETYLCYSTEINE 100 MG/ML
4 SOLUTION ORAL; RESPIRATORY (INHALATION)
Qty: 30 ML | Refills: 0 | Status: SHIPPED | OUTPATIENT
Start: 2025-02-13

## 2025-02-13 NOTE — H&P (VIEW-ONLY)
"Pulmonary In-patient Follow-up     Name: Mireya Yi      : 1945      MRN: [id number]  Encounter Provider: Boone Perry DO  Encounter Date: 2025   Encounter department: [facility] [place]  :  Assessment & Plan  Hospitalization within last 30 days    Narrowing of airway  CTA Chest: showing narrowing of the VANITA bronchus with debri in the bronchus intermedius which is new compared to previous CT Chest.  Noted to not have postobstructive atelectasis within the right lung.        Moderate persistent asthma with exacerbation         Pulmonary emphysema, unspecified emphysema type (HCC)         Mucus plugging of bronchi    Snoring    Acute otitis media, unspecified otitis media type  Completed 7 days of Augmentin         Plan:  Will schedule patient for bronchoscopy.  Return to pulmonary clinic in 1 month.  Discussed case with radiology who does not find an extrinsic etiology of VANITA stenosis.   Discussed case interventional pulm and thoracic surgery who both agree with bronchoscopy to evaluate for functional cause of right mainstem stenosis, given lack of post-obstructive atelectasis and   Airway clearance with albuterol, Mucomyst, flutter valve.  Hold DuoNebs for now.   Neti pot for nasal congestion.    History of Present Illness     HPI:  Mireya Yi is a 79 y.o. female wiwth PMHx of tobacoo abuse in remission, COPD, chronic allergic rhinitis, peripheral eosinophilia who presents to the pulmonary clinic for inpatient follow up. Patient notes that she has had some improvement in her shortness of breath but that her cough is persistent and feels \"as if the mucus cannot come out\". She denies other symptoms such as night sweats, fevers, chills, nausea, vomiting, diarrhea. Patient still complaining of nasal congestion and sinus congestion. Patient notes that she becomes more short of breath when she lays onto her right side.    Review of Systems   Constitutional:  Negative for appetite " change, chills, fatigue and fever.   HENT:  Negative for congestion and facial swelling.    Eyes:  Negative for discharge.   Respiratory:  Positive for cough and wheezing. Negative for shortness of breath.    Cardiovascular:  Negative for chest pain, palpitations and leg swelling.   Gastrointestinal:  Negative for abdominal distention.   Endocrine: Negative for cold intolerance.   Genitourinary:  Negative for difficulty urinating, frequency and menstrual problem.   Musculoskeletal:  Negative for arthralgias and joint swelling.   Skin:  Negative for color change.   Neurological:  Negative for dizziness and light-headedness.   Hematological:  Negative for adenopathy.   Psychiatric/Behavioral:  Negative for agitation.        Preventive   Most Recent Immunizations   Administered Date(s) Administered    Pneumococcal Conjugate Vaccine 20-valent (Pcv20), Polysace 11/17/2023    Pneumococcal Polysaccharide PPV23 10/01/2008    Tdap 06/08/2018     Historical Information   Past Medical History:   Diagnosis Date    Asthma     Asthmatic bronchitis     Last Assessed: 10/7/2014     Chronic diarrhea     Last Assessed: 8/20/2015     Fibromyalgia     Focal nodular hyperplasia of liver     Last Assessed: 6/11/2015    Herpes zoster     Last Assessed: 3/18/2016    Hypertension     IBS (irritable bowel syndrome)     Intermittent palpitations     Irritable bowel syndrome     Lumbar radiculopathy     Osteoarthritis     Vertigo      Past Surgical History:   Procedure Laterality Date    BREAST BIOPSY      BREAST LUMPECTOMY      when pt was 18    SMALL INTESTINE SURGERY       Family History   Problem Relation Age of Onset    Heart attack Mother     Asthma Father     Breast cancer Sister     Breast cancer Sister     No Known Problems Daughter     No Known Problems Daughter     Arthritis Family     Osteoporosis Family        Social History:   Social History     Tobacco Use   Smoking Status Some Days    Types: Cigarettes   Smokeless Tobacco Never    Tobacco Comments    Hasn't been smoking recently because of the cough., about 8 days.       Meds/Allergies     Current Outpatient Medications:     acetaminophen (TYLENOL) 500 mg tablet, Take 1 tablet (500 mg total) by mouth every 6 (six) hours as needed for mild pain, Disp: 60 tablet, Rfl: 0    acetylcysteine (MUCOMYST) 10 % nebulizer solution, Take 4 mL by nebulization every 4 (four) hours, Disp: 30 mL, Rfl: 0    albuterol (2.5 mg/3 mL) 0.083 % nebulizer solution, Take 3 mL (2.5 mg total) by nebulization every 6 (six) hours as needed for wheezing or shortness of breath, Disp: 60 mL, Rfl: 0    albuterol (PROVENTIL HFA,VENTOLIN HFA) 90 mcg/act inhaler, Inhale 2 puffs every 6 (six) hours as needed for wheezing or shortness of breath, Disp: 18 g, Rfl: 5    amLODIPine (NORVASC) 5 mg tablet, Take 1 tablet (5 mg total) by mouth daily, Disp: 90 tablet, Rfl: 2    azelastine (ASTELIN) 0.1 % nasal spray, 1 spray into each nostril 2 (two) times a day Use in each nostril as directed, Disp: 1 mL, Rfl: 0    benzonatate (TESSALON PERLES) 100 mg capsule, Take 1 capsule (100 mg total) by mouth 3 (three) times a day as needed for cough, Disp: 30 capsule, Rfl: 0    budesonide-formoterol (Symbicort) 80-4.5 MCG/ACT inhaler, Inhale 2 puffs 2 (two) times a day Rinse mouth after use, Disp: 10.2 g, Rfl: 5    fexofenadine (ALLEGRA) 180 MG tablet, Take 180 mg by mouth daily, Disp: , Rfl:     fluticasone (FLONASE) 50 mcg/act nasal spray, SPRAY 2 SPRAYS INTO EACH NOSTRIL EVERY DAY, Disp: 48 mL, Rfl: 2    folic acid (FOLVITE) 1 mg tablet, Take 1 tablet (1,000 mcg total) by mouth daily, Disp: 90 tablet, Rfl: 2    hydrOXYzine HCL (ATARAX) 10 mg tablet, Take 1 tablet (10 mg total) by mouth daily at bedtime as needed for anxiety for up to 10 doses, Disp: 10 tablet, Rfl: 0    ipratropium-albuterol (DUO-NEB) 0.5-2.5 mg/3 mL nebulizer solution, Take 3 mL by nebulization 4 (four) times a day, Disp: 360 mL, Rfl: 2    montelukast (SINGULAIR) 10 mg  "tablet, Take 1 tablet (10 mg total) by mouth daily at bedtime, Disp: 90 tablet, Rfl: 1    ondansetron (Zofran ODT) 4 mg disintegrating tablet, Take 1 tablet (4 mg total) by mouth every 6 (six) hours as needed for nausea or vomiting, Disp: 20 tablet, Rfl: 3    pantoprazole (PROTONIX) 20 mg tablet, Take 1 tablet (20 mg total) by mouth daily, Disp: 90 tablet, Rfl: 1    predniSONE 10 mg tablet, Take 4 tablets (40 mg total) by mouth daily for 2 days, THEN 3 tablets (30 mg total) daily for 2 days, THEN 2 tablets (20 mg total) daily for 2 days, THEN 1 tablet (10 mg total) daily for 2 days., Disp: 20 tablet, Rfl: 0    amoxicillin-clavulanate (AUGMENTIN) 875-125 mg per tablet, Take 1 tablet by mouth every 12 (twelve) hours for 7 days (Patient not taking: Reported on 2/13/2025), Disp: 14 tablet, Rfl: 0    Cholecalciferol (D3-1000) 1,000 units tablet, Take 1 tablet (1,000 Units total) by mouth daily (Patient not taking: Reported on 2/13/2025), Disp: 90 tablet, Rfl: 1    ondansetron (ZOFRAN) 4 mg tablet, Take 1 tablet (4 mg total) by mouth every 8 (eight) hours as needed for nausea or vomiting, Disp: 30 tablet, Rfl: 0  Allergies   Allergen Reactions    Ambrosia Artemisiifolia (Ragweed) Skin Test Facial Swelling    Codeine Other (See Comments)     Heart races    Epinephrine      Pt reports \"it makes my heart race, they told me I cant take it.\"    Ibuprofen Other (See Comments)     Heart racing    Morphine Other (See Comments)     Heart racing    Pollen Extract Sneezing    Tramadol Other (See Comments)     Heart racing       Vitals: Blood pressure 124/80, pulse 94, temperature 97.8 °F (36.6 °C), temperature source Tympanic, height 5' 3\" (1.6 m), weight 59 kg (130 lb), SpO2 98%., Body mass index is 23.03 kg/m². Oxygen Therapy  SpO2: 98 %  Oxygen Therapy: None (Room air)    Physical Exam  Physical Exam  Constitutional:       Appearance: She is normal weight.   HENT:      Nose: No congestion.      Mouth/Throat:      Pharynx: " "Oropharynx is clear. No oropharyngeal exudate.   Eyes:      Conjunctiva/sclera: Conjunctivae normal.   Cardiovascular:      Rate and Rhythm: Normal rate.      Pulses: Normal pulses.      Heart sounds: No murmur heard.     No friction rub. No gallop.   Pulmonary:      Effort: No respiratory distress.      Breath sounds: No stridor. Wheezing and rhonchi present. No rales.   Chest:      Chest wall: No tenderness.   Abdominal:      General: Abdomen is flat. There is no distension.      Palpations: There is no mass.      Tenderness: There is no abdominal tenderness.      Hernia: No hernia is present.   Musculoskeletal:         General: Normal range of motion.      Cervical back: Normal range of motion.      Right lower leg: No edema.      Left lower leg: No edema.   Skin:     General: Skin is warm.      Capillary Refill: Capillary refill takes less than 2 seconds.   Neurological:      General: No focal deficit present.      Mental Status: She is alert and oriented to person, place, and time.   Psychiatric:         Mood and Affect: Mood normal.         Labs: I have personally reviewed pertinent lab results.  Lab Results   Component Value Date    WBC 13.73 (H) 02/11/2025    HGB 10.5 (L) 02/11/2025    HCT 33.0 (L) 02/11/2025    MCV 85 02/11/2025     02/11/2025     Lab Results   Component Value Date    GLUCOSE 92 05/19/2015    CALCIUM 8.8 02/11/2025     05/19/2015    K 3.9 02/11/2025    CO2 23 02/11/2025     (H) 02/11/2025    BUN 21 02/11/2025    CREATININE 1.24 02/11/2025     No results found for: \"IGE\"  Lab Results   Component Value Date    ALT 11 01/19/2025    AST 12 (L) 01/19/2025    ALKPHOS 73 01/19/2025    BILITOT 0.20 05/19/2015       Imaging and other studies: Results Review Statement: I personally reviewed the following image studies/reports in PACS and discussed pertinent findings with Radiology: CT chest. My interpretation of the radiology images/reports is: as above.  XR chest 2 views  Result " "Date: 2025  Impression: No acute cardiopulmonary disease. Workstation performed: [id number]     Pulmonary function testin/2023  Results:     FEV1/FVC Ratio: 56.74%  Forced Vital Capacity: 2.65 L    108% predicted  FEV1: 1.50 L79% predicted        After administration of bronchodilator      FVC: 2.66 L, 108% predicted, +0 % change  FEV1: 1.58 L, 83% predicted, +5 % change     Lung volumes by body plethysmography:      Total Lung Capacity 98% predicted   Residual volume 95% predicted  RV/TLC Ratio: 45.53%, 98% predicted     DLCO corrected for patients hemoglobin level: 72%     Interpretation:     Mild obstructive airflow defect on spirometry     No significant improvement in airflow or forced vital capacity in response to the administration to bronchodilator per ATS standards.      Normal lung volumes     Normal Diffusion     Flow-volume loop consistent with obstruction     EKG, Pathology, and Other Studies: Results Review Statement: I personally reviewed the following image studies/reports in PACS and discussed pertinent findings with Radiology: CT chest. My interpretation of the radiology images/reports is: As above.    Disclaimer: Portions of the record may have been created with voice recognition software. Occasional wrong word or \"sound a like\" substitutions may have occurred due to the inherent limitations of voice recognition software. Careful consideration should be taken to recognize, using context, where substitutions have occurred.    Boone Gonzalez DO   Pulmonary & Critical Care Medicine Fellow PGY-V  [facility]    "

## 2025-02-13 NOTE — PROGRESS NOTES
"Pulmonary In-patient Follow-up     Name: Mireya Yi      : 1945      MRN: [id number]  Encounter Provider: Boone Perry DO  Encounter Date: 2025   Encounter department: [facility] [place]  :  Assessment & Plan  Hospitalization within last 30 days    Narrowing of airway  CTA Chest: showing narrowing of the VANITA bronchus with debri in the bronchus intermedius which is new compared to previous CT Chest.  Noted to not have postobstructive atelectasis within the right lung.        Moderate persistent asthma with exacerbation         Pulmonary emphysema, unspecified emphysema type (HCC)         Mucus plugging of bronchi    Snoring    Acute otitis media, unspecified otitis media type  Completed 7 days of Augmentin         Plan:  Will schedule patient for bronchoscopy.  Return to pulmonary clinic in 1 month.  Discussed case with radiology who does not find an extrinsic etiology of VANITA stenosis.   Discussed case interventional pulm and thoracic surgery who both agree with bronchoscopy to evaluate for functional cause of right mainstem stenosis, given lack of post-obstructive atelectasis and   Airway clearance with albuterol, Mucomyst, flutter valve.  Hold DuoNebs for now.   Neti pot for nasal congestion.    History of Present Illness     HPI:  Mireya Yi is a 79 y.o. female wiwth PMHx of tobacoo abuse in remission, COPD, chronic allergic rhinitis, peripheral eosinophilia who presents to the pulmonary clinic for inpatient follow up. Patient notes that she has had some improvement in her shortness of breath but that her cough is persistent and feels \"as if the mucus cannot come out\". She denies other symptoms such as night sweats, fevers, chills, nausea, vomiting, diarrhea. Patient still complaining of nasal congestion and sinus congestion. Patient notes that she becomes more short of breath when she lays onto her right side.    Review of Systems   Constitutional:  Negative for appetite " change, chills, fatigue and fever.   HENT:  Negative for congestion and facial swelling.    Eyes:  Negative for discharge.   Respiratory:  Positive for cough and wheezing. Negative for shortness of breath.    Cardiovascular:  Negative for chest pain, palpitations and leg swelling.   Gastrointestinal:  Negative for abdominal distention.   Endocrine: Negative for cold intolerance.   Genitourinary:  Negative for difficulty urinating, frequency and menstrual problem.   Musculoskeletal:  Negative for arthralgias and joint swelling.   Skin:  Negative for color change.   Neurological:  Negative for dizziness and light-headedness.   Hematological:  Negative for adenopathy.   Psychiatric/Behavioral:  Negative for agitation.        Preventive   Most Recent Immunizations   Administered Date(s) Administered    Pneumococcal Conjugate Vaccine 20-valent (Pcv20), Polysace 11/17/2023    Pneumococcal Polysaccharide PPV23 10/01/2008    Tdap 06/08/2018     Historical Information   Past Medical History:   Diagnosis Date    Asthma     Asthmatic bronchitis     Last Assessed: 10/7/2014     Chronic diarrhea     Last Assessed: 8/20/2015     Fibromyalgia     Focal nodular hyperplasia of liver     Last Assessed: 6/11/2015    Herpes zoster     Last Assessed: 3/18/2016    Hypertension     IBS (irritable bowel syndrome)     Intermittent palpitations     Irritable bowel syndrome     Lumbar radiculopathy     Osteoarthritis     Vertigo      Past Surgical History:   Procedure Laterality Date    BREAST BIOPSY      BREAST LUMPECTOMY      when pt was 18    SMALL INTESTINE SURGERY       Family History   Problem Relation Age of Onset    Heart attack Mother     Asthma Father     Breast cancer Sister     Breast cancer Sister     No Known Problems Daughter     No Known Problems Daughter     Arthritis Family     Osteoporosis Family        Social History:   Social History     Tobacco Use   Smoking Status Some Days    Types: Cigarettes   Smokeless Tobacco Never    Tobacco Comments    Hasn't been smoking recently because of the cough., about 8 days.       Meds/Allergies     Current Outpatient Medications:     acetaminophen (TYLENOL) 500 mg tablet, Take 1 tablet (500 mg total) by mouth every 6 (six) hours as needed for mild pain, Disp: 60 tablet, Rfl: 0    acetylcysteine (MUCOMYST) 10 % nebulizer solution, Take 4 mL by nebulization every 4 (four) hours, Disp: 30 mL, Rfl: 0    albuterol (2.5 mg/3 mL) 0.083 % nebulizer solution, Take 3 mL (2.5 mg total) by nebulization every 6 (six) hours as needed for wheezing or shortness of breath, Disp: 60 mL, Rfl: 0    albuterol (PROVENTIL HFA,VENTOLIN HFA) 90 mcg/act inhaler, Inhale 2 puffs every 6 (six) hours as needed for wheezing or shortness of breath, Disp: 18 g, Rfl: 5    amLODIPine (NORVASC) 5 mg tablet, Take 1 tablet (5 mg total) by mouth daily, Disp: 90 tablet, Rfl: 2    azelastine (ASTELIN) 0.1 % nasal spray, 1 spray into each nostril 2 (two) times a day Use in each nostril as directed, Disp: 1 mL, Rfl: 0    benzonatate (TESSALON PERLES) 100 mg capsule, Take 1 capsule (100 mg total) by mouth 3 (three) times a day as needed for cough, Disp: 30 capsule, Rfl: 0    budesonide-formoterol (Symbicort) 80-4.5 MCG/ACT inhaler, Inhale 2 puffs 2 (two) times a day Rinse mouth after use, Disp: 10.2 g, Rfl: 5    fexofenadine (ALLEGRA) 180 MG tablet, Take 180 mg by mouth daily, Disp: , Rfl:     fluticasone (FLONASE) 50 mcg/act nasal spray, SPRAY 2 SPRAYS INTO EACH NOSTRIL EVERY DAY, Disp: 48 mL, Rfl: 2    folic acid (FOLVITE) 1 mg tablet, Take 1 tablet (1,000 mcg total) by mouth daily, Disp: 90 tablet, Rfl: 2    hydrOXYzine HCL (ATARAX) 10 mg tablet, Take 1 tablet (10 mg total) by mouth daily at bedtime as needed for anxiety for up to 10 doses, Disp: 10 tablet, Rfl: 0    ipratropium-albuterol (DUO-NEB) 0.5-2.5 mg/3 mL nebulizer solution, Take 3 mL by nebulization 4 (four) times a day, Disp: 360 mL, Rfl: 2    montelukast (SINGULAIR) 10 mg  "tablet, Take 1 tablet (10 mg total) by mouth daily at bedtime, Disp: 90 tablet, Rfl: 1    ondansetron (Zofran ODT) 4 mg disintegrating tablet, Take 1 tablet (4 mg total) by mouth every 6 (six) hours as needed for nausea or vomiting, Disp: 20 tablet, Rfl: 3    pantoprazole (PROTONIX) 20 mg tablet, Take 1 tablet (20 mg total) by mouth daily, Disp: 90 tablet, Rfl: 1    predniSONE 10 mg tablet, Take 4 tablets (40 mg total) by mouth daily for 2 days, THEN 3 tablets (30 mg total) daily for 2 days, THEN 2 tablets (20 mg total) daily for 2 days, THEN 1 tablet (10 mg total) daily for 2 days., Disp: 20 tablet, Rfl: 0    amoxicillin-clavulanate (AUGMENTIN) 875-125 mg per tablet, Take 1 tablet by mouth every 12 (twelve) hours for 7 days (Patient not taking: Reported on 2/13/2025), Disp: 14 tablet, Rfl: 0    Cholecalciferol (D3-1000) 1,000 units tablet, Take 1 tablet (1,000 Units total) by mouth daily (Patient not taking: Reported on 2/13/2025), Disp: 90 tablet, Rfl: 1    ondansetron (ZOFRAN) 4 mg tablet, Take 1 tablet (4 mg total) by mouth every 8 (eight) hours as needed for nausea or vomiting, Disp: 30 tablet, Rfl: 0  Allergies   Allergen Reactions    Ambrosia Artemisiifolia (Ragweed) Skin Test Facial Swelling    Codeine Other (See Comments)     Heart races    Epinephrine      Pt reports \"it makes my heart race, they told me I cant take it.\"    Ibuprofen Other (See Comments)     Heart racing    Morphine Other (See Comments)     Heart racing    Pollen Extract Sneezing    Tramadol Other (See Comments)     Heart racing       Vitals: Blood pressure 124/80, pulse 94, temperature 97.8 °F (36.6 °C), temperature source Tympanic, height 5' 3\" (1.6 m), weight 59 kg (130 lb), SpO2 98%., Body mass index is 23.03 kg/m². Oxygen Therapy  SpO2: 98 %  Oxygen Therapy: None (Room air)    Physical Exam  Physical Exam  Constitutional:       Appearance: She is normal weight.   HENT:      Nose: No congestion.      Mouth/Throat:      Pharynx: " "Oropharynx is clear. No oropharyngeal exudate.   Eyes:      Conjunctiva/sclera: Conjunctivae normal.   Cardiovascular:      Rate and Rhythm: Normal rate.      Pulses: Normal pulses.      Heart sounds: No murmur heard.     No friction rub. No gallop.   Pulmonary:      Effort: No respiratory distress.      Breath sounds: No stridor. Wheezing and rhonchi present. No rales.   Chest:      Chest wall: No tenderness.   Abdominal:      General: Abdomen is flat. There is no distension.      Palpations: There is no mass.      Tenderness: There is no abdominal tenderness.      Hernia: No hernia is present.   Musculoskeletal:         General: Normal range of motion.      Cervical back: Normal range of motion.      Right lower leg: No edema.      Left lower leg: No edema.   Skin:     General: Skin is warm.      Capillary Refill: Capillary refill takes less than 2 seconds.   Neurological:      General: No focal deficit present.      Mental Status: She is alert and oriented to person, place, and time.   Psychiatric:         Mood and Affect: Mood normal.         Labs: I have personally reviewed pertinent lab results.  Lab Results   Component Value Date    WBC 13.73 (H) 02/11/2025    HGB 10.5 (L) 02/11/2025    HCT 33.0 (L) 02/11/2025    MCV 85 02/11/2025     02/11/2025     Lab Results   Component Value Date    GLUCOSE 92 05/19/2015    CALCIUM 8.8 02/11/2025     05/19/2015    K 3.9 02/11/2025    CO2 23 02/11/2025     (H) 02/11/2025    BUN 21 02/11/2025    CREATININE 1.24 02/11/2025     No results found for: \"IGE\"  Lab Results   Component Value Date    ALT 11 01/19/2025    AST 12 (L) 01/19/2025    ALKPHOS 73 01/19/2025    BILITOT 0.20 05/19/2015       Imaging and other studies: Results Review Statement: I personally reviewed the following image studies/reports in PACS and discussed pertinent findings with Radiology: CT chest. My interpretation of the radiology images/reports is: as above.  XR chest 2 views  Result " "Date: 2025  Impression: No acute cardiopulmonary disease. Workstation performed: [id number]     Pulmonary function testin/2023  Results:     FEV1/FVC Ratio: 56.74%  Forced Vital Capacity: 2.65 L    108% predicted  FEV1: 1.50 L79% predicted        After administration of bronchodilator      FVC: 2.66 L, 108% predicted, +0 % change  FEV1: 1.58 L, 83% predicted, +5 % change     Lung volumes by body plethysmography:      Total Lung Capacity 98% predicted   Residual volume 95% predicted  RV/TLC Ratio: 45.53%, 98% predicted     DLCO corrected for patients hemoglobin level: 72%     Interpretation:     Mild obstructive airflow defect on spirometry     No significant improvement in airflow or forced vital capacity in response to the administration to bronchodilator per ATS standards.      Normal lung volumes     Normal Diffusion     Flow-volume loop consistent with obstruction     EKG, Pathology, and Other Studies: Results Review Statement: I personally reviewed the following image studies/reports in PACS and discussed pertinent findings with Radiology: CT chest. My interpretation of the radiology images/reports is: As above.    Disclaimer: Portions of the record may have been created with voice recognition software. Occasional wrong word or \"sound a like\" substitutions may have occurred due to the inherent limitations of voice recognition software. Careful consideration should be taken to recognize, using context, where substitutions have occurred.    Boone Gonzalez DO   Pulmonary & Critical Care Medicine Fellow PGY-V  [facility]    "

## 2025-02-13 NOTE — LETTER
February 15, 2025     Elham Saleem MD  [address] [place] [address]    Patient: Mireya Yi   YOB: 1945   Date of Visit: 2025       Dear Dr. Saleem:    Thank you for referring Mireya Yi to me for evaluation. Below are my notes for this consultation.    If you have questions, please do not hesitate to call me. I look forward to following your patient along with you.         Sincerely,        Boone Perry DO        CC: No Recipients    Boone Perry DO  2025  6:09 PM  Attested  Pulmonary In-patient Follow-up     Name: Mireya Yi      : 1945      MRN: [id number]  Encounter Provider: Boone Perry DO  Encounter Date: 2025   Encounter department: [facility]  :  Assessment & Plan  Hospitalization within last 30 days    Narrowing of airway  CTA Chest: showing narrowing of the VANITA bronchus with debri in the bronchus intermedius which is new compared to previous CT Chest.  Noted to not have postobstructive atelectasis within the right lung.        Moderate persistent asthma with exacerbation         Pulmonary emphysema, unspecified emphysema type (HCC)         Mucus plugging of bronchi    Snoring    Acute otitis media, unspecified otitis media type  Completed 7 days of Augmentin         Plan:  Will schedule patient for bronchoscopy.  Return to pulmonary clinic in 1 month.  Discussed case with radiology who does not find an extrinsic etiology of VANITA stenosis.   Discussed case interventional pulm and thoracic surgery who both agree with bronchoscopy to evaluate for functional cause of right mainstem stenosis, given lack of post-obstructive atelectasis and   Airway clearance with albuterol, Mucomyst, flutter valve.  Hold DuoNebs for now.   Neti pot for nasal congestion.    History of Present Illness    HPI:  Mireya Yi is a 79 y.o. female wiwth PMHx of tobacoo abuse in remission, COPD, chronic allergic rhinitis, peripheral eosinophilia who  "presents to the pulmonary clinic for inpatient follow up. Patient notes that she has had some improvement in her shortness of breath but that her cough is persistent and feels \"as if the mucus cannot come out\". She denies other symptoms such as night sweats, fevers, chills, nausea, vomiting, diarrhea. Patient still complaining of nasal congestion and sinus congestion. Patient notes that she becomes more short of breath when she lays onto her right side.    Review of Systems   Constitutional:  Negative for appetite change, chills, fatigue and fever.   HENT:  Negative for congestion and facial swelling.    Eyes:  Negative for discharge.   Respiratory:  Positive for cough and wheezing. Negative for shortness of breath.    Cardiovascular:  Negative for chest pain, palpitations and leg swelling.   Gastrointestinal:  Negative for abdominal distention.   Endocrine: Negative for cold intolerance.   Genitourinary:  Negative for difficulty urinating, frequency and menstrual problem.   Musculoskeletal:  Negative for arthralgias and joint swelling.   Skin:  Negative for color change.   Neurological:  Negative for dizziness and light-headedness.   Hematological:  Negative for adenopathy.   Psychiatric/Behavioral:  Negative for agitation.        Preventive   Most Recent Immunizations   Administered Date(s) Administered   • Pneumococcal Conjugate Vaccine 20-valent (Pcv20), Polysace 11/17/2023   • Pneumococcal Polysaccharide PPV23 10/01/2008   • Tdap 06/08/2018     Historical Information  Past Medical History:   Diagnosis Date   • Asthma    • Asthmatic bronchitis     Last Assessed: 10/7/2014    • Chronic diarrhea     Last Assessed: 8/20/2015    • Fibromyalgia    • Focal nodular hyperplasia of liver     Last Assessed: 6/11/2015   • Herpes zoster     Last Assessed: 3/18/2016   • Hypertension    • IBS (irritable bowel syndrome)    • Intermittent palpitations    • Irritable bowel syndrome    • Lumbar radiculopathy    • Osteoarthritis  "   • Vertigo      Past Surgical History:   Procedure Laterality Date   • BREAST BIOPSY     • BREAST LUMPECTOMY      when pt was 18   • SMALL INTESTINE SURGERY       Family History   Problem Relation Age of Onset   • Heart attack Mother    • Asthma Father    • Breast cancer Sister    • Breast cancer Sister    • No Known Problems Daughter    • No Known Problems Daughter    • Arthritis Family    • Osteoporosis Family        Social History:   Social History     Tobacco Use   Smoking Status Some Days   • Types: Cigarettes   Smokeless Tobacco Never   Tobacco Comments    Hasn't been smoking recently because of the cough., about 8 days.       Meds/Allergies    Current Outpatient Medications:   •  acetaminophen (TYLENOL) 500 mg tablet, Take 1 tablet (500 mg total) by mouth every 6 (six) hours as needed for mild pain, Disp: 60 tablet, Rfl: 0  •  acetylcysteine (MUCOMYST) 10 % nebulizer solution, Take 4 mL by nebulization every 4 (four) hours, Disp: 30 mL, Rfl: 0  •  albuterol (2.5 mg/3 mL) 0.083 % nebulizer solution, Take 3 mL (2.5 mg total) by nebulization every 6 (six) hours as needed for wheezing or shortness of breath, Disp: 60 mL, Rfl: 0  •  albuterol (PROVENTIL HFA,VENTOLIN HFA) 90 mcg/act inhaler, Inhale 2 puffs every 6 (six) hours as needed for wheezing or shortness of breath, Disp: 18 g, Rfl: 5  •  amLODIPine (NORVASC) 5 mg tablet, Take 1 tablet (5 mg total) by mouth daily, Disp: 90 tablet, Rfl: 2  •  azelastine (ASTELIN) 0.1 % nasal spray, 1 spray into each nostril 2 (two) times a day Use in each nostril as directed, Disp: 1 mL, Rfl: 0  •  benzonatate (TESSALON PERLES) 100 mg capsule, Take 1 capsule (100 mg total) by mouth 3 (three) times a day as needed for cough, Disp: 30 capsule, Rfl: 0  •  budesonide-formoterol (Symbicort) 80-4.5 MCG/ACT inhaler, Inhale 2 puffs 2 (two) times a day Rinse mouth after use, Disp: 10.2 g, Rfl: 5  •  fexofenadine (ALLEGRA) 180 MG tablet, Take 180 mg by mouth daily, Disp: , Rfl:   •   "fluticasone (FLONASE) 50 mcg/act nasal spray, SPRAY 2 SPRAYS INTO EACH NOSTRIL EVERY DAY, Disp: 48 mL, Rfl: 2  •  folic acid (FOLVITE) 1 mg tablet, Take 1 tablet (1,000 mcg total) by mouth daily, Disp: 90 tablet, Rfl: 2  •  hydrOXYzine HCL (ATARAX) 10 mg tablet, Take 1 tablet (10 mg total) by mouth daily at bedtime as needed for anxiety for up to 10 doses, Disp: 10 tablet, Rfl: 0  •  ipratropium-albuterol (DUO-NEB) 0.5-2.5 mg/3 mL nebulizer solution, Take 3 mL by nebulization 4 (four) times a day, Disp: 360 mL, Rfl: 2  •  montelukast (SINGULAIR) 10 mg tablet, Take 1 tablet (10 mg total) by mouth daily at bedtime, Disp: 90 tablet, Rfl: 1  •  ondansetron (Zofran ODT) 4 mg disintegrating tablet, Take 1 tablet (4 mg total) by mouth every 6 (six) hours as needed for nausea or vomiting, Disp: 20 tablet, Rfl: 3  •  pantoprazole (PROTONIX) 20 mg tablet, Take 1 tablet (20 mg total) by mouth daily, Disp: 90 tablet, Rfl: 1  •  predniSONE 10 mg tablet, Take 4 tablets (40 mg total) by mouth daily for 2 days, THEN 3 tablets (30 mg total) daily for 2 days, THEN 2 tablets (20 mg total) daily for 2 days, THEN 1 tablet (10 mg total) daily for 2 days., Disp: 20 tablet, Rfl: 0  •  amoxicillin-clavulanate (AUGMENTIN) 875-125 mg per tablet, Take 1 tablet by mouth every 12 (twelve) hours for 7 days (Patient not taking: Reported on 2/13/2025), Disp: 14 tablet, Rfl: 0  •  Cholecalciferol (D3-1000) 1,000 units tablet, Take 1 tablet (1,000 Units total) by mouth daily (Patient not taking: Reported on 2/13/2025), Disp: 90 tablet, Rfl: 1  •  ondansetron (ZOFRAN) 4 mg tablet, Take 1 tablet (4 mg total) by mouth every 8 (eight) hours as needed for nausea or vomiting, Disp: 30 tablet, Rfl: 0  Allergies   Allergen Reactions   • Ambrosia Artemisiifolia (Ragweed) Skin Test Facial Swelling   • Codeine Other (See Comments)     Heart races   • Epinephrine      Pt reports \"it makes my heart race, they told me I cant take it.\"   • Ibuprofen Other (See " "Comments)     Heart racing   • Morphine Other (See Comments)     Heart racing   • Pollen Extract Sneezing   • Tramadol Other (See Comments)     Heart racing       Vitals: Blood pressure 124/80, pulse 94, temperature 97.8 °F (36.6 °C), temperature source Tympanic, height 5' 3\" (1.6 m), weight 59 kg (130 lb), SpO2 98%., Body mass index is 23.03 kg/m². Oxygen Therapy  SpO2: 98 %  Oxygen Therapy: None (Room air)    Physical Exam  Physical Exam  Constitutional:       Appearance: She is normal weight.   HENT:      Nose: No congestion.      Mouth/Throat:      Pharynx: Oropharynx is clear. No oropharyngeal exudate.   Eyes:      Conjunctiva/sclera: Conjunctivae normal.   Cardiovascular:      Rate and Rhythm: Normal rate.      Pulses: Normal pulses.      Heart sounds: No murmur heard.     No friction rub. No gallop.   Pulmonary:      Effort: No respiratory distress.      Breath sounds: No stridor. Wheezing and rhonchi present. No rales.   Chest:      Chest wall: No tenderness.   Abdominal:      General: Abdomen is flat. There is no distension.      Palpations: There is no mass.      Tenderness: There is no abdominal tenderness.      Hernia: No hernia is present.   Musculoskeletal:         General: Normal range of motion.      Cervical back: Normal range of motion.      Right lower leg: No edema.      Left lower leg: No edema.   Skin:     General: Skin is warm.      Capillary Refill: Capillary refill takes less than 2 seconds.   Neurological:      General: No focal deficit present.      Mental Status: She is alert and oriented to person, place, and time.   Psychiatric:         Mood and Affect: Mood normal.         Labs: I have personally reviewed pertinent lab results.  Lab Results   Component Value Date    WBC 13.73 (H) 02/11/2025    HGB 10.5 (L) 02/11/2025    HCT 33.0 (L) 02/11/2025    MCV 85 02/11/2025     02/11/2025     Lab Results   Component Value Date    GLUCOSE 92 05/19/2015    CALCIUM 8.8 02/11/2025     " "2015    K 3.9 2025    CO2 23 2025     (H) 2025    BUN 21 2025    CREATININE 1.24 2025     No results found for: \"IGE\"  Lab Results   Component Value Date    ALT 11 2025    AST 12 (L) 2025    ALKPHOS 73 2025    BILITOT 0.20 2015       Imaging and other studies: Results Review Statement: I personally reviewed the following image studies/reports in PACS and discussed pertinent findings with Radiology: CT chest. My interpretation of the radiology images/reports is: as above.  XR chest 2 views  Result Date: 2025  Impression: No acute cardiopulmonary disease. Workstation performed: [id number]     Pulmonary function testin/2023  Results:     FEV1/FVC Ratio: 56.74%  Forced Vital Capacity: 2.65 L    108% predicted  FEV1: 1.50 L79% predicted        After administration of bronchodilator      FVC: 2.66 L, 108% predicted, +0 % change  FEV1: 1.58 L, 83% predicted, +5 % change     Lung volumes by body plethysmography:      Total Lung Capacity 98% predicted   Residual volume 95% predicted  RV/TLC Ratio: 45.53%, 98% predicted     DLCO corrected for patients hemoglobin level: 72%     Interpretation:     Mild obstructive airflow defect on spirometry     No significant improvement in airflow or forced vital capacity in response to the administration to bronchodilator per ATS standards.      Normal lung volumes     Normal Diffusion     Flow-volume loop consistent with obstruction     EKG, Pathology, and Other Studies: Results Review Statement: I personally reviewed the following image studies/reports in PACS and discussed pertinent findings with Radiology: CT chest. My interpretation of the radiology images/reports is: As above.    Disclaimer: Portions of the record may have been created with voice recognition software. Occasional wrong word or \"sound a like\" substitutions may have occurred due to the inherent limitations of voice recognition software. " Careful consideration should be taken to recognize, using context, where substitutions have occurred.    Boone Gonzalez DO   Pulmonary & Critical Care Medicine Fellow PGY-V  [facility]    Attestation signed by Siddharth De Leon DO at 2/15/2025  9:22 PM:  Attending Attestation:    I reviewed the care furnished by the Resident/Fellow during the visit on 2/13/2025.  I was present in the office and personally saw and examined the patient.    Assessment/Plan  79 y.o. F with PMHx of COPD, chronic allergic rhinitis, peripheral eosinophilia who comes in for shortness of breath and difficulty getting secretions out.    1.  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  P jg will be to perform bronchoscopy to assess the airway       -  Continue airway clearance as tolerated        -  PAP therapy can be considered to help splint open airway    2.  Moderate persistent asthma and emphysema - continue nebulizers, flutter valve and mucomyst.  Continue symbicort and singulair.        3.  Snoring/Excessive daytime sleepiness      -  Check a home study to assess for obstructive sleep apnea      -  I discussed in depth the diagnostic studies and treatment options involved with obstructive sleep apnea      -  I also discussed in depth the risk of leaving sleep apnea untreated including hypertension, heart failure, arrhythmia, MI and stroke.      -  The patient is agreeable to undergo testing and treatment of obstructive sleep apnea.  She understands that pitfalls she may encounter along the way and is willing to attempt CPAP treatment.      79 y.o. female with past medical history of tobacco abuse in remission, asthma/COPD (diagnosed at age 41), chronic sinusitis, pulmonary nodule, and GERD who presents for follow up for acute respiratory issues.  She has noted shortness of breath and difficulty getting mucus out of her lungs.  She  "notes chest congestion.   She denies fever, chills or night sweats.    /80 (BP Location: Left arm, Patient Position: Sitting, Cuff Size: Standard)   Pulse 94   Temp 97.8 °F (36.6 °C) (Tympanic)   Ht 5' 3\" (1.6 m)   Wt 59 kg (130 lb)   SpO2 98%   BMI 23.03 kg/m²   RA  General:  Patient is awake, alert, non-toxic and in no acute respiratory distress  Eyes: PERRL, no scleral icterus  Neck: No JVD  CV:  Regular, +S1 and S2, No murmurs, gallops or rubs appreciated  Lungs: Clear to auscultation bilateral without wheeze, rales or rhonci  Abdomen: Soft, +BS, Non-tender, non-distended  Extremities: No clubbing, cyanosis or edema  Neuro: No focal motor/sensory deficits  Skin: Warm, No rashes or ulcerations  Lab Results   Component Value Date    WBC 13.73 (H) 2025    HGB 10.5 (L) 2025    HCT 33.0 (L) 2025    MCV 85 2025     2025     Lab Results   Component Value Date    SODIUM 143 2025    K 3.9 2025     (H) 2025    CO2 23 2025    BUN 21 2025    CREATININE 1.24 2025    GLUC 214 (H) 2025    CALCIUM 8.8 2025     Lab Results   Component Value Date    ALT 11 2025    AST 12 (L) 2025    ALKPHOS 73 2025    BILITOT 0.20 2015     Imaging and other studies: Results Review Statement: I personally reviewed the following image studies/reports in PACS and discussed pertinent findings with Radiology: CT chest. My interpretation of the radiology images/reports is: as above.  XR chest 2 views  Result Date: 2025  Impression: No acute cardiopulmonary disease. Workstation performed: [id number]      Pulmonary function testin/2023  Results:     FEV1/FVC Ratio: 56.74%  Forced Vital Capacity: 2.65 L    108% predicted  FEV1: 1.50 L79% predicted        After administration of bronchodilator      FVC: 2.66 L, 108% predicted, +0 % change  FEV1: 1.58 L, 83% predicted, +5 % change     Lung volumes by body " plethysmography:      Total Lung Capacity 98% predicted   Residual volume 95% predicted  RV/TLC Ratio: 45.53%, 98% predicted     DLCO corrected for patients hemoglobin level: 72%     Interpretation:     Mild obstructive airflow defect on spirometry     No significant improvement in airflow or forced vital capacity in response to the administration to bronchodilator per ATS standards.      Normal lung volumes     Normal Diffusion     Flow-volume loop consistent with obstruction      EKG, Pathology, and Other Studies: Results Review Statement: I personally reviewed the following image studies/reports in PACS and discussed pertinent findings with Radiology: CT chest. My interpretation of the radiology images/reports is: As above.

## 2025-02-18 ENCOUNTER — TELEPHONE (OUTPATIENT)
Dept: PULMONOLOGY | Facility: CLINIC | Age: 80
End: 2025-02-18

## 2025-02-18 NOTE — TELEPHONE ENCOUNTER
Dr. Franco will be performing a BRONCHOSCOPY on Mireya Yi on 02/21/2025     LOCATION: [facility]    ANTICOAGULATION INSTRUCTIONS: NONE    OTHER MEDICATION INSTRUCTIONS: NONE    LABS: (CBC/PT/PTT in last 90 days): 02/11/2025   (If no, labs ordered / to be done at least one day prior to procedure)    LAST OFFICE NOTE/H&P within 30 days of procedure: 02/13/2025    INSTRUCTIONS GIVEN: Spoke with patient advised of procedure date and location. Patient aware she is to be NPO after midnight the night prior and will need a . Patient advised she will receive a call the day before with time of arrival.    Thank You   Esperanza Stevens

## 2025-02-19 ENCOUNTER — TELEPHONE (OUTPATIENT)
Dept: SLEEP CENTER | Facility: CLINIC | Age: 80
End: 2025-02-19

## 2025-02-19 ENCOUNTER — TELEPHONE (OUTPATIENT)
Age: 80
End: 2025-02-19

## 2025-02-19 DIAGNOSIS — G47.19 EXCESSIVE DAYTIME SLEEPINESS: ICD-10-CM

## 2025-02-19 DIAGNOSIS — R06.83 SNORING: ICD-10-CM

## 2025-02-19 DIAGNOSIS — Z91.89 AT RISK FOR OBSTRUCTIVE SLEEP APNEA: Primary | ICD-10-CM

## 2025-02-19 NOTE — PROGRESS NOTES
Received message regarding denial from insurance for home study due to patient's history of oxygen use. Ordered diagnostic sleep study instead.

## 2025-02-19 NOTE — TELEPHONE ENCOUNTER
Referral placed for a home sleep study. Patient has oxygen use listed as a co-morbidity. Patient's insurance does not cover a home sleep study.      Please place new referral for sleep consultation.     Ordering and co-signing providers notified.

## 2025-02-19 NOTE — TELEPHONE ENCOUNTER
Caller: Patient    Doctor: Dr. SINGER    Reason for call: Patient is having a hard time sleep due to pain, Would like to know recommendations while waiting for her OV    Please advise    Call back#:

## 2025-02-21 ENCOUNTER — ANESTHESIA EVENT (OUTPATIENT)
Dept: GASTROENTEROLOGY | Facility: HOSPITAL | Age: 80
End: 2025-02-21
Payer: MEDICARE

## 2025-02-21 ENCOUNTER — HOSPITAL ENCOUNTER (OUTPATIENT)
Dept: GASTROENTEROLOGY | Facility: HOSPITAL | Age: 80
End: 2025-02-21
Attending: INTERNAL MEDICINE
Payer: MEDICARE

## 2025-02-21 ENCOUNTER — ANESTHESIA (OUTPATIENT)
Dept: GASTROENTEROLOGY | Facility: HOSPITAL | Age: 80
End: 2025-02-21
Payer: MEDICARE

## 2025-02-21 ENCOUNTER — TELEPHONE (OUTPATIENT)
Age: 80
End: 2025-02-21

## 2025-02-21 VITALS
BODY MASS INDEX: 23.04 KG/M2 | TEMPERATURE: 98.8 F | HEIGHT: 63 IN | SYSTOLIC BLOOD PRESSURE: 133 MMHG | WEIGHT: 130 LBS | RESPIRATION RATE: 16 BRPM | HEART RATE: 97 BPM | DIASTOLIC BLOOD PRESSURE: 86 MMHG | OXYGEN SATURATION: 96 %

## 2025-02-21 DIAGNOSIS — J45.41 MODERATE PERSISTENT ASTHMA WITH EXACERBATION: ICD-10-CM

## 2025-02-21 DIAGNOSIS — T17.500A MUCUS PLUGGING OF BRONCHI: ICD-10-CM

## 2025-02-21 DIAGNOSIS — J98.8 NARROWING OF AIRWAY: ICD-10-CM

## 2025-02-21 PROCEDURE — 87252 VIRUS INOCULATION TISSUE: CPT | Performed by: INTERNAL MEDICINE

## 2025-02-21 PROCEDURE — 87070 CULTURE OTHR SPECIMN AEROBIC: CPT | Performed by: INTERNAL MEDICINE

## 2025-02-21 PROCEDURE — 89051 BODY FLUID CELL COUNT: CPT | Performed by: INTERNAL MEDICINE

## 2025-02-21 PROCEDURE — 87102 FUNGUS ISOLATION CULTURE: CPT | Performed by: INTERNAL MEDICINE

## 2025-02-21 PROCEDURE — 88112 CYTOPATH CELL ENHANCE TECH: CPT | Performed by: PATHOLOGY

## 2025-02-21 PROCEDURE — 87116 MYCOBACTERIA CULTURE: CPT | Performed by: INTERNAL MEDICINE

## 2025-02-21 PROCEDURE — 87206 SMEAR FLUORESCENT/ACID STAI: CPT | Performed by: INTERNAL MEDICINE

## 2025-02-21 PROCEDURE — 87798 DETECT AGENT NOS DNA AMP: CPT | Performed by: INTERNAL MEDICINE

## 2025-02-21 PROCEDURE — 31624 DX BRONCHOSCOPE/LAVAGE: CPT | Performed by: INTERNAL MEDICINE

## 2025-02-21 PROCEDURE — 87205 SMEAR GRAM STAIN: CPT | Performed by: INTERNAL MEDICINE

## 2025-02-21 PROCEDURE — 88305 TISSUE EXAM BY PATHOLOGIST: CPT | Performed by: PATHOLOGY

## 2025-02-21 RX ORDER — SODIUM CHLORIDE 9 MG/ML
INJECTION, SOLUTION INTRAVENOUS CONTINUOUS PRN
Status: DISCONTINUED | OUTPATIENT
Start: 2025-02-21 | End: 2025-02-21

## 2025-02-21 RX ORDER — PROPOFOL 10 MG/ML
INJECTION, EMULSION INTRAVENOUS CONTINUOUS PRN
Status: DISCONTINUED | OUTPATIENT
Start: 2025-02-21 | End: 2025-02-21

## 2025-02-21 RX ORDER — PROPOFOL 10 MG/ML
INJECTION, EMULSION INTRAVENOUS AS NEEDED
Status: DISCONTINUED | OUTPATIENT
Start: 2025-02-21 | End: 2025-02-21

## 2025-02-21 RX ORDER — FENTANYL CITRATE 50 UG/ML
INJECTION, SOLUTION INTRAMUSCULAR; INTRAVENOUS AS NEEDED
Status: DISCONTINUED | OUTPATIENT
Start: 2025-02-21 | End: 2025-02-21

## 2025-02-21 RX ORDER — LIDOCAINE HYDROCHLORIDE 10 MG/ML
INJECTION, SOLUTION EPIDURAL; INFILTRATION; INTRACAUDAL; PERINEURAL AS NEEDED
Status: COMPLETED | OUTPATIENT
Start: 2025-02-21 | End: 2025-02-21

## 2025-02-21 RX ORDER — LIDOCAINE HYDROCHLORIDE 10 MG/ML
INJECTION, SOLUTION EPIDURAL; INFILTRATION; INTRACAUDAL; PERINEURAL AS NEEDED
Status: DISCONTINUED | OUTPATIENT
Start: 2025-02-21 | End: 2025-02-21

## 2025-02-21 RX ORDER — ONDANSETRON 2 MG/ML
INJECTION INTRAMUSCULAR; INTRAVENOUS AS NEEDED
Status: DISCONTINUED | OUTPATIENT
Start: 2025-02-21 | End: 2025-02-21

## 2025-02-21 RX ORDER — DEXAMETHASONE SODIUM PHOSPHATE 10 MG/ML
INJECTION, SOLUTION INTRAMUSCULAR; INTRAVENOUS AS NEEDED
Status: DISCONTINUED | OUTPATIENT
Start: 2025-02-21 | End: 2025-02-21

## 2025-02-21 RX ADMIN — PROPOFOL 120 MCG/KG/MIN: 10 INJECTION, EMULSION INTRAVENOUS at 12:12

## 2025-02-21 RX ADMIN — ONDANSETRON 4 MG: 2 INJECTION INTRAMUSCULAR; INTRAVENOUS at 12:18

## 2025-02-21 RX ADMIN — LIDOCAINE HYDROCHLORIDE 8 ML: 10 INJECTION, SOLUTION EPIDURAL; INFILTRATION; INTRACAUDAL; PERINEURAL at 12:20

## 2025-02-21 RX ADMIN — PROPOFOL 50 MG: 10 INJECTION, EMULSION INTRAVENOUS at 12:16

## 2025-02-21 RX ADMIN — PROPOFOL 120 MG: 10 INJECTION, EMULSION INTRAVENOUS at 12:09

## 2025-02-21 RX ADMIN — DEXAMETHASONE SODIUM PHOSPHATE 10 MG: 10 INJECTION, SOLUTION INTRAMUSCULAR; INTRAVENOUS at 12:09

## 2025-02-21 RX ADMIN — SODIUM CHLORIDE: 0.9 INJECTION, SOLUTION INTRAVENOUS at 10:47

## 2025-02-21 RX ADMIN — FENTANYL CITRATE 50 MCG: 50 INJECTION INTRAMUSCULAR; INTRAVENOUS at 12:15

## 2025-02-21 RX ADMIN — LIDOCAINE HYDROCHLORIDE 50 MG: 10 INJECTION, SOLUTION EPIDURAL; INFILTRATION; INTRACAUDAL; PERINEURAL at 12:09

## 2025-02-21 NOTE — ANESTHESIA POSTPROCEDURE EVALUATION
Post-Op Assessment Note    CV Status:  Stable  Pain Score: 0    Pain management: adequate       Mental Status:  Alert and sleepy   Hydration Status:  Stable   PONV Controlled:  None   Airway Patency:  Patent     Post Op Vitals Reviewed: Yes    No anethesia notable event occurred.    Staff: CRNA           Last Filed PACU Vitals:  Vitals Value Taken Time   Temp 98.8 °F (37.1 °C) 02/21/25 1241   Pulse 100 02/21/25 1241   /81 02/21/25 1241   Resp     SpO2 98 % 02/21/25 1241       Modified Dimitry:     Vitals Value Taken Time   Activity 2 02/21/25 1243   Respiration 2 02/21/25 1243   Circulation 2 02/21/25 1243   Consciousness 1 02/21/25 1243   Oxygen Saturation 1 02/21/25 1243     Modified Dimitry Score: 8

## 2025-02-21 NOTE — TELEPHONE ENCOUNTER
Caller: Mireya BAER    Doctor: Dr. De La Garza    Reason for call: Pt would like [facility] for her appt 02/25/2025. Sent POM email for review     Call back#: [phone]

## 2025-02-21 NOTE — TELEPHONE ENCOUNTER
Caller: Patient    Doctor: Dr. MCDANIEL    Reason for call:     Transferred to [facility]    Call back#: n/a

## 2025-02-21 NOTE — INTERVAL H&P NOTE
H&P reviewed. After examining the patient I find no changes in the patients condition since the H&P had been written.    Vitals:    02/21/25 1034   BP: 164/85   Pulse: 102   Resp: 20   Temp: 98.5 °F (36.9 °C)   SpO2: 96%     Exam  Lungs clear, no wheeze or rales  CV reg, single s1/2, no m  Ext warm, no edema    CT reviewed with VANITA/RUL and BI narrowing with questionable endobronchial lesion vs mucous    Assessment  Abnormal CT chest  Chronic cough    PLAN  Bronchoscopy for airway survey and possible biopsy of endobronchial lesion  Informed consent obtained and all questions answered    Francisco Franco, DO

## 2025-02-21 NOTE — ANESTHESIA PREPROCEDURE EVALUATION
Procedure:  BRONCHOSCOPY    Per patient and daughter, had a stomach procedure about 27 years ago c/b lung collapse transiently requiring a machine to help patient breath in the post op period   Uses inhalers regularly and as needed for chronic cough and sob since having Covid about 2 months ago     Review of Systems/Medical History  Patient summary reviewed.  Chart reviewed.  No history of anesthetic complications     Cardiovascular  EKG reviewed. Exercise tolerance (METS): >4. Hypertension    Comment: Hx irregular heartbeat  EKG- 5/2018--SR 87, Old septal MI, T wave inversion laterally (new) Pulmonary   Smoker (1 pk every 3 days) cigarette smoker   , Asthma (Mild intermittent) , well controlled/ stable Last rescue: < 1 week ago Asthma type of rescue: PRN nebulizer  Comment: Seasonal allergies     GI/Hepatic     GERD well controlled, Liver disease (Focal nodular hyperplasia)   Comment: IBS     Negative  ROS        Endo/Other  Negative endo/other ROS       GYN  Negative gynecology ROS          Hematology  Negative hematology ROS ,     Musculoskeletal   Osteoarthritis        Neurology   ,  Headaches (Migraines), Fibromyalgia  Comment: Hx vertigo  Fibromyalgia Psychology    Anxiety     Comment: Panic attacks       Physical Exam    Airway    Mallampati score: II  TM Distance: >3 FB       Dental    upper dentures and lower dentures,     Cardiovascular  Rhythm: regular    Pulmonary   Breath sounds clear to auscultation    Other Findings  post-pubertal.      Anesthesia Plan  ASA Score- 3     Anesthesia Type- general with ASA Monitors.         Additional Monitors:     Airway Plan: LMA.           Plan Factors-Exercise tolerance (METS): >4.    Chart reviewed. EKG reviewed.  Existing labs reviewed. Patient summary reviewed.    Patient is a current smoker.  Patient instructed to abstain from smoking on day of procedure. Patient did not smoke on day of surgery.            Induction- intravenous.    Postoperative Plan-          Informed Consent- Anesthetic plan and risks discussed with patient and daughter.  I personally reviewed this patient with the CRNA. Discussed and agreed on the Anesthesia Plan with the CRNA..

## 2025-02-22 LAB — RHODAMINE-AURAMINE STN SPEC: NORMAL

## 2025-02-23 LAB
BACTERIA BRONCH AEROBE CULT: NO GROWTH
GRAM STN SPEC: NORMAL

## 2025-02-24 ENCOUNTER — TELEPHONE (OUTPATIENT)
Dept: SLEEP CENTER | Facility: CLINIC | Age: 80
End: 2025-02-24

## 2025-02-24 ENCOUNTER — TELEPHONE (OUTPATIENT)
Dept: OBGYN CLINIC | Facility: HOSPITAL | Age: 80
End: 2025-02-24

## 2025-02-24 LAB — FUNGUS SPEC CULT: NORMAL

## 2025-02-24 NOTE — TELEPHONE ENCOUNTER
Referral placed for a diagnostic sleep study.     Please addend note with specific discussion of symptoms (snoring/choking/gasping/witnessed apneas/excessive daytime fatigue).    Following a review of the Sleep Study/Sleep Consult ordering and insurance approval process, going forward, the Sleep Medicine Department is asking that the specific symptoms selected within the order for a sleep study and/or Sleep Medicine Consult (i.e. witnessed apneas, snoring, daytime sleepiness, etc.) be directly discussed (if even briefly) within the note from the face-to-face encounter. This is a slight change from what was asked in the past, but will help with proper compliance from a coding and insurance approval standpoint, ensuring that sleep studies are able to obtain insurance approval and that patients are then able to obtain the studies without difficulty. Thank you!     Ordering and co-signing providers notified.

## 2025-02-24 NOTE — TELEPHONE ENCOUNTER
I have called patient back to discuss symptoms of daytime sleepiness and I have added an addendum to the visit regarding symptoms of daytime sleepiness for the diagnostic sleep study referral.

## 2025-02-24 NOTE — PROGRESS NOTES
Assessment:  1. Lumbar radiculopathy    2. Lumbar spondylosis    3. Spinal stenosis of lumbar region, unspecified whether neurogenic claudication present        Plan:  I will initiate pregabalin 50 mg nightly and titrate to 150 mg daily for neuropathic complaints.  I discussed with the patient the type of medication it is, how it works, and that it requires a titration process that is specific to each individual. I reviewed with the patient that it may take 3-4 weeks for the medication's effects to be noticed and that it should never be abruptly stopped. Possible side effects include but are not limited to; vertigo, lethargy, nausea, and edema of the extremities. Advised the patient to call our office if they experience any side effects. The patient verbalized an understanding.  Continue to follow with orthopedic surgery as scheduled  Patient may continue Tylenol as needed  Will avoid NSAIDs secondary to CKD  Patient not an ideal opiate candidate secondary to significant respiratory compromise and pulmonary disease  Continue with home exercise program  Follow-up in 3 months or sooner if needed    History of Present Illness:    The patient is a 79 y.o. female with a history of emphysema, hypertension and CKD last seen on 10/28/2024 who presents for a follow up office visit in regards to chronic lumbosacral back pain with radiculopathy into the bilateral lower extremities, left greater than right with associated paresthesias.  She denies bowel or bladder incontinence or saddle anesthesia.  Patient unfortunately has failed lumbar epidural steroid injections and lumbar medial branch blocks.  She was evaluated by orthopedic surgery.  Unfortunately the patient has been suffering multiple pulmonary complications and would need clearance prior to pursuing surgical intervention.  She also needs DEXA scan which is currently scheduled.    Patient rates her pain a 10 out of 10 on the numeric pain rating scale.  Pain is  described as throbbing.  She unfortunately has not had relief with Tylenol, side effects to gabapentin, and physical therapy has not provided relief.    I have personally reviewed and/or updated the patient's past medical history, past surgical history, family history, social history, current medications, allergies, and vital signs today.       Review of Systems:    Review of Systems      Past Medical History:   Diagnosis Date    Asthma     Asthmatic bronchitis     Last Assessed: 10/7/2014     Chronic diarrhea     Last Assessed: 8/20/2015     Fibromyalgia     Focal nodular hyperplasia of liver     Last Assessed: 6/11/2015    Herpes zoster     Last Assessed: 3/18/2016    Hypertension     IBS (irritable bowel syndrome)     Intermittent palpitations     Irritable bowel syndrome     Lumbar radiculopathy     Osteoarthritis     Vertigo        Past Surgical History:   Procedure Laterality Date    BREAST BIOPSY      BREAST LUMPECTOMY      when pt was 18    SMALL INTESTINE SURGERY         Family History   Problem Relation Age of Onset    Heart attack Mother     Asthma Father     Breast cancer Sister     Breast cancer Sister     No Known Problems Daughter     No Known Problems Daughter     Arthritis Family     Osteoporosis Family        Social History     Occupational History    Occupation: Unemployed    Tobacco Use    Smoking status: Some Days     Types: Cigarettes    Smokeless tobacco: Never    Tobacco comments:     Hasn't been smoking recently because of the cough., about 8 days.   Vaping Use    Vaping status: Never Used   Substance and Sexual Activity    Alcohol use: Not Currently    Drug use: No    Sexual activity: Not Currently     Partners: Male         Current Outpatient Medications:     acetaminophen (TYLENOL) 500 mg tablet, Take 1 tablet (500 mg total) by mouth every 6 (six) hours as needed for mild pain, Disp: 60 tablet, Rfl: 0    acetylcysteine (MUCOMYST) 10 % nebulizer solution, Take 4 mL by nebulization every 4  (four) hours, Disp: 30 mL, Rfl: 0    albuterol (2.5 mg/3 mL) 0.083 % nebulizer solution, Take 3 mL (2.5 mg total) by nebulization every 6 (six) hours as needed for wheezing or shortness of breath, Disp: 60 mL, Rfl: 0    albuterol (PROVENTIL HFA,VENTOLIN HFA) 90 mcg/act inhaler, Inhale 2 puffs every 6 (six) hours as needed for wheezing or shortness of breath, Disp: 18 g, Rfl: 5    amLODIPine (NORVASC) 5 mg tablet, Take 1 tablet (5 mg total) by mouth daily, Disp: 90 tablet, Rfl: 2    azelastine (ASTELIN) 0.1 % nasal spray, 1 spray into each nostril 2 (two) times a day Use in each nostril as directed, Disp: 1 mL, Rfl: 0    Cholecalciferol (D3-1000) 1,000 units tablet, Take 1 tablet (1,000 Units total) by mouth daily, Disp: 90 tablet, Rfl: 1    fexofenadine (ALLEGRA) 180 MG tablet, Take 180 mg by mouth daily, Disp: , Rfl:     fluticasone (FLONASE) 50 mcg/act nasal spray, SPRAY 2 SPRAYS INTO EACH NOSTRIL EVERY DAY, Disp: 48 mL, Rfl: 2    hydrOXYzine HCL (ATARAX) 10 mg tablet, Take 1 tablet (10 mg total) by mouth daily at bedtime as needed for anxiety for up to 10 doses, Disp: 10 tablet, Rfl: 0    ipratropium-albuterol (DUO-NEB) 0.5-2.5 mg/3 mL nebulizer solution, Take 3 mL by nebulization 4 (four) times a day, Disp: 360 mL, Rfl: 2    montelukast (SINGULAIR) 10 mg tablet, Take 1 tablet (10 mg total) by mouth daily at bedtime, Disp: 90 tablet, Rfl: 1    omeprazole (PriLOSEC) 20 mg delayed release capsule, Take 20 mg by mouth daily, Disp: , Rfl:     ondansetron (Zofran ODT) 4 mg disintegrating tablet, Take 1 tablet (4 mg total) by mouth every 6 (six) hours as needed for nausea or vomiting, Disp: 20 tablet, Rfl: 3    pregabalin (LYRICA) 50 mg capsule, Take 1 PO HS x 5 days, then 1 PO BID x 5 days, then 1 PO AM and 2 PO HS, Disp: 90 capsule, Rfl: 2    benzonatate (TESSALON PERLES) 100 mg capsule, Take 1 capsule (100 mg total) by mouth 3 (three) times a day as needed for cough (Patient not taking: Reported on 2/21/2025), Disp:  "30 capsule, Rfl: 0    budesonide-formoterol (Symbicort) 80-4.5 MCG/ACT inhaler, Inhale 2 puffs 2 (two) times a day Rinse mouth after use, Disp: 10.2 g, Rfl: 5    folic acid (FOLVITE) 1 mg tablet, Take 1 tablet (1,000 mcg total) by mouth daily (Patient not taking: Reported on 2/21/2025), Disp: 90 tablet, Rfl: 2    ondansetron (ZOFRAN) 4 mg tablet, Take 1 tablet (4 mg total) by mouth every 8 (eight) hours as needed for nausea or vomiting, Disp: 30 tablet, Rfl: 0    pantoprazole (PROTONIX) 20 mg tablet, Take 1 tablet (20 mg total) by mouth daily (Patient not taking: Reported on 2/21/2025), Disp: 90 tablet, Rfl: 1    Allergies   Allergen Reactions    Ambrosia Artemisiifolia (Ragweed) Skin Test Facial Swelling    Codeine Other (See Comments)     Heart races    Epinephrine      Pt reports \"it makes my heart race, they told me I cant take it.\"    Ibuprofen Other (See Comments)     Heart racing    Morphine Other (See Comments)     Heart racing    Pollen Extract Sneezing    Tramadol Other (See Comments)     Heart racing       Physical Exam:    Ht 5' 3\" (1.6 m)   Wt 59 kg (130 lb)   BMI 23.03 kg/m²     Constitutional:normal, well developed, well nourished, alert, in no distress and non-toxic and no overt pain behavior.  Eyes:anicteric  HEENT:grossly intact  Neck:supple, symmetric, trachea midline and no masses   Pulmonary:even and unlabored  Cardiovascular:No edema or pitting edema present  Skin:Normal without rashes or lesions and well hydrated  Psychiatric:Mood and affect appropriate  Neurologic:Cranial Nerves II-XII grossly intact  Musculoskeletal: Slightly antalgic gait but steady without assistive devices      Imaging  No orders to display     MRI LUMBAR SPINE WITHOUT CONTRAST     INDICATION: M54.16: Radiculopathy, lumbar region  R52: Pain, unspecified  M48.062: Spinal stenosis, lumbar region with neurogenic claudication.   Surgical planning. Lumbar stenosis.     COMPARISON: 11/11/2024 radiographs. 12/2/2023 MRI. " 2/9/2010 abdominopelvic CT     TECHNIQUE:  Multiplanar, multisequence imaging of the lumbar spine was performed. .        IMAGE QUALITY:  Diagnostic     FINDINGS:     Vertebral bodies are numbered such that the 1st conical shape vertebral segment is called S1.  Iliolumbar ligaments at L5. 5 nonrib-bearing vertebral bodies.     VERTEBRAL BODIES:  Alignment: Preserved.     Vertebral body heights: Normal.     Bone marrow: Normal signal intensity.     SACRUM:  Normal.     DISTAL CORD AND CONUS:  Conus and nerve roots are within normal limits. Conus terminates at L1.     PARASPINAL SOFT TISSUES:  Unchanged mild posterior paraspinal muscle fatty infiltration.  Chronic fibroid uterus.     Renal cysts and colonic diverticulosis.     LOWER THORACIC DISC SPACES:  Within normal limits.     LUMBAR DISC SPACES: Diffuse disc desiccation. Mild loss of height at L5-S1.     L1-L2:  Normal.     L2-L3:  Normal.     L3-L4: Unchanged small disc bulge and mild facet arthropathy. Minimal central canal and mild bilateral foraminal narrowing.     L4-L5: Similar small disc bulge, tiny left central protrusion and bilateral facet hypertrophy with ligamentum flavum infolding and 0.5 cm right lateral recess synovial cyst. Mild central canal, moderate bilateral lateral recess and mild bilateral   foraminal narrowing.     L5-S1: Similar small disc bulge. Small, new right subarticular protrusion. Mild facet hypertrophy. Minimal central canal and foraminal narrowing. Moderate to marked right lateral recess narrowing. Evidence of mass effect on the traversing right S1 nerve   root.     IMPRESSION:     New, small right disc herniation at L5-S1 compared to 12/2/2023. Moderate to marked right lateral recess narrowing with evidence of right S1 nerve root impingement.     Other stable findings above.       No orders of the defined types were placed in this encounter.

## 2025-02-25 ENCOUNTER — TELEPHONE (OUTPATIENT)
Dept: PAIN MEDICINE | Facility: CLINIC | Age: 80
End: 2025-02-25

## 2025-02-25 ENCOUNTER — TRANSCRIBE ORDERS (OUTPATIENT)
Dept: SLEEP CENTER | Facility: CLINIC | Age: 80
End: 2025-02-25

## 2025-02-25 ENCOUNTER — OFFICE VISIT (OUTPATIENT)
Dept: PAIN MEDICINE | Facility: CLINIC | Age: 80
End: 2025-02-25
Payer: MEDICARE

## 2025-02-25 VITALS — WEIGHT: 130 LBS | BODY MASS INDEX: 23.04 KG/M2 | HEIGHT: 63 IN

## 2025-02-25 DIAGNOSIS — G47.19 EXCESSIVE DAYTIME SLEEPINESS: ICD-10-CM

## 2025-02-25 DIAGNOSIS — R06.83 SNORING: ICD-10-CM

## 2025-02-25 DIAGNOSIS — M48.061 SPINAL STENOSIS OF LUMBAR REGION, UNSPECIFIED WHETHER NEUROGENIC CLAUDICATION PRESENT: ICD-10-CM

## 2025-02-25 DIAGNOSIS — M47.816 LUMBAR SPONDYLOSIS: ICD-10-CM

## 2025-02-25 DIAGNOSIS — Z91.89 AT RISK FOR OBSTRUCTIVE SLEEP APNEA: Primary | ICD-10-CM

## 2025-02-25 DIAGNOSIS — M54.16 LUMBAR RADICULOPATHY: Primary | ICD-10-CM

## 2025-02-25 LAB
MYCOBACTERIUM SPEC CULT: NORMAL
PNEUMOCYSTIS PCR: NEGATIVE
RHODAMINE-AURAMINE STN SPEC: NORMAL

## 2025-02-25 PROCEDURE — 88305 TISSUE EXAM BY PATHOLOGIST: CPT | Performed by: PATHOLOGY

## 2025-02-25 PROCEDURE — 99214 OFFICE O/P EST MOD 30 MIN: CPT | Performed by: NURSE PRACTITIONER

## 2025-02-25 PROCEDURE — 88112 CYTOPATH CELL ENHANCE TECH: CPT | Performed by: PATHOLOGY

## 2025-02-25 RX ORDER — PREGABALIN 50 MG/1
CAPSULE ORAL
Qty: 90 CAPSULE | Refills: 2 | Status: SHIPPED | OUTPATIENT
Start: 2025-02-25

## 2025-02-25 NOTE — PATIENT INSTRUCTIONS
Patient Education     Pregabalin (pre OTTO a deja)   Brand Names: US Lyrica; Lyrica CR   Brand Names: Felix ACH-Pregabalin; AG-Pregabalin; APO-Pregabalin; Auro-Pregabalin; DOM-Pregabalin; JAMP-Pregabalin; Lyrica; M-Pregabalin; Mar-Pregabalin; MINT-Pregabalin; LENKA-Pregabalin; NRA-Pregabalin; PMS-Pregabalin; TREY-Pregabalin; SANDOZ Pregabalin; TARO-Pregabalin; TEVA-Pregabalin   What is this drug used for?   It is used to help control certain kinds of seizures.  It is used to treat painful nerve diseases.  It is used to treat fibromyalgia.  It may be given to you for other reasons. Talk with the doctor.  What do I need to tell my doctor BEFORE I take this drug?   If you are allergic to this drug; any part of this drug; or any other drugs, foods, or substances. Tell your doctor about the allergy and what signs you had.  If you have kidney disease.  If you are breast-feeding. Do not breast-feed while you take this drug.  This is not a list of all drugs or health problems that interact with this drug.  Tell your doctor and pharmacist about all of your drugs (prescription or OTC, natural products, vitamins) and health problems. You must check to make sure that it is safe for you to take this drug with all of your drugs and health problems. Do not start, stop, or change the dose of any drug without checking with your doctor.  What are some things I need to know or do while I take this drug?   Tell all of your health care providers that you take this drug. This includes your doctors, nurses, pharmacists, and dentists.  Avoid driving and doing other tasks or actions that call for you to be alert or have clear eyesight until you see how this drug affects you.  If seizures are different or worse after starting this drug, talk with the doctor.  Do not stop taking this drug all of a sudden without calling your doctor. You may have a greater risk of side effects. If you need to stop this drug, you will want to slowly stop it as  ordered by your doctor.  Avoid drinking alcohol while taking this drug.  Talk with your doctor before you use marijuana, other forms of cannabis, or prescription or OTC drugs that may slow your actions.  A very bad reaction called angioedema has happened with this drug. Sometimes, this may be life-threatening. Signs may include swelling of the hands, face, lips, eyes, tongue, or throat; trouble breathing; trouble swallowing; or unusual hoarseness. Get medical help right away if you have any of these signs.  Severe breathing problems have happened with this drug in people taking certain other drugs (like opioid pain drugs). This has also happened in people who already have lung or breathing problems. The risk may also be greater in people who are older than 65. Sometimes, breathing problems have been deadly. If you have questions, talk with the doctor.  If you are 65 or older, use this drug with care. You could have more side effects.  Talk with your doctor if you plan to father a child. This drug made male animals less fertile and caused sperm changes. Birth defects also happened in the young of male animals treated with this drug. It is not known if these problems happen in humans.  Tell your doctor if you are pregnant or plan on getting pregnant. You will need to talk about the benefits and risks of using this drug while you are pregnant.  What are some side effects that I need to call my doctor about right away?   WARNING/CAUTION: Even though it may be rare, some people may have very bad and sometimes deadly side effects when taking a drug. Tell your doctor or get medical help right away if you have any of the following signs or symptoms that may be related to a very bad side effect:  Signs of an allergic reaction, like rash; hives; itching; red, swollen, blistered, or peeling skin with or without fever; wheezing; tightness in the chest or throat; trouble breathing, swallowing, or talking; unusual hoarseness; or  swelling of the mouth, face, lips, tongue, or throat.  Change in eyesight.  Muscle pain or weakness.  Change in balance.  Feeling confused.  Shakiness.  Trouble breathing, slow breathing, or shallow breathing.  Blue or gray color of the skin, lips, nail beds, fingers, or toes.  Memory problems or loss.  Shortness of breath, a big weight gain, or swelling in the arms or legs.  Fast or abnormal heartbeat.  Fever, chills, or sore throat.  Skin sores.  Any skin change.  Trouble speaking.  Trouble sleeping.  Trouble walking.  Feeling high (easy laughing and feeling good).  Twitching.  Get medical help right away if you feel very sleepy, very dizzy, or if you pass out. Caregivers or others need to get medical help right away if the patient does not respond, does not answer or react like normal, or will not wake up.  Like other drugs that may be used for seizures, this drug may rarely raise the risk of suicidal thoughts or actions. The risk may be higher in people who have had suicidal thoughts or actions in the past. Call the doctor right away about any new or worse signs like depression; feeling nervous, restless, or grouchy; panic attacks; or other changes in mood or behavior. Call the doctor right away if any suicidal thoughts or actions occur.  Low platelet counts have rarely happened with this drug. This may lead to a higher chance of bleeding. Call your doctor right away if you have any unexplained bruising or bleeding.  What are some other side effects of this drug?   All drugs may cause side effects. However, many people have no side effects or only have minor side effects. Call your doctor or get medical help if any of these side effects or any other side effects bother you or do not go away:  Feeling dizzy, sleepy, tired, or weak.  Weight gain.  Not able to focus.  Headache.  Dry mouth.  Constipation.  Increased appetite.  Upset stomach.  Joint pain.  Nose or throat irritation.  These are not all of the side  effects that may occur. If you have questions about side effects, call your doctor. Call your doctor for medical advice about side effects.  You may report side effects to your national health agency.  You may report side effects to the FDA at 1-658.966.1986. You may also report side effects at https://www.fda.gov/medwatch.  How is this drug best taken?   Use this drug as ordered by your doctor. Read all information given to you. Follow all instructions closely.  Extended-release tablets:   Take after the evening meal if taking once daily.  Swallow whole. Do not chew, break, or crush.  Keep taking this drug as you have been told by your doctor or other health care provider, even if you feel well.  Capsules and oral solution:   Take with or without food.  Keep taking this drug as you have been told by your doctor or other health care provider, even if you feel well.  Oral solution:   Measure liquid doses carefully. Use the measuring device that comes with this drug. If there is none, ask the pharmacist for a device to measure this drug.  What do I do if I miss a dose?   Extended-release tablets:   Take a missed dose just before bedtime after eating a snack or after the next day's morning meal.  If you miss taking the missed dose after the next day's morning meal, skip the missed dose and go back to your normal time.  Do not take 2 doses at the same time or extra doses.  Capsules and oral solution:   Take a missed dose as soon as you think about it.  If it is close to the time for your next dose, skip the missed dose and go back to your normal time.  Do not take 2 doses at the same time or extra doses.  How do I store and/or throw out this drug?   Store in the original container at room temperature.  Store in a dry place. Do not store in a bathroom.  Store this drug in a safe place where children cannot see or reach it, and where other people cannot get to it. A locked box or area may help keep this drug safe. Keep  all drugs away from pets.  Throw away unused or  drugs. Do not flush down a toilet or pour down a drain unless you are told to do so. Check with your pharmacist if you have questions about the best way to throw out drugs. There may be drug take-back programs in your area.  General drug facts   If your symptoms or health problems do not get better or if they become worse, call your doctor.  Do not share your drugs with others and do not take anyone else's drugs.  Some drugs may have another patient information leaflet. If you have any questions about this drug, please talk with your doctor, nurse, pharmacist, or other health care provider.  This drug comes with an extra patient fact sheet called a Medication Guide. Read it with care. Read it again each time this drug is refilled. If you have any questions about this drug, please talk with the doctor, pharmacist, or other health care provider.  If you think there has been an overdose, call your poison control center or get medical care right away. Be ready to tell or show what was taken, how much, and when it happened.  Consumer Information Use and Disclaimer   This generalized information is a limited summary of diagnosis, treatment, and/or medication information. It is not meant to be comprehensive and should be used as a tool to help the user understand and/or assess potential diagnostic and treatment options. It does NOT include all information about conditions, treatments, medications, side effects, or risks that may apply to a specific patient. It is not intended to be medical advice or a substitute for the medical advice, diagnosis, or treatment of a health care provider based on the health care provider's examination and assessment of a patient's specific and unique circumstances. Patients must speak with a health care provider for complete information about their health, medical questions, and treatment options, including any risks or benefits  regarding use of medications. This information does not endorse any treatments or medications as safe, effective, or approved for treating a specific patient. Up[employer]Date, Inc[employer] and its affiliates disclaim any warranty or liability relating to this information or the use thereof. The use of this information is governed by the Terms of Use, available at https://www.[employer].com/en/know/clinical-effectiveness-terms.  Last Reviewed Date   2023-12-21  Copyright   © 2024 UpToDate, Inc. and its affiliates and/or licensors. All rights reserved.

## 2025-02-25 NOTE — TELEPHONE ENCOUNTER
Caller: Mireya    Doctor: Dr. Avendaño    Reason for call: spoke with pharmacy and they need prior authorization for Lyrica 50 mg     I made aware it could take up to 7-21 days     Call back#: [phone]

## 2025-02-25 NOTE — TELEPHONE ENCOUNTER
PA for PREGABALIN 50 MG  APPROVED     Date(s) approved UNTIL 02/25/2025        Patient advised by          [x]MyChart Message  []Phone call   []LMOM  []L/M to call office as no active Communication consent on file  []Unable to leave detailed message as VM not approved on Communication consent       Pharmacy advised by    [x]Fax  []Phone call         Approval letter scanned into [employer] Yes

## 2025-02-26 ENCOUNTER — TELEPHONE (OUTPATIENT)
Dept: FAMILY MEDICINE CLINIC | Facility: CLINIC | Age: 80
End: 2025-02-26

## 2025-02-26 NOTE — TELEPHONE ENCOUNTER
Nebulizer order and supplies from 2/10/25 refaxed to [facility]. Please let daughter know she can contact them this afternoon @ [phone].

## 2025-02-26 NOTE — TELEPHONE ENCOUNTER
Called daughter and she specified to call Patient directly with the information    M for patient relaying information from Kady    Patient can be reached at [phone]

## 2025-02-27 LAB
LYMPHOCYTES NFR BLD AUTO: 8 %
MACROPHAGES NFR FLD: 89 %
NEUTS SEG NFR BLD AUTO: 3 %
PATH REV: YES
TOTAL CELLS COUNTED SPEC: 100

## 2025-02-28 ENCOUNTER — OFFICE VISIT (OUTPATIENT)
Dept: FAMILY MEDICINE CLINIC | Facility: CLINIC | Age: 80
End: 2025-02-28

## 2025-02-28 VITALS
HEIGHT: 63 IN | TEMPERATURE: 98.4 F | RESPIRATION RATE: 18 BRPM | SYSTOLIC BLOOD PRESSURE: 143 MMHG | BODY MASS INDEX: 23.6 KG/M2 | DIASTOLIC BLOOD PRESSURE: 80 MMHG | WEIGHT: 133.2 LBS | HEART RATE: 105 BPM | OXYGEN SATURATION: 98 %

## 2025-02-28 DIAGNOSIS — M79.89 LEFT LEG SWELLING: Primary | ICD-10-CM

## 2025-02-28 PROCEDURE — 3077F SYST BP >= 140 MM HG: CPT

## 2025-02-28 PROCEDURE — G2211 COMPLEX E/M VISIT ADD ON: HCPCS

## 2025-02-28 PROCEDURE — 3079F DIAST BP 80-89 MM HG: CPT

## 2025-02-28 PROCEDURE — 99213 OFFICE O/P EST LOW 20 MIN: CPT

## 2025-02-28 NOTE — ASSESSMENT & PLAN NOTE
"- Chronic lower left limb swelling per patient no worse than baseline; homans sign negative on exam today.  Patient states she was just told to come in by her pain doctor for possible adjustment of medication.  -Last venous duplex done in 2024 was negative for DVT.  Patient denies any respiratory signs; DVT unlikely but will order venous duplex given pt's reports of unilateral swelling (not different from baseline).   - Patient with swelling of ankle on exam; per patient is also at baseline. Per chart review had x-ray's prior in 2024 that showed \" Lateral malleolar soft tissue edema. Remote appearing avulsion fracture at the tip of the lateral malleolus \"   - Will repeat x-ray and send to ortho for follow-up   - On exam patient with 2+ pulses DP/ TP, sensation and strength intact. Left leg does not appear overtly more swollen however L ankle does. (Pic below)  - Talked about changing amlodipine to see if this help;  however patient would like to try to get imagining first then discuss possible medicine change with Dr. Saleem   - Will follow up in one week with dr. Saleem  - ER and return precautions discussed      Orders:    VAS VENOUS DUPLEX -LOWER LIMB UNILATERAL; Future    Ambulatory Referral to Orthopedic Surgery; Future    XR ankle 3+ vw left; Future    "

## 2025-02-28 NOTE — PROGRESS NOTES
"Name: Mireya Yi      : 1945      MRN: [id number]  Encounter Provider: Nicolasa Cardenas MD  Encounter Date: 2025   Encounter department: [facility] [place]  :  Assessment & Plan  Left leg swelling  - Chronic lower left limb swelling per patient no worse than baseline; homans sign negative on exam today.  Patient states she was just told to come in by her pain doctor for possible adjustment of medication.  -Last venous duplex done in  was negative for DVT.  Patient denies any respiratory signs; DVT unlikely but will order venous duplex given pt's reports of unilateral swelling (not different from baseline).   - Patient with swelling of ankle on exam; per patient is also at baseline. Per chart review had x-ray's prior in  that showed \" Lateral malleolar soft tissue edema. Remote appearing avulsion fracture at the tip of the lateral malleolus \"   - Will repeat x-ray and send to ortho for follow-up   - On exam patient with 2+ pulses DP/ TP, sensation and strength intact. Left leg does not appear overtly more swollen however L ankle does. (Pic below)  - Talked about changing amlodipine to see if this help;  however patient would like to try to get imagining first then discuss possible medicine change with Dr. Saleem   - Will follow up in one week with dr. Saleem  - ER and return precautions discussed      Orders:    VAS VENOUS DUPLEX -LOWER LIMB UNILATERAL; Future    Ambulatory Referral to Orthopedic Surgery; Future    XR ankle 3+ vw left; Future           History of Present Illness   Chronic leg swelling was seen by pain management who stated that patient should see PCP to see if medication needs to be changed   Patient state swelling is not worst then usually, pain also present which is at baseline for over 1 year      Review of Systems   Constitutional:  Negative for chills and fever.   Respiratory:  Negative for cough and shortness of breath (she states its " "\"good\").    Cardiovascular:  Negative for chest pain and palpitations.   Skin:  Negative for color change, pallor and wound.   Neurological:  Negative for facial asymmetry and light-headedness.       Objective   /80 (BP Location: Left arm, Patient Position: Sitting, Cuff Size: Standard)   Pulse 105   Temp 98.4 °F (36.9 °C) (Temporal)   Resp 18   Ht 5' 3\" (1.6 m)   Wt 60.4 kg (133 lb 3.2 oz)   SpO2 98%   BMI 23.60 kg/m²      Physical Exam  Vitals reviewed.   HENT:      Nose: No congestion.   Cardiovascular:      Rate and Rhythm: Normal rate.   Pulmonary:      Effort: Pulmonary effort is normal. No respiratory distress.   Abdominal:      General: There is no distension.      Palpations: Abdomen is soft.      Tenderness: There is no abdominal tenderness.   Musculoskeletal:      Comments: Left ankle swelling noted, homans negative    Neurological:      Mental Status: She is alert.      Comments: 5/5 strength LE's though patient is reporting pain; also has intact.  Sensation intact         "

## 2025-03-01 ENCOUNTER — TELEPHONE (OUTPATIENT)
Dept: PULMONOLOGY | Facility: CLINIC | Age: 80
End: 2025-03-01

## 2025-03-02 NOTE — TELEPHONE ENCOUNTER
Called patient regarding bronchoscopy results showing negative for malignancy and for infection. Answered all questions asked. Will message staff regarding helping patient schedule her sleep study.

## 2025-03-03 LAB — FUNGUS SPEC CULT: NORMAL

## 2025-03-03 NOTE — TELEPHONE ENCOUNTER
Called patient to give them the phone number to the sleep clinic, so that they can schedule appt. I was unable to reach patient as they did not answer. Their voice message inbox was full, so I was unable to leave voicemail.    If patient calls back please give them the phone number to the [facility] as that would be closest to their location.

## 2025-03-04 ENCOUNTER — HOSPITAL ENCOUNTER (OUTPATIENT)
Dept: NON INVASIVE DIAGNOSTICS | Facility: HOSPITAL | Age: 80
Discharge: HOME | End: 2025-03-04
Payer: MEDICARE

## 2025-03-04 ENCOUNTER — HOSPITAL ENCOUNTER (OUTPATIENT)
Dept: RADIOLOGY | Facility: HOSPITAL | Age: 80
Discharge: HOME | End: 2025-03-04
Payer: MEDICARE

## 2025-03-04 DIAGNOSIS — M79.89 LEFT LEG SWELLING: ICD-10-CM

## 2025-03-04 LAB
MYCOBACTERIUM SPEC CULT: NORMAL
RHODAMINE-AURAMINE STN SPEC: NORMAL

## 2025-03-04 PROCEDURE — 93971 EXTREMITY STUDY: CPT | Performed by: SURGERY

## 2025-03-04 PROCEDURE — 73610 X-RAY EXAM OF ANKLE: CPT

## 2025-03-04 PROCEDURE — 93971 EXTREMITY STUDY: CPT

## 2025-03-05 ENCOUNTER — RESULTS FOLLOW-UP (OUTPATIENT)
Dept: FAMILY MEDICINE CLINIC | Facility: CLINIC | Age: 80
End: 2025-03-05

## 2025-03-06 NOTE — TELEPHONE ENCOUNTER
Patient contact office inquiring results. Informed patient SALOMON Hillman contacted her yesterday 3/5/25. Patient state she did not spoke with no one at the office about her results.     Patient informed her results were normal. Patient verbalized understanding and have no questions or concerns at this moment.

## 2025-03-10 LAB — FUNGUS SPEC CULT: NORMAL

## 2025-03-11 LAB
MYCOBACTERIUM SPEC CULT: NORMAL
RHODAMINE-AURAMINE STN SPEC: NORMAL

## 2025-03-13 ENCOUNTER — OFFICE VISIT (OUTPATIENT)
Dept: FAMILY MEDICINE CLINIC | Facility: CLINIC | Age: 80
End: 2025-03-13

## 2025-03-13 VITALS
HEART RATE: 86 BPM | OXYGEN SATURATION: 96 % | WEIGHT: 129.4 LBS | DIASTOLIC BLOOD PRESSURE: 91 MMHG | TEMPERATURE: 98.4 F | RESPIRATION RATE: 18 BRPM | SYSTOLIC BLOOD PRESSURE: 155 MMHG | BODY MASS INDEX: 22.92 KG/M2

## 2025-03-13 DIAGNOSIS — M79.89 LEFT LEG SWELLING: Primary | ICD-10-CM

## 2025-03-13 PROCEDURE — 3077F SYST BP >= 140 MM HG: CPT

## 2025-03-13 PROCEDURE — 3080F DIAST BP >= 90 MM HG: CPT

## 2025-03-13 PROCEDURE — G2211 COMPLEX E/M VISIT ADD ON: HCPCS

## 2025-03-13 PROCEDURE — 99213 OFFICE O/P EST LOW 20 MIN: CPT

## 2025-03-13 RX ORDER — LISINOPRIL 20 MG/1
20 TABLET ORAL DAILY
Qty: 90 TABLET | Refills: 0 | Status: SHIPPED | OUTPATIENT
Start: 2025-03-13

## 2025-03-13 NOTE — PROGRESS NOTES
Name: Mireya Yi      : 1945      MRN: [id number]  Encounter Provider: Elham Saleem MD  Encounter Date: 3/13/2025   Encounter department: [facility] [place]  :  Assessment & Plan  Left leg swelling  1+ pitting edema to above ankle noted on exam today. LLE swelling appears improved when compared to  photo in media. Xray unremarkable for acute fracture or significant osseous abnormality. Duplex U/S negative for venous thrombosis. Likely related either to amlodipine (unlikely with low dose of medication) or venous stasis dermatitis. Will transition to Lisinopril at 20mg for renal protection and recheck blood pressure in two weeks. Discussed side effects including dry cough and instructed to stop lisinopril and return to amlodipine if cough develops.   Transition to Lisinopril 20mg QD  Analgesic regimen per pain medicine  Recommend Tylenol 1000mg before bed  Trial of below the knee compression stockings               History of Present Illness   Patient presents for follow up regarding left lower extremity swelling. Patient states that her leg pain and numbness are at baseline. She reports that the swelling in her left leg waxes and wanes, though there has been some improvement since her last office visit. She has been following with pain medicine who started her on Lyrica, though she was advised by her pharmacist to not start this medication until discussing with her pulmonologist.       Review of Systems   Constitutional:  Negative for chills and fever.   Respiratory:  Negative for cough and shortness of breath.    Cardiovascular:  Positive for leg swelling. Negative for chest pain and palpitations.   Musculoskeletal:  Positive for back pain. Negative for gait problem.   Neurological:  Positive for numbness.       Objective   /91 (BP Location: Right arm, Patient Position: Sitting, Cuff Size: Standard)   Pulse 86   Temp 98.4 °F (36.9 °C)   Resp 18   Wt  58.7 kg (129 lb 6.4 oz)   SpO2 96%   BMI 22.92 kg/m²      Physical Exam  Constitutional:       Appearance: Normal appearance. She is not ill-appearing.   Cardiovascular:      Rate and Rhythm: Normal rate and regular rhythm.      Pulses: Normal pulses.           Dorsalis pedis pulses are 2+ on the right side and 2+ on the left side.      Heart sounds: Normal heart sounds.   Pulmonary:      Effort: Pulmonary effort is normal.      Breath sounds: Normal breath sounds.   Musculoskeletal:      Right lower le+ Edema present.      Left lower le+ Pitting Edema present.      Right ankle: Swelling present.      Left ankle: Swelling present.      Comments: 2+ DP pulses bilaterally. 1+ pitting pedal edema, more pronounced on left.    Skin:     General: Skin is warm and dry.   Neurological:      Mental Status: She is alert.

## 2025-03-13 NOTE — ASSESSMENT & PLAN NOTE
1+ pitting edema to above ankle noted on exam today. LLE swelling appears improved when compared to 2/28 photo in media. Xray unremarkable for acute fracture or significant osseous abnormality. Duplex U/S negative for venous thrombosis. Likely related either to amlodipine (unlikely with low dose of medication) or venous stasis dermatitis. Will transition to Lisinopril at 20mg for renal protection and recheck blood pressure in two weeks. Discussed side effects including dry cough and instructed to stop lisinopril and return to amlodipine if cough develops.   Transition to Lisinopril 20mg QD  Analgesic regimen per pain medicine  Recommend Tylenol 1000mg before bed  Trial of below the knee compression stockings

## 2025-03-18 LAB
MYCOBACTERIUM SPEC CULT: NORMAL
RHODAMINE-AURAMINE STN SPEC: NORMAL

## 2025-03-23 ENCOUNTER — HOSPITAL ENCOUNTER (EMERGENCY)
Facility: HOSPITAL | Age: 80
Discharge: HOME | End: 2025-03-23
Attending: EMERGENCY MEDICINE
Payer: MEDICARE

## 2025-03-23 VITALS
SYSTOLIC BLOOD PRESSURE: 184 MMHG | DIASTOLIC BLOOD PRESSURE: 94 MMHG | OXYGEN SATURATION: 98 % | RESPIRATION RATE: 18 BRPM | HEART RATE: 103 BPM | TEMPERATURE: 97.8 F

## 2025-03-23 DIAGNOSIS — M25.472 LEFT ANKLE SWELLING: Primary | ICD-10-CM

## 2025-03-23 DIAGNOSIS — M79.672 CHRONIC FOOT PAIN, LEFT: ICD-10-CM

## 2025-03-23 DIAGNOSIS — G89.29 CHRONIC FOOT PAIN, LEFT: ICD-10-CM

## 2025-03-23 DIAGNOSIS — M54.16 LUMBAR RADICULOPATHY, CHRONIC: ICD-10-CM

## 2025-03-23 PROCEDURE — 99284 EMERGENCY DEPT VISIT MOD MDM: CPT | Performed by: EMERGENCY MEDICINE

## 2025-03-23 PROCEDURE — 99283 EMERGENCY DEPT VISIT LOW MDM: CPT

## 2025-03-23 RX ORDER — OXYCODONE AND ACETAMINOPHEN 5; 325 MG/1; MG/1
1 TABLET ORAL ONCE
Refills: 0 | Status: COMPLETED | OUTPATIENT
Start: 2025-03-23 | End: 2025-03-23

## 2025-03-23 RX ADMIN — OXYCODONE HYDROCHLORIDE AND ACETAMINOPHEN 1 TABLET: 5; 325 TABLET ORAL at 13:52

## 2025-03-23 NOTE — ED ATTENDING ATTESTATION
3/23/2025  I, Keo Sierra MD, saw and evaluated the patient. I have discussed the patient with the resident/non-physician practitioner and agree with the resident's/non-physician practitioner's findings, Plan of Care, and MDM as documented in the resident's/non-physician practitioner's note, except where noted. All available labs and Radiology studies were reviewed.  I was present for key portions of any procedure(s) performed by the resident/non-physician practitioner and I was immediately available to provide assistance.       At this point I agree with the current assessment done in the Emergency Department.  I have conducted an independent evaluation of this patient a history and physical is as follows:    ED Course         Critical Care Time  Procedures    78 yo female with hx of asthma, lumbar radiculopathy, previous fx of left ankle and has outpatient podiatry and ortho followup for chronic pain to left ankle with swelling.  Pt with two days of left ankle swelling and pain. No wounds, no acute injury.  Pt with heel pain today and having trouble putting weight on it. Pt has had duplex and xray previously.  Vss, afebrile, lungs cta, rrr, abodmen soft nontender, left calcaneus tenderness, nvi, good pulses.  Pain meds.

## 2025-03-23 NOTE — ED PROVIDER NOTES
Time reflects when diagnosis was documented in both MDM as applicable and the Disposition within this note       Time User Action Codes Description Comment    3/23/2025  1:49 PM Bo Bryant [M25.472] Left ankle swelling     3/23/2025  1:49 PM Bo Bryant [M79.672,  G89.29] Chronic foot pain, left     3/23/2025  1:50 PM Bo Bryant [M54.16] Lumbar radiculopathy, chronic           ED Disposition       ED Disposition   Discharge    Condition   Stable    Date/Time   Sun Mar 23, 2025  1:49 PM    Comment   Mireya Waldenuria discharge to home/self care.                   Assessment & Plan       Medical Decision Making  Patient is a 79 y.o. female with PMH of polyarthritis toyed factor, chronic left-sided low back pain and sciatica, who presents to the ED with complaint of left lower extremity foot swelling and pain    On presentation patient's blood pressure is elevated 184/94, pulse of 103, otherwise vital signs are within normal limits, on exam, pt is alert, oriented, no evidence of respiratory distress, see physical exam for additional details    History and physical exam most consistent with ankle swelling secondary to neurogenic claudication, however, differential diagnosis included but not limited to arthritis, plan pain control with Percocet, affirm patient's need for outpatient follow-up    View ED course above for further discussion on patient workup.     All labs reviewed and utilized in the medical decision making process  All radiology studies independently viewed by me and interpreted by the radiologist.  I reviewed all testing with the patient.     Upon re-evaluation patient continues to remain hemodynamically stable with no new symptom/complaint, discussed with the patient neck steps for care need to involve following up with her primary doctor as well as the orthopedics, neurosurgical, and podiatry team as well as APS, we are able to help with acute pain management at this time, but will not be  able to provide prescription for narcotics or additional chronic pain regimen    Patient is hemodynamically stable, there is no evidence of features concerning for DVT, no evidence of features concerning for fracture, patient continues to be able to weight-bear and is ambulatory, pain is well-controlled secondary to medication administration    Risk  Prescription drug management.        ED Course as of 03/24/25 1650   Sun Mar 23, 2025   1335 Has seen orthopedics, neurosurgeon for lumbar radiculopathy, sees pain management, has an apt w/ podiatry, has not seen a rash / warts, has had an xray and US duplex performed, has had a break in this ankle in the past, has been taking aspirin, x2 every 6 hours, has not taken nerve pain meds yet secondary to lung issues? Sees pulm, has been swelling for 2 years, has been an issue since spinal surgery, no recent worsening, spinal pain waxes and wanes, has swelling in the L ankle / base of the foot, and has pain from the bottom of the foot while putting pressure on it, no recent falls / trauma, does not use walking assist at home, no hx heart disease, no use of water pill, complaining of diffuse ankle swelling of L side w/ no rash / radiation   1350 Patient has an x-ray from earlier this month, no acute traumatic process, shared decision making held, will forego additional imaging studies at this time, additionally patient's history and exam is not consistent with DVT, patient has a recent duplex study, we will forego repeat ultrasound duplex study based on shared decision making at this time       Medications   oxyCODONE-acetaminophen (PERCOCET) 5-325 mg per tablet 1 tablet (1 tablet Oral Given 3/23/25 1352)       ED Risk Strat Scores                    Identification of Seniors at Risk      Flowsheet Row Most Recent Value   (ISAR) Identification of Seniors at Risk    Before the illness or injury that brought you to the Emergency, did you need someone to help you on a regular  basis? 0 Filed at: 03/23/2025 1246   In the last 24 hours, have you needed more help than usual? 0 Filed at: 03/23/2025 1246   Have you been hospitalized for one or more nights during the past 6 months? 1 Filed at: 03/23/2025 1246   In general, do you see well? 0 Filed at: 03/23/2025 1246   In general, do you have serious problems with your memory? 0 Filed at: 03/23/2025 1246   Do you take more than three different medications every day? 1 Filed at: 03/23/2025 1246   ISAR Score 2 Filed at: 03/23/2025 1246                SBIRT 22yo+      Flowsheet Row Most Recent Value   Initial Alcohol Screen: US AUDIT-C     1. How often do you have a drink containing alcohol? 0 Filed at: 03/23/2025 1246   2. How many drinks containing alcohol do you have on a typical day you are drinking?  0 Filed at: 03/23/2025 1246   3b. FEMALE Any Age, or MALE 65+: How often do you have 4 or more drinks on one occassion? 0 Filed at: 03/23/2025 1246   Audit-C Score 0 Filed at: 03/23/2025 1246   PASTOR: How many times in the past year have you...    Used an illegal drug or used a prescription medication for non-medical reasons? Never Filed at: 03/23/2025 1246                            History of Present Illness       Chief Complaint   Patient presents with    Ankle Injury     Pt reports left ankle pain and swelling. Pt reports this has been ongoing for about 2 years. Pt reports they haven't been able to identify why the swelling comes and goes.Pt reports pain and swelling increased today.       Past Medical History:   Diagnosis Date    Asthma     Asthmatic bronchitis     Last Assessed: 10/7/2014     Chronic diarrhea     Last Assessed: 8/20/2015     Fibromyalgia     Focal nodular hyperplasia of liver     Last Assessed: 6/11/2015    Herpes zoster     Last Assessed: 3/18/2016    Hypertension     IBS (irritable bowel syndrome)     Intermittent palpitations     Irritable bowel syndrome     Lumbar radiculopathy     Osteoarthritis     Vertigo       Past  Surgical History:   Procedure Laterality Date    BREAST BIOPSY      BREAST LUMPECTOMY      when pt was 18    SMALL INTESTINE SURGERY        Family History   Problem Relation Age of Onset    Heart attack Mother     Asthma Father     Breast cancer Sister     Breast cancer Sister     No Known Problems Daughter     No Known Problems Daughter     Arthritis Family     Osteoporosis Family       Social History     Tobacco Use    Smoking status: Some Days     Types: Cigarettes    Smokeless tobacco: Never    Tobacco comments:     Hasn't been smoking recently because of the cough., about 8 days.   Vaping Use    Vaping status: Never Used   Substance Use Topics    Alcohol use: Not Currently    Drug use: No      E-Cigarette/Vaping    E-Cigarette Use Never User       E-Cigarette/Vaping Substances    Nicotine No     THC No     CBD No     Flavoring No     Other No     Unknown No       I have reviewed and agree with the history as documented.     Patient is a 79-year-old female with past medical history of chronic left lower extremity foot swelling, has seen orthopedics, neurosurgeon for lumbar radiculopathy, sees pain management, has an apt w/ podiatry, has not seen a rash / warts, has had an xray and US duplex performed, has had a break in this ankle in the past, has been taking aspirin, x2 every 6 hours, has not taken nerve pain meds yet secondary to lung issues? Sees pulm, has been swelling for 2 years, has been an issue since spinal surgery, no recent worsening, spinal pain waxes and wanes, has swelling in the L ankle / base of the foot, and has pain from the bottom of the foot while putting pressure on it, no recent falls / trauma, does not use walking assist at home, no hx heart disease, no use of water pill, complaining of diffuse ankle swelling of L side w/ no rash / radiation        Review of Systems        Objective       ED Triage Vitals   Temperature Pulse Blood Pressure Respirations SpO2 Patient Position - Orthostatic  VS   03/23/25 1247 03/23/25 1244 03/23/25 1244 03/23/25 1244 03/23/25 1244 03/23/25 1244   97.8 °F (36.6 °C) 103 (!) 184/94 18 98 % Sitting      Temp Source Heart Rate Source BP Location FiO2 (%) Pain Score    03/23/25 1247 03/23/25 1244 03/23/25 1244 -- 03/23/25 1244    Temporal Monitor Left arm  10 - Worst Possible Pain      Vitals      Date and Time Temp Pulse SpO2 Resp BP Pain Score FACES Pain Rating User   03/23/25 1352 -- -- -- -- -- 7 -- MD   03/23/25 1247 97.8 °F (36.6 °C) -- -- -- -- -- -- BL   03/23/25 1244 -- 103 98 % 18 184/94 10 - Worst Possible Pain -- BL            Physical Exam  Vitals and nursing note reviewed.   Constitutional:       General: She is not in acute distress.     Appearance: She is well-developed. She is not ill-appearing or toxic-appearing.   HENT:      Head: Normocephalic and atraumatic.   Eyes:      Conjunctiva/sclera: Conjunctivae normal.   Cardiovascular:      Rate and Rhythm: Normal rate and regular rhythm.      Heart sounds: No murmur heard.  Pulmonary:      Effort: Pulmonary effort is normal. No respiratory distress.      Breath sounds: Normal breath sounds. No wheezing, rhonchi or rales.   Abdominal:      Palpations: Abdomen is soft.      Tenderness: There is no abdominal tenderness. There is no guarding or rebound.   Musculoskeletal:         General: Swelling and tenderness present. No deformity or signs of injury.      Cervical back: Neck supple.      Left lower leg: Edema present.      Comments: Patient has tenderness to palpation of the left ankle, underside of the left foot, there is circumferential swelling which is unilateral to the left foot/ankle, it does not extend down to the toes, does not extend up to the shin, there is no evidence of erythema, there is no open ulcerations or lesions, there is no evidence of traumatic injury   Skin:     General: Skin is warm and dry.      Capillary Refill: Capillary refill takes less than 2 seconds.   Neurological:      General:  No focal deficit present.      Mental Status: She is alert.      Comments: Patient reports paresthesias down the left leg which are at baseline   Psychiatric:         Mood and Affect: Mood normal.         Results Reviewed       None            No orders to display       Procedures    ED Medication and Procedure Management   Prior to Admission Medications   Prescriptions Last Dose Informant Patient Reported? Taking?   Cholecalciferol (D3-1000) 1,000 units tablet  Self No No   Sig: Take 1 tablet (1,000 Units total) by mouth daily   acetaminophen (TYLENOL) 500 mg tablet  Self No No   Sig: Take 1 tablet (500 mg total) by mouth every 6 (six) hours as needed for mild pain   acetylcysteine (MUCOMYST) 10 % nebulizer solution   No No   Sig: Take 4 mL by nebulization every 4 (four) hours   albuterol (2.5 mg/3 mL) 0.083 % nebulizer solution   No No   Sig: Take 3 mL (2.5 mg total) by nebulization every 6 (six) hours as needed for wheezing or shortness of breath   albuterol (PROVENTIL HFA,VENTOLIN HFA) 90 mcg/act inhaler  Self No No   Sig: Inhale 2 puffs every 6 (six) hours as needed for wheezing or shortness of breath   azelastine (ASTELIN) 0.1 % nasal spray  Self No No   Si spray into each nostril 2 (two) times a day Use in each nostril as directed   benzonatate (TESSALON PERLES) 100 mg capsule   No No   Sig: Take 1 capsule (100 mg total) by mouth 3 (three) times a day as needed for cough   budesonide-formoterol (Symbicort) 80-4.5 MCG/ACT inhaler  Self No No   Sig: Inhale 2 puffs 2 (two) times a day Rinse mouth after use   Patient taking differently: Inhale 2 puffs if needed Rinse mouth after use   fexofenadine (ALLEGRA) 180 MG tablet  Self Yes No   Sig: Take 180 mg by mouth daily   fluticasone (FLONASE) 50 mcg/act nasal spray  Self No No   Sig: SPRAY 2 SPRAYS INTO EACH NOSTRIL EVERY DAY   folic acid (FOLVITE) 1 mg tablet  Self No No   Sig: Take 1 tablet (1,000 mcg total) by mouth daily   Patient not taking: Reported on  2/21/2025   hydrOXYzine HCL (ATARAX) 10 mg tablet  Self No No   Sig: Take 1 tablet (10 mg total) by mouth daily at bedtime as needed for anxiety for up to 10 doses   ipratropium-albuterol (DUO-NEB) 0.5-2.5 mg/3 mL nebulizer solution   No No   Sig: Take 3 mL by nebulization 4 (four) times a day   lisinopril (ZESTRIL) 20 mg tablet   No No   Sig: Take 1 tablet (20 mg total) by mouth daily   montelukast (SINGULAIR) 10 mg tablet  Self No No   Sig: Take 1 tablet (10 mg total) by mouth daily at bedtime   omeprazole (PriLOSEC) 20 mg delayed release capsule   Yes No   Sig: Take 20 mg by mouth daily   ondansetron (ZOFRAN) 4 mg tablet   No No   Sig: Take 1 tablet (4 mg total) by mouth every 8 (eight) hours as needed for nausea or vomiting   ondansetron (Zofran ODT) 4 mg disintegrating tablet  Self No No   Sig: Take 1 tablet (4 mg total) by mouth every 6 (six) hours as needed for nausea or vomiting   pantoprazole (PROTONIX) 20 mg tablet  Self No No   Sig: Take 1 tablet (20 mg total) by mouth daily   pregabalin (LYRICA) 50 mg capsule   No No   Sig: Take 1 PO HS x 5 days, then 1 PO BID x 5 days, then 1 PO AM and 2 PO HS      Facility-Administered Medications: None     Discharge Medication List as of 3/23/2025  1:56 PM        CONTINUE these medications which have NOT CHANGED    Details   acetaminophen (TYLENOL) 500 mg tablet Take 1 tablet (500 mg total) by mouth every 6 (six) hours as needed for mild pain, Starting Mon 8/27/2018, Normal      acetylcysteine (MUCOMYST) 10 % nebulizer solution Take 4 mL by nebulization every 4 (four) hours, Starting Thu 2/13/2025, Normal      albuterol (2.5 mg/3 mL) 0.083 % nebulizer solution Take 3 mL (2.5 mg total) by nebulization every 6 (six) hours as needed for wheezing or shortness of breath, Starting Thu 2/13/2025, Until Wed 5/14/2025 at 2359, Normal      albuterol (PROVENTIL HFA,VENTOLIN HFA) 90 mcg/act inhaler Inhale 2 puffs every 6 (six) hours as needed for wheezing or shortness of breath,  Starting Wed 5/15/2024, Normal      azelastine (ASTELIN) 0.1 % nasal spray 1 spray into each nostril 2 (two) times a day Use in each nostril as directed, Starting Thu 1/16/2025, Normal      benzonatate (TESSALON PERLES) 100 mg capsule Take 1 capsule (100 mg total) by mouth 3 (three) times a day as needed for cough, Starting Tue 2/11/2025, Normal      budesonide-formoterol (Symbicort) 80-4.5 MCG/ACT inhaler Inhale 2 puffs 2 (two) times a day Rinse mouth after use, Starting Fri 1/24/2025, Until Thu 3/13/2025, Normal      Cholecalciferol (D3-1000) 1,000 units tablet Take 1 tablet (1,000 Units total) by mouth daily, Starting Mon 12/2/2024, Normal      fexofenadine (ALLEGRA) 180 MG tablet Take 180 mg by mouth daily, Historical Med      fluticasone (FLONASE) 50 mcg/act nasal spray SPRAY 2 SPRAYS INTO EACH NOSTRIL EVERY DAY, Normal      folic acid (FOLVITE) 1 mg tablet Take 1 tablet (1,000 mcg total) by mouth daily, Starting Mon 12/2/2024, Normal      hydrOXYzine HCL (ATARAX) 10 mg tablet Take 1 tablet (10 mg total) by mouth daily at bedtime as needed for anxiety for up to 10 doses, Starting Fri 1/24/2025, Normal      ipratropium-albuterol (DUO-NEB) 0.5-2.5 mg/3 mL nebulizer solution Take 3 mL by nebulization 4 (four) times a day, Starting Thu 2/6/2025, Normal      lisinopril (ZESTRIL) 20 mg tablet Take 1 tablet (20 mg total) by mouth daily, Starting Thu 3/13/2025, Normal      montelukast (SINGULAIR) 10 mg tablet Take 1 tablet (10 mg total) by mouth daily at bedtime, Starting Mon 12/2/2024, Normal      omeprazole (PriLOSEC) 20 mg delayed release capsule Take 20 mg by mouth daily, Historical Med      ondansetron (Zofran ODT) 4 mg disintegrating tablet Take 1 tablet (4 mg total) by mouth every 6 (six) hours as needed for nausea or vomiting, Starting Mon 12/2/2024, Normal      ondansetron (ZOFRAN) 4 mg tablet Take 1 tablet (4 mg total) by mouth every 8 (eight) hours as needed for nausea or vomiting, Starting Thu 2/6/2025,  Normal      pantoprazole (PROTONIX) 20 mg tablet Take 1 tablet (20 mg total) by mouth daily, Starting Mon 12/2/2024, Normal      pregabalin (LYRICA) 50 mg capsule Take 1 PO HS x 5 days, then 1 PO BID x 5 days, then 1 PO AM and 2 PO HS, Normal           No discharge procedures on file.  ED SEPSIS DOCUMENTATION   Time reflects when diagnosis was documented in both MDM as applicable and the Disposition within this note       Time User Action Codes Description Comment    3/23/2025  1:49 PM Bo Bryant [M25.472] Left ankle swelling     3/23/2025  1:49 PM Bo Bryant [M79.672,  G89.29] Chronic foot pain, left     3/23/2025  1:50 PM Bo Bryant [M54.16] Lumbar radiculopathy, chronic                  Bo Bryant DO  03/24/25 1650

## 2025-03-23 NOTE — DISCHARGE INSTRUCTIONS
Please return to the emergency department if you develop new or worsening symptoms including fever, chest pain, shortness of breath, nausea and vomiting that does not resolve on its own, please return to the emergency department for inability to walk, loss of strength or sensation in the left lower extremity, or for worsening swelling    Please follow-up with your primary care provider for additional care and management of your recent ER visit, lumbar radiculopathy, and chronic left lower extremity swelling and pain    Please continue to take your home medications as prescribed, please continue to follow pain regimen established by your pain management services

## 2025-03-24 LAB — FUNGUS SPEC CULT: NORMAL

## 2025-03-25 LAB
MYCOBACTERIUM SPEC CULT: NORMAL
RHODAMINE-AURAMINE STN SPEC: NORMAL

## 2025-03-27 ENCOUNTER — OFFICE VISIT (OUTPATIENT)
Dept: FAMILY MEDICINE CLINIC | Facility: CLINIC | Age: 80
End: 2025-03-27

## 2025-03-27 VITALS
WEIGHT: 129.4 LBS | DIASTOLIC BLOOD PRESSURE: 91 MMHG | SYSTOLIC BLOOD PRESSURE: 166 MMHG | OXYGEN SATURATION: 97 % | TEMPERATURE: 98.2 F | HEART RATE: 91 BPM | BODY MASS INDEX: 22.92 KG/M2 | RESPIRATION RATE: 18 BRPM

## 2025-03-27 DIAGNOSIS — M25.472 LEFT ANKLE SWELLING: Primary | ICD-10-CM

## 2025-03-27 DIAGNOSIS — R09.81 NASAL CONGESTION: ICD-10-CM

## 2025-03-27 PROCEDURE — G2211 COMPLEX E/M VISIT ADD ON: HCPCS

## 2025-03-27 PROCEDURE — 3080F DIAST BP >= 90 MM HG: CPT

## 2025-03-27 PROCEDURE — 3077F SYST BP >= 140 MM HG: CPT

## 2025-03-27 PROCEDURE — 99213 OFFICE O/P EST LOW 20 MIN: CPT

## 2025-03-27 RX ORDER — AZELASTINE 1 MG/ML
1 SPRAY, METERED NASAL 2 TIMES DAILY
Qty: 1 ML | Refills: 4 | Status: SHIPPED | OUTPATIENT
Start: 2025-03-27

## 2025-03-27 NOTE — PROGRESS NOTES
Name: Mireya Yi      : 1945      MRN: [id number]  Encounter Provider: Elham Saleem MD  Encounter Date: 3/27/2025   Encounter department: [facility] [place]  :  Assessment & Plan  Left ankle swelling  ED f/u from 3/23/25 for left ankle swelling. Pt was given medications for pain and d/c'd. States swelling somewhat improved since ED visit but not back to baseline. Previous hx of left ankle fx in 2024 at onest of initial swelling. Has had duplex US of left LE which was negative for DVT. Discussed repeat xray of left ankle, pt declines at this time as she states she is seeing orthopedics for her left ankle next week. Provided pt with new ace wrap. Recommend elevation, ice, and compressions. Return to care precautions reviewed.        Nasal congestion    Orders:    azelastine (ASTELIN) 0.1 % nasal spray; 1 spray into each nostril 2 (two) times a day Use in each nostril as directed           History of Present Illness   Patient presents to clinic for ED follow up for left leg swelling. Left leg swelling has been ongoing since 2024 at which point she was found to have fracture of her left lateral fibula and ankle sprain. Ankle swelling has continued since November and at times swelling increases but swelling has never completely resolved per patient. She states she went to the ED due to to pain and swelling worsening. Patient reports today that the swelling and pain have improved some since ED visit. She states she has been using an ace bandage on her ankle. She states she elevates her ankle when she is at home.       Review of Systems   Constitutional:  Negative for chills and fever.   Eyes:  Negative for visual disturbance.   Respiratory:  Negative for shortness of breath and wheezing.    Cardiovascular:  Negative for chest pain and palpitations.   Gastrointestinal:  Negative for abdominal pain and constipation.   Genitourinary:  Negative for difficulty  urinating and dysuria.   Musculoskeletal:  Positive for arthralgias and joint swelling.   Skin:  Negative for rash.   Allergic/Immunologic: Positive for environmental allergies. Negative for food allergies.   Neurological:  Negative for seizures and syncope.       Objective   /91 (BP Location: Right arm, Patient Position: Sitting, Cuff Size: Standard)   Pulse 91   Temp 98.2 °F (36.8 °C)   Resp 18   Wt 58.7 kg (129 lb 6.4 oz)   SpO2 97%   BMI 22.92 kg/m²      Physical Exam  Vitals and nursing note reviewed.   Constitutional:       General: She is not in acute distress.     Appearance: She is well-developed.   HENT:      Head: Normocephalic and atraumatic.   Eyes:      Conjunctiva/sclera: Conjunctivae normal.   Cardiovascular:      Rate and Rhythm: Normal rate and regular rhythm.      Heart sounds: No murmur heard.  Pulmonary:      Effort: Pulmonary effort is normal. No respiratory distress.      Breath sounds: Normal breath sounds.   Abdominal:      Palpations: Abdomen is soft.      Tenderness: There is no abdominal tenderness.   Musculoskeletal:      Cervical back: Neck supple.      Left ankle: Swelling present. No ecchymosis. Tenderness present over the lateral malleolus. Normal pulse.      Left foot: Decreased range of motion. Swelling and tenderness present.   Skin:     General: Skin is warm and dry.      Capillary Refill: Capillary refill takes less than 2 seconds.   Neurological:      Mental Status: She is alert.   Psychiatric:         Mood and Affect: Mood normal.

## 2025-04-01 ENCOUNTER — NURSE TRIAGE (OUTPATIENT)
Age: 80
End: 2025-04-01

## 2025-04-01 DIAGNOSIS — J45.41 MODERATE PERSISTENT ASTHMA WITH EXACERBATION: Primary | ICD-10-CM

## 2025-04-01 DIAGNOSIS — J01.91 ACUTE RECURRENT SINUSITIS, UNSPECIFIED LOCATION: ICD-10-CM

## 2025-04-01 LAB
MYCOBACTERIUM SPEC CULT: NORMAL
RHODAMINE-AURAMINE STN SPEC: NORMAL

## 2025-04-01 RX ORDER — PREDNISONE 20 MG/1
40 TABLET ORAL DAILY
Qty: 10 TABLET | Refills: 0 | Status: SHIPPED | OUTPATIENT
Start: 2025-04-01 | End: 2025-04-08

## 2025-04-01 RX ORDER — AZITHROMYCIN 250 MG/1
TABLET, FILM COATED ORAL
Qty: 6 TABLET | Refills: 0 | Status: SHIPPED | OUTPATIENT
Start: 2025-04-01 | End: 2025-04-05

## 2025-04-02 ENCOUNTER — OFFICE VISIT (OUTPATIENT)
Dept: OBGYN CLINIC | Facility: HOSPITAL | Age: 80
End: 2025-04-02
Payer: MEDICARE

## 2025-04-02 VITALS — WEIGHT: 128.4 LBS | HEIGHT: 63 IN | BODY MASS INDEX: 22.75 KG/M2

## 2025-04-02 DIAGNOSIS — M54.16 LUMBAR RADICULOPATHY: ICD-10-CM

## 2025-04-02 DIAGNOSIS — M76.72 PERONEAL TENDINITIS OF LEFT LOWER EXTREMITY: Primary | ICD-10-CM

## 2025-04-02 PROCEDURE — 99214 OFFICE O/P EST MOD 30 MIN: CPT | Performed by: ORTHOPAEDIC SURGERY

## 2025-04-02 NOTE — TELEPHONE ENCOUNTER
Called patient to discuss symptoms. She reports wheezing and sinusitis. She has an appointment scheduled for 4/17 but her symptoms are significant right now and she is nervous she is going to end up in the ED. Sent prescription for prednisone 40mg daily for 5 days as well as azithromycin 500mg on the first day followed by 250mg for 4 days for asthma exacerbation and recurrent acute sinusitis. Will need plan for asthma regimen adjustment as well as ENT as she has had frequent exacerbations. Will discuss at appointment on 4/17. Patient aware to call the office or go to the ED if symptoms do not improve or worsen.

## 2025-04-02 NOTE — ASSESSMENT & PLAN NOTE
Chronic , exacerbated recently  Weight bearing as tolerated left lower extremity   MRI of left ankle ordered to evaluate for peroneal tendon tears  Begin physical therapy as directed   Over the counter analgesics as needed / directed   Ice / heat as directed   Follow up after MRI is performed     Orders:    MRI ankle/heel left  wo contrast; Future

## 2025-04-07 ENCOUNTER — HOSPITAL ENCOUNTER (OUTPATIENT)
Facility: HOSPITAL | Age: 80
Setting detail: OBSERVATION
Discharge: HOME | End: 2025-04-08
Attending: EMERGENCY MEDICINE
Payer: MEDICARE

## 2025-04-07 ENCOUNTER — APPOINTMENT (EMERGENCY)
Dept: RADIOLOGY | Facility: HOSPITAL | Age: 80
End: 2025-04-07
Payer: MEDICARE

## 2025-04-07 ENCOUNTER — TELEPHONE (OUTPATIENT)
Age: 80
End: 2025-04-07

## 2025-04-07 DIAGNOSIS — D25.9 FIBROID UTERUS: ICD-10-CM

## 2025-04-07 DIAGNOSIS — U07.1 COVID: ICD-10-CM

## 2025-04-07 DIAGNOSIS — T17.500A MUCUS PLUGGING OF BRONCHI: ICD-10-CM

## 2025-04-07 DIAGNOSIS — J11.1 INFLUENZA: ICD-10-CM

## 2025-04-07 DIAGNOSIS — R06.83 SNORING: ICD-10-CM

## 2025-04-07 DIAGNOSIS — R93.89 ENDOMETRIAL THICKENING ON ULTRASOUND: ICD-10-CM

## 2025-04-07 DIAGNOSIS — J45.901 ASTHMA EXACERBATION: Primary | ICD-10-CM

## 2025-04-07 DIAGNOSIS — M79.89 LEFT LEG SWELLING: ICD-10-CM

## 2025-04-07 DIAGNOSIS — J45.30 MILD PERSISTENT ASTHMA WITHOUT COMPLICATION: ICD-10-CM

## 2025-04-07 PROBLEM — J10.1 INFLUENZA B: Status: ACTIVE | Noted: 2025-04-07

## 2025-04-07 PROBLEM — R93.5 ABNORMAL ULTRASOUND OF ENDOMETRIUM: Status: ACTIVE | Noted: 2025-04-07

## 2025-04-07 PROBLEM — J45.909 ASTHMA: Status: ACTIVE | Noted: 2021-09-14

## 2025-04-07 PROBLEM — I49.9 IRREGULAR HEART RHYTHM: Status: ACTIVE | Noted: 2025-04-07

## 2025-04-07 LAB
ALBUMIN SERPL BCG-MCNC: 3.6 G/DL (ref 3.5–5)
ALP SERPL-CCNC: 70 U/L (ref 34–104)
ALT SERPL W P-5'-P-CCNC: 9 U/L (ref 7–52)
ANION GAP SERPL CALCULATED.3IONS-SCNC: 9 MMOL/L (ref 4–13)
AST SERPL W P-5'-P-CCNC: 9 U/L (ref 13–39)
BASOPHILS # BLD AUTO: 0.05 THOUSANDS/ÂΜL (ref 0–0.1)
BASOPHILS NFR BLD AUTO: 0 % (ref 0–1)
BILIRUB SERPL-MCNC: 0.29 MG/DL (ref 0.2–1)
BUN SERPL-MCNC: 14 MG/DL (ref 5–25)
CALCIUM SERPL-MCNC: 9.3 MG/DL (ref 8.4–10.2)
CHLORIDE SERPL-SCNC: 110 MMOL/L (ref 96–108)
CO2 SERPL-SCNC: 23 MMOL/L (ref 21–32)
CREAT SERPL-MCNC: 1.05 MG/DL (ref 0.6–1.3)
EOSINOPHIL # BLD AUTO: 0.07 THOUSAND/ÂΜL (ref 0–0.61)
EOSINOPHIL NFR BLD AUTO: 1 % (ref 0–6)
ERYTHROCYTE [DISTWIDTH] IN BLOOD BY AUTOMATED COUNT: 16.7 % (ref 11.6–15.1)
EST. AVERAGE GLUCOSE BLD GHB EST-MCNC: 143 MG/DL
FLUAV AG UPPER RESP QL IA.RAPID: NEGATIVE
FLUBV AG UPPER RESP QL IA.RAPID: POSITIVE
GFR SERPL CREATININE-BSD FRML MDRD: 50 ML/MIN/1.73SQ M
GLUCOSE SERPL-MCNC: 95 MG/DL (ref 65–140)
HBA1C MFR BLD: 6.6 %
HCT VFR BLD AUTO: 37 % (ref 34.8–46.1)
HGB BLD-MCNC: 11.4 G/DL (ref 11.5–15.4)
IMM GRANULOCYTES # BLD AUTO: 0.08 THOUSAND/UL (ref 0–0.2)
IMM GRANULOCYTES NFR BLD AUTO: 1 % (ref 0–2)
LACTATE SERPL-SCNC: 1.4 MMOL/L (ref 0.5–2)
LIPASE SERPL-CCNC: 24 U/L (ref 11–82)
LYMPHOCYTES # BLD AUTO: 3.33 THOUSANDS/ÂΜL (ref 0.6–4.47)
LYMPHOCYTES NFR BLD AUTO: 26 % (ref 14–44)
MCH RBC QN AUTO: 25.1 PG (ref 26.8–34.3)
MCHC RBC AUTO-ENTMCNC: 30.8 G/DL (ref 31.4–37.4)
MCV RBC AUTO: 82 FL (ref 82–98)
MONOCYTES # BLD AUTO: 1 THOUSAND/ÂΜL (ref 0.17–1.22)
MONOCYTES NFR BLD AUTO: 8 % (ref 4–12)
NEUTROPHILS # BLD AUTO: 8.19 THOUSANDS/ÂΜL (ref 1.85–7.62)
NEUTS SEG NFR BLD AUTO: 64 % (ref 43–75)
NRBC BLD AUTO-RTO: 0 /100 WBCS
PLATELET # BLD AUTO: 355 THOUSANDS/UL (ref 149–390)
PLATELET # BLD AUTO: 376 THOUSANDS/UL (ref 149–390)
PMV BLD AUTO: 10.9 FL (ref 8.9–12.7)
PMV BLD AUTO: 11.7 FL (ref 8.9–12.7)
POTASSIUM SERPL-SCNC: 3.3 MMOL/L (ref 3.5–5.3)
PROT SERPL-MCNC: 6.5 G/DL (ref 6.4–8.4)
RBC # BLD AUTO: 4.54 MILLION/UL (ref 3.81–5.12)
SARS-COV+SARS-COV-2 AG RESP QL IA.RAPID: POSITIVE
SODIUM SERPL-SCNC: 142 MMOL/L (ref 135–147)
WBC # BLD AUTO: 12.72 THOUSAND/UL (ref 4.31–10.16)

## 2025-04-07 PROCEDURE — 99291 CRITICAL CARE FIRST HOUR: CPT | Performed by: EMERGENCY MEDICINE

## 2025-04-07 PROCEDURE — 80053 COMPREHEN METABOLIC PANEL: CPT

## 2025-04-07 PROCEDURE — 83036 HEMOGLOBIN GLYCOSYLATED A1C: CPT

## 2025-04-07 PROCEDURE — 85025 COMPLETE CBC W/AUTO DIFF WBC: CPT

## 2025-04-07 PROCEDURE — 99223 1ST HOSP IP/OBS HIGH 75: CPT

## 2025-04-07 PROCEDURE — 74177 CT ABD & PELVIS W/CONTRAST: CPT

## 2025-04-07 PROCEDURE — 87804 INFLUENZA ASSAY W/OPTIC: CPT

## 2025-04-07 PROCEDURE — 99284 EMERGENCY DEPT VISIT MOD MDM: CPT

## 2025-04-07 PROCEDURE — 85049 AUTOMATED PLATELET COUNT: CPT

## 2025-04-07 PROCEDURE — 93005 ELECTROCARDIOGRAM TRACING: CPT

## 2025-04-07 PROCEDURE — 96365 THER/PROPH/DIAG IV INF INIT: CPT

## 2025-04-07 PROCEDURE — 36415 COLL VENOUS BLD VENIPUNCTURE: CPT

## 2025-04-07 PROCEDURE — 83605 ASSAY OF LACTIC ACID: CPT

## 2025-04-07 PROCEDURE — 94640 AIRWAY INHALATION TREATMENT: CPT

## 2025-04-07 PROCEDURE — 87811 SARS-COV-2 COVID19 W/OPTIC: CPT

## 2025-04-07 PROCEDURE — 94760 N-INVAS EAR/PLS OXIMETRY 1: CPT

## 2025-04-07 PROCEDURE — 71260 CT THORAX DX C+: CPT

## 2025-04-07 PROCEDURE — 96375 TX/PRO/DX INJ NEW DRUG ADDON: CPT

## 2025-04-07 PROCEDURE — 83690 ASSAY OF LIPASE: CPT

## 2025-04-07 RX ORDER — ONDANSETRON 4 MG/1
4 TABLET, ORALLY DISINTEGRATING ORAL EVERY 6 HOURS PRN
Status: DISCONTINUED | OUTPATIENT
Start: 2025-04-07 | End: 2025-04-08 | Stop reason: HOSPADM

## 2025-04-07 RX ORDER — ONDANSETRON 2 MG/ML
4 INJECTION INTRAMUSCULAR; INTRAVENOUS ONCE
Status: COMPLETED | OUTPATIENT
Start: 2025-04-07 | End: 2025-04-07

## 2025-04-07 RX ORDER — PREGABALIN 50 MG/1
50 CAPSULE ORAL
Status: DISCONTINUED | OUTPATIENT
Start: 2025-04-07 | End: 2025-04-08 | Stop reason: HOSPADM

## 2025-04-07 RX ORDER — ALBUTEROL SULFATE 90 UG/1
2 INHALANT RESPIRATORY (INHALATION) 4 TIMES DAILY
Status: DISCONTINUED | OUTPATIENT
Start: 2025-04-07 | End: 2025-04-08 | Stop reason: HOSPADM

## 2025-04-07 RX ORDER — SODIUM CHLORIDE FOR INHALATION 0.9 %
12 VIAL, NEBULIZER (ML) INHALATION ONCE
Status: COMPLETED | OUTPATIENT
Start: 2025-04-07 | End: 2025-04-07

## 2025-04-07 RX ORDER — ACETAMINOPHEN 325 MG/1
975 TABLET ORAL EVERY 8 HOURS PRN
Status: DISCONTINUED | OUTPATIENT
Start: 2025-04-07 | End: 2025-04-08 | Stop reason: HOSPADM

## 2025-04-07 RX ORDER — NICOTINE 21 MG/24HR
14 PATCH, TRANSDERMAL 24 HOURS TRANSDERMAL DAILY PRN
Status: DISCONTINUED | OUTPATIENT
Start: 2025-04-07 | End: 2025-04-08 | Stop reason: HOSPADM

## 2025-04-07 RX ORDER — FLUTICASONE PROPIONATE 50 MCG
1 SPRAY, SUSPENSION (ML) NASAL DAILY
Status: DISCONTINUED | OUTPATIENT
Start: 2025-04-08 | End: 2025-04-08 | Stop reason: HOSPADM

## 2025-04-07 RX ORDER — LISINOPRIL 20 MG/1
20 TABLET ORAL DAILY
Qty: 90 TABLET | Refills: 0 | Status: SHIPPED | OUTPATIENT
Start: 2025-04-08

## 2025-04-07 RX ORDER — MAGNESIUM SULFATE HEPTAHYDRATE 40 MG/ML
2 INJECTION, SOLUTION INTRAVENOUS ONCE
Status: COMPLETED | OUTPATIENT
Start: 2025-04-07 | End: 2025-04-07

## 2025-04-07 RX ORDER — HYDROMORPHONE HCL IN WATER/PF 6 MG/30 ML
0.2 PATIENT CONTROLLED ANALGESIA SYRINGE INTRAVENOUS ONCE AS NEEDED
Status: DISCONTINUED | OUTPATIENT
Start: 2025-04-07 | End: 2025-04-07

## 2025-04-07 RX ORDER — BUDESONIDE AND FORMOTEROL FUMARATE DIHYDRATE 80; 4.5 UG/1; UG/1
2 AEROSOL RESPIRATORY (INHALATION) 2 TIMES DAILY
Status: DISCONTINUED | OUTPATIENT
Start: 2025-04-08 | End: 2025-04-08 | Stop reason: HOSPADM

## 2025-04-07 RX ORDER — MONTELUKAST SODIUM 10 MG/1
10 TABLET ORAL
Qty: 90 TABLET | Refills: 0 | Status: SHIPPED | OUTPATIENT
Start: 2025-04-08

## 2025-04-07 RX ORDER — KETOROLAC TROMETHAMINE 30 MG/ML
15 INJECTION, SOLUTION INTRAMUSCULAR; INTRAVENOUS ONCE
Status: COMPLETED | OUTPATIENT
Start: 2025-04-07 | End: 2025-04-07

## 2025-04-07 RX ORDER — NICOTINE 21 MG/24HR
14 PATCH, TRANSDERMAL 24 HOURS TRANSDERMAL DAILY
Status: DISCONTINUED | OUTPATIENT
Start: 2025-04-07 | End: 2025-04-07

## 2025-04-07 RX ORDER — GUAIFENESIN 600 MG/1
600 TABLET, EXTENDED RELEASE ORAL EVERY 12 HOURS SCHEDULED
Status: DISCONTINUED | OUTPATIENT
Start: 2025-04-07 | End: 2025-04-07

## 2025-04-07 RX ORDER — ENOXAPARIN SODIUM 100 MG/ML
40 INJECTION SUBCUTANEOUS DAILY
Status: DISCONTINUED | OUTPATIENT
Start: 2025-04-08 | End: 2025-04-07

## 2025-04-07 RX ORDER — HYDROXYZINE HYDROCHLORIDE 10 MG/1
10 TABLET, FILM COATED ORAL
Status: DISCONTINUED | OUTPATIENT
Start: 2025-04-07 | End: 2025-04-08 | Stop reason: HOSPADM

## 2025-04-07 RX ORDER — LEVALBUTEROL INHALATION SOLUTION 1.25 MG/3ML
1.25 SOLUTION RESPIRATORY (INHALATION) EVERY 6 HOURS PRN
Status: DISCONTINUED | OUTPATIENT
Start: 2025-04-07 | End: 2025-04-07

## 2025-04-07 RX ORDER — ALBUTEROL SULFATE 5 MG/ML
10 SOLUTION RESPIRATORY (INHALATION) ONCE
Status: COMPLETED | OUTPATIENT
Start: 2025-04-07 | End: 2025-04-07

## 2025-04-07 RX ORDER — MONTELUKAST SODIUM 10 MG/1
10 TABLET ORAL
Status: DISCONTINUED | OUTPATIENT
Start: 2025-04-08 | End: 2025-04-08 | Stop reason: HOSPADM

## 2025-04-07 RX ORDER — LORATADINE 10 MG/1
10 TABLET ORAL DAILY
Status: DISCONTINUED | OUTPATIENT
Start: 2025-04-08 | End: 2025-04-08 | Stop reason: HOSPADM

## 2025-04-07 RX ORDER — PANTOPRAZOLE SODIUM 20 MG/1
20 TABLET, DELAYED RELEASE ORAL
Status: DISCONTINUED | OUTPATIENT
Start: 2025-04-08 | End: 2025-04-08 | Stop reason: HOSPADM

## 2025-04-07 RX ORDER — ENOXAPARIN SODIUM 100 MG/ML
40 INJECTION SUBCUTANEOUS DAILY
Status: DISCONTINUED | OUTPATIENT
Start: 2025-04-07 | End: 2025-04-08 | Stop reason: HOSPADM

## 2025-04-07 RX ORDER — METHYLPREDNISOLONE SODIUM SUCCINATE 125 MG/2ML
125 INJECTION, POWDER, LYOPHILIZED, FOR SOLUTION INTRAMUSCULAR; INTRAVENOUS ONCE
Status: COMPLETED | OUTPATIENT
Start: 2025-04-07 | End: 2025-04-07

## 2025-04-07 RX ORDER — GUAIFENESIN 600 MG/1
600 TABLET, EXTENDED RELEASE ORAL EVERY 12 HOURS PRN
Status: DISCONTINUED | OUTPATIENT
Start: 2025-04-07 | End: 2025-04-08 | Stop reason: HOSPADM

## 2025-04-07 RX ORDER — METHYLPREDNISOLONE SODIUM SUCCINATE 125 MG/2ML
60 INJECTION, POWDER, LYOPHILIZED, FOR SOLUTION INTRAMUSCULAR; INTRAVENOUS ONCE
Status: COMPLETED | OUTPATIENT
Start: 2025-04-08 | End: 2025-04-08

## 2025-04-07 RX ORDER — LISINOPRIL 20 MG/1
20 TABLET ORAL DAILY
Status: DISCONTINUED | OUTPATIENT
Start: 2025-04-08 | End: 2025-04-08 | Stop reason: HOSPADM

## 2025-04-07 RX ADMIN — KETOROLAC TROMETHAMINE 15 MG: 30 INJECTION, SOLUTION INTRAMUSCULAR; INTRAVENOUS at 14:27

## 2025-04-07 RX ADMIN — METHYLPREDNISOLONE SODIUM SUCCINATE 125 MG: 125 INJECTION, POWDER, FOR SOLUTION INTRAMUSCULAR; INTRAVENOUS at 14:26

## 2025-04-07 RX ADMIN — ISODIUM CHLORIDE 12 ML: 0.03 SOLUTION RESPIRATORY (INHALATION) at 15:53

## 2025-04-07 RX ADMIN — ENOXAPARIN SODIUM 40 MG: 40 INJECTION SUBCUTANEOUS at 22:03

## 2025-04-07 RX ADMIN — ACETAMINOPHEN 975 MG: 325 TABLET, FILM COATED ORAL at 22:45

## 2025-04-07 RX ADMIN — GUAIFENESIN 600 MG: 600 TABLET, EXTENDED RELEASE ORAL at 22:45

## 2025-04-07 RX ADMIN — NICOTINE 14 MG: 14 PATCH, EXTENDED RELEASE TRANSDERMAL at 22:45

## 2025-04-07 RX ADMIN — MAGNESIUM SULFATE HEPTAHYDRATE 2 G: 40 INJECTION, SOLUTION INTRAVENOUS at 15:53

## 2025-04-07 RX ADMIN — IPRATROPIUM BROMIDE 1 MG: 0.5 SOLUTION RESPIRATORY (INHALATION) at 14:16

## 2025-04-07 RX ADMIN — IOHEXOL 80 ML: 350 INJECTION, SOLUTION INTRAVENOUS at 15:34

## 2025-04-07 RX ADMIN — IPRATROPIUM BROMIDE 1 MG: 0.5 SOLUTION RESPIRATORY (INHALATION) at 15:53

## 2025-04-07 RX ADMIN — ALBUTEROL SULFATE 10 MG: 2.5 SOLUTION RESPIRATORY (INHALATION) at 14:16

## 2025-04-07 RX ADMIN — ALBUTEROL SULFATE 10 MG: 2.5 SOLUTION RESPIRATORY (INHALATION) at 15:53

## 2025-04-07 RX ADMIN — PREGABALIN 50 MG: 50 CAPSULE ORAL at 22:03

## 2025-04-07 RX ADMIN — ALBUTEROL SULFATE 2 PUFF: 90 AEROSOL, METERED RESPIRATORY (INHALATION) at 22:03

## 2025-04-07 RX ADMIN — ONDANSETRON 4 MG: 2 INJECTION, SOLUTION INTRAMUSCULAR; INTRAVENOUS at 14:27

## 2025-04-07 RX ADMIN — ISODIUM CHLORIDE 12 ML: 0.03 SOLUTION RESPIRATORY (INHALATION) at 14:16

## 2025-04-07 NOTE — ASSESSMENT & PLAN NOTE
Patient's most recent A1c on 4/6 was 6.6 and has ranged in the 6s prior.      Plan:  -Continue to monitor blood sugars

## 2025-04-07 NOTE — ASSESSMENT & PLAN NOTE
Patient is presenting for a 1 week history of shortness of breath and wheezing which she attributed to an illness.  She denied any fevers, chills, or any sick contacts.  In the ER, vital signs were stable and she remained on room air, labs were remarkable for a WBC count of 12.72 (absolute neutrophils 8.19), potassium of 3.3, and patient was flu and COVID-positive.  CT CAP showed mild emphysema, mild mucous plugging right lower lobe bronchi, no evidence of pneumonia.  She was given albuterol 10 mg x 2, ipratropium nebulization x 2, Solu-Medrol 125 mg , and IVFs.      Home regimen: Albuterol inhaler 4 times daily, Symbicort 80-4.5 twice daily, and Singulair 10 mg daily at bedtime      Plan:  -Solu-Medrol 60 mg on 4/8 with plan for p.o. steroid taper  -Respiratory protocol  -Continue home regimen  -Continue to monitor oxygenation and wheezing  -Will not initiate Tamiflu or Paxlovid due to patient having symptoms for 1 week

## 2025-04-07 NOTE — TELEPHONE ENCOUNTER
Follow Up Appt Change    Appt moved to:    04/08/25 9:00AM    Please notify [facility] of change in date/time.

## 2025-04-07 NOTE — ASSESSMENT & PLAN NOTE
Patient reports that her blood pressure is well-controlled and she is currently 138/88 and on lisinopril 20 daily at home.  She denies any headaches, palpitations, or blurry vision.      Plan:  -Continue lisinopril

## 2025-04-07 NOTE — H&P
H&P Exam - Mireya Yi 79 y.o. female MRN: [id number]  Unit/Bed#: -[facility] Encounter: [id number]  Assessment & Plan  Moderate asthma with acute exacerbation  Patient is presenting for a 1 week history of shortness of breath and wheezing which she attributed to an illness.  She denied any fevers, chills, or any sick contacts.  In the ER, vital signs were stable and she remained on room air, labs were remarkable for a WBC count of 12.72 (absolute neutrophils 8.19), potassium of 3.3, and patient was flu and COVID-positive.  CT CAP showed mild emphysema, mild mucous plugging right lower lobe bronchi, no evidence of pneumonia.  She was given albuterol 10 mg x 2, ipratropium nebulization x 2, Solu-Medrol 125 mg , and IVFs.      Home regimen: Albuterol inhaler 4 times daily, Symbicort 80-4.5 twice daily, and Singulair 10 mg daily at bedtime      Plan:  -Solu-Medrol 60 mg on 4/8 with plan for p.o. steroid taper  -Respiratory protocol  -Continue home regimen  -Continue to monitor oxygenation and wheezing  -Will not initiate Tamiflu or Paxlovid due to patient having symptoms for 1 week  Chronic allergic rhinitis  Patient takes Flonase and Allegra at home and reports no current allergic symptoms.      Plan:  -Continue Flonase and Claritin while inpatient  Essential hypertension  Patient reports that her blood pressure is well-controlled and she is currently 138/88 and on lisinopril 20 daily at home.  She denies any headaches, palpitations, or blurry vision.      Plan:  -Continue lisinopril  GERD (gastroesophageal reflux disease)  Patient takes Protonix 20 mg daily.      Plan:  -Continue Protonix   Stage 3b chronic kidney disease (HCC)  Lab Results   Component Value Date    EGFR 50 04/07/2025    EGFR 41 02/11/2025    EGFR 42 02/11/2025    CREATININE 1.05 04/07/2025    CREATININE 1.24 02/11/2025    CREATININE 1.21 02/11/2025     Baseline creatinine 1.1-1.2      Plan:  -Avoid nephrotoxic medications  -Encourage po  hydration  -Consider IVF if needed  Prediabetes  Patient's most recent A1c on 4/6 was 6.6 and has ranged in the 6s prior.      Plan:  -Continue to monitor blood sugars  Anxiety  Patient currently takes Atarax 10 mg daily at bedtime as needed.      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  She was last seen by pulmonology on 2/6/2025 where it was recommended she continue her Symbicort twice daily and albuterol/DuoNebs as needed.      Plan:  -See Asthma  COVID-19 virus infection  See asthma. Will not start Paxlovid due to duration of symptoms  Lumbar radiculopathy  Patient follows with pain management and was recently seen on 2/25/2025 and started on pregabalin 50 mg nightly.      Plan:  -Continue Pregabalin   Influenza B  See Asthma. Will not start tamiflu due to duration of symptoms  Abnormal ultrasound of endometrium  CT CAP 4/7 showed a distended endometrium measuring up to 10 mm. Correlation with nonemergent pelvic ultrasound recommended to exclude neoplasm. Fibroid uterus.      Plan:  -Obtain pelvic ultrasound as an outpatient  Irregular heart rhythm  Patient's exam was remarkable for sinus pauses after approximately 4-5 beats.  She denies any history of palpitations or heart arrhythmias.  Last EKG done on 7/31/2023 showed sinus tachycardia with nonspecific T waves and some possible ectopic atrial beats    EKG 4/7 showed normal sinus rhythm, nonspecific ST and T wave abnormality      Plan:   -Continue to monitor for symptoms  Tobacco abuse  Patient reports that she continues to smokes on and off despite quitting sometime ago.      Plan:  -Nicotine patch ordered        DATE: 4/7/2025  TIME: 9:02 PM  Primary Care Physician: Elham Saleem MD  Admitting Provider: Rock Slaughter DO    History of Present Illness   HPI:  Mireya Yi is a 79 y.o.  female who with a past medical history of stage III CKD, asthma, hypertension, GERD, and anxiety presenting for a 1 week history of shortness of breath and wheezing which she attributed to an illness.  She denied any fevers, chills, or any sick contacts.  In the ER, vital signs were stable and she remained on room air, labs were remarkable for a WBC count of 12.72 (absolute neutrophils 8.19), potassium of 3.3, and patient was flu and COVID-positive.  CT CAP showed mild emphysema, mild mucous plugging right lower lobe bronchi, no evidence of pneumonia.  She was given albuterol 10 mg x 2, ipratropium nebulization x 2, 125 mg Solu-Medrol, and IVFs.    On exam today, patient did not have any acute complaints and said that she felt better after receiving medication in the ER.  She said that she had been feeling this way for about a week.  Patient notes that she takes all her medications at home and and says she is compliant..      Review of Systems   Constitutional:  Negative for chills and fever.   HENT:  Negative for ear pain and sore throat.    Eyes:  Negative for pain and visual disturbance.   Respiratory:  Positive for shortness of breath. Negative for cough.    Cardiovascular:  Negative for chest pain and palpitations.   Gastrointestinal:  Negative for abdominal pain and vomiting.   Genitourinary:  Negative for dysuria and hematuria.   Musculoskeletal:  Negative for arthralgias and back pain.   Skin:  Negative for color change and rash.   Neurological:  Negative for seizures and syncope.   All other systems reviewed and are negative.      Historical Information   Past Medical History:   Diagnosis Date    Asthma     Asthmatic bronchitis     Last Assessed: 10/7/2014     Chronic diarrhea     Last Assessed: 8/20/2015     Fibromyalgia     Focal nodular hyperplasia of liver     Last Assessed: 6/11/2015    Herpes zoster     Last Assessed: 3/18/2016    Hypertension     IBS (irritable bowel syndrome)     Intermittent  palpitations     Irritable bowel syndrome     Lumbar radiculopathy     Osteoarthritis     Vertigo      Past Surgical History:   Procedure Laterality Date    BREAST BIOPSY      BREAST LUMPECTOMY      when pt was 18    SMALL INTESTINE SURGERY       Social History   Social History     Substance and Sexual Activity   Alcohol Use Not Currently     Social History     Substance and Sexual Activity   Drug Use No     Social History     Tobacco Use   Smoking Status Some Days    Types: Cigarettes   Smokeless Tobacco Never   Tobacco Comments    Hasn't been smoking recently because of the cough., about 8 days.     Family History:   Family History   Problem Relation Age of Onset    Heart attack Mother     Asthma Father     Breast cancer Sister     Breast cancer Sister     No Known Problems Daughter     No Known Problems Daughter     Arthritis Family     Osteoporosis Family        Meds/Allergies   PTA meds:   Prior to Admission Medications   Prescriptions Last Dose Informant Patient Reported? Taking?   Cholecalciferol (D3-1000) 1,000 units tablet  Self No No   Sig: Take 1 tablet (1,000 Units total) by mouth daily   acetaminophen (TYLENOL) 500 mg tablet  Self No No   Sig: Take 1 tablet (500 mg total) by mouth every 6 (six) hours as needed for mild pain   acetylcysteine (MUCOMYST) 10 % nebulizer solution   No No   Sig: Take 4 mL by nebulization every 4 (four) hours   albuterol (2.5 mg/3 mL) 0.083 % nebulizer solution   No No   Sig: Take 3 mL (2.5 mg total) by nebulization every 6 (six) hours as needed for wheezing or shortness of breath   albuterol (PROVENTIL HFA,VENTOLIN HFA) 90 mcg/act inhaler  Self No No   Sig: Inhale 2 puffs every 6 (six) hours as needed for wheezing or shortness of breath   azelastine (ASTELIN) 0.1 % nasal spray   No No   Si spray into each nostril 2 (two) times a day Use in each nostril as directed   benzonatate (TESSALON PERLES) 100 mg capsule   No No   Sig: Take 1 capsule (100 mg total) by mouth 3  (three) times a day as needed for cough   budesonide-formoterol (Symbicort) 80-4.5 MCG/ACT inhaler  Self No No   Sig: Inhale 2 puffs 2 (two) times a day Rinse mouth after use   Patient taking differently: Inhale 2 puffs if needed Rinse mouth after use   fexofenadine (ALLEGRA) 180 MG tablet  Self Yes No   Sig: Take 180 mg by mouth daily   fluticasone (FLONASE) 50 mcg/act nasal spray  Self No No   Sig: SPRAY 2 SPRAYS INTO EACH NOSTRIL EVERY DAY   folic acid (FOLVITE) 1 mg tablet  Self No No   Sig: Take 1 tablet (1,000 mcg total) by mouth daily   Patient not taking: Reported on 2/21/2025   hydrOXYzine HCL (ATARAX) 10 mg tablet  Self No No   Sig: Take 1 tablet (10 mg total) by mouth daily at bedtime as needed for anxiety for up to 10 doses   ipratropium-albuterol (DUO-NEB) 0.5-2.5 mg/3 mL nebulizer solution   No No   Sig: Take 3 mL by nebulization 4 (four) times a day   lisinopril (ZESTRIL) 20 mg tablet   No No   Sig: Take 1 tablet (20 mg total) by mouth daily   montelukast (SINGULAIR) 10 mg tablet  Self No No   Sig: Take 1 tablet (10 mg total) by mouth daily at bedtime   omeprazole (PriLOSEC) 20 mg delayed release capsule   Yes No   Sig: Take 20 mg by mouth daily   ondansetron (ZOFRAN) 4 mg tablet   No No   Sig: Take 1 tablet (4 mg total) by mouth every 8 (eight) hours as needed for nausea or vomiting   ondansetron (Zofran ODT) 4 mg disintegrating tablet  Self No No   Sig: Take 1 tablet (4 mg total) by mouth every 6 (six) hours as needed for nausea or vomiting   pantoprazole (PROTONIX) 20 mg tablet  Self No No   Sig: Take 1 tablet (20 mg total) by mouth daily   predniSONE 20 mg tablet   No No   Sig: Take 2 tablets (40 mg total) by mouth daily for 5 days   pregabalin (LYRICA) 50 mg capsule   No No   Sig: Take 1 PO HS x 5 days, then 1 PO BID x 5 days, then 1 PO AM and 2 PO HS      Facility-Administered Medications: None     Allergies   Allergen Reactions    Ambrosia Artemisiifolia (Ragweed) Skin Test Facial Swelling     "Codeine Other (See Comments)     Heart races    Epinephrine      Pt reports \"it makes my heart race, they told me I cant take it.\"    Ibuprofen Other (See Comments)     Heart racing    Morphine Other (See Comments)     Heart racing    Pollen Extract Sneezing    Tramadol Other (See Comments)     Heart racing       Objective   Vitals: Blood pressure 138/88, pulse 88, temperature 97.9 °F (36.6 °C), resp. rate 20, SpO2 98%.    No intake or output data in the 24 hours ending 04/07/25 2102    Invasive Devices       Peripheral Intravenous Line  Duration             Peripheral IV 04/07/25 Left Antecubital <1 day                    Physical Exam  Vitals reviewed.   Constitutional:       General: She is not in acute distress.     Appearance: Normal appearance. She is not ill-appearing.   HENT:      Head: Normocephalic and atraumatic.      Nose: Nose normal. No congestion or rhinorrhea.   Eyes:      Conjunctiva/sclera: Conjunctivae normal.   Cardiovascular:      Rate and Rhythm: Normal rate. Rhythm irregular.      Pulses: Normal pulses.      Heart sounds: No murmur heard.  Pulmonary:      Effort: Pulmonary effort is normal. No respiratory distress.      Breath sounds: No stridor. Wheezing (Expiratory wheezing) present. No rhonchi or rales.   Abdominal:      Palpations: Abdomen is soft.      Tenderness: There is no abdominal tenderness.   Musculoskeletal:         General: No swelling. Normal range of motion.      Cervical back: Normal range of motion. No rigidity.   Neurological:      General: No focal deficit present.      Mental Status: She is alert.   Psychiatric:         Mood and Affect: Mood normal.         Lab Results:   Results Review Statement: No pertinent imaging studies reviewed.    Recent Results (from the past 24 hours)   CBC and differential    Collection Time: 04/07/25  2:22 PM   Result Value Ref Range    WBC 12.72 (H) 4.31 - 10.16 Thousand/uL    RBC 4.54 3.81 - 5.12 Million/uL    Hemoglobin 11.4 (L) 11.5 - 15.4 " g/dL    Hematocrit 37.0 34.8 - 46.1 %    MCV 82 82 - 98 fL    MCH 25.1 (L) 26.8 - 34.3 pg    MCHC 30.8 (L) 31.4 - 37.4 g/dL    RDW 16.7 (H) 11.6 - 15.1 %    MPV 11.7 8.9 - 12.7 fL    Platelets 376 149 - 390 Thousands/uL    nRBC 0 /100 WBCs    Segmented % 64 43 - 75 %    Immature Grans % 1 0 - 2 %    Lymphocytes % 26 14 - 44 %    Monocytes % 8 4 - 12 %    Eosinophils Relative 1 0 - 6 %    Basophils Relative 0 0 - 1 %    Absolute Neutrophils 8.19 (H) 1.85 - 7.62 Thousands/µL    Absolute Immature Grans 0.08 0.00 - 0.20 Thousand/uL    Absolute Lymphocytes 3.33 0.60 - 4.47 Thousands/µL    Absolute Monocytes 1.00 0.17 - 1.22 Thousand/µL    Eosinophils Absolute 0.07 0.00 - 0.61 Thousand/µL    Basophils Absolute 0.05 0.00 - 0.10 Thousands/µL   Comprehensive metabolic panel    Collection Time: 04/07/25  2:22 PM   Result Value Ref Range    Sodium 142 135 - 147 mmol/L    Potassium 3.3 (L) 3.5 - 5.3 mmol/L    Chloride 110 (H) 96 - 108 mmol/L    CO2 23 21 - 32 mmol/L    ANION GAP 9 4 - 13 mmol/L    BUN 14 5 - 25 mg/dL    Creatinine 1.05 0.60 - 1.30 mg/dL    Glucose 95 65 - 140 mg/dL    Calcium 9.3 8.4 - 10.2 mg/dL    AST 9 (L) 13 - 39 U/L    ALT 9 7 - 52 U/L    Alkaline Phosphatase 70 34 - 104 U/L    Total Protein 6.5 6.4 - 8.4 g/dL    Albumin 3.6 3.5 - 5.0 g/dL    Total Bilirubin 0.29 0.20 - 1.00 mg/dL    eGFR 50 ml/min/1.73sq m   Lipase    Collection Time: 04/07/25  2:22 PM   Result Value Ref Range    Lipase 24 11 - 82 u/L   Lactic acid, plasma (w/reflex if result > 2.0)    Collection Time: 04/07/25  2:22 PM   Result Value Ref Range    LACTIC ACID 1.4 0.5 - 2.0 mmol/L   FLU/COVID Rapid Antigen (30 min. TAT) - Preferred screening test in ED    Collection Time: 04/07/25  2:47 PM    Specimen: Nose; Nares   Result Value Ref Range    SARS COV Rapid Antigen Positive (A) Negative    Influenza A Rapid Antigen Negative Negative    Influenza B Rapid Antigen Positive (A) Negative   Platelet count    Collection Time: 04/07/25  6:46 PM    Result Value Ref Range    Platelets 355 149 - 390 Thousands/uL    MPV 10.9 8.9 - 12.7 fL   Hemoglobin A1C    Collection Time: 04/07/25  6:46 PM   Result Value Ref Range    Hemoglobin A1C 6.6 (H) Normal 4.0-5.6%; PreDiabetic 5.7-6.4%; Diabetic >=6.5%; Glycemic control for adults with diabetes <7.0% %     mg/dl       Imaging:   EKG, Pathology, and Other Studies: Results Review Statement: No pertinent imaging studies reviewed.    Code Status: Level 1 - Full Code  POLST:    VTE Prophylaxis: VTE covered by:  enoxaparin, Subcutaneous     sequential compression device  Admission Status: OBSERVATION      Kyree Blandon DO

## 2025-04-07 NOTE — ED PROVIDER NOTES
Time reflects when diagnosis was documented in both MDM as applicable and the Disposition within this note       Time User Action Codes Description Comment    4/7/2025  4:45 PM Kat Hidalgo [J45.901] Asthma exacerbation     4/7/2025  4:46 PM Kat Hidalgo [J11.1] Influenza     4/7/2025  4:46 PM Kat Hidalgo [U07.1] COVID     4/7/2025  4:46 PM Kat Hidalgo [R93.89] Endometrial thickening on ultrasound     4/7/2025  4:46 PM Kat Hidalgo [D25.9] Fibroid uterus     4/7/2025  6:02 PM Kyree Blandon [M79.89] Left leg swelling     4/7/2025  6:02 PM Kyree Blandon [J45.30] Mild persistent asthma without complication           ED Disposition       ED Disposition   Admit    Condition   Stable    Date/Time   Mon Apr 7, 2025  4:45 PM    Comment   --             Assessment & Plan       Medical Decision Making  Amount and/or Complexity of Data Reviewed  Labs: ordered. Decision-making details documented in ED Course.  Radiology: ordered. Decision-making details documented in ED Course.    Risk  Prescription drug management.  Decision regarding hospitalization.        ED Course as of 04/07/25 1954 Mon Apr 07, 2025   1410 Patient seen and evaluated by me  DDx: Patient appears to be in acute exacerbation of asthma.  Less likely CHF as patient has no history of such and does not appear to be in fluid overload.  Could be in setting of viral illness with prolonged course and associated multi system complaints.  Patient does not appear distended, but does have risk factors for SBO and is tender on examination.  Doubt appendicitis as symptoms have been ongoing for the past week.  Doubt pneumothorax, but patient does have a [place] valve and history of asthma.  Workup and plan: CBC, CMP, lipase, lactic, viral swab, CT chest abdomen and pelvis.   1529 SARS COV Rapid Antigen(!): Positive   1530 Influenza B Rapid Antigen(!): Positive   1530 WBC(!): 12.72   1550 Re-assessed. Improvement in overall  appearance and reported improvement of symptoms but with ongoing wheezing, will give 2nd neb and magnesium. Plan for admission   1613 LACTIC ACID: 1.4   1614 Comprehensive metabolic panel(!)  Mild hypokalemia   1614 LIPASE: 24   1638 CT chest abdomen pelvis w contrast  1.  Mild emphysema. Mild mucous plugging right lower lobe bronchi. No evidence of pneumonia.     2.  No acute intra-abdominal or pelvic pathology.     3.  Distended endometrium measuring up to 10 mm. Correlation with nonemergent pelvic ultrasound recommended to exclude neoplasm. Fibroid uterus.     4.  Diffuse colonic diverticulosis without evidence of diverticulitis.     1645 Admitted to        Medications   albuterol inhalation solution 10 mg (10 mg Nebulization Given 4/7/25 1416)   ipratropium (ATROVENT) 0.02 % inhalation solution 1 mg (1 mg Nebulization Given 4/7/25 1416)   sodium chloride 0.9 % inhalation solution 12 mL (12 mL Nebulization Given 4/7/25 1416)   methylPREDNISolone sodium succinate (Solu-MEDROL) injection 125 mg (125 mg Intravenous Given 4/7/25 1426)   ondansetron (ZOFRAN) injection 4 mg (4 mg Intravenous Given 4/7/25 1427)   ketorolac (TORADOL) injection 15 mg (15 mg Intravenous Given 4/7/25 1427)   iohexol (OMNIPAQUE) 350 MG/ML injection (MULTI-DOSE) 80 mL (80 mL Intravenous Given 4/7/25 1534)   albuterol inhalation solution 10 mg (10 mg Nebulization Given 4/7/25 1553)   ipratropium (ATROVENT) 0.02 % inhalation solution 1 mg (1 mg Nebulization Given 4/7/25 1553)   sodium chloride 0.9 % inhalation solution 12 mL (12 mL Nebulization Given 4/7/25 1553)   magnesium sulfate 2 g/50 mL IVPB (premix) 2 g (0 g Intravenous Stopped 4/7/25 1656)       ED Risk Strat Scores                                                History of Present Illness       Chief Complaint   Patient presents with    Asthma     Since this am, states at home neb didn't help.        Past Medical History:   Diagnosis Date    Asthma     Asthmatic bronchitis     Last  Assessed: 10/7/2014     Chronic diarrhea     Last Assessed: 8/20/2015     Fibromyalgia     Focal nodular hyperplasia of liver     Last Assessed: 6/11/2015    Herpes zoster     Last Assessed: 3/18/2016    Hypertension     IBS (irritable bowel syndrome)     Intermittent palpitations     Irritable bowel syndrome     Lumbar radiculopathy     Osteoarthritis     Vertigo       Past Surgical History:   Procedure Laterality Date    BREAST BIOPSY      BREAST LUMPECTOMY      when pt was 18    SMALL INTESTINE SURGERY        Family History   Problem Relation Age of Onset    Heart attack Mother     Asthma Father     Breast cancer Sister     Breast cancer Sister     No Known Problems Daughter     No Known Problems Daughter     Arthritis Family     Osteoporosis Family       Social History     Tobacco Use    Smoking status: Some Days     Types: Cigarettes    Smokeless tobacco: Never    Tobacco comments:     Hasn't been smoking recently because of the cough., about 8 days.   Vaping Use    Vaping status: Never Used   Substance Use Topics    Alcohol use: Not Currently    Drug use: No      E-Cigarette/Vaping    E-Cigarette Use Never User       E-Cigarette/Vaping Substances    Nicotine No     THC No     CBD No     Flavoring No     Other No     Unknown No       I have reviewed and agree with the history as documented.     Patient is a 79-year-old female, past medical history significant for asthma, IBS, presenting today for evaluation of an asthma exacerbation.  She states that over the past 1 week she has been sick with times of cough, difficulty breathing, as well as abdominal pain.  Patient states that she saw her primary care doctor at the start of symptoms who prescribed her a course of azithromycin, prednisone, and inhaled corticosteroids.  Patient has been using nebulized treatments at home every 4 hours with no relief in symptoms.  She reports finishing her course of prednisone yesterday with no change in symptoms while she was  taking them versus now.  Patient not aware of any sick contacts that she has had.  She additionally endorses some abdominal pain with associated nausea and no vomiting.  She reports 3 days ago she had 2 episodes of loose stools, which since has resolved.  She has not had any bowel movement since that time.  Patient does have a history of a ruptured ulcer for which she required surgery and has a large ex lap scar on her abdomen.  Patient denies any fevers that she knows of.  There has been no increase sputum production.  She denies any history of smoking or COPD.  She denies any history of cardiac issues.  No chest pain.  Patient is leaning forward on my exam holding her abdomen stating that this is the only position that she can get comfortable and then when her abdominal pain gets worse she states that her asthma gets worse.          Review of Systems   Constitutional:  Negative for chills, fatigue and fever.   HENT:  Negative for congestion.    Respiratory:  Positive for cough, shortness of breath and wheezing. Negative for chest tightness.    Cardiovascular:  Negative for chest pain.   Gastrointestinal:  Positive for abdominal pain, constipation and nausea. Negative for diarrhea and vomiting.   Genitourinary:  Negative for difficulty urinating.   Skin:  Negative for color change.   Neurological:  Negative for dizziness, syncope, weakness, numbness and headaches.           Objective       ED Triage Vitals   Temperature Pulse Blood Pressure Respirations SpO2 Patient Position - Orthostatic VS   04/07/25 1339 04/07/25 1339 04/07/25 1339 04/07/25 1339 04/07/25 1339 --   97.5 °F (36.4 °C) 96 (!) 192/98 20 96 %       Temp Source Heart Rate Source BP Location FiO2 (%) Pain Score    04/07/25 1339 04/07/25 1339 -- -- 04/07/25 1427    Temporal Monitor   8      Vitals      Date and Time Temp Pulse SpO2 Resp BP Pain Score FACES Pain Rating User   04/07/25 1711 97.9 °F (36.6 °C) -- -- -- 138/88 -- -- DII   04/07/25 1620 -- 88  98 % 20 155/72 -- -- JR   04/07/25 1427 -- -- -- -- -- 8 -- SM   04/07/25 1339 97.5 °F (36.4 °C) 96 96 % 20 192/98 -- -- NONA            Physical Exam  Constitutional:       General: She is in acute distress.      Appearance: She is not ill-appearing.   HENT:      Nose: Nose normal.      Mouth/Throat:      Mouth: Mucous membranes are moist.   Eyes:      Extraocular Movements: Extraocular movements intact.   Cardiovascular:      Rate and Rhythm: Normal rate.      Pulses: Normal pulses.   Pulmonary:      Effort: Respiratory distress present.      Breath sounds: Wheezing present.   Abdominal:      General: Abdomen is flat. There is no distension.      Palpations: Abdomen is soft.      Tenderness: There is abdominal tenderness. There is no guarding.   Musculoskeletal:         General: Normal range of motion.      Cervical back: Normal range of motion.      Right lower leg: No edema.      Left lower leg: No edema.   Skin:     General: Skin is warm.      Capillary Refill: Capillary refill takes less than 2 seconds.   Neurological:      General: No focal deficit present.      Mental Status: She is alert.         Results Reviewed       Procedure Component Value Units Date/Time    FLU/COVID Rapid Antigen (30 min. TAT) - Preferred screening test in ED [[id number]]  (Abnormal) Collected: 04/07/25 1447    Lab Status: Final result Specimen: Nares from Nose Updated: 04/07/25 1521     SARS COV Rapid Antigen Positive     Influenza A Rapid Antigen Negative     Influenza B Rapid Antigen Positive    Narrative:      This test has been performed using the Quidel Nita 2 FLU+SARS Antigen test under the Emergency Use Authorization (EUA). This test has been validated by the  and verified by the performing laboratory. The Nita uses lateral flow immunofluorescent sandwich assay to detect SARS-COV, Influenza A and Influenza B Antigen.     The Quidel Nita 2 SARS Antigen test does not differentiate between SARS-CoV and SARS-CoV-2.      Negative results are presumptive and may be confirmed with a molecular assay, if necessary, for patient management. Negative results do not rule out SARS-CoV-2 or influenza infection and should not be used as the sole basis for treatment or patient management decisions. A negative test result may occur if the level of antigen in a sample is below the limit of detection of this test.     Positive results are indicative of the presence of viral antigens, but do not rule out bacterial infection or co-infection with other viruses.     All test results should be used as an adjunct to clinical observations and other information available to the provider.    FOR PEDIATRIC PATIENTS - copy/paste COVID Guidelines URL to browser: [email or web address]    Comprehensive metabolic panel [[id number]]  (Abnormal) Collected: 04/07/25 1422    Lab Status: Final result Specimen: Blood from Arm, Left Updated: 04/07/25 1502     Sodium 142 mmol/L      Potassium 3.3 mmol/L      Chloride 110 mmol/L      CO2 23 mmol/L      ANION GAP 9 mmol/L      BUN 14 mg/dL      Creatinine 1.05 mg/dL      Glucose 95 mg/dL      Calcium 9.3 mg/dL      AST 9 U/L      ALT 9 U/L      Alkaline Phosphatase 70 U/L      Total Protein 6.5 g/dL      Albumin 3.6 g/dL      Total Bilirubin 0.29 mg/dL      eGFR 50 ml/min/1.73sq m     Narrative:      National Kid[employer] guidelines for Chronic Kidney Disease (CKD):     Stage 1 with normal or high GFR (GFR > 90 mL/min/1.73 square meters)    Stage 2 Mild CKD (GFR = 60-89 mL/min/1.73 square meters)    Stage 3A Moderate CKD (GFR = 45-59 mL/min/1.73 square meters)    Stage 3B Moderate CKD (GFR = 30-44 mL/min/1.73 square meters)    Stage 4 Severe CKD (GFR = 15-29 mL/min/1.73 square meters)    Stage 5 End Stage CKD (GFR <15 mL/min/1.73 square meters)  Note: GFR calculation is accurate only with a steady state creatinine    Lipase [[id number]]  (Normal) Collected:  04/07/25 1422    Lab Status: Final result Specimen: Blood from Arm, Left Updated: 04/07/25 1502     Lipase 24 u/L     Lactic acid, plasma (w/reflex if result > 2.0) [[id number]]  (Normal) Collected: 04/07/25 1422    Lab Status: Final result Specimen: Blood from Arm, Left Updated: 04/07/25 1501     LACTIC ACID 1.4 mmol/L     Narrative:      Result may be elevated if tourniquet was used during collection.    CBC and differential [[id number]]  (Abnormal) Collected: 04/07/25 1422    Lab Status: Final result Specimen: Blood from Arm, Left Updated: 04/07/25 1440     WBC 12.72 Thousand/uL      RBC 4.54 Million/uL      Hemoglobin 11.4 g/dL      Hematocrit 37.0 %      MCV 82 fL      MCH 25.1 pg      MCHC 30.8 g/dL      RDW 16.7 %      MPV 11.7 fL      Platelets 376 Thousands/uL      nRBC 0 /100 WBCs      Segmented % 64 %      Immature Grans % 1 %      Lymphocytes % 26 %      Monocytes % 8 %      Eosinophils Relative 1 %      Basophils Relative 0 %      Absolute Neutrophils 8.19 Thousands/µL      Absolute Immature Grans 0.08 Thousand/uL      Absolute Lymphocytes 3.33 Thousands/µL      Absolute Monocytes 1.00 Thousand/µL      Eosinophils Absolute 0.07 Thousand/µL      Basophils Absolute 0.05 Thousands/µL             CT chest abdomen pelvis w contrast   Final Interpretation by Mitchell Cruz DO (04/07 1633)      1.  Mild emphysema. Mild mucous plugging right lower lobe bronchi. No evidence of pneumonia.      2.  No acute intra-abdominal or pelvic pathology.      3.  Distended endometrium measuring up to 10 mm. Correlation with nonemergent pelvic ultrasound recommended to exclude neoplasm. Fibroid uterus.      4.  Diffuse colonic diverticulosis without evidence of diverticulitis.      5.  Additional incidental findings as above.      The study was marked in EPIC for follow-up.         Workstation performed: [id number]             Procedures    ED Medication and Procedure Management   Prior to Admission Medications    Prescriptions Last Dose Informant Patient Reported? Taking?   Cholecalciferol (D3-1000) 1,000 units tablet  Self No No   Sig: Take 1 tablet (1,000 Units total) by mouth daily   acetaminophen (TYLENOL) 500 mg tablet  Self No No   Sig: Take 1 tablet (500 mg total) by mouth every 6 (six) hours as needed for mild pain   acetylcysteine (MUCOMYST) 10 % nebulizer solution   No No   Sig: Take 4 mL by nebulization every 4 (four) hours   albuterol (2.5 mg/3 mL) 0.083 % nebulizer solution   No No   Sig: Take 3 mL (2.5 mg total) by nebulization every 6 (six) hours as needed for wheezing or shortness of breath   albuterol (PROVENTIL HFA,VENTOLIN HFA) 90 mcg/act inhaler  Self No No   Sig: Inhale 2 puffs every 6 (six) hours as needed for wheezing or shortness of breath   azelastine (ASTELIN) 0.1 % nasal spray   No No   Si spray into each nostril 2 (two) times a day Use in each nostril as directed   benzonatate (TESSALON PERLES) 100 mg capsule   No No   Sig: Take 1 capsule (100 mg total) by mouth 3 (three) times a day as needed for cough   budesonide-formoterol (Symbicort) 80-4.5 MCG/ACT inhaler  Self No No   Sig: Inhale 2 puffs 2 (two) times a day Rinse mouth after use   Patient taking differently: Inhale 2 puffs if needed Rinse mouth after use   fexofenadine (ALLEGRA) 180 MG tablet  Self Yes No   Sig: Take 180 mg by mouth daily   fluticasone (FLONASE) 50 mcg/act nasal spray  Self No No   Sig: SPRAY 2 SPRAYS INTO EACH NOSTRIL EVERY DAY   folic acid (FOLVITE) 1 mg tablet  Self No No   Sig: Take 1 tablet (1,000 mcg total) by mouth daily   Patient not taking: Reported on 2025   hydrOXYzine HCL (ATARAX) 10 mg tablet  Self No No   Sig: Take 1 tablet (10 mg total) by mouth daily at bedtime as needed for anxiety for up to 10 doses   ipratropium-albuterol (DUO-NEB) 0.5-2.5 mg/3 mL nebulizer solution   No No   Sig: Take 3 mL by nebulization 4 (four) times a day   lisinopril (ZESTRIL) 20 mg tablet   No No   Sig: Take 1 tablet (20  mg total) by mouth daily   montelukast (SINGULAIR) 10 mg tablet  Self No No   Sig: Take 1 tablet (10 mg total) by mouth daily at bedtime   omeprazole (PriLOSEC) 20 mg delayed release capsule   Yes No   Sig: Take 20 mg by mouth daily   ondansetron (ZOFRAN) 4 mg tablet   No No   Sig: Take 1 tablet (4 mg total) by mouth every 8 (eight) hours as needed for nausea or vomiting   ondansetron (Zofran ODT) 4 mg disintegrating tablet  Self No No   Sig: Take 1 tablet (4 mg total) by mouth every 6 (six) hours as needed for nausea or vomiting   pantoprazole (PROTONIX) 20 mg tablet  Self No No   Sig: Take 1 tablet (20 mg total) by mouth daily   predniSONE 20 mg tablet   No No   Sig: Take 2 tablets (40 mg total) by mouth daily for 5 days   pregabalin (LYRICA) 50 mg capsule   No No   Sig: Take 1 PO HS x 5 days, then 1 PO BID x 5 days, then 1 PO AM and 2 PO HS      Facility-Administered Medications: None     Current Discharge Medication List        CONTINUE these medications which have CHANGED    Details   lisinopril (ZESTRIL) 20 mg tablet Take 1 tablet (20 mg total) by mouth daily Do not start before April 8, 2025.  Qty: 90 tablet, Refills: 0    Associated Diagnoses: Left leg swelling      montelukast (SINGULAIR) 10 mg tablet Take 1 tablet (10 mg total) by mouth daily at bedtime Do not start before April 8, 2025.  Qty: 90 tablet, Refills: 0    Associated Diagnoses: Mild persistent asthma without complication           CONTINUE these medications which have NOT CHANGED    Details   acetaminophen (TYLENOL) 500 mg tablet Take 1 tablet (500 mg total) by mouth every 6 (six) hours as needed for mild pain  Qty: 60 tablet, Refills: 0    Associated Diagnoses: Chronic left hip pain      acetylcysteine (MUCOMYST) 10 % nebulizer solution Take 4 mL by nebulization every 4 (four) hours  Qty: 30 mL, Refills: 0    Associated Diagnoses: Snoring; Mucus plugging of bronchi      albuterol (2.5 mg/3 mL) 0.083 % nebulizer solution Take 3 mL (2.5 mg  total) by nebulization every 6 (six) hours as needed for wheezing or shortness of breath  Qty: 60 mL, Refills: 0    Associated Diagnoses: Snoring; Mucus plugging of bronchi      albuterol (PROVENTIL HFA,VENTOLIN HFA) 90 mcg/act inhaler Inhale 2 puffs every 6 (six) hours as needed for wheezing or shortness of breath  Qty: 18 g, Refills: 5    Comments: Substitution to a formulary equivalent within the same pharmaceutical class is authorized.  Associated Diagnoses: Mild persistent asthma without complication      azelastine (ASTELIN) 0.1 % nasal spray 1 spray into each nostril 2 (two) times a day Use in each nostril as directed  Qty: 1 mL, Refills: 4    Associated Diagnoses: Nasal congestion      benzonatate (TESSALON PERLES) 100 mg capsule Take 1 capsule (100 mg total) by mouth 3 (three) times a day as needed for cough  Qty: 30 capsule, Refills: 0    Associated Diagnoses: Dyspnea      budesonide-formoterol (Symbicort) 80-4.5 MCG/ACT inhaler Inhale 2 puffs 2 (two) times a day Rinse mouth after use  Qty: 10.2 g, Refills: 5    Associated Diagnoses: Exacerbation of persistent asthma, unspecified asthma severity; Chronic allergic rhinitis      Cholecalciferol (D3-1000) 1,000 units tablet Take 1 tablet (1,000 Units total) by mouth daily  Qty: 90 tablet, Refills: 1    Associated Diagnoses: Vitamin D deficiency      fexofenadine (ALLEGRA) 180 MG tablet Take 180 mg by mouth daily      fluticasone (FLONASE) 50 mcg/act nasal spray SPRAY 2 SPRAYS INTO EACH NOSTRIL EVERY DAY  Qty: 48 mL, Refills: 2    Associated Diagnoses: Chronic allergic rhinitis      folic acid (FOLVITE) 1 mg tablet Take 1 tablet (1,000 mcg total) by mouth daily  Qty: 90 tablet, Refills: 2    Associated Diagnoses: Rheumatoid arthritis involving multiple sites with positive rheumatoid factor (HCC); High risk medication use      hydrOXYzine HCL (ATARAX) 10 mg tablet Take 1 tablet (10 mg total) by mouth daily at bedtime as needed for anxiety for up to 10  doses  Qty: 10 tablet, Refills: 0    Associated Diagnoses: Anxiety      ipratropium-albuterol (DUO-NEB) 0.5-2.5 mg/3 mL nebulizer solution Take 3 mL by nebulization 4 (four) times a day  Qty: 360 mL, Refills: 2    Associated Diagnoses: Moderate persistent asthma with exacerbation      omeprazole (PriLOSEC) 20 mg delayed release capsule Take 20 mg by mouth daily      ondansetron (Zofran ODT) 4 mg disintegrating tablet Take 1 tablet (4 mg total) by mouth every 6 (six) hours as needed for nausea or vomiting  Qty: 20 tablet, Refills: 3    Associated Diagnoses: Gastroesophageal reflux disease without esophagitis      ondansetron (ZOFRAN) 4 mg tablet Take 1 tablet (4 mg total) by mouth every 8 (eight) hours as needed for nausea or vomiting  Qty: 30 tablet, Refills: 0    Associated Diagnoses: Nausea      pantoprazole (PROTONIX) 20 mg tablet Take 1 tablet (20 mg total) by mouth daily  Qty: 90 tablet, Refills: 1    Associated Diagnoses: Gastroesophageal reflux disease without esophagitis      pregabalin (LYRICA) 50 mg capsule Take 1 PO HS x 5 days, then 1 PO BID x 5 days, then 1 PO AM and 2 PO HS  Qty: 90 capsule, Refills: 2    Associated Diagnoses: Lumbar radiculopathy           STOP taking these medications       predniSONE 20 mg tablet Comments:   Reason for Stopping:             No discharge procedures on file.  ED SEPSIS DOCUMENTATION   Time reflects when diagnosis was documented in both MDM as applicable and the Disposition within this note       Time User Action Codes Description Comment    4/7/2025  4:45 PM Kat Hidalgo [J45.901] Asthma exacerbation     4/7/2025  4:46 PM Kat Hidalgo [J11.1] Influenza     4/7/2025  4:46 PM Kat Hidalgo [U07.1] COVID     4/7/2025  4:46 PM Kat Hidalgo [R93.89] Endometrial thickening on ultrasound     4/7/2025  4:46 PM Kat Hidalgo [D25.9] Fibroid uterus     4/7/2025  6:02 PM Kyree Blandon Add [M79.89] Left leg swelling     4/7/2025  6:02 PM Barber  Kyree Add [J45.30] Mild persistent asthma without complication                  Kat Hidalgo MD  04/07/25 1955

## 2025-04-07 NOTE — ASSESSMENT & PLAN NOTE
CT CAP 4/7 showed a distended endometrium measuring up to 10 mm. Correlation with nonemergent pelvic ultrasound recommended to exclude neoplasm. Fibroid uterus.      Plan:  -Obtain pelvic ultrasound as an outpatient

## 2025-04-07 NOTE — ASSESSMENT & PLAN NOTE
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  She was last seen by pulmonology on 2/6/2025 where it was recommended she continue her Symbicort twice daily and albuterol/DuoNebs as needed.      Plan:  -See Asthma

## 2025-04-07 NOTE — ASSESSMENT & PLAN NOTE
Patient follows with pain management and was recently seen on 2/25/2025 and started on pregabalin 50 mg nightly.      Plan:  -Continue Pregabalin

## 2025-04-07 NOTE — ASSESSMENT & PLAN NOTE
Lab Results   Component Value Date    EGFR 50 04/07/2025    EGFR 41 02/11/2025    EGFR 42 02/11/2025    CREATININE 1.05 04/07/2025    CREATININE 1.24 02/11/2025    CREATININE 1.21 02/11/2025     Baseline creatinine 1.1-1.2      Plan:  -Avoid nephrotoxic medications  -Encourage po hydration  -Consider IVF if needed

## 2025-04-07 NOTE — ED ATTENDING ATTESTATION
4/7/2025  I, Papi Weiss MD, saw and evaluated the patient. I have discussed the patient with the resident/non-physician practitioner and agree with the resident's/non-physician practitioner's findings, Plan of Care, and MDM as documented in the resident's/non-physician practitioner's note, except where noted. All available labs and Radiology studies were reviewed.  I was present for key portions of any procedure(s) performed by the resident/non-physician practitioner and I was immediately available to provide assistance.       At this point I agree with the current assessment done in the Emergency Department.  I have conducted an independent evaluation of this patient a history and physical is as follows:    79-year-old woman with history of asthma presenting with one week of progressive worsening shortness of breath and wheezing, also with flulike symptoms and abdominal pain.  On initial exam she is awake and alert, appears in pain distress and in respiratory distress with increased work of breathing.  Diminished breath sounds in expiratory wheezes heard in all lung fields.  Patient tachypneic.  Heart tachycardic, regular, no murmurs rubs or gallops.  Abdomen is soft and nondistended.  There is generalized tenderness with voluntary guarding.  Skin warm and dry.  No extremity swelling or edema.    Patient given 1 hour-long neb treatment, and on reassessment had continued increased work of breathing and wheezing.  Given second treatment and magnesium.  Showing some improvement at this time but will require admission for high likelihood of further necessary treatments.    Critical Care Time Statement: Upon my evaluation, this patient had a high probability of imminent or life-threatening deterioration due to respiratory failure/ compromise, which required my direct attention, intervention, and personal management.  I spent a total of 31 minutes directly providing critical care services, including interpretation  of complex medical databases, evaluating for the presence of life-threatening injuries or illnesses, management of organ system failure(s) , complex medical decision making (to support/prevent further life-threatening deterioration)., interpretation of hemodynamic data, and ordering and interpreting studies, ordering interventions.  This time is exclusive of procedures, teaching, treating other patients, family meetings, and any prior time recorded by providers other than myself.       ED Course         Critical Care Time  Procedures

## 2025-04-08 VITALS
HEART RATE: 83 BPM | DIASTOLIC BLOOD PRESSURE: 74 MMHG | HEIGHT: 63 IN | BODY MASS INDEX: 22.75 KG/M2 | OXYGEN SATURATION: 97 % | SYSTOLIC BLOOD PRESSURE: 124 MMHG | TEMPERATURE: 97.8 F | RESPIRATION RATE: 18 BRPM

## 2025-04-08 LAB
ALBUMIN SERPL BCG-MCNC: 2.9 G/DL (ref 3.5–5)
ALP SERPL-CCNC: 58 U/L (ref 34–104)
ALT SERPL W P-5'-P-CCNC: 8 U/L (ref 7–52)
ANION GAP SERPL CALCULATED.3IONS-SCNC: 7 MMOL/L (ref 4–13)
AST SERPL W P-5'-P-CCNC: 8 U/L (ref 13–39)
ATRIAL RATE: 82 BPM
BASOPHILS # BLD AUTO: 0.06 THOUSANDS/ÂΜL (ref 0–0.1)
BASOPHILS NFR BLD AUTO: 1 % (ref 0–1)
BILIRUB SERPL-MCNC: 0.18 MG/DL (ref 0.2–1)
BUN SERPL-MCNC: 17 MG/DL (ref 5–25)
CALCIUM ALBUM COR SERPL-MCNC: 9.1 MG/DL (ref 8.3–10.1)
CALCIUM SERPL-MCNC: 8.2 MG/DL (ref 8.4–10.2)
CHLORIDE SERPL-SCNC: 110 MMOL/L (ref 96–108)
CO2 SERPL-SCNC: 25 MMOL/L (ref 21–32)
CREAT SERPL-MCNC: 1.25 MG/DL (ref 0.6–1.3)
EOSINOPHIL # BLD AUTO: 0.32 THOUSAND/ÂΜL (ref 0–0.61)
EOSINOPHIL NFR BLD AUTO: 3 % (ref 0–6)
ERYTHROCYTE [DISTWIDTH] IN BLOOD BY AUTOMATED COUNT: 16.9 % (ref 11.6–15.1)
GFR SERPL CREATININE-BSD FRML MDRD: 41 ML/MIN/1.73SQ M
GLUCOSE SERPL-MCNC: 91 MG/DL (ref 65–140)
HCT VFR BLD AUTO: 33.4 % (ref 34.8–46.1)
HGB BLD-MCNC: 10.4 G/DL (ref 11.5–15.4)
IMM GRANULOCYTES # BLD AUTO: 0.05 THOUSAND/UL (ref 0–0.2)
IMM GRANULOCYTES NFR BLD AUTO: 1 % (ref 0–2)
LYMPHOCYTES # BLD AUTO: 2.97 THOUSANDS/ÂΜL (ref 0.6–4.47)
LYMPHOCYTES NFR BLD AUTO: 28 % (ref 14–44)
MCH RBC QN AUTO: 25.6 PG (ref 26.8–34.3)
MCHC RBC AUTO-ENTMCNC: 31.1 G/DL (ref 31.4–37.4)
MCV RBC AUTO: 82 FL (ref 82–98)
MONOCYTES # BLD AUTO: 0.77 THOUSAND/ÂΜL (ref 0.17–1.22)
MONOCYTES NFR BLD AUTO: 7 % (ref 4–12)
MYCOBACTERIUM SPEC CULT: NORMAL
NEUTROPHILS # BLD AUTO: 6.64 THOUSANDS/ÂΜL (ref 1.85–7.62)
NEUTS SEG NFR BLD AUTO: 60 % (ref 43–75)
NRBC BLD AUTO-RTO: 0 /100 WBCS
P AXIS: 78 DEGREES
PLATELET # BLD AUTO: 327 THOUSANDS/UL (ref 149–390)
PMV BLD AUTO: 11.6 FL (ref 8.9–12.7)
POTASSIUM SERPL-SCNC: 3.6 MMOL/L (ref 3.5–5.3)
PR INTERVAL: 114 MS
PROT SERPL-MCNC: 5.3 G/DL (ref 6.4–8.4)
QRS AXIS: -7 DEGREES
QRSD INTERVAL: 88 MS
QT INTERVAL: 370 MS
QTC INTERVAL: 432 MS
RBC # BLD AUTO: 4.07 MILLION/UL (ref 3.81–5.12)
RHODAMINE-AURAMINE STN SPEC: NORMAL
SODIUM SERPL-SCNC: 142 MMOL/L (ref 135–147)
T WAVE AXIS: 61 DEGREES
VENTRICULAR RATE: 82 BPM
WBC # BLD AUTO: 10.81 THOUSAND/UL (ref 4.31–10.16)

## 2025-04-08 PROCEDURE — 93010 ELECTROCARDIOGRAM REPORT: CPT | Performed by: INTERNAL MEDICINE

## 2025-04-08 PROCEDURE — 99238 HOSP IP/OBS DSCHRG MGMT 30/<: CPT

## 2025-04-08 PROCEDURE — 80053 COMPREHEN METABOLIC PANEL: CPT

## 2025-04-08 PROCEDURE — 85025 COMPLETE CBC W/AUTO DIFF WBC: CPT

## 2025-04-08 RX ORDER — POTASSIUM CHLORIDE 1500 MG/1
20 TABLET, EXTENDED RELEASE ORAL ONCE
Status: COMPLETED | OUTPATIENT
Start: 2025-04-08 | End: 2025-04-08

## 2025-04-08 RX ORDER — PREDNISONE 10 MG/1
TABLET ORAL
Qty: 30 TABLET | Refills: 0 | Status: SHIPPED | OUTPATIENT
Start: 2025-04-09 | End: 2025-04-15

## 2025-04-08 RX ORDER — ALBUTEROL SULFATE 0.83 MG/ML
2.5 SOLUTION RESPIRATORY (INHALATION) EVERY 6 HOURS PRN
Qty: 60 ML | Refills: 0 | Status: SHIPPED | OUTPATIENT
Start: 2025-04-08 | End: 2025-04-15 | Stop reason: SDUPTHER

## 2025-04-08 RX ADMIN — FLUTICASONE PROPIONATE 1 SPRAY: 50 SPRAY, METERED NASAL at 09:28

## 2025-04-08 RX ADMIN — LISINOPRIL 20 MG: 20 TABLET ORAL at 09:25

## 2025-04-08 RX ADMIN — ALBUTEROL SULFATE 2 PUFF: 90 AEROSOL, METERED RESPIRATORY (INHALATION) at 09:31

## 2025-04-08 RX ADMIN — BUDESONIDE AND FORMOTEROL FUMARATE DIHYDRATE 2 PUFF: 80; 4.5 AEROSOL RESPIRATORY (INHALATION) at 09:28

## 2025-04-08 RX ADMIN — POTASSIUM CHLORIDE 20 MEQ: 1500 TABLET, EXTENDED RELEASE ORAL at 09:25

## 2025-04-08 RX ADMIN — Medication 1000 UNITS: at 09:25

## 2025-04-08 RX ADMIN — ACETAMINOPHEN 975 MG: 325 TABLET, FILM COATED ORAL at 06:29

## 2025-04-08 RX ADMIN — ENOXAPARIN SODIUM 40 MG: 40 INJECTION SUBCUTANEOUS at 09:25

## 2025-04-08 RX ADMIN — METHYLPREDNISOLONE SODIUM SUCCINATE 60 MG: 125 INJECTION, POWDER, FOR SOLUTION INTRAMUSCULAR; INTRAVENOUS at 09:25

## 2025-04-08 RX ADMIN — LORATADINE 10 MG: 10 TABLET ORAL at 09:25

## 2025-04-08 RX ADMIN — PANTOPRAZOLE SODIUM 20 MG: 20 TABLET, DELAYED RELEASE ORAL at 06:16

## 2025-04-08 NOTE — UTILIZATION REVIEW
Initial Clinical Review    Admission: Date/Time/Statement:   Admission Orders (From admission, onward)       Ordered        04/07/25 1649  Place in Observation  Once                          Orders Placed This Encounter   Procedures    Place in Observation     Standing Status:   Standing     Number of Occurrences:   1     Level of Care:   Med Surg [16]     ED Arrival Information       Expected   -    Arrival   4/7/2025 13:26    Acuity   Urgent              Means of arrival   Walk-In    Escorted by   Self    Service   Family Medicine    Admission type   Emergency              Arrival complaint   SOB             Chief Complaint   Patient presents with    Asthma     Since this am, states at home neb didn't help.        Initial Presentation: 79 y.o. female to ED presents for Shortness of breath and wheezing x1 wk. Labs remarkable for Wbc of 12.72, potassium of 3.3, and was flu and COVID-positive. CT CAP showed mild emphysema, mild mucous plugging right lower lobe bronchi, no evidence of pneumonia. Given albuterol 10 mg x 2, ipratropium nebulization x 2, 125 mg Solu-Medrol, and IVFs.   PMH for stage III CKD, asthma, hypertension, GERD, and anxiety. Chronic allergic rhinitis, Prediabetes  Admit to Observation Dx; Acute Exacerbation, Influenza B, Covid-19 virus infection, Abnormal US of endometrium, Irregular heart rhythm  Plan; Iv Solumedrol 60 mg on 4/8, plan for po steriod taper.   Will not initiate Tamiflu or Paxlovid due to patient having symptoms for 1 week   4/7 CT CAP; showed a distended endometrium measuring up to 10 mm. Correlation with nonemergent pelvic ultrasound recommended to exclude neoplasm. Fibroid uterus.     ED Treatment-Medication Administration from 04/07/2025 1326 to 04/07/2025 1709         Date/Time Order Dose Route Action     04/07/2025 1416 albuterol inhalation solution 10 mg 10 mg Nebulization Given     04/07/2025 1416 ipratropium (ATROVENT) 0.02 % inhalation solution 1 mg 1 mg Nebulization Given      04/07/2025 1416 sodium chloride 0.9 % inhalation solution 12 mL 12 mL Nebulization Given     04/07/2025 1426 methylPREDNISolone sodium succinate (Solu-MEDROL) injection 125 mg 125 mg Intravenous Given     04/07/2025 1427 ondansetron (ZOFRAN) injection 4 mg 4 mg Intravenous Given     04/07/2025 1427 ketorolac (TORADOL) injection 15 mg 15 mg Intravenous Given     04/07/2025 1534 iohexol (OMNIPAQUE) 350 MG/ML injection (MULTI-DOSE) 80 mL 80 mL Intravenous Given     04/07/2025 1553 albuterol inhalation solution 10 mg 10 mg Nebulization Given     04/07/2025 1553 ipratropium (ATROVENT) 0.02 % inhalation solution 1 mg 1 mg Nebulization Given     04/07/2025 1553 sodium chloride 0.9 % inhalation solution 12 mL 12 mL Nebulization Given     04/07/2025 1553 magnesium sulfate 2 g/50 mL IVPB (premix) 2 g 2 g Intravenous New Bag            Scheduled Medications:  albuterol, 2 puff, Inhalation, 4x Daily  budesonide-formoterol, 2 puff, Inhalation, BID  Cholecalciferol, 1,000 Units, Oral, Daily  enoxaparin, 40 mg, Subcutaneous, Daily  fluticasone, 1 spray, Each Nare, Daily  lisinopril, 20 mg, Oral, Daily  loratadine, 10 mg, Oral, Daily  methylPREDNISolone sodium succinate, 60 mg, Intravenous, Once  montelukast, 10 mg, Oral, HS  pantoprazole, 20 mg, Oral, Early Morning  potassium chloride, 20 mEq, Oral, Once  pregabalin, 50 mg, Oral, HS      Continuous IV Infusions:     PRN Meds:  acetaminophen, 975 mg, Oral, Q8H PRN  guaiFENesin, 600 mg, Oral, Q12H PRN  hydrOXYzine HCL, 10 mg, Oral, HS PRN  nicotine, 14 mg, Transdermal, Daily PRN  ondansetron, 4 mg, Oral, Q6H PRN      ED Triage Vitals   Temperature Pulse Respirations Blood Pressure SpO2 Pain Score   04/07/25 1339 04/07/25 1339 04/07/25 1339 04/07/25 1339 04/07/25 1339 04/07/25 1427   97.5 °F (36.4 °C) 96 20 (!) 192/98 96 % 8     Weight (last 2 days)       None            Vital Signs (last 3 days)       Date/Time Temp Pulse Resp BP MAP (mmHg) SpO2 O2 Device Pain    04/08/25  07:28:49 97.8 °F (36.6 °C) 83 18 124/74 91 97 % -- --    04/07/25 2300 -- 89 -- -- -- 94 % -- --    04/07/25 2245 -- 112 -- 107/61 76 96 % -- 3    04/07/25 22:36:05 98.2 °F (36.8 °C) 91 -- 107/61 76 95 % -- --    04/07/25 2000 -- -- -- -- -- -- None (Room air) --    04/07/25 17:11:13 97.9 °F (36.6 °C) -- -- 138/88 105 -- -- No Pain    04/07/25 1620 -- 88 20 155/72 -- 98 % None (Room air) --    04/07/25 1427 -- -- -- -- -- -- -- 8    04/07/25 1339 97.5 °F (36.4 °C) 96 20 192/98 -- 96 % None (Room air) --              Pertinent Labs/Diagnostic Test Results:   Radiology:  CT chest abdomen pelvis w contrast   Final Interpretation by Mitchell Cruz DO (04/07 1633)      1.  Mild emphysema. Mild mucous plugging right lower lobe bronchi. No evidence of pneumonia.      2.  No acute intra-abdominal or pelvic pathology.      3.  Distended endometrium measuring up to 10 mm. Correlation with nonemergent pelvic ultrasound recommended to exclude neoplasm. Fibroid uterus.      4.  Diffuse colonic diverticulosis without evidence of diverticulitis.      5.  Additional incidental findings as above.      The study was marked in EPIC for follow-up.         Workstation performed: [id number]           Cardiology:  ECG 12 lead   Final Result by Dwain Canales MD (04/08 0803)   Normal sinus rhythm   Nonspecific ST and T wave abnormality   Abnormal ECG   When compared with ECG of 11-Feb-2025 01:12:50   No significant change was found   Confirmed by Dwain Canales ([id number]) on 4/8/2025 8:03:08 AM        GI:  No orders to display           Results from last 7 days   Lab Units 04/08/25  0624 04/07/25  1846 04/07/25  1422   WBC Thousand/uL 10.81*  --  12.72*   HEMOGLOBIN g/dL 10.4*  --  11.4*   HEMATOCRIT % 33.4*  --  37.0   PLATELETS Thousands/uL 327 355 376   TOTAL NEUT ABS Thousands/µL 6.64  --  8.19*         Results from last 7 days   Lab Units 04/08/25  0624 04/07/25  1422   SODIUM mmol/L 142 142   POTASSIUM mmol/L 3.6 3.3*  "  CHLORIDE mmol/L 110* 110*   CO2 mmol/L 25 23   ANION GAP mmol/L 7 9   BUN mg/dL 17 14   CREATININE mg/dL 1.25 1.05   EGFR ml/min/1.73sq m 41 50   CALCIUM mg/dL 8.2* 9.3     Results from last 7 days   Lab Units 04/08/25  0624 04/07/25  1422   AST U/L 8* 9*   ALT U/L 8 9   ALK PHOS U/L 58 70   TOTAL PROTEIN g/dL 5.3* 6.5   ALBUMIN g/dL 2.9* 3.6   TOTAL BILIRUBIN mg/dL 0.18* 0.29         Results from last 7 days   Lab Units 04/08/25  0624 04/07/25  1422   GLUCOSE RANDOM mg/dL 91 95         Results from last 7 days   Lab Units 04/07/25  1846   HEMOGLOBIN A1C % 6.6*   EAG mg/dl 143     No results found for: \"BETA-HYDROXYBUTYRATE\"                                       Results from last 7 days   Lab Units 04/07/25  1422   LACTIC ACID mmol/L 1.4                                 Results from last 7 days   Lab Units 04/07/25  1422   LIPASE u/L 24                                                                   Past Medical History:   Diagnosis Date    Asthma     Asthmatic bronchitis     Last Assessed: 10/7/2014     Chronic diarrhea     Last Assessed: 8/20/2015     Fibromyalgia     Focal nodular hyperplasia of liver     Last Assessed: 6/11/2015    Herpes zoster     Last Assessed: 3/18/2016    Hypertension     IBS (irritable bowel syndrome)     Intermittent palpitations     Irritable bowel syndrome     Lumbar radiculopathy     Osteoarthritis     Vertigo      Present on Admission:   Moderate asthma with acute exacerbation   Anxiety   Stage 3b chronic kidney disease (HCC)   Prediabetes   Hypovitaminosis D   GERD (gastroesophageal reflux disease)   Essential hypertension   Emphysema lung (HCC)   COVID-19 virus infection   Chronic allergic rhinitis   Lumbar radiculopathy   Tobacco abuse      Admitting Diagnosis: Fibroid uterus [D25.9]  Influenza [J11.1]  Asthma exacerbation [J45.901]  Endometrial thickening on ultrasound [R93.89]  COVID [U07.1]  Age/Sex: 79 y.o. female    Network Utilization Review Department  ATTENTION: Please " call with any questions or concerns to [phone] and carefully listen to the prompts so that you are directed to the right person. All voicemails are confidential.   For Discharge needs, contact Care Management DC Support Team at [phone] opt. 2  Send all requests for admission clinical reviews, approved or denied determinations and any other requests to dedicated fax number below belonging to the campus where the patient is receiving treatment. List of dedicated fax numbers for the Facilities:  FACILITY NAME UR FAX NUMBER   ADMISSION DENIALS (Administrative/Medical Necessity) [phone]   DISCHARGE SUPPORT TEAM (NETWORK) [phone]   PARENT CHILD HEALTH (Maternity/NICU/Pediatrics) [phone]   [facility] [phone]   [facility] [phone]   [facility] [phone]   [facility] [phone]   [facility] [phone]   [facility] [phone]   [facility] [phone]   [facility] [phone]   [facility] [phone]   [facility] [phone]   [facility] [phone]   [facility] [phone]

## 2025-04-08 NOTE — ASSESSMENT & PLAN NOTE
Patient reports that she continues to smokes on and off despite quitting sometime ago.      Plan:  -Nicotine patch ordered

## 2025-04-08 NOTE — PLAN OF CARE
Problem: PAIN - ADULT  Goal: Verbalizes/displays adequate comfort level or baseline comfort level  Description: Interventions:- Encourage patient to monitor pain and request assistance- Assess pain using appropriate pain scale- Administer analgesics based on type and severity of pain and evaluate response- Implement non-pharmacological measures as appropriate and evaluate response- Consider cultural and social influences on pain and pain management- Notify physician/advanced practitioner if interventions unsuccessful or patient reports new pain  Outcome: Progressing     Problem: INFECTION - ADULT  Goal: Absence or prevention of progression during hospitalization  Description: INTERVENTIONS:- Assess and monitor for signs and symptoms of infection- Monitor lab/diagnostic results- Monitor all insertion sites, i.e. indwelling lines, tubes, and drains- Monitor endotracheal if appropriate and nasal secretions for changes in amount and color- [place] appropriate cooling/warming therapies per order- Administer medications as ordered- Instruct and encourage patient and family to use good hand hygiene technique- Identify and instruct in appropriate isolation precautions for identified infection/condition  Outcome: Progressing     Problem: SAFETY ADULT  Goal: Patient will remain free of falls  Description: INTERVENTIONS:- Educate patient/family on patient safety including physical limitations- Instruct patient to call for assistance with activity - Consult OT/PT to assist with strengthening/mobility - Keep Call bell within reach- Keep bed low and locked with side rails adjusted as appropriate- Keep care items and personal belongings within reach- Initiate and maintain comfort rounds  Outcome: Progressing     Problem: Knowledge Deficit  Goal: Patient/family/caregiver demonstrates understanding of disease process, treatment plan, medications, and discharge instructions  Description: Complete learning assessment and assess  knowledge base.Interventions:- Provide teaching at level of understanding- Provide teaching via preferred learning methods  Outcome: Progressing     Problem: DISCHARGE PLANNING  Goal: Discharge to home or other facility with appropriate resources  Description: INTERVENTIONS:- Identify barriers to discharge w/patient and caregiver- Arrange for needed discharge resources and transportation as appropriate- Identify discharge learning needs (meds, wound care, etc.)- Arrange for interpretive services to assist at discharge as needed- Refer to Case Management Department for coordinating discharge planning if the patient needs post-hospital services based on physician/advanced practitioner order or complex needs related to functional status, cognitive ability, or social support system  Outcome: Progressing

## 2025-04-08 NOTE — PROGRESS NOTES
Patient provided with discharge instructions and reviewed with this Rn.   IV removed and all questions answered

## 2025-04-08 NOTE — RESPIRATORY THERAPY NOTE
RT Protocol Note  Mireya Yi 79 y.o. female MRN: [id number]  Unit/Bed#: -01 Encounter: [id number]    Assessment    Principal Problem:    Moderate asthma with acute exacerbation  Active Problems:    Chronic allergic rhinitis    Essential hypertension    GERD (gastroesophageal reflux disease)    Prediabetes    Anxiety    Hypovitaminosis D    Stage 3b chronic kidney disease (HCC)    Tobacco abuse    Emphysema lung (HCC)    Lumbar radiculopathy    COVID-19 virus infection    Influenza B    Abnormal ultrasound of endometrium    Irregular heart rhythm      Home Pulmonary Medications:  Albuterol symbicort       Past Medical History:   Diagnosis Date    Asthma     Asthmatic bronchitis     Last Assessed: 10/7/2014     Chronic diarrhea     Last Assessed: 8/20/2015     Fibromyalgia     Focal nodular hyperplasia of liver     Last Assessed: 6/11/2015    Herpes zoster     Last Assessed: 3/18/2016    Hypertension     IBS (irritable bowel syndrome)     Intermittent palpitations     Irritable bowel syndrome     Lumbar radiculopathy     Osteoarthritis     Vertigo      Social History     Socioeconomic History    Marital status:      Spouse name: None    Number of children: None    Years of education: None    Highest education level: None   Occupational History    Occupation: Unemployed    Tobacco Use    Smoking status: Some Days     Types: Cigarettes    Smokeless tobacco: Never    Tobacco comments:     Hasn't been smoking recently because of the cough., about 8 days.   Vaping Use    Vaping status: Never Used   Substance and Sexual Activity    Alcohol use: Not Currently    Drug use: No    Sexual activity: Not Currently     Partners: Male   Other Topics Concern    None   Social History Narrative    Mental Disability     Exercising Regularly     Lives with adult children      Social Drivers of Health     Financial Resource Strain: Low Risk  (2/28/2025)    Overall Financial Resource Strain (CARDIA)     Difficulty of Paying  Living Expenses: Not hard at all   Food Insecurity: No Food Insecurity (4/8/2025)    Nursing - Inadequate Food Risk Classification     Worried About Running Out of Food in the Last Year: Not on file     Ran Out of Food in the Last Year: Never true     Ran Out of Food in the Last Year: Never true   Transportation Needs: No Transportation Needs (4/8/2025)    Nursing - Transportation Risk Classification     Lack of Transportation: Not on file     Lack of Transportation: No   Physical Activity: Sufficiently Active (6/14/2024)    Exercise Vital Sign     Days of Exercise per Week: 4 days     Minutes of Exercise per Session: 40 min   Stress: Stress Concern Present (3/13/2025)    [employer] [place] [employer] - Occupational Stress Questionnaire     Feeling of Stress : Very much   Social Connections: Unknown (6/14/2024)    Social Connection and Isolation Panel [NHANES]     Frequency of Communication with Friends and Family: More than three times a week     Frequency of Social Gatherings with Friends and Family: More than three times a week     Attends Jew Services: Never     Active Member of Clubs or Organizations: No     Attends Club or Organization Meetings: Never     Marital Status: Not on file   Recent Concern: Social Connections - Socially Isolated (6/14/2024)    Social Connection and Isolation Panel [NHANES]     Frequency of Communication with Friends and Family: More than three times a week     Frequency of Social Gatherings with Friends and Family: More than three times a week     Attends Jew Services: Never     Active Member of Clubs or Organizations: No     Attends Club or Organization Meetings: Never     Marital Status: Never    Intimate Partner Violence: Unknown (4/8/2025)    Nursing IPS     Feels Physically and Emotionally Safe: Not on file     Physically Hurt by Someone: Not on file     Humiliated or Emotionally Abused by Someone: Not on file     Physically Hurt by Someone: No      "Hurt or Threatened by Someone: No   Housing Stability: Unknown (2025)    Nursing: Inadequate Housing Risk Classification     Has Housing: Not on file     Worried About Losing Housing: Not on file     Unable to Get Utilities: Not on file     Unable to Pay for Housing in the Last Year: No     Has Housin       Subjective         Objective    Physical Exam:   Assessment Type: Assess only  General Appearance: Awake  Respiratory Pattern: Normal    Vitals:  Blood pressure 124/74, pulse 83, temperature 97.8 °F (36.6 °C), resp. rate 18, height 5' 3\" (1.6 m), SpO2 97%.          Imaging and other studies: Results Review Statement: I reviewed radiology reports from this admission including: CT chest.          Plan    Respiratory Plan: Home Bronchodilator Patient pathway, Mild Distress pathway, Discontinue Protocol        Resp Comments: (P) pt admitted for sob due to COVID/FLU pt uses albmaryanne mdi and symbicort at home. pt is followed by [facility] in outpt [place]. pt was ordered on bryanna hewitti qid  as she is + for COVID. and she usess this at home. will d/c protocol at this time   "

## 2025-04-08 NOTE — PLAN OF CARE
Problem: PAIN - ADULT  Goal: Verbalizes/displays adequate comfort level or baseline comfort level  Description: Interventions:- Encourage patient to monitor pain and request assistance- Assess pain using appropriate pain scale- Administer analgesics based on type and severity of pain and evaluate response- Implement non-pharmacological measures as appropriate and evaluate response- Consider cultural and social influences on pain and pain management- Notify physician/advanced practitioner if interventions unsuccessful or patient reports new pain  Outcome: Progressing     Problem: INFECTION - ADULT  Goal: Absence or prevention of progression during hospitalization  Description: INTERVENTIONS:- Assess and monitor for signs and symptoms of infection- Monitor lab/diagnostic results- Monitor all insertion sites, i.e. indwelling lines, tubes, and drains- Monitor endotracheal if appropriate and nasal secretions for changes in amount and color- [place] appropriate cooling/warming therapies per order- Administer medications as ordered- Instruct and encourage patient and family to use good hand hygiene technique- Identify and instruct in appropriate isolation precautions for identified infection/condition  Outcome: Progressing  Goal: Absence of fever/infection during neutropenic period  Description: INTERVENTIONS:- Monitor WBC  Outcome: Progressing     Problem: SAFETY ADULT  Goal: Patient will remain free of falls  Description: INTERVENTIONS:- Educate patient/family on patient safety including physical limitations- Instruct patient to call for assistance with activity - Consult OT/PT to assist with strengthening/mobility - Keep Call bell within reach- Keep bed low and locked with side rails adjusted as appropriate- Keep care items and personal belongings within reach- Initiate and maintain comfort rounds  Outcome: Progressing  Goal: Maintain or return to baseline ADL function  Description: INTERVENTIONS:-  Assess patient's  ability to carry out ADLs; assess patient's baseline for ADL function and identify physical deficits which impact ability to perform ADLs (bathing, care of mouth/teeth, toileting, grooming, dressing, etc.)- Assess/evaluate cause of self-care deficits - Assess range of motion- Assess patient's mobility; develop plan if impaired- Assess patient's need for assistive devices and provide as appropriate- Encourage maximum independence but intervene and supervise when necessary- Involve family in performance of ADLs- Assess for home care needs following discharge - Consider OT consult to assist with ADL evaluation and planning for discharge- Provide patient education as appropriate  Outcome: Progressing  Goal: Maintains/Returns to pre admission functional level  Description: INTERVENTIONS:- Perform AM-PAC 6 Click Basic Mobility/ Daily Activity assessment daily.- Set and communicate daily mobility goal to care team and patient/family/caregiver. - Collaborate with rehabilitation services on mobility goals if consulted- Perform Range of Motion 3 times a day.- Reposition patient every 2 hours.- Dangle patient 3 times a day- Stand patient 3 times a day- Ambulate patient 3 times a day- Out of bed to chair 3 times a day - Out of bed for meals 3 times a day- Out of bed for toileting- Record patient progress and toleration of activity level   Outcome: Progressing     Problem: DISCHARGE PLANNING  Goal: Discharge to home or other facility with appropriate resources  Description: INTERVENTIONS:- Identify barriers to discharge w/patient and caregiver- Arrange for needed discharge resources and transportation as appropriate- Identify discharge learning needs (meds, wound care, etc.)- Arrange for interpretive services to assist at discharge as needed- Refer to Case Management Department for coordinating discharge planning if the patient needs post-hospital services based on physician/advanced practitioner order or complex needs related  to functional status, cognitive ability, or social support system  Outcome: Progressing     Problem: Knowledge Deficit  Goal: Patient/family/caregiver demonstrates understanding of disease process, treatment plan, medications, and discharge instructions  Description: Complete learning assessment and assess knowledge base.Interventions:- Provide teaching at level of understanding- Provide teaching via preferred learning methods  Outcome: Progressing

## 2025-04-08 NOTE — DISCHARGE SUMMARY
Discharge Summary - Family Medicine   Name: Mireya Yi 79 y.o. female I MRN: [id number]  Unit/Bed#: -01 I Date of Admission: 4/7/2025   Date of Service: 4/8/2025 I Hospital Day: 0       No new Assessment & Plan notes have been filed under this hospital service since the last note was generated.  Service: Family Medicine    Assessment & Plan    Moderate asthma with acute exacerbation  Patient is presenting for a 1 week history of shortness of breath and wheezing which she attributed to an illness.  She denied any fevers, chills, or any sick contacts.  In the ER, vital signs were stable and she remained on room air, labs were remarkable for a WBC count of 12.72 (absolute neutrophils 8.19), potassium of 3.3, and patient was flu and COVID-positive.  CT CAP showed mild emphysema, mild mucous plugging right lower lobe bronchi, no evidence of pneumonia.  She was given albuterol 10 mg x 2, ipratropium nebulization x 2, Solu-Medrol 125 mg , and IVFs.    Home regimen: Symbicort 80-4.5 twice daily, Singulair 10 mg daily at bedtime,  Albuterol inhaler as needed  Patient notes she has been using it up to 4x a day for the last 2 weeks due to viral illness. Upon admission, patient was switched to Solu-medrol 60mg. Patient was put on respiratory protocol with monitoring of oxygenation and wheezing. Tamiflu or Paxlovid not indicated due to patient having symptoms for 1 week (out of window).     Plan:  -10 day Prednisone taper beginning 4/9, first dose prednisone 50 mg  - Continue asthma home medications     Abnormal ultrasound of endometrium  CT CAP 4/7 showed a distended endometrium measuring up to 10 mm. Correlation with nonemergent pelvic ultrasound recommended to exclude neoplasm. Fibroid uterus. Denies any vaginal bleeding.   Plan:  - Patient made aware to follow-up with PCP outpatient for pelvic ultrasound       Essential hypertension  Patient reports that her blood pressure is well-controlled and she is currently  138/88 and on lisinopril 20 daily at home.  She denies any headaches, palpitations, or blurry vision.  Plan:  -Continue lisinopril    GERD (gastroesophageal reflux disease)  Patient takes Protonix 20 mg daily.  Plan:  -Continue Protonix     Stage 3b chronic kidney disease (HCC)  Lab Results   Component Value Date    EGFR 41 04/08/2025    EGFR 50 04/07/2025    EGFR 41 02/11/2025    CREATININE 1.25 04/08/2025    CREATININE 1.05 04/07/2025    CREATININE 1.24 02/11/2025     Baseline creatinine 1.1-1.2  Plan:  -Avoid nephrotoxic medications  -Encourage po hydration    Prediabetes  Patient's most recent A1c on 4/6 was 6.6 and has ranged in the 6s prior. F/u with PCP.    Anxiety  Patient currently takes Atarax 10 mg daily at bedtime as needed.  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  She was last seen by pulmonology on 2/6/2025 where it was recommended she continue her Symbicort twice daily and albuterol/DuoNebs as needed.    COVID-19 virus infection  See asthma. Will not start Paxlovid due to duration of symptoms    Lumbar radiculopathy  Patient follows with pain management and was recently seen on 2/25/2025 and started on pregabalin 50 mg nightly.  Plan:  -Continue Pregabalin     Influenza B  See Asthma. Will not start tamiflu due to duration of symptoms    Irregular heart rhythm  Patient's exam was remarkable for sinus pauses after approximately 4-5 beats.  She denies any history of palpitations or heart arrhythmias.  Last EKG done on 7/31/2023 showed sinus tachycardia with nonspecific T waves and some possible ectopic atrial beats  EKG 4/7 showed normal sinus rhythm, nonspecific ST and T wave abnormality  Plan:   -Continue to monitor for symptoms      Diagnosis:   Principal Problem:    Moderate asthma with acute exacerbation  Active Problems:    Chronic  "allergic rhinitis    Essential hypertension    GERD (gastroesophageal reflux disease)    Prediabetes    Anxiety    Hypovitaminosis D    Stage 3b chronic kidney disease (HCC)    Tobacco abuse    Emphysema lung (HCC)    Lumbar radiculopathy    COVID-19 virus infection    Influenza B    Abnormal ultrasound of endometrium    Irregular heart rhythm  Resolved Problems:    * No resolved hospital problems. *      HPI: (per admission H&P note on 4/07)      \"Mireya Yi is a 79 y.o. female who with a past medical history of stage III CKD, asthma, hypertension, GERD, and anxiety presenting for a 1 week history of shortness of breath and wheezing which she attributed to an illness.  She denied any fevers, chills, or any sick contacts.  In the ER, vital signs were stable and she remained on room air, labs were remarkable for a WBC count of 12.72 (absolute neutrophils 8.19), potassium of 3.3, and patient was flu and COVID-positive.  CT CAP showed mild emphysema, mild mucous plugging right lower lobe bronchi, no evidence of pneumonia.  She was given albuterol 10 mg x 2, ipratropium nebulization x 2, 125 mg Solu-Medrol, and IVFs.     On exam today, patient did not have any acute complaints and said that she felt better after receiving medication in the ER.  She said that she had been feeling this way for about a week.  Patient notes that she takes all her medications at home and and says she is compliant.\"    HOSPITAL COURSE:   Please see progress note on discharge day for detailed list of diagnoses and related plan for additional information.    Hospital Course:   79 y.o. female admitted on 4/7/2025 for asthma exacerbation. Patient tested positive for Covid and flu. She was given albuterol 10 mg x 2, ipratropium nebulization x 2, Solu-Medrol 125 mg , and IVF in the ED. Upon admission, patient was switched to Solu-medrol 60mg. Patient was put on respiratory protocol with monitoring of oxygenation and wheezing. Tamiflu or Paxlovid " not indicated due to patient having symptoms for 1 week (out of window). By the morning of 4/08, patient's breathing improved greatly. Patient made aware to continue asthma home regimen and complete steroid taper.     On 04/08/25, pt remains stable and is medically cleared for discharge. Patient will need to make close follow-up appointment with PCP for pelvic ultrasound to evaluate incidental finding of thickened endometrium.     Patient is informed of and is aware of current health status and understand the plan of treatment and outpatient follow-up. The patient understood and agreed with the plan. All pertinent lab results, imaging studies, procedures, and/or any incidental findings have been disclosed to the patient. All pertinent questions are answered to patient's satisfaction.    Complications: NONE     Condition at Discharge: fair   Basic Mobility Inpatient Raw Score: 24  JH-HLM Goal: 8: Walk 250 feet or more  JH-HLM Achieved: 7: Walk 25 feet or more    PROCEDURES:     Procedures Performed:     No orders of the defined types were placed in this encounter.        SIGNIFICANT FINDINGS / ABNORMAL RESULTS:     Significant Findings/Abnormal Results with this admission:    CT chest abdomen pelvis w contrast  Result Date: 4/7/2025  Narrative: CT CHEST, ABDOMEN AND PELVIS WITH IV CONTRAST INDICATION: Abdominal pain, tenderness, constipation. Shortness of breath with asthma and wheezing, hx of zephyr. COMPARISON: CT chest 1/18/2025 and 4/26/2023, CT abdomen and pelvis 2/9/2010 TECHNIQUE: CT examination of the chest, abdomen and pelvis was performed. Multiplanar 2D reformatted images were created from the source data. This examination, like all CT scans performed in the [facility], was performed utilizing techniques to minimize radiation dose exposure, including the use of iterative reconstruction and automated exposure control. Radiation dose length product (DLP) for this visit: 580.84 mGy-cm IV  Contrast: 80 mL of iohexol Enteric Contrast: Not administered. FINDINGS: CHEST LUNGS: Mild emphysema. Minor subsegmental atelectasis or scarring inferior lingula. No consolidation to suggest pneumonia. 3 mm right apical nodule (3/47) stable from April 2023, likely benign. Mild mucous plugging noted within the right lower lobe bronchi. PLEURA: Unremarkable. HEART/GREAT VESSELS: Heart is unremarkable for patient's age. Small volume of pericardial fluid. Atherosclerotic changes thoracic aorta and coronary arteries. No thoracic aortic aneurysm. MEDIASTINUM AND TITO: Unremarkable. Surgical clips along the GE junction. CHEST WALL AND LOWER NECK: Small nodular densities in the right breast, nonspecific but not significantly changed from previous study. Patient had similar findings described on screening mammography in December 2024. Correlate clinically. No enlarged axillary lymphadenopathy. Heterogeneously enlarged thyroid gland, similar to the previous study. ABDOMEN LIVER/BILIARY TREE: Subcentimeter hypoattenuating lesion(s), too small to characterize but statistically likely benign, which do not require follow-up (ACR White Paper 2017). No suspicious mass. Normal hepatic contours. No biliary dilation. GALLBLADDER: No calcified gallstones. No pericholecystic inflammatory change. SPLEEN: Unremarkable. PANCREAS: Moderate atrophy without acute findings. ADRENAL GLANDS: Unremarkable. KIDNEYS/URETERS: Mild fullness of the right upper pole collecting system, could be related to compression by a large mid to upper pole renal cyst extending to the renal sinus measuring up to 8.6 cm. No urinary tract calculi. Additional bilateral renal cysts and subcentimeter hypoattenuating lesion(s), too small to characterize but statistically likely benign, which do not warrant follow-up (Radiology June 2019). STOMACH AND BOWEL: Evaluation of the stomach is limited by underdistention.  No focal pathology seen within limitations. Small bowel  "is normal caliber. Diffuse colonic diverticulosis without evidence of diverticulitis. APPENDIX: No findings to suggest appendicitis. ABDOMINOPELVIC CAVITY: No ascites. No pneumoperitoneum. No lymphadenopathy. VESSELS: Atherosclerosis without abdominal aortic aneurysm. PELVIS REPRODUCTIVE ORGANS: The endometrium appears distended measuring up to 10 mm. A few small partially exophytic left uterine fibroids. 5.6 x 4.8 cm posterior subserosal uterine body fibroid. Coarse calcifications seen along the right ovary. No suspicious adnexal masses. URINARY BLADDER: Unremarkable. ABDOMINAL WALL/INGUINAL REGIONS: Unremarkable. BONES: No acute fracture. Degenerative changes of the spine. Mottled lucencies in the bilateral iliac bones, nonspecific but could reflect osteopenia. Similar findings were noted previously.     Impression: 1.  Mild emphysema. Mild mucous plugging right lower lobe bronchi. No evidence of pneumonia. 2.  No acute intra-abdominal or pelvic pathology. 3.  Distended endometrium measuring up to 10 mm. Correlation with nonemergent pelvic ultrasound recommended to exclude neoplasm. Fibroid uterus. 4.  Diffuse colonic diverticulosis without evidence of diverticulitis. 5.  Additional incidental findings as above. The study was marked in EPIC for follow-up. Workstation performed: [id number]         VITALS ON DISCHARGE DATE:     Vitals  Blood Pressure: 124/74 (04/08/25 0728)  Temperature: 97.8 °F (36.6 °C) (04/08/25 0728)  Temp Source: Temporal (04/07/25 1339)  Pulse: 83 (04/08/25 0728)  Respirations: 18 (04/08/25 0728)  SpO2: 97 % (04/08/25 0728)  Height: 5' 3\" (160 cm) (04/07/25 2245)    Temp:  [97.5 °F (36.4 °C)-98.2 °F (36.8 °C)] 97.8 °F (36.6 °C)  HR:  [] 83  BP: (107-192)/(61-98) 124/74  Resp:  [18-20] 18  SpO2:  [94 %-98 %] 97 %  O2 Device: None (Room air)    Weight (last 2 days)       None              Intake/Output Summary (Last 24 hours) at 4/8/2025 1127  Last data filed at 4/8/2025 0900  Gross per " 24 hour   Intake 1168 ml   Output --   Net 1168 ml       Invasive Devices       Peripheral Intravenous Line  Duration             Peripheral IV 04/07/25 Left Antecubital <1 day                      PHYSICAL EXAM ON DAY OF DISCHARGE:     Physical Exam  Constitutional:       General: She is not in acute distress.     Appearance: She is not ill-appearing.   HENT:      Head: Normocephalic and atraumatic.      Mouth/Throat:      Mouth: Mucous membranes are moist.      Pharynx: Oropharynx is clear.   Eyes:      Conjunctiva/sclera: Conjunctivae normal.   Cardiovascular:      Rate and Rhythm: Normal rate and regular rhythm.      Pulses: Normal pulses.      Heart sounds: Normal heart sounds. No murmur heard.  Pulmonary:      Effort: Pulmonary effort is normal.      Breath sounds: Wheezing (generalized) present. No rhonchi or rales.   Abdominal:      General: Abdomen is flat. There is no distension.      Palpations: Abdomen is soft.   Musculoskeletal:         General: No swelling.   Skin:     General: Skin is warm and dry.   Neurological:      General: No focal deficit present.      Mental Status: She is alert. Mental status is at baseline.         DISCHARGE MEDICATIONS:     Discharge Medications:  See after visit summary (AVS) for detailed reconciled discharge medications, which was provided to patient and family. Summary of medication changes made with this admission:    START:  Prednisone 50 mg on 4/09 and complete 10 day taper course      RESUME:  All other home medications      FOLLOW-UP APPOINTMENTS / INSTRUCTION :     Important Physician Related Follow Up:     Marguerite Russell MD  [address] [place] [address] [phone]    Call in 1 day(s)  To discuss incidental finding on CT imaging and hospital discharge follow-up        Comfirmed future (up-coming) appointments:  Future Appointments   Date Time Provider Department Center   4/14/2025 11:00 AM BE DEXA [facility] SLN 1 BE SLN Dexa BE [place]   4/15/2025 11:30 AM  [facility]   4/21/2025  9:30 AM Carter Cortez MD [facility]   4/26/2025 10:00 AM BE MRI 2 BE MRI [facility]   4/30/2025  8:45 AM Maycol Patel PA-C [facility]   5/12/2025  9:30 AM Elham Saleem MD [facility] FP BE [facility]   5/20/2025  8:30 AM EILEEN Guerrero [facility]   6/2/2025  9:50 AM Elham Saleem MD [facility] FP BE [facility]   7/31/2025  4:00 PM Joselyn Reyes [place], MD [facility]   8/29/2025  8:00 PM BE SLEEP ROOM 02 BE Sleep lab BE [facility]   12/15/2025 11:00 AM BE MAMMO SLN 1 BE SLN Mammo BE [facility] instructions/Information to patient and family:   See after visit summary (AVS) for information provided to patient and family.      Provisions for Follow-Up Care:  See after visit summary for information related to follow-up care and any pertinent home health orders.        DISPOSITION:     Disposition: Home    Discharge Statement   I spent 30 minutes discharging the patient. This time was spent on the day of discharge. I had direct contact with the patient on the day of discharge. Additional documentation is required if more than 30 minutes were spent on discharge.     Planned Readmission: No    Cristal Cowart DO  PGY-1, Family Medicine  04/08/25

## 2025-04-08 NOTE — DISCHARGE INSTR - AVS FIRST PAGE
-Please follow up with your PCP for a non-emergent pelvic US  -Complete steroid taper   -Continue home asthma medications

## 2025-04-08 NOTE — ASSESSMENT & PLAN NOTE
Patient's exam was remarkable for sinus pauses after approximately 4-5 beats.  She denies any history of palpitations or heart arrhythmias.  Last EKG done on 7/31/2023 showed sinus tachycardia with nonspecific T waves and some possible ectopic atrial beats    EKG 4/7 showed normal sinus rhythm, nonspecific ST and T wave abnormality      Plan:   -Continue to monitor for symptoms

## 2025-04-09 ENCOUNTER — TELEPHONE (OUTPATIENT)
Dept: FAMILY MEDICINE CLINIC | Facility: CLINIC | Age: 80
End: 2025-04-09

## 2025-04-09 ENCOUNTER — TRANSITIONAL CARE MANAGEMENT (OUTPATIENT)
Dept: FAMILY MEDICINE CLINIC | Facility: CLINIC | Age: 80
End: 2025-04-09

## 2025-04-09 ENCOUNTER — OFFICE VISIT (OUTPATIENT)
Dept: PULMONOLOGY | Facility: CLINIC | Age: 80
End: 2025-04-09
Payer: MEDICARE

## 2025-04-09 VITALS
DIASTOLIC BLOOD PRESSURE: 84 MMHG | OXYGEN SATURATION: 94 % | HEART RATE: 105 BPM | SYSTOLIC BLOOD PRESSURE: 142 MMHG | BODY MASS INDEX: 22.68 KG/M2 | TEMPERATURE: 99.2 F | WEIGHT: 128 LBS | HEIGHT: 63 IN

## 2025-04-09 DIAGNOSIS — J43.9 PULMONARY EMPHYSEMA, UNSPECIFIED EMPHYSEMA TYPE (HCC): ICD-10-CM

## 2025-04-09 DIAGNOSIS — F17.210 CIGARETTE NICOTINE DEPENDENCE WITHOUT COMPLICATION: ICD-10-CM

## 2025-04-09 DIAGNOSIS — R06.00 DYSPNEA: ICD-10-CM

## 2025-04-09 DIAGNOSIS — J10.1 INFLUENZA B: ICD-10-CM

## 2025-04-09 DIAGNOSIS — J45.41 MODERATE PERSISTENT ASTHMA WITH ACUTE EXACERBATION: Primary | ICD-10-CM

## 2025-04-09 PROCEDURE — 99214 OFFICE O/P EST MOD 30 MIN: CPT | Performed by: INTERNAL MEDICINE

## 2025-04-09 PROCEDURE — G2211 COMPLEX E/M VISIT ADD ON: HCPCS | Performed by: INTERNAL MEDICINE

## 2025-04-09 RX ORDER — BENZONATATE 100 MG/1
100 CAPSULE ORAL 3 TIMES DAILY PRN
Qty: 30 CAPSULE | Refills: 0 | Status: SHIPPED | OUTPATIENT
Start: 2025-04-09

## 2025-04-09 NOTE — ASSESSMENT & PLAN NOTE
Diagnosed 4/7 but had symptoms about a week prior to diagnosis so appropriately did not receive Tamiflu.    Tessalon Perles provided for cough, treatment of asthma as noted.

## 2025-04-09 NOTE — ASSESSMENT & PLAN NOTE
Cute exacerbation in setting of influenza infection, was hospitalized briefly on 4/7 to 4/8 and given a dose of Solu-Medrol and discharged on prednisone taper.    Will continue 10-day prednisone taper which started today.  Will also continue Symbicort and use nebulizer treatments in place of rescue albuterol inhaler.  Ordered Tessalon Perles to help with cough.  Also provided flutter valve to help with mucus clearance given CT showing mucoid debris in lower lobe bronchi.    Follow-up in 1 week to ensure improvement.

## 2025-04-09 NOTE — ASSESSMENT & PLAN NOTE
Has been smoking for the past few days since feeling unwell.  Encouraged her to continue with smoking cessation and to use this habit as an opportunity to stop smoking altogether.

## 2025-04-09 NOTE — PROGRESS NOTES
Pulmonary Follow Up Note   Mireya Yi 79 y.o. female MRN: [id number]  4/9/2025      Assessment/Plan:    Moderate asthma with acute exacerbation  Cute exacerbation in setting of influenza infection, was hospitalized briefly on 4/7 to 4/8 and given a dose of Solu-Medrol and discharged on prednisone taper.    Will continue 10-day prednisone taper which started today.  Will also continue Symbicort and use nebulizer treatments in place of rescue albuterol inhaler.  Ordered Tessalon Perles to help with cough.  Also provided flutter valve to help with mucus clearance given CT showing mucoid debris in lower lobe bronchi.    Follow-up in 1 week to ensure improvement.    Influenza B  Diagnosed 4/7 but had symptoms about a week prior to diagnosis so appropriately did not receive Tamiflu.    Tessalon Perles provided for cough, treatment of asthma as noted.    Emphysema lung (HCC)  In setting of smoking, discussed cessation and she states that she has not smoked since started feeling unwell and encouraged her to continue avoiding cigarettes.    Cigarette nicotine dependence without complication  Has been smoking for the past few days since feeling unwell.  Encouraged her to continue with smoking cessation and to use this habit as an opportunity to stop smoking altogether.      Orders:    Diagnoses and all orders for this visit:    Moderate persistent asthma with acute exacerbation    Dyspnea  -     benzonatate (TESSALON PERLES) 100 mg capsule; Take 1 capsule (100 mg total) by mouth 3 (three) times a day as needed for cough    Influenza B    Pulmonary emphysema, unspecified emphysema type (HCC)    Cigarette nicotine dependence without complication        Return for Next scheduled follow up.    History of Present Illness   HPI:  Mireya Yi is a 79 y.o. female who presents for persistent shortness of breath and wheezing as well as cough after recent hospitalization where she was found to have influenza infection.  She says she  is still having shortness of breath and wheezing as well as a cough but is having difficulty getting her mucus cleared.  She was started on steroids in the hospital and got 1 dose of Solu-Medrol and just started her oral prednisone today.  She denies any fevers but is having some chills but these have been going on since she started feeling ill about a week ago.  She is having some sinus pressure and sinus drainage as well.    She is using her inhaler and has a nebulizer at home but was told to use the albuterol rescue inhaler which has not been helpful.  Encouraged her to use her nebulizer scheduled for the next few days.  She will also continue her prednisone.  Also provided flutter valve to help with mucus clearance.  She has a follow-up appointment next week to ensure she is continue to improve.    Review of Systems   Constitutional:  Positive for chills and fatigue. Negative for fever.   HENT:  Positive for congestion, rhinorrhea and sinus pressure.    Respiratory:  Positive for cough, chest tightness, shortness of breath and wheezing.    Cardiovascular:  Negative for chest pain and palpitations.   Gastrointestinal:  Negative for nausea and vomiting.       Historical Information   Past Medical History:   Diagnosis Date    Asthma     Asthmatic bronchitis     Last Assessed: 10/7/2014     Chronic diarrhea     Last Assessed: 8/20/2015     Fibromyalgia     Focal nodular hyperplasia of liver     Last Assessed: 6/11/2015    Herpes zoster     Last Assessed: 3/18/2016    Hypertension     IBS (irritable bowel syndrome)     Intermittent palpitations     Irritable bowel syndrome     Lumbar radiculopathy     Osteoarthritis     Vertigo      Past Surgical History:   Procedure Laterality Date    BREAST BIOPSY      BREAST LUMPECTOMY      when pt was 18    SMALL INTESTINE SURGERY       Family History   Problem Relation Age of Onset    Heart attack Mother     Asthma Father     Breast cancer Sister     Breast cancer Sister     No  Known Problems Daughter     No Known Problems Daughter     Arthritis Family     Osteoporosis Family          Meds/Allergies     Current Outpatient Medications:     acetaminophen (TYLENOL) 500 mg tablet, Take 1 tablet (500 mg total) by mouth every 6 (six) hours as needed for mild pain, Disp: 60 tablet, Rfl: 0    acetylcysteine (MUCOMYST) 10 % nebulizer solution, Take 4 mL by nebulization every 4 (four) hours, Disp: 30 mL, Rfl: 0    albuterol (2.5 mg/3 mL) 0.083 % nebulizer solution, Take 3 mL (2.5 mg total) by nebulization every 6 (six) hours as needed for wheezing or shortness of breath, Disp: 60 mL, Rfl: 0    albuterol (PROVENTIL HFA,VENTOLIN HFA) 90 mcg/act inhaler, Inhale 2 puffs every 6 (six) hours as needed for wheezing or shortness of breath, Disp: 18 g, Rfl: 5    azelastine (ASTELIN) 0.1 % nasal spray, 1 spray into each nostril 2 (two) times a day Use in each nostril as directed, Disp: 1 mL, Rfl: 4    benzonatate (TESSALON PERLES) 100 mg capsule, Take 1 capsule (100 mg total) by mouth 3 (three) times a day as needed for cough, Disp: 30 capsule, Rfl: 0    budesonide-formoterol (Symbicort) 80-4.5 MCG/ACT inhaler, Inhale 2 puffs 2 (two) times a day Rinse mouth after use, Disp: 10.2 g, Rfl: 5    Cholecalciferol (D3-1000) 1,000 units tablet, Take 1 tablet (1,000 Units total) by mouth daily, Disp: 90 tablet, Rfl: 1    fexofenadine (ALLEGRA) 180 MG tablet, Take 180 mg by mouth daily, Disp: , Rfl:     fluticasone (FLONASE) 50 mcg/act nasal spray, SPRAY 2 SPRAYS INTO EACH NOSTRIL EVERY DAY, Disp: 48 mL, Rfl: 2    hydrOXYzine HCL (ATARAX) 10 mg tablet, Take 1 tablet (10 mg total) by mouth daily at bedtime as needed for anxiety for up to 10 doses, Disp: 10 tablet, Rfl: 0    ipratropium-albuterol (DUO-NEB) 0.5-2.5 mg/3 mL nebulizer solution, Take 3 mL by nebulization 4 (four) times a day, Disp: 360 mL, Rfl: 2    lisinopril (ZESTRIL) 20 mg tablet, Take 1 tablet (20 mg total) by mouth daily Do not start before April 8,  "2025., Disp: 90 tablet, Rfl: 0    montelukast (SINGULAIR) 10 mg tablet, Take 1 tablet (10 mg total) by mouth daily at bedtime Do not start before April 8, 2025., Disp: 90 tablet, Rfl: 0    omeprazole (PriLOSEC) 20 mg delayed release capsule, Take 20 mg by mouth daily, Disp: , Rfl:     ondansetron (Zofran ODT) 4 mg disintegrating tablet, Take 1 tablet (4 mg total) by mouth every 6 (six) hours as needed for nausea or vomiting, Disp: 20 tablet, Rfl: 3    ondansetron (ZOFRAN) 4 mg tablet, Take 1 tablet (4 mg total) by mouth every 8 (eight) hours as needed for nausea or vomiting, Disp: 30 tablet, Rfl: 0    pantoprazole (PROTONIX) 20 mg tablet, Take 1 tablet (20 mg total) by mouth daily, Disp: 90 tablet, Rfl: 1    predniSONE 10 mg tablet, Take 5 tablets (50 mg total) by mouth daily for 2 days, THEN 4 tablets (40 mg total) daily for 2 days, THEN 3 tablets (30 mg total) daily for 2 days, THEN 2 tablets (20 mg total) daily for 2 days, THEN 1 tablet (10 mg total) daily for 2 days. Do not start before April 9, 2025., Disp: 30 tablet, Rfl: 0    pregabalin (LYRICA) 50 mg capsule, Take 1 PO HS x 5 days, then 1 PO BID x 5 days, then 1 PO AM and 2 PO HS, Disp: 90 capsule, Rfl: 2  Allergies   Allergen Reactions    Ambrosia Artemisiifolia (Ragweed) Skin Test Facial Swelling    Codeine Other (See Comments)     Heart races    Epinephrine      Pt reports \"it makes my heart race, they told me I cant take it.\"    Ibuprofen Other (See Comments)     Heart racing    Morphine Other (See Comments)     Heart racing    Pollen Extract Sneezing    Tramadol Other (See Comments)     Heart racing       Vitals: Blood pressure 142/84, pulse 105, temperature 99.2 °F (37.3 °C), temperature source Tympanic Core, height 5' 3\" (1.6 m), weight 58.1 kg (128 lb), SpO2 94%. Body mass index is 22.67 kg/m². Oxygen Therapy  SpO2: 94 %  Oxygen Therapy: None (Room air)      Physical Exam  Physical Exam  Vitals reviewed.   Constitutional:       General: She is not in " "acute distress.     Appearance: Normal appearance. She is not toxic-appearing.   HENT:      Nose: Congestion present.   Cardiovascular:      Rate and Rhythm: Normal rate and regular rhythm.   Pulmonary:      Effort: Pulmonary effort is normal. No respiratory distress.      Breath sounds: Wheezing (Scattered and present bilaterally) and rales (Bibasilar) present.   Musculoskeletal:      Right lower leg: No edema.      Left lower leg: No edema.   Neurological:      Mental Status: She is alert.         Labs: I have personally reviewed pertinent lab results.  Lab Results   Component Value Date    WBC 10.81 (H) 04/08/2025    HGB 10.4 (L) 04/08/2025    HCT 33.4 (L) 04/08/2025    MCV 82 04/08/2025     04/08/2025     Lab Results   Component Value Date    GLUCOSE 92 05/19/2015    CALCIUM 8.2 (L) 04/08/2025     05/19/2015    K 3.6 04/08/2025    CO2 25 04/08/2025     (H) 04/08/2025    BUN 17 04/08/2025    CREATININE 1.25 04/08/2025     No results found for: \"IGE\"  Lab Results   Component Value Date    ALT 8 04/08/2025    AST 8 (L) 04/08/2025    ALKPHOS 58 04/08/2025    BILITOT 0.20 05/19/2015       Influenza swab positive on 4/7/2025    Imaging and other studies: Results Review Statement: I personally reviewed the following image studies in PACS and associated radiology reports: CT chest. My interpretation of the radiology images/reports is: Clear lung fields bilaterally with emphysematous changes as well as mucus debris in lower lobe bronchi.    "

## 2025-04-09 NOTE — ASSESSMENT & PLAN NOTE
In setting of smoking, discussed cessation and she states that she has not smoked since started feeling unwell and encouraged her to continue avoiding cigarettes.

## 2025-04-09 NOTE — TELEPHONE ENCOUNTER
Patient is calling just got released from the hospital and would like to schedule appointment. Patient stated Doctor wants her to be seen ASAP    She will not be home at 3 she has appointment    Patient can be reached at [phone]

## 2025-04-14 ENCOUNTER — OFFICE VISIT (OUTPATIENT)
Dept: FAMILY MEDICINE CLINIC | Facility: CLINIC | Age: 80
End: 2025-04-14

## 2025-04-14 ENCOUNTER — HOSPITAL ENCOUNTER (OUTPATIENT)
Dept: RADIOLOGY | Age: 80
Discharge: HOME | End: 2025-04-14
Payer: MEDICARE

## 2025-04-14 VITALS
BODY MASS INDEX: 23.28 KG/M2 | DIASTOLIC BLOOD PRESSURE: 87 MMHG | SYSTOLIC BLOOD PRESSURE: 170 MMHG | RESPIRATION RATE: 20 BRPM | HEIGHT: 63 IN | TEMPERATURE: 98 F | HEART RATE: 85 BPM | WEIGHT: 131.4 LBS | OXYGEN SATURATION: 96 %

## 2025-04-14 VITALS — HEIGHT: 62 IN | BODY MASS INDEX: 23.92 KG/M2 | WEIGHT: 130 LBS

## 2025-04-14 DIAGNOSIS — J45.901 EXACERBATION OF PERSISTENT ASTHMA, UNSPECIFIED ASTHMA SEVERITY: Primary | ICD-10-CM

## 2025-04-14 DIAGNOSIS — R03.0 ELEVATED BLOOD PRESSURE READING: ICD-10-CM

## 2025-04-14 DIAGNOSIS — M81.0 AGE-RELATED OSTEOPOROSIS WITHOUT CURRENT PATHOLOGICAL FRACTURE: ICD-10-CM

## 2025-04-14 DIAGNOSIS — Z72.0 TOBACCO ABUSE: ICD-10-CM

## 2025-04-14 DIAGNOSIS — E55.9 VITAMIN D DEFICIENCY: ICD-10-CM

## 2025-04-14 DIAGNOSIS — R93.89 ABNORMAL FINDING PRESENT ON DIAGNOSTIC IMAGING OF UTERUS: ICD-10-CM

## 2025-04-14 DIAGNOSIS — Z13.820 OSTEOPOROSIS SCREENING: ICD-10-CM

## 2025-04-14 PROCEDURE — 99495 TRANSJ CARE MGMT MOD F2F 14D: CPT | Performed by: FAMILY MEDICINE

## 2025-04-14 PROCEDURE — 77080 DXA BONE DENSITY AXIAL: CPT

## 2025-04-14 RX ORDER — MEDICAL SUPPLY, MISCELLANEOUS
EACH MISCELLANEOUS DAILY
Qty: 1 EACH | Refills: 0 | Status: SHIPPED | OUTPATIENT
Start: 2025-04-14

## 2025-04-14 RX ORDER — BUDESONIDE AND FORMOTEROL FUMARATE DIHYDRATE 80; 4.5 UG/1; UG/1
2 AEROSOL RESPIRATORY (INHALATION) 2 TIMES DAILY
Start: 2025-04-14 | End: 2025-04-15

## 2025-04-14 NOTE — PROGRESS NOTES
"Name: Mireya Yi      : 1945      MRN: [id number]  Encounter Provider: Fady Crystal MD  Encounter Date: 2025   Encounter department: [facility] [place]  :Transition of Care Visit:    Assessment & Plan  Exacerbation of persistent asthma, unspecified asthma severity  - TCM visit today  - Hospitalized from 25 - 25 for asthma exacerbation  - Hx of COPD not on home oxygen, active smoker 3-4 times daily  - Following with Pulm, seen on 25  - Currently on steroid taper, bhronchodilators, Symbicort, allergy meds; states comliance  - Active smoker - on Chantix and nicotine gum  - Overall states slightly improved, still continues with symptoms  - Taking OTC mucinex   Continue with medications recommended by Pulm  Pulm consult next scheduled for tomorrow (4/15)  Orders:    budesonide-formoterol (Symbicort) 80-4.5 MCG/ACT inhaler; Inhale 2 puffs 2 (two) times a day Rinse mouth after use    Abnormal finding present on diagnostic imaging of uterus  - CT CAP : Incidental finding of \"Distended endometrium measuring up to 10 mm. Correlation with nonemergent pelvic ultrasound recommended to exclude neoplasm. Fibroid uterus.\"  - Menopause in 40s  - Denies abdominal pain, vaginal bleeding  - States when she was premenopausal, had significantly abnormal uterine bleeding   - Hx of multiple vaginal deliveries, denies C sections/surgeries   Orders:    US pelvis complete w transvaginal; Future  F/U in 2 weeks w PCP   Contact PCP office for any changes in menstruation symptoms   Elevated blood pressure reading  - Elevated BP today at 170/87, repeat at 185/95   - Current meds: lisinopril 20 mg daily - did not take this morning   - Hx of low blood pressure during hospitalization - meds held   - Currently on prednisone taper  Orders:    Blood Pressure Monitoring (B-D ASSURE BPM/AUTO ARM CUFF) MISC; Use in the morning  Encouraged to take daily BP after medication for ambulatory " log  Contact PCP office/ED precautions for worsening symptoms   Vitamin D deficiency  - Requested refills   Orders:    Cholecalciferol (D3-1000) 1,000 units tablet; Take 1 tablet (1,000 Units total) by mouth daily    Tobacco abuse  - Active smoker 3-4 times daily  - Tobacco Cessation Counseling: Tobacco cessation counseling and education was provided. The patient is sincerely urged to quit consumption of tobacco. She is not ready to quit tobacco. The numerous health risks of tobacco consumption were discussed.  Patient on chantix and nicotine gum which states is helping.. I spent 20 minutes on Tobacco Cessation counseling during today's visit.              History of Present Illness     Transitional Care Management Review:   Mireya Yi is a 79 y.o. female here for TCM follow up.     During the TCM phone call patient stated:  TCM Call (since 3/31/2025)       Date and time call was made  4/9/2025 11:03 AM    Hospital care reviewed  Records reviewed    Patient was hospitialized at  [facility]    Date of Admission  04/07/25    Date of discharge  04/08/25    Diagnosis  Moderate asthma with acute exacerbation    Disposition  Home    Were the patients medications reviewed and updated  Yes    Current Symptoms  Cough          TCM Call (since 3/31/2025)       Post hospital issues  None    Scheduled for follow up?  Yes    Patients specialists  [facility]    Did you obtain your prescribed medications  Yes    Do you need help managing your prescriptions or medications  No    Is transportation to your appointment needed  No    I have advised the patient to call PCP with any new or worsening symptoms  Jesu Maurer    Living Arrangements  Family members    Support System  Family    The type of support provided  None    Do you have social support  Yes, as much as I need          78 yo female with PMH of active smoking, COPD, empasma, asthma presents for TCM visit after hospitalization from 4/7/25 through 4/8/25  "for asthma exacerbation. Seen by Pulm post hospitalization. Currently on steroid taper and multiple controller and reliever medications. States overall slight improvement but still coughing. Incidental finding of thickened endometrium on CT during hospitalization, denies any symptoms, agreeable for outpatient imaging for follow up. BP significantly elevated today however states she has not taken any medications this morning. Reminded of Pulm appointment for 4/15.       Review of Systems   Constitutional:  Negative for chills and fever.   HENT:  Negative for ear pain and sore throat.    Eyes:  Negative for pain and visual disturbance.   Respiratory:  Positive for cough and shortness of breath.    Cardiovascular:  Negative for chest pain and palpitations.   Gastrointestinal:  Negative for abdominal pain and vomiting.   Genitourinary:  Negative for dysuria and hematuria.   Musculoskeletal:  Negative for arthralgias and back pain.   Skin:  Negative for color change and rash.   Neurological:  Negative for seizures and syncope.   All other systems reviewed and are negative.    Objective   /87 (BP Location: Left arm, Patient Position: Sitting, Cuff Size: Standard)   Pulse 85   Temp 98 °F (36.7 °C)   Resp 20   Ht 5' 3\" (1.6 m)   Wt 59.6 kg (131 lb 6.4 oz)   SpO2 96%   BMI 23.28 kg/m²     Physical Exam  Vitals and nursing note reviewed.   Constitutional:       General: She is not in acute distress.     Appearance: She is well-developed.   HENT:      Head: Normocephalic and atraumatic.      Right Ear: External ear normal.      Left Ear: External ear normal.      Nose: Nose normal.      Mouth/Throat:      Mouth: Mucous membranes are moist.      Pharynx: Oropharynx is clear.   Eyes:      Conjunctiva/sclera: Conjunctivae normal.   Cardiovascular:      Rate and Rhythm: Normal rate and regular rhythm.      Pulses: Normal pulses.      Heart sounds: Normal heart sounds. No murmur heard.  Pulmonary:      Effort: " Pulmonary effort is normal. No respiratory distress.      Breath sounds: Wheezing present.   Abdominal:      General: Abdomen is flat. Bowel sounds are normal.      Palpations: Abdomen is soft.      Tenderness: There is no abdominal tenderness. There is no right CVA tenderness or left CVA tenderness.   Musculoskeletal:         General: No swelling.      Cervical back: Neck supple.   Skin:     General: Skin is warm and dry.      Capillary Refill: Capillary refill takes less than 2 seconds.   Neurological:      General: No focal deficit present.      Mental Status: She is alert and oriented to person, place, and time. Mental status is at baseline.   Psychiatric:         Mood and Affect: Mood normal.       Medications have been reviewed by provider in current encounter

## 2025-04-14 NOTE — ASSESSMENT & PLAN NOTE
- Active smoker 3-4 times daily  - Tobacco Cessation Counseling: Tobacco cessation counseling and education was provided. The patient is sincerely urged to quit consumption of tobacco. She is not ready to quit tobacco. The numerous health risks of tobacco consumption were discussed. Patient on chantix and nicotine gum which states is helping.. I spent 20 minutes on Tobacco Cessation counseling during today's visit.

## 2025-04-15 ENCOUNTER — OFFICE VISIT (OUTPATIENT)
Dept: PULMONOLOGY | Facility: CLINIC | Age: 80
End: 2025-04-15
Payer: MEDICARE

## 2025-04-15 ENCOUNTER — TELEPHONE (OUTPATIENT)
Dept: PULMONOLOGY | Facility: CLINIC | Age: 80
End: 2025-04-15

## 2025-04-15 VITALS
BODY MASS INDEX: 24.88 KG/M2 | WEIGHT: 131.8 LBS | TEMPERATURE: 96.8 F | DIASTOLIC BLOOD PRESSURE: 82 MMHG | HEART RATE: 90 BPM | HEIGHT: 61 IN | OXYGEN SATURATION: 96 % | SYSTOLIC BLOOD PRESSURE: 146 MMHG

## 2025-04-15 DIAGNOSIS — T17.500A MUCUS PLUGGING OF BRONCHI: ICD-10-CM

## 2025-04-15 DIAGNOSIS — J45.51 SEVERE PERSISTENT ASTHMA WITH ACUTE EXACERBATION: Primary | ICD-10-CM

## 2025-04-15 DIAGNOSIS — F17.201 TOBACCO USE DISORDER, SEVERE, IN SUSTAINED REMISSION: ICD-10-CM

## 2025-04-15 DIAGNOSIS — J45.41 MODERATE PERSISTENT ASTHMA WITH ACUTE EXACERBATION: ICD-10-CM

## 2025-04-15 DIAGNOSIS — J45.51 SEVERE PERSISTENT ASTHMA WITH ACUTE EXACERBATION: ICD-10-CM

## 2025-04-15 DIAGNOSIS — J30.9 CHRONIC ALLERGIC RHINITIS: ICD-10-CM

## 2025-04-15 PROCEDURE — 99214 OFFICE O/P EST MOD 30 MIN: CPT | Performed by: INTERNAL MEDICINE

## 2025-04-15 PROCEDURE — G2211 COMPLEX E/M VISIT ADD ON: HCPCS | Performed by: INTERNAL MEDICINE

## 2025-04-15 RX ORDER — SODIUM CHLORIDE FOR INHALATION 3 %
4 VIAL, NEBULIZER (ML) INHALATION AS NEEDED
Qty: 30 ML | Refills: 1 | Status: SHIPPED | OUTPATIENT
Start: 2025-04-15 | End: 2025-04-22 | Stop reason: SDUPTHER

## 2025-04-15 RX ORDER — ALBUTEROL SULFATE 0.83 MG/ML
2.5 SOLUTION RESPIRATORY (INHALATION) EVERY 6 HOURS PRN
Qty: 60 ML | Refills: 0 | Status: SHIPPED | OUTPATIENT
Start: 2025-04-15 | End: 2025-07-14

## 2025-04-15 RX ORDER — BUDESONIDE, GLYCOPYRROLATE, AND FORMOTEROL FUMARATE 160; 9; 4.8 UG/1; UG/1; UG/1
2 AEROSOL, METERED RESPIRATORY (INHALATION) DAILY
Qty: 10.7 G | Refills: 3 | Status: SHIPPED | OUTPATIENT
Start: 2025-04-15 | End: 2025-04-15

## 2025-04-15 RX ORDER — PREDNISONE 20 MG/1
TABLET ORAL
Qty: 32 TABLET | Refills: 0 | Status: SHIPPED | OUTPATIENT
Start: 2025-04-15 | End: 2025-05-03

## 2025-04-15 RX ORDER — SODIUM CHLORIDE FOR INHALATION 3 %
4 VIAL, NEBULIZER (ML) INHALATION AS NEEDED
Qty: 750 ML | Refills: 1 | OUTPATIENT
Start: 2025-04-15

## 2025-04-15 RX ORDER — BUDESONIDE, GLYCOPYRROLATE, AND FORMOTEROL FUMARATE 160; 9; 4.8 UG/1; UG/1; UG/1
2 AEROSOL, METERED RESPIRATORY (INHALATION) 2 TIMES DAILY
Qty: 10.7 G | Refills: 3 | Status: SHIPPED | OUTPATIENT
Start: 2025-04-15

## 2025-04-15 NOTE — TELEPHONE ENCOUNTER
Reason for call:   [x] Prior Auth  [] Other:     Caller:  [] Patient  [x] Pharmacy  Name: [facility]  Address: [address] [place] [address]   Callback Number:  [phone]     Medication: sodium chloride 3 % inhalation solution     Dose/Frequency: Take 4 mL by nebulization as needed for cough     Quantity: 30 mL    Ordering Provider:   [] PCP/Provider -   [x] Speciality/Provider -

## 2025-04-15 NOTE — PROGRESS NOTES
Follow-up  Visit - Pulmonary Medicine   Name: Mireya Yi      : 1945      MRN: [id number]  Encounter Provider: Aby Welsh MD  Encounter Date: 4/15/2025   Encounter department: [facility]  :  Assessment & Plan  Severe persistent asthma with acute exacerbation  Patient has had multiple recent exacerbations with persistent asthma symptoms. She most recently had acute exacerbation in the setting of influenza B infection, received steroid taper. She took 20mg daily today and has 2 more days of 10mg daily left. She was seen in the office last week and started on DuoNeb and given flutter valve and tessalon perles. She remains on symbicort 2 puffs twice daily and singulair nightly. She continues to have persistent cough, wheezing, shortness of breath. Using DuoNeb nebulizer frequently.  Plan:  -Stop current steroid taper and restart steroid taper and higher dose of 60mg daily for 3 days, decrease by 10mg every 3 days  -Escalate inhaler regimen to Breztri 2 puffs twice daily  -Continue singulair nightly  -Switch DuoNeb nebulizer to albuterol nebulizer  -Start hypertonic saline nebulizer with albuterol nebulizer every 6 hours for 1 week to help with retained secretions, cough  -Encourage continued smoking cessation  -Will arrange for close follow up. If escalation of therapy does not help, she may need biologic therapy, however would need to fully quit smoking to do so  Orders:    predniSONE 20 mg tablet; Take 3 tablets (60 mg total) by mouth daily for 3 days, THEN 2.5 tablets (50 mg total) daily for 3 days, THEN 2 tablets (40 mg total) daily for 3 days, THEN 1.5 tablets (30 mg total) daily for 3 days, THEN 1 tablet (20 mg total) daily for 3 days, THEN 0.5 tablets (10 mg total) daily for 3 days.    sodium chloride 3 % inhalation solution; Take 4 mL by nebulization as needed for cough    albuterol (2.5 mg/3 mL) 0.083 % nebulizer solution; Take 3 mL (2.5 mg total) by nebulization  every 6 (six) hours as needed for wheezing or shortness of breath    Budeson-Glycopyrrol-Formoterol (Breztri Aerosphere) 160-9-4.8 MCG/ACT AERO; Inhale 2 puffs 2 (two) times a day Rinse mouth after use.    Mucus plugging of bronchi  She has history of mucus plugging. Underwent bronchoscopy which showed evidence of EDAC of right mainstem as well as mucus plugging. Has been on mucinex and aggressive airway clearance protocol flutter valve  Plan:  -Start hypertonic saline and albuterol nebulizer every 6 hours for 1 week to help with retained secretions and cough  -Continue mucinex  Orders:    predniSONE 20 mg tablet; Take 3 tablets (60 mg total) by mouth daily for 3 days, THEN 2.5 tablets (50 mg total) daily for 3 days, THEN 2 tablets (40 mg total) daily for 3 days, THEN 1.5 tablets (30 mg total) daily for 3 days, THEN 1 tablet (20 mg total) daily for 3 days, THEN 0.5 tablets (10 mg total) daily for 3 days.    sodium chloride 3 % inhalation solution; Take 4 mL by nebulization as needed for cough    Tobacco use disorder, severe, in sustained remission  Patient with significant smoking history of 20-30 pack-years. She is currently smoking occasionally. Encouraged smoking cessation. Patient understands she needs to quit.   Plan:  -Start nicotine patch 7mg daily  Orders:    nicotine (NICODERM CQ) 7 mg/24hr TD 24 hr patch; Place 1 patch on the skin over 24 hours every 24 hours    Chronic allergic rhinitis  Patient follows with ENT. She is being considered for septoplasty/turbinoplasty. She needs lung disease under control prior to surgery.  -Continue to follow with ENT for management, f/u scheduled   -Continue Singular, Allegra, Fluticasone, and azelastine          Return in about 4 weeks (around 5/13/2025) for Next scheduled follow up.    History of Present Illness   Mireya Yi is a 79 y.o. female who presents for follow-up evaluation. She was recently seen last week after she was hospitalized for acute asthma  "exacerbation and found to have influenza B. She had CT chest done which showed mucus plugging and emphysema. She was given steroid course and followed up in the office. She was initiated on nebulizer therapy with duonebs and given flutter valve for aggressive chest PT.     She presents today for follow-up. She is currently on 20mg prednisone daily and has 2 more days of 10mg daily. She is still wheezing, coughing, lightheaded. She is using duoneb nebulizer several times per day. She is on symbicort and using that twice daily. She got mucinex to help her cough out mucus. She feels that nothing is really helping. She still has significant symptoms. Has a hard time getting mucus up.     She was recommended to get sleep study but never had this done - set up for August. There was concern that she had some airway collapse that could be contributing to her symptoms. Thought that pap therapy may help stent open airways    She is currently smoking here and there. She is really not able to smoke due to her symptoms. Last cigarette a couple of days ago. She is motivated to quit and had success with nicotine patch in the past.     Review of Systems   Constitutional:  Negative for chills, fatigue and fever.   HENT:  Positive for congestion. Negative for rhinorrhea and sinus pressure.    Respiratory:  Positive for cough, shortness of breath and wheezing.    Cardiovascular:  Negative for chest pain and palpitations.   Gastrointestinal:  Negative for nausea and vomiting.       Aside from what is mentioned in the HPI, ROS is otherwise negative      Medical History Reviewed by provider this encounter:     .    Objective   /82 (BP Location: Left arm, Patient Position: Sitting, Cuff Size: Standard)   Pulse 90   Temp (!) 96.8 °F (36 °C) (Tympanic)   Ht 5' 1\" (1.549 m)   Wt 59.8 kg (131 lb 12.8 oz)   SpO2 96%   BMI 24.90 kg/m²     Physical Exam  Vitals reviewed.   Constitutional:       General: She is not in acute " distress.     Appearance: Normal appearance. She is not toxic-appearing.   HENT:      Nose: Congestion present.   Cardiovascular:      Rate and Rhythm: Normal rate and regular rhythm.   Pulmonary:      Effort: Pulmonary effort is normal. No respiratory distress.      Breath sounds: Wheezing (Scattered and present bilaterally) and rales (Bibasilar) present.   Musculoskeletal:         General: Normal range of motion.      Right lower leg: No edema.      Left lower leg: No edema.   Neurological:      General: No focal deficit present.      Mental Status: She is alert and oriented to person, place, and time. Mental status is at baseline.   Psychiatric:         Mood and Affect: Mood normal.         Behavior: Behavior normal.           Diagnostic Data:  Labs: I personally reviewed the most recent laboratory data pertinent to today's visit.      Radiology results:  Radiology Results Review: I have reviewed the following images/report studies in PACS:   CT chest 4/7/2025: Mild emphysema. Mild mucous plugging right lower lobe bronchi. No evidence of pneumonia.       Other Study Results: Other Study Results Review : Other studies reviewed include: 5/2/2024-LVEF normal.  Moderate concentric hypertrophy.  Diastolic dysfunction grade 1.  RV size and function is normal.  Small pericardial effusion anterior to the heart.  No tamponade.  RV systolic pressure is normal    PFT Results Reviewed: reviewed  Mild obstructive airflow defect on spirometry.  No BD response.  Normal lung volumes.  Normal diffusion.    Aby Welsh MD

## 2025-04-15 NOTE — ASSESSMENT & PLAN NOTE
Patient has had multiple recent exacerbations with persistent asthma symptoms. She most recently had acute exacerbation in the setting of influenza B infection, received steroid taper. She took 20mg daily today and has 2 more days of 10mg daily left. She was seen in the office last week and started on DuoNeb and given flutter valve and tessalon perles. She remains on symbicort 2 puffs twice daily and singulair nightly. She continues to have persistent cough, wheezing, shortness of breath. Using DuoNeb nebulizer frequently.  Plan:  -Stop current steroid taper and restart steroid taper and higher dose of 60mg daily for 3 days, decrease by 10mg every 3 days  -Escalate inhaler regimen to Breztri 2 puffs twice daily  -Continue singulair nightly  -Switch DuoNeb nebulizer to albuterol nebulizer  -Start hypertonic saline nebulizer with albuterol nebulizer every 6 hours for 1 week to help with retained secretions, cough  -Encourage continued smoking cessation  -Will arrange for close follow up. If escalation of therapy does not help, she may need biologic therapy, however would need to fully quit smoking to do so  Orders:    predniSONE 20 mg tablet; Take 3 tablets (60 mg total) by mouth daily for 3 days, THEN 2.5 tablets (50 mg total) daily for 3 days, THEN 2 tablets (40 mg total) daily for 3 days, THEN 1.5 tablets (30 mg total) daily for 3 days, THEN 1 tablet (20 mg total) daily for 3 days, THEN 0.5 tablets (10 mg total) daily for 3 days.    sodium chloride 3 % inhalation solution; Take 4 mL by nebulization as needed for cough    albuterol (2.5 mg/3 mL) 0.083 % nebulizer solution; Take 3 mL (2.5 mg total) by nebulization every 6 (six) hours as needed for wheezing or shortness of breath    Budeson-Glycopyrrol-Formoterol (Breztri Aerosphere) 160-9-4.8 MCG/ACT AERO; Inhale 2 puffs 2 (two) times a day Rinse mouth after use.

## 2025-04-15 NOTE — PATIENT INSTRUCTIONS
Start using Breztri inhaler 2 puffs daily in place of symbicort.     Stop current steroid taper. Start new steroid taper tomorrow at 60mg daily. Decrease by 10mg every 3 days until off.     Use albuterol nebulizer 3-4 times daily (every 6 hours). Add hypertonic saline to albuterol nebulizer therapy to help relieve cough (every 6 hours). Continue this regimen for 1 week. After 1 week you may go back to using your DuoNeb nebulizer therapy 3-4 times daily as needed.     Continue to take mucinex    Start nicotine patch 7mg daily.

## 2025-04-16 NOTE — TELEPHONE ENCOUNTER
Patient is calling as she was advised by her pharmacy to call the provider about the PA. I advised patient that our prior auth team has started working on it for her and patient will receive updates about the PA.    Thank you.

## 2025-04-17 DIAGNOSIS — K21.9 GASTROESOPHAGEAL REFLUX DISEASE WITHOUT ESOPHAGITIS: ICD-10-CM

## 2025-04-17 RX ORDER — PANTOPRAZOLE SODIUM 20 MG/1
20 TABLET, DELAYED RELEASE ORAL DAILY
Qty: 90 TABLET | Refills: 1 | Status: SHIPPED | OUTPATIENT
Start: 2025-04-17

## 2025-04-22 ENCOUNTER — OFFICE VISIT (OUTPATIENT)
Dept: OBGYN CLINIC | Facility: HOSPITAL | Age: 80
End: 2025-04-22
Payer: MEDICARE

## 2025-04-22 VITALS — HEIGHT: 62 IN | WEIGHT: 131.84 LBS | BODY MASS INDEX: 24.26 KG/M2

## 2025-04-22 DIAGNOSIS — M54.16 LUMBAR RADICULOPATHY: Primary | ICD-10-CM

## 2025-04-22 DIAGNOSIS — M46.1 SACROILIITIS (HCC): ICD-10-CM

## 2025-04-22 PROCEDURE — 99213 OFFICE O/P EST LOW 20 MIN: CPT | Performed by: ORTHOPAEDIC SURGERY

## 2025-04-22 RX ORDER — SODIUM CHLORIDE FOR INHALATION 3 %
4 VIAL, NEBULIZER (ML) INHALATION EVERY 8 HOURS PRN
Qty: 30 ML | Refills: 0 | Status: SHIPPED | OUTPATIENT
Start: 2025-04-22

## 2025-04-22 NOTE — PROGRESS NOTES
Assessment & Plan/Medical Decision Makin y.o. female with Bilateral Radicular Leg Pain, Ambulatory Dysfunction, and Left Hip Pain and imaging findings most notable for L4-5 degenerative spondylolisthesis         The clinical, physical and imaging findings were reviewed with the patient.  Mireya  has a constellation of findings consistent with lumbar radiculopathy, sacroiliac joint dysfunction in the setting of lumbar degenerative disease.      Physical examination showing +TTP left > right SI joint region. Decreased lumbar ROM.  Fortunately patient remains neurologically intact and functional.   We discussed the treatment options including physical therapy, at home exercises, activity modifications, chiropractic medicine, oral medications, interventional spine procedures.      Again reviewed lumbar MRI in detail with the patient. Did discuss role of surgical intervention in form of fusion to address L4-5 spondylolisthesis, stenosis and right sided facet cyst. She continues with back and left > right gluteal and lower extremity pain, however pain is much more controlled/improved since taking oral steroid that was prescribed for her pulmonary issues.     She reports current ongoing breathing/pulmonary issues. She is currently undergoing pulmonology treatment. She will need surgical clearance and pulmonary optimization prior to continued surgical discussion. Would recommend continued conservative treatment at this time.    Patient may follow up with spine & pain management in the interim for repeat injection. Would recommend trying left SI joint injection given significant TTP over left SI joint and current improvement with oral steroid.    Patient instructed to return to office/ER sooner if symptoms are not improving, getting worse, or new worrisome/neurologic symptoms arise. Patient can follow up if pain persists and once her ongoing pulmonary issues are more controlled.     Subjective:      Chief Complaint:  Back Pain    HPI:  Mireya Yi is a 79 y.o. female presenting for initial visit with chief complaint of low back pain, bilateral radicular leg pain -referred by Violetta Avendaño.  Pain began about 3 years ago without any injury or fall. She has pain with bilateral side lying and with laying on her back. She also experiences pain with sitting any standing. She describes her left leg to be worse. Her symptoms are describes to go to her base of her foot in which she experiences numbness and tingling in her left foot. Her entire left leg cramps if she externally rotates her hip.   She has attempted physical therapy that did not provide any relief. She received an NEERU on 8/12/24 that did not provide any relief. She is scheduled for a repeat injection on 11/14/24. She is currently taking aspirin for pain which does provide slight relief.   Denies any michael trauma. Denies fever or chills, no night sweats. Denies any bladder or bowel changes.      Conservative therapy includes the following:   Medications: OTC    Injections:   11/13/2024 - bilateral L4-5, L5-S1 MBB without relief  8/12/2024 - L4=5 ILESI without relief  5/14/2024 - left L4 TFESI without relief  11/23/2023 - left greater troch injection without relief  Physical Therapy: Yes  Chiropractic Medicine: has not attempted  Accupunture/Massage Therapy: has not attempted   These therapeutic modalities were ineffective at providing sustained pain relief/functional improvement.     Interval History 12/27/2024:  She underwent Fluoroscopically-guided Medial Branch Nerve/Dorsal Ramus Blocks of the bilateral L4-5 and L5-S1 facet joint(s) using 2% lidocaine on 11/13/2024. She states they were supposed to call her to schedule two more injections but no one ever reached out. She had immediate relief for a few hours until shooting pain that night.  She describes continued numbness of the left leg down to her ankle. Her leg also cramps with increased activity. She does not  "require assistive device for ambulation. She takes Aspirin for pain with mild relief. Her pain is worse some days than others.     Update HPI on 1/15/2025:  Mireya is here for follow up, lumbar MRI review.Pain has been significantly worsening over past 6 days or so. She reports ongoing low back pain with left > right gluteal region pain with radiation into her posterolateral left lower extremity with numbness in her left ankle and foot. Describes \"shooting\" pains into her left lower extremity with ongoing muscle cramping. Pain worse with direct pressure, sitting and lying down. Ongoing \"pressure\" in her low back\". Difficulty sleeping and driving due to pain, has to sit in a position that alleviate pressure from her left gluteal region. Notes she will start to limp when ambulating due to gluteal, hip and leg pain, otherwise is able to ambulate without issue. Subjective left lower extremity weakness, noting she feels like her left leg is going to buckle on her at times. Notes left anterior groin pain with ambulation as well. Denies right lower extremity symptoms.    She reports ongoing breathing issues/asthma issues. Has been following up pulmonology.     Update HPI on 4/22/2025:  Mireya is here for follow up. She underwent DEXA scan on 4/14/2025 which demonstrates osteopenia.     She is currently on prednisone for her asthma which has been signficantly improving her back and left lower extermity \"shooting and thorbbing\" pain. Pain is currently at a 3/10. She has been able to sleep due to current pain being under control. However, she is nervous that pain will return once the oral steroid is completed. When she has pain, she describes severe pain that started in her left gluteal region and will radiate into her left lower extremity with muscle cramping. Worse with direct pressure and sitting, noting she needs to constantly shift to her right side when pain is severe on her left. Also had pain with prolonged standing and " walking. Denies right sided symptoms. Also reports numbness in her left ankle and top of her left foot. She has an unable MRI of her left ankle scheduled for 4/26/2025. She is currently undergoing treatment for her ongoing pulmonary issues    Objective:     Family History   Problem Relation Age of Onset    Heart attack Mother     Asthma Father     Breast cancer Sister     Breast cancer Sister     No Known Problems Daughter     No Known Problems Daughter     Arthritis Family     Osteoporosis Family        Past Medical History:   Diagnosis Date    Asthma     Asthmatic bronchitis     Last Assessed: 10/7/2014     Chronic diarrhea     Last Assessed: 8/20/2015     Fibromyalgia     Focal nodular hyperplasia of liver     Last Assessed: 6/11/2015    Herpes zoster     Last Assessed: 3/18/2016    Hypertension     IBS (irritable bowel syndrome)     Intermittent palpitations     Irritable bowel syndrome     Lumbar radiculopathy     Osteoarthritis     Vertigo        Current Outpatient Medications   Medication Sig Dispense Refill    acetaminophen (TYLENOL) 500 mg tablet Take 1 tablet (500 mg total) by mouth every 6 (six) hours as needed for mild pain 60 tablet 0    albuterol (2.5 mg/3 mL) 0.083 % nebulizer solution Take 3 mL (2.5 mg total) by nebulization every 6 (six) hours as needed for wheezing or shortness of breath 60 mL 0    albuterol (PROVENTIL HFA,VENTOLIN HFA) 90 mcg/act inhaler Inhale 2 puffs every 6 (six) hours as needed for wheezing or shortness of breath 18 g 5    azelastine (ASTELIN) 0.1 % nasal spray 1 spray into each nostril 2 (two) times a day Use in each nostril as directed 1 mL 4    benzonatate (TESSALON PERLES) 100 mg capsule Take 1 capsule (100 mg total) by mouth 3 (three) times a day as needed for cough 30 capsule 0    Blood Pressure Monitoring (B-D ASSURE BPM/AUTO ARM CUFF) MISC Use in the morning 1 each 0    Budeson-Glycopyrrol-Formoterol (Breztri Aerosphere) 160-9-4.8 MCG/ACT AERO Inhale 2 puffs 2 (two)  times a day Rinse mouth after use. 10.7 g 3    fexofenadine (ALLEGRA) 180 MG tablet Take 180 mg by mouth daily      fluticasone (FLONASE) 50 mcg/act nasal spray SPRAY 2 SPRAYS INTO EACH NOSTRIL EVERY DAY 48 mL 2    hydrOXYzine HCL (ATARAX) 10 mg tablet Take 1 tablet (10 mg total) by mouth daily at bedtime as needed for anxiety for up to 10 doses 10 tablet 0    ipratropium-albuterol (DUO-NEB) 0.5-2.5 mg/3 mL nebulizer solution Take 3 mL by nebulization 4 (four) times a day 360 mL 2    lisinopril (ZESTRIL) 20 mg tablet Take 1 tablet (20 mg total) by mouth daily Do not start before April 8, 2025. 90 tablet 0    montelukast (SINGULAIR) 10 mg tablet Take 1 tablet (10 mg total) by mouth daily at bedtime Do not start before April 8, 2025. 90 tablet 0    nicotine (NICODERM CQ) 7 mg/24hr TD 24 hr patch Place 1 patch on the skin over 24 hours every 24 hours 28 patch 0    ondansetron (Zofran ODT) 4 mg disintegrating tablet Take 1 tablet (4 mg total) by mouth every 6 (six) hours as needed for nausea or vomiting 20 tablet 3    ondansetron (ZOFRAN) 4 mg tablet Take 1 tablet (4 mg total) by mouth every 8 (eight) hours as needed for nausea or vomiting 30 tablet 0    predniSONE 20 mg tablet Take 3 tablets (60 mg total) by mouth daily for 3 days, THEN 2.5 tablets (50 mg total) daily for 3 days, THEN 2 tablets (40 mg total) daily for 3 days, THEN 1.5 tablets (30 mg total) daily for 3 days, THEN 1 tablet (20 mg total) daily for 3 days, THEN 0.5 tablets (10 mg total) daily for 3 days. 32 tablet 0    acetylcysteine (MUCOMYST) 10 % nebulizer solution Take 4 mL by nebulization every 4 (four) hours (Patient not taking: Reported on 4/22/2025) 30 mL 0    Cholecalciferol (D3-1000) 1,000 units tablet Take 1 tablet (1,000 Units total) by mouth daily (Patient not taking: Reported on 4/22/2025) 90 tablet 1    pantoprazole (PROTONIX) 20 mg tablet TAKE 1 TABLET BY MOUTH EVERY DAY 90 tablet 1    pregabalin (LYRICA) 50 mg capsule Take 1 PO HS x 5  days, then 1 PO BID x 5 days, then 1 PO AM and 2 PO HS (Patient not taking: Reported on 4/22/2025) 90 capsule 2    sodium chloride 3 % inhalation solution Take 4 mL by nebulization every 8 (eight) hours as needed for cough (Use with albuterol nebulizer) (Patient not taking: Reported on 4/22/2025) 30 mL 0     No current facility-administered medications for this visit.       Past Surgical History:   Procedure Laterality Date    BREAST BIOPSY      BREAST LUMPECTOMY      when pt was 18    SMALL INTESTINE SURGERY         Social History     Socioeconomic History    Marital status:      Spouse name: Not on file    Number of children: Not on file    Years of education: Not on file    Highest education level: Not on file   Occupational History    Occupation: Unemployed    Tobacco Use    Smoking status: Some Days     Types: Cigarettes    Smokeless tobacco: Never    Tobacco comments:     Hasn't been smoking recently because of the cough., about 8 days.   Vaping Use    Vaping status: Never Used   Substance and Sexual Activity    Alcohol use: Not Currently    Drug use: No    Sexual activity: Not Currently     Partners: Male   Other Topics Concern    Not on file   Social History Narrative    Mental Disability     Exercising Regularly     Lives with adult children      Social Drivers of Health     Financial Resource Strain: Low Risk  (2/28/2025)    Overall Financial Resource Strain (CARDIA)     Difficulty of Paying Living Expenses: Not hard at all   Food Insecurity: No Food Insecurity (4/8/2025)    Nursing - Inadequate Food Risk Classification     Worried About Running Out of Food in the Last Year: Not on file     Ran Out of Food in the Last Year: Never true     Ran Out of Food in the Last Year: Never true   Transportation Needs: No Transportation Needs (4/8/2025)    Nursing - Transportation Risk Classification     Lack of Transportation: Not on file     Lack of Transportation: No   Physical Activity: Sufficiently Active  "(2024)    Exercise Vital Sign     Days of Exercise per Week: 4 days     Minutes of Exercise per Session: 40 min   Stress: Stress Concern Present (3/13/2025)    [employer] - Occupational Stress Questionnaire     Feeling of Stress : Very much   Social Connections: Unknown (2024)    Social Connection and Isolation Panel [NHANES]     Frequency of Communication with Friends and Family: More than three times a week     Frequency of Social Gatherings with Friends and Family: More than three times a week     Attends Restorationist Services: Never     Active Member of Clubs or Organizations: No     Attends Club or Organization Meetings: Never     Marital Status: Not on file   Recent Concern: Social Connections - Socially Isolated (2024)    Social Connection and Isolation Panel [NHANES]     Frequency of Communication with Friends and Family: More than three times a week     Frequency of Social Gatherings with Friends and Family: More than three times a week     Attends Restorationist Services: Never     Active Member of Clubs or Organizations: No     Attends Club or Organization Meetings: Never     Marital Status: Never    Intimate Partner Violence: Unknown (2025)    Nursing IPS     Feels Physically and Emotionally Safe: Not on file     Physically Hurt by Someone: Not on file     Humiliated or Emotionally Abused by Someone: Not on file     Physically Hurt by Someone: No     Hurt or Threatened by Someone: No   Housing Stability: Unknown (2025)    Nursing: Inadequate Housing Risk Classification     Has Housing: Not on file     Worried About Losing Housing: Not on file     Unable to Get Utilities: Not on file     Unable to Pay for Housing in the Last Year: No     Has Housin       Allergies   Allergen Reactions    Ambrosia Artemisiifolia (Ragweed) Skin Test Facial Swelling    Codeine Other (See Comments)     Heart races    Epinephrine      Pt reports \"it makes my heart race, " "they told me I cant take it.\"    Ibuprofen Other (See Comments)     Heart racing    Morphine Other (See Comments)     Heart racing    Pollen Extract Sneezing    Tramadol Other (See Comments)     Heart racing       Review of Systems  General- denies fever/chills  HEENT- denies hearing loss or sore throat  Eyes- denies eye pain or visual disturbances, denies red eyes  Respiratory- denies cough or SOB  Cardio- denies chest pain or palpitations  GI- denies abdominal pain  Endocrine- denies urinary frequency  Urinary- denies pain with urination  Musculoskeletal- Negative except noted above  Skin- denies rashes or wounds  Neurological- denies dizziness or headache  Psychiatric- denies anxiety or difficulty concentrating    Physical Exam  Ht 5' 1.5\" (1.562 m)   Wt 59.8 kg (131 lb 13.4 oz)   BMI 24.51 kg/m²     General/Constitutional: No apparent distress: well-nourished and well developed.  Lymphatic: No appreciable lymphadenopathy  Respiratory: Non-labored breathing  Vascular: No edema, swelling or tenderness, except as noted in detailed exam.  Integumentary: No impressive skin lesions present, except as noted in detailed exam.  Psych: Normal mood and affect, oriented to person, place and time.  MSK: normal other than stated in HPI and exam  Gait & balance: no evidence of myelopathic gait, ambulates Independently     Lumbar spine range of motion:  -Forward flexion to 90  -Extension to neutral  There is mild tenderness with palpation along lumbar paraspinal musculature, no midline tenderness     Neurologic:  Lower Extremity Motor Function    Right  Left    Iliopsoas  5/5  5/5    Quadriceps 5/5 5/5   Tibialis anterior  5/5  5/5    EHL  5/5  5/5    Gastroc. muscle  5/5  5/5    Heel rise  5/5  5/5    Toe rise  5/5  5/5      Sensory: light touch is intact to bilateral upper and lower extremities     Reflexes:  intact     Other tests:  Straight Leg Raise: negative  Tonio SI: positive  ROHAN SI: positive  Greater troch: +TTP " "left greater troch  Internal/external hip ROM: intact, no pain   Flexion/extension knee ROM: intact, no pain   Vascular: WWP extremities    Diagnostic Tests   IMAGING: I have personally reviewed the images and these are my findings:  Lumbar Spine X-rays from 11/11/2024: multi level lumbar spondylosis with loss of disc height, osteophyte formation and facet hypertrophy, no appreciated lytic/blastic lesions, L4-5 spondylolisthesis which is more prominent on flexion x-rays    Lumbar Spine MRI from 12/2/2023: multi level lumbar disc degeneration with disc desiccation, loss of disc height, facet and ligamentum hypertrophy, right-sided L4-5 facet cyst, bilateral mild/moderate lateral recess stenosis    Lumbar Spine MRI from 1/9/2025: multi level lumbar disc degeneration with disc desiccation, loss of disc height, facet and ligamentum hypertrophy,  bilateral mild/moderate lateral recess stenosis at L4-5 with mild/moderate central stenosis, right sided facet cyst L4-5, L4-5 degenerative spondylolisthesis appears to reduce slightly on MRI    Electronic Medical Records were reviewed including pain management notes, radiology reports    Procedures, if performed today     None performed         Portions of the record may have been created with voice recognition software.  Occasional wrong word or \"sound a like\" substitutions may have occurred due to the inherent limitations of voice recognition software.  Read the chart carefully and recognize, using context, where substitutions have occurred.   "

## 2025-04-24 ENCOUNTER — NURSE TRIAGE (OUTPATIENT)
Age: 80
End: 2025-04-24

## 2025-04-24 DIAGNOSIS — R09.81 NASAL CONGESTION: ICD-10-CM

## 2025-04-24 RX ORDER — AZELASTINE 1 MG/ML
1 SPRAY, METERED NASAL 2 TIMES DAILY
Qty: 1 ML | Refills: 0 | Status: SHIPPED | OUTPATIENT
Start: 2025-04-24

## 2025-04-24 NOTE — TELEPHONE ENCOUNTER
PREOPERATIVE HISTORY AND PHYSICAL    NAME: Anita Sanchez  : 1978    DATE OF EXAM:  10/22/2020    CHIEF COMPLAINT:   Chief Complaint   Patient presents with   • Pre-Op Exam     surgery        HISTORY OF PRESENT ILLNESS:  Anita Sanchez presents for a preoperative evaluation at the request of Dr. Pranav Reid.  She is planned for a septoplasty on 2020.  She has been having symptoms of deviated septum.  A copy of this report is being sent to Dr. Pranav Reid.    Patient's past medical history is significant for allergic rhinitis.  She had no complaints today other than wanting to get the surgery done.  She is getting a yeast infection from the antibiotic she was given and would like Diflucan.  She denied any chest pain, shortness of breath, diaphoresis or orthopnea.  Patient is able to walk one-two miles, climb one flight of stairs and perform activities of daily living without any difficulty.    Current Outpatient Medications   Medication Sig   • norgestimate-ethinyl estradiol, triphasic, (Ortho Tri-Cyclen, 28,) 0.18/0.215/0.25 MG-35 MCG tablet Take 1 tablet by mouth daily.   • aluminum chloride (DRYSOL) 20 % topical solution Apply topically daily. Apply daily for 3 weeks, then 3 times weekly thereafter.   • amLODIPine (NORVASC) 10 MG tablet TAKE 1 TABLET BY MOUTH DAILY   • omeprazole (PRILOSEC) 20 MG capsule Take 1 capsule by mouth daily.   • HYDROcodone-acetaminophen (NORCO) 5-325 MG per tablet Take 1 tablet by mouth every 8 hours as needed for Pain. Caution: sedation, do not drive while taking.   • traMADol (ULTRAM) 50 MG tablet TAKE 1 TABLET BY MOUTH EVERY 6 HOURS AS NEEDED FOR PAIN   • MELOXICAM PO    • acetaminophen (TYLENOL) 325 MG tablet Take 650 mg by mouth every 4 hours as needed for Pain.   • tamsulosin (FLOMAX) 0.4 MG Cap Take 1 capsule by mouth daily after a meal.   • ciprofloxacin (CIPRO) 250 MG tablet Take 1 tablet by mouth 2 times daily.   •  "FOLLOW UP: Yes-Requesting call back from provider; [facility] ER evaluation    REASON FOR CONVERSATION: Shortness of Breath    SYMPTOMS: Patient c/o severe shortness of breath in spite of using inhalers and nebulizers. Last day of steroids is tomorrow. Cannot breathe through her nose, but no drainage. Dry cough. States she has had this for 2.5 weeks but now it is getting worse again. Was diagnosed earlier this month with Covid and flu. Does not have pulse ox at home to check. Does not have a kit to test for covid. Advised ER eval for severe shortness of breath but she refused. Wants to speak with Dr. Welsh.    OTHER: Refused ER eval; only wants to speak with Dr. Welsh; no appts available.    DISPOSITION: Discuss with Provider and Call Back Patient (overriding Go to ED Now)    Reason for Disposition   Difficulty breathing, and not from stuffy nose (e.g., not relieved by cleaning out the nose)    Additional Information   Breathing difficulty and only from stuffy nose    Answer Assessment - Initial Assessment Questions  1. RESPIRATORY STATUS: \"Describe your breathing?\" (e.g., wheezing, shortness of breath, unable to speak, severe coughing)       Shortness of  breath  2. ONSET: \"When did this breathing problem begin?\"       2.5 weeks ago  3. PATTERN \"Does the difficult breathing come and go, or has it been constant since it started?\"       Been fairly constant  4. SEVERITY: \"How bad is your breathing?\" (e.g., mild, moderate, severe)       Describes as severe  5. RECURRENT SYMPTOM: \"Have you had difficulty breathing before?\" If Yes, ask: \"When was the last time?\" and \"What happened that time?\"       Yes, with covid  6. CARDIAC HISTORY: \"Do you have any history of heart disease?\" (e.g., heart attack, angina, bypass surgery, angioplasty)       See history  7. LUNG HISTORY: \"Do you have any history of lung disease?\"  (e.g., pulmonary embolus, asthma, emphysema)      See history  8. CAUSE: \"What do you think is causing the " "breathing problem?\"       Unknown  9. OTHER SYMPTOMS: \"Do you have any other symptoms?\" (e.g., chest pain, cough, dizziness, fever, runny nose)      Sinus congestion with no discharge, denies fever or chills, dry cough, must breathe through her mouth  10. O2 SATURATION MONITOR:  \"Do you use an oxygen saturation monitor (pulse oximeter) at home?\" If Yes, ask: \"What is your reading (oxygen level) today?\" \"What is your usual oxygen saturation reading?\" (e.g., 95%)        Does not have a pulse ox to check  11. TRAVEL: \"Have you traveled out of the country in the last month?\" (e.g., travel history, exposures)        Denies    Protocols used: Breathing Difficulty-Adult-OH, Sinus Pain or Congestion-Adult-OH    " escitalopram (LEXAPRO) 10 MG tablet Take 1 and 1/2 tablets by mouth every morning.   • Vitamin D, Ergocalciferol, 10934 units capsule Take 1 capsule by mouth 1 day a week.   • hydrOXYzine (ATARAX) 25 MG tablet Take 1 tablet by mouth daily as needed for panic.   • EPINEPHrine (AUVI-Q) 0.3 MG/0.3ML Solution Auto-injector Inject 0.3 mLs into the muscle once as needed for Anaphylaxis.   • fluticasone (FLONASE) 50 MCG/ACT nasal spray Spray 2 sprays in each nostril daily.     No current facility-administered medications for this visit.        ALLERGIES:   Allergen Reactions   • Lisinopril THROAT SWELLING   • Terbinafine NAUSEA, DIARRHEA and Anorexia       Past Medical History:   Diagnosis Date   • Asthma    • Dysmenorrhea 2016   • Hypertension    • Migraine headache    • Nephrolithiasis    • Seasonal allergies     environmental       Past Surgical History:   Procedure Laterality Date   • Past surgical history      None       Family History   Problem Relation Age of Onset   • Diabetes Mother    • Hypertension Father    • Cancer Other         3/2019 neg fam hx:br,ut,ov,co   • Heart disease Maternal Grandmother    • Cancer Maternal Grandfather         STOMACH CANCER   • Patient is unaware of any medical problems Paternal Grandmother    • Cancer Paternal Grandfather         CANCER       Family Status   Relation Name Status   • Mo  Alive   • Fa  Alive   • OTHER  (Not Specified)   • Sis  Alive   • Bro  Alive   • MGMo     • MGFa     • PGMo     • PGFa     • Sis  Alive   • Bro  Alive       SOCIAL HISTORY:  Social History     Tobacco Use   • Smoking status: Never Smoker   • Smokeless tobacco: Never Used   Substance Use Topics   • Alcohol use: No     Alcohol/week: 0.0 standard drinks       Patient currently lives with her lives with spouse.  She is a nonsmoker.  She does not drink caffeinated beverages on a regular basis.        MENSTRUAL HISTORY: regular every 28 days.    REVIEW OF SYSTEMS:  Negative unless noted below.   Negative except as per HPI    Negative for all other systems.  Patient specifically denied chest pain or pressure at rest or with activity.  She is able to climb one flight of stairs and perform yard work, household cleaning, grocery shopping, etc. without trouble.      PHYSICAL EXAM:    VITAL SIGNS:   Visit Vitals  /70   Pulse 69   Temp 98.2 °F (36.8 °C)   Resp 14   Ht 4' 11\" (1.499 m)   Wt 65 kg   LMP 10/13/2020 (Exact Date)   SpO2 98%   BMI 28.92 kg/m²     GENERAL APPEARANCE: Appears stated age, is in no apparent distress and is well developed and well nourished  SKIN: Normal color, normal texture, normal turgor, no skin rashes, no atypical appearing skin lesions and no bruises  HEAD: Normocephalic  EYES: Pupils are equal and reactive to light and accommodation, extraocular movements are full, visual fields are full, sclerae and conjunctivae are normal, lids and lashes are normal and fundi are normal   EARS: Pinna and external ear is normal bilaterally, external auditory canals are normal, tympanic membranes are normal, middle ear space is normal bilaterally and auditory acuity is grossly normal  NOSE: External nose is normal to inspection and anterior nares are normal  MOUTH/THROAT: Tongue is midline and appears normal, oropharynx appears normal, soft palate and uvula are normal and oral mucosa is normal  NECK: Neck is supple, no thyromegaly, no anterior cervical adenopathy, no posterior cervical adenopathy and no supraclavicular adenopathy  CHEST: Contour is normal with normal AP diameter, normal respiratory excursion and respiratory effort is not labored  BREASTS: Deferred  LUNGS: Lungs are clear to auscultation with normal inspiratory/expiratory sounds, no rales, no rhonchi and no wheezes  HEART: Normal rate and rhythm, no palpable heaves or thrills, S1 and S2 normal, no S3 or S4, no murmurs and no extra heart sounds  ABDOMEN: Abdomen is soft, normal active bowel sounds,  nontender, without masses, without hepatomegaly, without splenomegaly and no pulsatile masses  BACK: Inspection shows no curvature, no discomfort to palpation of the midline and no costovertebral angle discomfort to palpation  PELVIC: Deferred  RECTAL: Deferred  EXTREMITIES: No clubbing, no cyanosis, no edema and normal muscle tone and development bilaterally  NEUROLOGIC: Cranial nerves 2 through 12 normal, motor strength normal, gait and station normal, coordination normal, deep tendon reflexes normal and symmetric and no tremor noted    DATA:   EKG within normal limits, laboratory results pending    ASSESSMENT & PLAN:  Patient is a 42 year old female needing a septoplasty.  Other than well-controlled allergic rhinitis, patient has no increased risk factors for increased perioperative cardiac morbidity/mortality and is thus cleared for surgery.

## 2025-04-25 ENCOUNTER — HOSPITAL ENCOUNTER (EMERGENCY)
Facility: HOSPITAL | Age: 80
Discharge: HOME | End: 2025-04-25
Attending: EMERGENCY MEDICINE
Payer: MEDICARE

## 2025-04-25 ENCOUNTER — APPOINTMENT (EMERGENCY)
Dept: RADIOLOGY | Facility: HOSPITAL | Age: 80
End: 2025-04-25
Payer: MEDICARE

## 2025-04-25 ENCOUNTER — NURSE TRIAGE (OUTPATIENT)
Age: 80
End: 2025-04-25

## 2025-04-25 VITALS
RESPIRATION RATE: 18 BRPM | TEMPERATURE: 98 F | SYSTOLIC BLOOD PRESSURE: 115 MMHG | OXYGEN SATURATION: 94 % | HEART RATE: 98 BPM | DIASTOLIC BLOOD PRESSURE: 74 MMHG

## 2025-04-25 DIAGNOSIS — J40 BRONCHITIS: Primary | ICD-10-CM

## 2025-04-25 LAB
2HR DELTA HS TROPONIN: -1 NG/L
ALBUMIN SERPL BCG-MCNC: 3.3 G/DL (ref 3.5–5)
ALP SERPL-CCNC: 50 U/L (ref 34–104)
ALT SERPL W P-5'-P-CCNC: 11 U/L (ref 7–52)
ANION GAP SERPL CALCULATED.3IONS-SCNC: 8 MMOL/L (ref 4–13)
ANISOCYTOSIS BLD QL SMEAR: PRESENT
AST SERPL W P-5'-P-CCNC: 8 U/L (ref 13–39)
ATRIAL RATE: 79 BPM
BASOPHILS # BLD MANUAL: 0.16 THOUSAND/UL (ref 0–0.1)
BASOPHILS NFR MAR MANUAL: 1 % (ref 0–1)
BILIRUB SERPL-MCNC: 0.26 MG/DL (ref 0.2–1)
BNP SERPL-MCNC: 87 PG/ML (ref 0–100)
BUN SERPL-MCNC: 23 MG/DL (ref 5–25)
CALCIUM ALBUM COR SERPL-MCNC: 9.3 MG/DL (ref 8.3–10.1)
CALCIUM SERPL-MCNC: 8.7 MG/DL (ref 8.4–10.2)
CARDIAC TROPONIN I PNL SERPL HS: 19 NG/L (ref ?–50)
CARDIAC TROPONIN I PNL SERPL HS: 20 NG/L (ref ?–50)
CHLORIDE SERPL-SCNC: 111 MMOL/L (ref 96–108)
CO2 SERPL-SCNC: 22 MMOL/L (ref 21–32)
CREAT SERPL-MCNC: 1.19 MG/DL (ref 0.6–1.3)
EOSINOPHIL # BLD MANUAL: 0 THOUSAND/UL (ref 0–0.4)
EOSINOPHIL NFR BLD MANUAL: 0 % (ref 0–6)
ERYTHROCYTE [DISTWIDTH] IN BLOOD BY AUTOMATED COUNT: 16.8 % (ref 11.6–15.1)
GFR SERPL CREATININE-BSD FRML MDRD: 43 ML/MIN/1.73SQ M
GLUCOSE SERPL-MCNC: 124 MG/DL (ref 65–140)
HCT VFR BLD AUTO: 34 % (ref 34.8–46.1)
HGB BLD-MCNC: 10.5 G/DL (ref 11.5–15.4)
LYMPHOCYTES # BLD AUTO: 0.65 THOUSAND/UL (ref 0.6–4.47)
LYMPHOCYTES # BLD AUTO: 4 % (ref 14–44)
MCH RBC QN AUTO: 24.9 PG (ref 26.8–34.3)
MCHC RBC AUTO-ENTMCNC: 30.9 G/DL (ref 31.4–37.4)
MCV RBC AUTO: 81 FL (ref 82–98)
MONOCYTES # BLD AUTO: 0.16 THOUSAND/UL (ref 0–1.22)
MONOCYTES NFR BLD: 1 % (ref 4–12)
NEUTROPHILS # BLD MANUAL: 15.25 THOUSAND/UL (ref 1.85–7.62)
NEUTS SEG NFR BLD AUTO: 94 % (ref 43–75)
P AXIS: 85 DEGREES
PLATELET # BLD AUTO: 314 THOUSANDS/UL (ref 149–390)
PLATELET BLD QL SMEAR: ADEQUATE
PMV BLD AUTO: 11.3 FL (ref 8.9–12.7)
POIKILOCYTOSIS BLD QL SMEAR: PRESENT
POTASSIUM SERPL-SCNC: 4.2 MMOL/L (ref 3.5–5.3)
PR INTERVAL: 110 MS
PROT SERPL-MCNC: 5.7 G/DL (ref 6.4–8.4)
QRS AXIS: -10 DEGREES
QRSD INTERVAL: 76 MS
QT INTERVAL: 352 MS
QTC INTERVAL: 403 MS
RBC # BLD AUTO: 4.21 MILLION/UL (ref 3.81–5.12)
RBC MORPH BLD: PRESENT
SODIUM SERPL-SCNC: 141 MMOL/L (ref 135–147)
T WAVE AXIS: -69 DEGREES
VENTRICULAR RATE: 79 BPM
WBC # BLD AUTO: 16.22 THOUSAND/UL (ref 4.31–10.16)

## 2025-04-25 PROCEDURE — 94644 CONT INHLJ TX 1ST HOUR: CPT

## 2025-04-25 PROCEDURE — 85027 COMPLETE CBC AUTOMATED: CPT

## 2025-04-25 PROCEDURE — 71046 X-RAY EXAM CHEST 2 VIEWS: CPT

## 2025-04-25 PROCEDURE — 99284 EMERGENCY DEPT VISIT MOD MDM: CPT | Performed by: EMERGENCY MEDICINE

## 2025-04-25 PROCEDURE — 96374 THER/PROPH/DIAG INJ IV PUSH: CPT

## 2025-04-25 PROCEDURE — 36415 COLL VENOUS BLD VENIPUNCTURE: CPT

## 2025-04-25 PROCEDURE — 80053 COMPREHEN METABOLIC PANEL: CPT

## 2025-04-25 PROCEDURE — 84484 ASSAY OF TROPONIN QUANT: CPT

## 2025-04-25 PROCEDURE — 83880 ASSAY OF NATRIURETIC PEPTIDE: CPT

## 2025-04-25 PROCEDURE — 99285 EMERGENCY DEPT VISIT HI MDM: CPT

## 2025-04-25 PROCEDURE — 94640 AIRWAY INHALATION TREATMENT: CPT

## 2025-04-25 PROCEDURE — 93005 ELECTROCARDIOGRAM TRACING: CPT

## 2025-04-25 PROCEDURE — 93010 ELECTROCARDIOGRAM REPORT: CPT | Performed by: INTERNAL MEDICINE

## 2025-04-25 PROCEDURE — 85007 BL SMEAR W/DIFF WBC COUNT: CPT

## 2025-04-25 RX ORDER — AZITHROMYCIN 250 MG/1
500 TABLET, FILM COATED ORAL ONCE
Status: COMPLETED | OUTPATIENT
Start: 2025-04-25 | End: 2025-04-25

## 2025-04-25 RX ORDER — AZITHROMYCIN 250 MG/1
TABLET, FILM COATED ORAL
Qty: 6 TABLET | Refills: 0 | Status: SHIPPED | OUTPATIENT
Start: 2025-04-25 | End: 2025-04-29

## 2025-04-25 RX ORDER — SODIUM CHLORIDE FOR INHALATION 0.9 %
12 VIAL, NEBULIZER (ML) INHALATION ONCE
Status: COMPLETED | OUTPATIENT
Start: 2025-04-25 | End: 2025-04-25

## 2025-04-25 RX ORDER — AZITHROMYCIN 250 MG/1
250 TABLET, FILM COATED ORAL EVERY 24 HOURS
Qty: 4 TABLET | Refills: 0 | Status: SHIPPED | OUTPATIENT
Start: 2025-04-26 | End: 2025-04-30

## 2025-04-25 RX ORDER — METHYLPREDNISOLONE SODIUM SUCCINATE 125 MG/2ML
60 INJECTION, POWDER, LYOPHILIZED, FOR SOLUTION INTRAMUSCULAR; INTRAVENOUS ONCE
Status: COMPLETED | OUTPATIENT
Start: 2025-04-25 | End: 2025-04-25

## 2025-04-25 RX ORDER — ALBUTEROL SULFATE 5 MG/ML
10 SOLUTION RESPIRATORY (INHALATION) ONCE
Status: COMPLETED | OUTPATIENT
Start: 2025-04-25 | End: 2025-04-25

## 2025-04-25 RX ORDER — OXYMETAZOLINE HYDROCHLORIDE 0.05 G/100ML
2 SPRAY NASAL ONCE
Status: COMPLETED | OUTPATIENT
Start: 2025-04-25 | End: 2025-04-25

## 2025-04-25 RX ADMIN — ALBUTEROL SULFATE 10 MG: 2.5 SOLUTION RESPIRATORY (INHALATION) at 12:54

## 2025-04-25 RX ADMIN — METHYLPREDNISOLONE SODIUM SUCCINATE 60 MG: 125 INJECTION, POWDER, FOR SOLUTION INTRAMUSCULAR; INTRAVENOUS at 12:56

## 2025-04-25 RX ADMIN — OXYMETAZOLINE HYDROCHLORIDE 2 SPRAY: 0.05 SPRAY NASAL at 12:54

## 2025-04-25 RX ADMIN — ISODIUM CHLORIDE 12 ML: 0.03 SOLUTION RESPIRATORY (INHALATION) at 12:54

## 2025-04-25 RX ADMIN — AZITHROMYCIN DIHYDRATE 500 MG: 250 TABLET ORAL at 15:59

## 2025-04-25 RX ADMIN — IPRATROPIUM BROMIDE 1 MG: 0.5 SOLUTION RESPIRATORY (INHALATION) at 12:54

## 2025-04-25 NOTE — TELEPHONE ENCOUNTER
Attempted to call patient back. No answer, left voicemail. Will attempt to call patient again in a little bit.

## 2025-04-25 NOTE — TELEPHONE ENCOUNTER
Spoke with patient. She reports nasal congestion and cough. She is currently taking Breztri 2 puffs twice daily, duoneb every 6 hours, flonase, singulair, allegra. She reports congestion is the worst and makes her feel like she can't breath. She is going to do saline rinse tonight. She ran out of azelastine so I have sent in a refill for her. In addition, she still needs to  hypertonic saline to help with her secretions. She is going to get that tomorrow. She understands if her symptoms get worse that she must go to the ED. She can also make an appointment to follow up with any provider for O2 titration if needed.

## 2025-04-25 NOTE — DISCHARGE INSTRUCTIONS
Your labs and test did not show anything concerning.  Your symptoms are likely related to your asthma in the setting of having a viral infection recently.  In case there is a bacterial infection, I will give you a short course of antibiotics which she should take as prescribed.  You should also continue using your home inhalers and nebulizer treatments.  You should follow-up with your primary care doctor in 3 to 5 days to ensure you are improving.  Return to the emergency room if you cannot catch your breath even at rest, if you have persistent fevers, or if you have severe chest pain.

## 2025-04-25 NOTE — ED ATTENDING ATTESTATION
4/25/2025  I, Keo Sierra MD, saw and evaluated the patient. I have discussed the patient with the resident/non-physician practitioner and agree with the resident's/non-physician practitioner's findings, Plan of Care, and MDM as documented in the resident's/non-physician practitioner's note, except where noted. All available labs and Radiology studies were reviewed.  I was present for key portions of any procedure(s) performed by the resident/non-physician practitioner and I was immediately available to provide assistance.       At this point I agree with the current assessment done in the Emergency Department.  I have conducted an independent evaluation of this patient a history and physical is as follows:    ED Course         Critical Care Time  Procedures    80 yo female with hx of asthma, ckd, htn, having 3 days of worsening sob, cough, ear aches and fullness. Pt dx with flu/covid two weeks ago.  Pt on steroid taper currently and was improving but now cough is worsening, congestion worsening.  Pt with chest tighntess on right side.  Pt taking nebulizers.  Vss, afebrile, hypertensive, tachy, bilateral ear effusions, nasal congestion, phayrngeal erythema, no lymphadenopathy, lungs with reduced air movement, wheezes bilaterally, rrr, abdomen soft nontender, no ankle swelling.  Cardiac workup, likely viral illness with asthma exacerbation.  Bnp, vogel neb, solumedrol.

## 2025-04-25 NOTE — TELEPHONE ENCOUNTER
Regarding: SOB  ----- Message from Tina AVILES sent at 4/25/2025  8:15 AM EDT -----  Patient complaining of worsening shortness of breath.

## 2025-04-25 NOTE — TELEPHONE ENCOUNTER
Pt called stating she was advised to call this AM to schedule appt to be seen today as per conversation with provider last night.     Reviewed 04/24/25 provider/patient conversation with patient. Pt states she needs to be seen to assess her oxygen level. Pt confirmed she is taking her prescribed PULM medications, has to pickup her azelastine today. Pt states she wakes up in the night SOB and needs to be seen as per the conversation with the provider last night. Advised pt there are  no appointment openings for today. Advised if she develops worsening breathing/SOB to call back or go to the ER. Advised I will notify the provider/leadership to see if there is anyway she can be seen today. Pt acknowledged instructions and had no further questions and/or concerns at this time.

## 2025-04-25 NOTE — ED PROVIDER NOTES
Time reflects when diagnosis was documented in both MDM as applicable and the Disposition within this note       Time User Action Codes Description Comment    4/25/2025  3:48 PM Guillermo Coleman Add [J40] Bronchitis           ED Disposition       ED Disposition   Discharge    Condition   Stable    Date/Time   Fri Apr 25, 2025  3:48 PM    Comment   Mireya Yi discharge to home/self care.                   Assessment & Plan       Medical Decision Making  79-year-old female with history of severe persistent asthma, hypertension, CKD, diastolic dysfunction echocardiogram presents to the emergency room with complaints of shortness of breath.  She says she was diagnosed with COVID and flu a couple weeks ago and was put on steroids and nebulizers and initially was improving, but over the past few days she has had increasing shortness of breath.  She has continued to feel very congested in her nose and ears.  She has been taking all of her medications as prescribed.  She says that it takes a long time to recover when she has a coughing fit and is worried that she will stop breathing.  She has been able to tolerate p.o. without issue.  Denies fevers, lightheadedness, dizziness, abdominal pain, nausea, vomiting, diarrhea, dysuria, hematuria, increased urinary frequency or urgency, decreased urine output, rashes, focal numbness or weakness.    Patient with normal vital signs on presentation.  Patient is overall well-appearing and not in any acute distress.  Alert and oriented x 3.  There are middle ear effusions bilaterally with no tympanic membrane bulging or erythema and normal canals and no pain with manipulation of the outer ear.  Conjunctivae are normal and there is no scleral icterus.  There is nasal congestion.  Posterior oropharynx reveals some irritation and postnasal drip but no exudates.  Mucous membranes are moist.  Neck is supple and without adenopathy.  Heart sounds are normal with regular rate and rhythm.   Lungs with diffuse and expiratory wheezing slightly worse on the left with somewhat decreased air movement with no rales or rhonchi.  Abdomen is soft and nondistended and with no tenderness to palpation.  Distal pulses are equal and intact.  There is no lower extremity edema.  Capillary refill is normal.  Skin has normal tone and is warm and dry and there are no rashes or jaundice.  Patient moves all extremities.    Presentation most consistent with bronchitis versus asthma exacerbation due to viral illness.  Pneumonia, new onset heart failure, ACS are not ruled out.  Will get cardiac workup with BNP and two-view chest x-ray and treat with high-dose inhaled bronchodilators, IV steroids.    Amount and/or Complexity of Data Reviewed  Labs: ordered. Decision-making details documented in ED Course.  Radiology: ordered.    Risk  OTC drugs.  Prescription drug management.        ED Course as of 04/26/25 0733   Fri Apr 25, 2025   1556 WBC(!): 16.22  Likely in setting of recent steroid use.   1556 Delta 2hr hsTnI: -1   1556 BNP: 87   1556 Workup is reassuring.  Will treat empirically with azithromycin and recommend primary care follow-up.  I did discuss this workup and plan with the patient.  She is feeling better after symptomatic treatment. Patient voiced understanding of the plan and all questions were answered. Strict return precautions given. Patient is hemodynamically stable and safe for discharge at this time.       Medications   albuterol inhalation solution 10 mg (10 mg Nebulization Given 4/25/25 1254)   ipratropium (ATROVENT) 0.02 % inhalation solution 1 mg (1 mg Nebulization Given 4/25/25 1254)   sodium chloride 0.9 % inhalation solution 12 mL (12 mL Nebulization Given 4/25/25 1254)   oxymetazoline (AFRIN) 0.05 % nasal spray 2 spray (2 sprays Each Nare Given 4/25/25 1254)   methylPREDNISolone sodium succinate (Solu-MEDROL) injection 60 mg (60 mg Intravenous Given 4/25/25 1256)   azithromycin (ZITHROMAX) tablet 500  mg (500 mg Oral Given 4/25/25 1949)       ED Risk Strat Scores                    (ISAR) Identification of Seniors at Risk  Before the illness or injury that brought you to the Emergency, did you need someone to help you on a regular basis?: 0  In the last 24 hours, have you needed more help than usual?: 0  Have you been hospitalized for one or more nights during the past 6 months?: 0  In general, do you see well?: 0  In general, do you have serious problems with your memory?: 0  Do you take more than three different medications every day?: 0  ISAR Score: 0            SBIRT 20yo+      Flowsheet Row Most Recent Value   Initial Alcohol Screen: US AUDIT-C     1. How often do you have a drink containing alcohol? 0 Filed at: 04/25/2025 1132   2. How many drinks containing alcohol do you have on a typical day you are drinking?  0 Filed at: 04/25/2025 1132   3a. Male UNDER 65: How often do you have five or more drinks on one occasion? 0 Filed at: 04/25/2025 1132   3b. FEMALE Any Age, or MALE 65+: How often do you have 4 or more drinks on one occassion? 0 Filed at: 04/25/2025 1132   Audit-C Score 0 Filed at: 04/25/2025 1132   PASTOR: How many times in the past year have you...    Used an illegal drug or used a prescription medication for non-medical reasons? Never Filed at: 04/25/2025 1132                            History of Present Illness       Chief Complaint   Patient presents with    URI     Pt had COVID and flu 2 weeks ago and is still having cough, congestion, and sore throat.       Past Medical History:   Diagnosis Date    Asthma     Asthmatic bronchitis     Last Assessed: 10/7/2014     Chronic diarrhea     Last Assessed: 8/20/2015     Fibromyalgia     Focal nodular hyperplasia of liver     Last Assessed: 6/11/2015    Herpes zoster     Last Assessed: 3/18/2016    Hypertension     IBS (irritable bowel syndrome)     Intermittent palpitations     Irritable bowel syndrome     Lumbar radiculopathy     Osteoarthritis      Vertigo       Past Surgical History:   Procedure Laterality Date    BREAST BIOPSY      BREAST LUMPECTOMY      when pt was 18    SMALL INTESTINE SURGERY        Family History   Problem Relation Age of Onset    Heart attack Mother     Asthma Father     Breast cancer Sister     Breast cancer Sister     No Known Problems Daughter     No Known Problems Daughter     Arthritis Family     Osteoporosis Family       Social History     Tobacco Use    Smoking status: Some Days     Types: Cigarettes    Smokeless tobacco: Never    Tobacco comments:     Hasn't been smoking recently because of the cough., about 8 days.   Vaping Use    Vaping status: Never Used   Substance Use Topics    Alcohol use: Not Currently    Drug use: No      E-Cigarette/Vaping    E-Cigarette Use Never User       E-Cigarette/Vaping Substances    Nicotine No     THC No     CBD No     Flavoring No     Other No     Unknown No       I have reviewed and agree with the history as documented.     HPI    Review of Systems        Objective       ED Triage Vitals [04/25/25 1130]   Temperature Pulse Blood Pressure Respirations SpO2 Patient Position - Orthostatic VS   98 °F (36.7 °C) (!) 108 (!) 201/103 20 96 % Sitting      Temp Source Heart Rate Source BP Location FiO2 (%) Pain Score    Temporal Monitor Right arm -- No Pain      Vitals      Date and Time Temp Pulse SpO2 Resp BP Pain Score FACES Pain Rating User   04/25/25 1600 -- 98 94 % 18 115/74 -- -- CR   04/25/25 1130 98 °F (36.7 °C) 108 96 % 20 201/103 No Pain -- KRR            Physical Exam    Results Reviewed       Procedure Component Value Units Date/Time    HS Troponin I 2hr [[id number]]  (Normal) Collected: 04/25/25 1501    Lab Status: Final result Specimen: Blood from Arm, Left Updated: 04/25/25 1536     hs TnI 2hr 19 ng/L      Delta 2hr hsTnI -1 ng/L     RBC Morphology Reflex Test [[id number]] Collected: 04/25/25 1257    Lab Status: Final result Specimen: Blood from Arm, Left Updated: 04/25/25 1349    CBC and  differential [[id number]]  (Abnormal) Collected: 04/25/25 1257    Lab Status: Final result Specimen: Blood from Arm, Left Updated: 04/25/25 1346     WBC 16.22 Thousand/uL      RBC 4.21 Million/uL      Hemoglobin 10.5 g/dL      Hematocrit 34.0 %      MCV 81 fL      MCH 24.9 pg      MCHC 30.9 g/dL      RDW 16.8 %      MPV 11.3 fL      Platelets 314 Thousands/uL     Narrative:      This is an appended report.  These results have been appended to a previously verified report.    Manual Differential(PHLEBS Do Not Order) [[id number]]  (Abnormal) Collected: 04/25/25 1257    Lab Status: Final result Specimen: Blood from Arm, Left Updated: 04/25/25 1346     Segmented % 94 %      Lymphocytes % 4 %      Monocytes % 1 %      Eosinophils % 0 %      Basophils % 1 %      Absolute Neutrophils 15.25 Thousand/uL      Absolute Lymphocytes 0.65 Thousand/uL      Absolute Monocytes 0.16 Thousand/uL      Absolute Eosinophils 0.00 Thousand/uL      Absolute Basophils 0.16 Thousand/uL      Total Counted --     RBC Morphology Present     Platelet Estimate Adequate     Anisocytosis Present     Poikilocytes Present    B-Type Natriuretic Peptide(BNP) [[id number]]  (Normal) Collected: 04/25/25 1257    Lab Status: Final result Specimen: Blood from Arm, Left Updated: 04/25/25 1338     BNP 87 pg/mL     Comprehensive metabolic panel [[id number]]  (Abnormal) Collected: 04/25/25 1257    Lab Status: Final result Specimen: Blood from Arm, Left Updated: 04/25/25 1330     Sodium 141 mmol/L      Potassium 4.2 mmol/L      Chloride 111 mmol/L      CO2 22 mmol/L      ANION GAP 8 mmol/L      BUN 23 mg/dL      Creatinine 1.19 mg/dL      Glucose 124 mg/dL      Calcium 8.7 mg/dL      Corrected Calcium 9.3 mg/dL      AST 8 U/L      ALT 11 U/L      Alkaline Phosphatase 50 U/L      Total Protein 5.7 g/dL      Albumin 3.3 g/dL      Total Bilirubin 0.26 mg/dL      eGFR 43 ml/min/1.73sq m     Narrative:      [employer] guidelines for Chronic Kidney  Disease (CKD):     Stage 1 with normal or high GFR (GFR > 90 mL/min/1.73 square meters)    Stage 2 Mild CKD (GFR = 60-89 mL/min/1.73 square meters)    Stage 3A Moderate CKD (GFR = 45-59 mL/min/1.73 square meters)    Stage 3B Moderate CKD (GFR = 30-44 mL/min/1.73 square meters)    Stage 4 Severe CKD (GFR = 15-29 mL/min/1.73 square meters)    Stage 5 End Stage CKD (GFR <15 mL/min/1.73 square meters)  Note: GFR calculation is accurate only with a steady state creatinine    HS Troponin 0hr (reflex protocol) [[id number]]  (Normal) Collected: 04/25/25 1257    Lab Status: Final result Specimen: Blood from Arm, Left Updated: 04/25/25 1328     hs TnI 0hr 20 ng/L             X-ray chest 2 views   Final Interpretation by Suman Shirley MD (04/25 1530)      No acute cardiopulmonary disease.               Resident: Guillermo Julio I, the attending radiologist, have reviewed the images and agree with the final report above.      Workstation performed: [id number]             Procedures    ED Medication and Procedure Management   Prior to Admission Medications   Prescriptions Last Dose Informant Patient Reported? Taking?   Blood Pressure Monitoring (B-D ASSURE BPM/AUTO ARM CUFF) MISC  Self No No   Sig: Use in the morning   Budeson-Glycopyrrol-Formoterol (Breztri Aerosphere) 160-9-4.8 MCG/ACT AERO   No No   Sig: Inhale 2 puffs 2 (two) times a day Rinse mouth after use.   Cholecalciferol (D3-1000) 1,000 units tablet  Self No No   Sig: Take 1 tablet (1,000 Units total) by mouth daily   Patient not taking: Reported on 4/22/2025   acetaminophen (TYLENOL) 500 mg tablet  Self No No   Sig: Take 1 tablet (500 mg total) by mouth every 6 (six) hours as needed for mild pain   acetylcysteine (MUCOMYST) 10 % nebulizer solution  Self No No   Sig: Take 4 mL by nebulization every 4 (four) hours   Patient not taking: Reported on 4/22/2025   albuterol (2.5 mg/3 mL) 0.083 % nebulizer solution   No No   Sig: Take 3 mL (2.5 mg total) by  nebulization every 6 (six) hours as needed for wheezing or shortness of breath   albuterol (PROVENTIL HFA,VENTOLIN HFA) 90 mcg/act inhaler  Self No No   Sig: Inhale 2 puffs every 6 (six) hours as needed for wheezing or shortness of breath   azelastine (ASTELIN) 0.1 % nasal spray   No No   Si spray into each nostril 2 (two) times a day Use in each nostril as directed   benzonatate (TESSALON PERLES) 100 mg capsule  Self No No   Sig: Take 1 capsule (100 mg total) by mouth 3 (three) times a day as needed for cough   fexofenadine (ALLEGRA) 180 MG tablet  Self Yes No   Sig: Take 180 mg by mouth daily   fluticasone (FLONASE) 50 mcg/act nasal spray  Self No No   Sig: SPRAY 2 SPRAYS INTO EACH NOSTRIL EVERY DAY   hydrOXYzine HCL (ATARAX) 10 mg tablet  Self No No   Sig: Take 1 tablet (10 mg total) by mouth daily at bedtime as needed for anxiety for up to 10 doses   ipratropium-albuterol (DUO-NEB) 0.5-2.5 mg/3 mL nebulizer solution  Self No No   Sig: Take 3 mL by nebulization 4 (four) times a day   lisinopril (ZESTRIL) 20 mg tablet  Self No No   Sig: Take 1 tablet (20 mg total) by mouth daily Do not start before 2025.   montelukast (SINGULAIR) 10 mg tablet  Self No No   Sig: Take 1 tablet (10 mg total) by mouth daily at bedtime Do not start before 2025.   nicotine (NICODERM CQ) 7 mg/24hr TD 24 hr patch   No No   Sig: Place 1 patch on the skin over 24 hours every 24 hours   ondansetron (ZOFRAN) 4 mg tablet  Self No No   Sig: Take 1 tablet (4 mg total) by mouth every 8 (eight) hours as needed for nausea or vomiting   ondansetron (Zofran ODT) 4 mg disintegrating tablet  Self No No   Sig: Take 1 tablet (4 mg total) by mouth every 6 (six) hours as needed for nausea or vomiting   pantoprazole (PROTONIX) 20 mg tablet   No No   Sig: TAKE 1 TABLET BY MOUTH EVERY DAY   predniSONE 20 mg tablet   No No   Sig: Take 3 tablets (60 mg total) by mouth daily for 3 days, THEN 2.5 tablets (50 mg total) daily for 3 days, THEN  2 tablets (40 mg total) daily for 3 days, THEN 1.5 tablets (30 mg total) daily for 3 days, THEN 1 tablet (20 mg total) daily for 3 days, THEN 0.5 tablets (10 mg total) daily for 3 days.   pregabalin (LYRICA) 50 mg capsule  Self No No   Sig: Take 1 PO HS x 5 days, then 1 PO BID x 5 days, then 1 PO AM and 2 PO HS   Patient not taking: Reported on 4/22/2025   sodium chloride 3 % inhalation solution   No No   Sig: Take 4 mL by nebulization every 8 (eight) hours as needed for cough (Use with albuterol nebulizer)   Patient not taking: Reported on 4/22/2025      Facility-Administered Medications: None     Discharge Medication List as of 4/25/2025  3:56 PM        START taking these medications    Details   azithromycin (ZITHROMAX) 250 mg tablet Take 1 tablet (250 mg total) by mouth every 24 hours for 4 days Take 2 tablets today then 1 tablet daily x 4 days Do not start before April 26, 2025., Starting Sat 4/26/2025, Until Wed 4/30/2025, Normal           CONTINUE these medications which have NOT CHANGED    Details   acetaminophen (TYLENOL) 500 mg tablet Take 1 tablet (500 mg total) by mouth every 6 (six) hours as needed for mild pain, Starting Mon 8/27/2018, Normal      acetylcysteine (MUCOMYST) 10 % nebulizer solution Take 4 mL by nebulization every 4 (four) hours, Starting Thu 2/13/2025, Normal      albuterol (2.5 mg/3 mL) 0.083 % nebulizer solution Take 3 mL (2.5 mg total) by nebulization every 6 (six) hours as needed for wheezing or shortness of breath, Starting Tue 4/15/2025, Until Mon 7/14/2025 at 2359, Normal      albuterol (PROVENTIL HFA,VENTOLIN HFA) 90 mcg/act inhaler Inhale 2 puffs every 6 (six) hours as needed for wheezing or shortness of breath, Starting Wed 5/15/2024, Normal      azelastine (ASTELIN) 0.1 % nasal spray 1 spray into each nostril 2 (two) times a day Use in each nostril as directed, Starting Thu 4/24/2025, Normal      benzonatate (TESSALON PERLES) 100 mg capsule Take 1 capsule (100 mg total) by  mouth 3 (three) times a day as needed for cough, Starting Wed 4/9/2025, Normal      Blood Pressure Monitoring (B-D ASSURE BPM/AUTO ARM CUFF) MISC Use in the morning, Starting Mon 4/14/2025, Normal      Budeson-Glycopyrrol-Formoterol (Breztri Aerosphere) 160-9-4.8 MCG/ACT AERO Inhale 2 puffs 2 (two) times a day Rinse mouth after use., Starting Tue 4/15/2025, Normal      Cholecalciferol (D3-1000) 1,000 units tablet Take 1 tablet (1,000 Units total) by mouth daily, Starting Mon 4/14/2025, Normal      fexofenadine (ALLEGRA) 180 MG tablet Take 180 mg by mouth daily, Historical Med      fluticasone (FLONASE) 50 mcg/act nasal spray SPRAY 2 SPRAYS INTO EACH NOSTRIL EVERY DAY, Normal      hydrOXYzine HCL (ATARAX) 10 mg tablet Take 1 tablet (10 mg total) by mouth daily at bedtime as needed for anxiety for up to 10 doses, Starting Fri 1/24/2025, Normal      ipratropium-albuterol (DUO-NEB) 0.5-2.5 mg/3 mL nebulizer solution Take 3 mL by nebulization 4 (four) times a day, Starting Thu 2/6/2025, Normal      lisinopril (ZESTRIL) 20 mg tablet Take 1 tablet (20 mg total) by mouth daily Do not start before April 8, 2025., Starting Tue 4/8/2025, Normal      montelukast (SINGULAIR) 10 mg tablet Take 1 tablet (10 mg total) by mouth daily at bedtime Do not start before April 8, 2025., Starting Tue 4/8/2025, Normal      nicotine (NICODERM CQ) 7 mg/24hr TD 24 hr patch Place 1 patch on the skin over 24 hours every 24 hours, Starting Tue 4/15/2025, Normal      ondansetron (Zofran ODT) 4 mg disintegrating tablet Take 1 tablet (4 mg total) by mouth every 6 (six) hours as needed for nausea or vomiting, Starting Mon 12/2/2024, Normal      ondansetron (ZOFRAN) 4 mg tablet Take 1 tablet (4 mg total) by mouth every 8 (eight) hours as needed for nausea or vomiting, Starting Thu 2/6/2025, Normal      pantoprazole (PROTONIX) 20 mg tablet TAKE 1 TABLET BY MOUTH EVERY DAY, Starting Thu 4/17/2025, Normal      predniSONE 20 mg tablet Multiple  Dosages:Starting Tue 4/15/2025, Until Thu 4/17/2025 at 2359, THEN Starting Fri 4/18/2025, Until Sun 4/20/2025 at 2359, THEN Starting Mon 4/21/2025, Until Wed 4/23/2025 at 2359, THEN Starting Thu 4/24/2025, Until Sat 4/26/2025 at 2359, THEN  Starting Sun 4/27/2025, Until Tue 4/29/2025 at 2359, THEN Starting Wed 4/30/2025, Until Fri 5/2/2025 at 2359Take 3 tablets (60 mg total) by mouth daily for 3 days, THEN 2.5 tablets (50 mg total) daily for 3 days, THEN 2 tablets (40 mg total) daily for  3 days, THEN 1.5 tablets (30 mg total) daily for 3 days, THEN 1 tablet (20 mg total) daily for 3 days, THEN 0.5 tablets (10 mg total) daily for 3 days., Normal      pregabalin (LYRICA) 50 mg capsule Take 1 PO HS x 5 days, then 1 PO BID x 5 days, then 1 PO AM and 2 PO HS, Normal      sodium chloride 3 % inhalation solution Take 4 mL by nebulization every 8 (eight) hours as needed for cough (Use with albuterol nebulizer), Starting Tue 4/22/2025, Normal           No discharge procedures on file.  ED SEPSIS DOCUMENTATION   Time reflects when diagnosis was documented in both MDM as applicable and the Disposition within this note       Time User Action Codes Description Comment    4/25/2025  3:48 PM Guillermo Coleman Add [J40] Bronchitis                  Guillermo Coleman DO  04/26/25 0733

## 2025-04-28 ENCOUNTER — HOSPITAL ENCOUNTER (OUTPATIENT)
Dept: RADIOLOGY | Facility: HOSPITAL | Age: 80
Discharge: HOME | End: 2025-04-28
Payer: MEDICARE

## 2025-04-28 DIAGNOSIS — M76.72 PERONEAL TENDINITIS OF LEFT LOWER EXTREMITY: ICD-10-CM

## 2025-04-28 PROCEDURE — 73721 MRI JNT OF LWR EXTRE W/O DYE: CPT

## 2025-04-30 ENCOUNTER — OFFICE VISIT (OUTPATIENT)
Dept: OBGYN CLINIC | Facility: HOSPITAL | Age: 80
End: 2025-04-30
Payer: MEDICARE

## 2025-04-30 VITALS — WEIGHT: 131 LBS | BODY MASS INDEX: 24.11 KG/M2 | HEIGHT: 62 IN

## 2025-04-30 DIAGNOSIS — M54.16 LUMBAR RADICULOPATHY: Primary | ICD-10-CM

## 2025-04-30 DIAGNOSIS — M21.42 PES PLANUS OF BOTH FEET: ICD-10-CM

## 2025-04-30 DIAGNOSIS — M25.572 ACUTE LEFT ANKLE PAIN: ICD-10-CM

## 2025-04-30 DIAGNOSIS — R60.0 LOWER EXTREMITY EDEMA: ICD-10-CM

## 2025-04-30 DIAGNOSIS — S93.409A SPRAIN OF ANKLE, INITIAL ENCOUNTER: ICD-10-CM

## 2025-04-30 DIAGNOSIS — M21.41 PES PLANUS OF BOTH FEET: ICD-10-CM

## 2025-04-30 DIAGNOSIS — M25.472 LEFT ANKLE SWELLING: ICD-10-CM

## 2025-04-30 PROCEDURE — 99213 OFFICE O/P EST LOW 20 MIN: CPT | Performed by: PHYSICIAN ASSISTANT

## 2025-04-30 NOTE — ASSESSMENT & PLAN NOTE
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 Lachman in future pending symptoms  Follow up in 6 weeks    Orders:    Brace

## 2025-04-30 NOTE — PROGRESS NOTES
Name: Mireya Yi      : 1945       MRN: [id number]   Encounter Provider: Maycol Patel PA-C   Encounter Date: 25  Encounter department: 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 Lachman in future pending symptoms  Follow up in 6 weeks    Orders:    Brace    Lumbar radiculopathy  See other diagnoses          Pes planus of both feet  See other diagnoses     Orders:    Brace    Acute left ankle pain  See other diagnoses     Orders:    Brace    Lower extremity edema  Patient referred to her PCP to discus LE edema       Sprain of ankle, initial encounter    Orders:    Brace         To do next visit:  Recheck  Consider referral to Dr.Lachman    _____________________________________________________  CHIEF COMPLAINT:  Chief Complaint   Patient presents with    Left Ankle - Follow-up         SUBJECTIVE:  Mireya Yi is a 79 y.o. female who presents for follow up of left ankle with MRI review.  Patient has a known history of a avulsion fracture of the distal fibula years ago.  She has worked with  for lumbar and has plans to see Pain Management.  She feels overall the same.  Today she complains of low back pain with left posterolateral thigh, calf and anterior and lateral ankle pain with numbness including foot.  She states her symptoms are  constant.  The ankle can cramp on occasion.   She has attempted physical therapy for ankle with limited benefit.  She does use ASA for pain.            PAST MEDICAL HISTORY:  Past Medical History:   Diagnosis Date    Asthma     Asthmatic bronchitis     Last Assessed: 10/7/2014     Chronic diarrhea     Last Assessed: 8/20/2015     Fibromyalgia     Focal nodular hyperplasia of liver     Last Assessed: 6/11/2015    Herpes zoster     Last Assessed: 3/18/2016    Hypertension     IBS (irritable bowel syndrome)     Intermittent palpitations     Irritable bowel syndrome     Lumbar radiculopathy     Osteoarthritis     Vertigo        PAST SURGICAL HISTORY:  Past Surgical History:   Procedure Laterality Date    BREAST BIOPSY      BREAST LUMPECTOMY      when pt was 18    SMALL INTESTINE SURGERY         FAMILY HISTORY:  Family History   Problem Relation Age of Onset    Heart attack Mother     Asthma Father     Breast cancer Sister     Breast cancer Sister     No Known Problems Daughter     No Known Problems Daughter     Arthritis Family     Osteoporosis Family        SOCIAL HISTORY:  Social History     Tobacco Use    Smoking status: Some Days     Types: Cigarettes    Smokeless tobacco: Never    Tobacco comments:     Hasn't been smoking recently because of the cough., about 8 days.   Vaping Use    Vaping status: Never Used   Substance Use Topics    Alcohol use: Not Currently    Drug use: No       MEDICATIONS:    Current Outpatient Medications:     acetaminophen (TYLENOL) 500 mg tablet, Take 1 tablet (500 mg total) by mouth every 6 (six) hours as needed for mild pain, Disp: 60 tablet, Rfl: 0    albuterol (2.5 mg/3 mL) 0.083 % nebulizer solution, Take 3 mL (2.5 mg total) by nebulization every 6 (six) hours as needed for wheezing or shortness of breath, Disp: 60 mL, Rfl: 0    albuterol (PROVENTIL HFA,VENTOLIN HFA) 90 mcg/act inhaler, Inhale 2 puffs every 6 (six) hours as needed for wheezing or shortness of breath, Disp: 18 g, Rfl:  5    azelastine (ASTELIN) 0.1 % nasal spray, 1 spray into each nostril 2 (two) times a day Use in each nostril as directed, Disp: 1 mL, Rfl: 0    azithromycin (ZITHROMAX) 250 mg tablet, Take 1 tablet (250 mg total) by mouth every 24 hours for 4 days Take 2 tablets today then 1 tablet daily x 4 days Do not start before April 26, 2025., Disp: 4 tablet, Rfl: 0    benzonatate (TESSALON PERLES) 100 mg capsule, Take 1 capsule (100 mg total) by mouth 3 (three) times a day as needed for cough, Disp: 30 capsule, Rfl: 0    Blood Pressure Monitoring (B-D ASSURE BPM/AUTO ARM CUFF) MISC, Use in the morning, Disp: 1 each, Rfl: 0    Budeson-Glycopyrrol-Formoterol (Breztri Aerosphere) 160-9-4.8 MCG/ACT AERO, Inhale 2 puffs 2 (two) times a day Rinse mouth after use., Disp: 10.7 g, Rfl: 3    fexofenadine (ALLEGRA) 180 MG tablet, Take 180 mg by mouth daily, Disp: , Rfl:     fluticasone (FLONASE) 50 mcg/act nasal spray, SPRAY 2 SPRAYS INTO EACH NOSTRIL EVERY DAY, Disp: 48 mL, Rfl: 2    hydrOXYzine HCL (ATARAX) 10 mg tablet, Take 1 tablet (10 mg total) by mouth daily at bedtime as needed for anxiety for up to 10 doses, Disp: 10 tablet, Rfl: 0    ipratropium-albuterol (DUO-NEB) 0.5-2.5 mg/3 mL nebulizer solution, Take 3 mL by nebulization 4 (four) times a day, Disp: 360 mL, Rfl: 2    lisinopril (ZESTRIL) 20 mg tablet, Take 1 tablet (20 mg total) by mouth daily Do not start before April 8, 2025., Disp: 90 tablet, Rfl: 0    montelukast (SINGULAIR) 10 mg tablet, Take 1 tablet (10 mg total) by mouth daily at bedtime Do not start before April 8, 2025., Disp: 90 tablet, Rfl: 0    nicotine (NICODERM CQ) 7 mg/24hr TD 24 hr patch, Place 1 patch on the skin over 24 hours every 24 hours, Disp: 28 patch, Rfl: 0    ondansetron (Zofran ODT) 4 mg disintegrating tablet, Take 1 tablet (4 mg total) by mouth every 6 (six) hours as needed for nausea or vomiting, Disp: 20 tablet, Rfl: 3    pantoprazole (PROTONIX) 20 mg tablet, TAKE 1 TABLET BY MOUTH EVERY  "DAY, Disp: 90 tablet, Rfl: 1    predniSONE 20 mg tablet, Take 3 tablets (60 mg total) by mouth daily for 3 days, THEN 2.5 tablets (50 mg total) daily for 3 days, THEN 2 tablets (40 mg total) daily for 3 days, THEN 1.5 tablets (30 mg total) daily for 3 days, THEN 1 tablet (20 mg total) daily for 3 days, THEN 0.5 tablets (10 mg total) daily for 3 days., Disp: 32 tablet, Rfl: 0    acetylcysteine (MUCOMYST) 10 % nebulizer solution, Take 4 mL by nebulization every 4 (four) hours (Patient not taking: Reported on 4/22/2025), Disp: 30 mL, Rfl: 0    Cholecalciferol (D3-1000) 1,000 units tablet, Take 1 tablet (1,000 Units total) by mouth daily (Patient not taking: Reported on 4/22/2025), Disp: 90 tablet, Rfl: 1    ondansetron (ZOFRAN) 4 mg tablet, Take 1 tablet (4 mg total) by mouth every 8 (eight) hours as needed for nausea or vomiting (Patient not taking: Reported on 4/30/2025), Disp: 30 tablet, Rfl: 0    pregabalin (LYRICA) 50 mg capsule, Take 1 PO HS x 5 days, then 1 PO BID x 5 days, then 1 PO AM and 2 PO HS (Patient not taking: Reported on 4/22/2025), Disp: 90 capsule, Rfl: 2    sodium chloride 3 % inhalation solution, Take 4 mL by nebulization every 8 (eight) hours as needed for cough (Use with albuterol nebulizer) (Patient not taking: Reported on 4/30/2025), Disp: 30 mL, Rfl: 0    ALLERGIES:  Allergies   Allergen Reactions    Ambrosia Artemisiifolia (Ragweed) Skin Test Facial Swelling    Codeine Other (See Comments)     Heart races    Epinephrine      Pt reports \"it makes my heart race, they told me I cant take it.\"    Ibuprofen Other (See Comments)     Heart racing    Morphine Other (See Comments)     Heart racing    Pollen Extract Sneezing    Tramadol Other (See Comments)     Heart racing       LABS:  HgA1c:   Lab Results   Component Value Date    HGBA1C 6.6 (H) 04/07/2025     BMP:   Lab Results   Component Value Date    GLUCOSE 92 05/19/2015    CALCIUM 8.7 04/25/2025     05/19/2015    K 4.2 04/25/2025    CO2 " "22 04/25/2025     (H) 04/25/2025    BUN 23 04/25/2025    CREATININE 1.19 04/25/2025     CBC: No components found for: \"CBC\"    _____________________________________________________  PHYSICAL EXAMINATION:  Vital signs: Ht 5' 1.5\" (1.562 m)   Wt 59.4 kg (131 lb)   BMI 24.35 kg/m²   General: No acute distress, awake and alert  Psychiatric: Mood and affect appear appropriate  HEENT: Trachea Midline, No torticollis, no apparent facial trauma  Cardiovascular: No audible murmurs; Extremities appear perfused  Pulmonary: No audible wheezing or stridor  Skin: No open lesions; see further details (if any) below    MUSCULOSKELETAL EXAMINATION:  Left ankle examination :  Patient sitting comfortably in the office in no apparent distress   Pes planus   2+ left and 1+ right LE edema  Modest swelling over anterior and lateral ankle   TTP over peroneal tendons  General discomfort over anterior joint line and ATFL  Extremity perfused    _____________________________________________________  STUDIES REVIEWED:  I personally reviewed the images obtained in office today and my independent interpretation is as follows:    Left ankle MRI of 4/28/2025:  No acute internal derangement. Unremarkable appearance of the peroneal tendons.  Chronic partial tear of the anterior talofibular ligament.          PROCEDURES PERFORMED:  No procedures were performed at this time.                         Portions of the record may have been created with voice recognition software.  Occasional wrong word or \"sound a like\" substitutions may have occurred due to the inherent limitations of voice recognition software.  Read the chart carefully and recognize, using context, where substitutions have occurred.          "

## 2025-04-30 NOTE — PATIENT INSTRUCTIONS
Current symptoms of low back pain with radiating pain and numbness including foot with focal left anterior and lateral ankle pain, swelling and cramping  Left ankle MRI reveals chronic ATFL tear  Left ankle brace provided today  Patient declines physical therapy and states this does not help  Consider lackman evaluation if symptoms persist  Patient encouraged to acquire arch support shoes  List of local shoe stores provided  Patient referred to PCP for LE edema  Patient encouraged to attend Pain Management evaluation for left LE radiating symptoms  Follow up in 6 weeks    [employer], [employer], [employer] are good brands but I recommend going to a dedicate shoe store (not [employer] or [employer].) At these types of stores, they have experts that can fit you for shoes appropriate for your foot problem. Shoe choice is essential to solving/improving most types of foot pain.  Even after a surgery, good shoes are necessary to keep the foot as comfortable as possible.    [employer]  [address] [address]  [phone]    [facility]  [address], [place] [address]  [phone]    [employer]  [address] [place] [address]  [phone]    [employer]   [address]  [phone]    [employer]  [address], [place], [place] [address] [phone]    [employer]  [address] [place], [place] [id number] [phone]    [facility] [employer]   [address] [place] [address]  [phone]    [employer]  [address] [place], [place]  [phone]    [employer]  [address], [address]  [phone]    [employer]  [address] [place] [address], [address], [place] [address] [phone]

## 2025-05-01 ENCOUNTER — HOSPITAL ENCOUNTER (OUTPATIENT)
Dept: RADIOLOGY | Facility: HOSPITAL | Age: 80
Discharge: HOME | End: 2025-05-01
Payer: MEDICARE

## 2025-05-01 DIAGNOSIS — R93.89 ABNORMAL FINDING PRESENT ON DIAGNOSTIC IMAGING OF UTERUS: ICD-10-CM

## 2025-05-01 PROCEDURE — 76830 TRANSVAGINAL US NON-OB: CPT

## 2025-05-01 PROCEDURE — 76856 US EXAM PELVIC COMPLETE: CPT

## 2025-05-07 PROBLEM — J10.1 INFLUENZA B: Status: RESOLVED | Noted: 2025-04-07 | Resolved: 2025-05-07

## 2025-05-08 ENCOUNTER — TELEPHONE (OUTPATIENT)
Dept: OTHER | Facility: HOSPITAL | Age: 80
End: 2025-05-08

## 2025-05-08 NOTE — TELEPHONE ENCOUNTER
Patient called to discuss results of Pelvic Ultrasound. Called home and mobile number with no answer. LVM. Will also relate results to PCP for appointment already scheduled on 5/12/25 to discuss need for endometrial biopsy.

## 2025-05-12 ENCOUNTER — OFFICE VISIT (OUTPATIENT)
Dept: FAMILY MEDICINE CLINIC | Facility: CLINIC | Age: 80
End: 2025-05-12

## 2025-05-12 ENCOUNTER — TELEPHONE (OUTPATIENT)
Dept: FAMILY MEDICINE CLINIC | Facility: CLINIC | Age: 80
End: 2025-05-12

## 2025-05-12 VITALS
TEMPERATURE: 97.8 F | SYSTOLIC BLOOD PRESSURE: 185 MMHG | WEIGHT: 133 LBS | HEART RATE: 96 BPM | OXYGEN SATURATION: 96 % | DIASTOLIC BLOOD PRESSURE: 102 MMHG | BODY MASS INDEX: 24.48 KG/M2 | HEIGHT: 62 IN

## 2025-05-12 DIAGNOSIS — I10 ESSENTIAL HYPERTENSION: ICD-10-CM

## 2025-05-12 DIAGNOSIS — M79.89 LEFT LEG SWELLING: ICD-10-CM

## 2025-05-12 DIAGNOSIS — J45.51 SEVERE PERSISTENT ASTHMA WITH ACUTE EXACERBATION: ICD-10-CM

## 2025-05-12 DIAGNOSIS — T17.500A MUCUS PLUGGING OF BRONCHI: ICD-10-CM

## 2025-05-12 DIAGNOSIS — R09.81 NASAL CONGESTION: ICD-10-CM

## 2025-05-12 DIAGNOSIS — R93.5 ABNORMAL ULTRASOUND OF ENDOMETRIUM: Primary | ICD-10-CM

## 2025-05-12 PROCEDURE — 3077F SYST BP >= 140 MM HG: CPT | Performed by: FAMILY MEDICINE

## 2025-05-12 PROCEDURE — 3080F DIAST BP >= 90 MM HG: CPT | Performed by: FAMILY MEDICINE

## 2025-05-12 PROCEDURE — 99213 OFFICE O/P EST LOW 20 MIN: CPT | Performed by: FAMILY MEDICINE

## 2025-05-12 PROCEDURE — G2211 COMPLEX E/M VISIT ADD ON: HCPCS | Performed by: FAMILY MEDICINE

## 2025-05-12 RX ORDER — AZELASTINE 1 MG/ML
1 SPRAY, METERED NASAL 2 TIMES DAILY
Qty: 1 ML | Refills: 0 | Status: SHIPPED | OUTPATIENT
Start: 2025-05-12

## 2025-05-12 RX ORDER — SODIUM CHLORIDE FOR INHALATION 3 %
4 VIAL, NEBULIZER (ML) INHALATION EVERY 8 HOURS PRN
Qty: 30 ML | Refills: 0 | Status: SHIPPED | OUTPATIENT
Start: 2025-05-12

## 2025-05-12 RX ORDER — LISINOPRIL 40 MG/1
40 TABLET ORAL DAILY
Qty: 30 TABLET | Refills: 0 | Status: SHIPPED | OUTPATIENT
Start: 2025-05-12

## 2025-05-12 NOTE — ASSESSMENT & PLAN NOTE
Continue left leg swelling, seems to be focused around the lateral malleoli of the left foot.  She does have a locking sensation if she inverts her foot forcefully.  I suspect that this is more so related to her previous injury on this foot rather than any medications that she is on.  We will increase her lisinopril to 40 mg daily and recheck her blood pressure.  Orders:    lisinopril (ZESTRIL) 40 mg tablet; Take 1 tablet (40 mg total) by mouth daily

## 2025-05-12 NOTE — ASSESSMENT & PLAN NOTE
Uncontrolled, increase lisinopril to 40 mg daily and recheck blood pressure in 1 to 2 weeks  Orders:    lisinopril (ZESTRIL) 40 mg tablet; Take 1 tablet (40 mg total) by mouth daily

## 2025-05-12 NOTE — TELEPHONE ENCOUNTER
Upon checkout, patient needs an appointment for:    Endometrial biopsy within 2 wks and BP recheck at same appt with Dr. Saleem

## 2025-05-12 NOTE — PROGRESS NOTES
Name: Mireya Yi      : 1945      MRN: [id number]  Encounter Provider: Jocelyn Carlson DO  Encounter Date: 2025   Encounter department: [facility] [place]  :  Assessment & Plan  Abnormal ultrasound of endometrium  CT CAP  showed a distended endometrium measuring up to 10 mm. Correlation with nonemergent pelvic ultrasound recommended to exclude neoplasm.   Pelvic US on 25: 1.  Endometrial echo complex is thickened for a postmenopausal patient measuring 11 mm in AP caliber with scattered cystic changes. Differential considerations include endometrial hyperplasia as well as endometrial neoplasm. OB/GYN evaluation with   endometrial sampling is recommended.  2.  Multiple uterine fibroids. The largest is exophytic within the posterior cul-de-sac/left pelvis measuring 6.9 cm which corresponds to finding on prior CT.  3.  Unremarkable sonographic appearance of the ovaries.    Plan:  Discussed the above results with patient today, recommend endometrial biopsy which can be performed in our office by Dr. Saleem.  Recommended the patient schedule this on checkout at earliest convenience.         Left leg swelling  Continue left leg swelling, seems to be focused around the lateral malleoli of the left foot.  She does have a locking sensation if she inverts her foot forcefully.  I suspect that this is more so related to her previous injury on this foot rather than any medications that she is on.  We will increase her lisinopril to 40 mg daily and recheck her blood pressure.  Orders:    lisinopril (ZESTRIL) 40 mg tablet; Take 1 tablet (40 mg total) by mouth daily    Essential hypertension  Uncontrolled, increase lisinopril to 40 mg daily and recheck blood pressure in 1 to 2 weeks  Orders:    lisinopril (ZESTRIL) 40 mg tablet; Take 1 tablet (40 mg total) by mouth daily    Mucus plugging of bronchi    Orders:    sodium chloride 3 % inhalation solution; Take 4 mL by nebulization  every 8 (eight) hours as needed for cough (Use with albuterol nebulizer)    Severe persistent asthma with acute exacerbation    Orders:    sodium chloride 3 % inhalation solution; Take 4 mL by nebulization every 8 (eight) hours as needed for cough (Use with albuterol nebulizer)    Nasal congestion    Orders:    azelastine (ASTELIN) 0.1 % nasal spray; 1 spray into each nostril 2 (two) times a day Use in each nostril as directed           History of Present Illness   HPI  80-year-old female presents today for results discussion for pelvic ultrasound.  She was found to have a thickened endometrial lining while hospitalized and had a follow-up pelvic ultrasound which revealed an endometrial lining of 11 mm.  Patient denies any vaginal bleeding.  She does have a family history of breast cancer in her sister and she states that another sister did have an abdominal cancer of some sort that she ended up passing away from.  She denies any weight loss.  She states that since being placed on lisinopril her blood pressure has not been controlled and it was previously well-controlled on amlodipine.  This which is secondary to swelling that she was having in her left lower extremity which she sees podiatry for and was found to have a previous fracture in this area.  Review of Systems   Constitutional:  Negative for chills and fever.   Eyes:  Negative for visual disturbance.   Respiratory:  Positive for shortness of breath and wheezing. Negative for cough.    Cardiovascular:  Negative for chest pain and palpitations.   Gastrointestinal:  Negative for abdominal pain and vomiting.   Genitourinary:  Negative for dysuria, hematuria and vaginal bleeding.   Musculoskeletal:  Negative for arthralgias and back pain.   Skin:  Negative for color change and rash.   Neurological:  Negative for dizziness and headaches.   All other systems reviewed and are negative.      Objective   BP (!) 185/102 (BP Location: Left arm, Patient Position:  "Sitting, Cuff Size: Standard)   Pulse 96   Temp 97.8 °F (36.6 °C) (Temporal)   Ht 5' 1.5\" (1.562 m)   Wt 60.3 kg (133 lb)   SpO2 96%   BMI 24.72 kg/m²      Physical Exam  Vitals and nursing note reviewed.   Constitutional:       General: She is not in acute distress.     Appearance: She is well-developed.   HENT:      Head: Normocephalic and atraumatic.   Eyes:      Conjunctiva/sclera: Conjunctivae normal.   Cardiovascular:      Rate and Rhythm: Normal rate and regular rhythm.      Heart sounds: No murmur heard.     No friction rub. No gallop.   Pulmonary:      Effort: Pulmonary effort is normal. No respiratory distress.      Breath sounds: Normal breath sounds. No wheezing, rhonchi or rales.   Abdominal:      Palpations: Abdomen is soft.      Tenderness: There is no abdominal tenderness.   Musculoskeletal:         General: No swelling.      Cervical back: Neck supple.   Skin:     General: Skin is warm and dry.      Capillary Refill: Capillary refill takes less than 2 seconds.   Neurological:      Mental Status: She is alert.   Psychiatric:         Mood and Affect: Mood normal.         "

## 2025-05-12 NOTE — ASSESSMENT & PLAN NOTE
CT CAP 4/7 showed a distended endometrium measuring up to 10 mm. Correlation with nonemergent pelvic ultrasound recommended to exclude neoplasm.   Pelvic US on 5/1/25: 1.  Endometrial echo complex is thickened for a postmenopausal patient measuring 11 mm in AP caliber with scattered cystic changes. Differential considerations include endometrial hyperplasia as well as endometrial neoplasm. OB/GYN evaluation with   endometrial sampling is recommended.  2.  Multiple uterine fibroids. The largest is exophytic within the posterior cul-de-sac/left pelvis measuring 6.9 cm which corresponds to finding on prior CT.  3.  Unremarkable sonographic appearance of the ovaries.    Plan:  Discussed the above results with patient today, recommend endometrial biopsy which can be performed in our office by Dr. Saleem.  Recommended the patient schedule this on checkout at earliest convenience.

## 2025-05-13 NOTE — TELEPHONE ENCOUNTER
Reviewed schedule, patient is scheduled 6/2/25 with Dr Saleem for f/u. Would like to change time to 2:00pm when Dr Garcia is precepting. Tried calling patient, unable to leave voice message.

## 2025-05-14 ENCOUNTER — OFFICE VISIT (OUTPATIENT)
Dept: PULMONOLOGY | Facility: CLINIC | Age: 80
End: 2025-05-14
Payer: MEDICARE

## 2025-05-14 VITALS
OXYGEN SATURATION: 97 % | WEIGHT: 132.6 LBS | HEIGHT: 61 IN | TEMPERATURE: 98.1 F | BODY MASS INDEX: 25.04 KG/M2 | SYSTOLIC BLOOD PRESSURE: 130 MMHG | HEART RATE: 93 BPM | DIASTOLIC BLOOD PRESSURE: 70 MMHG

## 2025-05-14 DIAGNOSIS — F17.201 TOBACCO USE DISORDER, SEVERE, IN SUSTAINED REMISSION: ICD-10-CM

## 2025-05-14 DIAGNOSIS — J45.51 SEVERE PERSISTENT ASTHMA WITH ACUTE EXACERBATION: ICD-10-CM

## 2025-05-14 DIAGNOSIS — T17.500A MUCUS PLUGGING OF BRONCHI: Primary | ICD-10-CM

## 2025-05-14 DIAGNOSIS — J30.9 CHRONIC ALLERGIC RHINITIS: ICD-10-CM

## 2025-05-14 PROCEDURE — G2211 COMPLEX E/M VISIT ADD ON: HCPCS | Performed by: INTERNAL MEDICINE

## 2025-05-14 PROCEDURE — 99214 OFFICE O/P EST MOD 30 MIN: CPT | Performed by: INTERNAL MEDICINE

## 2025-05-14 NOTE — PATIENT INSTRUCTIONS
Continue to use Breztri two puffs twice daily    Obtain blood tests to check your blood counts and [facility] allergen panel    Continue using DuoNebs nebulizer as needed    Follow up in 2-3 months

## 2025-05-14 NOTE — PROGRESS NOTES
Follow-up  Visit - Pulmonary Medicine   Name: Mireya Yi      : 1945      MRN: [id number]  Encounter Provider: Aby Welsh MD  Encounter Date: 2025   Enc[facility]  :  Assessment & Plan  Severe persistent asthma with acute exacerbation  Patient has had multiple recent exacerbations with persistent asthma symptoms. She most recently had acute exacerbation in the setting of influenza B infection, received steroid taper. She took 20mg daily today and has 2 more days of 10mg daily left. She was seen in the office last week and started on DuoNeb and given flutter valve and tessalon perles. She remains on symbicort 2 puffs twice daily and singulair nightly. She continues to have persistent cough, wheezing, shortness of breath. Using DuoNeb nebulizer frequently.  Plan:  -Continue Breztri 2 puffs BID  -Continue singulair nightly  -Continue DuoNeb nebulizer 3-4 times daily as needed  -Encourage continued smoking cessation  -Will arrange for close follow up. If escalation of therapy does not help, she may need biologic therapy, however would need to fully quit smoking to do so - will obtain CBC and [facility] allergen panel in the meantime now that patient is off steroid therapy  Orders:    CBC and differential; Future    [facility] Allergy Panel, Adult; Future    Mucus plugging of bronchi  She has history of mucus plugging. Underwent bronchoscopy which showed evidence of EDAC of right mainstem as well as mucus plugging. Has been on mucinex and aggressive airway clearance protocol flutter valve   -Continue airway clearance  Orders:    CBC and differential; Future    [facility] Allergy Panel, Adult; Future    Tobacco use disorder, severe, in sustained remission  Patient with significant smoking history of 20-30 pack-years. She is currently smoking occasionally. -Encouraged smoking cessation. Patient understands she needs to quit.   -Continue nicotine  patch  -Patient ordered lung cancer screening during previous visit [facility].  She states that insurance company would not cover due to her age.  She is 80 so that should be covered.  However, patient last had a CT scan in April of this year which revealed 3 mm right apical lung nodule which was stable from April 2023.       Chronic allergic rhinitis  Patient follows with ENT. She is being considered for septoplasty/turbinoplasty. She needs lung disease under control prior to surgery.  -Continue to follow with ENT for management, f/u scheduled   -Continue Singular, Allegra, Fluticasone, and azelastine          Return in about 3 months (around 8/14/2025) for Next scheduled follow up.    History of Present Illness   Mireya Yi is a 80 y.o. female current smoker with past medical history of persistent asthma, allergic rhinitis, mucus plugging who presents for follow-up evaluation.     She has had multiple exacerbations over the last few weeks/months. Taken several steroid courses as well as many courses of antibiotics. She also had COVID and flu earlier this year. She was last seen about 1 month ago.  During that visit she had coughing, wheezing and lightheadedness.  Her Symbicort was increased to [address].  She was also recommended to use Mucinex to help with mucus secretion.  She was initiated on albuterol and hypertonic saline nebs for 1 week and then recommended to return to DuoNeb therapy.  She was also initiated on a prolonged steroid taper of 60 mg for 3 days with plan to decrease by 10 mg every 3 days.     Patient was seen in the emergency department shortly after her visit and was treated with azithromycin empirically and recommended to follow-up in the outpatient setting.     Since then, she does feel like her symptoms have been improving.  She states this is the best that she has felt in many months.  She is currently taking Breztri 2 puffs twice daily.  She is using DuoNeb nebulizer 3-4 times daily as  needed.    She continues to smoke a couple cigarettes per day.  She does have nicotine patches at home and is motivated to quit but has not been able to successfully do so yet.    Review of Systems   Constitutional:  Negative for activity change and appetite change.   HENT:  Negative for congestion, postnasal drip and trouble swallowing.    Respiratory:  Positive for cough. Negative for shortness of breath and wheezing.    Cardiovascular:  Negative for chest pain, palpitations and leg swelling.   All other systems reviewed and are negative.      Aside from what is mentioned in the HPI, ROS is otherwise negative    Past Medical History   Past Medical History:   Diagnosis Date    Asthma     Asthmatic bronchitis     Last Assessed: 10/7/2014     Chronic diarrhea     Last Assessed: 8/20/2015     Fibromyalgia     Focal nodular hyperplasia of liver     Last Assessed: 6/11/2015    Herpes zoster     Last Assessed: 3/18/2016    Hypertension     IBS (irritable bowel syndrome)     Intermittent palpitations     Irritable bowel syndrome     Lumbar radiculopathy     Osteoarthritis     Vertigo      Past Surgical History:   Procedure Laterality Date    BREAST BIOPSY      BREAST LUMPECTOMY      when pt was 18    SMALL INTESTINE SURGERY       Family History   Problem Relation Age of Onset    Heart attack Mother     Asthma Father     Breast cancer Sister     Breast cancer Sister     No Known Problems Daughter     No Known Problems Daughter     Arthritis Family     Osteoporosis Family       reports that she has been smoking cigarettes. She has never used smokeless tobacco. She reports that she does not currently use alcohol. She reports that she does not use drugs.  Current Outpatient Medications   Medication Instructions    acetaminophen (TYLENOL) 500 mg, Oral, Every 6 hours PRN    albuterol (PROVENTIL HFA,VENTOLIN HFA) 90 mcg/act inhaler 2 puffs, Inhalation, Every 6 hours PRN    albuterol 2.5 mg, Nebulization, Every 6 hours PRN     "azelastine (ASTELIN) 0.1 % nasal spray 1 spray, Nasal, 2 times daily, Use in each nostril as directed    benzonatate (TESSALON PERLES) 100 mg, Oral, 3 times daily PRN    Blood Pressure Monitoring (B-D ASSURE BPM/AUTO ARM CUFF) MISC Does not apply, Daily    Budeson-Glycopyrrol-Formoterol (Breztri Aerosphere) 160-9-4.8 MCG/ACT AERO 2 puffs, Inhalation, 2 times daily, Rinse mouth after use.    Cholecalciferol (D3-1000) 1,000 Units, Oral, Daily    fexofenadine (ALLEGRA) 180 mg, Daily    fluticasone (FLONASE) 50 mcg/act nasal spray SPRAY 2 SPRAYS INTO EACH NOSTRIL EVERY DAY    hydrOXYzine HCL (ATARAX) 10 mg, Oral, Daily at bedtime PRN    ipratropium-albuterol (DUO-NEB) 0.5-2.5 mg/3 mL nebulizer solution 3 mL, Nebulization, 4 times daily    lisinopril (ZESTRIL) 40 mg, Oral, Daily    montelukast (SINGULAIR) 10 mg, Oral, Daily at bedtime    nicotine (NICODERM CQ) 7 mg/24hr TD 24 hr patch 1 patch, Transdermal, Every 24 hours    ondansetron (ZOFRAN ODT) 4 mg, Oral, Every 6 hours PRN    ondansetron (ZOFRAN) 4 mg, Oral, Every 8 hours PRN    pantoprazole (PROTONIX) 20 mg, Oral, Daily    pregabalin (LYRICA) 50 mg capsule Take 1 PO HS x 5 days, then 1 PO BID x 5 days, then 1 PO AM and 2 PO HS    sodium chloride 3 % inhalation solution 4 mL, Nebulization, Every 8 hours PRN     Allergies   Allergen Reactions    Ambrosia Artemisiifolia (Ragweed) Skin Test Facial Swelling    Codeine Other (See Comments)     Heart races    Epinephrine      Pt reports \"it makes my heart race, they told me I cant take it.\"    Ibuprofen Other (See Comments)     Heart racing    Morphine Other (See Comments)     Heart racing    Pollen Extract Sneezing    Tramadol Other (See Comments)     Heart racing         Medical History Reviewed by provider this encounter:  Tobacco  Allergies  Meds  Problems  Med Hx  Surg Hx  Fam Hx     .    Objective   /70   Pulse 93   Temp 98.1 °F (36.7 °C) (Tympanic)   Ht 5' 1\" (1.549 m)   Wt 60.1 kg (132 lb 9.6 oz)  "  SpO2 97%   BMI 25.05 kg/m²     Physical Exam  Vitals reviewed.   Constitutional:       General: She is not in acute distress.     Appearance: Normal appearance. She is not toxic-appearing.   HENT:      Nose: No congestion.     Cardiovascular:      Rate and Rhythm: Normal rate and regular rhythm.   Pulmonary:      Effort: Pulmonary effort is normal. No respiratory distress.      Breath sounds: Normal breath sounds. No wheezing or rales.     Musculoskeletal:         General: Normal range of motion.      Right lower leg: No edema.      Left lower leg: No edema.     Neurological:      General: No focal deficit present.      Mental Status: She is alert and oriented to person, place, and time. Mental status is at baseline.     Psychiatric:         Mood and Affect: Mood normal.         Behavior: Behavior normal.           Diagnostic Data:  Labs: I personally reviewed the most recent laboratory data pertinent to today's visit.    Radiology Results Review: I have reviewed the following images/report studies in PACS:   CT chest 4/7/2025: Mild emphysema. Mild mucous plugging right lower lobe bronchi. No evidence of pneumonia.         Other Study Results: Other Study Results Review : Other studies reviewed include: 5/2/2024-LVEF normal.  Moderate concentric hypertrophy.  Diastolic dysfunction grade 1.  RV size and function is normal.  Small pericardial effusion anterior to the heart.  No tamponade.  RV systolic pressure is normal     PFT Results Reviewed: reviewed  Mild obstructive airflow defect on spirometry.  No BD response.  Normal lung volumes.  Normal diffusion.     Aby Welsh MD

## 2025-05-20 ENCOUNTER — APPOINTMENT (OUTPATIENT)
Dept: LAB | Facility: CLINIC | Age: 80
End: 2025-05-20
Payer: MEDICARE

## 2025-05-20 ENCOUNTER — OFFICE VISIT (OUTPATIENT)
Dept: PAIN MEDICINE | Facility: CLINIC | Age: 80
End: 2025-05-20

## 2025-05-20 VITALS — HEIGHT: 61 IN | WEIGHT: 131 LBS | BODY MASS INDEX: 24.73 KG/M2 | HEART RATE: 73 BPM

## 2025-05-20 DIAGNOSIS — M54.16 LUMBAR RADICULOPATHY: ICD-10-CM

## 2025-05-20 DIAGNOSIS — J45.51 SEVERE PERSISTENT ASTHMA WITH ACUTE EXACERBATION: ICD-10-CM

## 2025-05-20 DIAGNOSIS — D64.9 ANEMIA, UNSPECIFIED TYPE: ICD-10-CM

## 2025-05-20 DIAGNOSIS — M51.16 INTERVERTEBRAL DISC DISORDER WITH RADICULOPATHY OF LUMBAR REGION: Primary | ICD-10-CM

## 2025-05-20 DIAGNOSIS — M47.816 LUMBAR SPONDYLOSIS: ICD-10-CM

## 2025-05-20 DIAGNOSIS — M48.061 SPINAL STENOSIS OF LUMBAR REGION, UNSPECIFIED WHETHER NEUROGENIC CLAUDICATION PRESENT: ICD-10-CM

## 2025-05-20 DIAGNOSIS — N18.31 STAGE 3A CHRONIC KIDNEY DISEASE (HCC): ICD-10-CM

## 2025-05-20 DIAGNOSIS — T17.500A MUCUS PLUGGING OF BRONCHI: ICD-10-CM

## 2025-05-20 DIAGNOSIS — G89.4 CHRONIC PAIN SYNDROME: ICD-10-CM

## 2025-05-20 LAB
ANION GAP SERPL CALCULATED.3IONS-SCNC: 11 MMOL/L (ref 4–13)
BACTERIA UR QL AUTO: ABNORMAL /HPF
BASOPHILS # BLD AUTO: 0.06 THOUSANDS/ÂΜL (ref 0–0.1)
BASOPHILS NFR BLD AUTO: 1 % (ref 0–1)
BILIRUB UR QL STRIP: NEGATIVE
BUN SERPL-MCNC: 13 MG/DL (ref 5–25)
CALCIUM SERPL-MCNC: 8.9 MG/DL (ref 8.4–10.2)
CHLORIDE SERPL-SCNC: 111 MMOL/L (ref 96–108)
CLARITY UR: CLEAR
CO2 SERPL-SCNC: 20 MMOL/L (ref 21–32)
COLOR UR: COLORLESS
CREAT SERPL-MCNC: 1.16 MG/DL (ref 0.6–1.3)
CREAT UR-MCNC: 53.2 MG/DL
EOSINOPHIL # BLD AUTO: 0.37 THOUSAND/ÂΜL (ref 0–0.61)
EOSINOPHIL NFR BLD AUTO: 4 % (ref 0–6)
ERYTHROCYTE [DISTWIDTH] IN BLOOD BY AUTOMATED COUNT: 17.3 % (ref 11.6–15.1)
FERRITIN SERPL-MCNC: 4 NG/ML (ref 30–307)
GFR SERPL CREATININE-BSD FRML MDRD: 44 ML/MIN/1.73SQ M
GLUCOSE P FAST SERPL-MCNC: 101 MG/DL (ref 65–99)
GLUCOSE UR STRIP-MCNC: NEGATIVE MG/DL
HCT VFR BLD AUTO: 35 % (ref 34.8–46.1)
HGB BLD-MCNC: 10.3 G/DL (ref 11.5–15.4)
HGB UR QL STRIP.AUTO: NEGATIVE
IMM GRANULOCYTES # BLD AUTO: 0.04 THOUSAND/UL (ref 0–0.2)
IMM GRANULOCYTES NFR BLD AUTO: 0 % (ref 0–2)
IRON SATN MFR SERPL: 51 % (ref 15–50)
IRON SERPL-MCNC: 213 UG/DL (ref 50–212)
KETONES UR STRIP-MCNC: NEGATIVE MG/DL
LEUKOCYTE ESTERASE UR QL STRIP: ABNORMAL
LYMPHOCYTES # BLD AUTO: 1.9 THOUSANDS/ÂΜL (ref 0.6–4.47)
LYMPHOCYTES NFR BLD AUTO: 19 % (ref 14–44)
MCH RBC QN AUTO: 24 PG (ref 26.8–34.3)
MCHC RBC AUTO-ENTMCNC: 29.4 G/DL (ref 31.4–37.4)
MCV RBC AUTO: 82 FL (ref 82–98)
MICROALBUMIN UR-MCNC: 63.6 MG/L
MICROALBUMIN/CREAT 24H UR: 120 MG/G CREATININE (ref 0–30)
MONOCYTES # BLD AUTO: 0.75 THOUSAND/ÂΜL (ref 0.17–1.22)
MONOCYTES NFR BLD AUTO: 7 % (ref 4–12)
MUCOUS THREADS UR QL AUTO: ABNORMAL
NEUTROPHILS # BLD AUTO: 7.04 THOUSANDS/ÂΜL (ref 1.85–7.62)
NEUTS SEG NFR BLD AUTO: 69 % (ref 43–75)
NITRITE UR QL STRIP: NEGATIVE
NON-SQ EPI CELLS URNS QL MICRO: ABNORMAL /HPF
NRBC BLD AUTO-RTO: 0 /100 WBCS
PH UR STRIP.AUTO: 6 [PH]
PLATELET # BLD AUTO: 366 THOUSANDS/UL (ref 149–390)
PMV BLD AUTO: 12 FL (ref 8.9–12.7)
POTASSIUM SERPL-SCNC: 3.8 MMOL/L (ref 3.5–5.3)
PROT UR STRIP-MCNC: ABNORMAL MG/DL
RBC # BLD AUTO: 4.29 MILLION/UL (ref 3.81–5.12)
RBC #/AREA URNS AUTO: ABNORMAL /HPF
SODIUM SERPL-SCNC: 142 MMOL/L (ref 135–147)
SP GR UR STRIP.AUTO: 1.01 (ref 1–1.03)
TIBC SERPL-MCNC: 417.2 UG/DL (ref 250–450)
TRANSFERRIN SERPL-MCNC: 298 MG/DL (ref 203–362)
UIBC SERPL-MCNC: 204 UG/DL (ref 155–355)
UROBILINOGEN UR STRIP-ACNC: <2 MG/DL
WBC # BLD AUTO: 10.16 THOUSAND/UL (ref 4.31–10.16)
WBC #/AREA URNS AUTO: ABNORMAL /HPF

## 2025-05-20 PROCEDURE — 83550 IRON BINDING TEST: CPT

## 2025-05-20 PROCEDURE — 82043 UR ALBUMIN QUANTITATIVE: CPT

## 2025-05-20 PROCEDURE — 82785 ASSAY OF IGE: CPT

## 2025-05-20 PROCEDURE — 83540 ASSAY OF IRON: CPT

## 2025-05-20 PROCEDURE — 36415 COLL VENOUS BLD VENIPUNCTURE: CPT

## 2025-05-20 PROCEDURE — 82728 ASSAY OF FERRITIN: CPT

## 2025-05-20 PROCEDURE — 80048 BASIC METABOLIC PNL TOTAL CA: CPT

## 2025-05-20 PROCEDURE — 85025 COMPLETE CBC W/AUTO DIFF WBC: CPT

## 2025-05-20 PROCEDURE — 86003 ALLG SPEC IGE CRUDE XTRC EA: CPT

## 2025-05-20 PROCEDURE — 82570 ASSAY OF URINE CREATININE: CPT

## 2025-05-20 RX ORDER — PREGABALIN 50 MG/1
CAPSULE ORAL
Qty: 270 CAPSULE | Refills: 0 | Status: SHIPPED | OUTPATIENT
Start: 2025-05-20

## 2025-05-20 NOTE — PROGRESS NOTES
Assessment:  1. Intervertebral disc disorder with radiculopathy of lumbar region    2. Chronic pain syndrome    3. Spinal stenosis of lumbar region, unspecified whether neurogenic claudication present    4. Lumbar spondylosis    5. Lumbar radiculopathy        Plan:  The patient is a 80-year-old female with a history of chronic pain secondary to low back pain, lumbar intervertebral disc disorder with radiculopathy, lumbar spinal stenosis and lumbar spondylosis who presents to the office with ongoing left-sided low back pain and pain that radiates into the left lower extremity stopping at the left foot.    At this time, I did instruct patient we can perform a lumbar epidural steroid injection to help decrease inflammation and provide her relief.  Patient would like to proceed and will be scheduled.  Complete risks and benefits including bleeding, infection, tissue reaction, nerve injury and allergic reaction were discussed.  The approach was demonstrated using models and literature was provided.  Verbal and written consent was obtained.    Overall her pain continues to be managed with taking Lyrica, therefore I will continue her on this medication as prescribed.  Refills were electronically sent to the patient's pharmacy.    The patient will follow-up in 12 weeks for medication prescription refill and reevaluation. The patient was advised to contact the office should their symptoms worsen in the interim. The patient was agreeable and verbalized an understanding.        History of Present Illness:    The patient is a 80 y.o. female with a history of chronic pain secondary to low back pain, lumbar intervertebral disc disorder with radiculopathy, lumbar spinal stenosis and lumbar spondylosis.  She was last seen on 2/25/2025 where she was started on Lyrica 50 mg that she takes 1 capsule in the morning and 2 capsules before bed.  She states this medication is helpful.  She presents to the office with ongoing left-sided low  back pain and pain that radiates into the left lower extremity stopping at the left foot.    She states her pain is the same since the last office visit and constant.  She states her pain is worse with standing for prolonged period of time.  She rates quality of her pain as throbbing and is currently rating her pain an 8/10 on a numeric scale.    Current pain medications include Lyrica 50 mg 1 capsule in the morning and 2 capsules before bed and Tylenol as needed which is helpful.  She denies any side effects with this medication regimen.    I have personally reviewed and/or updated the patient's past medical history, past surgical history, family history, social history, current medications, allergies, and vital signs today.       Review of Systems:    Review of Systems   Musculoskeletal:         Decreased ROM, muscle weakness, joint stiffness, swelling.         Past Medical History:   Diagnosis Date    Asthma     Asthmatic bronchitis     Last Assessed: 10/7/2014     Chronic diarrhea     Last Assessed: 8/20/2015     Fibromyalgia     Focal nodular hyperplasia of liver     Last Assessed: 6/11/2015    Herpes zoster     Last Assessed: 3/18/2016    Hypertension     IBS (irritable bowel syndrome)     Intermittent palpitations     Irritable bowel syndrome     Lumbar radiculopathy     Osteoarthritis     Vertigo        Past Surgical History:   Procedure Laterality Date    BREAST BIOPSY      BREAST LUMPECTOMY      when pt was 18    SMALL INTESTINE SURGERY         Family History   Problem Relation Age of Onset    Heart attack Mother     Asthma Father     Breast cancer Sister     Breast cancer Sister     No Known Problems Daughter     No Known Problems Daughter     Arthritis Family     Osteoporosis Family        Social History     Occupational History    Occupation: Unemployed    Tobacco Use    Smoking status: Some Days     Types: Cigarettes    Smokeless tobacco: Never    Tobacco comments:     Hasn't been smoking recently  "because of the cough., about 8 days.   Vaping Use    Vaping status: Never Used   Substance and Sexual Activity    Alcohol use: Not Currently    Drug use: No    Sexual activity: Not Currently     Partners: Male       Current Medications[1]    Allergies[2]    Physical Exam:    Pulse 73   Ht 5' 1\" (1.549 m) Comment: Verbal  Wt 59.4 kg (131 lb)   BMI 24.75 kg/m²     Constitutional:normal, well developed, well nourished, alert, in no distress and non-toxic and no overt pain behavior.  Eyes:anicteric  HEENT:grossly intact  Neck:supple, symmetric, trachea midline and no masses   Pulmonary:even and unlabored  Cardiovascular:No edema or pitting edema present  Skin:Normal without rashes or lesions and well hydrated  Psychiatric:Mood and affect appropriate  Neurologic:Cranial Nerves II-XII grossly intact  Musculoskeletal:antalgic    Lumbar Spine Exam    Appearance:  Normal lordosis  Palpation/Tenderness:  left lumbar paraspinal tenderness  Sensory:  no sensory deficits noted  Range of Motion:  Flexion:  Minimally limited  with pain  Extension:  Minimally limited  with pain  Motor Strength:  Left hip flexion:  4/5  Right hip flexion:  5/5  Left knee flexion:  4/5  Left knee extension:  4/5  Right knee flexion:  5/5  Right knee extension:  5/5  Left foot dorsiflexion:  4/5  Left foot plantar flexion:  4/5  Right foot dorsiflexion:  5/5  Right foot plantar flexion:  5/5      Imaging  FL spine and pain procedure    (Results Pending)         Orders Placed This Encounter   Procedures    FL spine and pain procedure            [1]   Current Outpatient Medications:     acetaminophen (TYLENOL) 500 mg tablet, Take 1 tablet (500 mg total) by mouth every 6 (six) hours as needed for mild pain, Disp: 60 tablet, Rfl: 0    albuterol (PROVENTIL HFA,VENTOLIN HFA) 90 mcg/act inhaler, Inhale 2 puffs every 6 (six) hours as needed for wheezing or shortness of breath, Disp: 18 g, Rfl: 5    azelastine (ASTELIN) 0.1 % nasal spray, 1 spray into each " nostril 2 (two) times a day Use in each nostril as directed, Disp: 1 mL, Rfl: 0    Blood Pressure Monitoring (B-D ASSURE BPM/AUTO ARM CUFF) MISC, Use in the morning, Disp: 1 each, Rfl: 0    Budeson-Glycopyrrol-Formoterol (Breztri Aerosphere) 160-9-4.8 MCG/ACT AERO, Inhale 2 puffs 2 (two) times a day Rinse mouth after use., Disp: 10.7 g, Rfl: 3    Cholecalciferol (D3-1000) 1,000 units tablet, Take 1 tablet (1,000 Units total) by mouth daily, Disp: 90 tablet, Rfl: 1    fexofenadine (ALLEGRA) 180 MG tablet, Take 180 mg by mouth in the morning., Disp: , Rfl:     fluticasone (FLONASE) 50 mcg/act nasal spray, SPRAY 2 SPRAYS INTO EACH NOSTRIL EVERY DAY, Disp: 48 mL, Rfl: 2    hydrOXYzine HCL (ATARAX) 10 mg tablet, Take 1 tablet (10 mg total) by mouth daily at bedtime as needed for anxiety for up to 10 doses, Disp: 10 tablet, Rfl: 0    ipratropium-albuterol (DUO-NEB) 0.5-2.5 mg/3 mL nebulizer solution, Take 3 mL by nebulization 4 (four) times a day, Disp: 360 mL, Rfl: 2    lisinopril (ZESTRIL) 40 mg tablet, Take 1 tablet (40 mg total) by mouth daily, Disp: 30 tablet, Rfl: 0    montelukast (SINGULAIR) 10 mg tablet, Take 1 tablet (10 mg total) by mouth daily at bedtime Do not start before April 8, 2025., Disp: 90 tablet, Rfl: 0    ondansetron (Zofran ODT) 4 mg disintegrating tablet, Take 1 tablet (4 mg total) by mouth every 6 (six) hours as needed for nausea or vomiting, Disp: 20 tablet, Rfl: 3    pantoprazole (PROTONIX) 20 mg tablet, TAKE 1 TABLET BY MOUTH EVERY DAY, Disp: 90 tablet, Rfl: 1    pregabalin (LYRICA) 50 mg capsule, Take 1 capsule in the morning and 2 capsules in the evening, Disp: 270 capsule, Rfl: 0    sodium chloride 3 % inhalation solution, Take 4 mL by nebulization every 8 (eight) hours as needed for cough (Use with albuterol nebulizer), Disp: 30 mL, Rfl: 0    albuterol (2.5 mg/3 mL) 0.083 % nebulizer solution, Take 3 mL (2.5 mg total) by nebulization every 6 (six) hours as needed for wheezing or shortness  "of breath (Patient not taking: Reported on 5/14/2025), Disp: 60 mL, Rfl: 0    benzonatate (TESSALON PERLES) 100 mg capsule, Take 1 capsule (100 mg total) by mouth 3 (three) times a day as needed for cough (Patient not taking: Reported on 5/14/2025), Disp: 30 capsule, Rfl: 0    nicotine (NICODERM CQ) 7 mg/24hr TD 24 hr patch, PLACE 1 PATCH ON THE SKIN OVER 24 HOURS EVERY 24 HOURS (Patient not taking: Reported on 5/20/2025), Disp: 28 patch, Rfl: 0    ondansetron (ZOFRAN) 4 mg tablet, Take 1 tablet (4 mg total) by mouth every 8 (eight) hours as needed for nausea or vomiting (Patient not taking: Reported on 4/30/2025), Disp: 30 tablet, Rfl: 0  [2]   Allergies  Allergen Reactions    Ambrosia Artemisiifolia (Ragweed) Skin Test Facial Swelling    Codeine Other (See Comments)     Heart races    Epinephrine      Pt reports \"it makes my heart race, they told me I cant take it.\"    Ibuprofen Other (See Comments)     Heart racing    Morphine Other (See Comments)     Heart racing    Pollen Extract Sneezing    Tramadol Other (See Comments)     Heart racing     "

## 2025-05-21 LAB
A ALTERNATA IGE QN: <0.1 KUA/I (ref 0–0.1)
A FUMIGATUS IGE QN: <0.1 KUA/I (ref 0–0.1)
BERMUDA GRASS IGE QN: <0.1 KUA/I (ref 0–0.1)
BOXELDER IGE QN: <0.1 KUA/I (ref 0–0.1)
C HERBARUM IGE QN: <0.1 KUA/I (ref 0–0.1)
CAT DANDER IGE QN: <0.1 KUA/I (ref 0–0.1)
CMN PIGWEED IGE QN: <0.1 KUA/I (ref 0–0.1)
COMMON RAGWEED IGE QN: <0.1 KUA/I (ref 0–0.1)
COTTONWOOD IGE QN: <0.1 KUA/I (ref 0–0.1)
D FARINAE IGE QN: 0.12 KUA/I (ref 0–0.1)
D PTERONYSS IGE QN: 0.2 KUA/I (ref 0–0.1)
DOG DANDER IGE QN: <0.1 KUA/I (ref 0–0.1)
LONDON PLANE IGE QN: <0.1 KUA/I (ref 0–0.1)
MOUSE URINE PROT IGE QN: <0.1 KUA/I (ref 0–0.1)
MT JUNIPER IGE QN: <0.1 KUA/I (ref 0–0.1)
MUGWORT IGE QN: <0.1 KUA/I (ref 0–0.1)
P NOTATUM IGE QN: <0.1 KUA/I (ref 0–0.1)
ROACH IGE QN: 0.17 KUA/I (ref 0–0.1)
SHEEP SORREL IGE QN: <0.1 KUA/I (ref 0–0.1)
SILVER BIRCH IGE QN: <0.1 KUA/I (ref 0–0.1)
TIMOTHY IGE QN: <0.1 KUA/I (ref 0–0.1)
TOTAL IGE SMQN RAST: 29.5 KU/L (ref 0–113)
WALNUT IGE QN: <0.1 KUA/I (ref 0–0.1)
WHITE ASH IGE QN: <0.1 KUA/I (ref 0–0.1)
WHITE ELM IGE QN: <0.1 KUA/I (ref 0–0.1)
WHITE MULBERRY IGE QN: <0.1 KUA/I (ref 0–0.1)
WHITE OAK IGE QN: <0.1 KUA/I (ref 0–0.1)

## 2025-05-23 ENCOUNTER — RESULTS FOLLOW-UP (OUTPATIENT)
Dept: NEPHROLOGY | Facility: CLINIC | Age: 80
End: 2025-05-23

## 2025-05-28 ENCOUNTER — HOSPITAL ENCOUNTER (OUTPATIENT)
Dept: RADIOLOGY | Facility: CLINIC | Age: 80
Discharge: HOME | End: 2025-05-28
Payer: MEDICARE

## 2025-05-28 VITALS
TEMPERATURE: 98.5 F | SYSTOLIC BLOOD PRESSURE: 170 MMHG | OXYGEN SATURATION: 97 % | DIASTOLIC BLOOD PRESSURE: 98 MMHG | HEART RATE: 89 BPM | RESPIRATION RATE: 18 BRPM

## 2025-05-28 DIAGNOSIS — M51.16 INTERVERTEBRAL DISC DISORDER WITH RADICULOPATHY OF LUMBAR REGION: ICD-10-CM

## 2025-05-28 PROCEDURE — 64483 NJX AA&/STRD TFRM EPI L/S 1: CPT | Performed by: ANESTHESIOLOGY

## 2025-05-28 PROCEDURE — 64484 NJX AA&/STRD TFRM EPI L/S EA: CPT | Performed by: ANESTHESIOLOGY

## 2025-05-28 RX ORDER — PAPAVERINE HCL 150 MG
15 CAPSULE, EXTENDED RELEASE ORAL ONCE
Status: COMPLETED | OUTPATIENT
Start: 2025-05-28 | End: 2025-05-28

## 2025-05-28 RX ADMIN — IOHEXOL 2 ML: 300 INJECTION, SOLUTION INTRAVENOUS at 09:14

## 2025-05-28 RX ADMIN — DEXAMETHASONE SODIUM PHOSPHATE 15 MG: 10 INJECTION, SOLUTION INTRAMUSCULAR; INTRAVENOUS at 09:14

## 2025-05-28 NOTE — DISCHARGE INSTR - LAB
Epidural Steroid Injection   WHAT YOU NEED TO KNOW:   An epidural steroid injection (NEERU) is a procedure to inject steroid medicine into the epidural space. The epidural space is between your spinal cord and vertebrae. Steroids reduce inflammation and fluid buildup in your spine that may be causing pain. You may be given pain medicine along with the steroids.          ACTIVITY  Do not drive or operate machinery today.  No strenuous activity today - bending, lifting, etc.  You may resume normal activites starting tomorrow - start slowly and as tolerated.  You may shower today, but no tub baths or hot tubs.  You may have numbness for several hours from the local anesthetic. Please use caution and common sense, especially with weight-bearing activities.    CARE OF THE INJECTION SITE  If you have soreness or pain, apply ice to the area today (20 minutes on/20 minutes off).  Starting tomorrow, you may use warm, moist heat or ice if needed.  You may have an increase or change in your discomfort for 36-48 hours after your treatment.  Apply ice and continue with any pain medication you have been prescribed.  Notify [facility] if you have any of the following: redness, drainage, swelling, headache, stiff neck or fever above 100°F.    SPECIAL INSTRUCTIONS  Our office will contact you in approximately 14 days for a progress report.    MEDICATIONS  Continue to take all routine medications.  Our office may have instructed you to hold some medications.    As no general anesthesia was used in today's procedure, you should not experience any side effects related to anesthesia.     If you are diabetic, the steroids used in today's injection may temporarily increase your blood sugar levels after the first few days after your injection. Please keep a close eye on your sugars and alert the doctor who manages your diabetes if your sugars are significantly high from your baseline or you are symptomatic.     If you have a  problem specifically related to your procedure, please call our office at [phone].  Problems not related to your procedure should be directed to your primary care physician.

## 2025-05-28 NOTE — H&P
History of Present Illness: The patient is a 80 y.o. female who presents with complaints of low back and leg pain.    Past Medical History[1]    Past Surgical History[2]    Current Medications[3]    Allergies[4]    Physical Exam:   Vitals:    05/28/25 0856   BP: 160/93   Pulse: 81   Resp: 16   Temp:    SpO2: 90%     General: Awake, Alert, Oriented x 3, Mood and affect appropriate  Respiratory: Respirations even and unlabored  Cardiovascular: Peripheral pulses intact; no edema  Musculoskeletal Exam: Antalgic gait    ASA Score: 3         Assessment:   1. Intervertebral disc disorder with radiculopathy of lumbar region        Plan: Left L5 and S1 TFESI         [1]   Past Medical History:  Diagnosis Date    Asthma     Asthmatic bronchitis     Last Assessed: 10/7/2014     Chronic diarrhea     Last Assessed: 8/20/2015     Fibromyalgia     Focal nodular hyperplasia of liver     Last Assessed: 6/11/2015    Herpes zoster     Last Assessed: 3/18/2016    Hypertension     IBS (irritable bowel syndrome)     Intermittent palpitations     Irritable bowel syndrome     Lumbar radiculopathy     Osteoarthritis     Vertigo    [2]   Past Surgical History:  Procedure Laterality Date    BREAST BIOPSY      BREAST LUMPECTOMY      when pt was 18    SMALL INTESTINE SURGERY     [3]   Current Outpatient Medications:     acetaminophen (TYLENOL) 500 mg tablet, Take 1 tablet (500 mg total) by mouth every 6 (six) hours as needed for mild pain, Disp: 60 tablet, Rfl: 0    albuterol (2.5 mg/3 mL) 0.083 % nebulizer solution, Take 3 mL (2.5 mg total) by nebulization every 6 (six) hours as needed for wheezing or shortness of breath (Patient not taking: Reported on 5/14/2025), Disp: 60 mL, Rfl: 0    albuterol (PROVENTIL HFA,VENTOLIN HFA) 90 mcg/act inhaler, Inhale 2 puffs every 6 (six) hours as needed for wheezing or shortness of breath, Disp: 18 g, Rfl: 5    azelastine (ASTELIN) 0.1 % nasal spray, 1 spray into each nostril 2 (two) times a day Use in each  nostril as directed, Disp: 1 mL, Rfl: 0    benzonatate (TESSALON PERLES) 100 mg capsule, Take 1 capsule (100 mg total) by mouth 3 (three) times a day as needed for cough (Patient not taking: Reported on 5/14/2025), Disp: 30 capsule, Rfl: 0    Blood Pressure Monitoring (B-D ASSURE BPM/AUTO ARM CUFF) MISC, Use in the morning, Disp: 1 each, Rfl: 0    Budeson-Glycopyrrol-Formoterol (Breztri Aerosphere) 160-9-4.8 MCG/ACT AERO, Inhale 2 puffs 2 (two) times a day Rinse mouth after use., Disp: 10.7 g, Rfl: 3    Cholecalciferol (D3-1000) 1,000 units tablet, Take 1 tablet (1,000 Units total) by mouth daily, Disp: 90 tablet, Rfl: 1    fexofenadine (ALLEGRA) 180 MG tablet, Take 180 mg by mouth in the morning., Disp: , Rfl:     fluticasone (FLONASE) 50 mcg/act nasal spray, SPRAY 2 SPRAYS INTO EACH NOSTRIL EVERY DAY, Disp: 48 mL, Rfl: 2    hydrOXYzine HCL (ATARAX) 10 mg tablet, Take 1 tablet (10 mg total) by mouth daily at bedtime as needed for anxiety for up to 10 doses, Disp: 10 tablet, Rfl: 0    ipratropium-albuterol (DUO-NEB) 0.5-2.5 mg/3 mL nebulizer solution, Take 3 mL by nebulization 4 (four) times a day, Disp: 360 mL, Rfl: 2    lisinopril (ZESTRIL) 40 mg tablet, Take 1 tablet (40 mg total) by mouth daily, Disp: 30 tablet, Rfl: 0    montelukast (SINGULAIR) 10 mg tablet, Take 1 tablet (10 mg total) by mouth daily at bedtime Do not start before April 8, 2025., Disp: 90 tablet, Rfl: 0    nicotine (NICODERM CQ) 7 mg/24hr TD 24 hr patch, PLACE 1 PATCH ON THE SKIN OVER 24 HOURS EVERY 24 HOURS (Patient not taking: Reported on 5/20/2025), Disp: 28 patch, Rfl: 0    ondansetron (Zofran ODT) 4 mg disintegrating tablet, Take 1 tablet (4 mg total) by mouth every 6 (six) hours as needed for nausea or vomiting, Disp: 20 tablet, Rfl: 3    ondansetron (ZOFRAN) 4 mg tablet, Take 1 tablet (4 mg total) by mouth every 8 (eight) hours as needed for nausea or vomiting (Patient not taking: Reported on 4/30/2025), Disp: 30 tablet, Rfl: 0     "pantoprazole (PROTONIX) 20 mg tablet, TAKE 1 TABLET BY MOUTH EVERY DAY, Disp: 90 tablet, Rfl: 1    pregabalin (LYRICA) 50 mg capsule, Take 1 capsule in the morning and 2 capsules in the evening, Disp: 270 capsule, Rfl: 0    sodium chloride 3 % inhalation solution, Take 4 mL by nebulization every 8 (eight) hours as needed for cough (Use with albuterol nebulizer), Disp: 30 mL, Rfl: 0  [4]   Allergies  Allergen Reactions    Ambrosia Artemisiifolia (Ragweed) Skin Test Facial Swelling    Codeine Other (See Comments)     Heart races    Epinephrine      Pt reports \"it makes my heart race, they told me I cant take it.\"    Ibuprofen Other (See Comments)     Heart racing    Morphine Other (See Comments)     Heart racing    Pollen Extract Sneezing    Tramadol Other (See Comments)     Heart racing     "

## 2025-05-29 ENCOUNTER — RESULTS FOLLOW-UP (OUTPATIENT)
Dept: PULMONOLOGY | Facility: CLINIC | Age: 80
End: 2025-05-29

## 2025-05-30 NOTE — TELEPHONE ENCOUNTER
Dayronm for pt in regards to scheduling follow up appt with Dr. Welsh. There is a follow up available on 7/2 that you can offer when she calls back. If she would like to be seen sooner Dr. Grimes has available appts.

## 2025-06-02 ENCOUNTER — TELEPHONE (OUTPATIENT)
Dept: FAMILY MEDICINE CLINIC | Facility: CLINIC | Age: 80
End: 2025-06-02

## 2025-06-02 ENCOUNTER — PROCEDURE VISIT (OUTPATIENT)
Dept: FAMILY MEDICINE CLINIC | Facility: CLINIC | Age: 80
End: 2025-06-02

## 2025-06-02 VITALS
BODY MASS INDEX: 24.17 KG/M2 | TEMPERATURE: 98.4 F | HEART RATE: 84 BPM | DIASTOLIC BLOOD PRESSURE: 90 MMHG | HEIGHT: 61 IN | WEIGHT: 128 LBS | OXYGEN SATURATION: 96 % | SYSTOLIC BLOOD PRESSURE: 181 MMHG

## 2025-06-02 DIAGNOSIS — R93.89 ENDOMETRIAL THICKENING ON ULTRASOUND: Primary | ICD-10-CM

## 2025-06-02 DIAGNOSIS — I10 ESSENTIAL HYPERTENSION: ICD-10-CM

## 2025-06-02 PROCEDURE — 58100 BIOPSY OF UTERUS LINING: CPT | Performed by: FAMILY MEDICINE

## 2025-06-02 PROCEDURE — 88305 TISSUE EXAM BY PATHOLOGIST: CPT | Performed by: PATHOLOGY

## 2025-06-02 PROCEDURE — 99214 OFFICE O/P EST MOD 30 MIN: CPT | Performed by: FAMILY MEDICINE

## 2025-06-02 PROCEDURE — G2211 COMPLEX E/M VISIT ADD ON: HCPCS | Performed by: FAMILY MEDICINE

## 2025-06-02 RX ORDER — AMLODIPINE BESYLATE 5 MG/1
TABLET ORAL
COMMUNITY
Start: 2025-05-28 | End: 2025-06-02 | Stop reason: DRUGHIGH

## 2025-06-02 RX ORDER — AMLODIPINE BESYLATE 2.5 MG/1
2.5 TABLET ORAL DAILY
Qty: 30 TABLET | Refills: 5 | Status: SHIPPED | OUTPATIENT
Start: 2025-06-02

## 2025-06-02 NOTE — ASSESSMENT & PLAN NOTE
Poorly controlled, above goal of less than 140/90, on multiple appointments per chart review. current regimen of lisinopril 40 mg daily. Given patient recent lab work showing microalbuminuria, will continue lisinopril at this time. Will discuss addition of SGLT2 at follow up visit for microabuminuria. Will re-start amlodipine at 2.5 mg and titrate up if appropriate. Return to care precautions reviewed, Will follow up in 1 week w/ bx results.   Orders:    amLODIPine (NORVASC) 2.5 mg tablet; Take 1 tablet (2.5 mg total) by mouth daily

## 2025-06-02 NOTE — PROGRESS NOTES
Name: Mireya Yi      : 1945      MRN: [id number]  Encounter Provider: Elham Saleem MD  Encounter Date: 2025   Encounter department: [facility] [place]  :  Assessment & Plan  Endometrial thickening on ultrasound    Orders:    Endometrial biopsy    Tissue Exam    Essential hypertension  Poorly controlled, above goal of less than 140/90, on multiple appointments per chart review. current regimen of lisinopril 40 mg daily. Given patient recent lab work showing microalbuminuria, will continue lisinopril at this time. Will discuss addition of SGLT2 at follow up visit for microabuminuria. Will re-start amlodipine at 2.5 mg and titrate up if appropriate. Return to care precautions reviewed, Will follow up in 1 week w/ bx results.   Orders:    amLODIPine (NORVASC) 2.5 mg tablet; Take 1 tablet (2.5 mg total) by mouth daily           History of Present Illness   Presents to clinic for endometrial bx. Patient with hx of essential htn which was previously well controlled on amlodipine. Amlodipine d/c'd due to unilateral ankle swelling as there was concern for extremity swelling as SE of amlodipine. Patient has been off amlodipine for several months with no improvement of left lateral malleolar swelling. Patient's BP remains poorly controlled on lisinopril 40 mg. Denies signs or symptoms of hypotension. Denies chest pain, increased dizziness, weakness of extremities, confusion, facial weakness.       Review of Systems   Constitutional:  Negative for chills and fever.   Eyes:  Negative for visual disturbance.   Cardiovascular:  Negative for chest pain and palpitations.   Gastrointestinal:  Negative for abdominal pain and nausea.   Genitourinary:  Negative for pelvic pain and vaginal bleeding.   Musculoskeletal:  Negative for arthralgias and myalgias.   Skin:  Negative for rash.   Allergic/Immunologic: Positive for environmental allergies.   Neurological:  Negative for  "seizures and syncope.       Objective   BP (!) 181/90 (BP Location: Right arm, Patient Position: Sitting, Cuff Size: Standard)   Pulse 84   Temp 98.4 °F (36.9 °C) (Temporal)   Ht 5' 1\" (1.549 m)   Wt 58.1 kg (128 lb)   SpO2 96%   BMI 24.19 kg/m²      Physical Exam  Vitals and nursing note reviewed.   Constitutional:       General: She is not in acute distress.     Appearance: Normal appearance. She is well-developed. She is not ill-appearing.   HENT:      Head: Normocephalic and atraumatic.     Eyes:      Conjunctiva/sclera: Conjunctivae normal.       Cardiovascular:      Rate and Rhythm: Normal rate.   Pulmonary:      Effort: Pulmonary effort is normal. No respiratory distress.   Abdominal:      Palpations: Abdomen is soft.      Tenderness: There is no abdominal tenderness.     Musculoskeletal:      Cervical back: Neck supple.     Skin:     General: Skin is warm and dry.     Neurological:      Mental Status: She is alert and oriented to person, place, and time.     Psychiatric:         Mood and Affect: Mood normal.         "

## 2025-06-02 NOTE — PROGRESS NOTES
Endometrial biopsy    Date/Time: 6/2/2025 2:00 PM    Performed by: Elham Saleem MD  Authorized by: Elham Saleem MD    Universal Protocol:  Procedure performed by: (Dr. Garcia)  Consent: Verbal consent obtained. Written consent obtained  Risks and benefits: risks, benefits and alternatives were discussed  Consent given by: patient  Patient understanding: patient states understanding of the procedure being performed  Patient consent: the patient's understanding of the procedure matches consent given  Procedure consent: procedure consent matches procedure scheduled  Radiology Images displayed and confirmed. If images not available, report reviewed: imaging studies available  Patient identity confirmed: verbally with patient and provided demographic data    Indication:     Indications comment:  Thickend endometrial linning (11 cm) on ultrasound  Pre-procedure:     Negative urine pregnancy test: no    Procedure:     Procedure: endometrial biopsy with Pipelle      A bivalve speculum was placed in the vagina: yes      Cervix cleaned and prepped: yes      Uterus sounded: yes      Uterus sound depth (cm):  7    Curettes used:  1    Specimen collected: specimen collected and sent to pathology      Patient tolerated procedure well with no complications: yes    Findings:     Uterus size:  Non-gravid    Cervix: normal

## 2025-06-03 ENCOUNTER — OFFICE VISIT (OUTPATIENT)
Dept: PULMONOLOGY | Facility: CLINIC | Age: 80
End: 2025-06-03
Payer: MEDICARE

## 2025-06-03 VITALS
OXYGEN SATURATION: 96 % | TEMPERATURE: 97.5 F | SYSTOLIC BLOOD PRESSURE: 114 MMHG | WEIGHT: 129.6 LBS | DIASTOLIC BLOOD PRESSURE: 58 MMHG | HEART RATE: 52 BPM | HEIGHT: 61 IN | BODY MASS INDEX: 24.47 KG/M2

## 2025-06-03 DIAGNOSIS — F17.201 TOBACCO USE DISORDER, SEVERE, IN SUSTAINED REMISSION: ICD-10-CM

## 2025-06-03 DIAGNOSIS — J30.9 CHRONIC ALLERGIC RHINITIS: ICD-10-CM

## 2025-06-03 DIAGNOSIS — G47.19 EXCESSIVE DAYTIME SLEEPINESS: ICD-10-CM

## 2025-06-03 DIAGNOSIS — J43.9 PULMONARY EMPHYSEMA, UNSPECIFIED EMPHYSEMA TYPE (HCC): ICD-10-CM

## 2025-06-03 DIAGNOSIS — J45.50 SEVERE PERSISTENT ASTHMA WITHOUT COMPLICATION: Primary | ICD-10-CM

## 2025-06-03 PROCEDURE — 99214 OFFICE O/P EST MOD 30 MIN: CPT | Performed by: INTERNAL MEDICINE

## 2025-06-03 NOTE — PROGRESS NOTES
Follow-up  Visit - Pulmonary Medicine   Name: Mireya Yi      : 1945      MRN: [id number]  Encounter Provider: Manav Clark MD  Encounter Date: 6/3/2025   [facility]  :  Assessment & Plan  Severe persistent asthma without complication  History of severe persistent asthma with prior exacerbations  Was started on Breztri which helped her symptoms   Patient is feeling better on Breztri and says that her wheezing, cough and shortness of breath are improved  Went over [place] allergy panel results, didn't show much allergies to the listed allergens  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    Plan:  Continue Breztri   Continue Duoneb PRN (patient hasn't been using it for the past several weeks)  Continue Albuterol PRN  Continue singulair and allegra       Pulmonary emphysema, unspecified emphysema type (HCC)  History of 20-30 pack year smoking and radiographic evidence of emphysema  Last PFT showed mild obstructive defect  Continued encouraging for smoking cessation       Tobacco use disorder, severe, in sustained remission  Still is an active smoker, smokes around 2-3 cigarettes daily  Talked about cessation in depth, patient will reach out to us if she needs help with cessation, discussed the importance and the risk of her lung function worsening with continued smoking  CT lung cancer screening is already ordered during one of the previous visits but patient said it was denied  CT chest from April of this year showed 3 mm right apical lung nodule which has been stable from prior imaging       Chronic allergic rhinitis  Chronic allergic rhinitis history  Patient is on montelukast and Allegra  Uses azelastine spray and Fluticasone as well  Continues to have some symptoms of rhinitis, recommended to also use nasal saline spray before using azelastine and fluticasone, patient agreed       Excessive daytime sleepiness  Patient already  "has a sleep study ordered for August  Also has trouble sleeping because due to dynamic airway collapse she feels better lying on the left side, but left hip and back is causing more pain when she lies on the left side, doesn't get good quality sleep overall. Follows up with pain medicine         Return in about 6 months (around 12/3/2025).    History of Present Illness   Mireya Yi is a 80 y.o. female who presents for a regular follow up. She has a PMH of severe persistent asthma on Breztri, also emphysema, allergic rhinitis, dynamic airway collapse. Patient is feeling better overall compared to before, Breztri is controlling her symptoms, denies any bothersome shortness of breath, or chest tightness, She does cough and clears her airways. Overall says that she is doing well, hasn't used Duoneb for several weeks because hasn't required it. Rhinitis is still bothering her, is on Azelastine and Flonase, recommended to use nasal saline spray prior to using medicated sprays, on which patient agreed.       Review of Systems   Constitutional:  Positive for fatigue. Negative for chills and fever.   HENT:  Positive for congestion (chronic rhinorrhea). Negative for ear pain and sore throat.    Eyes:  Negative for pain and visual disturbance.   Respiratory:  Negative for cough, shortness of breath and wheezing.    Cardiovascular:  Negative for chest pain and palpitations.   Gastrointestinal:  Negative for abdominal pain and vomiting.   Genitourinary:  Negative for dysuria and hematuria.   Musculoskeletal:  Positive for arthralgias and back pain.   Skin:  Negative for color change and rash.   Neurological:  Negative for seizures and syncope.   All other systems reviewed and are negative.      Aside from what is mentioned in the HPI, ROS is otherwise negative         Medical History Reviewed by provider this encounter:     .    Objective   /58   Pulse (!) 52   Temp 97.5 °F (36.4 °C) (Tympanic)   Ht 5' 1\" (1.549 m)  " " Wt 58.8 kg (129 lb 9.6 oz)   SpO2 96%   BMI 24.49 kg/m²     Physical Exam  Vitals and nursing note reviewed.   Constitutional:       General: She is not in acute distress.     Appearance: She is well-developed.   HENT:      Head: Normocephalic and atraumatic.     Eyes:      Conjunctiva/sclera: Conjunctivae normal.       Cardiovascular:      Rate and Rhythm: Normal rate and regular rhythm.      Heart sounds: No murmur heard.  Pulmonary:      Effort: Pulmonary effort is normal. No respiratory distress.      Breath sounds: Normal breath sounds. No wheezing.   Abdominal:      Palpations: Abdomen is soft.      Tenderness: There is no abdominal tenderness.     Musculoskeletal:         General: No swelling.      Cervical back: Neck supple.      Right lower leg: No edema.      Left lower leg: No edema.     Skin:     General: Skin is warm and dry.      Capillary Refill: Capillary refill takes less than 2 seconds.     Neurological:      Mental Status: She is alert.     Psychiatric:         Mood and Affect: Mood normal.         Diagnostic Data:  Labs: I personally reviewed the most recent laboratory data pertinent to today's visit.      Radiology results:  Radiology Results Review : No pertinent imaging studies reviewed.      PFT/spirometry results:  No results found for: \"FEV1\", \"FVC\", \"CID2VMH\", \"TLC\", \"DLCO\"       Manav Clark MD  "

## 2025-06-03 NOTE — ASSESSMENT & PLAN NOTE
History of 20-30 pack year smoking and radiographic evidence of emphysema  Last PFT showed mild obstructive defect  Continued encouraging for smoking cessation

## 2025-06-03 NOTE — ASSESSMENT & PLAN NOTE
Chronic allergic rhinitis history  Patient is on montelukast and Allegra  Uses azelastine spray and Fluticasone as well  Continues to have some symptoms of rhinitis, recommended to also use nasal saline spray before using azelastine and fluticasone, patient agreed

## 2025-06-04 DIAGNOSIS — M79.89 LEFT LEG SWELLING: ICD-10-CM

## 2025-06-04 DIAGNOSIS — I10 ESSENTIAL HYPERTENSION: ICD-10-CM

## 2025-06-05 RX ORDER — LISINOPRIL 40 MG/1
40 TABLET ORAL DAILY
Qty: 90 TABLET | Refills: 0 | Status: SHIPPED | OUTPATIENT
Start: 2025-06-05

## 2025-06-06 PROCEDURE — 88305 TISSUE EXAM BY PATHOLOGIST: CPT | Performed by: PATHOLOGY

## 2025-06-09 ENCOUNTER — OFFICE VISIT (OUTPATIENT)
Dept: FAMILY MEDICINE CLINIC | Facility: CLINIC | Age: 80
End: 2025-06-09

## 2025-06-09 VITALS
DIASTOLIC BLOOD PRESSURE: 80 MMHG | OXYGEN SATURATION: 99 % | HEART RATE: 87 BPM | RESPIRATION RATE: 20 BRPM | TEMPERATURE: 98.4 F | SYSTOLIC BLOOD PRESSURE: 130 MMHG | BODY MASS INDEX: 24.55 KG/M2 | WEIGHT: 130 LBS | HEIGHT: 61 IN

## 2025-06-09 DIAGNOSIS — I10 ESSENTIAL HYPERTENSION: Primary | ICD-10-CM

## 2025-06-09 DIAGNOSIS — R93.89 ENDOMETRIAL THICKENING ON ULTRASOUND: ICD-10-CM

## 2025-06-09 DIAGNOSIS — J45.30 MILD PERSISTENT ASTHMA WITHOUT COMPLICATION: ICD-10-CM

## 2025-06-09 PROCEDURE — 3079F DIAST BP 80-89 MM HG: CPT | Performed by: FAMILY MEDICINE

## 2025-06-09 PROCEDURE — 99213 OFFICE O/P EST LOW 20 MIN: CPT | Performed by: FAMILY MEDICINE

## 2025-06-09 PROCEDURE — G2211 COMPLEX E/M VISIT ADD ON: HCPCS | Performed by: FAMILY MEDICINE

## 2025-06-09 PROCEDURE — 3075F SYST BP GE 130 - 139MM HG: CPT | Performed by: FAMILY MEDICINE

## 2025-06-09 RX ORDER — MONTELUKAST SODIUM 10 MG/1
10 TABLET ORAL
Qty: 90 TABLET | Refills: 0 | Status: SHIPPED | OUTPATIENT
Start: 2025-06-09

## 2025-06-09 NOTE — ASSESSMENT & PLAN NOTE
Well controlled on current regimen at goal of less than 140/90. Continue medications as prescribed. Follow up 6 months.

## 2025-06-09 NOTE — PROGRESS NOTES
"Name: Mireya Yi      : 1945      MRN: [id number]  Encounter Provider: Elham Saleem MD  Encounter Date: 2025   Encounter department: [facility] [place]  :  Assessment & Plan  Essential hypertension  Well controlled on current regimen at goal of less than 140/90. Continue medications as prescribed. Follow up 6 months.        Endometrial thickening on ultrasound  TVUS with endometrial lining of 11mm. Reviewed endometrial bx results which showed non-atypical hyperplasia involving an endometrial polyp. Will refer to OBGYN for further evaluation.        Mild persistent asthma without complication    Orders:    montelukast (SINGULAIR) 10 mg tablet; Take 1 tablet (10 mg total) by mouth daily at bedtime           History of Present Illness   Presents to clinic for follow up of HTN and endometrial biopsy follow up results.   Denies any vaginal bleeding.   Denies symptomatic hypotension.   Reports compliance with her medications and denies side effects.         Review of Systems   Constitutional:  Negative for chills and fever.   Eyes:  Negative for redness and visual disturbance.   Respiratory:  Positive for cough. Negative for shortness of breath.    Cardiovascular:  Negative for chest pain and palpitations.   Gastrointestinal:  Negative for abdominal pain and constipation.   Genitourinary:  Negative for difficulty urinating, vaginal bleeding and vaginal discharge.   Skin:  Negative for rash.   Neurological:  Negative for syncope and headaches.       Objective   /80   Pulse 87   Temp 98.4 °F (36.9 °C) (Temporal)   Resp 20   Ht 5' 1\" (1.549 m)   Wt 59 kg (130 lb)   SpO2 99%   BMI 24.56 kg/m²      Physical Exam  Vitals and nursing note reviewed.   Constitutional:       General: She is not in acute distress.     Appearance: Normal appearance. She is not ill-appearing.   HENT:      Head: Normocephalic and atraumatic.     Cardiovascular:      Rate and Rhythm: " Normal rate and regular rhythm.      Pulses: Normal pulses.      Heart sounds: Normal heart sounds.   Pulmonary:      Effort: Pulmonary effort is normal. No respiratory distress.      Breath sounds: Normal breath sounds.   Abdominal:      General: There is no distension.      Palpations: Abdomen is soft.      Tenderness: There is no abdominal tenderness.     Musculoskeletal:      Cervical back: Neck supple.      Right lower leg: No edema.      Left lower leg: No edema.     Skin:     General: Skin is warm.     Neurological:      Mental Status: She is alert and oriented to person, place, and time.

## 2025-06-11 ENCOUNTER — TELEPHONE (OUTPATIENT)
Dept: PAIN MEDICINE | Facility: CLINIC | Age: 80
End: 2025-06-11

## 2025-06-11 ENCOUNTER — OFFICE VISIT (OUTPATIENT)
Dept: PAIN MEDICINE | Facility: CLINIC | Age: 80
End: 2025-06-11
Payer: MEDICARE

## 2025-06-11 VITALS — HEIGHT: 61 IN | WEIGHT: 133 LBS | BODY MASS INDEX: 25.11 KG/M2

## 2025-06-11 DIAGNOSIS — M54.16 LUMBAR RADICULOPATHY: ICD-10-CM

## 2025-06-11 PROCEDURE — 99214 OFFICE O/P EST MOD 30 MIN: CPT | Performed by: NURSE PRACTITIONER

## 2025-06-11 RX ORDER — PREGABALIN 50 MG/1
CAPSULE ORAL
Qty: 270 CAPSULE | Refills: 0 | Status: SHIPPED | OUTPATIENT
Start: 2025-06-11

## 2025-06-11 NOTE — ASSESSMENT & PLAN NOTE
Orders:    pregabalin (LYRICA) 50 mg capsule; Take 1 capsule in the morning and 2 capsules in the evening

## 2025-06-11 NOTE — PROGRESS NOTES
Name: Mireya Yi      : 1945      MRN: [id number]  Encounter Provider: EILEEN Duarte  Encounter Date: 2025   [facility]  :  Assessment & Plan  Lumbar radiculopathy    Orders:    pregabalin (LYRICA) 50 mg capsule; Take 1 capsule in the morning and 2 capsules in the evening    We can repeat left L5 and S1 TFESI to address the inflammatory component of the patient's pain.  I discussed with the patient that since there has been moderate to significant improvement in the pain symptoms, we will hold off on any repeat injections at this point in time. However, I reviewed with the patient that if their symptoms should return or worsen,  they should call our office to schedule to discuss repeating the injection.  Patient may continue pregabalin as prescribed.  This medication was refilled today  Will avoid NSAIDs secondary to CKD  Will avoid opioids secondary to significant respiratory compromise and pulmonary disease  Patient may continue Tylenol as needed  Continue home exercise program  Follow-up in 3 months or sooner if needed    My impressions and treatment recommendations were discussed in detail with the patient who verbalized understanding and had no further questions.  Discharge instructions were provided. I personally saw and examined the patient and I agree with the above discussed plan of care.    History of Present Illness     Mireya Yi is a 80 y.o. female with a history of emphysema, hypertension and CKD last seen in the office on May 20, 2025 who presents for a follow up office visit in regards to Back Pain (Lower back)  That radiates into the left lower extremity.  She denies bowel or bladder incontinence or saddle anesthesia.  Patient is status post left L5 and S1 TFESI 2025 with 50% relief of her pain which is ongoing.  She rates her pain a 5 out of 10 on the numeric pain rating scale.  Pain is described as throbbing.  She  "continues on pregabalin 50 mg in the morning and 100 mg at bedtime without side effects.    Review of Systems   Respiratory:  Positive for shortness of breath.    Musculoskeletal:  Positive for back pain.       Medical History Reviewed by provider this encounter:     .       Objective   Ht 5' 1\" (1.549 m)   Wt 60.3 kg (133 lb)   BMI 25.13 kg/m²      Pain Score:   5 (occasional) Occasional  Physical Exam  Constitutional: normal, well developed, well nourished, alert, in no distress and non-toxic and no overt pain behavior.  Eyes: anicteric  HEENT: grossly intact  Neck: supple, symmetric, trachea midline and no masses   Pulmonary: even and unlabored  Cardiovascular: No edema or pitting edema present  Skin: Normal without rashes or lesions and well hydrated  Psychiatric: Mood and affect appropriate  Neurologic: Cranial Nerves II-XII grossly intact  Musculoskeletal: normal gait  "

## 2025-06-18 ENCOUNTER — OFFICE VISIT (OUTPATIENT)
Dept: OBGYN CLINIC | Facility: HOSPITAL | Age: 80
End: 2025-06-18
Payer: MEDICARE

## 2025-06-18 VITALS — BODY MASS INDEX: 24.77 KG/M2 | HEIGHT: 61 IN | WEIGHT: 131.2 LBS

## 2025-06-18 DIAGNOSIS — M25.572 ACUTE LEFT ANKLE PAIN: Primary | ICD-10-CM

## 2025-06-18 DIAGNOSIS — M21.41 PES PLANUS OF BOTH FEET: ICD-10-CM

## 2025-06-18 DIAGNOSIS — S93.409A SPRAIN OF ANKLE, INITIAL ENCOUNTER: ICD-10-CM

## 2025-06-18 DIAGNOSIS — M21.42 PES PLANUS OF BOTH FEET: ICD-10-CM

## 2025-06-18 PROCEDURE — 99213 OFFICE O/P EST LOW 20 MIN: CPT | Performed by: ORTHOPAEDIC SURGERY

## 2025-06-18 NOTE — PROGRESS NOTES
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  Achilles tendinitis  PTT tendinitis  Possible contribution from lumbar radiculopathy  No injury  No benefit from previous physical therapy  Patient declines new referral today  Current symptoms of lateral and posterior ankle pain with inversion   Patient referred to Physical therapy for custom orthotics  Follow up in 2 months      Orders:    Ambulatory referral to Physical Therapy; Future             To do next visit:  Recheck    _____________________________________________________  CHIEF COMPLAINT:  Chief Complaint   Patient presents with    Left Ankle - Follow-up         SUBJECTIVE:  Mireya Yi is a 80 y.o. female who presents for follow up of left ankle.  She stats she is the same.  Today she complains of left lateral ankle.  She notes resolution of left low back pain with left leg pain.  She does use left ankle raptor brace.  She has worked with [facility] recently with benefit.            PAST MEDICAL HISTORY:  Past Medical History[1]    PAST SURGICAL HISTORY:  Past Surgical History[2]    FAMILY HISTORY:  Family History[3]    SOCIAL HISTORY:  Social History[4]    MEDICATIONS:  Current Medications[5]    ALLERGIES:  Allergies[6]    LABS:  HgA1c:   Lab Results   Component Value Date    HGBA1C 6.6 (H) 2025     BMP:   Lab Results   Component Value Date    GLUCOSE 92 2015    CALCIUM 8.9 2025     2015    K 3.8 2025    CO2 20 (L) 2025     (H) 2025    BUN 13 2025    CREATININE 1.16 2025     CBC: No components found for: \"CBC\"    _____________________________________________________  PHYSICAL " "EXAMINATION:  Vital signs: Ht 5' 1\" (1.549 m)   Wt 59.5 kg (131 lb 3.2 oz)   BMI 24.79 kg/m²   General: No acute distress, awake and alert  Psychiatric: Mood and affect appear appropriate  HEENT: Trachea Midline, No torticollis, no apparent facial trauma  Cardiovascular: No audible murmurs; Extremities appear perfused  Pulmonary: No audible wheezing or stridor  Skin: No open lesions; see further details (if any) below    MUSCULOSKELETAL EXAMINATION:  Left ankle/foot:  No erythema or ecchymosis  Pes planus  Mild swelling lateral and posterior ankle  TTP over ATFL  TTP over distal achilles  Calf compartments soft and supple  Sensation intact  Toes are warm sensate and mobile        _____________________________________________________  STUDIES REVIEWED:  none    PROCEDURES PERFORMED:  Procedures      Scribe Attestation      I,:  Joel Jones MA am acting as a scribe while in the presence of the attending physician.:       I,:  Mulugeta Laguna MD personally performed the services described in this documentation    as scribed in my presence.:                [1]   Past Medical History:  Diagnosis Date    Asthma     Asthmatic bronchitis     Last Assessed: 10/7/2014     Chronic diarrhea     Last Assessed: 8/20/2015     Fibromyalgia     Focal nodular hyperplasia of liver     Last Assessed: 6/11/2015    Herpes zoster     Last Assessed: 3/18/2016    Hypertension     IBS (irritable bowel syndrome)     Intermittent palpitations     Irritable bowel syndrome     Lumbar radiculopathy     Osteoarthritis     Vertigo    [2]   Past Surgical History:  Procedure Laterality Date    BREAST BIOPSY      BREAST LUMPECTOMY      when pt was 18    SMALL INTESTINE SURGERY     [3]   Family History  Problem Relation Name Age of Onset    Heart attack Mother      Asthma Father      Breast cancer Sister      Breast cancer Sister      No Known Problems Daughter      No Known Problems Daughter      Arthritis Family      Osteoporosis Family   "   [4]   Social History  Tobacco Use    Smoking status: Some Days     Types: Cigarettes    Smokeless tobacco: Never    Tobacco comments:     Hasn't been smoking recently because of the cough., about 8 days.   Vaping Use    Vaping status: Never Used   Substance Use Topics    Alcohol use: Not Currently    Drug use: No   [5]   Current Outpatient Medications:     acetaminophen (TYLENOL) 500 mg tablet, Take 1 tablet (500 mg total) by mouth every 6 (six) hours as needed for mild pain, Disp: 60 tablet, Rfl: 0    albuterol (2.5 mg/3 mL) 0.083 % nebulizer solution, Take 3 mL (2.5 mg total) by nebulization every 6 (six) hours as needed for wheezing or shortness of breath, Disp: 60 mL, Rfl: 0    albuterol (PROVENTIL HFA,VENTOLIN HFA) 90 mcg/act inhaler, Inhale 2 puffs every 6 (six) hours as needed for wheezing or shortness of breath, Disp: 18 g, Rfl: 5    amLODIPine (NORVASC) 2.5 mg tablet, Take 1 tablet (2.5 mg total) by mouth daily, Disp: 30 tablet, Rfl: 5    azelastine (ASTELIN) 0.1 % nasal spray, 1 spray into each nostril 2 (two) times a day Use in each nostril as directed, Disp: 1 mL, Rfl: 0    Blood Pressure Monitoring (B-D ASSURE BPM/AUTO ARM CUFF) MISC, Use in the morning, Disp: 1 each, Rfl: 0    Budeson-Glycopyrrol-Formoterol (Breztri Aerosphere) 160-9-4.8 MCG/ACT AERO, Inhale 2 puffs 2 (two) times a day Rinse mouth after use., Disp: 10.7 g, Rfl: 3    Cholecalciferol (D3-1000) 1,000 units tablet, Take 1 tablet (1,000 Units total) by mouth daily, Disp: 90 tablet, Rfl: 1    fexofenadine (ALLEGRA) 180 MG tablet, Take 180 mg by mouth in the morning., Disp: , Rfl:     fluticasone (FLONASE) 50 mcg/act nasal spray, SPRAY 2 SPRAYS INTO EACH NOSTRIL EVERY DAY, Disp: 48 mL, Rfl: 2    hydrOXYzine HCL (ATARAX) 10 mg tablet, Take 1 tablet (10 mg total) by mouth daily at bedtime as needed for anxiety for up to 10 doses, Disp: 10 tablet, Rfl: 0    ipratropium-albuterol (DUO-NEB) 0.5-2.5 mg/3 mL nebulizer solution, Take 3 mL by  "nebulization 4 (four) times a day, Disp: 360 mL, Rfl: 2    lisinopril (ZESTRIL) 40 mg tablet, TAKE 1 TABLET BY MOUTH EVERY DAY, Disp: 90 tablet, Rfl: 0    montelukast (SINGULAIR) 10 mg tablet, Take 1 tablet (10 mg total) by mouth daily at bedtime, Disp: 90 tablet, Rfl: 0    nicotine (NICODERM CQ) 7 mg/24hr TD 24 hr patch, PLACE 1 PATCH ON THE SKIN OVER 24 HOURS EVERY 24 HOURS, Disp: 28 patch, Rfl: 0    ondansetron (Zofran ODT) 4 mg disintegrating tablet, Take 1 tablet (4 mg total) by mouth every 6 (six) hours as needed for nausea or vomiting, Disp: 20 tablet, Rfl: 3    pantoprazole (PROTONIX) 20 mg tablet, TAKE 1 TABLET BY MOUTH EVERY DAY, Disp: 90 tablet, Rfl: 1    pregabalin (LYRICA) 50 mg capsule, Take 1 capsule in the morning and 2 capsules in the evening, Disp: 270 capsule, Rfl: 0    sodium chloride 3 % inhalation solution, Take 4 mL by nebulization every 8 (eight) hours as needed for cough (Use with albuterol nebulizer), Disp: 30 mL, Rfl: 0    benzonatate (TESSALON PERLES) 100 mg capsule, Take 1 capsule (100 mg total) by mouth 3 (three) times a day as needed for cough (Patient not taking: Reported on 6/3/2025), Disp: 30 capsule, Rfl: 0    ondansetron (ZOFRAN) 4 mg tablet, Take 1 tablet (4 mg total) by mouth every 8 (eight) hours as needed for nausea or vomiting (Patient not taking: Reported on 6/2/2025), Disp: 30 tablet, Rfl: 0  [6]   Allergies  Allergen Reactions    Ambrosia Artemisiifolia (Ragweed) Skin Test Facial Swelling    Codeine Other (See Comments)     Heart races    Epinephrine      Pt reports \"it makes my heart race, they told me I cant take it.\"    Ibuprofen Other (See Comments)     Heart racing    Morphine Other (See Comments)     Heart racing    Pollen Extract Sneezing    Tramadol Other (See Comments)     Heart racing     "

## 2025-06-18 NOTE — ASSESSMENT & PLAN NOTE
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

## 2025-06-23 DIAGNOSIS — F17.201 TOBACCO USE DISORDER, SEVERE, IN SUSTAINED REMISSION: ICD-10-CM

## 2025-06-24 DIAGNOSIS — I10 ESSENTIAL HYPERTENSION: ICD-10-CM

## 2025-06-24 RX ORDER — AMLODIPINE BESYLATE 2.5 MG/1
2.5 TABLET ORAL DAILY
Qty: 90 TABLET | Refills: 0 | Status: SHIPPED | OUTPATIENT
Start: 2025-06-24

## 2025-06-26 ENCOUNTER — HOSPITAL ENCOUNTER (EMERGENCY)
Facility: HOSPITAL | Age: 80
Discharge: HOME | End: 2025-06-26
Attending: EMERGENCY MEDICINE | Admitting: EMERGENCY MEDICINE
Payer: MEDICARE

## 2025-06-26 ENCOUNTER — APPOINTMENT (EMERGENCY)
Dept: RADIOLOGY | Facility: HOSPITAL | Age: 80
End: 2025-06-26
Payer: MEDICARE

## 2025-06-26 VITALS
OXYGEN SATURATION: 98 % | TEMPERATURE: 99.3 F | HEART RATE: 92 BPM | RESPIRATION RATE: 18 BRPM | DIASTOLIC BLOOD PRESSURE: 64 MMHG | SYSTOLIC BLOOD PRESSURE: 118 MMHG

## 2025-06-26 DIAGNOSIS — R10.9 ABDOMINAL PAIN: ICD-10-CM

## 2025-06-26 DIAGNOSIS — R11.0 NAUSEA: ICD-10-CM

## 2025-06-26 DIAGNOSIS — K57.92 DIVERTICULITIS: Primary | ICD-10-CM

## 2025-06-26 LAB
ALBUMIN SERPL BCG-MCNC: 3.7 G/DL (ref 3.5–5)
ALP SERPL-CCNC: 75 U/L (ref 34–104)
ALT SERPL W P-5'-P-CCNC: 8 U/L (ref 7–52)
ANION GAP SERPL CALCULATED.3IONS-SCNC: 9 MMOL/L (ref 4–13)
APTT PPP: 29 SECONDS (ref 23–34)
AST SERPL W P-5'-P-CCNC: 9 U/L (ref 13–39)
ATRIAL RATE: 106 BPM
BACTERIA UR QL AUTO: ABNORMAL /HPF
BASOPHILS # BLD AUTO: 0.09 THOUSANDS/ÂΜL (ref 0–0.1)
BASOPHILS NFR BLD AUTO: 1 % (ref 0–1)
BILIRUB SERPL-MCNC: 0.27 MG/DL (ref 0.2–1)
BILIRUB UR QL STRIP: NEGATIVE
BUN SERPL-MCNC: 23 MG/DL (ref 5–25)
CALCIUM SERPL-MCNC: 9.3 MG/DL (ref 8.4–10.2)
CHLORIDE SERPL-SCNC: 111 MMOL/L (ref 96–108)
CLARITY UR: ABNORMAL
CO2 SERPL-SCNC: 21 MMOL/L (ref 21–32)
COLOR UR: ABNORMAL
CREAT SERPL-MCNC: 1.29 MG/DL (ref 0.6–1.3)
EOSINOPHIL # BLD AUTO: 0.43 THOUSAND/ÂΜL (ref 0–0.61)
EOSINOPHIL NFR BLD AUTO: 3 % (ref 0–6)
ERYTHROCYTE [DISTWIDTH] IN BLOOD BY AUTOMATED COUNT: 18.1 % (ref 11.6–15.1)
GFR SERPL CREATININE-BSD FRML MDRD: 39 ML/MIN/1.73SQ M
GLUCOSE SERPL-MCNC: 106 MG/DL (ref 65–140)
GLUCOSE UR STRIP-MCNC: NEGATIVE MG/DL
HCT VFR BLD AUTO: 33.6 % (ref 34.8–46.1)
HGB BLD-MCNC: 10.1 G/DL (ref 11.5–15.4)
HGB UR QL STRIP.AUTO: NEGATIVE
IMM GRANULOCYTES # BLD AUTO: 0.09 THOUSAND/UL (ref 0–0.2)
IMM GRANULOCYTES NFR BLD AUTO: 1 % (ref 0–2)
INR PPP: 1.02 (ref 0.85–1.19)
KETONES UR STRIP-MCNC: NEGATIVE MG/DL
LACTATE SERPL-SCNC: 0.8 MMOL/L (ref 0.5–2)
LEUKOCYTE ESTERASE UR QL STRIP: NEGATIVE
LIPASE SERPL-CCNC: 24 U/L (ref 11–82)
LYMPHOCYTES # BLD AUTO: 2.07 THOUSANDS/ÂΜL (ref 0.6–4.47)
LYMPHOCYTES NFR BLD AUTO: 12 % (ref 14–44)
MCH RBC QN AUTO: 23.3 PG (ref 26.8–34.3)
MCHC RBC AUTO-ENTMCNC: 30.1 G/DL (ref 31.4–37.4)
MCV RBC AUTO: 78 FL (ref 82–98)
MONOCYTES # BLD AUTO: 1.21 THOUSAND/ÂΜL (ref 0.17–1.22)
MONOCYTES NFR BLD AUTO: 7 % (ref 4–12)
MUCOUS THREADS UR QL AUTO: ABNORMAL
NEUTROPHILS # BLD AUTO: 13.6 THOUSANDS/ÂΜL (ref 1.85–7.62)
NEUTS SEG NFR BLD AUTO: 76 % (ref 43–75)
NITRITE UR QL STRIP: NEGATIVE
NON-SQ EPI CELLS URNS QL MICRO: ABNORMAL /HPF
NRBC BLD AUTO-RTO: 0 /100 WBCS
P AXIS: 73 DEGREES
PH UR STRIP.AUTO: 5.5 [PH]
PLATELET # BLD AUTO: 355 THOUSANDS/UL (ref 149–390)
PMV BLD AUTO: 11.1 FL (ref 8.9–12.7)
POTASSIUM SERPL-SCNC: 4 MMOL/L (ref 3.5–5.3)
PR INTERVAL: 116 MS
PROCALCITONIN SERPL-MCNC: 0.13 NG/ML
PROT SERPL-MCNC: 6.8 G/DL (ref 6.4–8.4)
PROT UR STRIP-MCNC: ABNORMAL MG/DL
PROTHROMBIN TIME: 13.7 SECONDS (ref 12.3–15)
QRS AXIS: 12 DEGREES
QRSD INTERVAL: 82 MS
QT INTERVAL: 334 MS
QTC INTERVAL: 444 MS
RBC # BLD AUTO: 4.33 MILLION/UL (ref 3.81–5.12)
RBC #/AREA URNS AUTO: ABNORMAL /HPF
SODIUM SERPL-SCNC: 141 MMOL/L (ref 135–147)
SP GR UR STRIP.AUTO: 1.01 (ref 1–1.03)
T WAVE AXIS: 56 DEGREES
UROBILINOGEN UR STRIP-ACNC: <2 MG/DL
VENTRICULAR RATE: 106 BPM
WBC # BLD AUTO: 17.49 THOUSAND/UL (ref 4.31–10.16)
WBC #/AREA URNS AUTO: ABNORMAL /HPF

## 2025-06-26 PROCEDURE — 85025 COMPLETE CBC W/AUTO DIFF WBC: CPT

## 2025-06-26 PROCEDURE — 85610 PROTHROMBIN TIME: CPT

## 2025-06-26 PROCEDURE — 93010 ELECTROCARDIOGRAM REPORT: CPT | Performed by: INTERNAL MEDICINE

## 2025-06-26 PROCEDURE — 74177 CT ABD & PELVIS W/CONTRAST: CPT

## 2025-06-26 PROCEDURE — 83605 ASSAY OF LACTIC ACID: CPT

## 2025-06-26 PROCEDURE — 80053 COMPREHEN METABOLIC PANEL: CPT

## 2025-06-26 PROCEDURE — 84145 PROCALCITONIN (PCT): CPT

## 2025-06-26 PROCEDURE — 83690 ASSAY OF LIPASE: CPT

## 2025-06-26 PROCEDURE — 85730 THROMBOPLASTIN TIME PARTIAL: CPT

## 2025-06-26 PROCEDURE — 81001 URINALYSIS AUTO W/SCOPE: CPT

## 2025-06-26 PROCEDURE — 36415 COLL VENOUS BLD VENIPUNCTURE: CPT

## 2025-06-26 PROCEDURE — 87040 BLOOD CULTURE FOR BACTERIA: CPT

## 2025-06-26 PROCEDURE — 93005 ELECTROCARDIOGRAM TRACING: CPT

## 2025-06-26 PROCEDURE — 99285 EMERGENCY DEPT VISIT HI MDM: CPT | Performed by: EMERGENCY MEDICINE

## 2025-06-26 RX ORDER — HYDROMORPHONE HCL IN WATER/PF 6 MG/30 ML
0.2 PATIENT CONTROLLED ANALGESIA SYRINGE INTRAVENOUS ONCE
Status: COMPLETED | OUTPATIENT
Start: 2025-06-26 | End: 2025-06-26

## 2025-06-26 RX ORDER — ONDANSETRON 2 MG/ML
4 INJECTION INTRAMUSCULAR; INTRAVENOUS ONCE
Status: COMPLETED | OUTPATIENT
Start: 2025-06-26 | End: 2025-06-26

## 2025-06-26 RX ADMIN — IOHEXOL 85 ML: 350 INJECTION, SOLUTION INTRAVENOUS at 15:09

## 2025-06-26 RX ADMIN — AMOXICILLIN AND CLAVULANATE POTASSIUM 1 TABLET: 875; 125 TABLET, COATED ORAL at 15:48

## 2025-06-26 RX ADMIN — ONDANSETRON 4 MG: 2 INJECTION, SOLUTION INTRAMUSCULAR; INTRAVENOUS at 14:38

## 2025-06-26 RX ADMIN — HYDROMORPHONE HYDROCHLORIDE 0.2 MG: 0.2 INJECTION, SOLUTION INTRAMUSCULAR; INTRAVENOUS; SUBCUTANEOUS at 14:38

## 2025-06-26 NOTE — DISCHARGE INSTRUCTIONS
You were seen and evaluated in the emergency department for abdominal pain.  You are found to have diverticulitis which is an infection in your colon.  You are started on an antibiotic.  Please take the antibiotic as prescribed.  A referral was placed to gastroenterology which is the [facility].  Please give them a call to schedule follow-up.  Please also follow-up with your primary care physician.  Please return to the emergency department if you experience any new or worsening symptoms including nausea, vomiting, diarrhea, fevers or chills.

## 2025-06-26 NOTE — ED PROVIDER NOTES
Time reflects when diagnosis was documented in both MDM as applicable and the Disposition within this note       Time User Action Codes Description Comment    6/26/2025  3:45 PM Lexi Guadalupe Add [K57.92] Diverticulitis     6/26/2025  3:45 PM Lexi Guadalupe Add [R10.9] Abdominal pain     6/26/2025  3:45 PM Lexi Guadalupe Add [R11.0] Nausea           ED Disposition       ED Disposition   Discharge    Condition   Stable    Date/Time   Thu Jun 26, 2025  3:57 PM    Comment   Mireya Waldenuria discharge to home/self care.                   Assessment & Plan       Medical Decision Making  Patient is a 80-year-old female who presents today for evaluation of right upper quadrant abdominal pain.  Vital signs: Patient initially tachycardic 107 with repeat heart rate 92.     Pertinent physical exam: Alert and oriented in no acute distress.  Abdomen soft with tenderness in the right upper quadrant and right lower quadrant and right flank.  No rebound tenderness.  No CVA tenderness bilaterally.     Differential diagnosis and plan:   Differential diagnosis includes but is not limited to cholecystitis, appendicitis, pancreatitis.  Patient had labs collected prior to my evaluation including CBC, CMP and lipase.  Will add on septic workup.  Will give Dilaudid and Zofran.     View ED course above for further discussion on patient workup.      All labs reviewed and utilized in the medical decision making process  All radiology studies independently viewed by me and interpreted by the radiologist.  I reviewed all testing with the patient.      ED course:  While in the emergency department blood work revealed leukocytosis of 17.4, stable hemoglobin, normal electrolytes, normal renal function, lactic 0.8 CT abdomen pelvis revealed acute uncomplicated diverticulitis at the hepatic flexure.  Patient received pain medication and antiemetic.  Patient given a dose of Augmentin.  I discussed the results of the patient and explained  "diverticulitis.  Informed her that she requires follow-up with a gastroenterologist to have a colonoscopy completed.  Patient voices understanding.  I gave her strict return precaution.  Prescription sent to patient's pharmacy for Augmentin. Prior to discharge, I discussed discharge instructions with patient at bedside. Patient was provided with both verbal and written instructions. Patient is understanding of the discharge instructions and is agreeable to plan of care. Return precautions were discussed with patient bedside. All questions were answered. Patient was comfortable with the plan of care and discharged to home.      Reevaluation: Alert and oriented no acute distress.  States pain improved with the pain medication.  Disposition: Discharge home with referral to gastroenterology.     Portions of the record may have been created with voice recognition software. Occasional wrong word or \"sound a like\" substitutions may have occurred due to the inherent limitations of voice recognition software. Read the chart carefully and recognize, using context, where substitutions have occurred.    Amount and/or Complexity of Data Reviewed  Labs: ordered. Decision-making details documented in ED Course.  Radiology: ordered. Decision-making details documented in ED Course.    Risk  Prescription drug management.        ED Course as of 06/26/25 1605   Thu Jun 26, 2025   1425 WBC(!): 17.49  Leukocytosis   1426 Hemoglobin(!): 10.1  Stable, baseline   1426 LIPASE: 24   1530 LACTIC ACID: 0.8   1537 CT abdomen pelvis with contrast  1.  Acute uncomplicated diverticulitis at the hepatic flexure.  2.  Underdistended transverse colon with a focal rounded appearance at the mid transverse colon. Although this could be related to underdistention/redundancy, colonoscopy is recommended to exclude neoplasm.       Medications   ondansetron (ZOFRAN) injection 4 mg (4 mg Intravenous Given 6/26/25 1438)   HYDROmorphone HCl (DILAUDID) injection " "0.2 mg (0.2 mg Intravenous Given 6/26/25 1438)   iohexol (OMNIPAQUE) 350 MG/ML injection (MULTI-DOSE) 85 mL (85 mL Intravenous Given 6/26/25 1509)   amoxicillin-clavulanate (AUGMENTIN) 875-125 mg per tablet 1 tablet (1 tablet Oral Given 6/26/25 1548)       ED Risk Strat Scores                    No data recorded        SBIRT 20yo+      Flowsheet Row Most Recent Value   Initial Alcohol Screen: US AUDIT-C     1. How often do you have a drink containing alcohol? 0 Filed at: 06/26/2025 1521   2. How many drinks containing alcohol do you have on a typical day you are drinking?  0 Filed at: 06/26/2025 1521   3a. Male UNDER 65: How often do you have five or more drinks on one occasion? 0 Filed at: 06/26/2025 1521   3b. FEMALE Any Age, or MALE 65+: How often do you have 4 or more drinks on one occassion? 0 Filed at: 06/26/2025 1521   Audit-C Score 0 Filed at: 06/26/2025 1521   PASTOR: How many times in the past year have you...    Used an illegal drug or used a prescription medication for non-medical reasons? Never Filed at: 06/26/2025 1521                            History of Present Illness       Chief Complaint   Patient presents with    Abdominal Pain     Started in the middle of the night, RUQ pain with nausea only drank water today. States \"its feels crampy\"       Past Medical History[1]   Past Surgical History[2]   Family History[3]   Social History[4]   E-Cigarette/Vaping    E-Cigarette Use Never User       E-Cigarette/Vaping Substances    Nicotine No     THC No     CBD No     Flavoring No     Other No     Unknown No       I have reviewed and agree with the history as documented.     Patient is a 80-year-old female with past medical history hypertension, asthma, small bowel resection, fibromyalgia who presents today for evaluation of right upper quadrant abdominal pain.  The pain in her abdomen woke her up in the middle of the night and has progressively worsened.  She now describes the pain as sharp constant " abdominal pain.  The pain is worse when she coughs or moves or pushes on it.  She endorses nausea without vomiting.  She denies fever, chills, chest pain, shortness of breath, urinary symptoms.  She denies change in sputum.  She denies nasal congestion, rhinorrhea, ear pain, headaches, lightheadedness, dizziness.  She has been having normal bowel movements and had a nonbloody normal bowel movement yesterday.          Review of Systems   Constitutional:  Negative for chills and fever.   HENT:  Negative for congestion, rhinorrhea and sore throat.    Respiratory:  Negative for shortness of breath.    Cardiovascular:  Negative for chest pain.   Gastrointestinal:  Positive for abdominal pain and nausea. Negative for blood in stool, diarrhea and vomiting.   Genitourinary:  Negative for dysuria, hematuria and urgency.   Musculoskeletal:  Negative for back pain.   Neurological:  Negative for dizziness, light-headedness and headaches.           Objective       ED Triage Vitals [06/26/25 1142]   Temperature Pulse Blood Pressure Respirations SpO2 Patient Position - Orthostatic VS   99.3 °F (37.4 °C) (!) 107 149/85 18 98 % Sitting      Temp Source Heart Rate Source BP Location FiO2 (%) Pain Score    Temporal Monitor Left arm -- 10 - Worst Possible Pain      Vitals      Date and Time Temp Pulse SpO2 Resp BP Pain Score FACES Pain Rating User   06/26/25 1543 -- 92 98 % 18 118/64 -- -- AS   06/26/25 1438 -- -- -- -- -- 10 - Worst Possible Pain -- AS   06/26/25 1142 99.3 °F (37.4 °C) 107 98 % 18 149/85 10 - Worst Possible Pain -- TEAGAN            Physical Exam  Vitals and nursing note reviewed.   Constitutional:       General: She is not in acute distress.     Appearance: Normal appearance. She is well-developed. She is not ill-appearing.   HENT:      Head: Normocephalic and atraumatic.      Nose: Nose normal.      Mouth/Throat:      Mouth: Mucous membranes are moist.     Eyes:      Conjunctiva/sclera: Conjunctivae normal.        Cardiovascular:      Rate and Rhythm: Normal rate and regular rhythm.      Heart sounds: Normal heart sounds. No murmur heard.  Pulmonary:      Effort: Pulmonary effort is normal. No respiratory distress.      Breath sounds: Normal breath sounds.   Abdominal:      General: Abdomen is flat. There is no distension.      Palpations: Abdomen is soft.      Tenderness: There is abdominal tenderness in the right upper quadrant, right lower quadrant and epigastric area. There is guarding. There is no right CVA tenderness, left CVA tenderness or rebound.     Musculoskeletal:      Right lower leg: No edema.      Left lower leg: No edema.     Skin:     General: Skin is warm and dry.      Capillary Refill: Capillary refill takes less than 2 seconds.     Neurological:      General: No focal deficit present.      Mental Status: She is alert and oriented to person, place, and time.     Psychiatric:         Mood and Affect: Mood normal.         Behavior: Behavior normal.         Results Reviewed       Procedure Component Value Units Date/Time    UA w Reflex to Microscopic w Reflex to Culture [[id number]]  (Abnormal) Collected: 06/26/25 1541    Lab Status: Final result Specimen: Urine, Clean Catch Updated: 06/26/25 1602     Color, UA Light Yellow     Clarity, UA Turbid     Specific Gravity, UA 1.014     pH, UA 5.5     Leukocytes, UA Negative     Nitrite, UA Negative     Protein, UA Trace mg/dl      Glucose, UA Negative mg/dl      Ketones, UA Negative mg/dl      Urobilinogen, UA <2.0 mg/dl      Bilirubin, UA Negative     Occult Blood, UA Negative    Urine Microscopic [[id number]] Collected: 06/26/25 1541    Lab Status: In process Specimen: Urine, Clean Catch Updated: 06/26/25 1602    Procalcitonin [[id number]]  (Normal) Collected: 06/26/25 1440    Lab Status: Final result Specimen: Blood from Arm, Right Updated: 06/26/25 1519     Procalcitonin 0.13 ng/ml     Lactic acid [[id number]]  (Normal) Collected: 06/26/25 1440    Lab Status:  Final result Specimen: Blood from Arm, Right Updated: 06/26/25 1516     LACTIC ACID 0.8 mmol/L     Narrative:      Result may be elevated if tourniquet was used during collection.    Protime-INR [[id number]]  (Normal) Collected: 06/26/25 1440    Lab Status: Final result Specimen: Blood from Arm, Right Updated: 06/26/25 1502     Protime 13.7 seconds      INR 1.02    Narrative:      INR Therapeutic Range    Indication                                             INR Range      Atrial Fibrillation                                               2.0-3.0  Hypercoagulable State                                    2.0.2.3  Left Ventricular Asist Device                            2.0-3.0  Mechanical Heart Valve                                  -    Aortic(with afib, MI, embolism, HF, LA enlargement,    and/or coagulopathy)                                     2.0-3.0 (2.5-3.5)     Mitral                                                             2.5-3.5  Prosthetic/Bioprosthetic Heart Valve               2.0-3.0  Venous thromboembolism (VTE: VT, PE        2.0-3.0    APTT [[id number]]  (Normal) Collected: 06/26/25 1440    Lab Status: Final result Specimen: Blood from Arm, Right Updated: 06/26/25 1502     PTT 29 seconds     Blood culture #2 [[id number]] Collected: 06/26/25 1440    Lab Status: In process Specimen: Blood from Hand, Left Updated: 06/26/25 1445    Blood culture #1 [[id number]] Collected: 06/26/25 1440    Lab Status: In process Specimen: Blood from Arm, Right Updated: 06/26/25 1445    Lipase [[id number]]  (Normal) Collected: 06/26/25 1146    Lab Status: Final result Specimen: Blood from Arm, Left Updated: 06/26/25 1218     Lipase 24 u/L     Comprehensive metabolic panel [[id number]]  (Abnormal) Collected: 06/26/25 1146    Lab Status: Final result Specimen: Blood from Arm, Left Updated: 06/26/25 1218     Sodium 141 mmol/L      Potassium 4.0 mmol/L      Chloride 111 mmol/L      CO2 21 mmol/L      ANION GAP 9 mmol/L      BUN  23 mg/dL      Creatinine 1.29 mg/dL      Glucose 106 mg/dL      Calcium 9.3 mg/dL      AST 9 U/L      ALT 8 U/L      Alkaline Phosphatase 75 U/L      Total Protein 6.8 g/dL      Albumin 3.7 g/dL      Total Bilirubin 0.27 mg/dL      eGFR 39 ml/min/1.73sq m     Narrative:      [employer] Kid[employer] guidelines for Chronic Kidney Disease (CKD):     Stage 1 with normal or high GFR (GFR > 90 mL/min/1.73 square meters)    Stage 2 Mild CKD (GFR = 60-89 mL/min/1.73 square meters)    Stage 3A Moderate CKD (GFR = 45-59 mL/min/1.73 square meters)    Stage 3B Moderate CKD (GFR = 30-44 mL/min/1.73 square meters)    Stage 4 Severe CKD (GFR = 15-29 mL/min/1.73 square meters)    Stage 5 End Stage CKD (GFR <15 mL/min/1.73 square meters)  Note: GFR calculation is accurate only with a steady state creatinine    CBC and differential [[id number]]  (Abnormal) Collected: 06/26/25 1146    Lab Status: Final result Specimen: Blood from Arm, Left Updated: 06/26/25 1158     WBC 17.49 Thousand/uL      RBC 4.33 Million/uL      Hemoglobin 10.1 g/dL      Hematocrit 33.6 %      MCV 78 fL      MCH 23.3 pg      MCHC 30.1 g/dL      RDW 18.1 %      MPV 11.1 fL      Platelets 355 Thousands/uL      nRBC 0 /100 WBCs      Segmented % 76 %      Immature Grans % 1 %      Lymphocytes % 12 %      Monocytes % 7 %      Eosinophils Relative 3 %      Basophils Relative 1 %      Absolute Neutrophils 13.60 Thousands/µL      Absolute Immature Grans 0.09 Thousand/uL      Absolute Lymphocytes 2.07 Thousands/µL      Absolute Monocytes 1.21 Thousand/µL      Eosinophils Absolute 0.43 Thousand/µL      Basophils Absolute 0.09 Thousands/µL             CT abdomen pelvis with contrast   Final Interpretation by Jose Shaw MD (06/26 4808)      1.  Acute uncomplicated diverticulitis at the hepatic flexure.   2.  Underdistended transverse colon with a focal rounded appearance at the mid transverse colon. Although this could be related to  underdistention/redundancy, colonoscopy is recommended to exclude neoplasm.      The study was marked in EPIC for immediate notification.         Workstation performed: [id number]             ECG 12 Lead Documentation Only    Date/Time: 6/26/2025 4:04 PM    Performed by: Lexi Guadalupe DO  Authorized by: Lexi Guadalupe DO    Indications / Diagnosis:  Abdominal pain  ECG reviewed by me, the ED Provider: yes    Patient location:  ED  Previous ECG:     Previous ECG:  Compared to current    Similarity:  No change  Rate:     ECG rate:  106    ECG rate assessment: tachycardic    Rhythm:     Rhythm: sinus rhythm    Ectopy:     Ectopy: none    QRS:     QRS axis:  Normal    QRS intervals:  Normal  Conduction:     Conduction: normal    ST segments:     ST segments:  Normal  T waves:     T waves: normal        ED Medication and Procedure Management   Prior to Admission Medications   Prescriptions Last Dose Informant Patient Reported? Taking?   Blood Pressure Monitoring (B-D ASSURE BPM/AUTO ARM CUFF) MISC  Self No No   Sig: Use in the morning   Budeson-Glycopyrrol-Formoterol (Breztri Aerosphere) 160-9-4.8 MCG/ACT AERO  Self No No   Sig: Inhale 2 puffs 2 (two) times a day Rinse mouth after use.   Cholecalciferol (D3-1000) 1,000 units tablet  Self No No   Sig: Take 1 tablet (1,000 Units total) by mouth daily   acetaminophen (TYLENOL) 500 mg tablet  Self No No   Sig: Take 1 tablet (500 mg total) by mouth every 6 (six) hours as needed for mild pain   albuterol (2.5 mg/3 mL) 0.083 % nebulizer solution  Self No No   Sig: Take 3 mL (2.5 mg total) by nebulization every 6 (six) hours as needed for wheezing or shortness of breath   albuterol (PROVENTIL HFA,VENTOLIN HFA) 90 mcg/act inhaler  Self No No   Sig: Inhale 2 puffs every 6 (six) hours as needed for wheezing or shortness of breath   amLODIPine (NORVASC) 2.5 mg tablet   No No   Sig: TAKE 1 TABLET BY MOUTH EVERY DAY   azelastine (ASTELIN) 0.1 % nasal spray  Self No No   Sig:  1 spray into each nostril 2 (two) times a day Use in each nostril as directed   benzonatate (TESSALON PERLES) 100 mg capsule  Self No No   Sig: Take 1 capsule (100 mg total) by mouth 3 (three) times a day as needed for cough   Patient not taking: Reported on 6/3/2025   fexofenadine (ALLEGRA) 180 MG tablet  Self Yes No   Sig: Take 180 mg by mouth in the morning.   fluticasone (FLONASE) 50 mcg/act nasal spray  Self No No   Sig: SPRAY 2 SPRAYS INTO EACH NOSTRIL EVERY DAY   hydrOXYzine HCL (ATARAX) 10 mg tablet  Self No No   Sig: Take 1 tablet (10 mg total) by mouth daily at bedtime as needed for anxiety for up to 10 doses   ipratropium-albuterol (DUO-NEB) 0.5-2.5 mg/3 mL nebulizer solution  Self No No   Sig: Take 3 mL by nebulization 4 (four) times a day   lisinopril (ZESTRIL) 40 mg tablet   No No   Sig: TAKE 1 TABLET BY MOUTH EVERY DAY   montelukast (SINGULAIR) 10 mg tablet   No No   Sig: Take 1 tablet (10 mg total) by mouth daily at bedtime   nicotine (NICODERM CQ) 7 mg/24hr TD 24 hr patch   No No   Sig: PLACE 1 PATCH ON THE SKIN OVER 24 HOURS EVERY 24 HOURS   ondansetron (ZOFRAN) 4 mg tablet  Self No No   Sig: Take 1 tablet (4 mg total) by mouth every 8 (eight) hours as needed for nausea or vomiting   Patient not taking: Reported on 6/2/2025   ondansetron (Zofran ODT) 4 mg disintegrating tablet  Self No No   Sig: Take 1 tablet (4 mg total) by mouth every 6 (six) hours as needed for nausea or vomiting   pantoprazole (PROTONIX) 20 mg tablet  Self No No   Sig: TAKE 1 TABLET BY MOUTH EVERY DAY   pregabalin (LYRICA) 50 mg capsule   No No   Sig: Take 1 capsule in the morning and 2 capsules in the evening   sodium chloride 3 % inhalation solution  Self No No   Sig: Take 4 mL by nebulization every 8 (eight) hours as needed for cough (Use with albuterol nebulizer)      Facility-Administered Medications: None     Patient's Medications   Discharge Prescriptions    AMOXICILLIN-CLAVULANATE (AUGMENTIN) 875-125 MG PER TABLET     Take 1 tablet by mouth every 12 (twelve) hours for 7 days       Start Date: 6/26/2025 End Date: 7/3/2025       Order Dose: 1 tablet       Quantity: 14 tablet    Refills: 0       ED SEPSIS DOCUMENTATION   Time reflects when diagnosis was documented in both MDM as applicable and the Disposition within this note       Time User Action Codes Description Comment    6/26/2025  3:45 PM Lexi Guadalupe [K57.92] Diverticulitis     6/26/2025  3:45 PM Lexi Guadalupe Add [R10.9] Abdominal pain     6/26/2025  3:45 PM Lexi Guadalupe [R11.0] Nausea                    [1]   Past Medical History:  Diagnosis Date    Asthma     Asthmatic bronchitis     Last Assessed: 10/7/2014     Chronic diarrhea     Last Assessed: 8/20/2015     Fibromyalgia     Focal nodular hyperplasia of liver     Last Assessed: 6/11/2015    Herpes zoster     Last Assessed: 3/18/2016    Hypertension     IBS (irritable bowel syndrome)     Intermittent palpitations     Irritable bowel syndrome     Lumbar radiculopathy     Osteoarthritis     Vertigo    [2]   Past Surgical History:  Procedure Laterality Date    BREAST BIOPSY      BREAST LUMPECTOMY      when pt was 18    SMALL INTESTINE SURGERY     [3]   Family History  Problem Relation Name Age of Onset    Heart attack Mother      Asthma Father      Breast cancer Sister      Breast cancer Sister      No Known Problems Daughter      No Known Problems Daughter      Arthritis Family      Osteoporosis Family     [4]   Social History  Tobacco Use    Smoking status: Some Days     Types: Cigarettes    Smokeless tobacco: Never    Tobacco comments:     Hasn't been smoking recently because of the cough., about 8 days.   Vaping Use    Vaping status: Never Used   Substance Use Topics    Alcohol use: Not Currently    Drug use: No        Lexi Guadalupe DO  06/26/25 [id number]

## 2025-06-26 NOTE — ED ATTENDING ATTESTATION
6/26/2025  I, Shai Neumann DO, saw and evaluated the patient. I have discussed the patient with the resident/non-physician practitioner and agree with the resident's/non-physician practitioner's findings, Plan of Care, and MDM as documented in the resident's/non-physician practitioner's note, except where noted. All available labs and Radiology studies were reviewed.  I was present for key portions of any procedure(s) performed by the resident/non-physician practitioner and I was immediately available to provide assistance.       At this point I agree with the current assessment done in the Emergency Department.  I have conducted an independent evaluation of this patient a history and physical is as follows:    Patient is a 80-year-old female with a history of asthma irritable bowel syndrome, fibromyalgia, chronic cough, surgical history markable for small intestine resection, says about 2 AM this morning, about 12 and half hours ago she was awoken with severe right upper abdominal pain, going down towards the right lower quadrant, nausea but no vomiting, worse with pressing or moving, no dysuria, hematuria, no vaginal bleeding or discharge, no diarrhea, no constipation, still passing flatus.  No one at home with similar symptoms.  took a dose of aspirin for this.  Has never had this kind of pain before.    General:  Patient is well-appearing  Head:  Atraumatic  Eyes:  Conjunctiva pink  ENT:  Mucous membranes are moist  Neck:  Supple  Cardiac:  S1-S2, without murmurs  Lungs:  Clear to auscultation bilaterally  Abdomen: Abdomen has mild right upper and mid right and right lower abdominal tenderness, there is mild voluntary guarding, there is no other abdominal tenderness, no CVA tenderness, no tympany, no rigidity  Extremities:  Normal range of motion  Neurologic:  Awake, fluent speech, normal comprehension, AAOx3  Skin:  Pink warm and dry  Psychiatric:  Alert, pleasant, cooperative        ED Course  ED Course  as of 06/26/25 1429   Thu Jun 26, 2025   1429 ECG interpreted by me, sinus rhythm, rate of 106, no acute ischemic or acute infarctive changes, there is no acute change from 4/25/2025 ECG except that ECG has some nonspecific T wave changes which are not present today     Labs Reviewed   CBC AND DIFFERENTIAL - Abnormal       Result Value Ref Range Status    WBC 17.49 (*) 4.31 - 10.16 Thousand/uL Final    RBC 4.33  3.81 - 5.12 Million/uL Final    Hemoglobin 10.1 (*) 11.5 - 15.4 g/dL Final    Hematocrit 33.6 (*) 34.8 - 46.1 % Final    MCV 78 (*) 82 - 98 fL Final    MCH 23.3 (*) 26.8 - 34.3 pg Final    MCHC 30.1 (*) 31.4 - 37.4 g/dL Final    RDW 18.1 (*) 11.6 - 15.1 % Final    MPV 11.1  8.9 - 12.7 fL Final    Platelets 355  149 - 390 Thousands/uL Final    nRBC 0  /100 WBCs Final    Segmented % 76 (*) 43 - 75 % Final    Immature Grans % 1  0 - 2 % Final    Lymphocytes % 12 (*) 14 - 44 % Final    Monocytes % 7  4 - 12 % Final    Eosinophils Relative 3  0 - 6 % Final    Basophils Relative 1  0 - 1 % Final    Absolute Neutrophils 13.60 (*) 1.85 - 7.62 Thousands/µL Final    Absolute Immature Grans 0.09  0.00 - 0.20 Thousand/uL Final    Absolute Lymphocytes 2.07  0.60 - 4.47 Thousands/µL Final    Absolute Monocytes 1.21  0.17 - 1.22 Thousand/µL Final    Eosinophils Absolute 0.43  0.00 - 0.61 Thousand/µL Final    Basophils Absolute 0.09  0.00 - 0.10 Thousands/µL Final   COMPREHENSIVE METABOLIC PANEL - Abnormal    Sodium 141  135 - 147 mmol/L Final    Potassium 4.0  3.5 - 5.3 mmol/L Final    Chloride 111 (*) 96 - 108 mmol/L Final    CO2 21  21 - 32 mmol/L Final    ANION GAP 9  4 - 13 mmol/L Final    BUN 23  5 - 25 mg/dL Final    Creatinine 1.29  0.60 - 1.30 mg/dL Final    Comment: Standardized to IDMS reference method    Glucose 106  65 - 140 mg/dL Final    Comment: If the patient is fasting, the ADA then defines impaired fasting glucose as > 100 mg/dL and diabetes as > or equal to 123 mg/dL.    Calcium 9.3  8.4 - 10.2 mg/dL  Final    AST 9 (*) 13 - 39 U/L Final    ALT 8  7 - 52 U/L Final    Comment: Specimen collection should occur prior to Sulfasalazine administration due to the potential for falsely depressed results.     Alkaline Phosphatase 75  34 - 104 U/L Final    Total Protein 6.8  6.4 - 8.4 g/dL Final    Albumin 3.7  3.5 - 5.0 g/dL Final    Total Bilirubin 0.27  0.20 - 1.00 mg/dL Final    Comment: Use of this assay is not recommended for patients undergoing treatment with eltrombopag due to the potential for falsely elevated results.  N-acetyl-p-benzoquinone imine (metabolite of Acetaminophen) will generate erroneously low results in samples for patients that have taken an overdose of Acetaminophen.    eGFR 39  ml/min/1.73sq m Final    Narrative:     [employer] guidelines for Chronic Kidney Disease (CKD):     Stage 1 with normal or high GFR (GFR > 90 mL/min/1.73 square meters)    Stage 2 Mild CKD (GFR = 60-89 mL/min/1.73 square meters)    Stage 3A Moderate CKD (GFR = 45-59 mL/min/1.73 square meters)    Stage 3B Moderate CKD (GFR = 30-44 mL/min/1.73 square meters)    Stage 4 Severe CKD (GFR = 15-29 mL/min/1.73 square meters)    Stage 5 End Stage CKD (GFR <15 mL/min/1.73 square meters)  Note: GFR calculation is accurate only with a steady state creatinine   UA W REFLEX TO MICROSCOPIC WITH REFLEX TO CULTURE - Abnormal    Color, UA Light Yellow   Final    Clarity, UA Turbid   Final    Specific Gravity, UA 1.014  1.003 - 1.030 Final    pH, UA 5.5  4.5, 5.0, 5.5, 6.0, 6.5, 7.0, 7.5, 8.0 Final    Leukocytes, UA Negative  Negative Final    Nitrite, UA Negative  Negative Final    Protein, UA Trace (*) Negative mg/dl Final    Glucose, UA Negative  Negative mg/dl Final    Ketones, UA Negative  Negative mg/dl Final    Urobilinogen, UA <2.0  <2.0 mg/dl mg/dl Final    Bilirubin, UA Negative  Negative Final    Occult Blood, UA Negative  Negative Final   LIPASE - Normal    Lipase 24  11 - 82 u/L Final   LACTIC ACID,  PLASMA (W/REFLEX IF RESULT > 2.0) - Normal    LACTIC ACID 0.8  0.5 - 2.0 mmol/L Final    Narrative:     Result may be elevated if tourniquet was used during collection.   PROCALCITONIN TEST - Normal    Procalcitonin 0.13  <=0.25 ng/ml Final    Comment: Suspected Lower Respiratory Tract Infection (LRTI):  - LESS than or EQUAL to 0.25 ng/mL:   low likelihood for bacterial LRTI; antibiotics DISCOURAGED.  - GREATER than 0.25 ng/mL:   increased likelihood for bacterial LRTI; antibiotics ENCOURAGED.    Suspected Sepsis:  - Strongly consider initiating antibiotics in ALL UNSTABLE patients.  - LESS than or EQUAL to 0.5 ng/mL:   low likelihood for bacterial sepsis; antibiotics DISCOURAGED.  - GREATER than 0.5 ng/mL:   increased likelihood for bacterial sepsis; antibiotics ENCOURAGED.  - GREATER than 2 ng/mL:   high risk for severe sepsis / septic shock; antibiotics strongly ENCOURAGED.    Decisions on antibiotic use should not be based solely on Procalcitonin (PCT) levels. If PCT is low but uncertainty exists with stopping antibiotics, repeat PCT in 6-24 hours to confirm the low level. If antibiotics are administered (regardless if initial PCT was high or low), repeat PCT every 1-2 days to consider early antibiotic cessation (when GREATER than 80% decrease from the peak OR when PCT drops below designated cutoffs, whichever comes first), so long as the infection is NOT one that typically requires prolonged treatment durations (e.g., bone/joint infections, endocarditis, Staph. aureus bacteremia).    Situations of FALSE-POSITIVE Procalcitonin values:  1) Newborns < 72 hours old  2) Massive stress from severe trauma / burns, major surgery, acute pancreatitis, cardiogenic / hemorrhagic shock, sickle cell crisis, or other organ perfusion abnormalities  3) Malaria and some Candidal infections  4) Treatment with agents that stimulate cytokines (e.g., OKT3, anti-lymphocyte globulins, alemtuzumab, IL-2, granulocyte transfusion [NOT  GCSFs])  5) Chronic renal disease causes elevated baseline levels (consider GREATER than 0.75 ng/mL as an abnormal cut-off); initiating HD/CRRT may cause transient decreases  6) Paraneoplastic syndromes from medullary thyroid or SCLC, some forms of vasculitis, and acute [id number] disease    Situations of FALSE-NEGATIVE Procalcitonin values:  1) Too early in clinical course for PCT to have reached its peak (may repeat in 6-24 hours to confirm low level)  2) Localized infection WITHOUT systemic (SIRS / sepsis) response (e.g., an abscess, osteomyelitis, cystitis)  3) Mycobacteria (e.g., Tuberculosis, MAC)  4) Cystic fibrosis exacerbations     PROTIME-INR - Normal    Protime 13.7  12.3 - 15.0 seconds Final    INR 1.02  0.85 - 1.19 Final    Narrative:     INR Therapeutic Range    Indication                                             INR Range      Atrial Fibrillation                                               2.0-3.0  Hypercoagulable State                                    2.0.2.3  Left Ventricular Asist Device                            2.0-3.0  Mechanical Heart Valve                                  -    Aortic(with afib, MI, embolism, HF, LA enlargement,    and/or coagulopathy)                                     2.0-3.0 (2.5-3.5)     Mitral                                                             2.5-3.5  Prosthetic/Bioprosthetic Heart Valve               2.0-3.0  Venous thromboembolism (VTE: VT, PE        2.0-3.0   APTT - Normal    PTT 29  23 - 34 seconds Final    Comment: Therapeutic Heparin Range =  60-90 seconds   BLOOD CULTURE   BLOOD CULTURE     CT abdomen pelvis with contrast   Final Result      1.  Acute uncomplicated diverticulitis at the hepatic flexure.   2.  Underdistended transverse colon with a focal rounded appearance at the mid transverse colon. Although this could be related to underdistention/redundancy, colonoscopy is recommended to exclude neoplasm.      The study was marked in EPIC  for immediate notification.         Workstation performed: [id number]           Patient presents with acute abdominal pain, at risk for cholecystitis, symptomatic cholelithiasis, acute appendicitis, pancreatitis, or diverticulitis.  Her imaging shows acute uncomplicated diverticulitis at the hepatic flexure, there is no evidence of acute appendicitis, or acute cholecystitis, no evidence of life-threatening metabolic abnormality.  I considered admission for pain control and observation however patient says she felt comfortable, wishes to go home and would return if her symptoms got worse.  It was discussed with the patient about the CT results and the need for a follow-up colonoscopy.      IMPRESSIONS:  Acute uncomplicated sigmoid diverticulitis, acute abnormal CT, acute leukocytosis, acute right upper abdominal pain    MEDICAL DECISION MAKING CODING    COLLECTION AND INTERPRETATION OF DATA  I reviewed prior external notes, including 4/25/2025 ECG    I ordered each unique test  Tests reviewed personally by me:  ECG: See my ED course  Labs: see above  Imaging: I independently interpreted the CT as noted above.            Critical Care Time  Procedures

## 2025-06-29 LAB
BACTERIA BLD CULT: NORMAL
BACTERIA BLD CULT: NORMAL

## 2025-07-01 LAB
BACTERIA BLD CULT: NORMAL
BACTERIA BLD CULT: NORMAL

## 2025-07-09 ENCOUNTER — TELEPHONE (OUTPATIENT)
Age: 80
End: 2025-07-09

## 2025-07-09 NOTE — TELEPHONE ENCOUNTER
Patient scheduled per referral / patient needes transportation arranged for upcoming appt, roxann is also requesting a cb once ride is scheduled

## 2025-07-10 NOTE — TELEPHONE ENCOUNTER
Called to discuss ride.  Pt is on ride #4.  She has a number of upcoming appointments.  I am not sure to which appt she wanted to schedule a ride.

## 2025-07-18 NOTE — TELEPHONE ENCOUNTER
Spoke with patient.  Explained that GI appt is not until Oct 2025.  She had completed 3 rides and has 1 pending.  She has a number of appointments between now and the Oct. GI appt.  She understands, she would like to keep the remaining ride for when needed.

## 2025-08-05 ENCOUNTER — TELEPHONE (OUTPATIENT)
Dept: NEPHROLOGY | Facility: CLINIC | Age: 80
End: 2025-08-05

## 2025-08-05 DIAGNOSIS — N18.31 STAGE 3A CHRONIC KIDNEY DISEASE (HCC): Primary | ICD-10-CM

## 2025-08-05 DIAGNOSIS — N18.30 BENIGN HYPERTENSION WITH CHRONIC KIDNEY DISEASE, STAGE III (HCC): ICD-10-CM

## 2025-08-05 DIAGNOSIS — I10 ESSENTIAL HYPERTENSION: ICD-10-CM

## 2025-08-05 DIAGNOSIS — I12.9 BENIGN HYPERTENSION WITH CHRONIC KIDNEY DISEASE, STAGE III (HCC): ICD-10-CM

## 2025-08-12 ENCOUNTER — OFFICE VISIT (OUTPATIENT)
Dept: PAIN MEDICINE | Facility: CLINIC | Age: 80
End: 2025-08-12
Payer: MEDICARE

## 2025-08-18 DIAGNOSIS — R09.81 NASAL CONGESTION: ICD-10-CM

## 2025-08-18 RX ORDER — AZELASTINE HYDROCHLORIDE 137 UG/1
SPRAY, METERED NASAL
Qty: 30 ML | Refills: 3 | Status: SHIPPED | OUTPATIENT
Start: 2025-08-18

## 2025-08-20 ENCOUNTER — OFFICE VISIT (OUTPATIENT)
Dept: OBGYN CLINIC | Facility: HOSPITAL | Age: 80
End: 2025-08-20
Payer: MEDICARE

## 2025-08-20 ENCOUNTER — TELEPHONE (OUTPATIENT)
Dept: PODIATRY | Facility: CLINIC | Age: 80
End: 2025-08-20

## 2025-08-20 VITALS — HEIGHT: 61 IN | BODY MASS INDEX: 24.2 KG/M2 | WEIGHT: 128.2 LBS

## 2025-08-20 DIAGNOSIS — R20.0 LEFT LEG NUMBNESS: ICD-10-CM

## 2025-08-20 DIAGNOSIS — M25.572 CHRONIC PAIN OF LEFT ANKLE: ICD-10-CM

## 2025-08-20 DIAGNOSIS — M21.42 PES PLANUS OF BOTH FEET: ICD-10-CM

## 2025-08-20 DIAGNOSIS — G89.29 CHRONIC PAIN OF LEFT ANKLE: ICD-10-CM

## 2025-08-20 DIAGNOSIS — S93.409A SPRAIN OF ANKLE, INITIAL ENCOUNTER: Primary | ICD-10-CM

## 2025-08-20 DIAGNOSIS — M21.41 PES PLANUS OF BOTH FEET: ICD-10-CM

## 2025-08-20 PROCEDURE — 99213 OFFICE O/P EST LOW 20 MIN: CPT | Performed by: ORTHOPAEDIC SURGERY

## (undated) DEVICE — PACK PLASTIC HAND PBDS

## (undated) DEVICE — SUT VICRYL 3-0 SH 27 IN J416H

## (undated) DEVICE — CUFF TOURNIQUET 18 X 4 IN QUICK CONNECT DISP 1 BLADDER

## (undated) DEVICE — SPONGE PVP SCRUB WING STERILE

## (undated) DEVICE — GLOVE INDICATOR PI UNDERGLOVE SZ 8 BLUE

## (undated) DEVICE — GLOVE SRG BIOGEL 7.5

## (undated) DEVICE — DISPOSABLE EQUIPMENT COVER: Brand: SMALL TOWEL DRAPE

## (undated) DEVICE — NEEDLE 25G X 1 1/2

## (undated) DEVICE — SUT PROLENE 4-0 PS-2 18 IN 8682G

## (undated) DEVICE — INTENDED FOR TISSUE SEPARATION, AND OTHER PROCEDURES THAT REQUIRE A SHARP SURGICAL BLADE TO PUNCTURE OR CUT.: Brand: BARD-PARKER SAFETY BLADES SIZE 15, STERILE

## (undated) DEVICE — CHLORAPREP HI-LITE 26ML ORANGE

## (undated) DEVICE — SUT MONOCRYL 4-0 PS-2 18 IN Y496G